# Patient Record
Sex: MALE | Race: WHITE | NOT HISPANIC OR LATINO | Employment: OTHER | URBAN - METROPOLITAN AREA
[De-identification: names, ages, dates, MRNs, and addresses within clinical notes are randomized per-mention and may not be internally consistent; named-entity substitution may affect disease eponyms.]

---

## 2017-01-20 DIAGNOSIS — E66.9 OBESITY: ICD-10-CM

## 2017-01-22 ENCOUNTER — TRANSCRIBE ORDERS (OUTPATIENT)
Dept: LAB | Facility: HOSPITAL | Age: 62
End: 2017-01-22

## 2017-01-22 ENCOUNTER — APPOINTMENT (OUTPATIENT)
Dept: LAB | Facility: HOSPITAL | Age: 62
End: 2017-01-22
Payer: COMMERCIAL

## 2017-01-22 DIAGNOSIS — E66.9 OBESITY: ICD-10-CM

## 2017-01-22 LAB
ALBUMIN SERPL BCP-MCNC: 4.1 G/DL (ref 3.5–5)
ALP SERPL-CCNC: 81 U/L (ref 46–116)
ALT SERPL W P-5'-P-CCNC: 31 U/L (ref 12–78)
ANION GAP SERPL CALCULATED.3IONS-SCNC: 8 MMOL/L (ref 4–13)
AST SERPL W P-5'-P-CCNC: 14 U/L (ref 5–45)
BASOPHILS # BLD AUTO: 0.04 THOUSANDS/ΜL (ref 0–0.1)
BASOPHILS NFR BLD AUTO: 1 % (ref 0–1)
BILIRUB SERPL-MCNC: 0.53 MG/DL (ref 0.2–1)
BUN SERPL-MCNC: 17 MG/DL (ref 5–25)
CALCIUM SERPL-MCNC: 9.1 MG/DL (ref 8.3–10.1)
CHLORIDE SERPL-SCNC: 106 MMOL/L (ref 100–108)
CHOLEST SERPL-MCNC: 187 MG/DL (ref 50–200)
CO2 SERPL-SCNC: 28 MMOL/L (ref 21–32)
CREAT SERPL-MCNC: 1.32 MG/DL (ref 0.6–1.3)
EOSINOPHIL # BLD AUTO: 0.2 THOUSAND/ΜL (ref 0–0.61)
EOSINOPHIL NFR BLD AUTO: 3 % (ref 0–6)
ERYTHROCYTE [DISTWIDTH] IN BLOOD BY AUTOMATED COUNT: 13.6 % (ref 11.6–15.1)
GFR SERPL CREATININE-BSD FRML MDRD: 55.5 ML/MIN/1.73SQ M
GLUCOSE SERPL-MCNC: 146 MG/DL (ref 65–140)
HCT VFR BLD AUTO: 45.5 % (ref 36.5–49.3)
HDLC SERPL-MCNC: 49 MG/DL (ref 40–60)
HGB BLD-MCNC: 15.1 G/DL (ref 12–17)
LDLC SERPL CALC-MCNC: 113 MG/DL (ref 0–100)
LYMPHOCYTES # BLD AUTO: 2.08 THOUSANDS/ΜL (ref 0.6–4.47)
LYMPHOCYTES NFR BLD AUTO: 35 % (ref 14–44)
MCH RBC QN AUTO: 31.7 PG (ref 26.8–34.3)
MCHC RBC AUTO-ENTMCNC: 33.2 G/DL (ref 31.4–37.4)
MCV RBC AUTO: 95 FL (ref 82–98)
MONOCYTES # BLD AUTO: 0.62 THOUSAND/ΜL (ref 0.17–1.22)
MONOCYTES NFR BLD AUTO: 10 % (ref 4–12)
NEUTROPHILS # BLD AUTO: 3.03 THOUSANDS/ΜL (ref 1.85–7.62)
NEUTS SEG NFR BLD AUTO: 51 % (ref 43–75)
NRBC BLD AUTO-RTO: 0 /100 WBCS
PLATELET # BLD AUTO: 256 THOUSANDS/UL (ref 149–390)
PMV BLD AUTO: 11.1 FL (ref 8.9–12.7)
POTASSIUM SERPL-SCNC: 4.4 MMOL/L (ref 3.5–5.3)
PROT SERPL-MCNC: 7.8 G/DL (ref 6.4–8.2)
RBC # BLD AUTO: 4.77 MILLION/UL (ref 3.88–5.62)
SODIUM SERPL-SCNC: 142 MMOL/L (ref 136–145)
TRIGL SERPL-MCNC: 126 MG/DL
WBC # BLD AUTO: 5.98 THOUSAND/UL (ref 4.31–10.16)

## 2017-01-22 PROCEDURE — 85025 COMPLETE CBC W/AUTO DIFF WBC: CPT

## 2017-01-22 PROCEDURE — 80061 LIPID PANEL: CPT

## 2017-01-22 PROCEDURE — 36415 COLL VENOUS BLD VENIPUNCTURE: CPT

## 2017-01-22 PROCEDURE — 80053 COMPREHEN METABOLIC PANEL: CPT

## 2017-01-23 ENCOUNTER — ALLSCRIPTS OFFICE VISIT (OUTPATIENT)
Dept: OTHER | Facility: OTHER | Age: 62
End: 2017-01-23

## 2017-05-23 ENCOUNTER — ALLSCRIPTS OFFICE VISIT (OUTPATIENT)
Dept: OTHER | Facility: OTHER | Age: 62
End: 2017-05-23

## 2017-09-29 ENCOUNTER — GENERIC CONVERSION - ENCOUNTER (OUTPATIENT)
Dept: OTHER | Facility: OTHER | Age: 62
End: 2017-09-29

## 2017-11-28 ENCOUNTER — GENERIC CONVERSION - ENCOUNTER (OUTPATIENT)
Dept: OTHER | Facility: OTHER | Age: 62
End: 2017-11-28

## 2017-12-29 ENCOUNTER — GENERIC CONVERSION - ENCOUNTER (OUTPATIENT)
Dept: OTHER | Facility: OTHER | Age: 62
End: 2017-12-29

## 2017-12-29 ENCOUNTER — ALLSCRIPTS OFFICE VISIT (OUTPATIENT)
Dept: OTHER | Facility: OTHER | Age: 62
End: 2017-12-29

## 2017-12-29 DIAGNOSIS — E66.9 OBESITY: ICD-10-CM

## 2017-12-29 DIAGNOSIS — R73.01 IMPAIRED FASTING GLUCOSE: ICD-10-CM

## 2017-12-29 DIAGNOSIS — E78.2 MIXED HYPERLIPIDEMIA: ICD-10-CM

## 2017-12-29 DIAGNOSIS — F40.01 AGORAPHOBIA WITH PANIC DISORDER: ICD-10-CM

## 2017-12-30 NOTE — PROGRESS NOTES
Assessment   1  Agoraphobia with panic attacks (300 21) (F40 01)   2  Obesity (BMI 30-39 9) (278 00) (E66 9)   3  Impaired fasting glucose (790 21) (R73 01)   4  Hyperlipidemia, mixed (272 2) (E78 2)   5  Current every day smoker (305 1) (F17 200)   6  Encounter for screening colonoscopy (V76 51) (Z12 11)    Plan   Agoraphobia with panic attacks, Hyperlipidemia, mixed    · (1) TSH WITH FT4 REFLEX; Status:Active; Requested for:46Aoi4519;   Agoraphobia with panic attacks, Hyperlipidemia, mixed, Impaired fasting glucose,    Obesity (BMI 30-39  9)    · (1) CBC/PLT/DIFF; Status:Active; Requested for:19Eru0933;    · (1) COMPREHENSIVE METABOLIC PANEL; Status:Active; Requested for:92Vcx8778;    · (1) LIPID PANEL FASTING W DIRECT LDL REFLEX; Status:Active; Requested    for:65Xxq2931;   Encounter for screening colonoscopy    · COLONOSCOPY; Status:Active; Requested for:90Vpr4268;   Hyperlipidemia, mixed, Impaired fasting glucose    · (1) HEMOGLOBIN A1C; Status:Active; Requested for:42Xyr3802;   SocHx: Current every day smoker    · You need to quit smoking ; Status:Complete;   Done: 72DTX3515   · 2 - *MAYRA&MEGHA ( COLORECTAL SURGERY, GASTROENTEROLOGY)    Co-Management  *  Status: Hold For - Scheduling     Requested for: 47HXI0865  Care Summary provided  : Yes  SocHx: Current every day smoker, Obesity (BMI 30-39  9)    · Some eating tips that can help you lose weight ; Status:Complete;   Done: 62OXR2623    Discussion/Summary      1  Agoraphobia with panic attacks- Mood seems to be stable on Abilify and Clonazepam  He will continue to follow up with Andrew Roldan LCSW in our office  Recommended he try to engage in more social activities, perhaps try to volunteer again    Hyperlipidemia- will check lipid panel  Follow a low fat/ low cholesterol diet    IFG- labs as ordered  Follow a low carb diet, watch sweets   Encouraged weight loss    Obesity- regular exercise, weight loss encouraged    Pipe smoker- advised to stop smoking  He declined any assistance with this today   advised and ordered  I also offered to order the FIT test but he does not want to do this test discussed and advised, however, he declined  the flu vaccine today    6 months  Possible side effects of new medications were reviewed with the patient/guardian today  The treatment plan was reviewed with the patient/guardian  The patient/guardian understands and agrees with the treatment plan      Chief Complaint   Patient here for routine follow up for Agoraphobia with panic attacks, Hyperlipidemia, mixed, Impaired fasting glucose, and Obesity (BMI 30-39 9)  History of Present Illness   Pt presents by himself today for a routine follow up of chronic medical conditions   with panic attacks- Currently taking Clonazepam 0 5 mg one tab at HS and Ability 10 mg one tab daily  He does admit that he sometimes takes the Abilify every other day, for no particular reason  Reports his mood and symptoms have been stable on this regimen  Has not established himself with a therapist or Psychiatrist  Does not drive  Lives with his sister and brother in law, doesn't really leave his home  Reports a lot of anxiety just coming to this appointment today as it is a new environment  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  he was just evaluated by Kevin Alicia our office this morning- plan is to continue seeing [facility] via [employer]    last lipid panel 1/2017 with total cholesterol 187/ / Triglycerides 126/ HDL 49  He does not follow any particular diets, no regular exercise  Unfortunately, he did not have blood work that was ordered for this past summer   last checked 1/2017 at 146, still unclear if this was fasting or not    no regular exercise or attempts to lose weight    smoker- smokes a pipe twice daily for 45 years, has no desire to quit  ETOH or illicit drug use   has never had and has no desire to have this procedure   It looks like the FIT test was ordered in 1/2017 but he did not complete this has never had this level checked and does NOT want it checked now          Review of Systems        Constitutional: no fever,-- not feeling poorly,-- no chills-- and-- not feeling tired  Cardiovascular: no chest pain,-- no intermittent leg claudication,-- no palpitations-- and-- no extremity edema  Respiratory: no shortness of breath,-- no cough,-- no wheezing-- and-- no shortness of breath during exertion  Gastrointestinal: no abdominal pain,-- no nausea,-- no constipation,-- no diarrhea-- and-- no blood in stools  Genitourinary: no dysuria-- and-- no nocturia  Musculoskeletal: no arthralgias-- and-- no myalgias  Integumentary: no rashes-- and-- no skin wound  Neurological: no headache,-- no numbness,-- no tingling-- and-- no dizziness  Psychiatric: as noted in HPI,-- not suicidal-- and-- no sleep disturbances  Endocrine: no feelings of weakness  Hematologic/Lymphatic: no swollen glands,-- no tendency for easy bleeding-- and-- no tendency for easy bruising  ROS reviewed  Active Problems   1  Dental disease (525 9) (K08 9)   2  Encounter for screening colonoscopy (V76 51) (Z12 11)   3  Encounter for screening for malignant neoplasm of prostate (V76 44) (Z12 5)   4  Hyperlipidemia, mixed (272 2) (E78 2)   5  Impaired fasting glucose (790 21) (R73 01)   6  Need for influenza vaccination (V04 81) (Z23)   7  Obesity (BMI 30-39 9) (278 00) (E66 9)   8  Psychosis, atypical (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 9) (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)    Past Medical History   1  No pertinent past medical history     The active problems and past medical history were reviewed and updated today  Family History   Mother    1  Family history of Claustrophobia  Family History    2  Family history of Drug addiction   3   Family history of alcoholism (V17 0) (Z81 1)    Social History    · Denied: History of Alcohol use   · Always uses seat belt   · Current every day smoker (305 1) (F17 200)   · Denies alcohol consumption (V49 89) (Z78 9)   · No caffeine use   · No drug use   · Pipe smoker (305 1) (F17 290)   · Second hand smoke exposure (V15 89) (Z77 22)   · Single  The social history was reviewed and updated today  The social history was reviewed and is unchanged  Current Meds    1  ARIPiprazole 10 MG Oral Tablet; TAKE 1 TABLET DAILY; Therapy: 88NZG9358 to (Evaluate:13Mar2018)  Requested for: 44IMG4687; Last     Rx:13Nov2017 Ordered   2  ClonazePAM 0 5 MG Oral Tablet; TAKE 1 TABLET AT BEDTIME; Therapy: 61NOS9558 to (Evaluate:20Sep2017); Last Rx:68Jgd4029 Ordered     The medication list was reviewed and updated today  Allergies   1  No Known Drug Allergies    Vitals   Vital Signs    Recorded: 89OHC6859 10:48AM Recorded: 54Gpa5000 09:49AM   Temperature  97 3 F, Tympanic   Heart Rate  64, L Radial   Respiration  16   Systolic 509, LUE, Sitting 652, LUE   Diastolic 90, LUE, Sitting 104, LUE   Height  5 ft 7 in   Weight  222 lb 6 4 oz   BMI Calculated  34 83   BSA Calculated  2 12   Pain Scale  0     Physical Exam        Constitutional      General appearance: No acute distress, well appearing and well nourished  obese  Eyes      Conjunctiva and lids: No swelling, erythema, or discharge  Pupils and irises: Equal, round and reactive to light  Pulmonary      Respiratory effort: No increased work of breathing or signs of respiratory distress  Auscultation of lungs: Clear to auscultation, equal breath sounds bilaterally, no wheezes, no rales, no rhonci  Cardiovascular      Auscultation of heart: Normal rate and rhythm, normal S1 and S2, without murmurs  Examination of extremities for edema and/or varicosities: Normal        Carotid pulses: Normal        Abdomen      Abdomen: Non-tender, no masses  The abdomen was obese  Bowel sounds were normal  The abdomen was soft and nontender        Liver and spleen: No hepatomegaly or splenomegaly  Lymphatic      Palpation of lymph nodes in neck: No lymphadenopathy  Musculoskeletal      Gait and station: Normal        Skin      Skin and subcutaneous tissue: Normal without rashes or lesions  Neurologic Grossly intact  Psychiatric      Orientation to person, place and time: Normal        Mood and affect: Abnormal   Mood and Affect: anxious  Attending Note   Collaborating Note: I did not interview and examine the patient-- and-- I agree with the Advanced Practitioner note  Future Appointments      Date/Time Provider Specialty Site   06/29/2018 09:40 AM Yana Shirley MD Family Medicine [facility]     Signatures    Electronically signed by : Jimenez Ncie [facility]; Dec 29 2017 10:53AM EST                       (Author)     Electronically signed by :  Philip Beckman MD; Dec 29 2017 12:09PM EST                       (Author)
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
## 2018-01-12 NOTE — MISCELLANEOUS
Signatures   Electronically signed by : BROOKE Schwartz; Nov 28 2017  9:50AM EST                       (Author)

## 2018-01-12 NOTE — MISCELLANEOUS
Provider Comments  Provider Comments:   No showed today, called patient no answer      Signatures   Electronically signed by : BROOKE Dennis; Sep 29 2017 10:42AM EST                       (Author)

## 2018-01-13 VITALS
SYSTOLIC BLOOD PRESSURE: 120 MMHG | HEART RATE: 106 BPM | TEMPERATURE: 97.9 F | RESPIRATION RATE: 18 BRPM | HEIGHT: 67 IN | BODY MASS INDEX: 34.73 KG/M2 | WEIGHT: 221.25 LBS | DIASTOLIC BLOOD PRESSURE: 90 MMHG

## 2018-01-13 VITALS
BODY MASS INDEX: 34.53 KG/M2 | DIASTOLIC BLOOD PRESSURE: 80 MMHG | WEIGHT: 220 LBS | RESPIRATION RATE: 20 BRPM | HEIGHT: 67 IN | OXYGEN SATURATION: 97 % | HEART RATE: 99 BPM | SYSTOLIC BLOOD PRESSURE: 130 MMHG

## 2018-01-13 NOTE — MISCELLANEOUS
Provider Comments  Provider Comments:   Dear Gissel Donnelly,    You missed your 2nd appointment on 11/14/2016 at 10:00AM  We understand that many situations arise that occasionally prevents patients from keeping scheduled appointments  It is the policy of [facility] that patients notify us 24 hours in advance if unable to keep a scheduled appointment  Missed appointments jeopardize strong patient-physician relationships and may compromise our ability to provide you with appropriate quality medical care  The appointment you missed could have easily been made available to another patient if you had contacted us to cancel  We like to accommodate all of our patients, but when patients miss an appointment it prevents us from being able to help everyone  In the future, we request at least 24 hours notice of cancellation so we can make your appointment available to someone else in need  At this time, you have missed two or more appointments  Should you miss another appointment or cancel with failure to give 24 hours notice, we will no longer be able to maintain a patient-physician relationship and may dismiss you from the practice        Sincerely,        The Physicians and Staff of [facility]         Signatures   Electronically signed by : COLE Sherman ; Nov 14 2016 11:01AM EST                       (Author)

## 2018-01-18 NOTE — MISCELLANEOUS
Provider Comments  Provider Comments:   Dear Matthew Hinson,    You missed an appointment on 2/22/2016 at 9:40am  We understand that many situations arise that occasionally prevents patients from keeping scheduled appointments  It is the policy of [facility] that patients notify us 24 hours in advance if unable to keep a scheduled appointment  Missed appointments jeopardize strong physician-patient relationships  The appointment you missed could have easily been made available to another patient if you had contacted us to cancel  We like to accommodate all of our patients, but when patients miss an appointment it prevents us from being able to help everyone  In the future, we request at least 24 hours notice of cancellation so we can make your appointment available to someone else in need         Sincerely,    The Physicians and Staff of [facility]        Signatures   Electronically signed by : COLE Huerta ; Feb 22 2016  5:14PM EST                       (Author)

## 2018-01-23 VITALS
BODY MASS INDEX: 34.91 KG/M2 | WEIGHT: 222.4 LBS | TEMPERATURE: 97.3 F | RESPIRATION RATE: 16 BRPM | HEIGHT: 67 IN | DIASTOLIC BLOOD PRESSURE: 90 MMHG | HEART RATE: 64 BPM | SYSTOLIC BLOOD PRESSURE: 132 MMHG

## 2018-01-23 NOTE — PSYCH
History of Present Illness  Psychotherapy Provided [facility]: Individual Psychotherapy 30 minutes minutes provided today  Goals addressed in session:   Timbo Escobar denies any acute issues  Mood stable  Anxiety and agoraphobia manageable  Was long time patient at [address] [facility]  Has not been in consistent counseling or treatment since that time  Had been volunteering at [employer] and this was helpful in decreasing tension with his sister with whom he resides  Has limited activities with others  Does spend time with his niece  May function below average intellectual functioning  Had been a drinker but stopped 7 years ago  He is pleasant, verbal and cooperative  HPI - Psych: Timbo Escobar is stable  Sleep and appetite appropriate  No psychosis  Anxiety appears well managed on current meds  Has some stress at home relying on his sister for transportation  This appears to be his primary stressor  Spends time with niece  Has some social supports  Had done very well volunteering and needs to develop similar activity  Agrees with this and plans to do so after worst of winter is over  Has long [address] treatment history as an outpatient  Reports onset of agoraphobia about 10 years ago  No precipitating stressors  Possibly related to his drinking  Has been sober for 7 years   Note   Note:   Provided supportive therapy and focused on establishing a rapport  Reinforced need to develop more activities and supports separate from sister to decrease stress at home  Will work with him on this  Provided my contact information  Provided handout on evisits  Would prefer to eliminate need for sister to transport  Plans on visiting niece today to help him set up account  Once this is done, he will contact me to schedule  Assessment    1   Agoraphobia with panic attacks (300 21) (F40 01)    Signatures   Electronically signed by : Carmel Diaz LCSW; Dec 29 2017  9:57AM EST (Author)

## 2018-04-01 DIAGNOSIS — F40.01 AGORAPHOBIA WITH PANIC ATTACKS: Primary | ICD-10-CM

## 2018-04-01 RX ORDER — ARIPIPRAZOLE 10 MG/1
TABLET ORAL
Qty: 30 TABLET | Refills: 3 | Status: SHIPPED | OUTPATIENT
Start: 2018-04-01 | End: 2018-07-06 | Stop reason: SDUPTHER

## 2018-07-02 RX ORDER — CLONAZEPAM 0.5 MG/1
1 TABLET ORAL
COMMUNITY
Start: 2015-06-05 | End: 2019-06-20 | Stop reason: ALTCHOICE

## 2018-07-06 ENCOUNTER — OFFICE VISIT (OUTPATIENT)
Dept: FAMILY MEDICINE CLINIC | Facility: CLINIC | Age: 63
End: 2018-07-06
Payer: COMMERCIAL

## 2018-07-06 VITALS
RESPIRATION RATE: 16 BRPM | HEART RATE: 116 BPM | OXYGEN SATURATION: 95 % | HEIGHT: 67 IN | SYSTOLIC BLOOD PRESSURE: 132 MMHG | BODY MASS INDEX: 35.47 KG/M2 | DIASTOLIC BLOOD PRESSURE: 88 MMHG | TEMPERATURE: 98.8 F | WEIGHT: 226 LBS

## 2018-07-06 DIAGNOSIS — E66.9 OBESITY (BMI 30-39.9): Primary | ICD-10-CM

## 2018-07-06 DIAGNOSIS — F40.01 AGORAPHOBIA WITH PANIC ATTACKS: ICD-10-CM

## 2018-07-06 DIAGNOSIS — E78.2 HYPERLIPIDEMIA, MIXED: ICD-10-CM

## 2018-07-06 DIAGNOSIS — R73.01 IMPAIRED FASTING GLUCOSE: ICD-10-CM

## 2018-07-06 DIAGNOSIS — F17.290 PIPE SMOKER: ICD-10-CM

## 2018-07-06 PROCEDURE — 3725F SCREEN DEPRESSION PERFORMED: CPT | Performed by: FAMILY MEDICINE

## 2018-07-06 PROCEDURE — 3008F BODY MASS INDEX DOCD: CPT | Performed by: FAMILY MEDICINE

## 2018-07-06 PROCEDURE — 99214 OFFICE O/P EST MOD 30 MIN: CPT | Performed by: FAMILY MEDICINE

## 2018-07-06 RX ORDER — ARIPIPRAZOLE 10 MG/1
10 TABLET ORAL DAILY
Qty: 30 TABLET | Refills: 5 | Status: SHIPPED | OUTPATIENT
Start: 2018-07-06 | End: 2018-08-13 | Stop reason: SDUPTHER

## 2018-07-06 NOTE — PROGRESS NOTES
Chief Complaint   Patient presents with    Follow-up     6 month follow up     Assessment/Plan:    Obesity---lose weight  Agoraphobia with panic attacks- Mood seems to be stable on Abilify  He will continue to follow up with Deng Brunson LCSW in our office  IFG---low carb diet  Check labs  Hyperlipidemia- will check lipid panel  Follow a low fat/ low cholesterol diet     Pipe smoker- advised to stop smoking  He declined any assistance with this today    Refused Colonoscopy today  Refused PSA today  RTO 4 months  Diagnoses and all orders for this visit:    Obesity (BMI 30-39 9)  -     CBC and differential; Future  -     Comprehensive metabolic panel; Future  -     TSH, 3rd generation with Free T4 reflex; Future    Agoraphobia with panic attacks  -     ARIPiprazole (ABILIFY) 10 mg tablet; Take 1 tablet (10 mg total) by mouth daily    Impaired fasting glucose  -     Hemoglobin A1C; Future    Hyperlipidemia, mixed  -     Lipid Panel with Direct LDL reflex; Future    Pipe smoker          Subjective:      Patient ID: Naeem Hernandez is a 63 y o  male  HPI    Pt is here by himself  Obesity---BMI 35 4 today  Walk daily  Eat healthy per pt  Agoraphobia with panic attacks--- Currently taking Ability 10 mg one tab daily  He states he does not need clonazepam for a while  Reports his mood and symptoms have been stable on this regimen  Pt saw psychiatrist before but not now  Saw therapist Rusty Joyner before and would like to continue seeing Rusty Joyner  IFG- last checked 1/2017 at 146  No recent labs  Hyperlipidemia- last lipid panel 1/2017 with total cholesterol 187/ / Triglycerides 126/ HDL 49  No recent labs  Pt states he does not like needles  Current smoker- smokes a pipe twice daily for 45 years, has no desire to quit   Denies ETOH or illicit drug use    Does not drive  Lives with his sister and brother in law         The following portions of the patient's history were reviewed and updated as appropriate: allergies, current medications, past family history, past medical history, past social history, past surgical history and problem list     Review of Systems   Constitutional: Negative for appetite change, chills and fever  HENT: Negative for congestion, ear pain, sinus pain and sore throat  Eyes: Negative for discharge and itching  Respiratory: Negative for apnea, cough, chest tightness, shortness of breath and wheezing  Cardiovascular: Negative for chest pain, palpitations and leg swelling  Gastrointestinal: Negative for abdominal pain, anal bleeding, constipation, diarrhea, nausea and vomiting  Endocrine: Negative for cold intolerance, heat intolerance and polyuria  Genitourinary: Negative for difficulty urinating and dysuria  Musculoskeletal: Negative for arthralgias, back pain and myalgias  Skin: Negative for rash  Neurological: Negative for dizziness and headaches  Psychiatric/Behavioral: Negative for agitation  Objective:      /88 (BP Location: Left arm, Patient Position: Sitting, Cuff Size: Adult)   Pulse (!) 116   Temp 98 8 °F (37 1 °C) (Tympanic)   Resp 16   Ht 5' 7" (1 702 m)   Wt 103 kg (226 lb)   SpO2 95%   BMI 35 40 kg/m²          Physical Exam   Constitutional: He appears well-developed  HENT:   Head: Normocephalic and atraumatic  Eyes: Conjunctivae are normal  Pupils are equal, round, and reactive to light  Neck: Normal range of motion  Neck supple  Cardiovascular: Normal rate, regular rhythm, normal heart sounds and intact distal pulses  Pulmonary/Chest: Effort normal and breath sounds normal    Abdominal: Soft  Bowel sounds are normal    Musculoskeletal: Normal range of motion  Neurological: He is alert

## 2018-08-12 DIAGNOSIS — F40.01 AGORAPHOBIA WITH PANIC ATTACKS: ICD-10-CM

## 2018-08-13 RX ORDER — ARIPIPRAZOLE 10 MG/1
TABLET ORAL
Qty: 30 TABLET | Refills: 5 | Status: SHIPPED | OUTPATIENT
Start: 2018-08-13 | End: 2018-09-21

## 2018-08-16 DIAGNOSIS — F40.01 AGORAPHOBIA WITH PANIC ATTACKS: ICD-10-CM

## 2018-08-16 RX ORDER — ARIPIPRAZOLE 10 MG/1
TABLET ORAL
Qty: 30 TABLET | Refills: 3 | Status: SHIPPED | OUTPATIENT
Start: 2018-08-16 | End: 2019-03-14 | Stop reason: SDUPTHER

## 2018-09-21 ENCOUNTER — HOSPITAL ENCOUNTER (EMERGENCY)
Facility: HOSPITAL | Age: 63
Discharge: HOME | End: 2018-09-21
Attending: EMERGENCY MEDICINE | Admitting: EMERGENCY MEDICINE
Payer: COMMERCIAL

## 2018-09-21 VITALS
WEIGHT: 210 LBS | BODY MASS INDEX: 31.83 KG/M2 | OXYGEN SATURATION: 96 % | RESPIRATION RATE: 18 BRPM | SYSTOLIC BLOOD PRESSURE: 134 MMHG | HEART RATE: 92 BPM | HEIGHT: 68 IN | TEMPERATURE: 97.4 F | DIASTOLIC BLOOD PRESSURE: 74 MMHG

## 2018-09-21 DIAGNOSIS — T78.40XA ALLERGIC REACTION: Primary | ICD-10-CM

## 2018-09-21 PROCEDURE — 96375 TX/PRO/DX INJ NEW DRUG ADDON: CPT

## 2018-09-21 PROCEDURE — 99283 EMERGENCY DEPT VISIT LOW MDM: CPT

## 2018-09-21 PROCEDURE — 96372 THER/PROPH/DIAG INJ SC/IM: CPT

## 2018-09-21 PROCEDURE — 96374 THER/PROPH/DIAG INJ IV PUSH: CPT

## 2018-09-21 RX ORDER — DIPHENHYDRAMINE HYDROCHLORIDE 50 MG/ML
50 INJECTION INTRAMUSCULAR; INTRAVENOUS ONCE
Status: COMPLETED | OUTPATIENT
Start: 2018-09-21 | End: 2018-09-21

## 2018-09-21 RX ORDER — PREDNISONE 20 MG/1
40 TABLET ORAL DAILY
Qty: 8 TABLET | Refills: 0 | Status: SHIPPED | OUTPATIENT
Start: 2018-09-21 | End: 2018-09-25

## 2018-09-21 RX ORDER — DIPHENHYDRAMINE HCL 25 MG
50 TABLET ORAL EVERY 6 HOURS PRN
Qty: 30 TABLET | Refills: 0 | Status: SHIPPED | OUTPATIENT
Start: 2018-09-21 | End: 2019-03-12 | Stop reason: ALTCHOICE

## 2018-09-21 RX ORDER — METHYLPREDNISOLONE SODIUM SUCCINATE 125 MG/2ML
INJECTION, POWDER, LYOPHILIZED, FOR SOLUTION INTRAMUSCULAR; INTRAVENOUS
Status: COMPLETED
Start: 2018-09-21 | End: 2018-09-21

## 2018-09-21 RX ORDER — EPINEPHRINE 0.3 MG/.3ML
0.3 INJECTION SUBCUTANEOUS ONCE
Qty: 0.3 ML | Refills: 0 | Status: SHIPPED | OUTPATIENT
Start: 2018-09-21 | End: 2022-01-31 | Stop reason: HOSPADM

## 2018-09-21 RX ORDER — EPINEPHRINE 1 MG/ML
0.3 INJECTION, SOLUTION, CONCENTRATE INTRAVENOUS ONCE
Status: COMPLETED | OUTPATIENT
Start: 2018-09-21 | End: 2018-09-21

## 2018-09-21 RX ORDER — METHYLPREDNISOLONE SODIUM SUCCINATE 125 MG/2ML
125 INJECTION, POWDER, LYOPHILIZED, FOR SOLUTION INTRAMUSCULAR; INTRAVENOUS DAILY
Status: DISCONTINUED | OUTPATIENT
Start: 2018-09-21 | End: 2018-09-21 | Stop reason: HOSPADM

## 2018-09-21 RX ADMIN — FAMOTIDINE 20 MG: 10 INJECTION, SOLUTION INTRAVENOUS at 03:27

## 2018-09-21 RX ADMIN — METHYLPREDNISOLONE SODIUM SUCCINATE 125 MG: 125 INJECTION, POWDER, LYOPHILIZED, FOR SOLUTION INTRAMUSCULAR; INTRAVENOUS at 03:24

## 2018-09-21 RX ADMIN — DIPHENHYDRAMINE HYDROCHLORIDE 50 MG: 50 INJECTION, SOLUTION INTRAMUSCULAR; INTRAVENOUS at 03:21

## 2018-09-21 RX ADMIN — EPINEPHRINE 0.3 MG: 1 INJECTION, SOLUTION, CONCENTRATE INTRAVENOUS at 03:15

## 2018-09-21 RX ADMIN — METHYLPREDNISOLONE SODIUM SUCCINATE 125 MG: 125 INJECTION, POWDER, FOR SOLUTION INTRAMUSCULAR; INTRAVENOUS at 03:24

## 2018-09-21 NOTE — DISCHARGE INSTRUCTIONS
Your allergic reaction was likely due to year exposure to peanut butter  Avoid eating any food containing peanuts or occult in peanuttype wheels  Food Allergy   WHAT YOU NEED TO KNOW:   A food allergy is an immune system reaction to a food  A food allergen is an ingredient or chemical in a food that causes your immune system to react  Allergic reactions happen when your immune system fights too strongly against an allergen and causes you to get sick  Allergic reactions can happen within minutes to several hours after you eat, touch, or smell the food  You can also have a second reaction up to 8 hours later  DISCHARGE INSTRUCTIONS:   Call 911 for signs or symptoms of anaphylaxis,  such as trouble breathing, swelling in your mouth or throat, or wheezing  You may also have itching, a rash, hives, or feel like you are going to faint  Seek care immediately if:   · Your mouth, tongue, or throat swells  · You have itching or hives that spread all over your body  Contact your healthcare provider if:   · You have new or worsening rashes, hives, or itching  · You have an upset stomach or are vomiting  · You have stomach cramps or diarrhea  · You have questions or concerns about your condition or care  Medicines:   · Epinephrine  is used to treat severe allergic reactions such as anaphylaxis  · Antihistamines  decrease mild symptoms such as itching or a rash  · Steroids: This medicine may be given to decrease inflammation  · Short-acting bronchodilators: You may need short-acting bronchodilators to help open your airways quickly  These medicines may be called rescue inhalers or relievers  They relieve sudden, severe symptoms and start to work right away  · Take your medicine as directed  Contact your healthcare provider if you think your medicine is not helping or if you have side effects  Tell him or her if you are allergic to any medicine   Keep a list of the medicines, vitamins, and herbs you take  Include the amounts, and when and why you take them  Bring the list or the pill bottles to follow-up visits  Carry your medicine list with you in case of an emergency  Follow up with your healthcare provider as directed: You may need to see specialists for ongoing care  Your healthcare provider may want to test you regularly to see if the food allergy changes  Write down your questions so you remember to ask them during follow-up visits  Steps to take for signs or symptoms of anaphylaxis:   · Immediately  give 1 shot of epinephrine only into the outer thigh muscle  · Leave the shot in place  as directed  Your healthcare provider may recommend you leave it in place for up to 10 seconds before you remove it  This helps make sure all of the epinephrine is delivered  · Call 911 and go to the emergency department,  even if the shot improved symptoms  Do not drive yourself  Bring the used epinephrine shot with you  Safety precautions to take if you are at risk for anaphylaxis:   · Keep 2 shots of epinephrine with you at all times  You may need a second shot, because epinephrine only works for about 20 minutes and symptoms may return  Your healthcare provider can show you and family members how to give the shot  Check the expiration date every month and replace it before it expires  · Create an action plan  Your healthcare provider can help you create a written plan that explains the allergy and an emergency plan to treat a reaction  The plan explains when to give a second epinephrine shot if symptoms return or do not improve after the first  Give copies of the action plan and emergency instructions to family members, work and school staff, and  providers  Show them how to give a shot of epinephrine  Update the plan as the allergy changes  · Be careful when you exercise  If you have had exercise-induced anaphylaxis, do not exercise right after you eat   Stop exercising right away if you start to develop any signs or symptoms of anaphylaxis  You may first feel tired, warm, or have itchy skin  Hives, swelling, and severe breathing problems may develop if you continue to exercise  · Carry medical alert identification  Wear jewelry or carry a card that says you have a food allergy  Ask your healthcare provider where to get these items  · Do not eat the food that causes your allergy  Even a small taste can cause an allergic reaction  Your healthcare provider or a dietitian can help you plan a balanced diet  Babies may need to drink a formula that does not contain milk or soy  A dietitian can teach you how to read labels for ingredients that cause your allergies  · Ask about ingredients in foods prepared outside your home  When you eat out, ask what is in the food you want to order  Ask how food is prepared  Fried foods may contain small amounts of food allergens, such as nuts and shellfish  · Use good hygiene  Do not share utensils or food  Wash your hands before and after meals  Flu vaccine and egg allergy:  Do not get the nasal spray form of the flu vaccine if you have an egg allergy  The nasal spray may contain egg proteins that can cause anaphylaxis  Ask your healthcare provider if the injection form of the vaccine is safe for you  © 2017 [facility] [facility] Information is for End User's use only and may not be sold, redistributed or otherwise used for commercial purposes  All illustrations and images included in CareNotes® are the copyrighted property of [employer]  or Pedro Jorge  The above information is an  only  It is not intended as medical advice for individual conditions or treatments  Talk to your doctor, nurse or pharmacist before following any medical regimen to see if it is safe and effective for you

## 2018-09-21 NOTE — ED ATTENDING ATTESTATION
Marian Erickson MD, saw and evaluated the patient  I have discussed the patient with the resident/non-physician practitioner and agree with the resident's/non-physician practitioner's findings, Plan of Care, and MDM as documented in the resident's/non-physician practitioner's note, except where noted  All available labs and Radiology studies were reviewed  At this point I agree with the current assessment done in the Emergency Department  I have conducted an independent evaluation of this patient a history and physical is as follows:      Critical Care Time  CritCare Time    Procedures     65 yo male with known peanut allergy, had peanut butter and developed lip swelling and facial swelling after few hours  No trouble breathing, no sob, no trouble swallowing, no rash, no itchiness, no vomiting  Vss, afebrile, lungs cta, rrr, lip swelling, no rash, abdomen soft nontender  Epi, benadryl, steroids  Observation

## 2018-09-21 NOTE — ED PROVIDER NOTES
History  Chief Complaint   Patient presents with    Allergic Reaction     Patient reported to have eaten peanut butter arond 2030 last night  Patient woken from sleep with facial and lip swelling  79-year-old male with a known past medical history of peanut and other food allergies presents to the emergency department for an acute allergic reaction to peanut butter  Patient states that previously in his life he is able tolerate peanut butter without a reaction, even though he was diagnosed with severe peanut allergy  Patient states that today he decided he he would eat peanut butter and shortly after started to notice his lips tingling and slowly enlarging  Patient was brought by family to the emergency department for evaluation and treatment  Enroute the patient took 50 mg oral Benadryl  Patient's initial oxygen on room air was 100%  Patient denies fever, chills, nausea, vomiting, abdominal pain, chest pain, shortness of breath, dyspnea, throat itching/swelling/closing up sensation, tongue swelling  Prior to Admission Medications   Prescriptions Last Dose Informant Patient Reported? Taking? ARIPiprazole (ABILIFY) 10 mg tablet   No Yes   Sig: TAKE 1 TABLET DAILY  clonazePAM (KlonoPIN) 0 5 mg tablet  Self Yes Yes   Sig: Take 1 tablet by mouth daily at bedtime      Facility-Administered Medications: None       History reviewed  No pertinent past medical history  History reviewed  No pertinent surgical history  Family History   Problem Relation Age of Onset    Other Mother         Claustrophobia    Drug abuse Family     Alcohol abuse Family      I have reviewed and agree with the history as documented      Social History   Substance Use Topics    Smoking status: Current Every Day Smoker     Types: Pipe    Smokeless tobacco: Never Used      Comment: Secondhand smoke exposure    Alcohol use No        Review of Systems   Constitutional: Negative for chills, diaphoresis, fatigue and fever    HENT: Negative for congestion, ear discharge, facial swelling, hearing loss, rhinorrhea, sinus pain, sinus pressure, sneezing, sore throat, tinnitus and trouble swallowing  Eyes: Negative for pain, discharge and redness  Respiratory: Negative for cough, choking, chest tightness, shortness of breath, wheezing and stridor  Cardiovascular: Negative for chest pain, palpitations and leg swelling  Gastrointestinal: Negative for abdominal distention, abdominal pain, blood in stool, constipation, diarrhea, nausea and vomiting  Endocrine: Negative for cold intolerance, polydipsia and polyuria  Genitourinary: Negative for difficulty urinating, dysuria, enuresis, flank pain, frequency and hematuria  Musculoskeletal: Negative for arthralgias, back pain, gait problem and neck stiffness  Skin: Negative for rash and wound  Allergic/Immunologic: Positive for food allergies  Neurological: Negative for dizziness, seizures, syncope, weakness, numbness and headaches  Hematological: Negative for adenopathy  Psychiatric/Behavioral: Negative for agitation, confusion, hallucinations, sleep disturbance and suicidal ideas  All other systems reviewed and are negative  Physical Exam  ED Triage Vitals   Temperature Pulse Respirations Blood Pressure SpO2   09/21/18 0320 09/21/18 0320 09/21/18 0320 09/21/18 0320 09/21/18 0320   (!) 97 4 °F (36 3 °C) (!) 117 (!) 28 149/90 98 %      Temp Source Heart Rate Source Patient Position - Orthostatic VS BP Location FiO2 (%)   09/21/18 0320 09/21/18 0320 09/21/18 0320 09/21/18 0320 --   Tympanic Monitor Sitting Right arm       Pain Score       09/21/18 0330       No Pain           Orthostatic Vital Signs  Vitals:    09/21/18 0330 09/21/18 0425 09/21/18 0520 09/21/18 0543   BP: 120/82 122/69 115/74 134/74   Pulse: (!) 111 99 90 92   Patient Position - Orthostatic VS: Sitting Lying Lying        Physical Exam   Constitutional: He is oriented to person, place, and time  He appears well-developed and well-nourished  No distress  HENT:   Head: Normocephalic and atraumatic  Mouth/Throat:       Eyes: Conjunctivae and EOM are normal  Pupils are equal, round, and reactive to light  Neck: Normal range of motion  Cardiovascular: Normal rate and regular rhythm  Exam reveals no gallop and no friction rub  No murmur heard  Pulmonary/Chest: Breath sounds normal  No respiratory distress  He has no wheezes  He has no rales  Abdominal: Soft  Normal appearance and bowel sounds are normal  There is no tenderness  There is no rigidity, no rebound, no guarding, no CVA tenderness, no tenderness at McBurney's point and negative Hoyos's sign  Neurological: He is alert and oriented to person, place, and time  No cranial nerve deficit or sensory deficit  GCS eye subscore is 4  GCS verbal subscore is 5  GCS motor subscore is 6  Reflex Scores:       Bicep reflexes are 2+ on the right side and 2+ on the left side  Patellar reflexes are 2+ on the right side and 2+ on the left side  Patient has equal 5/5 strength b/l UE and Le  No focal neuro deficits noted  Skin: Skin is warm and dry  Capillary refill takes less than 2 seconds  He is not diaphoretic  Psychiatric: He has a normal mood and affect  His behavior is normal  Judgment and thought content normal    Nursing note and vitals reviewed        ED Medications  Medications   famotidine (PEPCID) injection 20 mg (20 mg Intravenous Given 9/21/18 0327)   methylPREDNISolone sodium succinate (Solu-MEDROL) injection 125 mg (125 mg Intravenous Given 9/21/18 0324)   diphenhydrAMINE (BENADRYL) injection 50 mg (50 mg Intravenous Given 9/21/18 0321)   EPINEPHrine PF (ADRENALIN) 1 mg/mL injection 0 3 mg (0 3 mg Intramuscular Given 9/21/18 0315)       Diagnostic Studies  Results Reviewed     None                 No orders to display         Procedures  Procedures      Phone Consults  ED Phone Contact    ED Course MDM  Number of Diagnoses or Management Options  Allergic reaction: new and requires workup  Diagnosis management comments: Upon arrival emergency department the patient's oxygen saturation on room air is 100%  Patient had IV access gain is given 125 Solu-Medrol, 50 mg Benadryl, 20 mg Pepcid, 0 3 mg IM epinephrine  Patient shortly after started see improvements in his sensations in his lips  Patient also states that her last epinephrine was given his throat was no longer scratchy  Approximately 30 minutes after the epinephrine and medications were given the patient's lip significantly improved the patient feels as if his symptoms are resolving  Will continue to watch for minimum 2-3 hours for rebound of symptoms  1   Food allergy, peanut  -patient prescribed EpiPen  -patient is to take Prednisone 40mg x 4 days  -patient is take Benadryl 50 mg q 8    The patient presented with a condition in which there was a high probability of imminent or life-threatening deterioration, and critical care services (excluding separately billable procedures) totalled 30-74 minutes  Disposition  Final diagnoses: Allergic reaction     Time reflects when diagnosis was documented in both MDM as applicable and the Disposition within this note     Time User Action Codes Description Comment    9/21/2018  5:24 AM Marty Walton Add [[id number] [id number]] Allergic reaction       ED Disposition     ED Disposition Condition Comment    Discharge  Dairl Pinna discharge to home/self care      Condition at discharge: Good        Follow-up Information     Follow up With Specialties Details Why Contact Info Additional Information    Margot Bryan MD Family Medicine Call in 2 days  [address] [facility] [phone]       [address] [place] [address] Emergency Department Emergency Medicine  If symptoms worsen [address]  [phone]  ED, [address] [place] [address], [place], [address] [facility], DO Allergy Call in 2 days  [address] [facility]  [address] [facility]             Discharge Medication List as of 9/21/2018  5:28 AM      START taking these medications    Details   diphenhydrAMINE (BENADRYL) 25 mg tablet Take 2 tablets (50 mg total) by mouth every 6 (six) hours as needed for itching, Starting Fri 9/21/2018, Print      EPINEPHrine (EPIPEN) 0 3 mg/0 3 mL SOAJ Inject 0 3 mL (0 3 mg total) into a muscle once for 1 dose, Starting Fri 9/21/2018, Print      predniSONE 20 mg tablet Take 2 tablets (40 mg total) by mouth daily for 4 days, Starting Fri 9/21/2018, Until Tue 9/25/2018, Print         CONTINUE these medications which have NOT CHANGED    Details   ARIPiprazole (ABILIFY) 10 mg tablet TAKE 1 TABLET DAILY , Normal      clonazePAM (KlonoPIN) 0 5 mg tablet Take 1 tablet by mouth daily at bedtime, Starting Fri 6/5/2015, Historical Med           No discharge procedures on file  ED Provider  Attending physically available and evaluated Chris Garcia  I managed the patient along with the ED Attending      Electronically Signed by         Caryle Bright, DO  09/21/18 7756

## 2018-09-21 NOTE — ED NOTES
Dr Kennedi Sims at the bedside for patient evaluation at this time       Nayan Umana, RN  09/21/18 2874

## 2018-11-05 ENCOUNTER — APPOINTMENT (OUTPATIENT)
Dept: LAB | Facility: HOSPITAL | Age: 63
End: 2018-11-05
Payer: COMMERCIAL

## 2018-11-05 DIAGNOSIS — E66.9 OBESITY (BMI 30-39.9): ICD-10-CM

## 2018-11-05 DIAGNOSIS — F40.01 AGORAPHOBIA WITH PANIC DISORDER: ICD-10-CM

## 2018-11-05 DIAGNOSIS — E78.2 HYPERLIPIDEMIA, MIXED: ICD-10-CM

## 2018-11-05 DIAGNOSIS — E66.9 OBESITY: ICD-10-CM

## 2018-11-05 DIAGNOSIS — E78.2 MIXED HYPERLIPIDEMIA: ICD-10-CM

## 2018-11-05 DIAGNOSIS — R73.01 IMPAIRED FASTING GLUCOSE: ICD-10-CM

## 2018-11-05 LAB
ALBUMIN SERPL BCP-MCNC: 3.7 G/DL (ref 3.5–5)
ALP SERPL-CCNC: 78 U/L (ref 46–116)
ALT SERPL W P-5'-P-CCNC: 34 U/L (ref 12–78)
ANION GAP SERPL CALCULATED.3IONS-SCNC: 9 MMOL/L (ref 4–13)
AST SERPL W P-5'-P-CCNC: 14 U/L (ref 5–45)
BASOPHILS # BLD AUTO: 0.06 THOUSANDS/ΜL (ref 0–0.1)
BASOPHILS NFR BLD AUTO: 1 % (ref 0–1)
BILIRUB SERPL-MCNC: 0.3 MG/DL (ref 0.2–1)
BUN SERPL-MCNC: 14 MG/DL (ref 5–25)
CALCIUM SERPL-MCNC: 8.2 MG/DL (ref 8.3–10.1)
CHLORIDE SERPL-SCNC: 107 MMOL/L (ref 100–108)
CHOLEST SERPL-MCNC: 174 MG/DL (ref 50–200)
CO2 SERPL-SCNC: 22 MMOL/L (ref 21–32)
CREAT SERPL-MCNC: 1.17 MG/DL (ref 0.6–1.3)
EOSINOPHIL # BLD AUTO: 0.18 THOUSAND/ΜL (ref 0–0.61)
EOSINOPHIL NFR BLD AUTO: 2 % (ref 0–6)
ERYTHROCYTE [DISTWIDTH] IN BLOOD BY AUTOMATED COUNT: 13.9 % (ref 11.6–15.1)
EST. AVERAGE GLUCOSE BLD GHB EST-MCNC: 126 MG/DL
GFR SERPL CREATININE-BSD FRML MDRD: 67 ML/MIN/1.73SQ M
GLUCOSE P FAST SERPL-MCNC: 146 MG/DL (ref 65–99)
HBA1C MFR BLD: 6 % (ref 4.2–6.3)
HCT VFR BLD AUTO: 43.9 % (ref 36.5–49.3)
HDLC SERPL-MCNC: 34 MG/DL (ref 40–60)
HGB BLD-MCNC: 14.4 G/DL (ref 12–17)
IMM GRANULOCYTES # BLD AUTO: 0.01 THOUSAND/UL (ref 0–0.2)
IMM GRANULOCYTES NFR BLD AUTO: 0 % (ref 0–2)
LDLC SERPL CALC-MCNC: 116 MG/DL (ref 0–100)
LYMPHOCYTES # BLD AUTO: 2.09 THOUSANDS/ΜL (ref 0.6–4.47)
LYMPHOCYTES NFR BLD AUTO: 28 % (ref 14–44)
MCH RBC QN AUTO: 31.8 PG (ref 26.8–34.3)
MCHC RBC AUTO-ENTMCNC: 32.8 G/DL (ref 31.4–37.4)
MCV RBC AUTO: 97 FL (ref 82–98)
MONOCYTES # BLD AUTO: 0.62 THOUSAND/ΜL (ref 0.17–1.22)
MONOCYTES NFR BLD AUTO: 8 % (ref 4–12)
NEUTROPHILS # BLD AUTO: 4.64 THOUSANDS/ΜL (ref 1.85–7.62)
NEUTS SEG NFR BLD AUTO: 61 % (ref 43–75)
NRBC BLD AUTO-RTO: 0 /100 WBCS
PLATELET # BLD AUTO: 238 THOUSANDS/UL (ref 149–390)
PMV BLD AUTO: 11.5 FL (ref 8.9–12.7)
POTASSIUM SERPL-SCNC: 3.6 MMOL/L (ref 3.5–5.3)
PROT SERPL-MCNC: 7 G/DL (ref 6.4–8.2)
RBC # BLD AUTO: 4.53 MILLION/UL (ref 3.88–5.62)
SODIUM SERPL-SCNC: 138 MMOL/L (ref 136–145)
TRIGL SERPL-MCNC: 122 MG/DL
TSH SERPL DL<=0.05 MIU/L-ACNC: 1.17 UIU/ML (ref 0.36–3.74)
WBC # BLD AUTO: 7.6 THOUSAND/UL (ref 4.31–10.16)

## 2018-11-05 PROCEDURE — 85025 COMPLETE CBC W/AUTO DIFF WBC: CPT

## 2018-11-05 PROCEDURE — 80053 COMPREHEN METABOLIC PANEL: CPT

## 2018-11-05 PROCEDURE — 80061 LIPID PANEL: CPT

## 2018-11-05 PROCEDURE — 36415 COLL VENOUS BLD VENIPUNCTURE: CPT

## 2018-11-05 PROCEDURE — 83036 HEMOGLOBIN GLYCOSYLATED A1C: CPT

## 2018-11-05 PROCEDURE — 84443 ASSAY THYROID STIM HORMONE: CPT

## 2018-11-09 ENCOUNTER — OFFICE VISIT (OUTPATIENT)
Dept: FAMILY MEDICINE CLINIC | Facility: CLINIC | Age: 63
End: 2018-11-09
Payer: COMMERCIAL

## 2018-11-09 VITALS
HEART RATE: 68 BPM | TEMPERATURE: 97.9 F | WEIGHT: 226.4 LBS | HEIGHT: 68 IN | RESPIRATION RATE: 16 BRPM | BODY MASS INDEX: 34.31 KG/M2 | SYSTOLIC BLOOD PRESSURE: 118 MMHG | DIASTOLIC BLOOD PRESSURE: 86 MMHG

## 2018-11-09 DIAGNOSIS — F40.01 AGORAPHOBIA WITH PANIC ATTACKS: ICD-10-CM

## 2018-11-09 DIAGNOSIS — Z23 NEED FOR INFLUENZA VACCINATION: ICD-10-CM

## 2018-11-09 DIAGNOSIS — E66.9 OBESITY (BMI 30-39.9): ICD-10-CM

## 2018-11-09 DIAGNOSIS — E78.2 HYPERLIPIDEMIA, MIXED: ICD-10-CM

## 2018-11-09 DIAGNOSIS — R73.01 IMPAIRED FASTING GLUCOSE: Primary | ICD-10-CM

## 2018-11-09 PROCEDURE — 90471 IMMUNIZATION ADMIN: CPT

## 2018-11-09 PROCEDURE — 99214 OFFICE O/P EST MOD 30 MIN: CPT | Performed by: FAMILY MEDICINE

## 2018-11-09 PROCEDURE — 3008F BODY MASS INDEX DOCD: CPT | Performed by: FAMILY MEDICINE

## 2018-11-09 PROCEDURE — 90682 RIV4 VACC RECOMBINANT DNA IM: CPT

## 2018-11-09 RX ORDER — D-METHORPHAN/PE/ACETAMINOPHEN 5-325MG/15
LIQUID (ML) ORAL
Refills: 0 | COMMUNITY
Start: 2018-09-21 | End: 2019-03-12

## 2018-11-09 NOTE — PROGRESS NOTES
Chief Complaint   Patient presents with    Follow-up     4 month follow up  Health Maintenance   Topic Date Due    CRC Screening: Colonoscopy  1955    Pneumococcal PPSV23 Medium Risk Adult (1 of 1 - PPSV23) 08/21/1976    DTaP,Tdap,and Td Vaccines (1 - Tdap) 08/21/1978    INFLUENZA VACCINE  07/01/2018    Depression Screening PHQ  07/06/2019     Assessment/Plan:  Reviewed lab in 11/2018  TSH normal  CBC ok  CMP ok  HgA1C 6 0 elevated  Lipid 174/122/34/116 ok    IFG---low carb diet  Agoraphobia with panic attacks- Mood seems to be stable on Abilify  He will continue to follow up with Hardik Suarez LCSW in our office     Hyperlipidemia---Follow a low fat/ low cholesterol diet     Obesity---lose weight  Advised pt to exercise regularly       Give flu shot today  Refused pneumovax  Refused Colonoscopy today  Refused PSA today  RTO 6 months  Diagnoses and all orders for this visit:    Impaired fasting glucose  -     Hemoglobin A1C; Future    Agoraphobia with panic attacks  -     Comprehensive metabolic panel; Future    Hyperlipidemia, mixed  -     Lipid Panel with Direct LDL reflex; Future    Obesity (BMI 30-39 9)  -     TSH, 3rd generation with Free T4 reflex; Future    Need for influenza vaccination  -     influenza vaccine, [id number], quadrivalent, recombinant, PF, 0 5 mL, for patients 18 yr+ (FLUBLOK)    Other orders  -     Cancel: Ambulatory referral to Gastroenterology; Future  -     Cancel: Ambulatory referral to Gastroenterology; Future          Subjective:      Patient ID: More Loving is a 66 y o  male  HPI    Pt is here by himself  IFG---11/2018 hgA1C 6 0 Pt likes candy       Agoraphobia with panic attacks--- Currently taking Ability 10 mg one tab daily  He states he does not need clonazepam for a while  Reports his mood and symptoms have been stable on this regimen  Pt saw psychiatrist before but not now  Saw therapist Farhat Young before and would like to continue seeing Farhat Young     Hyperlipidemia---Pt states he eat healthy, a lot of vegetables  Obesity---BMI 34 42 today        Current smoker- smokes a pipe twice daily for 45 years, has no desire to quit   Denies ETOH or illicit drug use     Does not drive  Lives with his sister and brother in law  The following portions of the patient's history were reviewed and updated as appropriate: allergies, current medications, past family history, past medical history, past social history, past surgical history and problem list     Review of Systems   Constitutional: Negative for appetite change, chills and fever  HENT: Negative for congestion, ear pain, sinus pain and sore throat  Eyes: Negative for discharge and itching  Respiratory: Negative for apnea, cough, chest tightness, shortness of breath and wheezing  Cardiovascular: Negative for chest pain, palpitations and leg swelling  Gastrointestinal: Negative for abdominal pain, anal bleeding, constipation, diarrhea, nausea and vomiting  Endocrine: Negative for cold intolerance, heat intolerance and polyuria  Genitourinary: Negative for difficulty urinating and dysuria  Musculoskeletal: Negative for arthralgias, back pain and myalgias  Skin: Negative for rash  Neurological: Negative for dizziness and headaches  Psychiatric/Behavioral: Negative for agitation  Objective:      /86 (BP Location: Left arm, Patient Position: Sitting, Cuff Size: Standard)   Pulse 68   Temp 97 9 °F (36 6 °C) (Tympanic)   Resp 16   Ht 5' 8" (1 727 m)   Wt 103 kg (226 lb 6 4 oz)   BMI 34 42 kg/m²          Physical Exam   Constitutional: He appears well-developed  HENT:   Head: Normocephalic and atraumatic  Eyes: Pupils are equal, round, and reactive to light  Conjunctivae are normal    Neck: Normal range of motion  Neck supple  Cardiovascular: Normal rate, regular rhythm, normal heart sounds and intact distal pulses      Pulmonary/Chest: Effort normal and breath sounds normal    Abdominal: Soft  Bowel sounds are normal    Musculoskeletal: Normal range of motion  Neurological: He is alert

## 2018-12-12 ENCOUNTER — HOSPITAL ENCOUNTER (EMERGENCY)
Facility: HOSPITAL | Age: 63
Discharge: HOME | End: 2018-12-12
Attending: EMERGENCY MEDICINE
Payer: COMMERCIAL

## 2018-12-12 VITALS
OXYGEN SATURATION: 94 % | TEMPERATURE: 97.9 F | SYSTOLIC BLOOD PRESSURE: 134 MMHG | RESPIRATION RATE: 18 BRPM | HEART RATE: 86 BPM | DIASTOLIC BLOOD PRESSURE: 64 MMHG

## 2018-12-12 DIAGNOSIS — T78.40XA ALLERGIC REACTION: ICD-10-CM

## 2018-12-12 DIAGNOSIS — T78.3XXA ANGIOEDEMA: Primary | ICD-10-CM

## 2018-12-12 PROCEDURE — 96374 THER/PROPH/DIAG INJ IV PUSH: CPT

## 2018-12-12 PROCEDURE — 96375 TX/PRO/DX INJ NEW DRUG ADDON: CPT

## 2018-12-12 PROCEDURE — 99283 EMERGENCY DEPT VISIT LOW MDM: CPT

## 2018-12-12 RX ORDER — DIPHENHYDRAMINE HYDROCHLORIDE 50 MG/ML
50 INJECTION INTRAMUSCULAR; INTRAVENOUS ONCE
Status: COMPLETED | OUTPATIENT
Start: 2018-12-12 | End: 2018-12-12

## 2018-12-12 RX ORDER — METHYLPREDNISOLONE SODIUM SUCCINATE 125 MG/2ML
125 INJECTION, POWDER, LYOPHILIZED, FOR SOLUTION INTRAMUSCULAR; INTRAVENOUS ONCE
Status: COMPLETED | OUTPATIENT
Start: 2018-12-12 | End: 2018-12-12

## 2018-12-12 RX ORDER — PREDNISONE 20 MG/1
40 TABLET ORAL DAILY
Qty: 8 TABLET | Refills: 0 | Status: SHIPPED | OUTPATIENT
Start: 2018-12-12 | End: 2018-12-16

## 2018-12-12 RX ADMIN — DIPHENHYDRAMINE HYDROCHLORIDE 50 MG: 50 INJECTION, SOLUTION INTRAMUSCULAR; INTRAVENOUS at 05:15

## 2018-12-12 RX ADMIN — FAMOTIDINE 20 MG: 10 INJECTION, SOLUTION INTRAVENOUS at 05:17

## 2018-12-12 RX ADMIN — METHYLPREDNISOLONE SODIUM SUCCINATE 125 MG: 125 INJECTION, POWDER, FOR SOLUTION INTRAMUSCULAR; INTRAVENOUS at 05:16

## 2018-12-12 NOTE — ED PROVIDER NOTES
History  Chief Complaint   Patient presents with    Allergic Reaction     Pt woke up with swelling to left side of face  Pt denies problems breathing  65 y/o male presents to the ED for L upper lip swelling  Patient states that he woke up from sleep about 20 minutes ago and noticed the swelling  He states that he has a hx of angioedema with prior allergic reactions to foods  He states that he is allergic to peanut butter and mushrooms- had a new TV dinner last night and is unsure it contained either of these  He did not take anything for the symptoms  Denies any tongue swelling, sensation of his throat closing, drooling, throat swelling, sob, wheeze, abd pain, n/v, or rash/ itching  He reports that he was seen here a few months ago for similar, after eating peanut butter- states that his entire upper and lower lips were swollen at that time and he was given epinephrine with resolution of symptoms  He denies any prior admissions or intubations for this  Denies any other complaints  Denies any new medications or ACE/ARB medication use  History provided by:  Patient      Prior to Admission Medications   Prescriptions Last Dose Informant Patient Reported? Taking? ARIPiprazole (ABILIFY) 10 mg tablet  Self No Yes   Sig: TAKE 1 TABLET DAILY  [facility] ALL[facility]GY 25 MG capsule  Self Yes Yes   Sig: TAKE 2 CAPSULES BY MOUTH EVERY 6 HRS AS NEEDED FOR ITCHING   EPINEPHrine (EPIPEN) 0 3 mg/0 3 mL SOAJ  Self No Yes   Sig: Inject 0 3 mL (0 3 mg total) into a muscle once for 1 dose   clonazePAM (KlonoPIN) 0 5 mg tablet  Self Yes Yes   Sig: Take 1 tablet by mouth daily at bedtime   diphenhydrAMINE (BENADRYL) 25 mg tablet  Self No Yes   Sig: Take 2 tablets (50 mg total) by mouth every 6 (six) hours as needed for itching      Facility-Administered Medications: None       History reviewed  No pertinent past medical history  History reviewed  No pertinent surgical history      Family History   Problem Relation Age of Onset  Other Mother         Claustrophobia    Drug abuse Family     Alcohol abuse Family      I have reviewed and agree with the history as documented  Social History   Substance Use Topics    Smoking status: Current Every Day Smoker     Types: Pipe    Smokeless tobacco: Never Used      Comment: Secondhand smoke exposure    Alcohol use No        Review of Systems   Constitutional: Negative for chills and fever  HENT: Positive for facial swelling  Negative for congestion, ear pain and sore throat  Eyes: Negative for pain and visual disturbance  Respiratory: Negative for cough, shortness of breath and wheezing  Cardiovascular: Negative for chest pain and leg swelling  Gastrointestinal: Negative for abdominal pain, diarrhea, nausea and vomiting  Genitourinary: Negative for dysuria, frequency, hematuria and urgency  Musculoskeletal: Negative for neck pain and neck stiffness  Skin: Negative for rash and wound  Neurological: Negative for weakness, numbness and headaches  Psychiatric/Behavioral: Negative for agitation and confusion  All other systems reviewed and are negative  Physical Exam  ED Triage Vitals   Temperature Pulse Respirations Blood Pressure SpO2   12/12/18 0510 12/12/18 0507 12/12/18 0507 12/12/18 0507 12/12/18 0507   97 9 °F (36 6 °C) (!) 111 20 151/78 95 %      Temp src Heart Rate Source Patient Position - Orthostatic VS BP Location FiO2 (%)   -- 12/12/18 0600 12/12/18 0600 12/12/18 0600 --    Monitor Lying Right arm       Pain Score       12/12/18 0507       No Pain           Orthostatic Vital Signs  Vitals:    12/12/18 0507 12/12/18 0600 12/12/18 0730   BP: 151/78 137/76 134/64   Pulse: (!) 111 90 86   Patient Position - Orthostatic VS:  Lying Lying       Physical Exam   Constitutional: He is oriented to person, place, and time  He appears well-developed and well-nourished  HENT:   Head: Normocephalic and atraumatic     Mouth/Throat: Oropharynx is clear and moist  Left sided upper lip swelling  No tongue swelling  Oropharynx is patent and open without any edema    Eyes: Pupils are equal, round, and reactive to light  EOM are normal    Neck: Normal range of motion  Neck supple  Cardiovascular: Normal rate and regular rhythm  Pulmonary/Chest: Effort normal and breath sounds normal  No respiratory distress  He has no wheezes  He has no rales  He exhibits no tenderness  Abdominal: Soft  Bowel sounds are normal  He exhibits no distension  There is no tenderness  Musculoskeletal: Normal range of motion  Neurological: He is alert and oriented to person, place, and time  No focal deficits   Skin: Skin is warm and dry  No rash noted  Nursing note and vitals reviewed  ED Medications  Medications   methylPREDNISolone sodium succinate (Solu-MEDROL) injection 125 mg (125 mg Intravenous Given 12/12/18 0516)   famotidine (PEPCID) injection 20 mg (20 mg Intravenous Given 12/12/18 0517)   diphenhydrAMINE (BENADRYL) injection 50 mg (50 mg Intravenous Given 12/12/18 0515)       Diagnostic Studies  Results Reviewed     None                 No orders to display         Procedures  Procedures      Phone Consults  ED Phone Contact    ED Course  ED Course as of Dec 13 0138   Wed Dec 12, 2018   0627 Patient re-evaluated- states that he feels slightly improved  Swelling has gone down slightly  MDM  Number of Diagnoses or Management Options  Allergic reaction: new and requires workup  Angioedema: new and requires workup  Diagnosis management comments: Patient with unilateral angioedema- will give steroids, benadryl, and pepcid- will reassess  Case signed out- pending continued observation          Amount and/or Complexity of Data Reviewed  Clinical lab tests: ordered and reviewed  Tests in the radiology section of CPT®: ordered and reviewed  Tests in the medicine section of CPT®: ordered and reviewed  Discussion of test results with the performing providers: yes  Decide to obtain previous medical records or to obtain history from someone other than the patient: yes  Obtain history from someone other than the patient: yes  Review and summarize past medical records: yes  Discuss the patient with other providers: yes  Independent visualization of images, tracings, or specimens: yes    Patient Progress  Patient progress: improved    CritCare Time    Disposition  Final diagnoses:   Angioedema   Allergic reaction     Time reflects when diagnosis was documented in both MDM as applicable and the Disposition within this note     Time User Action Codes Description Comment    12/12/2018  5:38 AM Genoveva Boothh A Add [place]  3XXA] Angioedema     12/12/2018  6:54 AM Genoveva Boothh A Add [T78 [id number]] Allergic reaction       ED Disposition     ED Disposition Condition Comment    Discharge  Deette Pro discharge to home/self care  Condition at discharge: Stable        Follow-up Information     Follow up With Specialties Details Why Contact Info Additional Information    Ida Kenney MD Family Medicine Call in 1 day for follow up within 2-3 days  [address]   [address] Emergency Department Emergency Medicine Go to immediately for any new or worsening symptoms    [address] [place] [address], [address] [place] [address], [id number]          Discharge Medication List as of 12/12/2018  7:11 AM      START taking these medications    Details   predniSONE 20 mg tablet Take 2 tablets (40 mg total) by mouth daily for 4 days, Starting Wed 12/12/2018, Until Sun 12/16/2018, Print         CONTINUE these medications which have NOT CHANGED    Details   ARIPiprazole (ABILIFY) 10 mg tablet TAKE 1 TABLET DAILY , Normal      clonazePAM (KlonoPIN) 0 5 mg tablet Take 1 tablet by mouth daily at bedtime, Starting Fri 6/5/2015, Historical Med      [facility] ALLERGY 25 MG capsule TAKE 2 CAPSULES BY MOUTH EVERY 6 HRS AS NEEDED FOR ITCHING, Historical Med      diphenhydrAMINE (BENADRYL) 25 mg tablet Take 2 tablets (50 mg total) by mouth every 6 (six) hours as needed for itching, Starting Fri 9/21/2018, Print      EPINEPHrine (EPIPEN) 0 3 mg/0 3 mL SOAJ Inject 0 3 mL (0 3 mg total) into a muscle once for 1 dose, Starting Fri 9/21/2018, Print           No discharge procedures on file  ED Provider  Attending physically available and evaluated Matthew Hinson I managed the patient along with the ED Attending      Electronically Signed by         Martin Case DO  12/13/18 3321

## 2018-12-12 NOTE — ED ATTENDING ATTESTATION
Saundra Foreman MD, saw and evaluated the patient  I have discussed the patient with the resident/non-physician practitioner and agree with the resident's/non-physician practitioner's findings, Plan of Care, and MDM as documented in the resident's/non-physician practitioner's note, except where noted  All available labs and Radiology studies were reviewed  At this point I agree with the current assessment done in the Emergency Department  I have conducted an independent evaluation of this patient including a focused history and a physical exam     68-year-old male, history of allergies to mushrooms and peanuts, presents to the emergency department for evaluation of swelling to his left upper lip that was present on waking  Patient denies any known ingestants, however states that he a new TV dinner the preceding night  Patient denies any sensation of swelling in his tongue or shortness of breath  Patient did not administer any medications prior to arrival   Patient has an epinephrine pen at home  On examination the patient is noted to have some mild swelling of the left upper lip  Uvula is midline and nonswollen  Tongue is nonswollen  Patient has a Mallampati 1 airway  Neck is supple without stridor  Lungs are clear to auscultation bilaterally  Heart is regular rate and rhythm  Abdomen is soft and nontender  Skin without any rashes  Allergic reaction versus idiopathic angioedema of the left side of the upper lip  Patient will be medicated with Benadryl, steroids, and H2 blockers and will be observed in the emergency department  Given the focal nature of the swelling, and lack of systemic symptoms, patient does not require epinephrine at this point for anaphylaxis

## 2018-12-12 NOTE — DISCHARGE INSTRUCTIONS
Angioedema   WHAT YOU NEED TO KNOW:   Angioedema is sudden swelling caused by fluid that collects in deep layers of the skin  Swelling occurs most often on the face, lips, tongue, or throat, but it can happen anywhere on the body  Your risk for angioedema increases if you have food or insect allergies or a family history of angioedema  Emotional stress, autoimmune disorders, and medicines, such as ACE inhibitors, also increase your risk  Your symptoms may be mild, or you may develop anaphylaxis  Anaphylaxis is a sudden, life-threatening reaction that needs immediate treatment  DISCHARGE INSTRUCTIONS:   Call 911 for signs or symptoms of anaphylaxis,  such as trouble breathing, swelling in your mouth or throat, or wheezing  You may also have itching, a rash, hives, or feel like you are going to faint  Return to the emergency department if:   · You have sudden behavior changes or irritability  · You are dizzy and your heart is beating faster than usual   Contact your healthcare provider if:   · Your swelling does not improve, even after you take your medicines  · You have questions or concerns about your condition or care  Medicines:   · Antihistamines  decrease mild symptoms such as itching or a rash  · Epinephrine  is used to treat severe allergic reactions such as anaphylaxis  · Steroids: This medicine may be given to decrease redness, pain, and swelling  · Take your medicine as directed  Contact your healthcare provider if you think your medicine is not helping or if you have side effects  Tell him of her if you are allergic to any medicine  Keep a list of the medicines, vitamins, and herbs you take  Include the amounts, and when and why you take them  Bring the list or the pill bottles to follow-up visits  Carry your medicine list with you in case of an emergency    Steps to take for signs or symptoms of anaphylaxis:   · Immediately  give 1 shot of epinephrine only into the outer thigh muscle  · Leave the shot in place  as directed  Your healthcare provider may recommend you leave it in place for up to 10 seconds before you remove it  This helps make sure all of the epinephrine is delivered  · Call 911 and go to the emergency department,  even if the shot improved symptoms  Do not drive yourself  Bring the used epinephrine shot with you  Safety precautions to take if you are at risk for anaphylaxis:   · Keep 2 shots of epinephrine with you at all times  You may need a second shot, because epinephrine only works for about 20 minutes and symptoms may return  Your healthcare provider can show you and family members how to give the shot  Check the expiration date every month and replace it before it expires  · Create an action plan  Your healthcare provider can help you create a written plan that explains the allergy and an emergency plan to treat a reaction  The plan explains when to give a second epinephrine shot if symptoms return or do not improve after the first  Give copies of the action plan and emergency instructions to family members, work and school staff, and  providers  Show them how to give a shot of epinephrine  · Be careful when you exercise  If you have had exercise-induced anaphylaxis, do not exercise right after you eat  Stop exercising right away if you start to develop any signs or symptoms of anaphylaxis  You may first feel tired, warm, or have itchy skin  Hives, swelling, and severe breathing problems may develop if you continue to exercise  · Carry medical alert identification  Wear medical alert jewelry or carry a card that explains the allergy  Ask your healthcare provider where to get these items  · Keep a symptom diary  Include information about how often your symptoms occur, how long they last, and if they are mild or severe  Also keep information on what you ate, what happened, or which medicines you took before the swelling started  · Avoid triggers  Triggers include foods, medicines, and other things that you know cause symptoms  You may need to see a specialist, such as an allergist or dietitian, to learn what to avoid  Follow up with your healthcare provider as directed:  Write down your questions so you remember to ask them during your visits  © 2017 [employer] Information is for End User's use only and may not be sold, redistributed or otherwise used for commercial purposes  All illustrations and images included in CareNotes® are the copyrighted property of A D A M [employer]  or [employer]  The above information is an  only  It is not intended as medical advice for individual conditions or treatments  Talk to your doctor, nurse or pharmacist before following any medical regimen to see if it is safe and effective for you  General Allergic Reaction   WHAT YOU NEED TO KNOW:   An allergic reaction is your body's response to an allergen  Allergens include medicines, food, insect stings, animal dander, mold, latex, chemicals, and dust mites  Pollen from trees, grass, and weeds can also cause an allergic reaction  DISCHARGE INSTRUCTIONS:   Return to the emergency department if:   · You have a skin rash, hives, swelling, or itching that gets worse  · You have trouble breathing, shortness of breath, wheezing, or coughing  · Your throat tightens, or your lips or tongue swell  · You have trouble swallowing or speaking  · You have dizziness, lightheadedness, fainting, or confusion  · You have nausea, vomiting, diarrhea, or abdominal cramps  · You have chest pain or tightness  Contact your healthcare provider if:   · You have questions or concerns about your condition or care  Medicines:   · Medicines  may be given to relieve certain allergy symptoms such as itching, sneezing, and swelling  You may take them as a pill or use drops in your nose or eyes   Topical treatments may be given to put directly on your skin to help decrease itching or swelling  · Take your medicine as directed  Contact your healthcare provider if you think your medicine is not helping or if you have side effects  Tell him of her if you are allergic to any medicine  Keep a list of the medicines, vitamins, and herbs you take  Include the amounts, and when and why you take them  Bring the list or the pill bottles to follow-up visits  Carry your medicine list with you in case of an emergency  Follow up with your healthcare provider as directed:  Write down your questions so you remember to ask them during your visits  Self-care:   · Avoid the allergen  that you think may have caused your allergic reaction  · Use cold compresses  on your skin or eyes if they were affected by the allergic reaction  Cold compresses may help to soothe your skin or eyes  · Rinse your nasal passages  with a saline solution  Daily rinsing may help clear your nose of allergens  · Do not smoke  Your allergy symptoms may decrease if you are not around smoke  Nicotine and other chemicals in cigarettes and cigars can also cause lung damage  Ask your healthcare provider for information if you currently smoke and need help to quit  E-cigarettes or smokeless tobacco still contain nicotine  Talk to your healthcare provider before you use these products  © 2017 [facility] Information is for End User's use only and may not be sold, redistributed or otherwise used for commercial purposes  All illustrations and images included in CareNotes® are the copyrighted property of A [employer]  or Pedro Jorge  The above information is an  only  It is not intended as medical advice for individual conditions or treatments  Talk to your doctor, nurse or pharmacist before following any medical regimen to see if it is safe and effective for you

## 2019-03-07 ENCOUNTER — APPOINTMENT (OUTPATIENT)
Dept: LAB | Facility: HOSPITAL | Age: 64
End: 2019-03-07
Payer: COMMERCIAL

## 2019-03-07 DIAGNOSIS — R73.01 IMPAIRED FASTING GLUCOSE: ICD-10-CM

## 2019-03-07 DIAGNOSIS — E66.9 OBESITY (BMI 30-39.9): ICD-10-CM

## 2019-03-07 DIAGNOSIS — E78.2 HYPERLIPIDEMIA, MIXED: ICD-10-CM

## 2019-03-07 DIAGNOSIS — F40.01 AGORAPHOBIA WITH PANIC ATTACKS: ICD-10-CM

## 2019-03-07 LAB
ALBUMIN SERPL BCP-MCNC: 3.8 G/DL (ref 3.5–5)
ALP SERPL-CCNC: 80 U/L (ref 46–116)
ALT SERPL W P-5'-P-CCNC: 31 U/L (ref 12–78)
ANION GAP SERPL CALCULATED.3IONS-SCNC: 3 MMOL/L (ref 4–13)
AST SERPL W P-5'-P-CCNC: 15 U/L (ref 5–45)
BILIRUB SERPL-MCNC: 0.36 MG/DL (ref 0.2–1)
BUN SERPL-MCNC: 18 MG/DL (ref 5–25)
CALCIUM SERPL-MCNC: 9.1 MG/DL (ref 8.3–10.1)
CHLORIDE SERPL-SCNC: 106 MMOL/L (ref 100–108)
CHOLEST SERPL-MCNC: 186 MG/DL (ref 50–200)
CO2 SERPL-SCNC: 30 MMOL/L (ref 21–32)
CREAT SERPL-MCNC: 1.2 MG/DL (ref 0.6–1.3)
EST. AVERAGE GLUCOSE BLD GHB EST-MCNC: 120 MG/DL
GFR SERPL CREATININE-BSD FRML MDRD: 65 ML/MIN/1.73SQ M
GLUCOSE P FAST SERPL-MCNC: 100 MG/DL (ref 65–99)
HBA1C MFR BLD: 5.8 % (ref 4.2–6.3)
HDLC SERPL-MCNC: 36 MG/DL (ref 40–60)
LDLC SERPL CALC-MCNC: 123 MG/DL (ref 0–100)
POTASSIUM SERPL-SCNC: 4.7 MMOL/L (ref 3.5–5.3)
PROT SERPL-MCNC: 7.2 G/DL (ref 6.4–8.2)
SODIUM SERPL-SCNC: 139 MMOL/L (ref 136–145)
TRIGL SERPL-MCNC: 137 MG/DL
TSH SERPL DL<=0.05 MIU/L-ACNC: 1.73 UIU/ML (ref 0.36–3.74)

## 2019-03-07 PROCEDURE — 80061 LIPID PANEL: CPT

## 2019-03-07 PROCEDURE — 80053 COMPREHEN METABOLIC PANEL: CPT

## 2019-03-07 PROCEDURE — 84443 ASSAY THYROID STIM HORMONE: CPT

## 2019-03-07 PROCEDURE — 36415 COLL VENOUS BLD VENIPUNCTURE: CPT

## 2019-03-07 PROCEDURE — 83036 HEMOGLOBIN GLYCOSYLATED A1C: CPT

## 2019-03-12 ENCOUNTER — OFFICE VISIT (OUTPATIENT)
Dept: FAMILY MEDICINE CLINIC | Facility: CLINIC | Age: 64
End: 2019-03-12
Payer: COMMERCIAL

## 2019-03-12 VITALS
WEIGHT: 225.6 LBS | RESPIRATION RATE: 16 BRPM | HEIGHT: 68 IN | BODY MASS INDEX: 34.19 KG/M2 | HEART RATE: 92 BPM | DIASTOLIC BLOOD PRESSURE: 68 MMHG | TEMPERATURE: 97.7 F | SYSTOLIC BLOOD PRESSURE: 122 MMHG

## 2019-03-12 DIAGNOSIS — R73.01 IMPAIRED FASTING GLUCOSE: ICD-10-CM

## 2019-03-12 DIAGNOSIS — E66.9 OBESITY (BMI 30-39.9): ICD-10-CM

## 2019-03-12 DIAGNOSIS — F40.01 AGORAPHOBIA WITH PANIC ATTACKS: Primary | ICD-10-CM

## 2019-03-12 DIAGNOSIS — E78.2 HYPERLIPIDEMIA, MIXED: ICD-10-CM

## 2019-03-12 PROCEDURE — 3008F BODY MASS INDEX DOCD: CPT | Performed by: NURSE PRACTITIONER

## 2019-03-12 PROCEDURE — 99214 OFFICE O/P EST MOD 30 MIN: CPT | Performed by: NURSE PRACTITIONER

## 2019-03-12 NOTE — PROGRESS NOTES
Chief Complaint   Patient presents with    Follow-up     no concerns     Assessment/Plan:    Impaired fasting glucose  A1C at 5 8  Diet modifications reviewed  Encouraged regular exercise     Obesity (BMI 30-39  9)  Advised regular exercise 30 minutes 5 x per week, work on weight loss     Hyperlipidemia, mixed  Lipid panel is controlled  Diet modifications reviewed, exercise regularly     Agoraphobia with panic attacks  Stable  Continue Abilify and Clonazepam  He is aware that we have a LCSW in our office if needed     Refuses Pneumovax, Tdap   Refuses CRC screening (multiple options discussed today)  RTO 6 months (labs due for this visit)     Diagnoses and all orders for this visit:    Agoraphobia with panic attacks  -     CBC and differential; Future  -     Comprehensive metabolic panel; Future  -     TSH, 3rd generation with Free T4 reflex; Future    Hyperlipidemia, mixed  -     CBC and differential; Future  -     Comprehensive metabolic panel; Future  -     Hemoglobin A1C; Future  -     Lipid panel; Future  -     TSH, 3rd generation with Free T4 reflex; Future    Obesity (BMI 30-39 9)  -     CBC and differential; Future  -     Comprehensive metabolic panel; Future  -     Hemoglobin A1C; Future  -     Lipid panel; Future  -     TSH, 3rd generation with Free T4 reflex; Future    Impaired fasting glucose  -     CBC and differential; Future  -     Comprehensive metabolic panel; Future  -     Hemoglobin A1C; Future  -     Lipid panel; Future  -     TSH, 3rd generation with Free T4 reflex; Future          Subjective:      Patient ID: Nydia Vásquez is a 66 y o  male      HPI    Pt presents by himself today for a routine follow up   His brother in law drove him to this visit today    IFG- recent A1C at 5 8    Agoraphobia with panic attacks- taking Abilify daily which seems to be controlling his symptoms well  Has Clonazepam for use at bedtime prn  Feels his anxiety is well controlled  When taking his meds, he is able to leave his home more- still doesn't enjoy this but is able to physically do it  Lives with his sister and brother in law    Hyperlipidemia- recent lipid panel with / / HDL 36/     Obesity- no current or regular exercise  BMI at 34 30      The following portions of the patient's history were reviewed and updated as appropriate: allergies, current medications, past medical history, past social history and problem list     Review of Systems   Constitutional: Negative for chills, fatigue and fever  Respiratory: Negative for cough, shortness of breath and wheezing  Cardiovascular: Negative for chest pain, palpitations and leg swelling  Gastrointestinal: Negative for abdominal pain, blood in stool, constipation, diarrhea and nausea  Genitourinary: Negative for dysuria  Musculoskeletal: Negative for arthralgias and myalgias  Skin: Negative for rash and wound  Neurological: Negative for dizziness, weakness, numbness and headaches  Psychiatric/Behavioral: Negative for sleep disturbance and suicidal ideas  The patient is nervous/anxious  As noted in HPI         Objective:      /68   Pulse 92   Temp 97 7 °F (36 5 °C) (Oral)   Resp 16   Ht 5' 8" (1 727 m)   Wt 102 kg (225 lb 9 6 oz)   BMI 34 30 kg/m²          Physical Exam   Constitutional: He is oriented to person, place, and time  He appears well-developed and well-nourished  No distress  HENT:   Head: Normocephalic and atraumatic  Eyes: Pupils are equal, round, and reactive to light  Conjunctivae are normal    Neck: Normal range of motion  Neck supple  No thyromegaly present  Cardiovascular: Normal rate, regular rhythm and normal heart sounds  No murmur heard  No LE edema  No carotid bruits    Pulmonary/Chest: Effort normal and breath sounds normal  No respiratory distress  He has no wheezes  Abdominal: Soft  Bowel sounds are normal  He exhibits no distension  There is no tenderness     Musculoskeletal: Normal range of motion  Lymphadenopathy:     He has no cervical adenopathy  Neurological: He is alert and oriented to person, place, and time  Skin: Skin is warm and dry  He is not diaphoretic  Psychiatric: His mood appears anxious  BMI Counseling: Body mass index is 34 3 kg/m²  Discussed the patient's BMI with him  The BMI is above average  BMI counseling and education was provided to the patient  Nutrition recommendations include reducing portion sizes, decreasing overall calorie intake, 3-5 servings of fruits/vegetables daily, reducing fast food intake, consuming healthier snacks, decreasing soda and/or juice intake, moderation in carbohydrate intake, increasing intake of lean protein, reducing intake of saturated fat and trans fat and reducing intake of cholesterol  Exercise recommendations include moderate aerobic physical activity for 150 minutes/week

## 2019-03-14 DIAGNOSIS — F40.01 AGORAPHOBIA WITH PANIC ATTACKS: ICD-10-CM

## 2019-03-14 RX ORDER — ARIPIPRAZOLE 10 MG/1
10 TABLET ORAL DAILY
Qty: 30 TABLET | Refills: 3 | Status: SHIPPED | OUTPATIENT
Start: 2019-03-14 | End: 2019-06-20 | Stop reason: ALTCHOICE

## 2019-03-14 NOTE — TELEPHONE ENCOUNTER
Patient wants a refill on Aripiprazole 10 mg called in to [address] Ma[facility] Escobar can be reached at [phone]

## 2019-06-20 ENCOUNTER — OFFICE VISIT (OUTPATIENT)
Dept: FAMILY MEDICINE CLINIC | Facility: CLINIC | Age: 64
End: 2019-06-20
Payer: COMMERCIAL

## 2019-06-20 VITALS
HEIGHT: 68 IN | DIASTOLIC BLOOD PRESSURE: 80 MMHG | SYSTOLIC BLOOD PRESSURE: 110 MMHG | WEIGHT: 218 LBS | RESPIRATION RATE: 16 BRPM | TEMPERATURE: 97.7 F | BODY MASS INDEX: 33.04 KG/M2 | HEART RATE: 80 BPM

## 2019-06-20 DIAGNOSIS — F40.01 AGORAPHOBIA WITH PANIC ATTACKS: Primary | ICD-10-CM

## 2019-06-20 PROCEDURE — 99214 OFFICE O/P EST MOD 30 MIN: CPT | Performed by: NURSE PRACTITIONER

## 2019-06-20 PROCEDURE — 3008F BODY MASS INDEX DOCD: CPT | Performed by: NURSE PRACTITIONER

## 2019-06-20 RX ORDER — ALPRAZOLAM 0.5 MG/1
TABLET ORAL
Qty: 30 TABLET | Refills: 0 | Status: SHIPPED | OUTPATIENT
Start: 2019-06-20 | End: 2020-06-25 | Stop reason: SDUPTHER

## 2019-09-06 ENCOUNTER — OFFICE VISIT (OUTPATIENT)
Dept: FAMILY MEDICINE CLINIC | Facility: CLINIC | Age: 64
End: 2019-09-06
Payer: COMMERCIAL

## 2019-09-06 VITALS
HEART RATE: 96 BPM | DIASTOLIC BLOOD PRESSURE: 82 MMHG | WEIGHT: 211.6 LBS | RESPIRATION RATE: 16 BRPM | TEMPERATURE: 97.5 F | HEIGHT: 68 IN | BODY MASS INDEX: 32.07 KG/M2 | SYSTOLIC BLOOD PRESSURE: 138 MMHG

## 2019-09-06 DIAGNOSIS — R73.01 IMPAIRED FASTING GLUCOSE: Primary | ICD-10-CM

## 2019-09-06 DIAGNOSIS — Z23 NEED FOR PNEUMOCOCCAL VACCINATION: ICD-10-CM

## 2019-09-06 DIAGNOSIS — E78.2 HYPERLIPIDEMIA, MIXED: ICD-10-CM

## 2019-09-06 DIAGNOSIS — E66.9 OBESITY (BMI 30-39.9): ICD-10-CM

## 2019-09-06 DIAGNOSIS — F40.01 AGORAPHOBIA WITH PANIC ATTACKS: ICD-10-CM

## 2019-09-06 DIAGNOSIS — F17.290 PIPE SMOKER: ICD-10-CM

## 2019-09-06 PROCEDURE — 3008F BODY MASS INDEX DOCD: CPT | Performed by: FAMILY MEDICINE

## 2019-09-06 PROCEDURE — 90471 IMMUNIZATION ADMIN: CPT

## 2019-09-06 PROCEDURE — 99214 OFFICE O/P EST MOD 30 MIN: CPT | Performed by: FAMILY MEDICINE

## 2019-09-06 PROCEDURE — 90732 PPSV23 VACC 2 YRS+ SUBQ/IM: CPT

## 2019-09-06 NOTE — PROGRESS NOTES
Chief Complaint   Patient presents with    Follow-up     6 month follow up     Health Maintenance   Topic Date Due    Hepatitis C Screening  1955    CRC Screening: Colonoscopy  1955    Pneumococcal Vaccine: Pediatrics (0 to 5 Years) and At-Risk Patients (6 to 59 Years) (1 of 1 - PPSV23) 08/21/1963    DTaP,Tdap,and Td Vaccines (1 - Tdap) 08/21/1978    INFLUENZA VACCINE  07/01/2019    Depression Screening PHQ  07/06/2019    BMI: Followup Plan  06/20/2020    BMI: Adult  06/20/2020    Pneumococcal Vaccine: 65+ Years (1 of 2 - PCV13) 08/21/2022    HEPATITIS B VACCINES  Aged Out     BMI Counseling: Body mass index is 32 17 kg/m²  Discussed the patient's BMI with him  The BMI is above average  BMI counseling and education was provided to the patient  Nutrition recommendations include reducing portion sizes, decreasing overall calorie intake and 3-5 servings of fruits/vegetables daily  Exercise recommendations include moderate aerobic physical activity for 150 minutes/week  Assessment/Plan:     Flu shot yearly  Give pneumovax today  Refused Colonoscopy or FIT  Refused PSA today  RTO 6 months  Diagnoses and all orders for this visit:    Impaired fasting glucose  Comments:  Low carb diet  Orders:  -     Hemoglobin A1C; Future  -     CBC and differential; Future  -     Comprehensive metabolic panel; Future    Hyperlipidemia, mixed  Comments:  Low fat diet  Orders:  -     Lipid Panel with Direct LDL reflex; Future  -     CBC and differential; Future  -     Comprehensive metabolic panel; Future    Agoraphobia with panic attacks  Comments:  Continue xanax as needed  SE educated pt  Pt is aware  PDMP checked and it was appropriate  Pipe smoker  Comments:  Strongly advised pt to quit  Pt refused  Obesity (BMI 30-39  9)    Need for pneumococcal vaccination  -     PNEUMOCOCCAL POLYSACCHARIDE VACCINE 23-VALENT =>3YO SQ IM          Subjective:      Patient ID: Ivonne Romero is a 60 y o  male  HPI    Pt is here with his brother-in-law  IFG---pt tried to follow low carb diet  3/2019 hgA1C 5 8 stable  Hyperlipidemia---Pt states he eat healthy, a lot of vegetables       Agoraphobia with panic attacks---He feels anxious if he gets out of house, stay in car or go to unfamiliar places  Family hx of agoraphobia  He was on abilify before but feels it did not do anything for him, so he stopped it since 6/2019  He uses xanax 0 5mg as needed, does not use it everyday  30 tabs last for months  Last refill was 6/2019  Does not need refill today  Pt saw psychiatrist/therapist before but not now      Obesity---BMI 32 17 today  Tried to exercise and lose weight       He smokes a pipe twice daily for 45 years, refuse to quit today  Denies ETOH or illicit drug use     Does not drive  Lives with his sister and brother in law  The following portions of the patient's history were reviewed and updated as appropriate: allergies, current medications, past family history, past medical history, past social history, past surgical history and problem list     Review of Systems   Constitutional: Negative for appetite change, chills and fever  HENT: Negative for congestion, ear pain, sinus pain and sore throat  Eyes: Negative for discharge and itching  Respiratory: Negative for apnea, cough, chest tightness, shortness of breath and wheezing  Cardiovascular: Negative for chest pain, palpitations and leg swelling  Gastrointestinal: Negative for abdominal pain, anal bleeding, constipation, diarrhea, nausea and vomiting  Endocrine: Negative for cold intolerance, heat intolerance and polyuria  Genitourinary: Negative for difficulty urinating and dysuria  Musculoskeletal: Negative for arthralgias, back pain and myalgias  Skin: Negative for rash  Neurological: Negative for dizziness and headaches  Psychiatric/Behavioral: Negative for agitation           Objective:      /82 (BP Location: Left arm, Patient Position: Sitting, Cuff Size: Adult)   Pulse 96   Temp 97 5 °F (36 4 °C) (Tympanic)   Resp 16   Ht 5' 8" (1 727 m)   Wt 96 kg (211 lb 9 6 oz)   BMI 32 17 kg/m²          Physical Exam   Constitutional: He appears well-developed  HENT:   Head: Normocephalic and atraumatic  Eyes: Pupils are equal, round, and reactive to light  Conjunctivae are normal    Neck: Normal range of motion  Neck supple  Cardiovascular: Normal rate, regular rhythm, normal heart sounds and intact distal pulses  Pulmonary/Chest: Effort normal and breath sounds normal    Abdominal: Soft  Bowel sounds are normal    Musculoskeletal: Normal range of motion  Neurological: He is alert

## 2019-11-08 ENCOUNTER — CLINICAL SUPPORT (OUTPATIENT)
Dept: FAMILY MEDICINE CLINIC | Facility: CLINIC | Age: 64
End: 2019-11-08
Payer: COMMERCIAL

## 2019-11-08 DIAGNOSIS — Z23 ENCOUNTER FOR IMMUNIZATION: ICD-10-CM

## 2019-11-08 PROCEDURE — 90682 RIV4 VACC RECOMBINANT DNA IM: CPT

## 2019-11-08 PROCEDURE — 90471 IMMUNIZATION ADMIN: CPT

## 2020-03-04 ENCOUNTER — APPOINTMENT (OUTPATIENT)
Dept: LAB | Facility: HOSPITAL | Age: 65
End: 2020-03-04
Payer: COMMERCIAL

## 2020-03-04 DIAGNOSIS — E78.2 HYPERLIPIDEMIA, MIXED: ICD-10-CM

## 2020-03-04 DIAGNOSIS — F40.01 AGORAPHOBIA WITH PANIC ATTACKS: ICD-10-CM

## 2020-03-04 DIAGNOSIS — R73.01 IMPAIRED FASTING GLUCOSE: ICD-10-CM

## 2020-03-04 DIAGNOSIS — E66.9 OBESITY (BMI 30-39.9): ICD-10-CM

## 2020-03-04 LAB
ALBUMIN SERPL BCP-MCNC: 3.6 G/DL (ref 3.5–5)
ALP SERPL-CCNC: 70 U/L (ref 46–116)
ALT SERPL W P-5'-P-CCNC: 24 U/L (ref 12–78)
ANION GAP SERPL CALCULATED.3IONS-SCNC: 7 MMOL/L (ref 4–13)
AST SERPL W P-5'-P-CCNC: 8 U/L (ref 5–45)
BASOPHILS # BLD AUTO: 0.06 THOUSANDS/ΜL (ref 0–0.1)
BASOPHILS NFR BLD AUTO: 1 % (ref 0–1)
BILIRUB SERPL-MCNC: 0.41 MG/DL (ref 0.2–1)
BUN SERPL-MCNC: 15 MG/DL (ref 5–25)
CALCIUM SERPL-MCNC: 8.5 MG/DL (ref 8.3–10.1)
CHLORIDE SERPL-SCNC: 110 MMOL/L (ref 100–108)
CHOLEST SERPL-MCNC: 174 MG/DL (ref 50–200)
CO2 SERPL-SCNC: 23 MMOL/L (ref 21–32)
CREAT SERPL-MCNC: 1.05 MG/DL (ref 0.6–1.3)
EOSINOPHIL # BLD AUTO: 0.23 THOUSAND/ΜL (ref 0–0.61)
EOSINOPHIL NFR BLD AUTO: 4 % (ref 0–6)
ERYTHROCYTE [DISTWIDTH] IN BLOOD BY AUTOMATED COUNT: 13 % (ref 11.6–15.1)
EST. AVERAGE GLUCOSE BLD GHB EST-MCNC: 108 MG/DL
GFR SERPL CREATININE-BSD FRML MDRD: 76 ML/MIN/1.73SQ M
GLUCOSE P FAST SERPL-MCNC: 94 MG/DL (ref 65–99)
HBA1C MFR BLD: 5.4 %
HCT VFR BLD AUTO: 43.6 % (ref 36.5–49.3)
HDLC SERPL-MCNC: 39 MG/DL
HGB BLD-MCNC: 14.2 G/DL (ref 12–17)
IMM GRANULOCYTES # BLD AUTO: 0.02 THOUSAND/UL (ref 0–0.2)
IMM GRANULOCYTES NFR BLD AUTO: 0 % (ref 0–2)
LDLC SERPL CALC-MCNC: 109 MG/DL (ref 0–100)
LYMPHOCYTES # BLD AUTO: 1.57 THOUSANDS/ΜL (ref 0.6–4.47)
LYMPHOCYTES NFR BLD AUTO: 30 % (ref 14–44)
MCH RBC QN AUTO: 31.2 PG (ref 26.8–34.3)
MCHC RBC AUTO-ENTMCNC: 32.6 G/DL (ref 31.4–37.4)
MCV RBC AUTO: 96 FL (ref 82–98)
MONOCYTES # BLD AUTO: 0.45 THOUSAND/ΜL (ref 0.17–1.22)
MONOCYTES NFR BLD AUTO: 9 % (ref 4–12)
NEUTROPHILS # BLD AUTO: 2.88 THOUSANDS/ΜL (ref 1.85–7.62)
NEUTS SEG NFR BLD AUTO: 56 % (ref 43–75)
NRBC BLD AUTO-RTO: 0 /100 WBCS
PLATELET # BLD AUTO: 219 THOUSANDS/UL (ref 149–390)
PMV BLD AUTO: 10.7 FL (ref 8.9–12.7)
POTASSIUM SERPL-SCNC: 3.7 MMOL/L (ref 3.5–5.3)
PROT SERPL-MCNC: 7 G/DL (ref 6.4–8.2)
RBC # BLD AUTO: 4.55 MILLION/UL (ref 3.88–5.62)
SODIUM SERPL-SCNC: 140 MMOL/L (ref 136–145)
TRIGL SERPL-MCNC: 128 MG/DL
TSH SERPL DL<=0.05 MIU/L-ACNC: 2 UIU/ML (ref 0.36–3.74)
WBC # BLD AUTO: 5.21 THOUSAND/UL (ref 4.31–10.16)

## 2020-03-04 PROCEDURE — 80053 COMPREHEN METABOLIC PANEL: CPT

## 2020-03-04 PROCEDURE — 85025 COMPLETE CBC W/AUTO DIFF WBC: CPT

## 2020-03-04 PROCEDURE — 83036 HEMOGLOBIN GLYCOSYLATED A1C: CPT

## 2020-03-04 PROCEDURE — 84443 ASSAY THYROID STIM HORMONE: CPT

## 2020-03-04 PROCEDURE — 80061 LIPID PANEL: CPT

## 2020-03-04 PROCEDURE — 36415 COLL VENOUS BLD VENIPUNCTURE: CPT

## 2020-03-06 ENCOUNTER — OFFICE VISIT (OUTPATIENT)
Dept: FAMILY MEDICINE CLINIC | Facility: CLINIC | Age: 65
End: 2020-03-06
Payer: COMMERCIAL

## 2020-03-06 VITALS
BODY MASS INDEX: 31.07 KG/M2 | DIASTOLIC BLOOD PRESSURE: 88 MMHG | OXYGEN SATURATION: 98 % | WEIGHT: 205 LBS | HEIGHT: 68 IN | HEART RATE: 100 BPM | TEMPERATURE: 97.4 F | RESPIRATION RATE: 16 BRPM | SYSTOLIC BLOOD PRESSURE: 122 MMHG

## 2020-03-06 DIAGNOSIS — F40.01 AGORAPHOBIA WITH PANIC ATTACKS: ICD-10-CM

## 2020-03-06 DIAGNOSIS — R73.01 IMPAIRED FASTING GLUCOSE: Primary | ICD-10-CM

## 2020-03-06 DIAGNOSIS — E78.2 HYPERLIPIDEMIA, MIXED: ICD-10-CM

## 2020-03-06 DIAGNOSIS — F17.290 PIPE SMOKER: ICD-10-CM

## 2020-03-06 DIAGNOSIS — E66.9 OBESITY (BMI 30-39.9): ICD-10-CM

## 2020-03-06 PROCEDURE — 99214 OFFICE O/P EST MOD 30 MIN: CPT | Performed by: FAMILY MEDICINE

## 2020-03-06 PROCEDURE — 4004F PT TOBACCO SCREEN RCVD TLK: CPT | Performed by: FAMILY MEDICINE

## 2020-03-06 PROCEDURE — 3008F BODY MASS INDEX DOCD: CPT | Performed by: FAMILY MEDICINE

## 2020-03-06 NOTE — ASSESSMENT & PLAN NOTE
Continue alprazolem 0 5mg 1-2 tabs prior to leaving his house  SE educated pt  PDMP checked and it was appropriate  Pt does not need refills today  Refused to see therapist or psychiatrist now

## 2020-03-06 NOTE — PROGRESS NOTES
Chief Complaint   Patient presents with    Follow-up     6 months and review labs  Health Maintenance   Topic Date Due    Hepatitis C Screening  1955    CRC Screening: Colonoscopy  1955    DTaP,Tdap,and Td Vaccines (1 - Tdap) 08/21/1968    HIV Screening  08/21/1972    Annual Physical  08/21/1975    Depression Screening PHQ  09/06/2020    BMI: Followup Plan  09/06/2020    BMI: Adult  09/06/2020    Pneumococcal Vaccine: 65+ Years (1 of 2 - PCV13) 08/21/2022    Pneumococcal Vaccine: Pediatrics (0 to 5 Years) and At-Risk Patients (6 to 59 Years)  Completed    Influenza Vaccine  Completed    HIB Vaccine  Aged Out    Hepatitis B Vaccine  Aged Out    IPV Vaccine  Aged Out    Hepatitis A Vaccine  Aged Out    Meningococcal ACWY Vaccine  Aged Out    HPV Vaccine  Aged Out     Assessment/Plan:    Impaired fasting glucose  3/2020 hgA1C 5 4 normal      Agoraphobia with panic attacks  Continue alprazolem 0 5mg 1-2 tabs prior to leaving his house  SE educated pt  PDMP checked and it was appropriate  Pt does not need refills today  Refused to see therapist or psychiatrist now  Hyperlipidemia, mixed  3/2020 lipid 174/128/39/109 stale  Low fat diet  Pipe smoker  Refused to quit now  Reviewed lab in 3/2020  Lipid ok  CBC ok  CMP ok  hgA1C 5 4 normal  TSH normal    Flu shot yearly  Got pneumovax in 9/2019  Refused Colonoscopy or FIT  Refused PSA today  RTO 6 months      BMI Counseling: Body mass index is 31 17 kg/m²  The BMI is above normal  Nutrition recommendations include decreasing portion sizes, encouraging healthy choices of fruits and vegetables and decreasing fast food intake  Exercise recommendations include moderate physical activity 150 minutes/week  No pharmacotherapy was ordered  Tobacco Cessation Counseling: Tobacco cessation counseling was provided   The patient is sincerely urged to quit consumption of tobacco  He is not ready to quit tobacco  Medication options and side effects of medication discussed  Patient refused medication  Diagnoses and all orders for this visit:    Impaired fasting glucose  -     Comprehensive metabolic panel; Future  -     Hemoglobin A1C With EAG; Future    Pipe smoker    Obesity (BMI 30-39  9)    Agoraphobia with panic attacks    Hyperlipidemia, mixed    Other orders  -     Cancel: Ambulatory referral to Gastroenterology; Future          Subjective:      Patient ID: Brandi Coronado is a 60 y o  male  HPI       Pt is here with his brother-in-law  Brother-in-law waits outside in waiting area  IFG---pt tried to follow low carb diet  Still loves candy  3/2020 hg1C 5 4 good       Hyperlipidemia---Pt states he eat healthy, a lot of vegetables  Not on statins       Agoraphobia with panic attacks---He feels anxious if he gets out of house or go to unfamiliar places  Family hx of agoraphobia  He uses xanax 0 5mg 1-2 tabs as needed, does not use it everyday  He states if he only gets out for several blocks from his house, he does not use it  30 tabs last for months  Last refill was 6/2019  Does not need refill today  Pt saw psychiatrist/therapist before but not now      Obesity---BMI 31 17 today   Tried to exercise regularly  Lost 6 lbs in last 6 months       He smokes a pipe twice daily for 45 years, refuse to quit today  Denies ETOH or illicit drug use     Does not drive  Lives with his sister and brother in law        The following portions of the patient's history were reviewed and updated as appropriate: allergies, current medications, past family history, past medical history, past social history, past surgical history and problem list     Review of Systems   Constitutional: Negative for appetite change, chills and fever  HENT: Negative for congestion, ear pain, sinus pain and sore throat  Eyes: Negative for discharge and itching  Respiratory: Negative for apnea, cough, chest tightness, shortness of breath and wheezing  Cardiovascular: Negative for chest pain, palpitations and leg swelling  Gastrointestinal: Negative for abdominal pain, anal bleeding, constipation, diarrhea, nausea and vomiting  Endocrine: Negative for cold intolerance, heat intolerance and polyuria  Genitourinary: Negative for difficulty urinating and dysuria  Musculoskeletal: Negative for arthralgias, back pain and myalgias  Skin: Negative for rash  Neurological: Negative for dizziness and headaches  Psychiatric/Behavioral: Negative for agitation, self-injury and suicidal ideas  The patient is nervous/anxious  Objective:      /88 (BP Location: Left arm, Patient Position: Sitting, Cuff Size: Adult)   Pulse 100   Temp (!) 97 4 °F (36 3 °C) (Tympanic)   Resp 16   Ht 5' 8" (1 727 m)   Wt 93 kg (205 lb)   SpO2 98%   BMI 31 17 kg/m²          Physical Exam   Constitutional: He appears well-developed  HENT:   Head: Normocephalic and atraumatic  Eyes: Pupils are equal, round, and reactive to light  Conjunctivae are normal    Neck: Normal range of motion  Neck supple  Cardiovascular: Normal rate, regular rhythm, normal heart sounds and intact distal pulses  Exam reveals no gallop and no friction rub  No murmur heard  Pulmonary/Chest: Effort normal and breath sounds normal  No stridor  No respiratory distress  He has no wheezes  He has no rales  Abdominal: Soft  Bowel sounds are normal  He exhibits no distension and no mass  There is no tenderness  There is no rebound and no guarding  Musculoskeletal: Normal range of motion  He exhibits no edema, tenderness or deformity  Neurological: He is alert

## 2020-06-25 DIAGNOSIS — F40.01 AGORAPHOBIA WITH PANIC ATTACKS: ICD-10-CM

## 2020-06-25 RX ORDER — ALPRAZOLAM 0.5 MG/1
TABLET ORAL
Qty: 30 TABLET | Refills: 0 | Status: SHIPPED | OUTPATIENT
Start: 2020-06-25 | End: 2021-03-18 | Stop reason: SDUPTHER

## 2020-09-14 ENCOUNTER — APPOINTMENT (OUTPATIENT)
Dept: LAB | Facility: HOSPITAL | Age: 65
End: 2020-09-14
Payer: COMMERCIAL

## 2020-09-14 DIAGNOSIS — R73.01 IMPAIRED FASTING GLUCOSE: ICD-10-CM

## 2020-09-14 LAB
ALBUMIN SERPL BCP-MCNC: 3.8 G/DL (ref 3.5–5)
ALP SERPL-CCNC: 68 U/L (ref 46–116)
ALT SERPL W P-5'-P-CCNC: 27 U/L (ref 12–78)
ANION GAP SERPL CALCULATED.3IONS-SCNC: 5 MMOL/L (ref 4–13)
AST SERPL W P-5'-P-CCNC: 9 U/L (ref 5–45)
BILIRUB SERPL-MCNC: 0.35 MG/DL (ref 0.2–1)
BUN SERPL-MCNC: 22 MG/DL (ref 5–25)
CALCIUM SERPL-MCNC: 8.8 MG/DL (ref 8.3–10.1)
CHLORIDE SERPL-SCNC: 113 MMOL/L (ref 100–108)
CO2 SERPL-SCNC: 24 MMOL/L (ref 21–32)
CREAT SERPL-MCNC: 1.07 MG/DL (ref 0.6–1.3)
EST. AVERAGE GLUCOSE BLD GHB EST-MCNC: 114 MG/DL
GFR SERPL CREATININE-BSD FRML MDRD: 73 ML/MIN/1.73SQ M
GLUCOSE P FAST SERPL-MCNC: 95 MG/DL (ref 65–99)
HBA1C MFR BLD: 5.6 %
POTASSIUM SERPL-SCNC: 4 MMOL/L (ref 3.5–5.3)
PROT SERPL-MCNC: 7.4 G/DL (ref 6.4–8.2)
SODIUM SERPL-SCNC: 142 MMOL/L (ref 136–145)

## 2020-09-14 PROCEDURE — 80053 COMPREHEN METABOLIC PANEL: CPT

## 2020-09-14 PROCEDURE — 83036 HEMOGLOBIN GLYCOSYLATED A1C: CPT

## 2020-09-14 PROCEDURE — 36415 COLL VENOUS BLD VENIPUNCTURE: CPT

## 2020-09-18 ENCOUNTER — OFFICE VISIT (OUTPATIENT)
Dept: FAMILY MEDICINE CLINIC | Facility: CLINIC | Age: 65
End: 2020-09-18
Payer: COMMERCIAL

## 2020-09-18 VITALS
SYSTOLIC BLOOD PRESSURE: 138 MMHG | DIASTOLIC BLOOD PRESSURE: 84 MMHG | HEIGHT: 66 IN | OXYGEN SATURATION: 98 % | BODY MASS INDEX: 32.37 KG/M2 | RESPIRATION RATE: 16 BRPM | HEART RATE: 96 BPM | WEIGHT: 201.4 LBS | TEMPERATURE: 97.7 F

## 2020-09-18 DIAGNOSIS — F40.01 AGORAPHOBIA WITH PANIC ATTACKS: ICD-10-CM

## 2020-09-18 DIAGNOSIS — R73.01 IMPAIRED FASTING GLUCOSE: Primary | ICD-10-CM

## 2020-09-18 DIAGNOSIS — E66.9 OBESITY (BMI 30-39.9): ICD-10-CM

## 2020-09-18 DIAGNOSIS — E78.2 HYPERLIPIDEMIA, MIXED: ICD-10-CM

## 2020-09-18 DIAGNOSIS — Z11.4 SCREENING FOR HIV (HUMAN IMMUNODEFICIENCY VIRUS): ICD-10-CM

## 2020-09-18 DIAGNOSIS — Z00.00 ANNUAL PHYSICAL EXAM: ICD-10-CM

## 2020-09-18 DIAGNOSIS — Z11.59 NEED FOR HEPATITIS C SCREENING TEST: ICD-10-CM

## 2020-09-18 DIAGNOSIS — Z23 NEED FOR INFLUENZA VACCINATION: ICD-10-CM

## 2020-09-18 DIAGNOSIS — F17.290 PIPE SMOKER: ICD-10-CM

## 2020-09-18 PROCEDURE — 90682 RIV4 VACC RECOMBINANT DNA IM: CPT

## 2020-09-18 PROCEDURE — 3725F SCREEN DEPRESSION PERFORMED: CPT | Performed by: FAMILY MEDICINE

## 2020-09-18 PROCEDURE — 99214 OFFICE O/P EST MOD 30 MIN: CPT | Performed by: FAMILY MEDICINE

## 2020-09-18 PROCEDURE — 4004F PT TOBACCO SCREEN RCVD TLK: CPT | Performed by: FAMILY MEDICINE

## 2020-09-18 PROCEDURE — 90471 IMMUNIZATION ADMIN: CPT

## 2020-09-18 PROCEDURE — 99396 PREV VISIT EST AGE 40-64: CPT | Performed by: FAMILY MEDICINE

## 2020-09-18 NOTE — PROGRESS NOTES
[address] [facility]    NAME: Matthew Hinson  AGE: 63 y o  SEX: male  : 1955     DATE: 2020     Assessment and Plan:     Problem List Items Addressed This Visit        Endocrine    Impaired fasting glucose - Primary     Reviewed lab in 2020 hgA1C 5 6 normal  Low carb diet  Relevant Orders    CBC    Comprehensive metabolic panel    Hemoglobin A1C    Lipid panel    TSH, 3rd generation with Free T4 reflex       Other    Agoraphobia with panic attacks     Use xanax 0 5mg 1-2 tabs as needed  SE educated pt  PDMP checked and it was appropriate  Pt does not need refills today  Hyperlipidemia, mixed     Low fat diet  Relevant Orders    CBC    Comprehensive metabolic panel    Hemoglobin A1C    Lipid panel    TSH, 3rd generation with Free T4 reflex    Obesity (BMI 30-39  9)    Relevant Orders    CBC    Comprehensive metabolic panel    Hemoglobin A1C    Lipid panel    TSH, 3rd generation with Free T4 reflex    Pipe smoker     Strongly advised pt to quit  Other Visit Diagnoses     Need for influenza vaccination        Relevant Orders    influenza vaccine, quadrivalent, recombinant, PF, 0 5 mL, for patients 18 yr+ ([facility]BLOK) (Completed)    Need for hepatitis C screening test        Relevant Orders    Hepatitis C Antibody ([facility])    Screening for HIV (human immunodeficiency virus)        Relevant Orders    HIV 1/2 Antigen/Antibody (4th Generation) w Reflex [facility]    Annual physical exam              Immunizations and preventive care screenings were discussed with patient today  Appropriate education was printed on patient's after visit summary  Counseling:  Alcohol/drug use: discussed moderation in alcohol intake, the recommendations for healthy alcohol use, and avoidance of illicit drug use  Dental Health: discussed importance of regular tooth brushing, flossing, and dental visits    Injury prevention: discussed safety/seat belts, safety helmets, smoke detectors, carbon dioxide detectors, and smoking near bedding or upholstery  Sexual health: discussed sexually transmitted diseases, partner selection, use of condoms, avoidance of unintended pregnancy, and contraceptive alternatives  · Exercise: the importance of regular exercise/physical activity was discussed  Recommend exercise 3-5 times per week for at least 30 minutes  No follow-ups on file  Chief Complaint:     Chief Complaint   Patient presents with    Physical Exam     Preventative and review labs  Health Maintenance   Topic Date Due    Hepatitis C Screening  1955    HIV Screening  08/21/1972    Annual Physical  08/21/1975    DTaP,Tdap,and Td Vaccines (1 - Tdap) 08/21/1978    Depression Screening PHQ  09/06/2020    BMI: Followup Plan  03/06/2021    Colorectal Cancer Screening  03/06/2021 (Originally 8/21/2007)    BMI: Adult  09/18/2021    Pneumococcal Vaccine: Pediatrics (0 to 5 Years) and At-Risk Patients (6 to 59 Years)  Completed    Influenza Vaccine  Completed    HIB Vaccine  Aged Out    Hepatitis B Vaccine  Aged Out    IPV Vaccine  Aged Out    Hepatitis A Vaccine  Aged Out    Meningococcal ACWY Vaccine  Aged Out    HPV Vaccine  Aged Out        History of Present Illness:     Adult Annual Physical   Patient here for a comprehensive physical exam  The patient reports no problems  Diet and Physical Activity  · Diet/Nutrition: well balanced diet  · Exercise: no formal exercise  Depression Screening  PHQ-9 Depression Screening    PHQ-9:    Frequency of the following problems over the past two weeks:       Little interest or pleasure in doing things:  0 - not at all  Feeling down, depressed, or hopeless:  0 - not at all  PHQ-2 Score:  0       General Health  · Sleep: sleeps well  · Hearing: normal - bilateral   · Vision: no vision problems  · Dental: regular dental visits          Health  · Symptoms include: none     Review of Systems:     Review of Systems   Past Medical History:     History reviewed  No pertinent past medical history  Past Surgical History:     History reviewed  No pertinent surgical history     Family History:     Family History   Problem Relation Age of Onset    Other Mother         Claustrophobia    Drug abuse Family     Alcohol abuse Family       Social History:        Social History     Socioeconomic History    Marital status: Single     Spouse name: None    Number of children: None    Years of education: None    Highest education level: None   Occupational History    None   Social Needs    Financial resource strain: None    Food insecurity     Worry: None     Inability: None    Transportation needs     Medical: None     Non-medical: None   Tobacco Use    Smoking status: Current Every Day Smoker     Types: Pipe    Smokeless tobacco: Never Used    Tobacco comment: Secondhand smoke exposure   Substance and Sexual Activity    Alcohol use: No    Drug use: No    Sexual activity: None   Lifestyle    Physical activity     Days per week: None     Minutes per session: None    Stress: None   Relationships    Social connections     Talks on phone: None     Gets together: None     Attends Mandaen service: None     Active member of club or organization: None     Attends meetings of clubs or organizations: None     Relationship status: None    Intimate partner violence     Fear of current or ex partner: None     Emotionally abused: None     Physically abused: None     Forced sexual activity: None   Other Topics Concern    None   Social History Narrative    Always uses seatbelt    No caffeine use      Current Medications:     Current Outpatient Medications   Medication Sig Dispense Refill    ALPRAZolam (XANAX) 0 5 mg tablet Take 1-2 tablets 30-60 minutes prior to leaving home prn 30 tablet 0    EPINEPHrine (EPIPEN) 0 3 mg/0 3 mL SOAJ Inject 0 3 mL (0 3 mg total) into a muscle once for 1 dose 0 3 mL 0     No current facility-administered medications for this visit  Allergies:      Allergies   Allergen Reactions    Mushroom Extract Complex Facial Swelling    Peanut Oil     Strawberry C [Ascorbate]       Physical Exam:     /84 (BP Location: Left arm, Patient Position: Sitting, Cuff Size: Adult)   Pulse 96   Temp 97 7 °F (36 5 °C) (Oral)   Resp 16   Ht 5' 5 5" (1 664 m)   Wt 91 4 kg (201 lb 6 4 oz)   SpO2 98%   BMI 33 00 kg/m²     Physical Exam     Eder Rodriguez MD  [address] [place] [address] Drive

## 2020-09-18 NOTE — ASSESSMENT & PLAN NOTE
Use xanax 0 5mg 1-2 tabs as needed  SE educated pt  PDMP checked and it was appropriate  Pt does not need refills today

## 2020-09-18 NOTE — PROGRESS NOTES
Assessment/Plan:    Impaired fasting glucose  Reviewed lab in 9/2020 hgA1C 5 6 normal  Low carb diet  Hyperlipidemia, mixed  Low fat diet  Agoraphobia with panic attacks  Use xanax 0 5mg 1-2 tabs as needed  SE educated pt  PDMP checked and it was appropriate  Pt does not need refills today  Pipe smoker  Strongly advised pt to quit  Reviewed lab in 9/2020  hgA1C 5 6 normal  CMP normal    Flu shot yearly  Got pneumovax in 9/2019     Refused Colonoscopy or FIT    Refused PSA today  RTO 6 months         Diagnoses and all orders for this visit:    Impaired fasting glucose  -     CBC; Future  -     Comprehensive metabolic panel; Future  -     Hemoglobin A1C; Future  -     Lipid panel; Future  -     TSH, 3rd generation with Free T4 reflex; Future    Agoraphobia with panic attacks    Hyperlipidemia, mixed  -     CBC; Future  -     Comprehensive metabolic panel; Future  -     Hemoglobin A1C; Future  -     Lipid panel; Future  -     TSH, 3rd generation with Free T4 reflex; Future    Obesity (BMI 30-39 9)  -     CBC; Future  -     Comprehensive metabolic panel; Future  -     Hemoglobin A1C; Future  -     Lipid panel; Future  -     TSH, 3rd generation with Free T4 reflex; Future    Need for influenza vaccination  -     influenza vaccine, quadrivalent, recombinant, PF, 0 5 mL, for patients 18 yr+ ([facility]OK)    Need for hepatitis C screening test  -     Hepatitis C Antibody ([facility]); Future    Screening for HIV (human immunodeficiency virus)  -     HIV 1/2 Antigen/Antibody (4th Generation) w Reflex [facility]; Future    Annual physical exam    Pipe smoker    Other orders  -     Cancel: TDAP VACCINE GREATER THAN OR EQUAL TO 8YO IM          Subjective:      Patient ID: Carissa Parada is a 63 y o  male  HPI    Pt is here with his brother-in-law  Brother-in-law waits outside in waiting area  IFG---pt tried to follow low carb diet       Hyperlipidemia---Pt states he eat healthy, a lot of vegetables   Not on statins       Agoraphobia with panic attacks---He feels anxious if he gets out of house or go to unfamiliar places  Family hx of agoraphobia  He uses xanax 0 5mg 1-2 tabs as needed, does not use it everyday  He states if he only gets out for several blocks from his house, he does not use it  30 tabs last for months  Last refill was 6/2020  Pt states he still has 21 tabs left  Does not need refill today    Pt saw psychiatrist/therapist before but not now      Obesity---BMI 33 today   Tried to exercise regularly      He smokes a pipe 3-4 daily for 45 years, refuse to quit today    Denies ETOH or illicit drug use       Does not drive  Lives with his sister and brother in law           The following portions of the patient's history were reviewed and updated as appropriate: allergies, current medications, past family history, past medical history, past social history, past surgical history and problem list     Review of Systems   Constitutional: Negative for appetite change, chills and fever  HENT: Negative for congestion, ear pain, sinus pain and sore throat  Eyes: Negative for discharge and itching  Respiratory: Negative for apnea, cough, chest tightness, shortness of breath and wheezing  Cardiovascular: Negative for chest pain, palpitations and leg swelling  Gastrointestinal: Negative for abdominal pain, anal bleeding, constipation, diarrhea, nausea and vomiting  Endocrine: Negative for cold intolerance, heat intolerance and polyuria  Genitourinary: Negative for difficulty urinating and dysuria  Musculoskeletal: Negative for arthralgias, back pain and myalgias  Skin: Negative for rash  Neurological: Negative for dizziness and headaches  Psychiatric/Behavioral: Negative for agitation           Objective:      /84 (BP Location: Left arm, Patient Position: Sitting, Cuff Size: Adult)   Pulse 96   Temp 97 7 °F (36 5 °C) (Oral)   Resp 16   Ht 5' 5 5" (1 664 m)   Wt 91 4 kg (201 lb 6 4 oz)   SpO2 98%   BMI 33 00 kg/m²          Physical Exam  Constitutional:       Appearance: He is well-developed  HENT:      Head: Normocephalic and atraumatic  Eyes:      General:         Right eye: No discharge  Left eye: No discharge  Conjunctiva/sclera: Conjunctivae normal    Neck:      Musculoskeletal: Normal range of motion and neck supple  Cardiovascular:      Rate and Rhythm: Normal rate and regular rhythm  Heart sounds: Normal heart sounds  Pulmonary:      Effort: Pulmonary effort is normal       Breath sounds: Normal breath sounds  Abdominal:      General: Bowel sounds are normal       Palpations: Abdomen is soft  Musculoskeletal: Normal range of motion  Neurological:      Mental Status: He is alert

## 2021-03-15 ENCOUNTER — APPOINTMENT (OUTPATIENT)
Dept: LAB | Facility: HOSPITAL | Age: 66
End: 2021-03-15
Payer: COMMERCIAL

## 2021-03-15 DIAGNOSIS — Z11.4 SCREENING FOR HIV (HUMAN IMMUNODEFICIENCY VIRUS): ICD-10-CM

## 2021-03-15 DIAGNOSIS — E78.2 HYPERLIPIDEMIA, MIXED: ICD-10-CM

## 2021-03-15 DIAGNOSIS — R73.01 IMPAIRED FASTING GLUCOSE: ICD-10-CM

## 2021-03-15 DIAGNOSIS — E66.9 OBESITY (BMI 30-39.9): ICD-10-CM

## 2021-03-15 DIAGNOSIS — Z11.59 NEED FOR HEPATITIS C SCREENING TEST: ICD-10-CM

## 2021-03-15 LAB
ALBUMIN SERPL BCP-MCNC: 3.8 G/DL (ref 3.5–5)
ALP SERPL-CCNC: 81 U/L (ref 46–116)
ALT SERPL W P-5'-P-CCNC: 41 U/L (ref 12–78)
ANION GAP SERPL CALCULATED.3IONS-SCNC: 3 MMOL/L (ref 4–13)
AST SERPL W P-5'-P-CCNC: 15 U/L (ref 5–45)
BILIRUB SERPL-MCNC: 0.54 MG/DL (ref 0.2–1)
BUN SERPL-MCNC: 20 MG/DL (ref 5–25)
CALCIUM SERPL-MCNC: 8.7 MG/DL (ref 8.3–10.1)
CHLORIDE SERPL-SCNC: 111 MMOL/L (ref 100–108)
CHOLEST SERPL-MCNC: 202 MG/DL (ref 50–200)
CO2 SERPL-SCNC: 26 MMOL/L (ref 21–32)
CREAT SERPL-MCNC: 1.09 MG/DL (ref 0.6–1.3)
ERYTHROCYTE [DISTWIDTH] IN BLOOD BY AUTOMATED COUNT: 13.4 % (ref 11.6–15.1)
EST. AVERAGE GLUCOSE BLD GHB EST-MCNC: 105 MG/DL
GFR SERPL CREATININE-BSD FRML MDRD: 72 ML/MIN/1.73SQ M
GLUCOSE P FAST SERPL-MCNC: 89 MG/DL (ref 65–99)
HBA1C MFR BLD: 5.3 %
HCT VFR BLD AUTO: 44.4 % (ref 36.5–49.3)
HCV AB SER QL: NORMAL
HDLC SERPL-MCNC: 42 MG/DL
HGB BLD-MCNC: 14.6 G/DL (ref 12–17)
LDLC SERPL CALC-MCNC: 133 MG/DL (ref 0–100)
MCH RBC QN AUTO: 31.3 PG (ref 26.8–34.3)
MCHC RBC AUTO-ENTMCNC: 32.9 G/DL (ref 31.4–37.4)
MCV RBC AUTO: 95 FL (ref 82–98)
NONHDLC SERPL-MCNC: 160 MG/DL
PLATELET # BLD AUTO: 216 THOUSANDS/UL (ref 149–390)
PMV BLD AUTO: 11 FL (ref 8.9–12.7)
POTASSIUM SERPL-SCNC: 3.7 MMOL/L (ref 3.5–5.3)
PROT SERPL-MCNC: 7.4 G/DL (ref 6.4–8.2)
RBC # BLD AUTO: 4.67 MILLION/UL (ref 3.88–5.62)
SODIUM SERPL-SCNC: 140 MMOL/L (ref 136–145)
TRIGL SERPL-MCNC: 136 MG/DL
TSH SERPL DL<=0.05 MIU/L-ACNC: 1.52 UIU/ML (ref 0.36–3.74)
WBC # BLD AUTO: 4.82 THOUSAND/UL (ref 4.31–10.16)

## 2021-03-15 PROCEDURE — 80053 COMPREHEN METABOLIC PANEL: CPT

## 2021-03-15 PROCEDURE — 86803 HEPATITIS C AB TEST: CPT

## 2021-03-15 PROCEDURE — 83036 HEMOGLOBIN GLYCOSYLATED A1C: CPT

## 2021-03-15 PROCEDURE — 36415 COLL VENOUS BLD VENIPUNCTURE: CPT

## 2021-03-15 PROCEDURE — 85027 COMPLETE CBC AUTOMATED: CPT

## 2021-03-15 PROCEDURE — 84443 ASSAY THYROID STIM HORMONE: CPT

## 2021-03-15 PROCEDURE — 87389 HIV-1 AG W/HIV-1&-2 AB AG IA: CPT

## 2021-03-15 PROCEDURE — 80061 LIPID PANEL: CPT

## 2021-03-16 LAB — HIV 1+2 AB+HIV1 P24 AG SERPL QL IA: NORMAL

## 2021-03-18 ENCOUNTER — OFFICE VISIT (OUTPATIENT)
Dept: FAMILY MEDICINE CLINIC | Facility: CLINIC | Age: 66
End: 2021-03-18
Payer: COMMERCIAL

## 2021-03-18 VITALS
RESPIRATION RATE: 16 BRPM | TEMPERATURE: 97.4 F | OXYGEN SATURATION: 94 % | HEIGHT: 66 IN | WEIGHT: 206 LBS | SYSTOLIC BLOOD PRESSURE: 122 MMHG | DIASTOLIC BLOOD PRESSURE: 68 MMHG | BODY MASS INDEX: 33.11 KG/M2 | HEART RATE: 92 BPM

## 2021-03-18 DIAGNOSIS — F17.290 PIPE SMOKER: ICD-10-CM

## 2021-03-18 DIAGNOSIS — E66.9 OBESITY (BMI 30-39.9): ICD-10-CM

## 2021-03-18 DIAGNOSIS — E78.2 HYPERLIPIDEMIA, MIXED: Primary | ICD-10-CM

## 2021-03-18 DIAGNOSIS — F40.01 AGORAPHOBIA WITH PANIC ATTACKS: ICD-10-CM

## 2021-03-18 DIAGNOSIS — R73.01 IMPAIRED FASTING GLUCOSE: ICD-10-CM

## 2021-03-18 PROCEDURE — 4004F PT TOBACCO SCREEN RCVD TLK: CPT | Performed by: FAMILY MEDICINE

## 2021-03-18 PROCEDURE — 3725F SCREEN DEPRESSION PERFORMED: CPT | Performed by: FAMILY MEDICINE

## 2021-03-18 PROCEDURE — 99214 OFFICE O/P EST MOD 30 MIN: CPT | Performed by: FAMILY MEDICINE

## 2021-03-18 RX ORDER — ALPRAZOLAM 0.5 MG/1
TABLET ORAL
Qty: 30 TABLET | Refills: 0 | Status: SHIPPED | OUTPATIENT
Start: 2021-03-18 | End: 2022-02-28 | Stop reason: SDUPTHER

## 2021-03-18 NOTE — ASSESSMENT & PLAN NOTE
3/2021 lipid 202/136/42/133 slightly elevated  Low fat diet  If still elevated next time, will consider medication

## 2021-03-18 NOTE — PATIENT INSTRUCTIONS
Heart Healthy Diet   WHAT YOU NEED TO KNOW:   A heart healthy diet is an eating plan low in unhealthy fats and sodium (salt)  The plan is high in healthy fats and fiber  A heart healthy diet helps improve your cholesterol levels and lowers your risk for heart disease and stroke  A dietitian will teach you how to read and understand food labels  DISCHARGE INSTRUCTIONS:   Heart healthy diet guidelines to follow:   · Choose foods that contain healthy fats  ? Unsaturated fats  include monounsaturated and polyunsaturated fats  Unsaturated fat is found in foods such as soybean, canola, olive, corn, and safflower oils  It is also found in soft tub margarine that is made with liquid vegetable oil  ? Omega-3 fat  is found in certain fish, such as salmon, tuna, and trout, and in walnuts and flaxseed  Eat fish high in omega-3 fats at least 2 times a week  · Get 20 to 30 grams of fiber each day  Fruits, vegetables, whole-grain foods, and legumes (cooked beans) are good sources of fiber  · Limit or do not have unhealthy fats  ? Cholesterol  is found in animal foods, such as eggs and lobster, and in dairy products made from whole milk  Limit cholesterol to less than 200 mg each day  ? Saturated fat  is found in meats, such as mendoza and hamburger  It is also found in chicken or turkey skin, whole milk, and butter  Limit saturated fat to less than 7% of your total daily calories  ? Trans fat  is found in packaged foods, such as potato chips and cookies  It is also in hard margarine, some fried foods, and shortening  Do not eat foods that contain trans fats  · Limit sodium as directed  You may be told to limit sodium to 2,000 to 2,300 mg each day  Choose low-sodium or no-salt-added foods  Add little or no salt to food you prepare  Use herbs and spices in place of salt         Include the following in your heart healthy plan:  Ask your dietitian or healthcare provider how many servings to have from each of the following food groups:  · Grains:      ? Whole-wheat breads, cereals, and pastas, and brown rice    ? Low-fat, low-sodium crackers and chips    · Vegetables:      ? Broccoli, green beans, green peas, and spinach    ? Collards, kale, and lima beans    ? Carrots, sweet potatoes, tomatoes, and peppers    ? Canned vegetables with no salt added    · Fruits:      ? Bananas, peaches, pears, and pineapple    ? Grapes, raisins, and dates    ? Oranges, tangerines, grapefruit, orange juice, and grapefruit juice    ? Apricots, mangoes, melons, and papaya    ? Raspberries and strawberries    ? Canned fruit with no added sugar    · Low-fat dairy:      ? Nonfat (skim) milk, 1% milk, and low-fat almond, cashew, or soy milks fortified with calcium    ? Low-fat cheese, regular or frozen yogurt, and cottage cheese    · Meats and proteins:      ? Lean cuts of beef and pork (loin, leg, round), skinless chicken and turkey    ? Legumes, soy products, egg whites, or nuts    Limit or do not include the following in your heart healthy plan:   · Unhealthy fats and oils:      ? Whole or 2% milk, cream cheese, sour cream, or cheese    ? High-fat cuts of beef (T-bone steaks, ribs), chicken or turkey with skin, and organ meats such as liver    ? Butter, stick margarine, shortening, and cooking oils such as coconut or palm oil    · Foods and liquids high in sodium:      ? Packaged foods, such as frozen dinners, cookies, macaroni and cheese, and cereals with more than 300 mg of sodium per serving    ? Vegetables with added sodium, such as instant potatoes, vegetables with added sauces, or regular canned vegetables    ? Cured or smoked meats, such as hot dogs, mendoza, and sausage    ? High-sodium ketchup, barbecue sauce, salad dressing, pickles, olives, soy sauce, or miso    · Foods and liquids high in sugar:      ? Candy, cake, cookies, pies, or doughnuts    ? Soft drinks (soda), sports drinks, or sweetened tea    ?  Canned or dry mixes for cakes, soups, sauces, or gravies    Other healthy heart guidelines:   · Do not smoke  Nicotine and other chemicals in cigarettes and cigars can cause lung and heart damage  Ask your healthcare provider for information if you currently smoke and need help to quit  E-cigarettes or smokeless tobacco still contain nicotine  Talk to your healthcare provider before you use these products  · Limit or do not drink alcohol as directed  Alcohol can damage your heart and raise your blood pressure  Your healthcare provider may give you specific daily and weekly limits  The general recommended limit is 1 drink a day for women 21 or older and for men 72 or older  Do not have more than 3 drinks in a day or 7 in a week  The recommended limit is 2 drinks a day for men 24to 59years of age  Do not have more than 4 drinks in a day or 14 in a week  A drink of alcohol is 12 ounces of beer, 5 ounces of wine, or 1½ ounces of liquor  · Exercise regularly  Exercise can help you maintain a healthy weight and improve your blood pressure and cholesterol levels  Regular exercise can also decrease your risk for heart problems  Ask your healthcare provider about the best exercise plan for you  Do not start an exercise program without asking your healthcare provider  Follow up with your doctor or cardiologist as directed:  Write down your questions so you remember to ask them during your visits  © Copyright [address] Information is for End User's use only and may not be sold, redistributed or otherwise used for commercial purposes  All illustrations and images included in CareNotes® are the copyrighted property of [employer]  or [address] [place] [address]  The above information is an  only  It is not intended as medical advice for individual conditions or treatments  Talk to your doctor, nurse or pharmacist before following any medical regimen to see if it is safe and effective for you

## 2021-03-18 NOTE — PROGRESS NOTES
Chief Complaint   Patient presents with    Follow-up     6 month follow up      Health Maintenance   Topic Date Due    DTaP,Tdap,and Td Vaccines (1 - Tdap) Never done    Colorectal Cancer Screening  Never done    BMI: Followup Plan  03/06/2021    Annual Physical  09/18/2021    Depression Screening PHQ  03/18/2022    BMI: Adult  03/18/2022    HIV Screening  Completed    Hepatitis C Screening  Completed    Pneumococcal Vaccine: Pediatrics (0 to 5 Years) and At-Risk Patients (6 to 59 Years)  Completed    Influenza Vaccine  Completed    HIB Vaccine  Aged Out    Hepatitis B Vaccine  Aged Out    IPV Vaccine  Aged Out    Hepatitis A Vaccine  Aged Out    Meningococcal ACWY Vaccine  Aged Out    HPV Vaccine  Aged Out     BMI Counseling: Body mass index is 33 76 kg/m²  The BMI is above normal  Nutrition recommendations include decreasing portion sizes, encouraging healthy choices of fruits and vegetables, decreasing fast food intake, consuming healthier snacks and limiting drinks that contain sugar  Exercise recommendations include moderate physical activity 150 minutes/week  No pharmacotherapy was ordered  Assessment/Plan:    Hyperlipidemia, mixed  3/2021 lipid 202/136/42/133 slightly elevated  Low fat diet  If still elevated next time, will consider medication  Impaired fasting glucose  3/2021 hgA1C 5 3 normal  Low carb diet  Agoraphobia with panic attacks  Use xanax as needed  SE educated pt  Pipe smoker  Advised pt to quit  Reviewed lab in 3/2021  TSH normal  Hep C negative  HIV negative  CBC normal  CMP ok  HgA1C 5 3 normal  Lipid 202/136/42/133 slightly elevated    Flu shot yearly  Got pneumovax in 9/2019     Interested in Wilson Sergio vaccine  Refused Colonoscopy or FIT or cologuard  Refused PSA  RTO 6 months         Diagnoses and all orders for this visit:    Hyperlipidemia, mixed  -     Comprehensive metabolic panel;  Future  -     Hemoglobin A1C; Future  -     Lipid panel; Future    Impaired fasting glucose  -     Comprehensive metabolic panel; Future  -     Hemoglobin A1C; Future  -     Lipid panel; Future    Agoraphobia with panic attacks  -     Comprehensive metabolic panel; Future  -     Hemoglobin A1C; Future  -     Lipid panel; Future  -     ALPRAZolam (XANAX) 0 5 mg tablet; Take 1-2 tablets 30-60 minutes prior to leaving home prn    Obesity (BMI 30-39  9)    Pipe smoker    Other orders  -     Cancel: Cologuard; Future          Subjective:      Patient ID: Aubree Zaragoza is a 63 y o  male  HPI    Pt is here with his brother-in-law  Brother-in-law waits outside in waiting area      Hyperlipidemia---Pt states he did not follow good diet recently  Not on statins      IFG---Not follow low carb diet recently       Agoraphobia with panic attacks---He feels anxious if he gets out of house or go to unfamiliar places  Family hx of agoraphobia  He uses xanax 0 5mg 1-2 tabs as needed, does not use it everyday  30 tabs last for 5-6 months  Pt saw psychiatrist/therapist before but not any more  Denies depression       Obesity---BMI 33 76 today   Tried to exercise regularly      He smokes a pipe 3-4 daily for 45 years, refuse to quit today    Denies ETOH or illicit drug use       Does not drive  Lives with his sister and brother in law        The following portions of the patient's history were reviewed and updated as appropriate: allergies, current medications, past family history, past medical history, past social history, past surgical history and problem list     Review of Systems   Constitutional: Negative for appetite change, chills and fever  HENT: Negative for congestion, ear pain, sinus pain and sore throat  Eyes: Negative for discharge and itching  Respiratory: Negative for apnea, cough, chest tightness, shortness of breath and wheezing  Cardiovascular: Negative for chest pain, palpitations and leg swelling     Gastrointestinal: Negative for abdominal pain, anal bleeding, constipation, diarrhea, nausea and vomiting  Endocrine: Negative for cold intolerance, heat intolerance and polyuria  Genitourinary: Negative for difficulty urinating and dysuria  Musculoskeletal: Negative for arthralgias, back pain and myalgias  Skin: Negative for rash  Neurological: Negative for dizziness and headaches  Psychiatric/Behavioral: Negative for agitation, self-injury, sleep disturbance and suicidal ideas  The patient is nervous/anxious  Objective:      /68   Pulse 92   Temp (!) 97 4 °F (36 3 °C) (Tympanic)   Resp 16   Ht 5' 5 5" (1 664 m)   Wt 93 4 kg (206 lb)   SpO2 94%   BMI 33 76 kg/m²          Physical Exam  Constitutional:       Appearance: He is well-developed  HENT:      Head: Normocephalic and atraumatic  Eyes:      General:         Right eye: No discharge  Left eye: No discharge  Conjunctiva/sclera: Conjunctivae normal    Neck:      Musculoskeletal: Normal range of motion and neck supple  No muscular tenderness  Cardiovascular:      Rate and Rhythm: Normal rate and regular rhythm  Heart sounds: Normal heart sounds  No murmur  No friction rub  No gallop  Pulmonary:      Effort: Pulmonary effort is normal  No respiratory distress  Breath sounds: Normal breath sounds  No wheezing or rales  Abdominal:      General: Bowel sounds are normal  There is no distension  Palpations: Abdomen is soft  Tenderness: There is no abdominal tenderness  There is no guarding  Musculoskeletal: Normal range of motion  General: No swelling, tenderness or deformity  Lymphadenopathy:      Cervical: No cervical adenopathy  Neurological:      Mental Status: He is alert

## 2021-09-13 ENCOUNTER — APPOINTMENT (OUTPATIENT)
Dept: LAB | Facility: HOSPITAL | Age: 66
End: 2021-09-13
Payer: COMMERCIAL

## 2021-09-13 DIAGNOSIS — R73.01 IMPAIRED FASTING GLUCOSE: ICD-10-CM

## 2021-09-13 DIAGNOSIS — F40.01 AGORAPHOBIA WITH PANIC ATTACKS: ICD-10-CM

## 2021-09-13 DIAGNOSIS — E78.2 HYPERLIPIDEMIA, MIXED: ICD-10-CM

## 2021-09-13 LAB
ALBUMIN SERPL BCP-MCNC: 3.7 G/DL (ref 3.5–5)
ALP SERPL-CCNC: 70 U/L (ref 46–116)
ALT SERPL W P-5'-P-CCNC: 25 U/L (ref 12–78)
ANION GAP SERPL CALCULATED.3IONS-SCNC: 3 MMOL/L (ref 4–13)
AST SERPL W P-5'-P-CCNC: 10 U/L (ref 5–45)
BILIRUB SERPL-MCNC: 0.62 MG/DL (ref 0.2–1)
BUN SERPL-MCNC: 19 MG/DL (ref 5–25)
CALCIUM SERPL-MCNC: 8.9 MG/DL (ref 8.3–10.1)
CHLORIDE SERPL-SCNC: 109 MMOL/L (ref 100–108)
CHOLEST SERPL-MCNC: 199 MG/DL (ref 50–200)
CO2 SERPL-SCNC: 27 MMOL/L (ref 21–32)
CREAT SERPL-MCNC: 1.16 MG/DL (ref 0.6–1.3)
EST. AVERAGE GLUCOSE BLD GHB EST-MCNC: 105 MG/DL
GFR SERPL CREATININE-BSD FRML MDRD: 66 ML/MIN/1.73SQ M
GLUCOSE P FAST SERPL-MCNC: 89 MG/DL (ref 65–99)
HBA1C MFR BLD: 5.3 %
HDLC SERPL-MCNC: 38 MG/DL
LDLC SERPL CALC-MCNC: 134 MG/DL (ref 0–100)
NONHDLC SERPL-MCNC: 161 MG/DL
POTASSIUM SERPL-SCNC: 3.9 MMOL/L (ref 3.5–5.3)
PROT SERPL-MCNC: 7.3 G/DL (ref 6.4–8.2)
SODIUM SERPL-SCNC: 139 MMOL/L (ref 136–145)
TRIGL SERPL-MCNC: 137 MG/DL

## 2021-09-13 PROCEDURE — 80061 LIPID PANEL: CPT

## 2021-09-13 PROCEDURE — 83036 HEMOGLOBIN GLYCOSYLATED A1C: CPT

## 2021-09-13 PROCEDURE — 36415 COLL VENOUS BLD VENIPUNCTURE: CPT

## 2021-09-13 PROCEDURE — 80053 COMPREHEN METABOLIC PANEL: CPT

## 2021-10-07 ENCOUNTER — OFFICE VISIT (OUTPATIENT)
Dept: FAMILY MEDICINE CLINIC | Facility: CLINIC | Age: 66
End: 2021-10-07
Payer: COMMERCIAL

## 2021-10-07 VITALS
HEIGHT: 66 IN | DIASTOLIC BLOOD PRESSURE: 82 MMHG | RESPIRATION RATE: 20 BRPM | TEMPERATURE: 98.4 F | OXYGEN SATURATION: 97 % | HEART RATE: 112 BPM | WEIGHT: 205.2 LBS | BODY MASS INDEX: 32.98 KG/M2 | SYSTOLIC BLOOD PRESSURE: 132 MMHG

## 2021-10-07 DIAGNOSIS — Z23 ENCOUNTER FOR IMMUNIZATION: ICD-10-CM

## 2021-10-07 DIAGNOSIS — F17.290 PIPE SMOKER: ICD-10-CM

## 2021-10-07 DIAGNOSIS — F40.01 AGORAPHOBIA WITH PANIC ATTACKS: ICD-10-CM

## 2021-10-07 DIAGNOSIS — F29 PSYCHOSIS, ATYPICAL (HCC): ICD-10-CM

## 2021-10-07 DIAGNOSIS — E78.2 HYPERLIPIDEMIA, MIXED: ICD-10-CM

## 2021-10-07 DIAGNOSIS — Z00.00 ANNUAL PHYSICAL EXAM: ICD-10-CM

## 2021-10-07 DIAGNOSIS — E66.9 OBESITY (BMI 30-39.9): ICD-10-CM

## 2021-10-07 DIAGNOSIS — R73.01 IMPAIRED FASTING GLUCOSE: Primary | ICD-10-CM

## 2021-10-07 PROCEDURE — 90471 IMMUNIZATION ADMIN: CPT

## 2021-10-07 PROCEDURE — 90682 RIV4 VACC RECOMBINANT DNA IM: CPT

## 2021-10-07 PROCEDURE — 99396 PREV VISIT EST AGE 40-64: CPT | Performed by: FAMILY MEDICINE

## 2021-10-07 PROCEDURE — 99214 OFFICE O/P EST MOD 30 MIN: CPT | Performed by: FAMILY MEDICINE

## 2021-12-30 ENCOUNTER — APPOINTMENT (INPATIENT)
Dept: RADIOLOGY | Facility: HOSPITAL | Age: 66
DRG: 167 | End: 2021-12-30
Payer: COMMERCIAL

## 2021-12-30 ENCOUNTER — HOSPITAL ENCOUNTER (INPATIENT)
Facility: HOSPITAL | Age: 66
LOS: 32 days | Discharge: NURSING, REHAB OR LONG-TERM CARE | DRG: 167 | End: 2022-01-31
Attending: EMERGENCY MEDICINE | Admitting: ANESTHESIOLOGY
Payer: COMMERCIAL

## 2021-12-30 ENCOUNTER — APPOINTMENT (EMERGENCY)
Dept: RADIOLOGY | Facility: HOSPITAL | Age: 66
DRG: 167 | End: 2021-12-30
Payer: COMMERCIAL

## 2021-12-30 ENCOUNTER — APPOINTMENT (INPATIENT)
Dept: NON INVASIVE DIAGNOSTICS | Facility: HOSPITAL | Age: 66
DRG: 167 | End: 2021-12-30
Payer: COMMERCIAL

## 2021-12-30 DIAGNOSIS — I50.21 ACUTE SYSTOLIC CONGESTIVE HEART FAILURE (HCC): ICD-10-CM

## 2021-12-30 DIAGNOSIS — R57.0 CARDIOGENIC SHOCK (HCC): Primary | ICD-10-CM

## 2021-12-30 DIAGNOSIS — R53.1 GENERALIZED WEAKNESS: ICD-10-CM

## 2021-12-30 DIAGNOSIS — R94.31 ST ELEVATION: ICD-10-CM

## 2021-12-30 DIAGNOSIS — Q21.0 VSD (VENTRICULAR SEPTAL DEFECT): ICD-10-CM

## 2021-12-30 DIAGNOSIS — T82.9XXA CENTRAL LINE COMPLICATION, INITIAL ENCOUNTER: ICD-10-CM

## 2021-12-30 DIAGNOSIS — K72.90 LIVER FAILURE (HCC): ICD-10-CM

## 2021-12-30 DIAGNOSIS — Z87.74 S/P VSD REPAIR: ICD-10-CM

## 2021-12-30 DIAGNOSIS — R33.9 URINARY RETENTION: ICD-10-CM

## 2021-12-30 DIAGNOSIS — N17.9 ACUTE KIDNEY INJURY (HCC): ICD-10-CM

## 2021-12-30 DIAGNOSIS — R74.01 TRANSAMINITIS: ICD-10-CM

## 2021-12-30 DIAGNOSIS — I50.9 ACUTE CHF (CONGESTIVE HEART FAILURE) (HCC): ICD-10-CM

## 2021-12-30 DIAGNOSIS — J96.01 ACUTE RESPIRATORY FAILURE WITH HYPOXIA (HCC): ICD-10-CM

## 2021-12-30 LAB
4HR DELTA HS TROPONIN: -1363 NG/L
ABO GROUP BLD: NORMAL
ABO GROUP BLD: NORMAL
ALBUMIN SERPL BCP-MCNC: 2.9 G/DL (ref 3.5–5)
ALBUMIN SERPL BCP-MCNC: 3.1 G/DL (ref 3.5–5)
ALP SERPL-CCNC: 145 U/L (ref 46–116)
ALP SERPL-CCNC: 165 U/L (ref 46–116)
ALT SERPL W P-5'-P-CCNC: 5455 U/L (ref 12–78)
ALT SERPL W P-5'-P-CCNC: 5669 U/L (ref 12–78)
ANION GAP SERPL CALCULATED.3IONS-SCNC: 17 MMOL/L (ref 4–13)
ANION GAP SERPL CALCULATED.3IONS-SCNC: 21 MMOL/L (ref 4–13)
AORTIC ROOT: 3.3 CM
APAP SERPL-MCNC: 21 UG/ML (ref 10–20)
APICAL FOUR CHAMBER EJECTION FRACTION: 58 %
APTT PPP: 28 SECONDS (ref 23–37)
APTT PPP: 28 SECONDS (ref 23–37)
APTT PPP: 34 SECONDS (ref 23–37)
ARTIFACT: PRESENT
ASCENDING AORTA: 3.3 CM
AST SERPL W P-5'-P-CCNC: 3054 U/L (ref 5–45)
AST SERPL W P-5'-P-CCNC: 3128 U/L (ref 5–45)
AV LVOT MEAN GRADIENT: 1 MMHG
AV LVOT PEAK GRADIENT: 2 MMHG
BACTERIA UR QL AUTO: ABNORMAL /HPF
BASE EX.OXY STD BLDV CALC-SCNC: 40.4 % (ref 60–80)
BASE EX.OXY STD BLDV CALC-SCNC: 50.5 % (ref 60–80)
BASE EX.OXY STD BLDV CALC-SCNC: 50.6 % (ref 60–80)
BASE EXCESS BLDA CALC-SCNC: -8.5 MMOL/L
BASE EXCESS BLDV CALC-SCNC: -11.3 MMOL/L
BASE EXCESS BLDV CALC-SCNC: -18.2 MMOL/L
BASE EXCESS BLDV CALC-SCNC: -7.2 MMOL/L
BASOPHILS # BLD MANUAL: 0 THOUSAND/UL (ref 0–0.1)
BASOPHILS NFR MAR MANUAL: 0 % (ref 0–1)
BILIRUB DIRECT SERPL-MCNC: 0.55 MG/DL (ref 0–0.2)
BILIRUB SERPL-MCNC: 1.09 MG/DL (ref 0.2–1)
BILIRUB SERPL-MCNC: 1.27 MG/DL (ref 0.2–1)
BILIRUB UR QL STRIP: ABNORMAL
BLD GP AB SCN SERPL QL: NEGATIVE
BODY TEMPERATURE: 97.7 DEGREES FEHRENHEIT
BUN SERPL-MCNC: 141 MG/DL (ref 5–25)
BUN SERPL-MCNC: 147 MG/DL (ref 5–25)
CALCIUM ALBUM COR SERPL-MCNC: 9.1 MG/DL (ref 8.3–10.1)
CALCIUM SERPL-MCNC: 8.2 MG/DL (ref 8.3–10.1)
CALCIUM SERPL-MCNC: 8.4 MG/DL (ref 8.3–10.1)
CARDIAC TROPONIN I PNL SERPL HS: 4519 NG/L
CARDIAC TROPONIN I PNL SERPL HS: 5882 NG/L
CHLORIDE SERPL-SCNC: 101 MMOL/L (ref 100–108)
CHLORIDE SERPL-SCNC: 97 MMOL/L (ref 100–108)
CHOLEST SERPL-MCNC: 150 MG/DL
CLARITY UR: ABNORMAL
CO2 SERPL-SCNC: 12 MMOL/L (ref 21–32)
CO2 SERPL-SCNC: 16 MMOL/L (ref 21–32)
COLOR UR: ABNORMAL
CREAT SERPL-MCNC: 3.53 MG/DL (ref 0.6–1.3)
CREAT SERPL-MCNC: 4.01 MG/DL (ref 0.6–1.3)
DOP CALC RVOT PEAK VEL: 2.48 M/S
E WAVE DECELERATION TIME: 115 MS
EOSINOPHIL # BLD MANUAL: 0 THOUSAND/UL (ref 0–0.4)
EOSINOPHIL NFR BLD MANUAL: 0 % (ref 0–6)
ERYTHROCYTE [DISTWIDTH] IN BLOOD BY AUTOMATED COUNT: 13.9 % (ref 11.6–15.1)
EST. AVERAGE GLUCOSE BLD GHB EST-MCNC: 117 MG/DL
FLUAV RNA RESP QL NAA+PROBE: NEGATIVE
FLUBV RNA RESP QL NAA+PROBE: NEGATIVE
FRACTIONAL SHORTENING: 20 % (ref 28–44)
GFR SERPL CREATININE-BSD FRML MDRD: 14 ML/MIN/1.73SQ M
GFR SERPL CREATININE-BSD FRML MDRD: 17 ML/MIN/1.73SQ M
GLUCOSE SERPL-MCNC: 136 MG/DL (ref 65–140)
GLUCOSE SERPL-MCNC: 163 MG/DL (ref 65–140)
GLUCOSE UR STRIP-MCNC: NEGATIVE MG/DL
HBA1C MFR BLD: 5.7 %
HCO3 BLDA-SCNC: 14.6 MMOL/L (ref 22–28)
HCO3 BLDV-SCNC: 10.4 MMOL/L (ref 24–30)
HCO3 BLDV-SCNC: 14 MMOL/L (ref 24–30)
HCO3 BLDV-SCNC: 17.7 MMOL/L (ref 24–30)
HCT VFR BLD AUTO: 40.1 % (ref 36.5–49.3)
HDLC SERPL-MCNC: 13 MG/DL
HGB BLD-MCNC: 12.8 G/DL (ref 12–17)
HGB UR QL STRIP.AUTO: ABNORMAL
HYALINE CASTS #/AREA URNS LPF: ABNORMAL /LPF
INR PPP: 2.04 (ref 0.84–1.19)
INTERVENTRICULAR SEPTUM IN DIASTOLE (PARASTERNAL SHORT AXIS VIEW): 1.1 CM
KETONES UR STRIP-MCNC: NEGATIVE MG/DL
LACTATE SERPL-SCNC: 11.1 MMOL/L (ref 0.5–2)
LACTATE SERPL-SCNC: 2.5 MMOL/L (ref 0.5–2)
LACTATE SERPL-SCNC: 5.1 MMOL/L (ref 0.5–2)
LDLC SERPL CALC-MCNC: 107 MG/DL (ref 0–100)
LEFT ATRIUM AREA SYSTOLE SINGLE PLANE A4C: 21.5 CM2
LEFT INTERNAL DIMENSION IN SYSTOLE: 3.6 CM (ref 2.1–4)
LEFT VENTRICULAR INTERNAL DIMENSION IN DIASTOLE: 4.5 CM (ref 5.42–8.07)
LEFT VENTRICULAR POSTERIOR WALL IN END DIASTOLE: 1.2 CM
LEFT VENTRICULAR STROKE VOLUME: 40 ML
LEUKOCYTE ESTERASE UR QL STRIP: ABNORMAL
LYMPHOCYTES # BLD AUTO: 0.69 THOUSAND/UL (ref 0.6–4.47)
LYMPHOCYTES # BLD AUTO: 4 % (ref 14–44)
MAGNESIUM SERPL-MCNC: 3.5 MG/DL (ref 1.6–2.6)
MCH RBC QN AUTO: 31.4 PG (ref 26.8–34.3)
MCHC RBC AUTO-ENTMCNC: 31.9 G/DL (ref 31.4–37.4)
MCV RBC AUTO: 98 FL (ref 82–98)
MONOCYTES # BLD AUTO: 0.69 THOUSAND/UL (ref 0–1.22)
MONOCYTES NFR BLD: 4 % (ref 4–12)
MV E'TISSUE VEL-SEP: 13 CM/S
MV PEAK A VEL: 0.88 M/S
MV PEAK E VEL: 110 CM/S
MV STENOSIS PRESSURE HALF TIME: 0 MS
NASAL CANNULA: 2
NASAL CANNULA: 4
NEUTROPHILS # BLD MANUAL: 15.67 THOUSAND/UL (ref 1.85–7.62)
NEUTS SEG NFR BLD AUTO: 91 % (ref 43–75)
NITRITE UR QL STRIP: NEGATIVE
NON-SQ EPI CELLS URNS QL MICRO: ABNORMAL /HPF
NONHDLC SERPL-MCNC: 137 MG/DL
NT-PROBNP SERPL-MCNC: ABNORMAL PG/ML
O2 CT BLDA-SCNC: 15.9 ML/DL (ref 16–23)
O2 CT BLDV-SCNC: 7.8 ML/DL
O2 CT BLDV-SCNC: 8.6 ML/DL
O2 CT BLDV-SCNC: 8.7 ML/DL
OXYHGB MFR BLDA: 96.4 % (ref 94–97)
PCO2 BLDA: 24.3 MM HG (ref 36–44)
PCO2 BLDV: 29.7 MM HG (ref 42–50)
PCO2 BLDV: 33.6 MM HG (ref 42–50)
PCO2 BLDV: 34.4 MM HG (ref 42–50)
PCO2 TEMP ADJ BLDA: 23.8 MM HG (ref 36–44)
PH BLD: 7.41 [PH] (ref 7.35–7.45)
PH BLDA: 7.4 [PH] (ref 7.35–7.45)
PH BLDV: 7.1 [PH] (ref 7.3–7.4)
PH BLDV: 7.29 [PH] (ref 7.3–7.4)
PH BLDV: 7.34 [PH] (ref 7.3–7.4)
PH UR STRIP.AUTO: 5 [PH]
PHOSPHATE SERPL-MCNC: 9 MG/DL (ref 2.3–4.1)
PLATELET # BLD AUTO: 217 THOUSANDS/UL (ref 149–390)
PLATELET BLD QL SMEAR: ADEQUATE
PMV BLD AUTO: 11.2 FL (ref 8.9–12.7)
PO2 BLD: 100 MM HG (ref 75–129)
PO2 BLDA: 103 MM HG (ref 75–129)
PO2 BLDV: 33.5 MM HG (ref 35–45)
PO2 BLDV: 35.4 MM HG (ref 35–45)
PO2 BLDV: 36.3 MM HG (ref 35–45)
POIKILOCYTOSIS BLD QL SMEAR: PRESENT
POTASSIUM SERPL-SCNC: 4.6 MMOL/L (ref 3.5–5.3)
POTASSIUM SERPL-SCNC: 4.7 MMOL/L (ref 3.5–5.3)
PROCALCITONIN SERPL-MCNC: 1.65 NG/ML
PROT SERPL-MCNC: 6.4 G/DL (ref 6.4–8.2)
PROT SERPL-MCNC: 7.6 G/DL (ref 6.4–8.2)
PROT UR STRIP-MCNC: ABNORMAL MG/DL
PROTHROMBIN TIME: 22.1 SECONDS (ref 11.6–14.5)
RBC # BLD AUTO: 4.08 MILLION/UL (ref 3.88–5.62)
RBC #/AREA URNS AUTO: ABNORMAL /HPF
RBC MORPH BLD: PRESENT
RH BLD: POSITIVE
RH BLD: POSITIVE
RIGHT ATRIUM AREA SYSTOLE A4C: 19.5 CM2
RIGHT VENTRICLE ID DIMENSION: 3.8 CM
RSV RNA RESP QL NAA+PROBE: NEGATIVE
SALICYLATES SERPL-MCNC: 3 MG/DL (ref 3–20)
SARS-COV-2 RNA RESP QL NAA+PROBE: NEGATIVE
SL CV LV EF: 45
SL CV PED ECHO LEFT VENTRICLE DIASTOLIC VOLUME (MOD BIPLANE) 2D: 94 ML
SL CV PED ECHO LEFT VENTRICLE SYSTOLIC VOLUME (MOD BIPLANE) 2D: 54 ML
SL CV RVOT MAX GRADIENT: 25 MMHG
SL CV RVOT MEAN GRADIENT: 13 MMHG
SL CV RVOT VMEAN: 1.64 M/S
SODIUM SERPL-SCNC: 130 MMOL/L (ref 136–145)
SODIUM SERPL-SCNC: 134 MMOL/L (ref 136–145)
SP GR UR STRIP.AUTO: 1.02 (ref 1–1.03)
SPECIMEN EXPIRATION DATE: NORMAL
SPECIMEN SOURCE: ABNORMAL
TRICUSPID VALVE PEAK REGURGITATION VELOCITY: 4.02 M/S
TRICUSPID VALVE S': 0.7 CM/S
TRIGL SERPL-MCNC: 152 MG/DL
TV PEAK GRADIENT: 65 MMHG
UROBILINOGEN UR QL STRIP.AUTO: 2 E.U./DL
VARIANT LYMPHS # BLD AUTO: 1 %
WBC # BLD AUTO: 17.22 THOUSAND/UL (ref 4.31–10.16)
WBC #/AREA URNS AUTO: ABNORMAL /HPF
Z-SCORE OF LEFT VENTRICULAR DIMENSION IN END SYSTOLE: -3.85

## 2021-12-30 PROCEDURE — 93306 TTE W/DOPPLER COMPLETE: CPT | Performed by: INTERNAL MEDICINE

## 2021-12-30 PROCEDURE — 86850 RBC ANTIBODY SCREEN: CPT | Performed by: PHYSICIAN ASSISTANT

## 2021-12-30 PROCEDURE — 83605 ASSAY OF LACTIC ACID: CPT | Performed by: EMERGENCY MEDICINE

## 2021-12-30 PROCEDURE — 81001 URINALYSIS AUTO W/SCOPE: CPT | Performed by: EMERGENCY MEDICINE

## 2021-12-30 PROCEDURE — 83735 ASSAY OF MAGNESIUM: CPT | Performed by: PHYSICIAN ASSISTANT

## 2021-12-30 PROCEDURE — 87040 BLOOD CULTURE FOR BACTERIA: CPT | Performed by: EMERGENCY MEDICINE

## 2021-12-30 PROCEDURE — 80179 DRUG ASSAY SALICYLATE: CPT | Performed by: ANESTHESIOLOGY

## 2021-12-30 PROCEDURE — 71045 X-RAY EXAM CHEST 1 VIEW: CPT

## 2021-12-30 PROCEDURE — 99285 EMERGENCY DEPT VISIT HI MDM: CPT

## 2021-12-30 PROCEDURE — 83605 ASSAY OF LACTIC ACID: CPT | Performed by: ANESTHESIOLOGY

## 2021-12-30 PROCEDURE — 96375 TX/PRO/DX INJ NEW DRUG ADDON: CPT

## 2021-12-30 PROCEDURE — 96365 THER/PROPH/DIAG IV INF INIT: CPT

## 2021-12-30 PROCEDURE — 84145 PROCALCITONIN (PCT): CPT | Performed by: EMERGENCY MEDICINE

## 2021-12-30 PROCEDURE — 96366 THER/PROPH/DIAG IV INF ADDON: CPT

## 2021-12-30 PROCEDURE — 80053 COMPREHEN METABOLIC PANEL: CPT | Performed by: EMERGENCY MEDICINE

## 2021-12-30 PROCEDURE — 82805 BLOOD GASES W/O2 SATURATION: CPT | Performed by: PHYSICIAN ASSISTANT

## 2021-12-30 PROCEDURE — 03HY32Z INSERTION OF MONITORING DEVICE INTO UPPER ARTERY, PERCUTANEOUS APPROACH: ICD-10-PCS | Performed by: THORACIC SURGERY (CARDIOTHORACIC VASCULAR SURGERY)

## 2021-12-30 PROCEDURE — 85730 THROMBOPLASTIN TIME PARTIAL: CPT | Performed by: EMERGENCY MEDICINE

## 2021-12-30 PROCEDURE — 84484 ASSAY OF TROPONIN QUANT: CPT | Performed by: EMERGENCY MEDICINE

## 2021-12-30 PROCEDURE — 36620 INSERTION CATHETER ARTERY: CPT

## 2021-12-30 PROCEDURE — 80143 DRUG ASSAY ACETAMINOPHEN: CPT | Performed by: EMERGENCY MEDICINE

## 2021-12-30 PROCEDURE — 85027 COMPLETE CBC AUTOMATED: CPT | Performed by: EMERGENCY MEDICINE

## 2021-12-30 PROCEDURE — 85730 THROMBOPLASTIN TIME PARTIAL: CPT | Performed by: ANESTHESIOLOGY

## 2021-12-30 PROCEDURE — 80061 LIPID PANEL: CPT | Performed by: PHYSICIAN ASSISTANT

## 2021-12-30 PROCEDURE — 93005 ELECTROCARDIOGRAM TRACING: CPT

## 2021-12-30 PROCEDURE — 99292 CRITICAL CARE ADDL 30 MIN: CPT | Performed by: EMERGENCY MEDICINE

## 2021-12-30 PROCEDURE — 02HV33Z INSERTION OF INFUSION DEVICE INTO SUPERIOR VENA CAVA, PERCUTANEOUS APPROACH: ICD-10-PCS | Performed by: THORACIC SURGERY (CARDIOTHORACIC VASCULAR SURGERY)

## 2021-12-30 PROCEDURE — 82805 BLOOD GASES W/O2 SATURATION: CPT | Performed by: ANESTHESIOLOGY

## 2021-12-30 PROCEDURE — 80076 HEPATIC FUNCTION PANEL: CPT | Performed by: PHYSICIAN ASSISTANT

## 2021-12-30 PROCEDURE — 99449 NTRPROF PH1/NTRNET/EHR 31/>: CPT | Performed by: EMERGENCY MEDICINE

## 2021-12-30 PROCEDURE — 36556 INSERT NON-TUNNEL CV CATH: CPT | Performed by: EMERGENCY MEDICINE

## 2021-12-30 PROCEDURE — 85610 PROTHROMBIN TIME: CPT | Performed by: EMERGENCY MEDICINE

## 2021-12-30 PROCEDURE — 83036 HEMOGLOBIN GLYCOSYLATED A1C: CPT | Performed by: ANESTHESIOLOGY

## 2021-12-30 PROCEDURE — 93306 TTE W/DOPPLER COMPLETE: CPT

## 2021-12-30 PROCEDURE — 4A133B1 MONITORING OF ARTERIAL PRESSURE, PERIPHERAL, PERCUTANEOUS APPROACH: ICD-10-PCS | Performed by: THORACIC SURGERY (CARDIOTHORACIC VASCULAR SURGERY)

## 2021-12-30 PROCEDURE — 99291 CRITICAL CARE FIRST HOUR: CPT | Performed by: ANESTHESIOLOGY

## 2021-12-30 PROCEDURE — 82805 BLOOD GASES W/O2 SATURATION: CPT | Performed by: EMERGENCY MEDICINE

## 2021-12-30 PROCEDURE — NC001 PR NO CHARGE: Performed by: THORACIC SURGERY (CARDIOTHORACIC VASCULAR SURGERY)

## 2021-12-30 PROCEDURE — 86923 COMPATIBILITY TEST ELECTRIC: CPT

## 2021-12-30 PROCEDURE — 99254 IP/OBS CNSLTJ NEW/EST MOD 60: CPT | Performed by: INTERNAL MEDICINE

## 2021-12-30 PROCEDURE — 86920 COMPATIBILITY TEST SPIN: CPT

## 2021-12-30 PROCEDURE — NC001 PR NO CHARGE: Performed by: INTERNAL MEDICINE

## 2021-12-30 PROCEDURE — 80048 BASIC METABOLIC PNL TOTAL CA: CPT | Performed by: PHYSICIAN ASSISTANT

## 2021-12-30 PROCEDURE — 96374 THER/PROPH/DIAG INJ IV PUSH: CPT

## 2021-12-30 PROCEDURE — 86901 BLOOD TYPING SEROLOGIC RH(D): CPT | Performed by: PHYSICIAN ASSISTANT

## 2021-12-30 PROCEDURE — 99255 IP/OBS CONSLTJ NEW/EST HI 80: CPT | Performed by: THORACIC SURGERY (CARDIOTHORACIC VASCULAR SURGERY)

## 2021-12-30 PROCEDURE — 0241U HB NFCT DS VIR RESP RNA 4 TRGT: CPT | Performed by: EMERGENCY MEDICINE

## 2021-12-30 PROCEDURE — 83880 ASSAY OF NATRIURETIC PEPTIDE: CPT | Performed by: INTERNAL MEDICINE

## 2021-12-30 PROCEDURE — 99291 CRITICAL CARE FIRST HOUR: CPT | Performed by: EMERGENCY MEDICINE

## 2021-12-30 PROCEDURE — 86900 BLOOD TYPING SEROLOGIC ABO: CPT | Performed by: PHYSICIAN ASSISTANT

## 2021-12-30 PROCEDURE — 85007 BL SMEAR W/DIFF WBC COUNT: CPT | Performed by: EMERGENCY MEDICINE

## 2021-12-30 PROCEDURE — 4A133J1 MONITORING OF ARTERIAL PULSE, PERIPHERAL, PERCUTANEOUS APPROACH: ICD-10-PCS | Performed by: THORACIC SURGERY (CARDIOTHORACIC VASCULAR SURGERY)

## 2021-12-30 PROCEDURE — 84100 ASSAY OF PHOSPHORUS: CPT | Performed by: PHYSICIAN ASSISTANT

## 2021-12-30 PROCEDURE — 36415 COLL VENOUS BLD VENIPUNCTURE: CPT | Performed by: EMERGENCY MEDICINE

## 2021-12-30 RX ORDER — METOLAZONE 10 MG/1
10 TABLET ORAL ONCE
Status: COMPLETED | OUTPATIENT
Start: 2021-12-30 | End: 2021-12-30

## 2021-12-30 RX ORDER — HEPARIN SODIUM 10000 [USP'U]/100ML
3-20 INJECTION, SOLUTION INTRAVENOUS
Status: DISCONTINUED | OUTPATIENT
Start: 2021-12-30 | End: 2021-12-31

## 2021-12-30 RX ORDER — ONDANSETRON 2 MG/ML
4 INJECTION INTRAMUSCULAR; INTRAVENOUS ONCE
Status: COMPLETED | OUTPATIENT
Start: 2021-12-30 | End: 2021-12-30

## 2021-12-30 RX ORDER — ONDANSETRON 2 MG/ML
INJECTION INTRAMUSCULAR; INTRAVENOUS
Status: COMPLETED
Start: 2021-12-30 | End: 2021-12-30

## 2021-12-30 RX ORDER — MILRINONE LACTATE 0.2 MG/ML
0.13 INJECTION, SOLUTION INTRAVENOUS CONTINUOUS
Status: DISCONTINUED | OUTPATIENT
Start: 2021-12-30 | End: 2022-01-04 | Stop reason: HOSPADM

## 2021-12-30 RX ORDER — ASPIRIN 81 MG/1
81 TABLET ORAL DAILY
Status: DISCONTINUED | OUTPATIENT
Start: 2021-12-31 | End: 2022-01-03

## 2021-12-30 RX ORDER — ASPIRIN 81 MG/1
324 TABLET, CHEWABLE ORAL ONCE
Status: COMPLETED | OUTPATIENT
Start: 2021-12-30 | End: 2021-12-30

## 2021-12-30 RX ORDER — NOREPINEPHRINE BITARTRATE 1 MG/ML
INJECTION, SOLUTION INTRAVENOUS
Status: COMPLETED
Start: 2021-12-30 | End: 2021-12-30

## 2021-12-30 RX ORDER — BUMETANIDE 0.25 MG/ML
1 INJECTION INTRAMUSCULAR; INTRAVENOUS CONTINUOUS
Status: DISCONTINUED | OUTPATIENT
Start: 2021-12-30 | End: 2021-12-31

## 2021-12-30 RX ORDER — HEPARIN SODIUM 1000 [USP'U]/ML
4000 INJECTION, SOLUTION INTRAVENOUS; SUBCUTANEOUS
Status: DISCONTINUED | OUTPATIENT
Start: 2021-12-30 | End: 2021-12-31

## 2021-12-30 RX ORDER — LIDOCAINE HYDROCHLORIDE 10 MG/ML
INJECTION, SOLUTION EPIDURAL; INFILTRATION; INTRACAUDAL; PERINEURAL
Status: COMPLETED
Start: 2021-12-30 | End: 2021-12-30

## 2021-12-30 RX ORDER — HEPARIN SODIUM 1000 [USP'U]/ML
2000 INJECTION, SOLUTION INTRAVENOUS; SUBCUTANEOUS
Status: DISCONTINUED | OUTPATIENT
Start: 2021-12-30 | End: 2021-12-31

## 2021-12-30 RX ORDER — BUMETANIDE 0.25 MG/ML
2 INJECTION, SOLUTION INTRAMUSCULAR; INTRAVENOUS 2 TIMES DAILY
Status: DISCONTINUED | OUTPATIENT
Start: 2021-12-30 | End: 2021-12-31

## 2021-12-30 RX ORDER — SODIUM CHLORIDE 9 MG/ML
3 INJECTION INTRAVENOUS
Status: DISCONTINUED | OUTPATIENT
Start: 2021-12-30 | End: 2022-01-04 | Stop reason: HOSPADM

## 2021-12-30 RX ORDER — FUROSEMIDE 10 MG/ML
40 INJECTION INTRAMUSCULAR; INTRAVENOUS ONCE
Status: COMPLETED | OUTPATIENT
Start: 2021-12-30 | End: 2021-12-30

## 2021-12-30 RX ORDER — HEPARIN SODIUM 1000 [USP'U]/ML
4000 INJECTION, SOLUTION INTRAVENOUS; SUBCUTANEOUS ONCE
Status: COMPLETED | OUTPATIENT
Start: 2021-12-30 | End: 2021-12-30

## 2021-12-30 RX ORDER — ALPRAZOLAM 0.25 MG/1
0.5 TABLET ORAL 2 TIMES DAILY PRN
Status: DISCONTINUED | OUTPATIENT
Start: 2021-12-30 | End: 2021-12-31

## 2021-12-30 RX ADMIN — HEPARIN SODIUM 4000 UNITS: 1000 INJECTION INTRAVENOUS; SUBCUTANEOUS at 10:26

## 2021-12-30 RX ADMIN — ACETYLCYSTEINE 5000 MG: 200 INJECTION, SOLUTION INTRAVENOUS at 21:20

## 2021-12-30 RX ADMIN — MILRINONE LACTATE IN DEXTROSE 0.38 MCG/KG/MIN: 200 INJECTION, SOLUTION INTRAVENOUS at 14:18

## 2021-12-30 RX ADMIN — BUMETANIDE 2 MG: 0.25 INJECTION INTRAMUSCULAR; INTRAVENOUS at 15:31

## 2021-12-30 RX ADMIN — ASPIRIN 81 MG CHEWABLE TABLET 324 MG: 81 TABLET CHEWABLE at 10:01

## 2021-12-30 RX ADMIN — NOREPINEPHRINE BITARTRATE 2 MCG/MIN: 1 INJECTION, SOLUTION, CONCENTRATE INTRAVENOUS at 22:01

## 2021-12-30 RX ADMIN — BUMETANIDE 2 MG: 0.25 INJECTION INTRAMUSCULAR; INTRAVENOUS at 18:23

## 2021-12-30 RX ADMIN — METOLAZONE 10 MG: 10 TABLET ORAL at 16:40

## 2021-12-30 RX ADMIN — ONDANSETRON 4 MG: 2 INJECTION INTRAMUSCULAR; INTRAVENOUS at 15:00

## 2021-12-30 RX ADMIN — VANCOMYCIN HYDROCHLORIDE 1750 MG: 10 INJECTION, POWDER, LYOPHILIZED, FOR SOLUTION INTRAVENOUS at 15:30

## 2021-12-30 RX ADMIN — HEPARIN SODIUM 4000 UNITS: 1000 INJECTION INTRAVENOUS; SUBCUTANEOUS at 20:45

## 2021-12-30 RX ADMIN — FOMEPIZOLE 1400 MG: 1 INJECTION, SOLUTION INTRAVENOUS at 22:04

## 2021-12-30 RX ADMIN — CEFEPIME HYDROCHLORIDE 2000 MG: 2 INJECTION, POWDER, FOR SOLUTION INTRAVENOUS at 12:04

## 2021-12-30 RX ADMIN — FUROSEMIDE 40 MG: 10 INJECTION, SOLUTION INTRAMUSCULAR; INTRAVENOUS at 12:11

## 2021-12-30 RX ADMIN — NOREPINEPHRINE BITARTRATE 4 MG: 1 INJECTION, SOLUTION, CONCENTRATE INTRAVENOUS at 22:03

## 2021-12-30 RX ADMIN — LIDOCAINE HYDROCHLORIDE 300 MG: 10 INJECTION, SOLUTION EPIDURAL; INFILTRATION; INTRACAUDAL; PERINEURAL at 20:18

## 2021-12-30 RX ADMIN — MILRINONE LACTATE IN DEXTROSE 0.25 MCG/KG/MIN: 200 INJECTION, SOLUTION INTRAVENOUS at 23:33

## 2021-12-30 RX ADMIN — ACETYLCYSTEINE 15000 MG: 200 INJECTION, SOLUTION INTRAVENOUS at 19:57

## 2021-12-30 RX ADMIN — HEPARIN SODIUM 11.1 UNITS/KG/HR: 10000 INJECTION, SOLUTION INTRAVENOUS at 10:26

## 2021-12-30 RX ADMIN — EPINEPHRINE 3 MCG/MIN: 1 INJECTION, SOLUTION, CONCENTRATE INTRAVENOUS at 12:18

## 2021-12-30 NOTE — CONSULTS
Consultation - Cardiology   Anju De La Cruz 61 y o  male MRN: [id number]  Unit/Bed#: ED 22 Encounter: [id number]    Assessment/Plan     Assessment:    Acute Decompensate Systolic CHF complicated by cardiogenic shock, JUDE, shock liver  Plan:    Recommend to start milrinone infusion for cardiogenic shock and wean off epinephrine as able  Start bumex 2 mg IV BID for diuresis  Can trend SVO2 to monitor response to milrinone and titrate if necessary  Check  Stat echo ( spoke with echo lab)  He has loud MR/TR murmur on exam      Ultimately once he is more stable/ renal function recovered, we can then do left heart cath to evaluate for ischemic heart disease  No ACEI secondary to JUDE and shock  Troponin elevation is most likely type 2 MI secondary to cardiogenic shock  History of Present Illness   Physician Requesting Consult: Home Nuñez MD  Reason for Consult / Principal Problem: Cardiomyopathy  HPI: Anju De La Cruz is a 61y o  year old male who presents with worsening shortness of breath and weakness  On presentation the patient was severely hypoxic requiring a 100% nonrebreather  Cath lab was initially contacted re: possibility of acute STEMI, however clinical presentation is not consistent with this  Patient was found to be hypotensive and started on Epinephrine drip  Bedside ultrasound by ER revealed dilated LV and severe LV systolic dysfunction  The patient denies any prior cardiac history  He was a long time smoker  He has dyslipidemia and takes xanax prn for anxiety  He currently complains of mild shortness of breath, no chest pain  He has cold extremities and is currently wearing a bear hugger  Consult to Cardiology  Consult performed by: Rafael Carlin MD  Consult ordered by: Latoya Hoang MD          Review of Systems   Constitutional: Positive for fatigue  Negative for chills and fever  HENT: Negative for ear pain and sore throat      Eyes: Negative for pain and visual disturbance  Respiratory: Positive for shortness of breath  Negative for cough  Cardiovascular: Positive for leg swelling  Negative for palpitations  Gastrointestinal: Negative for abdominal pain and vomiting  Endocrine: Negative  Genitourinary: Negative for dysuria and hematuria  Musculoskeletal: Negative for arthralgias and back pain  Skin: Negative for color change and rash  Allergic/Immunologic: Negative  Neurological: Negative for seizures and syncope  Hematological: Negative  Psychiatric/Behavioral: Negative  All other systems reviewed and are negative  Historical Information   History reviewed  No pertinent past medical history  History reviewed  No pertinent surgical history    Social History     Substance and Sexual Activity   Alcohol Use No     Social History     Substance and Sexual Activity   Drug Use No     E-Cigarette/Vaping     E-Cigarette/Vaping Substances     Social History     Tobacco Use   Smoking Status Current Every Day Smoker    Types: Pipe   Smokeless Tobacco Never Used   Tobacco Comment    Secondhand smoke exposure     Family History:   Family History   Problem Relation Age of Onset    Other Mother         Claustrophobia    Drug abuse Family     Alcohol abuse Family        Meds/Allergies   current meds:   Current Facility-Administered Medications   Medication Dose Route Frequency    bumetanide (BUMEX) injection 2 mg  2 mg Intravenous BID    EPINEPHrine 3000 mcg (STANDARD CONCENTRATION) IV in sodium chloride 0 9% 250 mL  1-10 mcg/min Intravenous Titrated    heparin (porcine) 25,000 units in 0 45% NaCl 250 mL infusion (premix)  3-20 Units/kg/hr (Order-Specific) Intravenous Titrated    heparin (porcine) injection 2,000 Units  2,000 Units Intravenous Q1H PRN    heparin (porcine) injection 4,000 Units  4,000 Units Intravenous Q1H PRN    milrinone (PRIMACOR) 20 mg in 100 mL infusion (premix)  0 38 mcg/kg/min Intravenous Continuous    sodium chloride (PF) 0 9 % injection 3 mL  3 mL Intravenous Q1H PRN    vancomycin (VANCOCIN) 1,750 mg in sodium chloride 0 9 % 500 mL IVPB  20 mg/kg Intravenous Once     Allergies   Allergen Reactions    Mushroom Extract Complex - Food Allergy Facial Swelling    Peanut Oil - Food Allergy     Strawberry C [Ascorbate - Food Allergy]        Objective   Vitals: Blood pressure (!) 88/53, pulse 102, temperature (!) 95 9 °F (35 5 °C), temperature source Rectal, resp  rate (!) 26, weight 93 kg (205 lb), SpO2 92 %  Orthostatic Blood Pressures      Most Recent Value   Blood Pressure 88/53 filed at 12/30/2021 1215   Patient Position - Orthostatic VS Lying filed at 12/30/2021 1215            Intake/Output Summary (Last 24 hours) at 12/30/2021 1238  Last data filed at 12/30/2021 1231  Gross per 24 hour   Intake --   Output 40 ml   Net -40 ml       Invasive Devices  Report    Peripheral Intravenous Line            Peripheral IV 12/30/21 Left Antecubital <1 day    Peripheral IV 12/30/21 Right Forearm <1 day          Drain            Urethral Catheter Temperature probe <1 day                Physical Exam  Vitals and nursing note reviewed  Constitutional:       Appearance: He is well-developed  HENT:      Head: Normocephalic and atraumatic  Mouth/Throat:      Mouth: Mucous membranes are moist    Eyes:      Conjunctiva/sclera: Conjunctivae normal    Neck:      Vascular: JVD present  Cardiovascular:      Rate and Rhythm: Normal rate and regular rhythm  Heart sounds: Murmur heard  Pulmonary:      Effort: Pulmonary effort is normal  No respiratory distress  Breath sounds: Normal breath sounds  Abdominal:      Palpations: Abdomen is soft  Tenderness: There is no abdominal tenderness  Musculoskeletal:         General: Swelling present  Cervical back: Neck supple  Right lower leg: Edema present  Left lower leg: Edema present  Skin:     General: Skin is warm and dry     Neurological: General: No focal deficit present  Mental Status: He is alert and oriented to person, place, and time  Psychiatric:         Mood and Affect: Mood normal          Behavior: Behavior normal          Lab Results:   I have personally reviewed pertinent lab results  CBC with diff:   Results from last 7 days   Lab Units 12/30/21  1007   WBC Thousand/uL 17 22*   RBC Million/uL 4 08   HEMOGLOBIN g/dL 12 8   HEMATOCRIT % 40 1   MCV fL 98   MCH pg 31 4   MCHC g/dL 31 9   RDW % 13 9   MPV fL 11 2   PLATELETS Thousands/uL 217     CMP:   Results from last 7 days   Lab Units 12/30/21  1007   SODIUM mmol/L 130*   POTASSIUM mmol/L 4 6   CHLORIDE mmol/L 97*   CO2 mmol/L 12*   BUN mg/dL 141*   CREATININE mg/dL 4 01*   CALCIUM mg/dL 8 4   AST U/L 3,128*   ALT U/L 5,669*   ALK PHOS U/L 165*   EGFR ml/min/1 73sq m 14     Troponin: No results found for: TROPONINI  BNP:   Results from last 7 days   Lab Units 12/30/21  1007   POTASSIUM mmol/L 4 6   CHLORIDE mmol/L 97*   CO2 mmol/L 12*   BUN mg/dL 141*   CREATININE mg/dL 4 01*   CALCIUM mg/dL 8 4   EGFR ml/min/1 73sq m 14     Imaging: I have personally reviewed pertinent films in PACS  EKG: NSR LVH with repolarization abnormality  Code Status: No Order  Advance Directive and Living Will:      Power of :    POLST:      Counseling / Coordination of Care  Total floor / unit time spent today 45 minutes  Greater than 50% of total time was spent with the patient and / or family counseling and / or coordination of care  A description of the counseling / coordination of care

## 2021-12-30 NOTE — ASSESSMENT & PLAN NOTE
· Presumed MI over past 1-2 weeks causing VSD  · Family opted for medical management  · Palliative care consult

## 2021-12-30 NOTE — H&P
[address] [facility]  H&P- Gissel Donnelly 1955, 61 y o  male MRN: [id number]  Unit/Bed#: [facility] 418-01 Encounter: [id number]  Primary Care Provider: León Browne MD   Date and time admitted to hospital: 12/30/2021  9:40 AM    * Cardiogenic shock ([facility] [address] )  Assessment & Plan  · Presented to the ER after several days of lethargy, fatigue, nausea, and constipation  · Found to be in multi-system organ failure - renal failure, elevated LFTs, encephalopathy  · Cardiology and cardiac surgery consults appreciated  · Evaluated for [facility] ECMO, decision was made not to cannulate per next of kin  · Milrinone 0 38, trend SvO2  L IJ CVC placed in ER  · Diuresis for goal negative 1-2 liters  Bolused with bumex  Trial zaroxolyn now    VSD (ventricular septal defect)  Assessment & Plan  · Presumed MI over past 1-2 weeks causing VSD  · Family opted for medical management  · Palliative care consult    JUDE (acute kidney injury) ([facility] [address] )  Assessment & Plan  · Secondary to cardiogenic shock  · Trend strict I/Os  · Attempt negative fluid balance    Transaminitis  Assessment & Plan  · Secondary to cardiogenic shock  · Trend daily    Agoraphobia with panic attacks  Assessment & Plan  · Xanax prn (home med)    -------------------------------------------------------------------------------------------------------------  Chief Complaint: Fatigue    History of Present Illness     Gissel Donnelly is a 61 y o  male who presents with PMHx of agoraphobia who presented to the ER today with several days of fatigue, nausea, and constipation  He was found to be in multi-system organ failure and cardiogenic shock, likely secondary to recent MI  Echo performed today showed anterior septal VSD consistent with recent LAD STEMI  Patient was started on epinephrine and subsequently milrinone in the ER and was given 2mg bumex per cardiology  Ultimately, cardiac surgery was consulted for [facility] ECMO evaluation as a bridge to VSD repair   Given patient's functional status and inability to make medical decisions on his behalf, patient's sister opted to not undergo ECMO or surgery for VSD  History obtained from chart review  -------------------------------------------------------------------------------------------------------------  Dispo: Admit to Critical Care     Code Status: Level 1 - Full Code  --------------------------------------------------------------------------------------------------------------  Review of Systems    A 12-point, complete review of systems was reviewed and negative except as stated above     Physical Exam  Vitals and nursing note reviewed  Constitutional:       General: He is not in acute distress  Appearance: Normal appearance  He is not ill-appearing, toxic-appearing or diaphoretic  Interventions: Nasal cannula in place  HENT:      Head: Normocephalic and atraumatic  Eyes:      Pupils: Pupils are equal, round, and reactive to light  Cardiovascular:      Rate and Rhythm: Regular rhythm  Tachycardia present  Pulses:           Radial pulses are 2+ on the right side and 2+ on the left side  Dorsalis pedis pulses are 2+ on the right side and 2+ on the left side  Heart sounds: Normal heart sounds  Heart sounds not distant  No murmur heard  No friction rub  No gallop  Pulmonary:      Effort: No tachypnea or accessory muscle usage  Breath sounds: Decreased breath sounds present  No wheezing, rhonchi or rales  Abdominal:      General: There is no distension  Palpations: Abdomen is soft  Tenderness: There is no abdominal tenderness  Musculoskeletal:      Right lower leg: Edema present  Left lower leg: Edema present  Skin:     General: Skin is warm and dry  Neurological:      General: No focal deficit present  Mental Status: He is alert and oriented to person, place, and time  GCS: GCS eye subscore is 4  GCS verbal subscore is 5  GCS motor subscore is 6  Psychiatric:         Behavior: Behavior is cooperative        --------------------------------------------------------------------------------------------------------------  Vitals:   Vitals:    12/30/21 1320 12/30/21 1425 12/30/21 1445 12/30/21 1600   BP: 129/93 101/70 105/74 95/52   BP Location:  Left arm Left arm Left arm   Pulse: (!) 109 (!) 112 (!) 112 (!) 114   Resp:  20 22 (!) 31   Temp:  98 6 °F (37 °C) 98 6 °F (37 °C) 97 5 °F (36 4 °C)   TempSrc:    Oral   SpO2:  99% 99% 98%   Weight: 93 kg (205 lb)      Height: 5' 5 5" (1 664 m)        Temp  Min: 95 9 °F (35 5 °C)  Max: 98 6 °F (37 °C)  IBW (Ideal Body Weight): 62 65 kg  Height: 5' 5 5" (166 4 cm)  Body mass index is 33 59 kg/m²  Laboratory and Diagnostics:  Results from last 7 days   Lab Units 12/30/21  1007   WBC Thousand/uL 17 22*   HEMOGLOBIN g/dL 12 8   HEMATOCRIT % 40 1   PLATELETS Thousands/uL 217   MONO PCT % 4     Results from last 7 days   Lab Units 12/30/21  1007   SODIUM mmol/L 130*   POTASSIUM mmol/L 4 6   CHLORIDE mmol/L 97*   CO2 mmol/L 12*   ANION GAP mmol/L 21*   BUN mg/dL 141*   CREATININE mg/dL 4 01*   CALCIUM mg/dL 8 4   GLUCOSE RANDOM mg/dL 163*   ALT U/L 5,669*   AST U/L 3,128*   ALK PHOS U/L 165*   ALBUMIN g/dL 3 1*   TOTAL BILIRUBIN mg/dL 1 27*          Results from last 7 days   Lab Units 12/30/21  1613 12/30/21  1007   INR   --  2 04*   PTT seconds 28 28          Results from last 7 days   Lab Units 12/30/21  1007   LACTIC ACID mmol/L 11 1*     ABG:    VBG:  Results from last 7 days   Lab Units 12/30/21  1007   PH JANNET  7 100*   PCO2 JANNET mm Hg 34 4*   PO2 JANNET mm Hg 36 3   HCO3 JANNET mmol/L 10 4*   BASE EXC JANNET mmol/L -18 2     Results from last 7 days   Lab Units 12/30/21  1007   PROCALCITONIN ng/ml 1 65*       Micro:  Results from last 7 days   Lab Units 12/30/21  1129 12/30/21  1010   BLOOD CULTURE  Received in Microbiology Lab  Culture in Progress  Received in Microbiology Lab  Culture in Progress         Imaging: CXR: Low lung volumes with vascular crowding  Question superimposed mild pulmonary edema  I have personally reviewed pertinent reports  Historical Information   History reviewed  No pertinent past medical history  History reviewed  No pertinent surgical history    Social History   Social History     Substance and Sexual Activity   Alcohol Use No     Social History     Substance and Sexual Activity   Drug Use No     Social History     Tobacco Use   Smoking Status Current Every Day Smoker    Types: Pipe   Smokeless Tobacco Never Used   Tobacco Comment    Secondhand smoke exposure     Family History:   Family History   Problem Relation Age of Onset    Other Mother         Claustrophobia    Drug abuse Family     Alcohol abuse Family      I have reviewed this patient's family history and commented on sigificant items within the HPI      Medications:  Current Facility-Administered Medications   Medication Dose Route Frequency    ALPRAZolam (XANAX) tablet 0 5 mg  0 5 mg Oral BID PRN    [START ON 12/31/2021] aspirin (ECOTRIN LOW STRENGTH) EC tablet 81 mg  81 mg Oral Daily    bumetanide (BUMEX) injection 2 mg  2 mg Intravenous BID    EPINEPHrine 3000 mcg (STANDARD CONCENTRATION) IV in sodium chloride 0 9% 250 mL  1-10 mcg/min Intravenous Titrated    heparin (porcine) 25,000 units in 0 45% NaCl 250 mL infusion (premix)  3-20 Units/kg/hr (Order-Specific) Intravenous Titrated    heparin (porcine) injection 2,000 Units  2,000 Units Intravenous Q1H PRN    heparin (porcine) injection 4,000 Units  4,000 Units Intravenous Q1H PRN    metolazone (ZAROXOLYN) tablet 10 mg  10 mg Oral Once    milrinone (PRIMACOR) 20 mg in 100 mL infusion (premix)  0 38 mcg/kg/min Intravenous Continuous    sodium chloride (PF) 0 9 % injection 3 mL  3 mL Intravenous Q1H PRN    vancomycin (VANCOCIN) 1,750 mg in sodium chloride 0 9 % 500 mL IVPB  20 mg/kg Intravenous Once     Home medications:  Prior to Admission Medications   Prescriptions Last Dose Informant Patient Reported? Taking? ALPRAZolam (XANAX) 0 5 mg tablet   No No   Sig: Take 1-2 tablets 30-60 minutes prior to leaving home prn   EPINEPHrine (EPIPEN) 0 3 mg/0 3 mL SOAJ  Self No No   Sig: Inject 0 3 mL (0 3 mg total) into a muscle once for 1 dose      Facility-Administered Medications: None     Allergies:   Allergies   Allergen Reactions    Mushroom Extract Complex - Food Allergy Facial Swelling    Peanut Oil - Food Allergy     Strawberry C [Ascorbate - Food Allergy]        ------------------------------------------------------------------------------------------------------------  Advance Directive and Living Will:      Power of :    POLST:    ------------------------------------------------------------------------------------------------------------  Anticipated Length of Stay is > 2 midnights    Care Time Delivered:   No Critical Care time spent       Nan Tomas PA-C

## 2021-12-30 NOTE — ED NOTES
Unable to obtain labs at this time  L AC peripheral IV removed from catheter damage  Will obtain labs when central line is inserted       Alexandra Carver, OMA  12/30/21 4127

## 2021-12-30 NOTE — ED NOTES
Upon starting procedure for central line, pt beginning to feel nausea, Dr Melissa Ambriz at bedside, will order zofran for nausea     Kellen Aguilar RN  12/30/21 29 L  [address] [facility], [address] [place] [address]  12/30/21 7992

## 2021-12-30 NOTE — PROGRESS NOTES
Called by ED regarding possible STEMI  Patient seen and examined  Patient with reports of generalized malaise and upper respiratory complaints  No notes of CP or palpitations currently or prior to arrival and further notes that he has not had any chest pain in over two months  ED Physician at bedside with 4 chamber TTE available to view with evidence of Stress CM  Physical exam reveals a 61year old man, ill appearing, though well perfused with 2+ BLE/BUE pulses, RRR, on supplemental oxygen, and in no acute distress  At this time, will proceed with medical management (enzymes, tte, risk strat with a1c/flp, dapt, heparin gtt, and monitoring on tele)  Patient with risk factors for CAD though an acute STEMI does not appear to be the etiology of his acute condition at this time  Anticipate future LHC on this admission pending further evaluation  Case discussed in detail with ED

## 2021-12-30 NOTE — CONSULTS
Consultation - Cardiothoracic Surgery   Sharon Goodson 61 y o  male MRN: [id number]  Unit/Bed#: [facility] 418-01 Encounter: [id number]    Physician Requesting Consult: Froilan Gibson MD    Reason for Consult / Principal Problem: Cardiogenic shock    Inpatient consult to Cardiothoracic Surgery  Consult performed by: Bebe Natarajan PA-C  Consult ordered by: Janalyn Oppenheim, PA-C        History of Present Illness: Sharon Goodson is a 61y o  year old male without a longstanding cardiovascular past medical history  Recently, he developed substernal chest pain and pressure approximately 5 days ago  He attributed this to acid reflux, and therefore did not immediately seek medical attention  Upon further questioning, he does note intermittent angina over the preceding months, which he associates with his pipe smoking  Ultimately his weakness and shortness of breath symptoms increased to the point that he presented for evaluation and treatment in the emergency department at [address]  Upon presentation, he also complains of significant fatigability and cold distal extremities  Upon initial evaluation he was found to be severely hypoxic requiring significant supplemental oxygen therapy  High sensitivity troponin was sent and found to be significantly elevated  At this point,  - he was evaluated by Cardiology, and epinephrine infusion was initiated for significant hypotension  Bedside transthoracic echocardiogram was completed and significant finding of ventricular septal defect was identified  At this point, cardiothoracic surgery consultation was obtained to evaluate for consideration of VA ECMO for cardiogenic shock  During interview, he denies complaints of ongoing current angina  He does again note persistent weakness, fatigability, and extremity coldness  He denies any lightheadedness, dizziness, or syncope  He also denies any recent fevers, sweats, or chills      Past Medical History:  Current tobacco abuse, history of alcohol abuse, anxiety    Past Surgical History:   Patient denies prior surgical interventions  Family History:  Family History   Problem Relation Age of Onset    Other Mother         Claustrophobia    Drug abuse Family     Alcohol abuse Family          Social History:  Social History     Substance and Sexual Activity   Alcohol Use No     Social History     Substance and Sexual Activity   Drug Use No     Social History     Tobacco Use   Smoking Status Current Every Day Smoker    Types: Pipe   Smokeless Tobacco Never Used   Tobacco Comment    Secondhand smoke exposure     Marital Status: Single      Home Medications:   Prior to Admission medications    Medication Sig Start Date End Date Taking?  Authorizing Provider   ALPRAZolam Dalbert Carton) 0 5 mg tablet Take 1-2 tablets 30-60 minutes prior to leaving home prn 3/18/21   Penelope Walters MD   EPINEPHrine (EPIPEN) 0 3 mg/0 3 mL SOAJ Inject 0 3 mL (0 3 mg total) into a muscle once for 1 dose 9/21/18 3/18/21  Rob Walton DO       Inpatient Medications:  Scheduled Meds:   Current Facility-Administered Medications   Medication Dose Route Frequency Provider Last Rate    bumetanide  2 mg Intravenous BID Ruth Denise MD      epinephrine  1-10 mcg/min Intravenous Titrated Jayjay Rodriguez MD Stopped (12/30/21 1600)    heparin (porcine)  3-20 Units/kg/hr (Order-Specific) Intravenous Titrated Jayjay Rodriguez MD 11 1 Units/kg/hr (12/30/21 1026)    heparin (porcine)  2,000 Units Intravenous Q1H PRN Jayjay Rodriguez MD      heparin (porcine)  4,000 Units Intravenous Q1H PRN Jayjay Rodriguez MD      [facility]) infusion  0 38 mcg/kg/min Intravenous Continuous Ruth Denise MD 0 38 mcg/kg/min (12/30/21 1418)    sodium chloride (PF)  3 mL Intravenous Q1H PRN Jayjay Rodriguez MD      vancomycin  20 mg/kg Intravenous Once Jayjay Rodriguez MD 1,750 mg (12/30/21 1530)     Continuous Infusions: epinephrine, 1-10 mcg/min, Last Rate: Stopped (12/30/21 1600)  heparin (porcine), 3-20 Units/kg/hr (Order-Specific), Last Rate: 11 1 Units/kg/hr (12/30/21 1026)  milrinone (PRIMACOR) infusion, 0 38 mcg/kg/min, Last Rate: 0 38 mcg/kg/min (12/30/21 1418)      PRN Meds:  heparin (porcine), 2,000 Units, Q1H PRN  heparin (porcine), 4,000 Units, Q1H PRN  sodium chloride (PF), 3 mL, Q1H PRN        Allergies: Allergies   Allergen Reactions    Mushroom Extract Complex - Food Allergy Facial Swelling    Peanut Oil - Food Allergy     Strawberry C [Ascorbate - Food Allergy]        Review of Systems:  Review of Systems   Unable to perform ROS: Mental status change       Vital Signs:     Vitals:    12/30/21 1320 12/30/21 1425 12/30/21 1445 12/30/21 1600   BP: 129/93 101/70 105/74 95/52   BP Location:  Left arm Left arm Left arm   Pulse: (!) 109 (!) 112 (!) 112 (!) 114   Resp:  20 22 (!) 31   Temp:  98 6 °F (37 °C) 98 6 °F (37 °C) 97 5 °F (36 4 °C)   TempSrc:    Oral   SpO2:  99% 99% 98%   Weight: 93 kg (205 lb)      Height: 5' 5 5" (1 664 m)        Invasive Devices  Report    Central Venous Catheter Line            CVC Central Lines 12/30/21 Triple Left Internal jugular <1 day          Peripheral Intravenous Line            Peripheral IV 12/30/21 Right Forearm <1 day          Drain            Urethral Catheter Temperature probe <1 day                Physical Exam:  Physical Exam  Constitutional:       Appearance: Normal appearance  He is well-developed  He is ill-appearing  HENT:      Head: Normocephalic and atraumatic  Eyes:      Conjunctiva/sclera: Conjunctivae normal    Neck:      Thyroid: No thyromegaly  Vascular: No carotid bruit or JVD  Trachea: No tracheal deviation  Cardiovascular:      Rate and Rhythm: Regular rhythm  Tachycardia present  Pulses:           Carotid pulses are 2+ on the right side and 2+ on the left side  Dorsalis pedis pulses are 2+ on the right side and 2+ on the left side          Posterior tibial pulses are 2+ on the right side and 2+ on the left side  Heart sounds: S1 normal and S2 normal  Murmur heard  Friction rub present  Pulmonary:      Effort: No accessory muscle usage or respiratory distress  Breath sounds: Rhonchi and rales present  No wheezing  Chest:      Chest wall: No tenderness  Abdominal:      General: Bowel sounds are normal       Palpations: Abdomen is soft  Tenderness: There is no abdominal tenderness  Musculoskeletal:         General: Normal range of motion  Cervical back: Full passive range of motion without pain and normal range of motion  Skin:     General: Skin is warm and dry  Neurological:      Mental Status: Mental status is at baseline  He is disoriented  Cranial Nerves: No cranial nerve deficit  Sensory: No sensory deficit  Psychiatric:         Speech: Speech is delayed  Behavior: Behavior is slowed  Cognition and Memory: Cognition is impaired  Lab Results:     Results from last 7 days   Lab Units 12/30/21  1007   WBC Thousand/uL 17 22*   HEMOGLOBIN g/dL 12 8   HEMATOCRIT % 40 1   PLATELETS Thousands/uL 217     Results from last 7 days   Lab Units 12/30/21  1007   POTASSIUM mmol/L 4 6   CHLORIDE mmol/L 97*   CO2 mmol/L 12*   BUN mg/dL 141*   CREATININE mg/dL 4 01*   CALCIUM mg/dL 8 4     Results from last 7 days   Lab Units 12/30/21  1007   INR  2 04*   PTT seconds 28     Lab Results   Component Value Date    HGBA1C 5 3 09/13/2021     No results found for: CKTOTAL, CKMB, CKMBINDEX, TROPONINI    Imaging Studies:     Echocardiogram:  EF 45%  IVS: There is diastolic flattening of the interventricular septum consistent with right ventricle volume overload  There is a small, post-myocardial infarction, central muscular ventricular septal defect with a large left to right shunt       I have personally reviewed pertinent reports        Assessment:  Principal Problem:    Cardiogenic shock [facility])    VSD with cardiogenic shock    Plan:  Risks and benefits of VA ECMO cannulation as a bridge to possible VSD repair were discussed in detail today with the patient's family over the phone  They understand the risks of the procedure as well as the alternatives to surgery  SIGNATURE: Claude Apley, PA-C  DATE: December 30, 2021  TIME: 4:15 PM    * This note was completed in part utilizing [employer] fluency direct voice recognition software  Grammatical errors, random word insertion, spelling mistakes, and incomplete sentences may be an occasional consequence of the system secondary to software limitations, ambient noise and hardware issues  At the time of dictation, efforts were made to edit, clarify and /or correct errors  Please read the chart carefully and recognize, using context, where substitutions have occurred  If you have any questions or concerns about the context, text or information contained within the body of this dictation, please contact myself, the provider, for further clarification

## 2021-12-30 NOTE — ED PROVIDER NOTES
Final Diagnosis:  1  Cardiogenic shock ([facility] [address] )    2  ST elevation    3  Acute respiratory failure with hypoxia (HCC)    4  Generalized weakness    5  Acute kidney injury ([facility] [address] )    6  Liver failure ([facility] [address] )    7  Transaminitis    8  Acute CHF (congestive heart failure) ([facility] [address] )    9  VSD (ventricular septal defect)      ED Course as of 01/01/22 1514   Thu Dec 30, 2021   1153 ICU to come evaluate     Chief Complaint   Patient presents with    Weakness - Generalized     per EMS - pt has been feeling weakness and has been febrile for the past 5-7 days   Procedure Note: EKG  Date/Time: 12/30/21 1430   Interpreted by: Maci Price  Indications / Diagnosis: SOB  ECG reviewed by me, the ED Provider: yes   The EKG demonstrates:  Rhythm: normal sinus  Intervals: normal intervals  Axis: normal axis  QRS/Blocks: RBBB  ST Changes: ST elevation anteroseptal; No acute ST Changes, no STD/POORNIMA  Procedure  Central Line    Date/Time: 12/30/2021 3:53 PM  Performed by: Rubens Jimenez MD  Authorized by: Rubens Jimenez MD     Patient location:  ED  Other Assisting Provider: Yes (comment)    Consent:     Consent obtained:  Verbal and written    Consent given by:  Patient    Risks discussed:  Incorrect placement, bleeding, infection, pneumothorax and arterial puncture  Universal protocol:     Patient identity confirmed:  Verbally with patient  Pre-procedure details:     Hand hygiene: Hand hygiene performed prior to insertion      Sterile barrier technique: All elements of maximal sterile technique followed      Skin preparation:  ChloraPrep    Skin preparation agent: Skin preparation agent completely dried prior to procedure    Indications:     Central line indications: medications requiring central line, hemodynamic monitoring and no peripheral vascular access      Site selection rationale:  Possible swan in R IJ  Anesthesia (see MAR for exact dosages):      Anesthesia method:  Local infiltration    Local anesthetic:  Lidocaine 1% w/o epi  Procedure details:     Location:  Left internal jugular    Vessel type: vein      Laterality:  Left    Approach: percutaneous technique used      Patient position:  Reverse Trendelenburg    Catheter type:  Triple lumen 20cm    Catheter size:  7 Fr    Landmarks identified: yes      Ultrasound guidance: yes      Ultrasound image availability:  Still images obtained    Sterile ultrasound techniques: Sterile gel and sterile probe covers were used      Number of attempts:  3  Post-procedure details:     Post-procedure:  Line sutured and dressing applied    Assessment:  Blood return through all ports    Post-procedure complications: none      Patient tolerance of procedure: Tolerated well, no immediate complications  Comments:       Initially despite good blood return, the wire would not thread  The L IJ was stuck twice more before the wire would thread          ASSESSMENT + PLAN:   - Nursing note reviewed  1  Shortness of breath  -has ST elevations in his anteroseptal leads, discussed the case with Interventional Cardiology who will come evaluate the patient at bedside  -started on heparin and aspirin for ACS  -bedside echocardiogram performed by Dr Osman Hill shows decreased ejection fraction, will get a formal echocardiogram  -believe the patient is in cardiogenic shock as he does have cool extremities, low blood pressure, troponin elevation, as well as LFT elevation consistent with cardiogenic shock, unclear origin of ST elevation, but at this time not a candidate for cath lab  -cardiology discussed the case with Cardiothoracic surgery discuss ECMO with patient and family, initially declined  -I placed a central line in as the patient required vasopressors and will need ongoing diuresis and likely more vasopressor support      Final Dispo   Patient was reassessed  Patient was updated with information regarding the tests/imaging done today  I answered all of the patient's and/or family's questions   Patient and/or family vocalizes understanding  After discussion and given the test results, the patient will be admitted to the hospital  Patient and/or family are agreeable to the plan   They will be admitted to ICU who will further manage their care in the hospital       Medications   sodium chloride (PF) 0 9 % injection 3 mL ( Intravenous MAR Unhold 12/31/21 1335)   milrinone (PRIMACOR) 20 mg in 100 mL infusion (premix) (0 25 mcg/kg/min × 93 kg Intravenous New Bag 1/1/22 1337)   aspirin (ECOTRIN LOW STRENGTH) EC tablet 81 mg (81 mg Oral Given 1/1/22 0845)   norepinephrine (LEVOPHED) 4 mg (STANDARD CONCENTRATION) IV in sodium chloride 0 9% 250 mL (6 mcg/min Intravenous Rate/Dose Change 1/1/22 1200)   vasopressin (PITRESSIN) 20 Units in sodium chloride 0 9 % 100 mL infusion (0 04 Units/min Intravenous New Bag 1/1/22 1238)   ceFAZolin (ANCEF) IVPB (premix in dextrose) 2,000 mg 50 mL ( Intravenous MAR Unhold 12/31/21 1335)   heparin (porcine) 5,000 Units in sodium chloride 0 9 % 1,000 mL Irrigation ( Irrigation MAR Unhold 12/31/21 1335)   propofol (DIPRIVAN) 1000 mg in 100 mL infusion (premix) (30 mcg/kg/min × 92 3 kg Intravenous New Bag 1/1/22 1336)   insulin regular (HumuLIN R,NovoLIN R) 1 Units/mL in sodium chloride 0 9 % 100 mL infusion (1 5 Units/hr Intravenous Rate/Dose Verify 1/1/22 1200)   fentanyl citrate (PF) 100 MCG/2ML 50 mcg (has no administration in time range)   ondansetron (ZOFRAN) injection 4 mg (has no administration in time range)   heparin (porcine) 25,000 units in 0 45% NaCl 250 mL infusion (premix) (13 Units/kg/hr × 90 kg (Order-Specific) Intravenous New Bag 1/1/22 1338)   chlorhexidine (PERIDEX) 0 12 % oral rinse 15 mL (15 mL Mouth/Throat Given 1/1/22 0845)   omeprazole (PRILOSEC) suspension 2 mg/mL (20 mg Oral Given 1/1/22 1355)   polyethylene glycol (MIRALAX) packet 17 g (17 g Oral Given 1/1/22 0845)   senna oral syrup 8 8 mg (8 8 mg Oral Given 1/1/22 0845)   metoprolol (LOPRESSOR) injection 2 5 mg (2 5 mg Intravenous Given 1/1/22 0229)   phytonadione (AQUA-MEPHYTON) 10 mg/mL 5 mg in sodium chloride 0 9 % 50 mL IVPB (has no administration in time range)   esmolol (BREVIBLOC) 2500 mg/250 mL IV infusion (premix) (25 mcg/kg/min × 90 5 kg Intravenous New Bag 1/1/22 1341)   potassium chloride 20 mEq IVPB (premix) (has no administration in time range)   aspirin chewable tablet 324 mg (324 mg Oral Given 12/30/21 1001)   heparin (porcine) injection 4,000 Units (4,000 Units Intravenous Given 12/30/21 1026)   cefepime (MAXIPIME) 2 g/50 mL dextrose IVPB (0 mg Intravenous Stopped 12/30/21 1240)   vancomycin (VANCOCIN) 1,750 mg in sodium chloride 0 9 % 500 mL IVPB (0 mg Intravenous Stopped 12/30/21 1938)   furosemide (LASIX) injection 40 mg (40 mg Intravenous Given 12/30/21 1211)   ondansetron (ZOFRAN) injection 4 mg (4 mg Intravenous Given 12/30/21 1500)   metolazone (ZAROXOLYN) tablet 10 mg (10 mg Oral Given 12/30/21 1640)   acetylcysteine (ACETADOTE) 15,000 mg in dextrose 5 % 200 mL IVPB (0 mg Intravenous Stopped 12/30/21 2122)   acetylcysteine (ACETADOTE) 5,000 mg in dextrose 5 % 500 mL IVPB (0 mg Intravenous Stopped 12/31/21 0137)   acetylcysteine (ACETADOTE) 10,000 mg in dextrose 5 % 1,000 mL IVPB (0 mg Intravenous Stopped 12/31/21 1300)   fomepizole (ANTIZOL) 1,400 mg in sodium chloride 0 9 % 100 mL IVPB (0 mg Intravenous Stopped 12/31/21 0137)   lidocaine (PF) (XYLOCAINE-MPF) 1 % injection **ADS Override Pull** (300 mg  Given 12/30/21 2018)   norepinephrine (LEVOPHED) 1 mg/mL injection **ADS Override Pull** (4 mg  Given 12/30/21 2203)   potassium chloride 20 mEq IVPB (premix) (0 mEq Intravenous Stopped 12/31/21 0600)   calcium gluconate 2 g in sodium chloride 0 9% 100 mL (premix) (0 g Intravenous Stopped 12/31/21 0700)     Followed by   calcium gluconate 2 g in sodium chloride 0 9% 100 mL (premix) (0 g Intravenous Stopped 12/31/21 0932)   calcium gluconate 2 g in sodium chloride 0 9% 100 mL (premix) (0 g Intravenous Stopped 12/31/21 1530)   potassium chloride 40 mEq IVPB (premix) (0 mEq Intravenous Stopped 12/31/21 1837)   albumin human (FLEXBUMIN) 5 % injection 12 5 g (0 g Intravenous Stopped 12/31/21 1636)   albumin human (FLEXBUMIN) 5 % injection 12 5 g (0 g Intravenous Stopped 12/31/21 2050)   potassium chloride 40 mEq IVPB (premix) (40 mEq Intravenous New Bag 1/1/22 0008)   albumin human (FLEXBUMIN) 5 % injection 25 g (0 g Intravenous Stopped 1/1/22 0229)   calcium gluconate 2 g in sodium chloride 0 9% 100 mL (premix) (0 g Intravenous Stopped 1/1/22 0800)     Time reflects when diagnosis was documented in both MDM as applicable and the Disposition within this note     Time User Action Codes Description Comment    12/30/2021  9:56 AM Norberto Lynch Add [R94 31] ST elevation     12/30/2021 12:04 PM Arnulfo Sherwood [R57 0] Cardiogenic shock ([facility] [address] [facility]     12/30/2021 12:04 PM Norberto Lynch Modify [R94 31] ST elevation     12/30/2021 12:04 PM Arnulfo Sherwood Modify [R57 0] Cardiogenic shock ([facility] [address] )     12/30/2021 12:04 PM Arnulfo Sherwood [J96 01] Acute respiratory failure with hypoxia ([facility] [address] )     12/30/2021 12:04 PM Arnulfo Sherwood [R53 1] Generalized weakness     12/30/2021 12:04 PM Arnulfo Sherwood [N17 9] Acute kidney injury ([facility] [address] )     12/30/2021 12:04 PM Arnulfo Sherwood [K72 90] Liver failure ([facility] [address] )     12/30/2021  2:77 PM Metneo Wood [D17 85] Transaminitis     12/31/2021  8:03 AM Arnulfo Sherwood [I50 9] Acute CHF (congestive heart failure) ([facility] [address] )     12/31/2021  8:03 AM Arnulfo Sherwood [Q21 0] VSD (ventricular septal defect)       ED Disposition     ED Disposition Condition Date/Time Comment    Admit Stable Thu Dec 30, 2021 12:04 PM Case was discussed with ICU and the patient's admission status was agreed to be Admission Status: inpatient status to the service of Dr Curry Hastings           Follow-up Information    None       Current Discharge Medication List      CONTINUE these medications which have NOT CHANGED    Details ALPRAZolam (XANAX) 0 5 mg tablet Take 1-2 tablets 30-60 minutes prior to leaving home prn  Qty: 30 tablet, Refills: 0    Associated Diagnoses: Agoraphobia with panic attacks      EPINEPHrine (EPIPEN) 0 3 mg/0 3 mL SOAJ Inject 0 3 mL (0 3 mg total) into a muscle once for 1 dose  Qty: 0 3 mL, Refills: 0    Associated Diagnoses: Allergic reaction           No discharge procedures on file  Prior to Admission Medications   Prescriptions Last Dose Informant Patient Reported? Taking? ALPRAZolam (XANAX) 0 5 mg tablet   No No   Sig: Take 1-2 tablets 30-60 minutes prior to leaving home prn   EPINEPHrine (EPIPEN) 0 3 mg/0 3 mL SOAJ  Self No No   Sig: Inject 0 3 mL (0 3 mg total) into a muscle once for 1 dose      Facility-Administered Medications: None       History of Present Illness: This is a 61 y o  male presenting today for evaluation of weakness and increasing shortness of breath  Patient says that for 5 or so days now, he has had shortness of breath  He denies any chest pain  I was urgently called to the bedside because EMS saw ST elevation in his anteroseptal leads  EKG here confirms the same  Patient says that he has some mild chest discomfort, but no real chest pain  No nausea or vomiting  Pain does not radiate anywhere  No associated diaphoresis  Patient is out of breath  He says that his only med drug uses that he smokes a pipe  - No language barrier    - History obtained from EMS, patient and chart and brother   - Limited by critical nature of patient  - Previous charting was reviewed  Some data reviewed included below for ease of access whether or not it is relevant to this patient encounter  Past Medical, Past Surgical History:    has no past medical history on file  has no past surgical history on file  Allergies:      Allergies   Allergen Reactions    Mushroom Extract Complex - Food Allergy Facial Swelling    Peanut Oil - Food Allergy     Strawberry C [Ascorbate - Food Allergy] Social and Family History:     Social History     Substance and Sexual Activity   Alcohol Use No     Social History     Tobacco Use   Smoking Status Current Every Day Smoker    Types: Pipe   Smokeless Tobacco Never Used   Tobacco Comment    Secondhand smoke exposure     Social History     Substance and Sexual Activity   Drug Use No       Review of Systems:   Review of Systems   Unable to perform ROS: Acuity of condition        Physical Examination     Vitals:    01/01/22 0900 01/01/22 1000 01/01/22 1100 01/01/22 1200   BP: (!) 78/55 (!) 87/51     BP Location:       Pulse: (!) 124 (!) 125 (!) 120 (!) 116   Resp: (!) 23 20 18 17   Temp: 98 6 °F (37 °C) 98 4 °F (36 9 °C) 98 6 °F (37 °C) 98 6 °F (37 °C)   TempSrc:    Core   SpO2: 98% 98% 98% 98%   Weight:       Height:         Vitals reviewed by me  Physical Exam  Vitals and nursing note reviewed  Constitutional:       Appearance: He is well-developed  He is ill-appearing  HENT:      Head: Normocephalic and atraumatic  Mouth/Throat:      Comments: JVD  Eyes:      General: No scleral icterus  Cardiovascular:      Rate and Rhythm: Regular rhythm  Tachycardia present  Heart sounds: Murmur heard  Pulmonary:      Effort: Respiratory distress present  Breath sounds: Rales present  Abdominal:      General: There is no distension  Palpations: Abdomen is soft  Tenderness: There is no abdominal tenderness  Musculoskeletal:         General: Normal range of motion  Cervical back: Normal range of motion and neck supple  Skin:     General: Skin is warm and dry  Neurological:      General: No focal deficit present  Mental Status: He is oriented to person, place, and time  Cranial Nerves: No cranial nerve deficit  Comments: Grossly neuro intact;  Able to move all extremities   Psychiatric:         Mood and Affect: Mood normal             Risk Stratification Tools               ED Course as of 01/01/22 1514   Thu Dec 30, 2021   1153 ICU to come evaluate     XR chest portable   Final Result      Lines and tubes as above  No acute findings  Workstation performed: [id number]         XR chest portable ICU   Final Result      Bibasilar atelectasis  Lines and tubes unchanged  Workstation performed: [id number]         XR chest portable ICU   Final Result      ECMO catheter tip projects over the right mainstem bronchus  Mild prominence of the pulmonary vasculature and right basilar subsegmental atelectasis  No pneumothorax  Workstation performed: [id number]         XR chest 1 view portable   Final Result      1  Central vascular congestion with diffuse bilateral hazy ground glass opacity likely representing edema, though with underlying infectious process not excluded in the context of leukocytosis  2   Probable new trace left pleural effusion  Workstation performed: [id number]         XR chest 1 view portable   ED Interpretation   Fluids overload vs COVID  Final Result      Low lung volumes with vascular crowding  Question superimposed mild pulmonary edema                    Workstation performed: [id number]         IR other    (Results Pending)   XR chest portable    (Results Pending)     Orders Placed This Encounter   Procedures    Central Line    Blood culture #1    Blood culture #2    COVID/FLU/RSV    XR chest 1 view portable    XR chest 1 view portable    IR other    XR chest portable ICU    XR chest portable    XR chest portable ICU    XR chest portable    CBC and differential    Comprehensive metabolic panel    Lactic acid    Procalcitonin with AM Reflex    Protime-INR    APTT    UA w Reflex to Microscopic w Reflex to Culture    Blood gas, venous    HS Troponin 0hr (reflex protocol)    APTT six (6) hours after Heparin bolus/drip initiation or dosing change    HS Troponin I 4hr    Lactic acid 2 Hours    Acetaminophen level-"If concentration is detectable, please discuss with medical  on call "    NT-BNP PRO    Urine Microscopic    Procalcitonin Reflex    Blood gas, venous    Basic metabolic panel    Magnesium    Phosphorus    Hepatic function panel    Lipid panel    Hemoglobin A1C w/ EAG Estimation    CBC and differential    Calcium, ionized    Magnesium    Phosphorus    APTT    Blood gas, arterial    Salicylate level    APTT    Blood gas, venous    Basic metabolic panel    Lactic acid, plasma    Lactic acid 2 Hours    APTT    Hemoglobin A1c w/EAG Estimation (Orders if not completed within the last 90 days)    Lactic acid, plasma    Hepatic function panel    Procalcitonin    Magnesium    Phosphorus    Protime-INR    Lactic acid 2 Hours    Blood gas, arterial    Blood gas, venous    CBC and Platelet    Comprehensive metabolic panel    Lactic acid, plasma    Protime-INR    Fibrinogen    APTT: Obtain PTT two (2) hours after Heparin drip initiation or dosing change   When 3 consecutive PTTs are therapeutic (minimum of 6 hours    Blood gas, arterial    APTT    Blood gas, arterial    APTT    Lactic acid 2 Hours    APTT    APTT    Acetaminophen level-"If concentration is detectable, please discuss with medical  on call "    APTT    Calcium, ionized    APTT    APTT    APTT    Blood gas, venous    CBC and differential    APTT    Procalcitonin Reflex    CBC and differential    Calcium, ionized    Magnesium    Phosphorus    Protime-INR    Hepatic function panel    APTT    APTT    Hepatic function panel    Protime-INR    CBC and Platelet    APTT    Diet Enteral/Parenteral; Tube Feeding No Oral Diet; Nepro; Continuous; 40    Diet NPO    Insert peripheral IV    Heparin: ACS (Low) or Straight  Infusion Protocol Notify Physician If:    Heparin Infusion and Platelet Monitoring Per Protocol    Insert urinary catheter    Okay to use CVC    Cardio-Pulmonary Monitoring (Critical Care & Step Down Only)    Vital Signs    Up with assistance    Turn patient    Daily weights    I/O    CAM (ICU) Assessment    Nursing dysphagia assessment    Insert peripheral IV    Maintain IV access    Apply SCD only    Treatment of hypoglycemia( Blood Glucose less than 70 mg/dL):    Insulin Infusion Notify Physician    Hypoglycemia Protocol    Nursing communication No history of diabetes    Fingerstick Glucose for Critical Care    Heparin Infusion Instruction    Heparin Infusion and Platelet Monitoring Per Protocol    ET tube suction    Early Mobilization    Elevate HOB    Orogastric tube insertion    Oral care    Suction deep laryngeal    Maintain suction equipment    Change oral suction equipment    Assess sedation level - goal not achieved    Assess sedation level - goal achieved    Notify provider    Daily Awakening Trial    Document reasons for exclusion    Care Document reasons for failure    Discontinue arterial line    Level 1-Full Code: all life saving measures are indicated    Consult to Cardiology    Inpatient consult to Cardiothoracic Surgery    Consult to Case Management    Inpatient consult to Palliative Care    Inpatient consult to Toxicology    Weaning and Extubation Protocol    Respiratory Protocol    Mechanical ventilation    Endotracheal Tube Position Adjustment    EKG RESULTS    ECG 12 lead    ECG 12 lead    ECG 12 lead    ECG 12 lead    ECG 12 lead    ECG 12 lead    Echo complete w/ contrast if indicated    Type and screen    ABORh Recheck - Contact Blood Bank Prior to Collection    Prepare Leukoreduced RBC: 4 Units    Type and screen    Insert arterial line    Insert arterial line    Insert arterial line    Inpatient Admission    Restraints non-violent    Fall precautions       Labs:     Labs Reviewed   CBC AND DIFFERENTIAL - Abnormal       Result Value Ref Range Status    WBC 17 22 (*) 4 31 - 10 16 Thousand/uL Final    RBC 4 08  3 88 - 5 62 Million/uL Final    Hemoglobin 12 8  12 0 - 17 0 g/dL Final    Hematocrit 40 1  36 5 - 49 3 % Final    MCV 98  82 - 98 fL Final    MCH 31 4  26 8 - 34 3 pg Final    MCHC 31 9  31 4 - 37 4 g/dL Final    RDW 13 9  11 6 - 15 1 % Final    MPV 11 2  8 9 - 12 7 fL Final    Platelets 250  447 - 390 Thousands/uL Final    Narrative: This is an appended report  These results have been appended to a previously verified report  COMPREHENSIVE METABOLIC PANEL - Abnormal    Sodium 130 (*) 136 - 145 mmol/L Final    Potassium 4 6  3 5 - 5 3 mmol/L Final    Chloride 97 (*) 100 - 108 mmol/L Final    CO2 12 (*) 21 - 32 mmol/L Final    ANION GAP 21 (*) 4 - 13 mmol/L Final     (*) 5 - 25 mg/dL Final    Creatinine 4 01 (*) 0 60 - 1 30 mg/dL Final    Comment: Standardized to IDMS reference method    Glucose 163 (*) 65 - 140 mg/dL Final    Comment: If the patient is fasting, the ADA then defines impaired fasting glucose as > 100 mg/dL and diabetes as > or equal to 123 mg/dL  Specimen collection should occur prior to Sulfasalazine administration due to the potential for falsely depressed results  Specimen collection should occur prior to Sulfapyridine administration due to the potential for falsely elevated results  Calcium 8 4  8 3 - 10 1 mg/dL Final    Corrected Calcium 9 1  8 3 - 10 1 mg/dL Final    AST 3,128 (*) 5 - 45 U/L Final    Comment: Specimen collection should occur prior to Sulfasalazine administration due to the potential for falsely depressed results  ALT 5,669 (*) 12 - 78 U/L Final    Comment: Specimen collection should occur prior to Sulfasalazine and/or Sulfapyridine administration due to the potential for falsely depressed results       Alkaline Phosphatase 165 (*) 46 - 116 U/L Final    Total Protein 7 6  6 4 - 8 2 g/dL Final    Albumin 3 1 (*) 3 5 - 5 0 g/dL Final    Total Bilirubin 1 27 (*) 0 20 - 1 00 mg/dL Final    Comment: Use of this assay is not recommended for patients undergoing treatment with eltrombopag due to the potential for falsely elevated results  eGFR 14  ml/min/1 73sq m Final    Narrative:     [place] guidelines for Chronic Kidney Disease (CKD):     Stage 1 with normal or high GFR (GFR > 90 mL/min/1 73 square meters)    Stage 2 Mild CKD (GFR = 60-89 mL/min/1 73 square meters)    Stage 3A Moderate CKD (GFR = 45-59 mL/min/1 73 square meters)    Stage 3B Moderate CKD (GFR = 30-44 mL/min/1 73 square meters)    Stage 4 Severe CKD (GFR = 15-29 mL/min/1 73 square meters)    Stage 5 End Stage CKD (GFR <15 mL/min/1 73 square meters)  Note: GFR calculation is accurate only with a steady state creatinine   LACTIC ACID, PLASMA - Abnormal    LACTIC ACID 11 1 (*) 0 5 - 2 0 mmol/L Final    Narrative:     Result may be elevated if tourniquet was used during collection  PROCALCITONIN TEST - Abnormal    Procalcitonin 1 65 (*) <=0 25 ng/ml Final    Comment: Suspected Lower Respiratory Tract Infection (LRTI):  - LESS than or EQUAL to 0 25 ng/mL:   low likelihood for bacterial LRTI; antibiotics DISCOURAGED   - GREATER than 0 25 ng/mL:   increased likelihood for bacterial LRTI; antibiotics ENCOURAGED  Suspected Sepsis:  - Strongly consider initiating antibiotics in ALL UNSTABLE patients  - LESS than or EQUAL to 0 5 ng/mL:   low likelihood for bacterial sepsis; antibiotics DISCOURAGED   - GREATER than 0 5 ng/mL:   increased likelihood for bacterial sepsis; antibiotics ENCOURAGED   - GREATER than 2 ng/mL:   high risk for severe sepsis / septic shock; antibiotics strongly ENCOURAGED  Decisions on antibiotic use should not be based solely on Procalcitonin (PCT) levels  If PCT is low but uncertainty exists with stopping antibiotics, repeat PCT in 6-24 hours to confirm the low level   If antibiotics are administered (regardless if initial PCT was high or low), repeat PCT every 1-2 days to consider early antibiotic cessation (when GREATER than 80% decrease from the peak OR when PCT drops below designated cutoffs, whichever comes first), so long as the infection is NOT one that typically requires prolonged treatment durations (e g , bone/joint infections, endocarditis, Staph  aureus bacteremia)      Situations of FALSE-POSITIVE Procalcitonin values:  1) Newborns < 67 hours old  2) Massive stress from severe trauma / burns, major surgery, acute pancreatitis, cardiogenic / hemorrhagic shock, sickle cell crisis, or other organ perfusion abnormalities  3) Malaria and some Candidal infections  4) Treatment with agents that stimulate cytokines (e g , OKT3, anti-lymphocyte globulins, alemtuzumab, IL-2, granulocyte transfusion [NOT GCSFs])  5) Chronic renal disease causes elevated baseline levels (consider GREATER than 0 75 ng/mL as an abnormal cut-off); initiating HD/CRRT may cause transient decreases  6) Paraneoplastic syndromes from medullary thyroid or SCLC, some forms of vasculitis, and acute eeqzq-vw-ectp disease    Situations of FALSE-NEGATIVE Procalcitonin values:  1) Too early in clinical course for PCT to have reached its peak (may repeat in 6-24 hours to confirm low level)  2) Localized infection WITHOUT systemic (SIRS / sepsis) response (e g , an abscess, osteomyelitis, cystitis)  3) Mycobacteria (e g , Tuberculosis, MAC)  4) Cystic fibrosis exacerbations     PROTIME-INR - Abnormal    Protime 22 1 (*) 11 6 - 14 5 seconds Final    INR 2 04 (*) 0 84 - 1 19 Final   UA W REFLEX TO MICROSCOPIC WITH REFLEX TO CULTURE - Abnormal    Color, UA Dk Yellow   Final    Clarity, UA Cloudy   Final    Specific [place], UA 1 019  1 003 - 1 030 Final    pH, UA 5 0  4 5, 5 0, 5 5, 6 0, 6 5, 7 0, 7 5, 8 0 Final    Leukocytes, UA Small (*) Negative Final    Nitrite, UA Negative  Negative Final    Protein, UA 30 (1+) (*) Negative mg/dl Final    Glucose, UA Negative  Negative mg/dl Final    Ketones, UA Negative  Negative mg/dl Final    Urobilinogen, UA 2 0 (*) 0 2, 1 0 E U /dl E U /dl Final    Bilirubin, UA Interference- unable to analyze (*) Negative Final    Comment: The dipstick result may be falsely positive due to interfering substances  We recommend reliance upon serum bilirubin, liver & kidney function tests to guide patient care if clinically indicated  Blood, UA Moderate (*) Negative Final   BLOOD GAS, VENOUS - Abnormal    pH, [place] 7 100 (*) 7 300 - 7 400 Final    pCO2, [place] 34 4 (*) 42 0 - 50 0 mm Hg Final    pO2, [place] 36 3  35 0 - 45 0 mm Hg Final    HCO3, [place] 10 4 (*) 24 - 30 mmol/L Final    Base Excess, [place] -18 2  mmol/L Final    O2 Content, [place] 7 8  ml/dL Final    O2 HGB, VENOUS 40 4 (*) 60 0 - 80 0 % Final   HS TROPONIN I 0HR - Abnormal    hs TnI 0hr 5,882 (*) "Refer to ACS Flowchart"- see link ng/L Final    Comment:                                              Initial (time 0) result  If >=50 ng/L, Myocardial injury suggested ;  Type of myocardial injury and treatment strategy  to be determined  If 5-49 ng/L, a delta result at 2 hours and or 4 hours will be needed to further evaluate  If <4 ng/L, and chest pain has been >3 hours since onset, patient may qualify for discharge based on the HEART score in the ED  If <5 ng/L and <3hours since onset of chest pain, a delta result at 2 hours will be needed to further evaluate  Second Troponin (time 2 hours)  If calculated delta >= 20 ng/L,  Myocardial injury suggested ; Type of myocardial injury and treatment strategy to be determined  If 5-49 ng/L and the calculated delta is 5-19 ng/L, consult medical service for evaluation  Continue evaluation for ischemia on ecg and other possible etiology and repeat hs troponin at 4 hours  If delta is <5 ng/L at 2 hours, consider discharge based on risk stratification via the HEART score (if in ED), or LUCÍA risk score in IP/Observation     URINE MICROSCOPIC - Abnormal    RBC, UA 10-20 (*) None Seen, 2-4 /hpf Final    WBC, UA 2-4  None Seen, 2-4, 5-60 /hpf Final    Epithelial Cells Moderate (*) None Seen, Occasional /hpf Final    Bacteria, UA Occasional  None Seen, Occasional /hpf Final    Hyaline Casts, UA 5-10 (*) None Seen /lpf Final   MANUAL DIFFERENTIAL(PHLEBS DO NOT ORDER) - Abnormal    Segmented % 91 (*) 43 - 75 % Final    Lymphocytes % 4 (*) 14 - 44 % Final    Monocytes % 4  4 - 12 % Final    Eosinophils, % 0  0 - 6 % Final    Basophils % 0  0 - 1 % Final    Atypical Lymphocytes % 1 (*) <=0 % Final    Absolute Neutrophils 15 67 (*) 1 85 - 7 62 Thousand/uL Final    Lymphocytes Absolute 0 69  0 60 - 4 47 Thousand/uL Final    Monocytes Absolute 0 69  0 00 - 1 22 Thousand/uL Final    Eosinophils Absolute 0 00  0 00 - 0 40 Thousand/uL Final    Basophils Absolute 0 00  0 00 - 0 10 Thousand/uL Final    Total Counted     Final    RBC Morphology Present   Final    Poikilocytes Present   Final    Platelet Estimate Adequate  Adequate Final    Artifact Present   Final   COVID19, INFLUENZA A/B, RSV PCR, [facility] - Normal    SARS-CoV-2 Negative  Negative Final    Comment:      INFLUENZA A PCR Negative  Negative Final    Comment:      INFLUENZA B PCR Negative  Negative Final    Comment:      RSV PCR Negative  Negative Final    Comment:      Narrative:         APTT - Normal    PTT 28  23 - 37 seconds Final    Comment: Therapeutic Heparin Range =  60-90 seconds       Imaging:   XR chest 1 view portable    Result Date: 12/30/2021  Narrative: CHEST INDICATION:   MI   Weakness, febrile  Patient has suspected COVID-19  COMPARISON:  None EXAM PERFORMED/VIEWS:  XR CHEST PORTABLE FINDINGS: Cardiomediastinal silhouette appears unremarkable  Low lung volumes with vascular crowding  Question superimposed mild pulmonary edema  No effusion or pneumothorax  Osseous structures appear within normal limits for patient age  Impression: Low lung volumes with vascular crowding  Question superimposed mild pulmonary edema   Workstation performed: [id number]     [id number] [facility] [facility] bedside procedure    Result Date: 12/30/2021  Narrative: 1 2 [phone] [id number]  2 446 246 [id number] 147 1       Final Diagnosis:  1  Cardiogenic shock ([facility] [address] )    2  ST elevation    3  Acute respiratory failure with hypoxia (HCC)    4  Generalized weakness    5  Acute kidney injury ([facility] [address] )    6  Liver failure ([facility] [address] )    7  Transaminitis    8  Acute [facility] (congestive [facility] failure[facility]    9  VSD (ventricular septal defect)        Code Status: Level 1 - Full Code    Portions of the record may have been created with voice recognition software  Occasional wrong word or "sound a like" substitutions may have occurred due to the inherent limitations of voice recognition software  Read the chart carefully and recognize, using context, where substitutions have occurred      Electronically signed by:  Talha Swenson, PGY 3, MD Yannick Ochoa MD  01/01/22 [id number]

## 2021-12-30 NOTE — ASSESSMENT & PLAN NOTE
· Presented to the ER after several days of lethargy, fatigue, nausea, and constipation  · Found to be in multi-system organ failure - renal failure, elevated LFTs, encephalopathy  · Cardiology and cardiac surgery consults appreciated  · Evaluated for [facility] ECMO, decision was made not to cannulate per next of kin  · Milrinone 0 38, trend SvO2  L IJ CVC placed in ER  · Diuresis for goal negative 1-2 liters  Bolused with bumex   Trial zaroxolyn now

## 2021-12-30 NOTE — ED ATTENDING ATTESTATION
Final Diagnosis:  1  Cardiogenic shock ([address] )    2  ST elevation    3  Acute respiratory failure with hypoxia (HCC)    4  Generalized weakness    5  Acute kidney injury ([facility] [address] )    6  Liver failure [facility])      ED Course as of 12/30/21 1228   Thu Dec 30, 2021   1012 Procedure Note for Ultrasound Guided Peripheral Line:  Skin prepared using Chloraprep  In the LEFT basilic vein, using ultrasound guidance (CPT code 03827), an 18G peripheral IV long angiocatheter was placed successfully by physician secondary to difficulty placing IV by nursing staff  Successful  One attempt  Good blood return  Good palpable flush  Adhered to skin using Tegederm  U/S image in chart  1012 POCUS Cardiac = Takusubo's   1119 LACTIC ACID(!!): 11 1       I, Evita Turcios MD, saw and evaluated the patient  All available labs and X-rays were ordered by me or the resident and have been reviewed by myself  I discussed the patient with the resident / non-physician and agree with the resident's / non-physician practitioner's findings and plan as documented in the resident's / non-physician practicitioner's note, except where noted  At this point, I agree with the current assessment done in the ED  I was present during key portions of all procedures performed unless otherwise stated  Chief Complaint   Patient presents with    Weakness - Generalized     per EMS - pt has been feeling weakness and has been febrile for the past 5-7 days     This is a 61 y o  male presenting for evaluation of increasing weakness and fatigue and shortness of breath  This been going on for maybe 5 days or so  It is getting worse so called EMS today  When he was initially found his O2 saturation was 50 percent on room air, improving to 85 percent on 6 liters nasal cannula  He is then placed on a non-rebreather  Also very cold to the touch which EMS initially thought was the reason for the difficulty getting O2 plasty    His initial temperature was 92 2 Fahrenheit tympanic for EMS  Patient states he just does not feel well  Compliant with all his medications  Denied any chest pain  A pre-hospital EKG was done that demonstrates anteroseptal ST elevations without reciprocal changes  No history of heart attack or ventricular aneurysm in the past   He was seen immediately upon arrival     POCUS done demonstrating LV aneurysm vs Takusubo's  Cardiology (interventional cards) called about patient ; doesn't have CP but has severe dyspnea + abnormal eKG  Echo discussed ? manage as NSTEMI essentially  PMH:   has no past medical history on file  PSH:   has no past surgical history on file  Social:  Social History     Substance and Sexual Activity   Alcohol Use No     Social History     Tobacco Use   Smoking Status Current Every Day Smoker    Types: Pipe   Smokeless Tobacco Never Used   Tobacco Comment    Secondhand smoke exposure     Social History     Substance and Sexual Activity   Drug Use No     PE:  Vitals:    12/30/21 0959 12/30/21 1045 12/30/21 1145 12/30/21 1215   BP:  127/58 93/69 (!) 88/53   BP Location:   Right arm Right arm   Pulse:  (!) 106 (!) 106 102   Resp:  20 (!) 32 (!) 26   Temp:       TempSrc:       SpO2:  97% 100% 92%   Weight: 93 kg (205 lb)      General: VS reviewed  Severe dyspnea  awake, alert  Well-nourished, well-developed  Appears stated age  Head: Normocephalic, atraumatic, nontender  Eyes: PERRL, EOM-I  No diplopia  No hyphema  No subconjunctival hemorrhages  Symmetrical lids  ENTAtraumatic external nose and ears  MMM  No stridor  Normal phonation  No drooling  Base of mouth is soft  No mastoid tenderness  Neck: Symmetric, trachea midline  No JVD  CV: Tachycardic   Peripheral pulses +2 throughout  No JVD  No target for an EJ  No chest wall tenderness  Lungs:   labored   No retractions  No crepitus  +tachypnea  No paradoxical motion    Abd: +BS, soft, NT/ND    MSK:   FROM   No lower extremity edema  Back:   No CVAT  Skin: Dry, intact  Neuro: AAOx3, GCS 15, CN II-XII grossly intact  Motor grossly intact  Psychiatric/Behavioral: Appropriate mood and affect   Exam: deferred  A:  - Tachypnea  - Hypoxia  - Hypotension (relative)  - Weakness  P:  - POCUS ? LV aneurysm vs Takusubo's   - Cardiac workup    - 13 point ROS was performed and all are normal unless stated in the history above  - Nursing note reviewed  Vitals reviewed  - Orders placed by myself and/or advanced practitioner / resident     - Previous chart was reviewed  - No language barrier    - History obtained from patient  - There are no limitations to the history obtained  - Critical care time: 90 minutes  - Critical care time was exclusive of seperately bilable procedures and treating other patients as well as teaching time     - Critical care was necessary to treat or prevent imminent or life-threatening deterioration of the following condition: Cardiac shock?  - Critical care time was spent personally by me on the following activities as well as the above as per the ED course and rest of chart: blood draw for specimens, obtaining history from patient / surrogate, developement of a treatment plan, discussions with consultants, evaluation of patient's response to the treatment, examination of the patient, ordering/performing treatements and interventions, re-evaluation of the patient's condition, review of old charts, ordering/reviewing laboratory studies, ordering/reviewing of radiographic studies    Code Status: No Order  Advance Directive and Living Will:      Power of :    POLST:      Medications   sodium chloride (PF) 0 9 % injection 3 mL (has no administration in time range)   heparin (porcine) 25,000 units in 0 45% NaCl 250 mL infusion (premix) (11 1 Units/kg/hr × 90 kg (Order-Specific) Intravenous New Bag 12/30/21 1026)   heparin (porcine) injection 4,000 Units (has no administration in time range) heparin (porcine) injection 2,000 Units (has no administration in time range)   cefepime (MAXIPIME) 2 g/50 mL dextrose IVPB (2,000 mg Intravenous New Bag 12/30/21 1204)   vancomycin (VANCOCIN) 1,750 mg in sodium chloride 0 9 % 500 mL IVPB (has no administration in time range)   EPINEPHrine 3000 mcg (STANDARD CONCENTRATION) IV in sodium chloride 0 9% 250 mL (3 mcg/min Intravenous New Bag 12/30/21 1218)   aspirin chewable tablet 324 mg (324 mg Oral Given 12/30/21 1001)   heparin (porcine) injection 4,000 Units (4,000 Units Intravenous Given 12/30/21 1026)   furosemide (LASIX) injection 40 mg (40 mg Intravenous Given 12/30/21 1211)     XR chest 1 view portable   ED Interpretation   Fluids overload vs COVID  Final Result      Low lung volumes with vascular crowding  Question superimposed mild pulmonary edema                    Workstation performed: [id number]           Orders Placed This Encounter   Procedures    Blood culture #1    Blood culture #2    COVID/FLU/RSV    XR chest 1 view portable    CBC and differential    Comprehensive metabolic panel    Lactic acid    Procalcitonin with AM Reflex    Protime-INR    APTT    UA w Reflex to Microscopic w Reflex to Culture    Blood gas, venous    HS Troponin 0hr (reflex protocol)    APTT six (6) hours after Heparin bolus/drip initiation or dosing change    HS Troponin I 2hr    HS Troponin I 4hr    Lactic acid 2 Hours    Acetaminophen level-"If concentration is detectable, please discuss with medical  on call "    Nonrebreather mask oxygen    Continuous cardiac monitoring    Continuous pulse oximetry    Nasal cannula oxygen    Insert peripheral IV    Heparin: ACS (low) or Straight Infusion Protocol Administration Instructions    Heparin: ACS (Low) or Straight  Infusion Protocol Notify Physician If:    Heparin Infusion and Platelet Monitoring Per Protocol    Insert urinary catheter    Consult to Cardiology    ECG 12 lead    Echo complete w/ contrast if indicated    Inpatient Admission     Labs Reviewed   CBC AND DIFFERENTIAL - Abnormal       Result Value Ref Range Status    WBC 17 22 (*) 4 31 - 10 16 Thousand/uL Final    RBC 4 08  3 88 - 5 62 Million/uL Final    Hemoglobin 12 8  12 0 - 17 0 g/dL Final    Hematocrit 40 1  36 5 - 49 3 % Final    MCV 98  82 - 98 fL Final    MCH 31 4  26 8 - 34 3 pg Final    MCHC 31 9  31 4 - 37 4 g/dL Final    RDW 13 9  11 6 - 15 1 % Final    MPV 11 2  8 9 - 12 7 fL Final    Platelets 633  799 - 390 Thousands/uL Final    Narrative: This is an appended report  These results have been appended to a previously verified report  COMPREHENSIVE METABOLIC PANEL - Abnormal    Sodium 130 (*) 136 - 145 mmol/L Final    Potassium 4 6  3 5 - 5 3 mmol/L Final    Chloride 97 (*) 100 - 108 mmol/L Final    CO2 12 (*) 21 - 32 mmol/L Final    ANION GAP 21 (*) 4 - 13 mmol/L Final     (*) 5 - 25 mg/dL Final    Creatinine 4 01 (*) 0 60 - 1 30 mg/dL Final    Comment: Standardized to IDMS reference method    Glucose 163 (*) 65 - 140 mg/dL Final    Comment: If the patient is fasting, the ADA then defines impaired fasting glucose as > 100 mg/dL and diabetes as > or equal to 123 mg/dL  Specimen collection should occur prior to Sulfasalazine administration due to the potential for falsely depressed results  Specimen collection should occur prior to Sulfapyridine administration due to the potential for falsely elevated results  Calcium 8 4  8 3 - 10 1 mg/dL Final    Corrected Calcium 9 1  8 3 - 10 1 mg/dL Final    AST 3,128 (*) 5 - 45 U/L Final    Comment: Specimen collection should occur prior to Sulfasalazine administration due to the potential for falsely depressed results  ALT 5,669 (*) 12 - 78 U/L Final    Comment: Specimen collection should occur prior to Sulfasalazine and/or Sulfapyridine administration due to the potential for falsely depressed results       Alkaline Phosphatase 165 (*) 46 - 116 U/L Final Total Protein 7 6  6 4 - 8 2 g/dL Final    Albumin 3 1 (*) 3 5 - 5 0 g/dL Final    Total Bilirubin 1 27 (*) 0 20 - 1 00 mg/dL Final    Comment: Use of this assay is not recommended for patients undergoing treatment with eltrombopag due to the potential for falsely elevated results  eGFR 14  ml/min/1 73sq m Final    Narrative:     [place] guidelines for Chronic Kidney Disease (CKD):     Stage 1 with normal or high GFR (GFR > 90 mL/min/1 73 square meters)    Stage 2 Mild CKD (GFR = 60-89 mL/min/1 73 square meters)    Stage 3A Moderate CKD (GFR = 45-59 mL/min/1 73 square meters)    Stage 3B Moderate CKD (GFR = 30-44 mL/min/1 73 square meters)    Stage 4 Severe CKD (GFR = 15-29 mL/min/1 73 square meters)    Stage 5 End Stage CKD (GFR <15 mL/min/1 73 square meters)  Note: GFR calculation is accurate only with a steady state creatinine   LACTIC ACID, PLASMA - Abnormal    LACTIC ACID 11 1 (*) 0 5 - 2 0 mmol/L Final    Narrative:     Result may be elevated if tourniquet was used during collection  PROTIME-INR - Abnormal    Protime 22 1 (*) 11 6 - 14 5 seconds Final    INR 2 04 (*) 0 84 - 1 19 Final   BLOOD GAS, VENOUS - Abnormal    pH, [place] 7 100 (*) 7 300 - 7 400 Final    pCO2, [place] 34 4 (*) 42 0 - 50 0 mm Hg Final    pO2, [place] 36 3  35 0 - 45 0 mm Hg Final    HCO3, [place] 10 4 (*) 24 - 30 mmol/L Final    Base Excess, [place] -18 2  mmol/L Final    O2 Content, [place] 7 8  ml/dL Final    O2 HGB, VENOUS 40 4 (*) 60 0 - 80 0 % Final   HS TROPONIN I 0HR - Abnormal    hs TnI 0hr 5,882 (*) "Refer to ACS Flowchart"- see link ng/L Final    Comment:                                              Initial (time 0) result  If >=50 ng/L, Myocardial injury suggested ;  Type of myocardial injury and treatment strategy  to be determined  If 5-49 ng/L, a delta result at 2 hours and or 4 hours will be needed to further evaluate    If <4 ng/L, and chest pain has been >3 hours since onset, patient may qualify for discharge based on the HEART score in the ED  If <5 ng/L and <3hours since onset of chest pain, a delta result at 2 hours will be needed to further evaluate  Second Troponin (time 2 hours)  If calculated delta >= 20 ng/L,  Myocardial injury suggested ; Type of myocardial injury and treatment strategy to be determined  If 5-49 ng/L and the calculated delta is 5-19 ng/L, consult medical service for evaluation  Continue evaluation for ischemia on ecg and other possible etiology and repeat hs troponin at 4 hours  If delta is <5 ng/L at 2 hours, consider discharge based on risk stratification via the HEART score (if in ED), or LUCÍA risk score in IP/Observation     MANUAL DIFFERENTIAL(PHLEBS DO NOT ORDER) - Abnormal    Segmented % 91 (*) 43 - 75 % Final    Lymphocytes % 4 (*) 14 - 44 % Final    Monocytes % 4  4 - 12 % Final    Eosinophils, % 0  0 - 6 % Final    Basophils % 0  0 - 1 % Final    Atypical Lymphocytes % 1 (*) <=0 % Final    Absolute Neutrophils 15 67 (*) 1 85 - 7 62 Thousand/uL Final    Lymphocytes Absolute 0 69  0 60 - 4 47 Thousand/uL Final    Monocytes Absolute 0 69  0 00 - 1 22 Thousand/uL Final    Eosinophils Absolute 0 00  0 00 - 0 40 Thousand/uL Final    Basophils Absolute 0 00  0 00 - 0 10 Thousand/uL Final    Total Counted     Final    RBC Morphology Present   Final    Poikilocytes Present   Final    Platelet Estimate Adequate  Adequate Final    Artifact Present   Final   COVID19, INFLUENZA A/B, RSV PCR, [facility] - Normal    SARS-CoV-2 Negative  Negative Final    Comment:      INFLUENZA A PCR Negative  Negative Final    Comment:      INFLUENZA B PCR Negative  Negative Final    Comment:      RSV PCR Negative  Negative Final    Comment:      Narrative:         APTT - Normal    PTT 28  23 - 37 seconds Final    Comment: Therapeutic Heparin Range =  60-90 seconds   BLOOD CULTURE   BLOOD CULTURE   PROCALCITONIN TEST   UA W REFLEX TO MICROSCOPIC WITH REFLEX TO CULTURE   HS TROPONIN I 2HR   LACTIC ACID 2 HOUR   ACETAMINOPHEN LEVEL   APTT   HS TROPONIN I 4HR     Time reflects when diagnosis was documented in both MDM as applicable and the Disposition within this note     Time User Action Codes Description Comment    12/30/2021  9:56 AM Niki Bassett Add [R94 31] ST elevation     12/30/2021 12:04 PM Arnulfo Sherwood [R57 0] Cardiogenic shock ([facility] [address] )     12/30/2021 12:04 PM Niki Bassett Modify [R94 31] ST elevation     12/30/2021 12:04 PM Arnulfo Sherwood Modify [R57 0] Cardiogenic shock ([facility] [address] )     12/30/2021 12:04 PM Arnulfo Sherwood [J96 01] Acute respiratory failure with hypoxia ([facility] [address] )     12/30/2021 12:04 PM Arnulfo Sherwood [R53 1] Generalized weakness     12/30/2021 12:04 PM Arnulfo Sherwood [N17 9] Acute kidney injury ([facility] [address] )     12/30/2021 12:04 PM Niki Wood [K72 90] Liver failure [facility])       ED Disposition     ED Disposition Condition Date/Time Comment    Admit Stable Thu Dec 30, 2021 12:04 PM Case was discussed with ICU and the patient's admission status was agreed to be Admission Status: inpatient status to the service of Dr Reanna Bustamante   Follow-up Information    None       Patient's Medications   Discharge Prescriptions    No medications on file     No discharge procedures on file  Prior to Admission Medications   Prescriptions Last Dose Informant Patient Reported? Taking? ALPRAZolam (XANAX) 0 5 mg tablet   No No   Sig: Take 1-2 tablets 30-60 minutes prior to leaving home prn   EPINEPHrine (EPIPEN) 0 3 mg/0 3 mL SOAJ  Self No No   Sig: Inject 0 3 mL (0 3 mg total) into a muscle once for 1 dose      Facility-Administered Medications: None       Portions of the record may have been created with voice recognition software  Occasional wrong word or "sound a like" substitutions may have occurred due to the inherent limitations of voice recognition software  Read the chart carefully and recognize, using context, where substitutions have occurred      Electronically signed by:  Bethany Nelson

## 2021-12-30 NOTE — PROGRESS NOTES
Full consult note to follow  I was asked by Dr Sondra Blevins to evaluate this patient with acquired post-MI ventricular septal defect who presented in cardiogenic shock with multi-system organ failure/dysfunction (cardiac, pulmonary, renal, hepatic)  History and physical exam, which will be documented in the full consult note, are consistent with MI of approximately 2-3 weeks duration and then worsening over the last 4-5 days  Patient reports chest pain that was worse over last 4-5 days  Sister and brother-in-law, with whom the patient lives, report that he has not eaten or drank anything for 4-5 days and has been basically been bed bound over that time period  Mamta had to "carry him down the stairs" for the ambulance to bring him to the hospital       Echo today shows apparent anterior septal VSD consistent with recent LAD STEMI  Emergent repair is warranted for acquired post-MI VSD  In the setting of MSOF/cardiogenic shock and severe acidosis, mortality risk of emergent repair is extreme (approaching 100%)  A reasonable alternative is initiation of VA ECMO in an attempt to limit end organ injury and ideally see some recovery of the kidneys and liver  If stabilization were achieved, then he could undergo VSD repair in 24-72 hours with less mortality risk, though still very high (~50%)  I had separate discussions with the patient (in person) and the patient's sister and Mamta (by telephone)  In my opinion the patient has extremely limited insight into his health in general and into this critical life-threatening illness as well  His very limited insight makes it impossible for him to make medical decisions on his own behalf  His sister and Mamta have good insight and understand how critically ill he is and that his life is at risk  I explained the option of VA ECMO as a bridge to definitive surgical repair  They understood but they do not want him to undergo ECMO or surgery    They stated that it would be "prolonging the inevitable"  I made it very clear that if we choose not to intervene with ECMO now, it will be extremely unlikely that he would improve enough with medical therapy to tolerate any surgical intervention later  They understood and were very clear that he not undergo potentially lifesaving surgical intervention  Recommend palliative care consultation at earliest opportunity      Margaux Moss MD  12/30/21  4:22 PM

## 2021-12-31 ENCOUNTER — APPOINTMENT (INPATIENT)
Dept: RADIOLOGY | Facility: HOSPITAL | Age: 66
DRG: 167 | End: 2021-12-31
Payer: COMMERCIAL

## 2021-12-31 ENCOUNTER — ANESTHESIA (INPATIENT)
Dept: PERIOP | Facility: HOSPITAL | Age: 66
DRG: 167 | End: 2021-12-31
Payer: COMMERCIAL

## 2021-12-31 ENCOUNTER — ANESTHESIA EVENT (INPATIENT)
Dept: PERIOP | Facility: HOSPITAL | Age: 66
DRG: 167 | End: 2021-12-31
Payer: COMMERCIAL

## 2021-12-31 ENCOUNTER — APPOINTMENT (INPATIENT)
Dept: NON INVASIVE DIAGNOSTICS | Facility: HOSPITAL | Age: 66
DRG: 167 | End: 2021-12-31
Attending: THORACIC SURGERY (CARDIOTHORACIC VASCULAR SURGERY)
Payer: COMMERCIAL

## 2021-12-31 PROBLEM — E87.2 METABOLIC ACIDOSIS: Status: ACTIVE | Noted: 2021-12-31

## 2021-12-31 PROBLEM — E87.20 METABOLIC ACIDOSIS: Status: ACTIVE | Noted: 2021-12-31

## 2021-12-31 PROBLEM — R73.9 HYPERGLYCEMIA: Status: ACTIVE | Noted: 2021-12-31

## 2021-12-31 PROBLEM — T39.1X1A TYLENOL INGESTION: Status: ACTIVE | Noted: 2021-12-31

## 2021-12-31 PROBLEM — I50.21 ACUTE SYSTOLIC CONGESTIVE HEART FAILURE (HCC): Status: ACTIVE | Noted: 2021-12-31

## 2021-12-31 PROBLEM — Z92.81 PERSONAL HISTORY OF ECMO: Status: ACTIVE | Noted: 2021-12-31

## 2021-12-31 PROBLEM — D64.9 ANEMIA: Status: ACTIVE | Noted: 2021-12-31

## 2021-12-31 LAB
ALBUMIN SERPL BCP-MCNC: 2.2 G/DL (ref 3.5–5)
ALBUMIN SERPL BCP-MCNC: 2.4 G/DL (ref 3.5–5)
ALBUMIN SERPL BCP-MCNC: 2.5 G/DL (ref 3.5–5)
ALP SERPL-CCNC: 128 U/L (ref 46–116)
ALP SERPL-CCNC: 148 U/L (ref 46–116)
ALP SERPL-CCNC: 150 U/L (ref 46–116)
ALT SERPL W P-5'-P-CCNC: 3696 U/L (ref 12–78)
ALT SERPL W P-5'-P-CCNC: 4905 U/L (ref 12–78)
ALT SERPL W P-5'-P-CCNC: 5427 U/L (ref 12–78)
ANION GAP SERPL CALCULATED.3IONS-SCNC: 14 MMOL/L (ref 4–13)
ANION GAP SERPL CALCULATED.3IONS-SCNC: 14 MMOL/L (ref 4–13)
ANION GAP SERPL CALCULATED.3IONS-SCNC: 16 MMOL/L (ref 4–13)
ANION GAP SERPL CALCULATED.3IONS-SCNC: 18 MMOL/L (ref 4–13)
ANION GAP SERPL CALCULATED.3IONS-SCNC: 21 MMOL/L (ref 4–13)
APAP SERPL-MCNC: 2 UG/ML (ref 10–20)
APTT PPP: 138 SECONDS (ref 23–37)
APTT PPP: 41 SECONDS (ref 23–37)
APTT PPP: 44 SECONDS (ref 23–37)
APTT PPP: 49 SECONDS (ref 23–37)
APTT PPP: 53 SECONDS (ref 23–37)
APTT PPP: 63 SECONDS (ref 23–37)
APTT PPP: 69 SECONDS (ref 23–37)
APTT PPP: 84 SECONDS (ref 23–37)
APTT PPP: 96 SECONDS (ref 23–37)
ARTERIAL PATENCY WRIST A: NO
AST SERPL W P-5'-P-CCNC: 2054 U/L (ref 5–45)
AST SERPL W P-5'-P-CCNC: 2371 U/L (ref 5–45)
AST SERPL W P-5'-P-CCNC: 996 U/L (ref 5–45)
BASE EX.OXY STD BLDV CALC-SCNC: 45.3 % (ref 60–80)
BASE EX.OXY STD BLDV CALC-SCNC: 50.8 % (ref 60–80)
BASE EX.OXY STD BLDV CALC-SCNC: 52.4 % (ref 60–80)
BASE EX.OXY STD BLDV CALC-SCNC: 98.8 % (ref 60–80)
BASE EXCESS BLDA CALC-SCNC: -2 MMOL/L (ref -2–3)
BASE EXCESS BLDA CALC-SCNC: -4 MMOL/L (ref -2–3)
BASE EXCESS BLDA CALC-SCNC: -4.7 MMOL/L
BASE EXCESS BLDA CALC-SCNC: -4.7 MMOL/L
BASE EXCESS BLDA CALC-SCNC: -4.8 MMOL/L
BASE EXCESS BLDA CALC-SCNC: -4.8 MMOL/L
BASE EXCESS BLDA CALC-SCNC: -6 MMOL/L (ref -2–3)
BASE EXCESS BLDA CALC-SCNC: -6 MMOL/L (ref -2–3)
BASE EXCESS BLDV CALC-SCNC: -5.2 MMOL/L
BASE EXCESS BLDV CALC-SCNC: -6.3 MMOL/L
BASE EXCESS BLDV CALC-SCNC: -6.5 MMOL/L
BASE EXCESS BLDV CALC-SCNC: -7.6 MMOL/L
BILIRUB DIRECT SERPL-MCNC: 0.4 MG/DL (ref 0–0.2)
BILIRUB DIRECT SERPL-MCNC: 0.43 MG/DL (ref 0–0.2)
BILIRUB SERPL-MCNC: 0.93 MG/DL (ref 0.2–1)
BILIRUB SERPL-MCNC: 0.99 MG/DL (ref 0.2–1)
BILIRUB SERPL-MCNC: 1 MG/DL (ref 0.2–1)
BODY TEMPERATURE: 97.3 DEGREES FEHRENHEIT
BODY TEMPERATURE: 97.9 DEGREES FEHRENHEIT
BODY TEMPERATURE: 98.6 DEGREES FEHRENHEIT
BUN SERPL-MCNC: 135 MG/DL (ref 5–25)
BUN SERPL-MCNC: 140 MG/DL (ref 5–25)
BUN SERPL-MCNC: 145 MG/DL (ref 5–25)
BUN SERPL-MCNC: 148 MG/DL (ref 5–25)
BUN SERPL-MCNC: 150 MG/DL (ref 5–25)
CA-I BLD-SCNC: 0.95 MMOL/L (ref 1.12–1.32)
CA-I BLD-SCNC: 1.09 MMOL/L (ref 1.12–1.32)
CA-I BLD-SCNC: 1.12 MMOL/L (ref 1.12–1.32)
CA-I BLD-SCNC: 1.12 MMOL/L (ref 1.12–1.32)
CA-I BLD-SCNC: 1.18 MMOL/L (ref 1.12–1.32)
CA-I BLD-SCNC: 1.2 MMOL/L (ref 1.12–1.32)
CALCIUM ALBUM COR SERPL-MCNC: 9.8 MG/DL (ref 8.3–10.1)
CALCIUM SERPL-MCNC: 7.4 MG/DL (ref 8.3–10.1)
CALCIUM SERPL-MCNC: 7.5 MG/DL (ref 8.3–10.1)
CALCIUM SERPL-MCNC: 8.4 MG/DL (ref 8.3–10.1)
CALCIUM SERPL-MCNC: 8.5 MG/DL (ref 8.3–10.1)
CALCIUM SERPL-MCNC: 8.6 MG/DL (ref 8.3–10.1)
CHLORIDE SERPL-SCNC: 104 MMOL/L (ref 100–108)
CHLORIDE SERPL-SCNC: 104 MMOL/L (ref 100–108)
CHLORIDE SERPL-SCNC: 106 MMOL/L (ref 100–108)
CHLORIDE SERPL-SCNC: 98 MMOL/L (ref 100–108)
CHLORIDE SERPL-SCNC: 99 MMOL/L (ref 100–108)
CO2 SERPL-SCNC: 16 MMOL/L (ref 21–32)
CO2 SERPL-SCNC: 17 MMOL/L (ref 21–32)
CO2 SERPL-SCNC: 18 MMOL/L (ref 21–32)
CO2 SERPL-SCNC: 20 MMOL/L (ref 21–32)
CO2 SERPL-SCNC: 24 MMOL/L (ref 21–32)
CREAT SERPL-MCNC: 2.82 MG/DL (ref 0.6–1.3)
CREAT SERPL-MCNC: 2.87 MG/DL (ref 0.6–1.3)
CREAT SERPL-MCNC: 3.02 MG/DL (ref 0.6–1.3)
CREAT SERPL-MCNC: 3.27 MG/DL (ref 0.6–1.3)
CREAT SERPL-MCNC: 3.29 MG/DL (ref 0.6–1.3)
ERYTHROCYTE [DISTWIDTH] IN BLOOD BY AUTOMATED COUNT: 13.4 % (ref 11.6–15.1)
ERYTHROCYTE [DISTWIDTH] IN BLOOD BY AUTOMATED COUNT: 13.4 % (ref 11.6–15.1)
EST. AVERAGE GLUCOSE BLD GHB EST-MCNC: 123 MG/DL
FIBRINOGEN PPP-MCNC: 367 MG/DL (ref 227–495)
FIO2 GAS DIL.REBREATH: 100 L
GFR SERPL CREATININE-BSD FRML MDRD: 18 ML/MIN/1.73SQ M
GFR SERPL CREATININE-BSD FRML MDRD: 18 ML/MIN/1.73SQ M
GFR SERPL CREATININE-BSD FRML MDRD: 20 ML/MIN/1.73SQ M
GFR SERPL CREATININE-BSD FRML MDRD: 22 ML/MIN/1.73SQ M
GFR SERPL CREATININE-BSD FRML MDRD: 22 ML/MIN/1.73SQ M
GLUCOSE SERPL-MCNC: 133 MG/DL (ref 65–140)
GLUCOSE SERPL-MCNC: 143 MG/DL (ref 65–140)
GLUCOSE SERPL-MCNC: 159 MG/DL (ref 65–140)
GLUCOSE SERPL-MCNC: 161 MG/DL (ref 65–140)
GLUCOSE SERPL-MCNC: 164 MG/DL (ref 65–140)
GLUCOSE SERPL-MCNC: 165 MG/DL (ref 65–140)
GLUCOSE SERPL-MCNC: 166 MG/DL (ref 65–140)
GLUCOSE SERPL-MCNC: 168 MG/DL (ref 65–140)
GLUCOSE SERPL-MCNC: 170 MG/DL (ref 65–140)
GLUCOSE SERPL-MCNC: 177 MG/DL (ref 65–140)
GLUCOSE SERPL-MCNC: 180 MG/DL (ref 65–140)
GLUCOSE SERPL-MCNC: 196 MG/DL (ref 65–140)
GLUCOSE SERPL-MCNC: 197 MG/DL (ref 65–140)
GLUCOSE SERPL-MCNC: 210 MG/DL (ref 65–140)
GLUCOSE SERPL-MCNC: 240 MG/DL (ref 65–140)
GLUCOSE SERPL-MCNC: 240 MG/DL (ref 65–140)
HBA1C MFR BLD: 5.9 %
HCO3 BLDA-SCNC: 16.3 MMOL/L (ref 24–30)
HCO3 BLDA-SCNC: 18.2 MMOL/L (ref 22–28)
HCO3 BLDA-SCNC: 19.1 MMOL/L (ref 22–28)
HCO3 BLDA-SCNC: 19.9 MMOL/L (ref 24–30)
HCO3 BLDA-SCNC: 20 MMOL/L (ref 22–28)
HCO3 BLDA-SCNC: 20.1 MMOL/L (ref 22–28)
HCO3 BLDA-SCNC: 21.1 MMOL/L (ref 22–28)
HCO3 BLDA-SCNC: 22.6 MMOL/L (ref 22–28)
HCO3 BLDV-SCNC: 15 MMOL/L (ref 24–30)
HCO3 BLDV-SCNC: 17.8 MMOL/L (ref 24–30)
HCO3 BLDV-SCNC: 18.4 MMOL/L (ref 24–30)
HCO3 BLDV-SCNC: 18.8 MMOL/L (ref 24–30)
HCT VFR BLD AUTO: 26.1 % (ref 36.5–49.3)
HCT VFR BLD AUTO: 31.2 % (ref 36.5–49.3)
HCT VFR BLD CALC: 23 % (ref 36.5–49.3)
HCT VFR BLD CALC: 23 % (ref 36.5–49.3)
HCT VFR BLD CALC: 28 % (ref 36.5–49.3)
HCT VFR BLD CALC: 32 % (ref 36.5–49.3)
HGB BLD-MCNC: 10.7 G/DL (ref 12–17)
HGB BLD-MCNC: 9.1 G/DL (ref 12–17)
HGB BLDA-MCNC: 10.9 G/DL (ref 12–17)
HGB BLDA-MCNC: 7.8 G/DL (ref 12–17)
HGB BLDA-MCNC: 7.8 G/DL (ref 12–17)
HGB BLDA-MCNC: 9.5 G/DL (ref 12–17)
HOROWITZ INDEX BLDA+IHG-RTO: 100 MM[HG]
HOROWITZ INDEX BLDA+IHG-RTO: 50 MM[HG]
INR PPP: 2.42 (ref 0.84–1.19)
KCT BLD-ACNC: 169 SEC (ref 89–137)
KCT BLD-ACNC: 238 SEC (ref 89–137)
KCT BLD-ACNC: 341 SEC (ref 89–137)
LACTATE SERPL-SCNC: 1.5 MMOL/L (ref 0.5–2)
LACTATE SERPL-SCNC: 2 MMOL/L (ref 0.5–2)
LACTATE SERPL-SCNC: 2.1 MMOL/L (ref 0.5–2)
LACTATE SERPL-SCNC: 2.3 MMOL/L (ref 0.5–2)
LACTATE SERPL-SCNC: 2.8 MMOL/L (ref 0.5–2)
MAGNESIUM SERPL-MCNC: 3.4 MG/DL (ref 1.6–2.6)
MCH RBC QN AUTO: 31.7 PG (ref 26.8–34.3)
MCH RBC QN AUTO: 32 PG (ref 26.8–34.3)
MCHC RBC AUTO-ENTMCNC: 34.3 G/DL (ref 31.4–37.4)
MCHC RBC AUTO-ENTMCNC: 34.9 G/DL (ref 31.4–37.4)
MCV RBC AUTO: 92 FL (ref 82–98)
MCV RBC AUTO: 92 FL (ref 82–98)
NASAL CANNULA: 2
NON VENT ROOM AIR: 21 %
NRBC BLD AUTO-RTO: 3 /100 WBCS
O2 CT BLDA-SCNC: 15.5 ML/DL (ref 16–23)
O2 CT BLDA-SCNC: 15.6 ML/DL (ref 16–23)
O2 CT BLDA-SCNC: 16 ML/DL (ref 16–23)
O2 CT BLDA-SCNC: 16 ML/DL (ref 16–23)
O2 CT BLDV-SCNC: 14.6 ML/DL
O2 CT BLDV-SCNC: 7.3 ML/DL
O2 CT BLDV-SCNC: 8.4 ML/DL
O2 CT BLDV-SCNC: 8.8 ML/DL
OXYHGB MFR BLDA: 95.6 % (ref 94–97)
OXYHGB MFR BLDA: 98.8 % (ref 94–97)
OXYHGB MFR BLDA: 98.9 % (ref 94–97)
OXYHGB MFR BLDA: 98.9 % (ref 94–97)
PCO2 BLD: 17 MMOL/L (ref 21–32)
PCO2 BLD: 21 MMOL/L (ref 21–32)
PCO2 BLD: 22 MMOL/L (ref 21–32)
PCO2 BLD: 23 MM HG (ref 42–50)
PCO2 BLD: 24 MMOL/L (ref 21–32)
PCO2 BLD: 36.8 MM HG (ref 36–44)
PCO2 BLD: 37.4 MM HG (ref 36–44)
PCO2 BLD: 39.1 MM HG (ref 42–50)
PCO2 BLDA: 27.8 MM HG (ref 36–44)
PCO2 BLDA: 30.9 MM HG (ref 36–44)
PCO2 BLDA: 35.3 MM HG (ref 36–44)
PCO2 BLDA: 36.9 MM HG (ref 36–44)
PCO2 BLDV: 16.6 MM HG (ref 42–50)
PCO2 BLDV: 33.6 MM HG (ref 42–50)
PCO2 BLDV: 36 MM HG (ref 42–50)
PCO2 BLDV: 36.8 MM HG (ref 42–50)
PEEP RESPIRATORY: 6 CM[H2O]
PH BLD: 7.32 [PH] (ref 7.3–7.4)
PH BLD: 7.36 [PH] (ref 7.35–7.45)
PH BLD: 7.4 [PH] (ref 7.35–7.45)
PH BLD: 7.46 [PH] (ref 7.3–7.4)
PH BLDA: 7.36 [PH] (ref 7.35–7.45)
PH BLDA: 7.37 [PH] (ref 7.35–7.45)
PH BLDA: 7.41 [PH] (ref 7.35–7.45)
PH BLDA: 7.43 [PH] (ref 7.35–7.45)
PH BLDV: 7.31 [PH] (ref 7.3–7.4)
PH BLDV: 7.33 [PH] (ref 7.3–7.4)
PH BLDV: 7.36 [PH] (ref 7.3–7.4)
PH BLDV: 7.57 [PH] (ref 7.3–7.4)
PHOSPHATE SERPL-MCNC: 6 MG/DL (ref 2.3–4.1)
PLATELET # BLD AUTO: 141 THOUSANDS/UL (ref 149–390)
PLATELET # BLD AUTO: 152 THOUSANDS/UL (ref 149–390)
PMV BLD AUTO: 11.3 FL (ref 8.9–12.7)
PMV BLD AUTO: 11.5 FL (ref 8.9–12.7)
PO2 BLD: 62 MM HG (ref 35–45)
PO2 BLD: 95 MM HG (ref 35–45)
PO2 BLD: >400 MM HG (ref 75–129)
PO2 BLD: >400 MM HG (ref 75–129)
PO2 BLDA: 347 MM HG (ref 75–129)
PO2 BLDA: 400 MM HG (ref 75–129)
PO2 BLDA: 401.7 MM HG (ref 75–129)
PO2 BLDA: 93.3 MM HG (ref 75–129)
PO2 BLDV: 310.9 MM HG (ref 35–45)
PO2 BLDV: 32 MM HG (ref 35–45)
PO2 BLDV: 33.8 MM HG (ref 35–45)
PO2 BLDV: 35.2 MM HG (ref 35–45)
POTASSIUM BLD-SCNC: 3.5 MMOL/L (ref 3.5–5.3)
POTASSIUM BLD-SCNC: 3.7 MMOL/L (ref 3.5–5.3)
POTASSIUM SERPL-SCNC: 3.5 MMOL/L (ref 3.5–5.3)
POTASSIUM SERPL-SCNC: 3.8 MMOL/L (ref 3.5–5.3)
POTASSIUM SERPL-SCNC: 3.9 MMOL/L (ref 3.5–5.3)
POTASSIUM SERPL-SCNC: 3.9 MMOL/L (ref 3.5–5.3)
POTASSIUM SERPL-SCNC: 4.2 MMOL/L (ref 3.5–5.3)
PROCALCITONIN SERPL-MCNC: 2.16 NG/ML
PROT SERPL-MCNC: 5 G/DL (ref 6.4–8.2)
PROT SERPL-MCNC: 6.3 G/DL (ref 6.4–8.2)
PROT SERPL-MCNC: 6.4 G/DL (ref 6.4–8.2)
PROTHROMBIN TIME: 25.1 SECONDS (ref 11.6–14.5)
RBC # BLD AUTO: 2.84 MILLION/UL (ref 3.88–5.62)
RBC # BLD AUTO: 3.38 MILLION/UL (ref 3.88–5.62)
SAO2 % BLD FROM PO2: 89 % (ref 60–85)
SAO2 % BLD FROM PO2: 98 % (ref 60–85)
SODIUM BLD-SCNC: 135 MMOL/L (ref 136–145)
SODIUM BLD-SCNC: 136 MMOL/L (ref 136–145)
SODIUM BLD-SCNC: 137 MMOL/L (ref 136–145)
SODIUM BLD-SCNC: 138 MMOL/L (ref 136–145)
SODIUM SERPL-SCNC: 134 MMOL/L (ref 136–145)
SODIUM SERPL-SCNC: 135 MMOL/L (ref 136–145)
SODIUM SERPL-SCNC: 138 MMOL/L (ref 136–145)
SODIUM SERPL-SCNC: 140 MMOL/L (ref 136–145)
SODIUM SERPL-SCNC: 142 MMOL/L (ref 136–145)
SPECIMEN SOURCE: ABNORMAL
VENT AC: 14
VENT- AC: AC
VT SETTING VENT: 400 ML
VT SETTING VENT: 450 ML
WBC # BLD AUTO: 14.61 THOUSAND/UL (ref 4.31–10.16)
WBC # BLD AUTO: 14.68 THOUSAND/UL (ref 4.31–10.16)

## 2021-12-31 PROCEDURE — 80076 HEPATIC FUNCTION PANEL: CPT | Performed by: ANESTHESIOLOGY

## 2021-12-31 PROCEDURE — C1894 INTRO/SHEATH, NON-LASER: HCPCS | Performed by: THORACIC SURGERY (CARDIOTHORACIC VASCULAR SURGERY)

## 2021-12-31 PROCEDURE — 85347 COAGULATION TIME ACTIVATED: CPT

## 2021-12-31 PROCEDURE — 99292 CRITICAL CARE ADDL 30 MIN: CPT | Performed by: ANESTHESIOLOGY

## 2021-12-31 PROCEDURE — 83605 ASSAY OF LACTIC ACID: CPT | Performed by: NURSE PRACTITIONER

## 2021-12-31 PROCEDURE — 99233 SBSQ HOSP IP/OBS HIGH 50: CPT | Performed by: ANESTHESIOLOGY

## 2021-12-31 PROCEDURE — 84145 PROCALCITONIN (PCT): CPT | Performed by: EMERGENCY MEDICINE

## 2021-12-31 PROCEDURE — 93005 ELECTROCARDIOGRAM TRACING: CPT

## 2021-12-31 PROCEDURE — 33948 ECMO/ECLS DAILY MGMT-VENOUS: CPT | Performed by: ANESTHESIOLOGY

## 2021-12-31 PROCEDURE — 82948 REAGENT STRIP/BLOOD GLUCOSE: CPT

## 2021-12-31 PROCEDURE — 99291 CRITICAL CARE FIRST HOUR: CPT | Performed by: NURSE PRACTITIONER

## 2021-12-31 PROCEDURE — 82805 BLOOD GASES W/O2 SATURATION: CPT | Performed by: NURSE PRACTITIONER

## 2021-12-31 PROCEDURE — 85730 THROMBOPLASTIN TIME PARTIAL: CPT | Performed by: NURSE PRACTITIONER

## 2021-12-31 PROCEDURE — 80053 COMPREHEN METABOLIC PANEL: CPT | Performed by: NURSE PRACTITIONER

## 2021-12-31 PROCEDURE — 82330 ASSAY OF CALCIUM: CPT | Performed by: NURSE PRACTITIONER

## 2021-12-31 PROCEDURE — 30233N1 TRANSFUSION OF NONAUTOLOGOUS RED BLOOD CELLS INTO PERIPHERAL VEIN, PERCUTANEOUS APPROACH: ICD-10-PCS | Performed by: THORACIC SURGERY (CARDIOTHORACIC VASCULAR SURGERY)

## 2021-12-31 PROCEDURE — C1769 GUIDE WIRE: HCPCS | Performed by: THORACIC SURGERY (CARDIOTHORACIC VASCULAR SURGERY)

## 2021-12-31 PROCEDURE — 83605 ASSAY OF LACTIC ACID: CPT | Performed by: ANESTHESIOLOGY

## 2021-12-31 PROCEDURE — 82805 BLOOD GASES W/O2 SATURATION: CPT | Performed by: ANESTHESIOLOGY

## 2021-12-31 PROCEDURE — 82805 BLOOD GASES W/O2 SATURATION: CPT | Performed by: PHYSICIAN ASSISTANT

## 2021-12-31 PROCEDURE — 85384 FIBRINOGEN ACTIVITY: CPT | Performed by: NURSE PRACTITIONER

## 2021-12-31 PROCEDURE — C1760 CLOSURE DEV, VASC: HCPCS | Performed by: THORACIC SURGERY (CARDIOTHORACIC VASCULAR SURGERY)

## 2021-12-31 PROCEDURE — 84132 ASSAY OF SERUM POTASSIUM: CPT

## 2021-12-31 PROCEDURE — 93355 ECHO TRANSESOPHAGEAL (TEE): CPT

## 2021-12-31 PROCEDURE — 94760 N-INVAS EAR/PLS OXIMETRY 1: CPT

## 2021-12-31 PROCEDURE — 5A1522G EXTRACORPOREAL OXYGENATION, MEMBRANE, PERIPHERAL VENO-ARTERIAL: ICD-10-PCS | Performed by: THORACIC SURGERY (CARDIOTHORACIC VASCULAR SURGERY)

## 2021-12-31 PROCEDURE — 30233M1 TRANSFUSION OF NONAUTOLOGOUS PLASMA CRYOPRECIPITATE INTO PERIPHERAL VEIN, PERCUTANEOUS APPROACH: ICD-10-PCS | Performed by: THORACIC SURGERY (CARDIOTHORACIC VASCULAR SURGERY)

## 2021-12-31 PROCEDURE — 33947 ECMO/ECLS INITIATION ARTERY: CPT | Performed by: THORACIC SURGERY (CARDIOTHORACIC VASCULAR SURGERY)

## 2021-12-31 PROCEDURE — 82330 ASSAY OF CALCIUM: CPT

## 2021-12-31 PROCEDURE — 80048 BASIC METABOLIC PNL TOTAL CA: CPT | Performed by: PHYSICIAN ASSISTANT

## 2021-12-31 PROCEDURE — 85730 THROMBOPLASTIN TIME PARTIAL: CPT | Performed by: PHYSICIAN ASSISTANT

## 2021-12-31 PROCEDURE — 71045 X-RAY EXAM CHEST 1 VIEW: CPT

## 2021-12-31 PROCEDURE — NC001 PR NO CHARGE: Performed by: NURSE PRACTITIONER

## 2021-12-31 PROCEDURE — 99252 IP/OBS CONSLTJ NEW/EST SF 35: CPT | Performed by: INTERNAL MEDICINE

## 2021-12-31 PROCEDURE — 99231 SBSQ HOSP IP/OBS SF/LOW 25: CPT | Performed by: THORACIC SURGERY (CARDIOTHORACIC VASCULAR SURGERY)

## 2021-12-31 PROCEDURE — 80143 DRUG ASSAY ACETAMINOPHEN: CPT | Performed by: ANESTHESIOLOGY

## 2021-12-31 PROCEDURE — 83735 ASSAY OF MAGNESIUM: CPT | Performed by: PHYSICIAN ASSISTANT

## 2021-12-31 PROCEDURE — 02UM08Z SUPPLEMENT VENTRICULAR SEPTUM WITH ZOOPLASTIC TISSUE, OPEN APPROACH: ICD-10-PCS | Performed by: THORACIC SURGERY (CARDIOTHORACIC VASCULAR SURGERY)

## 2021-12-31 PROCEDURE — 83036 HEMOGLOBIN GLYCOSYLATED A1C: CPT | Performed by: PHYSICIAN ASSISTANT

## 2021-12-31 PROCEDURE — 33952 ECMO/ECLS INSJ PRPH CANNULA: CPT | Performed by: PHYSICIAN ASSISTANT

## 2021-12-31 PROCEDURE — 33947 ECMO/ECLS INITIATION ARTERY: CPT | Performed by: PHYSICIAN ASSISTANT

## 2021-12-31 PROCEDURE — 76000 FLUOROSCOPY <1 HR PHYS/QHP: CPT

## 2021-12-31 PROCEDURE — 85027 COMPLETE CBC AUTOMATED: CPT | Performed by: PHYSICIAN ASSISTANT

## 2021-12-31 PROCEDURE — 5A1223Z PERFORMANCE OF CARDIAC PACING, CONTINUOUS: ICD-10-PCS | Performed by: THORACIC SURGERY (CARDIOTHORACIC VASCULAR SURGERY)

## 2021-12-31 PROCEDURE — 85610 PROTHROMBIN TIME: CPT | Performed by: NURSE PRACTITIONER

## 2021-12-31 PROCEDURE — 80048 BASIC METABOLIC PNL TOTAL CA: CPT | Performed by: NURSE PRACTITIONER

## 2021-12-31 PROCEDURE — 0T9B70Z DRAINAGE OF BLADDER WITH DRAINAGE DEVICE, VIA NATURAL OR ARTIFICIAL OPENING: ICD-10-PCS | Performed by: THORACIC SURGERY (CARDIOTHORACIC VASCULAR SURGERY)

## 2021-12-31 PROCEDURE — 82947 ASSAY GLUCOSE BLOOD QUANT: CPT

## 2021-12-31 PROCEDURE — 83605 ASSAY OF LACTIC ACID: CPT | Performed by: PHYSICIAN ASSISTANT

## 2021-12-31 PROCEDURE — 33952 ECMO/ECLS INSJ PRPH CANNULA: CPT | Performed by: THORACIC SURGERY (CARDIOTHORACIC VASCULAR SURGERY)

## 2021-12-31 PROCEDURE — 84100 ASSAY OF PHOSPHORUS: CPT | Performed by: PHYSICIAN ASSISTANT

## 2021-12-31 PROCEDURE — 85027 COMPLETE CBC AUTOMATED: CPT | Performed by: NURSE PRACTITIONER

## 2021-12-31 PROCEDURE — 85730 THROMBOPLASTIN TIME PARTIAL: CPT | Performed by: ANESTHESIOLOGY

## 2021-12-31 PROCEDURE — 30233K1 TRANSFUSION OF NONAUTOLOGOUS FROZEN PLASMA INTO PERIPHERAL VEIN, PERCUTANEOUS APPROACH: ICD-10-PCS | Performed by: THORACIC SURGERY (CARDIOTHORACIC VASCULAR SURGERY)

## 2021-12-31 PROCEDURE — 84295 ASSAY OF SERUM SODIUM: CPT

## 2021-12-31 PROCEDURE — 85014 HEMATOCRIT: CPT

## 2021-12-31 PROCEDURE — 94002 VENT MGMT INPAT INIT DAY: CPT

## 2021-12-31 PROCEDURE — 82330 ASSAY OF CALCIUM: CPT | Performed by: PHYSICIAN ASSISTANT

## 2021-12-31 PROCEDURE — 5A1221Z PERFORMANCE OF CARDIAC OUTPUT, CONTINUOUS: ICD-10-PCS | Performed by: THORACIC SURGERY (CARDIOTHORACIC VASCULAR SURGERY)

## 2021-12-31 PROCEDURE — 82803 BLOOD GASES ANY COMBINATION: CPT

## 2021-12-31 PROCEDURE — 30233R1 TRANSFUSION OF NONAUTOLOGOUS PLATELETS INTO PERIPHERAL VEIN, PERCUTANEOUS APPROACH: ICD-10-PCS | Performed by: THORACIC SURGERY (CARDIOTHORACIC VASCULAR SURGERY)

## 2021-12-31 RX ORDER — ALBUMIN, HUMAN INJ 5% 5 %
12.5 SOLUTION INTRAVENOUS ONCE
Status: COMPLETED | OUTPATIENT
Start: 2021-12-31 | End: 2021-12-31

## 2021-12-31 RX ORDER — CALCIUM CHLORIDE 100 MG/ML
INJECTION INTRAVENOUS; INTRAVENTRICULAR AS NEEDED
Status: DISCONTINUED | OUTPATIENT
Start: 2021-12-31 | End: 2021-12-31

## 2021-12-31 RX ORDER — CALCIUM GLUCONATE 20 MG/ML
2 INJECTION, SOLUTION INTRAVENOUS ONCE
Status: COMPLETED | OUTPATIENT
Start: 2021-12-31 | End: 2021-12-31

## 2021-12-31 RX ORDER — EPHEDRINE SULFATE 50 MG/ML
INJECTION INTRAVENOUS AS NEEDED
Status: DISCONTINUED | OUTPATIENT
Start: 2021-12-31 | End: 2021-12-31

## 2021-12-31 RX ORDER — FENTANYL CITRATE 50 UG/ML
INJECTION, SOLUTION INTRAMUSCULAR; INTRAVENOUS AS NEEDED
Status: DISCONTINUED | OUTPATIENT
Start: 2021-12-31 | End: 2021-12-31

## 2021-12-31 RX ORDER — ALBUMIN, HUMAN INJ 5% 5 %
SOLUTION INTRAVENOUS
Status: COMPLETED
Start: 2021-12-31 | End: 2021-12-31

## 2021-12-31 RX ORDER — POTASSIUM CHLORIDE 29.8 MG/ML
40 INJECTION INTRAVENOUS ONCE
Status: COMPLETED | OUTPATIENT
Start: 2021-12-31 | End: 2021-12-31

## 2021-12-31 RX ORDER — POLYETHYLENE GLYCOL 3350 17 G/17G
17 POWDER, FOR SOLUTION ORAL DAILY
Status: DISCONTINUED | OUTPATIENT
Start: 2022-01-01 | End: 2022-01-04 | Stop reason: HOSPADM

## 2021-12-31 RX ORDER — HEPARIN SODIUM 10000 [USP'U]/100ML
3-15 INJECTION, SOLUTION INTRAVENOUS
Status: DISCONTINUED | OUTPATIENT
Start: 2021-12-31 | End: 2022-01-04 | Stop reason: HOSPADM

## 2021-12-31 RX ORDER — POTASSIUM CHLORIDE 14.9 MG/ML
20 INJECTION INTRAVENOUS ONCE
Status: COMPLETED | OUTPATIENT
Start: 2021-12-31 | End: 2021-12-31

## 2021-12-31 RX ORDER — PROPOFOL 10 MG/ML
5-50 INJECTION, EMULSION INTRAVENOUS
Status: DISCONTINUED | OUTPATIENT
Start: 2021-12-31 | End: 2022-01-02

## 2021-12-31 RX ORDER — CEFAZOLIN SODIUM 2 G/50ML
2000 SOLUTION INTRAVENOUS ONCE
Status: DISCONTINUED | OUTPATIENT
Start: 2021-12-31 | End: 2021-12-31 | Stop reason: HOSPADM

## 2021-12-31 RX ORDER — ROCURONIUM BROMIDE 10 MG/ML
INJECTION, SOLUTION INTRAVENOUS AS NEEDED
Status: DISCONTINUED | OUTPATIENT
Start: 2021-12-31 | End: 2021-12-31

## 2021-12-31 RX ORDER — CEFAZOLIN SODIUM 1 G/3ML
INJECTION, POWDER, FOR SOLUTION INTRAMUSCULAR; INTRAVENOUS AS NEEDED
Status: DISCONTINUED | OUTPATIENT
Start: 2021-12-31 | End: 2021-12-31

## 2021-12-31 RX ORDER — HEPARIN SODIUM 1000 [USP'U]/ML
10000 INJECTION, SOLUTION INTRAVENOUS; SUBCUTANEOUS ONCE
Status: DISCONTINUED | OUTPATIENT
Start: 2021-12-31 | End: 2021-12-31 | Stop reason: HOSPADM

## 2021-12-31 RX ORDER — POTASSIUM CHLORIDE 14.9 MG/ML
20 INJECTION INTRAVENOUS ONCE
Status: DISCONTINUED | OUTPATIENT
Start: 2021-12-31 | End: 2021-12-31

## 2021-12-31 RX ORDER — SODIUM CHLORIDE, SODIUM LACTATE, POTASSIUM CHLORIDE, CALCIUM CHLORIDE 600; 310; 30; 20 MG/100ML; MG/100ML; MG/100ML; MG/100ML
INJECTION, SOLUTION INTRAVENOUS CONTINUOUS PRN
Status: DISCONTINUED | OUTPATIENT
Start: 2021-12-31 | End: 2021-12-31

## 2021-12-31 RX ORDER — CEFAZOLIN SODIUM 2 G/50ML
2000 SOLUTION INTRAVENOUS EVERY 8 HOURS
Status: ACTIVE | OUTPATIENT
Start: 2021-12-31 | End: 2021-12-31

## 2021-12-31 RX ORDER — HEPARIN SODIUM 1000 [USP'U]/ML
INJECTION, SOLUTION INTRAVENOUS; SUBCUTANEOUS AS NEEDED
Status: DISCONTINUED | OUTPATIENT
Start: 2021-12-31 | End: 2021-12-31

## 2021-12-31 RX ORDER — MILRINONE LACTATE 0.2 MG/ML
INJECTION, SOLUTION INTRAVENOUS CONTINUOUS PRN
Status: DISCONTINUED | OUTPATIENT
Start: 2021-12-31 | End: 2021-12-31

## 2021-12-31 RX ORDER — SENNOSIDES 8.8 MG/5ML
8.8 LIQUID ORAL 2 TIMES DAILY
Status: DISCONTINUED | OUTPATIENT
Start: 2022-01-01 | End: 2022-01-04 | Stop reason: HOSPADM

## 2021-12-31 RX ORDER — SUCCINYLCHOLINE/SOD CL,ISO/PF 100 MG/5ML
SYRINGE (ML) INTRAVENOUS AS NEEDED
Status: DISCONTINUED | OUTPATIENT
Start: 2021-12-31 | End: 2021-12-31

## 2021-12-31 RX ORDER — HEPARIN SODIUM 1000 [USP'U]/ML
400 INJECTION, SOLUTION INTRAVENOUS; SUBCUTANEOUS ONCE
Status: DISCONTINUED | OUTPATIENT
Start: 2021-12-31 | End: 2021-12-31 | Stop reason: HOSPADM

## 2021-12-31 RX ORDER — PROPOFOL 10 MG/ML
INJECTION, EMULSION INTRAVENOUS AS NEEDED
Status: DISCONTINUED | OUTPATIENT
Start: 2021-12-31 | End: 2021-12-31

## 2021-12-31 RX ORDER — CHLORHEXIDINE GLUCONATE 0.12 MG/ML
15 RINSE ORAL EVERY 12 HOURS SCHEDULED
Status: DISCONTINUED | OUTPATIENT
Start: 2021-12-31 | End: 2022-01-04 | Stop reason: HOSPADM

## 2021-12-31 RX ORDER — MIDAZOLAM HYDROCHLORIDE 2 MG/2ML
INJECTION, SOLUTION INTRAMUSCULAR; INTRAVENOUS AS NEEDED
Status: DISCONTINUED | OUTPATIENT
Start: 2021-12-31 | End: 2021-12-31

## 2021-12-31 RX ORDER — FENTANYL CITRATE 50 UG/ML
50 INJECTION, SOLUTION INTRAMUSCULAR; INTRAVENOUS EVERY 2 HOUR PRN
Status: DISCONTINUED | OUTPATIENT
Start: 2021-12-31 | End: 2022-01-02

## 2021-12-31 RX ORDER — MAGNESIUM HYDROXIDE 1200 MG/15ML
LIQUID ORAL AS NEEDED
Status: DISCONTINUED | OUTPATIENT
Start: 2021-12-31 | End: 2021-12-31 | Stop reason: HOSPADM

## 2021-12-31 RX ORDER — ONDANSETRON 2 MG/ML
4 INJECTION INTRAMUSCULAR; INTRAVENOUS EVERY 6 HOURS PRN
Status: DISCONTINUED | OUTPATIENT
Start: 2021-12-31 | End: 2022-01-04 | Stop reason: HOSPADM

## 2021-12-31 RX ORDER — SODIUM CHLORIDE 9 MG/ML
INJECTION, SOLUTION INTRAVENOUS CONTINUOUS PRN
Status: DISCONTINUED | OUTPATIENT
Start: 2021-12-31 | End: 2021-12-31

## 2021-12-31 RX ORDER — METOPROLOL TARTRATE 5 MG/5ML
2.5 INJECTION INTRAVENOUS EVERY 4 HOURS PRN
Status: DISCONTINUED | OUTPATIENT
Start: 2021-12-31 | End: 2022-01-04 | Stop reason: HOSPADM

## 2021-12-31 RX ADMIN — HEPARIN SODIUM 8 UNITS/KG/HR: 10000 INJECTION, SOLUTION INTRAVENOUS at 15:32

## 2021-12-31 RX ADMIN — Medication 1 MG/HR: at 00:25

## 2021-12-31 RX ADMIN — METOPROLOL TARTRATE 2.5 MG: 5 INJECTION, SOLUTION INTRAVENOUS at 18:05

## 2021-12-31 RX ADMIN — MILRINONE LACTATE IN DEXTROSE 0.5 MCG/KG/MIN: 200 INJECTION, SOLUTION INTRAVENOUS at 21:30

## 2021-12-31 RX ADMIN — VASOPRESSIN 0.04 UNITS/MIN: 20 INJECTION INTRAVENOUS at 10:02

## 2021-12-31 RX ADMIN — HEPARIN SODIUM 17.1 UNITS/KG/HR: 10000 INJECTION, SOLUTION INTRAVENOUS at 05:11

## 2021-12-31 RX ADMIN — PROPOFOL 50 MG: 10 INJECTION, EMULSION INTRAVENOUS at 11:52

## 2021-12-31 RX ADMIN — EPINEPHRINE 2 MCG/MIN: 1 INJECTION, SOLUTION, CONCENTRATE INTRAVENOUS at 16:57

## 2021-12-31 RX ADMIN — CALCIUM GLUCONATE 2 G: 20 INJECTION, SOLUTION INTRAVENOUS at 14:28

## 2021-12-31 RX ADMIN — VASOPRESSIN 2 ML: 20 INJECTION INTRAVENOUS at 10:39

## 2021-12-31 RX ADMIN — Medication 1 MG/HR: at 05:08

## 2021-12-31 RX ADMIN — Medication 2 MCG/MIN: at 10:02

## 2021-12-31 RX ADMIN — EPHEDRINE SULFATE 5 MG: 50 INJECTION, SOLUTION INTRAVENOUS at 10:07

## 2021-12-31 RX ADMIN — CHLORHEXIDINE GLUCONATE 15 ML: 1.2 SOLUTION ORAL at 21:09

## 2021-12-31 RX ADMIN — HEPARIN SODIUM 5000 UNITS: 1000 INJECTION INTRAVENOUS; SUBCUTANEOUS at 10:59

## 2021-12-31 RX ADMIN — MILRINONE LACTATE IN DEXTROSE 0.5 MCG/KG/MIN: 200 INJECTION, SOLUTION INTRAVENOUS at 10:02

## 2021-12-31 RX ADMIN — ALBUMIN, HUMAN INJ 5% 12.5 G: 5 SOLUTION at 20:38

## 2021-12-31 RX ADMIN — POTASSIUM CHLORIDE 40 MEQ: 29.8 INJECTION, SOLUTION INTRAVENOUS at 15:23

## 2021-12-31 RX ADMIN — CALCIUM GLUCONATE 2 G: 20 INJECTION, SOLUTION INTRAVENOUS at 06:10

## 2021-12-31 RX ADMIN — HEPARIN SODIUM 3000 UNITS: 1000 INJECTION INTRAVENOUS; SUBCUTANEOUS at 11:15

## 2021-12-31 RX ADMIN — Medication 20 MG: at 17:19

## 2021-12-31 RX ADMIN — CEFAZOLIN 2000 MG: 1 INJECTION, POWDER, FOR SOLUTION INTRAMUSCULAR; INTRAVENOUS at 10:30

## 2021-12-31 RX ADMIN — VASOPRESSIN 0.04 UNITS/MIN: 20 INJECTION INTRAVENOUS at 00:21

## 2021-12-31 RX ADMIN — NOREPINEPHRINE BITARTRATE 2 MCG/MIN: 1 INJECTION, SOLUTION, CONCENTRATE INTRAVENOUS at 10:02

## 2021-12-31 RX ADMIN — ACETYLCYSTEINE 10000 MG: 200 INJECTION, SOLUTION INTRAVENOUS at 01:35

## 2021-12-31 RX ADMIN — VASOPRESSIN 0.04 UNITS/MIN: 20 INJECTION INTRAVENOUS at 18:00

## 2021-12-31 RX ADMIN — MILRINONE LACTATE IN DEXTROSE 0.38 MCG/KG/MIN: 200 INJECTION, SOLUTION INTRAVENOUS at 09:15

## 2021-12-31 RX ADMIN — PROPOFOL 50 MG: 10 INJECTION, EMULSION INTRAVENOUS at 12:12

## 2021-12-31 RX ADMIN — CALCIUM CHLORIDE 0.5 G: 100 INJECTION INTRAVENOUS; INTRAVENTRICULAR at 11:12

## 2021-12-31 RX ADMIN — POTASSIUM CHLORIDE 20 MEQ: 14.9 INJECTION, SOLUTION INTRAVENOUS at 03:27

## 2021-12-31 RX ADMIN — PROPOFOL 50 MCG/KG/MIN: 10 INJECTION, EMULSION INTRAVENOUS at 13:02

## 2021-12-31 RX ADMIN — CALCIUM GLUCONATE 2 G: 20 INJECTION, SOLUTION INTRAVENOUS at 08:32

## 2021-12-31 RX ADMIN — VASOPRESSIN 2 ML: 20 INJECTION INTRAVENOUS at 10:11

## 2021-12-31 RX ADMIN — MIDAZOLAM 4 MG: 1 INJECTION INTRAMUSCULAR; INTRAVENOUS at 11:52

## 2021-12-31 RX ADMIN — NICARDIPINE HYDROCHLORIDE 2 MG/HR: 2.5 INJECTION, SOLUTION INTRAVENOUS at 12:08

## 2021-12-31 RX ADMIN — MIDAZOLAM 6 MG: 1 INJECTION INTRAMUSCULAR; INTRAVENOUS at 10:08

## 2021-12-31 RX ADMIN — SODIUM CHLORIDE: 0.9 INJECTION, SOLUTION INTRAVENOUS at 10:36

## 2021-12-31 RX ADMIN — HEPARIN SODIUM 2000 UNITS: 1000 INJECTION INTRAVENOUS; SUBCUTANEOUS at 04:25

## 2021-12-31 RX ADMIN — ALBUMIN (HUMAN) 12.5 G: 12.5 INJECTION, SOLUTION INTRAVENOUS at 20:38

## 2021-12-31 RX ADMIN — CHLORHEXIDINE GLUCONATE 15 ML: 1.2 SOLUTION ORAL at 14:32

## 2021-12-31 RX ADMIN — SODIUM CHLORIDE 1 UNITS/HR: 9 INJECTION, SOLUTION INTRAVENOUS at 18:13

## 2021-12-31 RX ADMIN — FENTANYL CITRATE 500 MCG: 50 INJECTION INTRAMUSCULAR; INTRAVENOUS at 10:08

## 2021-12-31 RX ADMIN — PROPOFOL 50 MCG/KG/MIN: 10 INJECTION, EMULSION INTRAVENOUS at 14:53

## 2021-12-31 RX ADMIN — Medication 100 MG: at 10:08

## 2021-12-31 RX ADMIN — SODIUM CHLORIDE, SODIUM LACTATE, POTASSIUM CHLORIDE, AND CALCIUM CHLORIDE: .6; .31; .03; .02 INJECTION, SOLUTION INTRAVENOUS at 10:36

## 2021-12-31 RX ADMIN — PROPOFOL 15 MCG/KG/MIN: 10 INJECTION, EMULSION INTRAVENOUS at 21:09

## 2021-12-31 RX ADMIN — MILRINONE LACTATE IN DEXTROSE 0.5 MCG/KG/MIN: 200 INJECTION, SOLUTION INTRAVENOUS at 15:20

## 2021-12-31 RX ADMIN — PROPOFOL 50 MG: 10 INJECTION, EMULSION INTRAVENOUS at 10:08

## 2021-12-31 RX ADMIN — ROCURONIUM BROMIDE 100 MG: 50 INJECTION, SOLUTION INTRAVENOUS at 10:10

## 2021-12-31 RX ADMIN — CALCIUM CHLORIDE 0.5 G: 100 INJECTION INTRAVENOUS; INTRAVENTRICULAR at 10:07

## 2021-12-31 RX ADMIN — ALBUMIN (HUMAN) 12.5 G: 12.5 INJECTION, SOLUTION INTRAVENOUS at 16:14

## 2021-12-31 NOTE — ASSESSMENT & PLAN NOTE
· Presented to the ER after several days of lethargy, fatigue, nausea, and constipation  · Found to be in multi-system organ failure - renal failure, elevated LFTs, encephalopathy  · Cardiology and cardiac surgery consults appreciated  · Evaluated for VA ECMO and VSD repair, initial decision was made not to cannulate per next of kin, however family called again yesterday evening after reading notes in myChart had better understanding  They felt if he had a 50/50 chance of recovery they would like to try    Case discussed with CTSx and they will reevaluate him again this AM   · Milrinone 0 38 initially, weaned to 0 25 overnight for hypotension, trend SvO2  · Continue bumex infusion 1mg/hr  · Weaned off of levophed overnight, vaso 0 04 on hold this AM

## 2021-12-31 NOTE — QUICK NOTE
Called the family back  They seemed to have misunderstood the conversation with CTS earlier and would be interested in ECMO/surgery if it was still an option  Explained that we are currently treating him medically and optimizing him from a renal standpoint  Pt will be re-assessed tomorrow morning as to whether or not he is still an ECMO candidate  Addressed concerns and reassured them that we would call if any acute changes in his clinical condition       Samantha Lynch MD

## 2021-12-31 NOTE — ASSESSMENT & PLAN NOTE
Wt Readings from Last 3 Encounters:   12/30/21 93 kg (205 lb)   10/07/21 93 1 kg (205 lb 3 2 oz)   03/18/21 93 4 kg (206 lb)       · Cardiology consulted appreciate recommendations  · Heparin infusion  · Complicated by cardiogenic shock and VSD  · Will eventually need cardiac cath to evaluate for ischemic heart disease

## 2021-12-31 NOTE — PROCEDURES
Arterial Line Insertion    Date/Time: 12/30/2021 9:06 PM  Performed by: RT Federico  Authorized by: Diana Guallpa MD     Patient location:  ICU  Consent:     Consent obtained:  Verbal    Consent given by:  Patient  Indications:     Indications: hemodynamic monitoring and multiple ABGs    Pre-procedure details:     Skin preparation:  Chlorhexidine  Anesthesia (see MAR for exact dosages): Anesthesia method:  None  Procedure details:     Location / Tip of Catheter:  Radial    Laterality:  Left    Tico's test performed: yes      Tico's test abnormal: no      Needle gauge:  20 G    Placement technique:  Ultrasound guided    Ultrasound image availability:  Not saved    Sterile ultrasound techniques: Sterile gel and sterile probe covers were used      Number of attempts:  2    Transducer: waveform confirmed    Post-procedure details:     Post-procedure:  Sterile dressing applied and sutured    CMS:  Normal    Patient tolerance of procedure:   Tolerated well, no immediate complications  Comments:      A-Line placed at this time per Order

## 2021-12-31 NOTE — ANESTHESIA POSTPROCEDURE EVALUATION
Post-Op Assessment Note    CV Status:  Stable  Pain Score: 0      Airway: intubated       Comments: see intra-op quick note for details of ICU hand off  Reason for prolonged intubation > 24 hours:  Ett per ICU management, this is added to close chartReason for prolonged intubation > 48 hours:  Ett per ICU management, this is added to close chart      No complications documented      BP      Temp     Pulse    Resp     SpO2

## 2021-12-31 NOTE — PROGRESS NOTES
[address] [facility]  Progress Note Adam Dire 1955, 61 y o  male MRN: [id number]  Unit/Bed#: [facility] 418-01 Encounter: [id number]  Primary Care Provider: Sindy Alonso MD   Date and time admitted to hospital: 12/30/2021  9:40 AM    * Cardiogenic shock ([facility] )  Assessment & Plan  · Presented to the ER after several days of lethargy, fatigue, nausea, and constipation  · Found to be in multi-system organ failure - renal failure, elevated LFTs, encephalopathy  · Cardiology and cardiac surgery consults appreciated  · Evaluated for VA ECMO and VSD repair, initial decision was made not to cannulate per next of kin, however [facility]  They felt if he had a 50/50 chance of recovery they would like to try  Case discussed with CTSx and they will reevaluate him again this AM   · Milrinone 0 38 initially, weaned to 0 25 overnight for hypotension, trend SvO2  · Continue bumex infusion 1mg/hr  · Weaned off of levophed overnight, vaso 0 04 on hold this AM    VSD (ventricular septal defect)  Assessment & Plan  · Presumed MI over past 1-2 weeks causing VSD  · Family opted for medical management initially    CTSx to reevaluate this AM  · Palliative care consult    JUDE (acute kidney injury) ([facility] )  Assessment & Plan  · Secondary to cardiogenic shock  · Trend strict I/Os  · Bumex 1mg/hr with Q6 BMPs  · Improving urine output however worsening uremia, may require dialysis     Transaminitis  Assessment & Plan  · Secondary to cardiogenic shock  · Avoid hypotension and hepatotoxic medications  · Improving   · Trend daily    Agoraphobia with panic attacks  Assessment & Plan  · Xanax prn (home med)    Anemia  Assessment & Plan  · Likely dilutional in setting of poor UOP and volume overload  · No obvious source of bleeding  · Trend daily    Hyperglycemia  Assessment & Plan  · Hx of impaired fasting glucose, check A1C  · SSI    Acute systolic congestive heart failure (HCC)  Assessment & Plan  Wt Readings from Last 3 Encounters:   12/30/21 93 kg (205 lb)   10/07/21 93 1 kg (205 lb 3 2 oz)   03/18/21 93 4 kg (206 lb)       · Cardiology consulted appreciate recommendations  · Heparin infusion  · Complicated by cardiogenic shock and VSD  · Will eventually need cardiac cath to evaluate for ischemic heart disease      Metabolic acidosis  Assessment & Plan  · Multifactorial in setting of acute renal failure and lactic acidosis from shock state  · LA downtrending and improvement in UOP  · Respiratory compensation  · Continue to monitor     Tylenol ingestion  Assessment & Plan  · Tylenol level in ED 21  · Toxicology consulted- feel this is unintentional as patient was taking Q6 hours to medicate for what we now believe was an MI  Patient received NAC 21hr protocol  · Trend Q8 CMP, daily INR  · Appreciate recommendations      ----------------------------------------------------------------------------------------  HPI/24hr events:     61 YOM PMhx agoraphobia, impaired fasting glucose, presented with CP x 5 days on 12/30  He was found to be cool, hypoxic with an elevated troponin  He was evaluated by cardiology and initiated on an epi infusion for cardiogenic shock  TTE demonstrated VSD and CTSx was consulted for possible VA ECMO and repair  Long discussion with patient and family who initially opted for medical management  After further reading notes in myChart and family discussions they would like patient to be reevaluated for ECMO and possible repair  Overnight primacor weaned to 0 25 for hypotension and levo/vaso initiated    Levo and vaso weaned to off by this AM   Started on bumex 1mg/hr for low UOP    Patient appropriate for transfer out of the ICU today?: No  Disposition: Continue Critical Care   Code Status: Level 1 - Full Code  ---------------------------------------------------------------------------------------  SUBJECTIVE  "I'm just uncomfortable" Patient reports he feels exhausted, but does not feel like he can sleep, took some short naps overnight  Is unable to specify what exactly is bothering him  Review of Systems  Review of systems was reviewed and negative unless stated above in HPI/24-hour events   ---------------------------------------------------------------------------------------  OBJECTIVE    Vitals   Vitals:    21 0400 21 0500 21 0540 21 0600   BP: 96/63 (!) 86/56  101/56   BP Location: Right arm   Right arm   Pulse: (!) 120 (!) 117  (!) 120   Resp: (!) 34 (!) 35  (!) 32   Temp: 97 9 °F (36 6 °C) 97 7 °F (36 5 °C)  97 5 °F (36 4 °C)   TempSrc: Probe   Bladder   SpO2: 95% 95%  95%   Weight:   92 3 kg (203 lb 7 8 oz)    Height:         Temp (24hrs), Av 7 °F (36 5 °C), Min:95 9 °F (35 5 °C), Max:98 6 °F (37 °C)  Current: Temperature: 97 5 °F (36 4 °C)  Arterial Line BP: 65/48  Arterial Line MAP (mmHg): 55 mmHg    Respiratory:  SpO2: SpO2: 95 %  Nasal Cannula O2 Flow Rate (L/min): 2 L/min    Invasive/non-invasive ventilation settings   Respiratory  Report   Lab Data (Last 4 hours)    None         O2/Vent Data (Last 4 hours)    None                Physical Exam  Constitutional:       Appearance: He is ill-appearing  He is not diaphoretic  HENT:      Head: Normocephalic and atraumatic  Mouth/Throat:      Mouth: Mucous membranes are moist    Eyes:      Pupils: Pupils are equal, round, and reactive to light  Cardiovascular:      Rate and Rhythm: Regular rhythm  Tachycardia present  Heart sounds: Murmur heard  No friction rub  No gallop  Pulmonary:      Comments: Tachypnea  Diminished b/l breath sounds that are difficult to heart with loud murmur  Abdominal:      General: There is no distension  Palpations: Abdomen is soft  Tenderness:  There is no abdominal tenderness  Musculoskeletal:      Right lower leg: Edema present  Left lower leg: Edema present  Skin:     General: Skin is warm and dry  Neurological:      General: No focal deficit present  Mental Status: He is alert and oriented to person, place, and time  Psychiatric:         Mood and Affect: Affect is blunt        Comments: Oriented, but unable to describe situation that lead to admission or discuss why he is in the hospital             Laboratory and Diagnostics:  Results from last 7 days   Lab Units 12/31/21  0400 12/30/21  1007   WBC Thousand/uL 14 68* 17 22*   HEMOGLOBIN g/dL 10 7* 12 8   HEMATOCRIT % 31 2* 40 1   PLATELETS Thousands/uL 152 217   MONO PCT %  --  4     Results from last 7 days   Lab Units 12/31/21  0420 12/31/21  0411 12/31/21  0043 12/31/21  0036 12/30/21  1632 12/30/21  1007   SODIUM mmol/L  --  134*  --  135* 134* 130*   POTASSIUM mmol/L  --  3 9  --  3 9 4 7 4 6   CHLORIDE mmol/L  --  99*  --  98* 101 97*   CO2 mmol/L  --  17*  --  16* 16* 12*   ANION GAP mmol/L  --  18*  --  21* 17* 21*   BUN mg/dL  --  150*  --  148* 147* 141*   CREATININE mg/dL  --  3 29*  --  3 27* 3 53* 4 01*   CALCIUM mg/dL  --  7 5*  --  7 4* 8 2* 8 4   GLUCOSE RANDOM mg/dL  --  240*  --  240* 136 163*   ALT U/L 4,905*  --  5,427*  --  5,455* 5,669*   AST U/L 2,054*  --  2,371*  --  3,054* 3,128*   ALK PHOS U/L 148*  --  150*  --  145* 165*   ALBUMIN g/dL 2 4*  --  2 5*  --  2 9* 3 1*   TOTAL BILIRUBIN mg/dL 1 00  --  0 99  --  1 09* 1 27*     Results from last 7 days   Lab Units 12/31/21  0400 12/30/21  1632   MAGNESIUM mg/dL 3 4* 3 5*   PHOSPHORUS mg/dL 6 0* 9 0*      Results from last 7 days   Lab Units 12/31/21  0306 12/30/21 2006 12/30/21  1613 12/30/21  1007   INR   --   --   --  2 04*   PTT seconds 53* 34 28 28          Results from last 7 days   Lab Units 12/31/21  0036 12/30/21 2259 12/30/21  1613 12/30/21  1007   LACTIC ACID mmol/L 2 8* 2 5* 5 1* 11 1*     ABG:  Results from last 7 days   Lab Units 12/30/21 2108   PH ART  7 398   PCO2 ART mm Hg 24 3*   PO2 ART mm Hg 103 0   HCO3 ART mmol/L 14 6*   BASE EXC ART mmol/L -8 5   ABG SOURCE  Line, Arterial     VBG:  Results from last 7 days   Lab Units 12/31/21  0411 12/30/21 2138 12/30/21 2108   PH JANNET  7 326   < >  --    PCO2 JANNET mm Hg 36 8*   < >  --    PO2 JANNET mm Hg 32 0*   < >  --    HCO3 JANNET mmol/L 18 8*   < >  --    BASE EXC JANNET mmol/L -6 5   < >  --    ABG SOURCE   --   --  Line, Arterial    < > = values in this interval not displayed  Results from last 7 days   Lab Units 12/31/21  0422 12/30/21  1007   PROCALCITONIN ng/ml 2 16* 1 65*       Micro  Results from last 7 days   Lab Units 12/30/21  1129 12/30/21  1010   BLOOD CULTURE  Received in Microbiology Lab  Culture in Progress  Received in Microbiology Lab  Culture in Progress  EKG: no new  Imaging: no new    Intake and Output  I/O       12/29 0701  12/30 0700 12/30 0701  12/31 0700    P  O   360    I V  (mL/kg)  459 6 (4 9)    IV Piggyback  1350    Total Intake(mL/kg)  2169 6 (23 3)    Urine (mL/kg/hr)  2385    Total Output  2385    Net  -215 4                Height and Weights   Height: 5' 5 5" (166 4 cm)  IBW (Ideal Body Weight): 62 65 kg  Body mass index is 33 35 kg/m²  Weight (last 2 days)     Date/Time Weight    12/31/21 0540 92 3 (203 48)    12/30/21 1320 93 (205)    12/30/21 0959 93 (205)            Nutrition       Diet Orders   (From admission, onward)             Start     Ordered    12/30/21 1616  Diet Cardiovascular; Cardiac; Fluid Restriction 1800 ML  Diet effective now        References:    Nutrtion Support Algorithm Enteral vs  Parenteral   Question Answer Comment   Diet Type Cardiovascular    Cardiac Cardiac    Other Restriction(s): Fluid Restriction 1800 ML    RD to adjust diet per protocol?  No        12/30/21 1616                  Active Medications  Scheduled Meds:  Current Facility-Administered Medications   Medication Dose Route Frequency Provider Last Rate    acetylcysteine  100 mg/kg Intravenous Once Reynaldo Mckeon MD 87,099 mg (12/31/21 0135)    ALPRAZolam  0 5 mg Oral BID PRN Valentino Fordyce, PA-C      aspirin  81 mg Oral Daily Valentinorosey Rome PA-C      bumetanide  1 mg/hr Intravenous Continuous Ileene Cancel, PA-C 1 mg/hr (12/31/21 4611)    calcium gluconate  2 g Intravenous Once Ileene Cancel, PA-C 2 g (12/31/21 0610)    Followed by   Margarita Chaudhari calcium gluconate  2 g Intravenous Once Ileene Cancel, PA-C      heparin (porcine)  3-20 Units/kg/hr (Order-Specific) Intravenous Titrated Yuli Padilla MD 17 1 Units/kg/hr (12/31/21 0511)    heparin (porcine)  2,000 Units Intravenous Q1H PRN Yuli Padilla MD      heparin (porcine)  4,000 Units Intravenous Q1H PRN Yuli Padilla MD      insulin lispro  1-5 Units Subcutaneous TID AC Lisa Islas PA-C      milrinone [facility]) infusion  0 38 mcg/kg/min Intravenous Continuous Ileene Cancel, PA-C 0 38 mcg/kg/min (12/31/21 0453)    norepinephrine  1-30 mcg/min Intravenous Titrated Ileene Cancel, PA-C Stopped (12/31/21 0122)    sodium chloride (PF)  3 mL Intravenous Q1H PRN Yuli Padilla MD      vasopressin (PITRESSIN) in 0 9 % sodium chloride 100 mL  0 04 Units/min Intravenous Continuous Ileene Cancel, PA-C Stopped (12/31/21 0401)     Continuous Infusions:  bumetanide, 1 mg/hr, Last Rate: 1 mg/hr (12/31/21 0508)  heparin (porcine), 3-20 Units/kg/hr (Order-Specific), Last Rate: 17 1 Units/kg/hr (12/31/21 0511)  milrinone (PRIMACOR) infusion, 0 38 mcg/kg/min, Last Rate: 0 38 mcg/kg/min (12/31/21 0453)  norepinephrine, 1-30 mcg/min, Last Rate: Stopped (12/31/21 0122)  vasopressin (PITRESSIN) in 0 9 % sodium chloride 100 mL, 0 04 Units/min, Last Rate: Stopped (12/31/21 0401)      PRN Meds:   ALPRAZolam, 0 5 mg, BID PRN  heparin (porcine), 2,000 Units, Q1H PRN  heparin (porcine), 4,000 Units, Q1H PRN  sodium chloride (PF), 3 mL, Q1H PRN        Invasive Devices Review  Invasive Devices  Report Central Venous Catheter Line            CVC Central Lines 12/30/21 Triple Left Internal jugular <1 day          Peripheral Intravenous Line            Peripheral IV 12/30/21 Right Forearm 1 day          Arterial Line            Arterial Line 12/30/21 Radial <1 day          Drain            Urethral Catheter Temperature probe <1 day                Rationale for remaining devices: medications and strict UOP  ---------------------------------------------------------------------------------------  Advance Directive and Living Will:      Power of :    POLST:    ---------------------------------------------------------------------------------------  Care Time Delivered:   No Critical Care time spent       Rich Robles PA-C      Portions of the record may have been created with voice recognition software  Occasional wrong word or "sound a like" substitutions may have occurred due to the inherent limitations of voice recognition software    Read the chart carefully and recognize, using context, where substitutions have occurred

## 2021-12-31 NOTE — ASSESSMENT & PLAN NOTE
· Secondary to cardiogenic shock  · Trend strict I/Os  · Bumex 1mg/hr with Q6 BMPs  · Improving urine output however worsening uremia, may require dialysis

## 2021-12-31 NOTE — ANESTHESIA PROCEDURE NOTES
Procedure Performed: CORIE Anesthesia  Start Time:  12/31/2021 10:40 AM        Preanesthesia Checklist    Patient identified, IV assessed, risks and benefits discussed, monitors and equipment assessed, procedure being performed at surgeon's request and anesthesia consent obtained  Procedure    Diagnostic Indications for CORIE:  defect repair evaluation, hemodynamic monitoring and suspected pericardial effusion  Type of CORIE: complete CORIE with interpretation  Images Saved: ultrasound permanent image saved  Physician Requesting Echo: Aleksandar Guo MD  Location performed: OR  Intubated  Bite block not placed  Heart visualized  Insertion of CORIE Probe:  Atraumatic  Probe Type:  Multiplane  Modalities:  3D, color flow mapping and continuous wave Doppler  Echocardiographic and Doppler Measurements    PREPROCEDURE    LEFT VENTRICLE:  Systolic Function: moderately impaired  Ejection Fraction: 40%  Regional Wall Motion Abnormalities: anteroseptal (mid to apical) aneurysmal, thinned out, VSD, left to right cont flow  RIGHT VENTRICLE:  Systolic Function: moderately impaired  Cavity size moderately dilated  AORTIC VALVE:  Leaflets: normal  Stenosis: none  Regurgitation: none  MITRAL VALVE:  Leaflets: normal  Regurgitation: none  Stenosis: none  ASCENDING AORTA:  Dissection not present  AORTIC ARCH:  dissection not present  Grade 2: severe intimal thickening without protruding atheroma  DESCENDING AORTA:  Dissection: unclear; localized dissection with minimal extension in mid descending, doesnot appear to be plaque  VENTRICULAR SEPTUM:  Intra-ventricular septum morphology: ventricular septal defect  POSTPROCEDURE    LEFT VENTRICLE: Unchanged   RIGHT VENTRICLE: Unchanged   AORTIC VALVE:   Leaflets: not opening  MITRAL VALVE: Unchanged   ATRIA:     OTHER ATRIAL FINDINGS: Venous cannula tip in SVC  AORTA:   Dissection present  OTHER AORTA FINDINGS: AV not opening (on VA ECMO with LV ejecting across VSD into RV); echogenic smoke in aortic root  Gerry Triana REMOVAL:  Probe Removal: atraumatic

## 2021-12-31 NOTE — ASSESSMENT & PLAN NOTE
· Multifactorial in setting of acute renal failure and lactic acidosis from shock state  · LA downtrending and improvement in UOP  · Respiratory compensation  · Continue to monitor

## 2021-12-31 NOTE — ANESTHESIA PROCEDURE NOTES
Arterial Line Insertion  Performed by: Sun Edward MD  Authorized by: Sun Edward MD   Consent: Verbal consent obtained  Written consent obtained  Risks and benefits: risks, benefits and alternatives were discussed  Consent given by: patient  Patient understanding: patient states understanding of the procedure being performed  Patient consent: the patient's understanding of the procedure matches consent given  Required items: required blood products, implants, devices, and special equipment available  Patient identity confirmed: arm band  Preparation: Patient was prepped and draped in the usual sterile fashion  Indications: multiple ABGs and hemodynamic monitoring  Indications comments: right side chan on VA ECMO  Orientation:  Right  Location: brachial arteryMedication group details: ga  Procedure Details:  Needle gauge: 4 Fr    Seldinger technique: Seldinger technique used  Number of attempts: 1    Post-procedure:  Post-procedure: chlorhexidine patch applied and dressing applied  Waveform: good waveform and waveform confirmed  Patient tolerance: patient tolerated the procedure well with no immediate complications  Comments: Attempt right radial with ultrasound, very difficult given lack of pulsatile flow  Right brachial as above, with ultrasound, sterile technique

## 2021-12-31 NOTE — ASSESSMENT & PLAN NOTE
· Presumed MI over past 1-2 weeks causing VSD  · Family opted for medical management initially    CTSx to reevaluate this AM  · Palliative care consult

## 2021-12-31 NOTE — ANESTHESIA PREPROCEDURE EVALUATION
Procedure:  SUPPORT EXTRACORPOREAL MEMBRANE OXYGENATION (ECMO) (N/A Chest)  REPLACEMENT VALVE AORTIC (AVR) (N/A Chest)    Relevant Problems   CARDIO   (+) Acute systolic congestive heart failure (HCC)   (+) Hyperlipidemia, mixed      /RENAL   (+) JUDE (acute kidney injury) (HCC)      HEMATOLOGY   (+) Anemia      NEURO/PSYCH   (+) Agoraphobia with panic attacks      PULMONARY   (+) Pipe smoker             Anesthesia Plan  ASA Score- 5 Emergent    Anesthesia Type- general with ASA Monitors  Additional Monitors: pulmonary artery catheter, central venous line and arterial line  Airway Plan: ETT  Comment: emergent  Plan Factors-    Chart reviewed  Existing labs reviewed  Patient summary reviewed  Induction- intravenous      Postoperative Plan-     Informed Consent-

## 2021-12-31 NOTE — ASSESSMENT & PLAN NOTE
· Likely dilutional in setting of poor UOP and volume overload  · No obvious source of bleeding  · Trend daily

## 2021-12-31 NOTE — CONSULTS
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   2  Anion gap metabolic acidosis, initially with dual respiratory acidosis that is now more compensated   3  Acute renal insufficiency   4  Cardiogenic shock       RECOMMENDATIONS:  Please continue supportive care and routine medical management, along with the following specific recommendations regarding acetaminophen toxicity  The patient's presentation, including the acute liver injury, appears multifactorial  Patient has been taking large quantities of acetaminophen and OTC medications for several days to self-treat myalgias and generalized illness  APAP concentration was 21 ug/mL today at 10AM without any APAP taken today, indicating much higher concentration prior to that, given the serum half life of 2 5 hours  Therefore, please start N-acetylcysteine per 21 hour protocol, but order the third bag as continuous, while assessing Q8 hour CMP, daily INR (NAC DC criteria: ALT downtrending x2 with INR < 2)  Please repeat a VBG now to assure pH is rising above 7 3  I would generally recommend that a pH less than 7 3 should be transferred to a transplant center based on [facility]; however, pH is improving and his AST/ALT ratio provide further information  Since his ALT is markedly higher than AST, the degree of his liver injury may be compounded by hepatic congestion secondary to fluid overload   This pattern is also seen with improving liver injury after APAP overdose, but since APAP still detectable, this is much less likely the case  Additionally, please administer fomepizole 15 mg/kg once as recent evidence supports its use in inhibiting the production of toxic APAP metabolites  Although the patient has multiple components to his presentation, the elevated INR, creatinine and acidosis also are common in significant acetaminophen toxicity  However, given the patient's acidosis and difficulty in properly utilizing OTC medications, please also assess salicylate concentration and reach out if elevated  Cardiogenic shock and fluid overload are unlikely directly related to the acetaminophen toxicity, which was likely as secondary event that occurred while patient was trying to self-treat  Summary of recommendations:  Repeat VBG and CMP, ASA ccn at this time  NAC per protocol with third bag ordered as continuous  Fomepizole 15 mg/kg x 1    CMP Q8 hrs and daily INR to monitor for NAC DC criteria  Additional treatment per primary team              For further questions, please contact the medical  on call via [employer] or throughl the [employer] or [employer]  Please see additional teaching note below:    Medical Toxicology Teaching Note  [facility]  Acetaminophen Toxicity  Last revised October 2017     Acetaminophen (Tylenol) is a nonopiod analgesic and antipyretic medication found in many over-the-counter and prescription products such as Tylenol PM, Norco, Percocet, Nyquil, Vicks Formula 44-D  The recommended maximum daily dose of acetaminophen for adults is 3g/day, and 75-90mg/kg/day for children  Alcoholics may safely take Tylenol in therapeutic doses, but they may be at increased risk for hepatotoxicity in overdose  Mechanism of Toxicity: Acetaminophen is primarily metabolized by the liver  In therapeutic doses, about 90% of acetaminophen is conjugated to nontoxic metabolites (glucoronides and sulfates)   A small portion (<5%) is conjugated by cytochrome P450 enzyme, subunit CYP2E1, to a toxic metabolite, N-acetyl-p-benzoquinoneimine (NAPQI)  This metabolite is further conjugated by glutathione, to nontoxic metabolites eliminated by the kidneys  Liver Injury:  In toxic doses, the usual metabolic pathways are overwhelmed; acetaminophen is shunted to the cytochrome P450 pathway, creating NAPQI  Glutathione stores are depleted and NAPQI is produced  Cellular injury and hepatic necrosis may occur as NAPQI accumulates  Renal Injury:  Cytochrome P450 activity in the kidneys is thought to cause direct renal damage  Renal insufficiency may also develop during fulminant hepatic failure due to hepatorenal syndrome  Renal toxicity is usually associated with liver injury  Pharmacokinetics:  Acetaminophen is rapidly absorbed  Peak levels occur within  minutes with normal doses  Delayed absorption may occur with sustained release products or with co-ingestions that slow the GI tract (opiods, anticholinergics)  The elimination half-life is 1-3 hours after therapeutic doses and may extend to 12 hours after overdose  Toxic Dose:  Toxicity in adults may occur with acute ingestions of 7g, and 200mg/kg in children  Hepatic injury following chronic ingestions may occur at any dose above the daily recommended dose  Clinical Presentation:    Acute Ingestion: Within 8 hrs of an acute ingestion, there are usually few symptoms  Between 8-30 hours after a toxic, acute ingestion, a transaminitis will develop  Nausea, vomiting, and right upper quadrant pain may occur  Within 12-36 hours, worsening AST/ALT develops with elevated bilirubin and INR  The most severe cases will develop fulminant liver failure with hepatic encephalopathy and acidosis, usually within 3-7 days post overdose  The patient should be evaluated for a liver transplantation     Repeated Supra-therapeutic Ingestion: Due to a sub-acute course, patients may present anywhere along a spectrum - normal LFTS to asymptomatic elevation of enzymes to hepatic failure  Diagnosis   Acute Ingestion (Time of Ingestion Known): After an acute ingestion at a known time, obtain a 4-hour post-ingestion serum acetaminophen level and plot the level on the Rumalexandria-Eddies nomogram (see below)  This nomogram is used to predict the likelihood of hepatic toxicity based on the level of acetaminophen between 4 and 24 hours post-ingestion  The nomogram CANNOT be used if the time of ingestion is unknown  The dotted line (Rumack-Eddie line), marking a 4-hour level at 200 mcg/ml, is the original line developed from the study above which hepatic toxicity will probably occur  The solid line (Treatment Line), marking a 4-hour level at 150ug/ml  is the treatment line accepted as the standard of care in the [place] and is 25% lower as a safety margin  If the patients serum APAP level falls above the treatment line, start treatment with N-acetylcysteine (NAC)  (see Treatment below)           Acute Ingestion (Time of Ingestion Unknown) or Repeated Supra-therapeutic Ingestion An acetaminophen level CANNOT be plotted on the Rumack-Eddies nomogram  Draw an APAP level and AST/ALT at time of presentation  Anyone with an APAP level> 10mcg/ml OR elevated AST/ALT should start NAC  (see Treatment below)     TREATMENT   Emergency and Supportive Care: Treat nausea and vomiting to protect airway and support safe administration of charcoal and NAC, when indicated (see below)  Provide standard supportive care for liver and renal failure  Contact liver transplant team if fulminant hepatic failure occurs  Decontamination:  Administer activated charcoal within 2 hours of ingestion (consider later if extended release preparations)  Use antiemetics for nausea  Activated charcoal does bind to NAC, but the effect is not thought to be clinically significant  Gastric emptying is not recommended  Specific Drugs and Antidotes  Acute Ingestion Treat with NAC if the APAP level falls above the Treatment Line on the nomogram  The maximal benefit occurs if given within 8 hours of acute ingestion  Therefore, it is recommended to empirically start NAC before a level is obtained if there is a reasonable concern of a toxic ingestion presenting close to 8 hours or beyond  In late presenters (>8hrs), start NAC and treat for a full course or longer if LFTS remain abnormal  Treatment maybe stopped when AST/ALT peak and then downtrend, with an INR <2 and patient is clinically well  If abnormal labs persist, continue NAC and call Toxicology  There are two routes of administration for NAC, oral and IV  The treatment protocols are described below  Acute Ingestion (Time of Ingestion Unknown) or Repeated Supra-therapeutic Ingestion   The nomogram CANNOT be used to estimate the risk of hepatotoxicity  At presentation, check a serum APAP level and AST/ALT  If the APAP level is above 10 mcg/ml or the AST/ALT are elevated, start NAC treatment for 12 hours  If abnormalities persist, continue NAC treatment and call toxicology  If the APAP level is undetectable and AST and ALT are downtrending at the end of 12 hours, treatment may be stopped  Intravenous (Acetadote)   Loading dose- 150mg/kg infused over 15-60 minutes   Maintenance Infusion #1- 50mg/kg (12 5mg/kg/hr) over 4 hours   Maintenance Infusion #2 -100mg/kg (6 25 mg/kg/hr) until treatment endpoint   Treatment Endpoint: 20 hours or more   NAC should be continued for the full course  NAC can be stopped when APAP is undetectable, AST/ALT have peaked and are downtrending, and patient appears clinically well  Consultation with a medical  [facility] is recommended before changes in the duration of therapy are made       Acetaminophen Toxicity Dos and Donts   Acute Ingestions   DO give charcoal for decontamination within 2 hours of ingestion if the patient can adequately protect their airway  DO start NAC empirically, i e  without an APAP level, if the ingestion is likely a large overdose presenting at 8 hours or more after ingestion  DO contact the Liver Transplant Team early if liver failure is developing  DO NOT get a level before 4hrs post-ingestion if the time of ingestion is certain in an acute overdose  DO NOT stop NAC therapy until full course is finished or truncated therapy is recommended by the [facility]  Repeated Supra-Therapeutic Ingestions (RSI)   DO ask patients with pain complaints (toothaches, back pain, cancer) about the amount of acetaminophen they use  DO NOT use the Osiris-Zaragoza nomogram to determine if the APAP level is toxic  DO NOT stop NAC therapy until full course is finished or truncated therapy is recommended by the [facility]  NAC Protocols   DO stop IV NAC if an anaphylactoid reaction occurs (rare)  Treat the reaction appropriately and call the [facility] for recommendations on continued NAC therapy  DO give charcoal with oral NAC when charcoal is indicated  References   Ramesh GALICIA Acetaminophen  In [facility] EM, [address] [place] et al [facility]  Medical Toxicology 3rd edition  [facility]: [address] [place] [address], 2004: pp [phone]  Buddy BROOKS Acetaminophen  In [facility]       Hx and PE limited by the dynamics of a phone consultation  I have not personally interviewed or evaluated the patient, but only advised based on the information provided to me  Primary provider is responsible for all clinical decisions  Pertinent history, physical exam and clinical findings and course discussed: Vivia Fothergill is a 61y o  year old male who presents with cardiogenic shock and multisystem organ dysfunction  He reports taking significant amounts of APAP during the last few days while feeling ill  Review of systems and physical exam not performed by me  Historical Information   History reviewed   No pertinent past medical history  History reviewed  No pertinent surgical history  Social History   Social History     Substance and Sexual Activity   Alcohol Use No     Social History     Substance and Sexual Activity   Drug Use No     Social History     Tobacco Use   Smoking Status Current Every Day Smoker    Types: Pipe   Smokeless Tobacco Never Used   Tobacco Comment    Secondhand smoke exposure     Family History   Problem Relation Age of Onset    Other Mother         Claustrophobia    Drug abuse Family     Alcohol abuse Family         Prior to Admission medications    Medication Sig Start Date End Date Taking?  Authorizing Provider   ALPRAZolam Osie Cools) 0 5 mg tablet Take 1-2 tablets 30-60 minutes prior to leaving home prn 3/18/21   Dony Gil MD   EPINEPHrine (EPIPEN) 0 3 mg/0 3 mL SOAJ Inject 0 3 mL (0 3 mg total) into a muscle once for 1 dose 9/21/18 3/18/21  Rob Walton DO       Current Facility-Administered Medications   Medication Dose Route Frequency    acetylcysteine (ACETADOTE) 10,000 mg in dextrose 5 % 1,000 mL IVPB  100 mg/kg Intravenous Once    acetylcysteine (ACETADOTE) 15,000 mg in dextrose 5 % 200 mL IVPB  150 mg/kg Intravenous Once    acetylcysteine (ACETADOTE) 5,000 mg in dextrose 5 % 500 mL IVPB  50 mg/kg Intravenous Once    ALPRAZolam (XANAX) tablet 0 5 mg  0 5 mg Oral BID PRN    [START ON 12/31/2021] aspirin (ECOTRIN LOW STRENGTH) EC tablet 81 mg  81 mg Oral Daily    bumetanide (BUMEX) injection 2 mg  2 mg Intravenous BID    EPINEPHrine 3000 mcg (STANDARD CONCENTRATION) IV in sodium chloride 0 9% 250 mL  1-10 mcg/min Intravenous Titrated    fomepizole (ANTIZOL) 1,400 mg in sodium chloride 0 9 % 100 mL IVPB  15 mg/kg Intravenous Once    heparin (porcine) 25,000 units in 0 45% NaCl 250 mL infusion (premix)  3-20 Units/kg/hr (Order-Specific) Intravenous Titrated    heparin (porcine) injection 2,000 Units  2,000 Units Intravenous Q1H PRN    heparin (porcine) injection 4,000 Units  4,000 Units Intravenous Q1H PRN    milrinone (PRIMACOR) 20 mg in 100 mL infusion (premix)  0 38 mcg/kg/min Intravenous Continuous    sodium chloride (PF) 0 9 % injection 3 mL  3 mL Intravenous Q1H PRN       Allergies   Allergen Reactions    Mushroom Extract Complex - Food Allergy Facial Swelling    Peanut Oil - Food Allergy     Strawberry C [Ascorbate - Food Allergy]        Objective       Intake/Output Summary (Last 24 hours) at 12/30/2021 1909  Last data filed at 12/30/2021 1800  Gross per 24 hour   Intake 849 22 ml   Output 365 ml   Net 484 22 ml       Invasive Devices:   CVC Central Lines 12/30/21 Triple Left Internal jugular (Active)   Reasons to continue CVC line  Medications requiring central line 12/30/21 1600   Goal for Removal D/C when meds requiring line finished 12/30/21 1600   Line Necessity Reviewed Yes, reviewed with provider 12/30/21 1600   Site Assessment WDL 12/30/21 1600   Proximal Lumen Status Blood return noted; Flushed;Normal saline locked 12/30/21 1600   Medial Lumen Status Infusing 12/30/21 1600   Distal Lumen Status Infusing 12/30/21 1600   Dressing Type Chlorhexidine dressing 12/30/21 1600   Dressing Status Clean;Dry; Intact 12/30/21 1600   Dressing Intervention Dressing reinforced 12/30/21 1600   Dressing Change Due 01/06/21 12/30/21 1600       Peripheral IV 12/30/21 Right Forearm (Active)   Site Assessment WDL 12/30/21 1600   Dressing Type Transparent 12/30/21 1600   Line Status Infusing 12/30/21 1600   Dressing Status Clean;Dry; Intact 12/30/21 1600   Dressing Change Due 01/03/21 12/30/21 1600       Urethral Catheter Temperature probe (Active)   Amt returned on insertion(mL) 20 mL 12/30/21 1231   Reasons to continue Urinary Catheter  Accurate I&O assessment in critically ill patients (48 hr  max) 12/30/21 1600   Goal for Removal Remove after 48 hrs of I/O monitoring 12/30/21 1600   Site Assessment Clean;Skin intact; Patent 12/30/21 1600   Collection Container Standard drainage bag 12/30/21 1600 Securement Method Securing device (Describe) 12/30/21 1600   Output (mL) 200 mL 12/30/21 1800       Vitals   Vitals:    12/30/21 1445 12/30/21 1600 12/30/21 1700 12/30/21 1800   BP: 105/74 95/52 (!) 89/57 94/54   TempSrc:  Oral     Pulse: (!) 112 (!) 114 (!) 113 (!) 114   Resp: 22 (!) 31 (!) 31 (!) 30   Patient Position - Orthostatic VS: Lying Lying     Temp: 98 6 °F (37 °C) 97 5 °F (36 4 °C)           EKG, Pathology, and/or Other Studies: I have personally reviewed pertinent reports  Lab Results: I have personally reviewed pertinent reports  Labs:    Results from last 7 days   Lab Units 12/30/21  1007   WBC Thousand/uL 17 22*   HEMOGLOBIN g/dL 12 8   HEMATOCRIT % 40 1   PLATELETS Thousands/uL 217   LYMPHO PCT % 4*   MONO PCT % 4      Results from last 7 days   Lab Units 12/30/21  1632 12/30/21  1007 12/30/21  1007   SODIUM mmol/L 134*   < > 130*   POTASSIUM mmol/L 4 7   < > 4 6   CHLORIDE mmol/L 101   < > 97*   CO2 mmol/L 16*   < > 12*   BUN mg/dL 147*   < > 141*   CREATININE mg/dL 3 53*   < > 4 01*   CALCIUM mg/dL 8 2*   < > 8 4   ALK PHOS U/L  --   --  165*   ALT U/L  --   --  5,669*   AST U/L  --   --  3,128*   MAGNESIUM mg/dL 3 5*  --   --    PHOSPHORUS mg/dL 9 0*  --   --     < > = values in this interval not displayed  Results from last 7 days   Lab Units 12/30/21  1613 12/30/21  1007   INR   --  2 04*   PTT seconds 28 28     Results from last 7 days   Lab Units 12/30/21  1613 12/30/21  1007   LACTIC ACID mmol/L 5 1* 11 1*     No results found for: TROPONINI  Results from last 7 days   Lab Units 12/30/21  1632   PH JANNET  7 290*   PCO2 JANNET mm Hg 29 7*   PO2 JANNET mm Hg 35 4   HCO3 JANNET mmol/L 14 0*   O2 CONTENT JANNET ml/dL 8 7   O2 HGB, VENOUS % 50 5*     Results from last 7 days   Lab Units 12/30/21  1007   ACETAMINOPHEN LVL ug/mL 21*     Invalid input(s): EXTPREGUR      Imaging Studies: I have personally reviewed pertinent reports        Counseling / Coordination of Care  Total time spent today 36 minutes  This was a phone consultation

## 2021-12-31 NOTE — SEPSIS NOTE
Although patient meets SIRS criteria, believe this is due to cardiogenic shock, new HF not infection  Sepsis Note   Chase Lot 61 y o  male MRN: [id number]  Unit/Bed#: [facility] 418-01 Encounter: [id number]       qSOFA     Row Name 12/31/21 0600 12/31/21 0500 12/31/21 0400 12/31/21 0300 12/31/21 0200    Altered mental status GCS < 15 -- -- 0 -- --    Respiratory Rate > / =22 1 1 1 1 1    Systolic BP < / =023 0 1 1 1 0    Q Sofa Score 1 2 2 2 1    Row Name 12/31/21 0100 12/31/21 0000 12/30/21 2300 12/30/21 2200 12/30/21 2100    Altered mental status GCS < 15 -- 0 -- -- --    Respiratory Rate > / =22 1 1 1 1 1    Systolic BP < / =195 1 1 1 1 1    Q Sofa Score 2 2 2 2 2    Row Name 12/30/21 2000 12/30/21 1900 12/30/21 1800 12/30/21 1700 12/30/21 1600    Altered mental status GCS < 15 0 -- -- -- 0    Respiratory Rate > / =22 1 1 1 1 1    Systolic BP < / =380 1 1 1 1 1    Q Sofa Score 2 2 2 2 2    Row Name 12/30/21 1445 12/30/21 1425 12/30/21 1320 12/30/21 1215 12/30/21 1145    Altered mental status GCS < 15 -- -- -- -- --    Respiratory Rate > / =22 1 0 -- 1 1    Systolic BP < / =175 0 0 0 1 1    Q Sofa Score 1 0 1 2 2    Row Name 12/30/21 1045 12/30/21 0945 12/30/21 0942          Altered mental status GCS < 15 -- 0 --      Respiratory Rate > / =22 0 -- 0      Systolic BP < / =521 0 -- 0      Q Sofa Score 0 0 0                 Initial Sepsis Screening     Row Name 12/30/21 1100                Is the patient's history suggestive of a new or worsening infection? --  Believe the patient has new HF, cardiogenic shock  -JH        Suspected source of infection --        Are two or more of the following signs & symptoms of infection both present and new to the patient? --        Indicate SIRS criteria --        If the answer is yes to both questions, suspicion of sepsis is present --        If severe sepsis is present AND tissue hypoperfusion perists in the hour after fluid resuscitation or lactate > 4, the patient meets criteria for SEPTIC SHOCK --        Are any of the following organ dysfunction criteria present within 6 hours of suspected infection and SIRS criteria that are NOT considered to be chronic conditions?  --        Organ dysfunction --        Date of presentation of severe sepsis --        Time of presentation of severe sepsis --        Tissue hypoperfusion persists in the hour after crystalloid fluid administration, evidenced, by either: --        Was hypotension present within one hour of the conclusion of crystalloid fluid administration? --        Date of presentation of septic shock --        Time of presentation of septic shock --              User Key  (r) = Recorded By, (t) = Taken By, (c) = Cosigned By    [id number] Name Provider Type    Hilda Luna MD Physician

## 2021-12-31 NOTE — ASSESSMENT & PLAN NOTE
· Secondary to cardiogenic shock  · Avoid hypotension and hepatotoxic medications  · Improving   · Trend daily

## 2021-12-31 NOTE — PROGRESS NOTES
[address] [facility] Liliane 61 y o  male MRN: [id number]  Unit/Bed#: [facility] 418-01 Encounter: [id number]    Consults    Physician Requesting Consult: Tara Echavarria MD    Reason for Consult / Principal Problem: Cardiogenic shock [facility])    HPI: Vladimir Edwards is a 61 y o  male with PMH of agoraphobia/panic attacks and tobacco abuse who began having chest pain, SOB, and activity intolerance around the time of thanksgiving  He noted that his chest pain became much worse on Tuesday and was associated with worsening fatigue, SOB, activity intolerance, nausea, and constipation prompting him to present to the ED for evaluation 12/30  He was encephalopathic on admission and found to be in cardiogenic shock with multi-organ system failure with significant JUDE and transaminitis  EKG suggestive of recent LAD STEMI and stat Echo demonstrated large VSD with left to right shunt, EF 45%  Stat cardiology and cardiac surgery consults were obtained  Patient encephalopathic at the time and could not make medical decisions, therefore this deferred to his sister  CT surgery contacted sister and d/t high mortality of immediate VSD repair, recommended bridge with VA ECMO to support end organ systems prior to repair  After deliberation patient's sister decided against ECMO cannulation last evening  After further deliberation overnight, patient's sister changed her mind and asked CT surgery to re-evaluate  In the interim patient was started on Milrinone at 0 38mcg and required Levo/Vaso to maintain MAP > 65  Milrinone decreased to 0 25mcg overnight d/t hypotension, however ScVO2 decreased to 45% this morning and was increased back up to 0 50mcg  Patient also aggressively diuresed with 4mg IV Bumex and Bumex infusion at 2mg/hr  This morning on exam patient alert and oriented and able to make decisions   After discussions with both critical care team and CT surgery, he elected to procedure with VA ECMO cannulation as a bridge to VSD repair  Patient arrives back from OR with R venous/arterial fem cannulation sites and retrograde perfusion cannula in place  Intra-op CORIE LVEF 40%  Anteroseptal (mid to apical) aneurysmal, thinned out, VSD, left to right cont flow, AV not opening secondary to flow through VSD  RV systolic function moderately reduced, cavity moderate dilated, descending aorta with localized dissection  Infusions: Milrinone 0 5mcg, Levo 2mcg, Vaso 0 04 units, Propofol    No blood products received intra-operatively  History obtained from chart review due to patient being intubated and sedated  ---------------------------------------------------------------------------------------------------------------------------------------------------------------------  Impressions:  1  Cardiogenic shock s/p VA ECMO cannulation  2  Recent LAD STEMI  3  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   4  Shock liver  5  JUDE  6  Toxic metabolic encephalopathy   7  Respiratory alkalosis    Plan:    Neuro: Propofol for continuous sedation  PRN fentanyl for pain  Trend neuro exam   Delirium precautions  CV: MAP goal >65  Aortic valve not opening secondary to flow across VSD  CI>2 2  Post-op medications: Primacor, 0 50 mcg/kg/min, Levophed, 2 mcg/min, Vasopressin, 0 04 Units/min  Wean Vasopressors/inotropes as able  Volume resuscitation as needed  Monitor rhythm on telemetry  Intra-op CORIE LVEF 40%  Anteroseptal (mid to apical) aneurysmal, thinned out, VSD, left to right cont flow, AV not opening secondary to flow through VSD  RV systolic function moderately reduced, cavity moderate dilated, descending aorta with localized dissection  Lung: Check STAT post-op ABG and CXR  Mechanical Ventilation 14/450/6/50%  Chlorhexidine/PPI/HOB> 30 degrees  GI: GI prophylaxis with PPI  Bowel regimen  Zofran PRN for nausea  FEN: NPO, start tube feedings tonight if vasopressor requirement remains stable   Replenish K >4 0, mag >2 0 and calcium >7 0      : Check STAT post-op BMP  Chang in place  Monitor UOP with goal >0 5cc/kg/hour  Diuretics versus volume resuscitate as needed depending on hemodynamics and volume status  ID: Prophylactic post-op abx  Maintain normothermia  Trend temps  Heme: Check STAT post-op H/H and platelets  Monitor cannulation sites and invasive lines  Send coags  Start ECMO protocol heparin gtt once PTT equal to or less than 72  Endo: Insulin gtt for blood sugar control  Results from last 6 Months   Lab Units 12/31/21  0400 12/30/21  1007 09/13/21  0657   HEMOGLOBIN A1C % 5 9* 5 7* 5 3     Disposition: ICU Care   ---------------------------------------------------------------------------------------------------------------------------------------------------------------------  Historical Information   History reviewed  No pertinent past medical history  History reviewed  No pertinent surgical history  Social History   Social History     Substance and Sexual Activity   Alcohol Use No     Social History     Substance and Sexual Activity   Drug Use No     Social History     Tobacco Use   Smoking Status Current Every Day Smoker    Types: Pipe   Smokeless Tobacco Never Used   Tobacco Comment    Secondhand smoke exposure     Family History   Problem Relation Age of Onset    Other Mother         Claustrophobia    Drug abuse Family     Alcohol abuse Family      I have reviewed this patient's family history and commented on sigificant items within the HPI    ROS: ROS unable to be obtained due to patient being intubated and sedated  Allergies: Allergies   Allergen Reactions    Mushroom Extract Complex - Food Allergy Facial Swelling    Peanut Oil - Food Allergy     Strawberry C [Ascorbate - Food Allergy]        Home Medications:   Prior to Admission medications    Medication Sig Start Date End Date Taking?  Authorizing Provider   ALPRAZolam Merlinda Sang) 0 5 mg tablet Take 1-2 tablets 30-60 minutes prior to leaving home prn 3/18/21   Nito Oglesby MD   EPINEPHrine (EPIPEN) 0 3 mg/0 3 mL SOAJ Inject 0 3 mL (0 3 mg total) into a muscle once for 1 dose 9/21/18 3/18/21  Parth Walton DO     Inpatient Medications:  Scheduled Meds:  Current Facility-Administered Medications   Medication Dose Route Frequency Provider Last Rate    acetylcysteine  100 mg/kg Intravenous Once Yuliana Moore MD 14,285 mg (12/31/21 0135)    aspirin  81 mg Oral Daily Wendi Spironello V, CRNP      bumetanide  1 mg/hr Intravenous Continuous Wendi Spironello V, CRNP Stopped (12/31/21 1345)    calcium gluconate  2 g Intravenous Once Wendi Spironello V, CRNP 2 g (12/31/21 1428)    cefazolin  2,000 mg Intravenous Q8H Wendi Spironello V, CRNP      chlorhexidine  15 mL Mouth/Throat Q12H [address] Wendi Spironello V, CRNP      fentanyl citrate (PF)  50 mcg Intravenous Q2H PRN Wendi Spironello V, CRNP      heparin (porcine)  3-15 Units/kg/hr (Order-Specific) Intravenous Titrated Wendi Spironello V, CRNP      heparin 5000 units in sodium chloride 0 9% 1000 mL irrigation   Irrigation Once [employer] V, CRNP      insulin regular (HumuLIN R,NovoLIN R) infusion  0 3-21 Units/hr Intravenous Titrated Wendi Spironello V, CRNP      milrinone [facility]) infusion  0 5 mcg/kg/min Intravenous Continuous Wendi Spironello V, CRNP 0 5 mcg/kg/min (12/31/21 1302)    norepinephrine  1-30 mcg/min Intravenous Titrated Wendi Spironello V, CRNP 2 mcg/min (12/31/21 1315)    omeprazole (PRILOSEC) suspension 2 mg/mL  20 mg Oral Daily Wendi Spironello V, CRNP      ondansetron  4 mg Intravenous Q6H PRN Wendi Spironello V, CRNP      [START ON 1/1/2022] polyethylene glycol  17 g Oral Daily Wendi Spironello V, CRNP      potassium chloride  40 mEq Intravenous Once [employer] V, CRNP      propofol  5-50 mcg/kg/min Intravenous Titrated Wendi Marks V, CRNP 50 mcg/kg/min (12/31/21 6613)    [START ON 1/1/2022] senna  8 8 mg Oral BID Wendi Spironello V, CRNP      sodium chloride (PF)  3 mL Intravenous Q1H PRN Wendi Spironello V, CRNP      vasopressin (PITRESSIN) in 0 9 % sodium chloride 100 mL  0 04 Units/min Intravenous Continuous Wendi Spironello V, CRNP 0 04 Units/min (21 1300)     Continuous Infusions:bumetanide, 1 mg/hr, Last Rate: Stopped (21 1345)  heparin (porcine), 3-15 Units/kg/hr (Order-Specific)  insulin regular (HumuLIN R,NovoLIN R) infusion, 0 3-21 Units/hr  milrinone (PRIMACOR) infusion, 0 5 mcg/kg/min, Last Rate: 0 5 mcg/kg/min (21 1302)  norepinephrine, 1-30 mcg/min, Last Rate: 2 mcg/min (21 1315)  propofol, 5-50 mcg/kg/min, Last Rate: 50 mcg/kg/min (21 1453)  vasopressin (PITRESSIN) in 0 9 % sodium chloride 100 mL, 0 04 Units/min, Last Rate: 0 04 Units/min (21 1300)      PRN Meds:  fentanyl citrate (PF), 50 mcg, Q2H PRN  ondansetron, 4 mg, Q6H PRN  sodium chloride (PF), 3 mL, Q1H PRN      ---------------------------------------------------------------------------------------------------------------------------------------------------------------------  Vitals:   Vitals:    21 1308 21 1320 21 1400 21 1500   BP:       BP Location:       Pulse:  (!) 106 (!) 114 (!) 116   Resp:  14 16 17   Temp:   98 9 °F (37 2 °C) 98 9 °F (37 2 °C)   TempSrc:   Probe Probe   SpO2: 98% 100% 100% 100%   Weight:       Height:         Arterial Line:  Arterial Line BP: 72/69  Arterial Line MAP (mmHg): 71 mmHg    Temperature: Temp (24hrs), Av 8 °F (36 6 °C), Min:97 3 °F (36 3 °C), Max:98 9 °F (37 2 °C)  Current: Temperature: 98 9 °F (37 2 °C)    Weights: IBW (Ideal Body Weight): 62 65 kg  Body mass index is 33 35 kg/m²      Hemodynamic Monitoring:  PAP: PAP: 13/9, CVP: CVP (mean): 6 mmHg, CO:  , CI:  , SVR:       ECMO:   Flow: 4 4  Speed: 6800  Sweep: 3  Fio2: 100%                  Ventilator Settings:  Respiratory  Report   Lab Data (Last 4 hours)       4836 pH, Arterial       7 371             pCO2, Arterial       35 3             pO2, Arterial       347 0             HCO3, Arterial       20 0             Base Excess, Arterial       -4 7                  O2/Vent Data       12/31 1308   Most Recent         Vent Mode AC/VC  AC/VC      Resp Rate (BPM) (BPM) 14  14      Vt (mL) (mL) 450  450      FIO2 (%) (%) 100  100      PEEP (cmH2O) (cmH2O) 6  6      Patient safety screen outcome: Failed  Failed      MV 6 9  6 9                Labs:   Results from last 7 days   Lab Units 12/31/21  1326 12/31/21  1149 12/31/21  1010 12/31/21  0400 12/30/21  1007 12/30/21  1007   WBC Thousand/uL 14 61*  --   --  14 68*  --  17 22*   HEMOGLOBIN g/dL 9 1*  --   --  10 7*   < > 12 8   I STAT HEMOGLOBIN g/dl 9 5* 7 8*  7 8*   < >  --   --   --    HEMATOCRIT % 26 1*  --   --  31 2*   < > 40 1   HEMATOCRIT, ISTAT % 28* 23*  23*   < >  --   --   --    PLATELETS Thousands/uL 141*  --   --  152  --  217   MONO PCT %  --   --   --   --   --  4    < > = values in this interval not displayed       Results from last 7 days   Lab Units 12/31/21  1326 12/31/21  1149 12/31/21  1010 12/31/21  1010 12/31/21  0420 12/31/21  0411 12/31/21  0043 12/31/21  0036 12/31/21  0036 12/30/21  1632   SODIUM mmol/L 138  --   --   --   --  134*  --   --  135*  --    POTASSIUM mmol/L 3 5  --   --   --   --  3 9  --   --  3 9  --    CHLORIDE mmol/L 104  --   --   --   --  99*  --   --  98*  --    CO2 mmol/L 18*  --   --   --   --  17*  --    < > 16*  --    CO2, I-STAT mmol/L 24 21  22   < > 17*  --   --   --   --   --   --    BUN mg/dL 140*  --   --   --   --  150*  --   --  148*  --    CREATININE mg/dL 2 87*  --   --   --   --  3 29*  --   --  3 27*  --    CALCIUM mg/dL 8 4  --   --   --   --  7 5*  --   --  7 4*  --    ALK PHOS U/L 128*  --   --   --  148*  --  150*  --   --    < >   ALT U/L 3,696*  --   --   --  4,905*  --  5,427*  --   --    < >   AST U/L 996*  --   --   --  2,054*  --  2,371*  --   --    < >   GLUCOSE, ISTAT mg/dl 161* 168*  170*  --  180*  --   --   --   --   --   --     < > = values in this interval not displayed  Post-op AB 39/36 8/>400/22 6/-2  Post-op CXR: Melinda Gaines appears to extend toward left main pulmonary artery, otherwise lines and tubes stable post-op  RML infiltrate vs atelectasis  I have personally reviewed pertinent films in PACS  Post-op EKG: NSR-rate 99, ST elevations V2-V6, PVCs  This was personally reviewed by myself  Physical Exam  Constitutional:       Appearance: Normal appearance  He is well-developed  Interventions: He is sedated, intubated and restrained  HENT:      Head: Normocephalic and atraumatic  Eyes:      Comments: Pupils +4 b/l non-reactive  Left pupil irregular shape   Neck:      Trachea: Trachea normal    Cardiovascular:      Rate and Rhythm: Regular rhythm  Tachycardia present  Pulses:           Radial pulses are 1+ on the right side and 1+ on the left side  Posterior tibial pulses are detected w/ Doppler on the right side and detected w/ Doppler on the left side  Heart sounds: S1 normal and S2 normal    Pulmonary:      Effort: Pulmonary effort is normal  He is intubated  Breath sounds: Normal breath sounds  Abdominal:      General: Bowel sounds are decreased  Palpations: Abdomen is soft  Musculoskeletal:      Cervical back: Neck supple  Comments: Unable to assess d/t sedation   Skin:     General: Skin is warm and dry  Capillary Refill: Capillary refill takes less than 2 seconds  Coloration: Skin is pale  Neurological:      GCS: GCS eye subscore is 1  GCS verbal subscore is 1  GCS motor subscore is 1  Invasive lines and devices:   Invasive Devices  Report    Central Venous Catheter Line            CVC Central Lines 21 Triple Left Internal jugular <1 day    Introducer 21 <1 day    LDA ECMO 21 Femoral artery  <1 day          Peripheral Intravenous Line            Peripheral IV 12/30/21 Right Forearm 1 day          Arterial Line            Arterial Line 12/30/21 Radial <1 day    Arterial Line 12/31/21 Right Brachial <1 day          Line            Arterial Sheath 6 Fr  Right  <1 day          Drain            Urethral Catheter Temperature probe 1 day          Airway            ETT  Hi-Lo; Cuffed;Oral 8 5 mm <1 day              ---------------------------------------------------------------------------------------------------------------------------------------------------------------------  Care Time Delivered:   Upon my evaluation, this patient had a high probability of imminent or life-threatening deterioration due to cardiogenic shock in setting of VSD s/p VA ECMO cannulation, which required my direct attention, intervention, and personal management  I have personally provided 40 minutes (1245 to 1400) of critical care time, exclusive of procedures, teaching, family meetings, and any prior time recorded by providers other than myself       SIGNATURE: BROOKE Castanon  DATE: December 31, 2021  TIME: 3:08 PM

## 2021-12-31 NOTE — PROGRESS NOTES
Progress Note - Cardiothoracic Surgery   Tommie Hays 61 y o  male MRN: [id number]  Unit/Bed#: [facility] 418-01 Encounter: [id number]      Hospital Day #2 with acquired Post_MI VSD    Pt seen/examined  Interval history and data reviewed with critical care team     Creatinine and ALT/AST have trended better  Acidosis improving  Family has decided that they do want him to undergo attempts at lifesaving surgical intervention        Medications:   Scheduled Meds:  Current Facility-Administered Medications   Medication Dose Route Frequency Provider Last Rate    acetylcysteine  100 mg/kg Intravenous Once Noris Jauregui MD 11,617 mg (12/31/21 0135)    ALPRAZolam  0 5 mg Oral BID PRN Leatha Singh PA-C      amiodarone (CORDARONE) 150 mg/3 mL perfusion syringe  150 mg Perfusion Once Chaim Woodward MD      aspirin  81 mg Oral Daily Leatha Singh PA-C      bumetanide  1 mg/hr Intravenous Continuous Elías Molina PA-C 1 mg/hr (12/31/21 9769)    cefazolin  2,000 mg Intravenous Q8H Concepcion Obrien PA-C      cefazolin  2,000 mg Intravenous Once Chaim Woodward MD      EPINEPHrine 12 mcg/mL in sodium chloride 0 9% 10 mL syringe   mcg Intravenous Once Chaim Woodward MD      EPINEPHrine 3000 mcg (STANDARD CONCENTRATION) in sodium chloride 0 9% 250 mL  1-10 mcg/min Intravenous Once Chaim Woodward MD      heparin (porcine)  3-20 Units/kg/hr (Order-Specific) Intravenous Titrated Flora Castaneda MD 17 1 Units/kg/hr (12/31/21 0511)    heparin 5000 units in sodium chloride 0 9% 1000 mL irrigation   Irrigation Once Chaim Woodward MD      heparin (porcine)  2,000 Units Intravenous Q1H PRN Flora Castaneda MD      heparin (porcine)  4,000 Units Intravenous Q1H PRN Flora Castaneda MD      heparin (porcine)  10,000 Units Perfusion Once Chaim Woodward MD      heparin (porcine)  400 Units/kg Perfusion Once Chaim Woodward MD      insulin lispro  1-6 Units Subcutaneous TID AC Wendi Marks V, CRNP      insulin lispro  1-6 Units Subcutaneous HS Wendi Marks V, CRNP      insulin regular 100 units in sodium chloride 0 9% 100 mL  100 Units Intravenous Once Renae Granados MD      lidocaine (cardiac) 100 mg/5 mL perfusion syringe  100 mg Perfusion Once Renae Granados MD      magnesium sulfate 2 g/4 mL perfusion syringe  2 g Perfusion Once Renae Granados MD      milrinone [facility]) infusion  0 38 mcg/kg/min Intravenous Continuous Rich Iana PA-C 0 38 mcg/kg/min (12/31/21 0915)    niCARdipine (CARDENE) 25 mg (STANDARD CONCENTRATION) in sodium chloride 0 9% 250 mL  1-15 mg/hr Intravenous Once Renae Granados MD      niCARdipine 100 mcg/mL in sodium chloride 0 9% 10 mL syringe  100-1,000 mcg Intravenous Once Renae Granados MD      norepinephrine  1-30 mcg/min Intravenous Titrated Rich PIPE Robles-C 2 mcg/min (12/31/21 4098)    phenylephine   mcg/min Intravenous Once Renae Granados MD      phenylephrine  10,000 mcg Perfusion Once Renae Granados MD      phenylephine  200-250 mcg Intravenous Once Renae Granados MD      potassium chloride  20 mEq Intravenous Once [employer] V, BROOKE      sodium chloride (PF)  3 mL Intravenous Q1H PRN Natalio Dorsey MD      vasopressin (PITRESSIN) in 0 9 % sodium chloride 100 mL  0 04 Units/min Intravenous Continuous Rich Travis PA-C Stopped (12/31/21 0401)     Continuous Infusions:bumetanide, 1 mg/hr, Last Rate: 1 mg/hr (12/31/21 0508)  heparin (porcine), 3-20 Units/kg/hr (Order-Specific), Last Rate: 17 1 Units/kg/hr (12/31/21 0511)  milrinone (PRIMACOR) infusion, 0 38 mcg/kg/min, Last Rate: 0 38 mcg/kg/min (12/31/21 0915)  norepinephrine, 1-30 mcg/min, Last Rate: 2 mcg/min (12/31/21 0828)  vasopressin (PITRESSIN) in 0 9 % sodium chloride 100 mL, 0 04 Units/min, Last Rate: Stopped (12/31/21 0401)      PRN Meds:  ALPRAZolam   heparin (porcine)    heparin (porcine)    Insert peripheral IV **AND** sodium chloride (PF)    Vitals: Blood pressure 98/53, pulse (!) 126, temperature (!) 97 3 °F (36 3 °C), resp  rate (!) 26, height 5' 5 5" (1 664 m), weight 92 3 kg (203 lb 7 8 oz), SpO2 97 %  ,Body mass index is 33 35 kg/m²  I/O last 24 hours: In: 2525 8 [P O :560; I V :515 8; IV Piggyback:1450]  Out: 4282 [Urine:3830]  Invasive Devices  Report    Central Venous Catheter Line            CVC Central Lines 12/30/21 Triple Left Internal jugular <1 day          Peripheral Intravenous Line            Peripheral IV 12/30/21 Right Forearm 1 day          Arterial Line            Arterial Line 12/30/21 Radial <1 day          Drain            Urethral Catheter Temperature probe <1 day                  Lab, Imaging and other studies:   Results from last 7 days   Lab Units 12/31/21  0400 12/30/21  1007   WBC Thousand/uL 14 68* 17 22*   HEMOGLOBIN g/dL 10 7* 12 8   HEMATOCRIT % 31 2* 40 1   PLATELETS Thousands/uL 152 217     Results from last 7 days   Lab Units 12/31/21  0411 12/31/21  0036 12/30/21  1632   POTASSIUM mmol/L 3 9 3 9 4 7   CHLORIDE mmol/L 99* 98* 101   CO2 mmol/L 17* 16* 16*   BUN mg/dL 150* 148* 147*   CREATININE mg/dL 3 29* 3 27* 3 53*   CALCIUM mg/dL 7 5* 7 4* 8 2*     Results from last 7 days   Lab Units 12/31/21  0306 12/30/21 2006 12/30/21  1613 12/30/21  1007 12/30/21  1007   INR   --   --   --   --  2 04*   PTT seconds 53* 34 28   < > 28    < > = values in this interval not displayed  Recent Labs     12/31/21  0833   PHART 7 435   RUT0QQM 18 2*   PO2ART 93 3   [id number] 27 8*   BEART -4 8           Plan:    Situation discussed with patient and his sister  He has improved somewhat and they now want aggressive lifesaving attempts at surgical intervention  Since he has improved it does appear reasonable  I still do not think he could tolerate definitive surgical repair right now    Therefore will proceed with VA ECMO in order to try to optimize him to definitive repair  Consent obtained from patient who appears more competent now  Additionally, the patient's sister also gave her consent for the procedure  After he is incapacitated, his sister will be the decision maker        Nicole Batista MD  DATE: December 31, 2021  TIME: 9:38 AM

## 2021-12-31 NOTE — PLAN OF CARE
Problem: MOBILITY - ADULT  Goal: Maintain or return to baseline ADL function  Description: INTERVENTIONS:  -  Assess patient's ability to carry out ADLs; assess patient's baseline for ADL function and identify physical deficits which impact ability to perform ADLs (bathing, care of mouth/teeth, toileting, grooming, dressing, etc )  - Assess/evaluate cause of self-care deficits   - Assess range of motion  - Assess patient's mobility; develop plan if impaired  - Assess patient's need for assistive devices and provide as appropriate  - Encourage maximum independence but intervene and supervise when necessary  - Involve family in performance of ADLs  - Assess for home care needs following discharge   - Consider OT consult to assist with ADL evaluation and planning for discharge  - Provide patient education as appropriate  12/31/2021 1028 by Walt Locke RN  Outcome: Progressing  12/31/2021 1027 by Walt Locke RN  Outcome: Progressing  12/31/2021 1027 by Walt Locke RN  Outcome: Progressing  Goal: Maintains/Returns to pre admission functional level  Description: INTERVENTIONS:  - Perform BMAT or MOVE assessment daily    - Set and communicate daily mobility goal to care team and patient/family/caregiver  - Collaborate with rehabilitation services on mobility goals if consulted  - Perform Range of Motion  times a day  - Reposition patient every  hours    - Dangle patient  times a day  - Stand patient  times a day  - Ambulate patient  times a day  - Out of bed to chair  times a day   - Out of bed for meals  times a day  - Out of bed for toileting  - Record patient progress and toleration of activity level   12/31/2021 1028 by Walt Locke RN  Outcome: Progressing  12/31/2021 1027 by Walt Locke RN  Outcome: Progressing  12/31/2021 1027 by Walt Locke RN  Outcome: Progressing     Problem: Potential for Falls  Goal: Patient will remain free of falls  Description: INTERVENTIONS:  - Educate patient/family on patient safety including physical limitations  - Instruct patient to call for assistance with activity   - Consult OT/PT to assist with strengthening/mobility   - Keep Call bell within reach  - Keep bed low and locked with side rails adjusted as appropriate  - Keep care items and personal belongings within reach  - Initiate and maintain comfort rounds  - Make Fall Risk Sign visible to staff  - Offer Toileting every  Hours, in advance of need  - Initiate/Maintain alarm  - Obtain necessary fall risk management equipment:   - Apply yellow socks and bracelet for high fall risk patients  - Consider moving patient to room near nurses station  12/31/2021 1028 by Callie Garcia RN  Outcome: Progressing  12/31/2021 1027 by Callie Garcia RN  Outcome: Progressing  12/31/2021 1027 by Callie Garcia RN  Outcome: Progressing     Problem: Prexisting or High Potential for Compromised Skin Integrity  Goal: Skin integrity is maintained or improved  Description: INTERVENTIONS:  - Identify patients at risk for skin breakdown  - Assess and monitor skin integrity  - Assess and monitor nutrition and hydration status  - Monitor labs   - Assess for incontinence   - Turn and reposition patient  - Assist with mobility/ambulation  - Relieve pressure over bony prominences  - Avoid friction and shearing  - Provide appropriate hygiene as needed including keeping skin clean and dry  - Evaluate need for skin moisturizer/barrier cream  - Collaborate with interdisciplinary team   - Patient/family teaching  - Consider wound care consult   12/31/2021 1028 by Callie Garcia RN  Outcome: Progressing  12/31/2021 1027 by Callie Garcia RN  Outcome: Progressing  12/31/2021 1027 by Callie Garcia RN  Outcome: Progressing     Problem: PAIN - ADULT  Goal: Verbalizes/displays adequate comfort level or baseline comfort level  Description: Interventions:  - Encourage patient to monitor pain and request assistance  - Assess pain using appropriate pain scale  - Administer analgesics based on type and severity of pain and evaluate response  - Implement non-pharmacological measures as appropriate and evaluate response  - Consider cultural and social influences on pain and pain management  - Notify physician/advanced practitioner if interventions unsuccessful or patient reports new pain  12/31/2021 1028 by Geraldo Ramos RN  Outcome: Progressing  12/31/2021 1027 by Geraldo Ramos RN  Outcome: Progressing     Problem: INFECTION - ADULT  Goal: Absence or prevention of progression during hospitalization  Description: INTERVENTIONS:  - Assess and monitor for signs and symptoms of infection  - Monitor lab/diagnostic results  - Monitor all insertion sites, i e  indwelling lines, tubes, and drains  - Monitor endotracheal if appropriate and nasal secretions for changes in amount and color  - Thompson appropriate cooling/warming therapies per order  - Administer medications as ordered  - Instruct and encourage patient and family to use good hand hygiene technique  - Identify and instruct in appropriate isolation precautions for identified infection/condition  12/31/2021 1028 by Geraldo Ramos RN  Outcome: Progressing  12/31/2021 1027 by Geraldo Ramos RN  Outcome: Progressing  Goal: Absence of fever/infection during neutropenic period  Description: INTERVENTIONS:  - Monitor WBC    12/31/2021 1028 by Geraldo Ramos RN  Outcome: Progressing  12/31/2021 1027 by Geraldo Ramos RN  Outcome: Progressing     Problem: SAFETY ADULT  Goal: Maintain or return to baseline ADL function  Description: INTERVENTIONS:  -  Assess patient's ability to carry out ADLs; assess patient's baseline for ADL function and identify physical deficits which impact ability to perform ADLs (bathing, care of mouth/teeth, toileting, grooming, dressing, etc )  - Assess/evaluate cause of self-care deficits   - Assess range of motion  - Assess patient's mobility; develop plan if impaired  - Assess patient's need for assistive devices and provide as appropriate  - Encourage maximum independence but intervene and supervise when necessary  - Involve family in performance of ADLs  - Assess for home care needs following discharge   - Consider OT consult to assist with ADL evaluation and planning for discharge  - Provide patient education as appropriate  12/31/2021 1028 by Annamaria Primrose, RN  Outcome: Progressing  12/31/2021 1027 by Annamaria Primrose, RN  Outcome: Progressing  12/31/2021 1027 by Annamaria Primrose, RN  Outcome: Progressing  Goal: Maintains/Returns to pre admission functional level  Description: INTERVENTIONS:  - Perform BMAT or MOVE assessment daily    - Set and communicate daily mobility goal to care team and patient/family/caregiver  - Collaborate with rehabilitation services on mobility goals if consulted  - Perform Range of Motion  times a day  - Reposition patient every  hours    - Dangle patient  times a day  - Stand patient  times a day  - Ambulate patient  times a day  - Out of bed to chair  times a day   - Out of bed for meals  times a day  - Out of bed for toileting  - Record patient progress and toleration of activity level   12/31/2021 1028 by Annamaria Primrose, RN  Outcome: Progressing  12/31/2021 1027 by Annamaria Primrose, RN  Outcome: Progressing  12/31/2021 1027 by Annamaria Primrose, RN  Outcome: Progressing  Goal: Patient will remain free of falls  Description: INTERVENTIONS:  - Educate patient/family on patient safety including physical limitations  - Instruct patient to call for assistance with activity   - Consult OT/PT to assist with strengthening/mobility   - Keep Call bell within reach  - Keep bed low and locked with side rails adjusted as appropriate  - Keep care items and personal belongings within reach  - Initiate and maintain comfort rounds  - Make Fall Risk Sign visible to staff  - Offer Toileting every  Hours, in advance of need  - Initiate/Maintain alarm  - Obtain necessary fall risk management equipment:   - Apply yellow socks and bracelet for high fall risk patients  - Consider moving patient to room near nurses station  12/31/2021 1028 by Jaret Lieberman RN  Outcome: Progressing  12/31/2021 1027 by Jaret Lieberman RN  Outcome: Progressing  12/31/2021 1027 by Jaret Lieberman RN  Outcome: Progressing     Problem: DISCHARGE PLANNING  Goal: Discharge to home or other facility with appropriate resources  Description: INTERVENTIONS:  - Identify barriers to discharge w/patient and caregiver  - Arrange for needed discharge resources and transportation as appropriate  - Identify discharge learning needs (meds, wound care, etc )  - Arrange for interpretive services to assist at discharge as needed  - Refer to Case Management Department for coordinating discharge planning if the patient needs post-hospital services based on physician/advanced practitioner order or complex needs related to functional status, cognitive ability, or social support system  12/31/2021 1028 by Jaret Lieberman RN  Outcome: Progressing  12/31/2021 1027 by Jaret Lieberman RN  Outcome: Progressing     Problem: Knowledge Deficit  Goal: Patient/family/caregiver demonstrates understanding of disease process, treatment plan, medications, and discharge instructions  Description: Complete learning assessment and assess knowledge base    Interventions:  - Provide teaching at level of understanding  - Provide teaching via preferred learning methods  12/31/2021 1028 by Jaret Lieberman RN  Outcome: Progressing  12/31/2021 1027 by Jaret Lieberman RN  Outcome: Progressing

## 2021-12-31 NOTE — ANESTHESIA PROCEDURE NOTES
Introducer/Nodaway-Althea  Performed by: Fredy Daniel MD  Authorized by: Fredy Daniel MD     Date/Time: 12/31/2021 10:27 AM  Consent: Verbal consent obtained  Written consent obtained  Risks and benefits: risks, benefits and alternatives were discussed  Consent given by: patient  Patient understanding: patient states understanding of the procedure being performed  Patient consent: the patient's understanding of the procedure matches consent given  Required items: required blood products, implants, devices, and special equipment available  Indications: central pressure monitoring and vascular access  Location details: right internal jugular  Catheter size: 9 Fr  Patient position: Trendelenburg    Anesthesia:  Local anesthetic: ga    Assessment: blood return through all ports and free fluid flow  Preparation: skin prepped with ChloraPrep  Skin prep agent dried: skin prep agent completely dried prior to procedure  Sterile barriers: all five maximum sterile barriers used - cap, mask, sterile gown, sterile gloves, and large sterile sheet  Ultrasound guidance: yes  ultrasound permanent image saved  Site selection rationale: vss, va ecmo  Number of attempts: 1  Successful placement: yes  Post-procedure: line sutured and dressing applied  Patient tolerance: patient tolerated the procedure well with no immediate complications

## 2021-12-31 NOTE — OP NOTE
OPERATIVE REPORT  PATIENT NAME: Carissa Parada    :  1955  MRN: [id number]  Pt Location: [facility] OR ROOM 02    SURGERY DATE: 2021    SURGEON: Megha Bhandari MD     ASSISTANT: Melquiades Zendejas PA-C    CO-SURGEON: Dr Limon     PREOPERATIVE DIAGNOSIS Acquired ventricular septal defect (post-infarction), Cardiogenic shock, acute kidney injury, acute liver injury     POSTOPERATIVE DIAGNOSIS Acquired ventricular septal defect (post-infarction), Cardiogenic shock, acute kidney injury, acute liver injury      NYHA CLASS: 4    CCS CLASS: 4    PROCEDURE Initiation of venoarterial Extracorporeal Life Support     ANESTHESIA Dr Haylee Charles, general endotracheal anesthesia with transesophageal echocardiogram guidance  CARDIOPULMONARY BYPASS TIME 0  PACKS/TUBES/DRAINS None  ESTIMATED BLOOD LOSS: 100 mL    OPERATIVE TECHNIQUE The patient was taken to the operating room and placed supine on the operating table  Following the satisfactory induction of general anesthesia and placement of monitoring lines, the patient was prepped and draped in the usual sterile fashion  A time-out procedure was performed  The right common femoral artery was accessed percutaneously using Seldinger technique and fluoroscopy  Two (2) Perclose sutures were deployed in the standard fashion  The right common femoral vein was accessed in a similar fashion and was cannulated with a 6 Western Smiley sheath  The femoral artery was cannulated with a 7 Croatian sheath  Angiography of the common femoral artery demonstrated a medium size common femoral artery with excellent runoff into the SFA and profunda  I elected not to perform SFA cannulation at this point anticipating that the right leg runoff around an 18 FR arterial cannula would be satisfactory  The patient was systemically heparinized  The right common femoral artery sheath was then removed over an Amplatz wire and the tract was serially dilated    An 18 FR cannula was placed, deaired, and connected to the arterial outflow of the ECMO circuit  The cannula was secured  Attention was turned to venous cannulation  The sheath was removed from the femoral vein over an Amplatz wire  The tract was serially dilated, and the cannula was advanced under fluoroscopic guidance  At the IVC-RA junction, resistance was met, and despite multiple attempts to advance the wire into the RA, I was unable to advance the wire, the dilator, or the cannula into the right atrium  The wire and dilator were removed from the sheath and a 16 FR dryseal sheath was placed through the cannula  An angled catheter was then advanced through the sheath over a Bentson wire  With the angled catheter, I was able to advance the wire into the SVC  The angled catheter was advanced over the wire into the SVC  The Bentson wire was then exchanged for an Amplatz wire  While maintaining the Amplatz wire position in the SVC, the Dryseal sheath was removed from the cannula and the dilator was placed back in the venous cannula  The cannula was then advanced over the Amplatz wire and was positioned with its tip just above the SVC-RA junction  The cannula was deaired and connect to the venous drainage side of the ECMO circuit  The cannula was secured  The patient was initiated on ECMO with excellent flow  Angiography was then performed through the arterial cannula to ensure adequate RLE perfusion  Unfortunately, there was no visible contrast flow below the arterial cannula  Therefore, using Seldinger technique, percutaneous access to the distal right CFA was performed  Angiography confirmed position of a 4 FR catheter entering the common femoral bifurcation with runoff in both the SFA and profunda  There was also acceptable backbleeding through the catheter, indicating some collateral flow  I was not able to direct the wire down the SFA unfortunately    Rather than potentially lose access, I elected to proceed with placement of a perfusion cannula in the profunda femoris  The wire was advanced down the profunda, the tract was serially dilated, and a wire-wrapped 6 FR sheath was advanced over the wire  Angiography through the sheath confirmed positioning in the profunda with good distal runoff  The sheath was secured, was deaired, and was connected to the arterial outflow of the ECMO circuit through a 3-way stopcock on the arterial cannula  The patient tolerated the procedure well and was transported back to the ICU in critical condition  Final CORIE demonstrated robust lateral and inferior LV function, severe hypokinesis of the anterior wall and anteroseptum  There was severe ventricular septal thinning and a R->L shunt was seen through this area  There is no cardiac residency program at this facility and for complex procedures such as this, it is vital to have a physician assistant experienced in cardiac surgery  The assistance of Bren Case was necessary for experienced assistance with manipulation of wires, sheaths, and cannulae during initiation of ECMO        SIGNATURESaintclair Headland, MD  DATE: December 31, 2021  TIME: 12:25 PM

## 2022-01-01 ENCOUNTER — APPOINTMENT (INPATIENT)
Dept: RADIOLOGY | Facility: HOSPITAL | Age: 67
DRG: 167 | End: 2022-01-01
Payer: COMMERCIAL

## 2022-01-01 PROBLEM — E87.20 METABOLIC ACIDOSIS: Status: RESOLVED | Noted: 2021-12-31 | Resolved: 2022-01-01

## 2022-01-01 PROBLEM — D68.9 COAGULOPATHY (HCC): Status: ACTIVE | Noted: 2022-01-01

## 2022-01-01 PROBLEM — E87.2 METABOLIC ACIDOSIS: Status: RESOLVED | Noted: 2021-12-31 | Resolved: 2022-01-01

## 2022-01-01 LAB
ALBUMIN SERPL BCP-MCNC: 3.1 G/DL (ref 3.5–5)
ALBUMIN SERPL BCP-MCNC: 3.3 G/DL (ref 3.5–5)
ALP SERPL-CCNC: 134 U/L (ref 46–116)
ALP SERPL-CCNC: 140 U/L (ref 46–116)
ALT SERPL W P-5'-P-CCNC: 2325 U/L (ref 12–78)
ALT SERPL W P-5'-P-CCNC: 2895 U/L (ref 12–78)
ANION GAP SERPL CALCULATED.3IONS-SCNC: 11 MMOL/L (ref 4–13)
ANION GAP SERPL CALCULATED.3IONS-SCNC: 11 MMOL/L (ref 4–13)
ANION GAP SERPL CALCULATED.3IONS-SCNC: 8 MMOL/L (ref 4–13)
ANISOCYTOSIS BLD QL SMEAR: PRESENT
APTT PPP: 47 SECONDS (ref 23–37)
APTT PPP: 49 SECONDS (ref 23–37)
APTT PPP: 49 SECONDS (ref 23–37)
APTT PPP: 50 SECONDS (ref 23–37)
APTT PPP: 51 SECONDS (ref 23–37)
APTT PPP: 55 SECONDS (ref 23–37)
APTT PPP: 55 SECONDS (ref 23–37)
APTT PPP: 56 SECONDS (ref 23–37)
ARTERIAL PATENCY WRIST A: NO
ARTERIAL PATENCY WRIST A: NO
ARTIFACT: PRESENT
AST SERPL W P-5'-P-CCNC: 349 U/L (ref 5–45)
AST SERPL W P-5'-P-CCNC: 476 U/L (ref 5–45)
ATRIAL RATE: 111 BPM
ATRIAL RATE: 115 BPM
ATRIAL RATE: 116 BPM
BASE EX.OXY STD BLDV CALC-SCNC: 97.9 % (ref 60–80)
BASE EXCESS BLDA CALC-SCNC: -2.3 MMOL/L
BASE EXCESS BLDA CALC-SCNC: -3.3 MMOL/L
BASE EXCESS BLDA CALC-SCNC: 0.8 MMOL/L
BASE EXCESS BLDA CALC-SCNC: 2 MMOL/L
BASE EXCESS BLDV CALC-SCNC: -4.2 MMOL/L
BASOPHILS # BLD MANUAL: 0 THOUSAND/UL (ref 0–0.1)
BASOPHILS NFR MAR MANUAL: 0 % (ref 0–1)
BILIRUB DIRECT SERPL-MCNC: 1.48 MG/DL (ref 0–0.2)
BILIRUB DIRECT SERPL-MCNC: 1.99 MG/DL (ref 0–0.2)
BILIRUB SERPL-MCNC: 2.64 MG/DL (ref 0.2–1)
BILIRUB SERPL-MCNC: 3.27 MG/DL (ref 0.2–1)
BODY TEMPERATURE: 98.5 DEGREES FEHRENHEIT
BODY TEMPERATURE: 98.5 DEGREES FEHRENHEIT
BODY TEMPERATURE: 98.6 DEGREES FEHRENHEIT
BUN SERPL-MCNC: 133 MG/DL (ref 5–25)
BUN SERPL-MCNC: 133 MG/DL (ref 5–25)
BUN SERPL-MCNC: 135 MG/DL (ref 5–25)
CA-I BLD-SCNC: 1.08 MMOL/L (ref 1.12–1.32)
CALCIUM SERPL-MCNC: 8.3 MG/DL (ref 8.3–10.1)
CALCIUM SERPL-MCNC: 8.4 MG/DL (ref 8.3–10.1)
CALCIUM SERPL-MCNC: 8.6 MG/DL (ref 8.3–10.1)
CHLORIDE SERPL-SCNC: 108 MMOL/L (ref 100–108)
CHLORIDE SERPL-SCNC: 108 MMOL/L (ref 100–108)
CHLORIDE SERPL-SCNC: 111 MMOL/L (ref 100–108)
CO2 SERPL-SCNC: 24 MMOL/L (ref 21–32)
CO2 SERPL-SCNC: 26 MMOL/L (ref 21–32)
CO2 SERPL-SCNC: 27 MMOL/L (ref 21–32)
CREAT SERPL-MCNC: 2.94 MG/DL (ref 0.6–1.3)
CREAT SERPL-MCNC: 3 MG/DL (ref 0.6–1.3)
CREAT SERPL-MCNC: 3.01 MG/DL (ref 0.6–1.3)
EOSINOPHIL # BLD MANUAL: 0 THOUSAND/UL (ref 0–0.4)
EOSINOPHIL NFR BLD MANUAL: 0 % (ref 0–6)
ERYTHROCYTE [DISTWIDTH] IN BLOOD BY AUTOMATED COUNT: 13.8 % (ref 11.6–15.1)
ERYTHROCYTE [DISTWIDTH] IN BLOOD BY AUTOMATED COUNT: 14.1 % (ref 11.6–15.1)
GFR SERPL CREATININE-BSD FRML MDRD: 20 ML/MIN/1.73SQ M
GFR SERPL CREATININE-BSD FRML MDRD: 20 ML/MIN/1.73SQ M
GFR SERPL CREATININE-BSD FRML MDRD: 21 ML/MIN/1.73SQ M
GLUCOSE SERPL-MCNC: 101 MG/DL (ref 65–140)
GLUCOSE SERPL-MCNC: 103 MG/DL (ref 65–140)
GLUCOSE SERPL-MCNC: 128 MG/DL (ref 65–140)
GLUCOSE SERPL-MCNC: 129 MG/DL (ref 65–140)
GLUCOSE SERPL-MCNC: 131 MG/DL (ref 65–140)
GLUCOSE SERPL-MCNC: 140 MG/DL (ref 65–140)
GLUCOSE SERPL-MCNC: 141 MG/DL (ref 65–140)
GLUCOSE SERPL-MCNC: 153 MG/DL (ref 65–140)
GLUCOSE SERPL-MCNC: 156 MG/DL (ref 65–140)
GLUCOSE SERPL-MCNC: 159 MG/DL (ref 65–140)
GLUCOSE SERPL-MCNC: 172 MG/DL (ref 65–140)
HCO3 BLDA-SCNC: 21.7 MMOL/L (ref 22–28)
HCO3 BLDA-SCNC: 22.9 MMOL/L (ref 22–28)
HCO3 BLDA-SCNC: 26.7 MMOL/L (ref 22–28)
HCO3 BLDA-SCNC: 27 MMOL/L (ref 22–28)
HCO3 BLDV-SCNC: 16.5 MMOL/L (ref 24–30)
HCT VFR BLD AUTO: 25.2 % (ref 36.5–49.3)
HCT VFR BLD AUTO: 26.2 % (ref 36.5–49.3)
HGB BLD-MCNC: 8.4 G/DL (ref 12–17)
HGB BLD-MCNC: 8.8 G/DL (ref 12–17)
HOROWITZ INDEX BLDA+IHG-RTO: 50 MM[HG]
INR PPP: 1.93 (ref 0.84–1.19)
INR PPP: 2.06 (ref 0.84–1.19)
LYMPHOCYTES # BLD AUTO: 1.69 THOUSAND/UL (ref 0.6–4.47)
LYMPHOCYTES # BLD AUTO: 6 % (ref 14–44)
MACROCYTES BLD QL AUTO: PRESENT
MAGNESIUM SERPL-MCNC: 3.2 MG/DL (ref 1.6–2.6)
MCH RBC QN AUTO: 31.6 PG (ref 26.8–34.3)
MCH RBC QN AUTO: 32 PG (ref 26.8–34.3)
MCHC RBC AUTO-ENTMCNC: 33.3 G/DL (ref 31.4–37.4)
MCHC RBC AUTO-ENTMCNC: 33.6 G/DL (ref 31.4–37.4)
MCV RBC AUTO: 95 FL (ref 82–98)
MCV RBC AUTO: 95 FL (ref 82–98)
MONOCYTES # BLD AUTO: 2.26 THOUSAND/UL (ref 0–1.22)
MONOCYTES NFR BLD: 8 % (ref 4–12)
NEUTROPHILS # BLD MANUAL: 24.28 THOUSAND/UL (ref 1.85–7.62)
NEUTS BAND NFR BLD MANUAL: 4 % (ref 0–8)
NEUTS SEG NFR BLD AUTO: 82 % (ref 43–75)
NRBC BLD AUTO-RTO: 7 /100 WBC (ref 0–2)
O2 CT BLDA-SCNC: 12.6 ML/DL (ref 16–23)
O2 CT BLDA-SCNC: 13 ML/DL (ref 16–23)
O2 CT BLDA-SCNC: 13.1 ML/DL (ref 16–23)
O2 CT BLDA-SCNC: 14.3 ML/DL (ref 16–23)
O2 CT BLDV-SCNC: 13.2 ML/DL
OXYHGB MFR BLDA: 98.3 % (ref 94–97)
OXYHGB MFR BLDA: 98.6 % (ref 94–97)
OXYHGB MFR BLDA: 98.8 % (ref 94–97)
OXYHGB MFR BLDA: 98.8 % (ref 94–97)
P AXIS: 44 DEGREES
P AXIS: 56 DEGREES
P AXIS: 65 DEGREES
PCO2 BLD: 18.4 MM HG (ref 42–50)
PCO2 BLDA: 38.9 MM HG (ref 36–44)
PCO2 BLDA: 40.9 MM HG (ref 36–44)
PCO2 BLDA: 42.2 MM HG (ref 36–44)
PCO2 BLDA: 52.1 MM HG (ref 36–44)
PCO2 BLDV: 18.5 MM HG (ref 42–50)
PCO2 TEMP ADJ BLDA: 38.7 MM HG (ref 36–44)
PEEP RESPIRATORY: 6 CM[H2O]
PH BLD: 7.37 [PH] (ref 7.35–7.45)
PH BLD: 7.57 [PH] (ref 7.3–7.4)
PH BLDA: 7.33 [PH] (ref 7.35–7.45)
PH BLDA: 7.37 [PH] (ref 7.35–7.45)
PH BLDA: 7.37 [PH] (ref 7.35–7.45)
PH BLDA: 7.42 [PH] (ref 7.35–7.45)
PH BLDV: 7.57 [PH] (ref 7.3–7.4)
PHOSPHATE SERPL-MCNC: 5.3 MG/DL (ref 2.3–4.1)
PLATELET # BLD AUTO: 138 THOUSANDS/UL (ref 149–390)
PLATELET # BLD AUTO: 164 THOUSANDS/UL (ref 149–390)
PLATELET BLD QL SMEAR: ADEQUATE
PMV BLD AUTO: 11 FL (ref 8.9–12.7)
PMV BLD AUTO: 11.2 FL (ref 8.9–12.7)
PO2 BLD: 369.5 MM HG (ref 75–129)
PO2 BLDA: 271.4 MM HG (ref 75–129)
PO2 BLDA: 348.2 MM HG (ref 75–129)
PO2 BLDA: 370 MM HG (ref 75–129)
PO2 BLDA: 384.7 MM HG (ref 75–129)
PO2 BLDV: 143.2 MM HG (ref 35–45)
PO2 VENOUS TEMP CORRECTED: 142.6 MM HG (ref 35–45)
POLYCHROMASIA BLD QL SMEAR: PRESENT
POTASSIUM SERPL-SCNC: 3.7 MMOL/L (ref 3.5–5.3)
POTASSIUM SERPL-SCNC: 4.1 MMOL/L (ref 3.5–5.3)
POTASSIUM SERPL-SCNC: 4.3 MMOL/L (ref 3.5–5.3)
PR INTERVAL: 158 MS
PR INTERVAL: 172 MS
PR INTERVAL: 176 MS
PROCALCITONIN SERPL-MCNC: 2.9 NG/ML
PROT SERPL-MCNC: 5.7 G/DL (ref 6.4–8.2)
PROT SERPL-MCNC: 6 G/DL (ref 6.4–8.2)
PROTHROMBIN TIME: 21.2 SECONDS (ref 11.6–14.5)
PROTHROMBIN TIME: 22.2 SECONDS (ref 11.6–14.5)
QRS AXIS: 53 DEGREES
QRS AXIS: 54 DEGREES
QRS AXIS: 55 DEGREES
QRSD INTERVAL: 132 MS
QRSD INTERVAL: 134 MS
QRSD INTERVAL: 140 MS
QT INTERVAL: 340 MS
QT INTERVAL: 376 MS
QT INTERVAL: 378 MS
QTC INTERVAL: 462 MS
QTC INTERVAL: 520 MS
QTC INTERVAL: 525 MS
RBC # BLD AUTO: 2.66 MILLION/UL (ref 3.88–5.62)
RBC # BLD AUTO: 2.75 MILLION/UL (ref 3.88–5.62)
RBC MORPH BLD: PRESENT
SODIUM SERPL-SCNC: 143 MMOL/L (ref 136–145)
SODIUM SERPL-SCNC: 145 MMOL/L (ref 136–145)
SODIUM SERPL-SCNC: 146 MMOL/L (ref 136–145)
SPECIMEN SOURCE: ABNORMAL
T WAVE AXIS: -9 DEGREES
T WAVE AXIS: 12 DEGREES
T WAVE AXIS: 17 DEGREES
VENT AC: 14
VENT AC: 15
VENT- AC: AC
VENTRICULAR RATE: 111 BPM
VENTRICULAR RATE: 115 BPM
VENTRICULAR RATE: 116 BPM
VT SETTING VENT: 400 ML
WBC # BLD AUTO: 28.23 THOUSAND/UL (ref 4.31–10.16)
WBC # BLD AUTO: 31.86 THOUSAND/UL (ref 4.31–10.16)

## 2022-01-01 PROCEDURE — 85027 COMPLETE CBC AUTOMATED: CPT | Performed by: NURSE PRACTITIONER

## 2022-01-01 PROCEDURE — 93005 ELECTROCARDIOGRAM TRACING: CPT

## 2022-01-01 PROCEDURE — 85730 THROMBOPLASTIN TIME PARTIAL: CPT | Performed by: PHYSICIAN ASSISTANT

## 2022-01-01 PROCEDURE — 33949 ECMO/ECLS DAILY MGMT ARTERY: CPT

## 2022-01-01 PROCEDURE — 93010 ELECTROCARDIOGRAM REPORT: CPT | Performed by: INTERNAL MEDICINE

## 2022-01-01 PROCEDURE — 84100 ASSAY OF PHOSPHORUS: CPT | Performed by: NURSE PRACTITIONER

## 2022-01-01 PROCEDURE — 82330 ASSAY OF CALCIUM: CPT | Performed by: PHYSICIAN ASSISTANT

## 2022-01-01 PROCEDURE — 94760 N-INVAS EAR/PLS OXIMETRY 1: CPT

## 2022-01-01 PROCEDURE — 82805 BLOOD GASES W/O2 SATURATION: CPT | Performed by: NURSE PRACTITIONER

## 2022-01-01 PROCEDURE — 83735 ASSAY OF MAGNESIUM: CPT | Performed by: NURSE PRACTITIONER

## 2022-01-01 PROCEDURE — 99291 CRITICAL CARE FIRST HOUR: CPT | Performed by: ANESTHESIOLOGY

## 2022-01-01 PROCEDURE — 85610 PROTHROMBIN TIME: CPT | Performed by: NURSE PRACTITIONER

## 2022-01-01 PROCEDURE — 71045 X-RAY EXAM CHEST 1 VIEW: CPT

## 2022-01-01 PROCEDURE — 85730 THROMBOPLASTIN TIME PARTIAL: CPT | Performed by: ANESTHESIOLOGY

## 2022-01-01 PROCEDURE — 4A133J1 MONITORING OF ARTERIAL PULSE, PERIPHERAL, PERCUTANEOUS APPROACH: ICD-10-PCS | Performed by: THORACIC SURGERY (CARDIOTHORACIC VASCULAR SURGERY)

## 2022-01-01 PROCEDURE — 80048 BASIC METABOLIC PNL TOTAL CA: CPT | Performed by: NURSE PRACTITIONER

## 2022-01-01 PROCEDURE — 03HY32Z INSERTION OF MONITORING DEVICE INTO UPPER ARTERY, PERCUTANEOUS APPROACH: ICD-10-PCS | Performed by: THORACIC SURGERY (CARDIOTHORACIC VASCULAR SURGERY)

## 2022-01-01 PROCEDURE — 33949 ECMO/ECLS DAILY MGMT ARTERY: CPT | Performed by: THORACIC SURGERY (CARDIOTHORACIC VASCULAR SURGERY)

## 2022-01-01 PROCEDURE — 33949 ECMO/ECLS DAILY MGMT ARTERY: CPT | Performed by: ANESTHESIOLOGY

## 2022-01-01 PROCEDURE — 99232 SBSQ HOSP IP/OBS MODERATE 35: CPT | Performed by: INTERNAL MEDICINE

## 2022-01-01 PROCEDURE — 85027 COMPLETE CBC AUTOMATED: CPT | Performed by: PHYSICIAN ASSISTANT

## 2022-01-01 PROCEDURE — 4A133B1 MONITORING OF ARTERIAL PRESSURE, PERIPHERAL, PERCUTANEOUS APPROACH: ICD-10-PCS | Performed by: THORACIC SURGERY (CARDIOTHORACIC VASCULAR SURGERY)

## 2022-01-01 PROCEDURE — NC001 PR NO CHARGE: Performed by: ANESTHESIOLOGY

## 2022-01-01 PROCEDURE — 82948 REAGENT STRIP/BLOOD GLUCOSE: CPT

## 2022-01-01 PROCEDURE — 36620 INSERTION CATHETER ARTERY: CPT | Performed by: ANESTHESIOLOGY

## 2022-01-01 PROCEDURE — 80076 HEPATIC FUNCTION PANEL: CPT | Performed by: NURSE PRACTITIONER

## 2022-01-01 PROCEDURE — 94003 VENT MGMT INPAT SUBQ DAY: CPT

## 2022-01-01 PROCEDURE — 85007 BL SMEAR W/DIFF WBC COUNT: CPT | Performed by: PHYSICIAN ASSISTANT

## 2022-01-01 PROCEDURE — 84145 PROCALCITONIN (PCT): CPT | Performed by: NURSE PRACTITIONER

## 2022-01-01 PROCEDURE — 82805 BLOOD GASES W/O2 SATURATION: CPT | Performed by: PHYSICIAN ASSISTANT

## 2022-01-01 RX ORDER — ALBUMIN, HUMAN INJ 5% 5 %
25 SOLUTION INTRAVENOUS ONCE
Status: COMPLETED | OUTPATIENT
Start: 2022-01-01 | End: 2022-01-01

## 2022-01-01 RX ORDER — ESMOLOL HYDROCHLORIDE 10 MG/ML
25-200 INJECTION, SOLUTION INTRAVENOUS
Status: DISCONTINUED | OUTPATIENT
Start: 2022-01-01 | End: 2022-01-02

## 2022-01-01 RX ORDER — POTASSIUM CHLORIDE 29.8 MG/ML
40 INJECTION INTRAVENOUS ONCE
Status: COMPLETED | OUTPATIENT
Start: 2022-01-01 | End: 2022-01-02

## 2022-01-01 RX ORDER — POTASSIUM CHLORIDE 14.9 MG/ML
20 INJECTION INTRAVENOUS ONCE
Status: COMPLETED | OUTPATIENT
Start: 2022-01-01 | End: 2022-01-01

## 2022-01-01 RX ORDER — CALCIUM GLUCONATE 20 MG/ML
2 INJECTION, SOLUTION INTRAVENOUS ONCE
Status: COMPLETED | OUTPATIENT
Start: 2022-01-01 | End: 2022-01-01

## 2022-01-01 RX ADMIN — ALBUMIN (HUMAN) 25 G: 12.5 INJECTION, SOLUTION INTRAVENOUS at 01:20

## 2022-01-01 RX ADMIN — SENNOSIDES 8.8 MG: 8.8 SYRUP ORAL at 18:20

## 2022-01-01 RX ADMIN — PROPOFOL 30 MCG/KG/MIN: 10 INJECTION, EMULSION INTRAVENOUS at 13:36

## 2022-01-01 RX ADMIN — METOPROLOL TARTRATE 2.5 MG: 5 INJECTION, SOLUTION INTRAVENOUS at 00:22

## 2022-01-01 RX ADMIN — POTASSIUM CHLORIDE 40 MEQ: 29.8 INJECTION, SOLUTION INTRAVENOUS at 00:08

## 2022-01-01 RX ADMIN — PROPOFOL 25 MCG/KG/MIN: 10 INJECTION, EMULSION INTRAVENOUS at 03:20

## 2022-01-01 RX ADMIN — CALCIUM GLUCONATE 2 G: 20 INJECTION, SOLUTION INTRAVENOUS at 06:41

## 2022-01-01 RX ADMIN — SENNOSIDES 8.8 MG: 8.8 SYRUP ORAL at 08:45

## 2022-01-01 RX ADMIN — VASOPRESSIN 0.04 UNITS/MIN: 20 INJECTION INTRAVENOUS at 12:38

## 2022-01-01 RX ADMIN — METOPROLOL TARTRATE 2.5 MG: 5 INJECTION, SOLUTION INTRAVENOUS at 02:29

## 2022-01-01 RX ADMIN — MILRINONE LACTATE IN DEXTROSE 0.25 MCG/KG/MIN: 200 INJECTION, SOLUTION INTRAVENOUS at 13:37

## 2022-01-01 RX ADMIN — ESMOLOL HYDROCHLORIDE 25 MCG/KG/MIN: 10 INJECTION INTRAVENOUS at 13:41

## 2022-01-01 RX ADMIN — VASOPRESSIN 0.04 UNITS/MIN: 20 INJECTION INTRAVENOUS at 01:58

## 2022-01-01 RX ADMIN — VASOPRESSIN 0.04 UNITS/MIN: 20 INJECTION INTRAVENOUS at 22:04

## 2022-01-01 RX ADMIN — CHLORHEXIDINE GLUCONATE 15 ML: 1.2 SOLUTION ORAL at 08:45

## 2022-01-01 RX ADMIN — POTASSIUM CHLORIDE 20 MEQ: 14.9 INJECTION, SOLUTION INTRAVENOUS at 15:32

## 2022-01-01 RX ADMIN — NOREPINEPHRINE BITARTRATE 5 MCG/MIN: 1 INJECTION, SOLUTION, CONCENTRATE INTRAVENOUS at 10:47

## 2022-01-01 RX ADMIN — PROPOFOL 25 MCG/KG/MIN: 10 INJECTION, EMULSION INTRAVENOUS at 19:59

## 2022-01-01 RX ADMIN — Medication 20 MG: at 13:55

## 2022-01-01 RX ADMIN — MILRINONE LACTATE IN DEXTROSE 0.5 MCG/KG/MIN: 200 INJECTION, SOLUTION INTRAVENOUS at 05:11

## 2022-01-01 RX ADMIN — CHLORHEXIDINE GLUCONATE 15 ML: 1.2 SOLUTION ORAL at 21:09

## 2022-01-01 RX ADMIN — PROPOFOL 25 MCG/KG/MIN: 10 INJECTION, EMULSION INTRAVENOUS at 08:22

## 2022-01-01 RX ADMIN — HEPARIN SODIUM 13 UNITS/KG/HR: 10000 INJECTION, SOLUTION INTRAVENOUS at 13:38

## 2022-01-01 RX ADMIN — PHYTONADIONE 5 MG: 10 INJECTION, EMULSION INTRAMUSCULAR; INTRAVENOUS; SUBCUTANEOUS at 15:26

## 2022-01-01 RX ADMIN — ASPIRIN 81 MG: 81 TABLET, COATED ORAL at 08:45

## 2022-01-01 RX ADMIN — POLYETHYLENE GLYCOL 3350 17 G: 17 POWDER, FOR SOLUTION ORAL at 08:45

## 2022-01-01 NOTE — PROGRESS NOTES
Progress Note - Critical Care   Thirza Spatz 61 y o  male MRN: [id number]  Unit/Bed#: [facility] 418-01 Encounter: [id number]      Attending Physician: Eri Garcia MD    24 Hour Events/HPI: POD # 1 s/p VA ECMO cannulation for eventual bridge to VSD repair  High urine output and drops in flows, given total of 750mL 5% Albumin  Current infusions: Epi 2mcg, Levo 2mcg, Vaso 0 04 units, Milrinone 0 5mcg, ECMO Heparin, Propofol    ROS: Review of Systems   Unable to perform ROS: Intubated     ------------------------------------------------------------------------------------------------------------------------------------------------------------------  Impressions:  1  Cardiogenic shock s/p VA ECMO cannulation  2  Recent LAD STEMI  3  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   4  Shock liver  5  JUDE  6  Toxic metabolic encephalopathy  7  Tylenol OD s/p NAC     Plan:    Neuro:   · Sedation plan: propofol  · RASS goal: -1 Drowsy  · Pain controlled with: PRN fentanyl   · Regulate sleep/wake cycle  · Delirium precautions  · CAM-ICU daily  · Trend neuro exam    CV:   · Cardiac infusions: Epinephrine, 2 mcg/min, Primacor, 0 5 mcg/kg/min, Levophed, 2 mcg/min, Vasopressin, 0 04 Units/min  · Wean vasopressors/inotropes as able  · MAP goal > 65 and CI >2 2  · continue Husser Althea catheter  · continue Arterial line  · Rhythm: Sinus Tachycardia  · Follow rhythm on telemetry  · Hold beta blocker secondary to vasopressor requirement  · Continue ASA    Lung:   · Acute respiratory failure; Requiring Intubation with ventilator support  Secondary to atelectasis and lethargy/altered mental status  Continue incentive spirometry/coughing/deep breathing exercises    Wean supplemental oxygen as tolerated for saturation > 90%  · SBT plan: daily  · Chlorhexidine ordered: yes, PPI, HOB > 30 degrees   · Vent 14/400/6/50%    GI:   · Continue PPI for stress ulcer prophylaxis  · Continue bowel regimen  · Trend abdominal exam and bowel function    FEN:   · Diuretic plan: PRN diuresis  · PRN electrolyte repletion  · Nutrition/diet plan: [facility] at [facility], advance to goal today  · Replenish electrolytes with goals: K >4 0, Mag >2 0, and Phos >3 0    :   · Indwelling Chang present: yes   · continue Chang  · Trend UOP and BUN/creat  · Strict I and O    ID:   · Trend temps and WBC count  · Maintain normothermia    Results from last 7 days   Lab Units 01/01/22  0433 12/31/21  0422 12/30/21  1007   PROCALCITONIN ng/ml 2 90* 2 16* 1 65*     Heme:   · Trend hgb and plts  · ECMO protocol heparin gtt     Endo:   · Glycemic control plan: no hx of DM, continue insulin gtt    Results from last 6 Months   Lab Units 12/31/21  0400 12/30/21  1007 09/13/21  0657   HEMOGLOBIN A1C % 5 9* 5 7* 5 3     MSK/Skin:  · Frequent turning and pressure off-loading  · Local wound care as needed    Disposition:  Continue ICU care  ---------------------------------------------------------------------------------------------------------------------------------------------------------------------  Allergies:    Allergies   Allergen Reactions    Mushroom Extract Complex - Food Allergy Facial Swelling    Peanut Oil - Food Allergy     Strawberry C [Ascorbate - Food Allergy]      Medications:   Scheduled Meds:  Current Facility-Administered Medications   Medication Dose Route Frequency Provider Last Rate    aspirin  81 mg Oral Daily Wendi Spironello V, CRNP      calcium gluconate  2 g Intravenous Once [employer] V, CRNP 2 g (01/01/22 0641)    chlorhexidine  15 mL Mouth/Throat Q12H [address] Wendi Spironello V, CRNP      epinephrine  2 mcg/min Intravenous Continuous Surinder Tesoriero, DO 2 mcg/min (12/31/21 6991)    fentanyl citrate (PF)  50 mcg Intravenous Q2H PRN Wendi Spironello V, CRNP      heparin (porcine)  3-15 Units/kg/hr (Order-Specific) Intravenous Titrated Wendi Spironello V, CRNP 13 Units/kg/hr (01/01/22 9553)    heparin 5000 units in sodium chloride 0 9% 1000 mL irrigation   Irrigation Once [facility] V, CRNP      insulin regular (HumuLIN R,NovoLIN R) infusion  0 3-21 Units/hr Intravenous Titrated Wendi Spironello V, CRNP 1 5 Units/hr (01/01/22 0606)    metoprolol  2 5 mg Intravenous Q4H PRN Luis Sergei, DO      milrinone [facility]) infusion  0 5 mcg/kg/min Intravenous Continuous Wendi Spironello V, CRNP 0 5 mcg/kg/min (01/01/22 0511)    norepinephrine  1-30 mcg/min Intravenous Titrated Wendi Spironello V, CRNP 3 mcg/min (01/01/22 3826)    omeprazole (PRILOSEC) suspension 2 mg/mL  20 mg Oral Daily Wendi Spironello V, CRNP      ondansetron  4 mg Intravenous Q6H PRN Wendi Spironello V, CRNP      polyethylene glycol  17 g Oral Daily Wendi Spironello V, CRNP      propofol  5-50 mcg/kg/min Intravenous Titrated Wendi Spironello V, CRNP 25 mcg/kg/min (01/01/22 0419)    senna  8 8 mg Oral BID Wendi Spironello V, CRNP      sodium chloride (PF)  3 mL Intravenous Q1H PRN Wendi Spironello V, CRNP      vasopressin (PITRESSIN) in 0 9 % sodium chloride 100 mL  0 04 Units/min Intravenous Continuous Wendi Spironello V, CRNP 0 04 Units/min (01/01/22 0158)     VTE Pharmacologic Prophylaxis: Heparin Drip  VTE Mechanical Prophylaxis: sequential compression device    Continuous Infusions:epinephrine, 2 mcg/min, Last Rate: 2 mcg/min (12/31/21 1657)  heparin (porcine), 3-15 Units/kg/hr (Order-Specific), Last Rate: 13 Units/kg/hr (01/01/22 0459)  insulin regular (HumuLIN R,NovoLIN R) infusion, 0 3-21 Units/hr, Last Rate: 1 5 Units/hr (01/01/22 0606)  milrinone (PRIMACOR) infusion, 0 5 mcg/kg/min, Last Rate: 0 5 mcg/kg/min (01/01/22 0511)  norepinephrine, 1-30 mcg/min, Last Rate: 3 mcg/min (01/01/22 1680)  propofol, 5-50 mcg/kg/min, Last Rate: 25 mcg/kg/min (01/01/22 3867)  vasopressin (PITRESSIN) in 0 9 % sodium chloride 100 mL, 0 04 Units/min, Last Rate: 0 04 Units/min (01/01/22 7419)      PRN Meds:  fentanyl citrate (PF), 50 mcg, Q2H PRN  metoprolol, 2 5 mg, Q4H PRN  ondansetron, 4 mg, Q6H PRN  sodium chloride (PF), 3 mL, Q1H PRN      Home Medications:   Prior to Admission medications    Medication Sig Start Date End Date Taking? Authorizing Provider   Gladys Branham) 0 5 mg tablet Take 1-2 tablets 30-60 minutes prior to leaving home prn 3/18/21   Siddhartha Blackwood MD   EPINEPHrine (EPIPEN) 0 3 mg/0 3 mL SOAJ Inject 0 3 mL (0 3 mg total) into a muscle once for 1 dose 9/21/18 3/18/21  Rob Walton, DO     ---------------------------------------------------------------------------------------------------------------------------------------------------------------------  Vitals:   Vitals:    22 0403 22 0500 22 0538 22 0600   BP:       BP Location:       Pulse:  (!) 114  (!) 114   Resp:  21  19   Temp:  98 6 °F (37 °C)  98 6 °F (37 °C)   TempSrc:       SpO2: 98% 98%  98%   Weight:   90 5 kg (199 lb 8 3 oz)    Height:         Arterial Line:  Arterial Line BP: 71/67  Arterial Line MAP (mmHg): 69 mmHg    Tele Rhythm: Sinus Tachycardia This was personally reviewed by myself      Respiratory:  SpO2: SpO2: 98 %, SpO2 Activity: SpO2 Activity: At Rest, SpO2 Device: O2 Device: Ventilator  Nasal Cannula O2 Flow Rate (L/min): 2 L/min    Ventilator:  Respiratory  Report   Lab Data (Last 4 hours)       0433            pH, Arterial       7 365             pCO2, Arterial       38 9             pO2, Arterial       370 0             HCO3, Arterial       21 7             Base Excess, Arterial       -3 3                  O2/Vent Data        0403   Most Recent         Vent Mode AC/VC  AC/VC      Resp Rate (BPM) (BPM) 14  14      Vt (mL) (mL) 400  400      FIO2 (%) (%) 50  50      PEEP (cmH2O) (cmH2O) 6  6      MV 8 9  8 9                Temperature: Temp (24hrs), Av 4 °F (36 9 °C), Min:96 2 °F (35 7 °C), Max:98 9 °F (37 2 °C)  Current: Temperature: 98 6 °F (37 °C)    Weights:   Weight (last 2 days)     Date/Time Weight 01/01/22 0538 90 5 (199 52)    12/31/21 0540 92 3 (203 48)    12/30/21 1320 93 (205)    12/30/21 0959 93 (205)        Body mass index is 32 7 kg/m²  Hemodynamic Monitoring:  PAP: PAP: 23/13, CVP: CVP (mean): 9 mmHg, CO:  , CI:  , SVR: SVR (dyne*sec)/cm5: 364 (dyne*sec)/cm5  PAP: (10-98)/(0-73) 23/13     ECMO:  Treatment Type: AV ECMO  Pump Flow (L/min): 4 4 LPM  Pump Speed (Rotations/min): 6800 RPM   Flow (L/min): 3 LPM   FiO2 (%): 100 %    Intake and Outputs:    Intake/Output Summary (Last 24 hours) at 1/1/2022 0729  Last data filed at 1/1/2022 0600  Gross per 24 hour   Intake 5033 19 ml   Output 6625 ml   Net -1591 81 ml     I/O last 24 hours: In: 5033 2 [I V :2664 3; IV Piggyback:2148 9; Feedings:220]  Out: 6625 [Urine:6625]    BM: PTA    Labs:  Results from last 7 days   Lab Units 01/01/22  0443 12/31/21  1326 12/31/21  1010 12/31/21  0400 12/30/21  1007 12/30/21  1007   WBC Thousand/uL 28 23* 14 61*  --  14 68*   < > 17 22*   HEMOGLOBIN g/dL 8 8* 9 1*  --  10 7*   < > 12 8   I STAT HEMOGLOBIN g/dl  --  9 5*   < >  --   --   --    HEMATOCRIT % 26 2* 26 1*  --  31 2*   < > 40 1   HEMATOCRIT, ISTAT %  --  28*   < >  --   --   --    PLATELETS Thousands/uL 164 141*  --  152   < > 217   MONO PCT %  --   --   --   --   --  4    < > = values in this interval not displayed       Results from last 7 days   Lab Units 01/01/22  0433 12/31/21  2321 12/31/21  1729 12/31/21  1326 12/31/21  1326 12/31/21  1149 12/31/21  1010 12/31/21  1010 12/31/21  0420 12/31/21  0411   SODIUM mmol/L 143 142 140   < > 138  --   --   --   --    < >   POTASSIUM mmol/L 4 1 3 8 4 2   < > 3 5  --   --   --   --    < >   CHLORIDE mmol/L 108 104 106   < > 104  --   --   --   --    < >   CO2 mmol/L 24 24 20*   < > 18*  --   --   --   --    < >   CO2, I-STAT mmol/L  --   --   --   --  24 21  22   < > 17*  --    < >   BUN mg/dL 133* 145* 135*   < > 140*  --   --   --   --    < >   CREATININE mg/dL 3 01* 3 02* 2 82*   < > 2 87*  -- --   --   --    < >   CALCIUM mg/dL 8 3 8 6 8 5   < > 8 4  --   --   --   --    < >   ALK PHOS U/L 134*  --   --   --  128*  --   --   --  148*  --    ALT U/L 2,895*  --   --   --  3,696*  --   --   --  4,905*  --    AST U/L 476*  --   --   --  996*  --   --   --  2,054*  --    GLUCOSE, ISTAT mg/dl  --   --   --   --  161* 168*  170*  --  180*  --   --     < > = values in this interval not displayed  Baseline Creat: 1 0-1 2    Results from last 7 days   Lab Units 01/01/22  0433 12/31/21  0400 12/30/21  1632   MAGNESIUM mg/dL 3 2* 3 4* 3 5*     Results from last 7 days   Lab Units 01/01/22  0433 12/31/21  0400 12/30/21  1632   PHOSPHORUS mg/dL 5 3* 6 0* 9 0*      Results from last 7 days   Lab Units 01/01/22  0646 01/01/22  0433 01/01/22  0351 01/01/22  0132 12/31/21  1354 12/31/21  1326 12/30/21  1613 12/30/21  1007   INR   --  2 06*  --   --   --  2 42*  --  2 04*   PTT seconds 50*  --  49* 47*   < > 138*   < > 28    < > = values in this interval not displayed  Results from last 7 days   Lab Units 12/31/21  1650 12/31/21  1326 12/31/21  0836   LACTIC ACID mmol/L 1 5 2 1* 2 0     Results from last 7 days   Lab Units 01/01/22  0433 12/31/21  2321 12/31/21  2321 12/31/21  1730 12/31/21  1730   PH ART  7 365   < > 7 355   < > 7 409   PCO2 ART mm Hg 38 9   < > 36 9   < > 30 9*   PO2 ART mm Hg 370 0*   < > 401 7*   < > 400 0*   HCO3 ART mmol/L 21 7*   < > 20 1*   < > 19 1*   BASE EXC ART mmol/L -3 3   < > -4 8   < > -4 7   ABG SOURCE  Line, Arterial  --  Line, Arterial  --  Line, Arterial    < > = values in this interval not displayed           SVO2: 98%    Micro:   Blood Culture:   Lab Results   Component Value Date    BLOODCX No Growth at 24 hrs  12/30/2021    BLOODCX No Growth at 24 hrs  12/30/2021     Urine Culture: No results found for: URINECX  Sputum Culture: No components found for: SPUTUMCX  Wound Culure: No results found for: WOUNDCULT    Diagnositic Studies:  01/01/22 CXR: lines and tubes stable, no pneumo  Slight interval worsening of RML/RLL infiltrate vs atelectasis  This was personally reviewed by myself in PACS   01/01/22 EKG: ST-rate 115, persistent ST elevations in V2-V6  This was personally reviewed by myself  Nutrition:        Diet Orders   (From admission, onward)             Start     Ordered    01/01/22 0727  Diet Enteral/Parenteral; Tube Feeding No Oral Diet; Nepro; Continuous; 40  Diet effective now        References:    Nutrtion Support Algorithm Enteral vs  Parenteral   Question Answer Comment   Diet Type Enteral/Parenteral    Enteral/Parenteral Tube Feeding No Oral Diet    Tube Feeding Formula: Nepro    Bolus/Cyclic/Continuous Continuous    Tube Feeding Goal Rate (mL/hr): 40    RD to adjust diet per protocol? Yes        01/01/22 0726              Physical Exam  Constitutional:       Appearance: He is well-developed  He is ill-appearing  Interventions: He is sedated, intubated and restrained  HENT:      Head: Normocephalic and atraumatic  Eyes:      General: Lids are normal       Extraocular Movements: Extraocular movements intact  Conjunctiva/sclera: Conjunctivae normal       Comments: Pupils +4 non-reactive bilaterally, left pupil irregularly shaped   Neck:      Trachea: Trachea normal    Cardiovascular:      Rate and Rhythm: Regular rhythm  Tachycardia present  Pulses:           Dorsalis pedis pulses are detected w/ Doppler on the right side  Posterior tibial pulses are detected w/ Doppler on the left side  Heart sounds: S1 normal and S2 normal  Murmur heard  Comments: Radial doppler signals intact  R fem VA ECMO cannulation site with retrograde perfusion catheter with bloody drainage and clots, site soft, no hematoma  Pulmonary:      Effort: Pulmonary effort is normal  He is intubated  Comments: Course rhonchi bilaterally  Abdominal:      General: Bowel sounds are normal       Palpations: Abdomen is soft     Musculoskeletal:      Cervical back: Neck supple  Comments: Unable to assess d/t sedation   Skin:     General: Skin is cool and dry  Capillary Refill: Capillary refill takes less than 2 seconds  Coloration: Skin is pale  Comments: B/l feet cool and dusky, right hand cool and dusky    Neurological:      GCS: GCS eye subscore is 1  GCS verbal subscore is 1  GCS motor subscore is 1  Invasive lines and devices: Invasive Devices  Report    Central Venous Catheter Line            CVC Central Lines 12/30/21 Triple Left Internal jugular 1 day    Introducer 12/31/21 <1 day    LDA ECMO 12/31/21 Femoral artery  <1 day          Arterial Line            Arterial Line 12/31/21 Right Brachial <1 day          Line            Arterial Sheath 6 Fr  Right  <1 day          Drain            Urethral Catheter Temperature probe 1 day    NG/OG/Enteral Tube Orogastric Center mouth <1 day          Airway            ETT  Hi-Lo; Cuffed;Oral 8 5 mm <1 day              ---------------------------------------------------------------------------------------------------------------------------------------------------------------------  Code Status: Level 1 - Full Code    Care Time Delivered:   No Critical Care time spent     Collaborative bedside rounds performed with cardiac surgery attending, critical care attending and bedside RN      SIGNATURE: BROOKE Henderson  DATE: January 1, 2022  TIME: 7:29 AM

## 2022-01-01 NOTE — PROCEDURES
Arterial Line Insertion    Date/Time: 1/1/2022 12:45 PM  Performed by: Mariela Patel DO  Authorized by: Mariela Patel DO     Patient location:  Bedside  Consent:     Consent obtained:  Emergent situation  Universal protocol:     Required blood products, implants, devices, and special equipment available: yes      Site/side marked: yes      Immediately prior to procedure a time out was called: yes      Patient identity confirmed: Anonymous protocol, patient vented/unresponsive and arm band  Indications:     Indications: hemodynamic monitoring and multiple ABGs    Pre-procedure details:     Skin preparation:  Chlorhexidine    Preparation: Patient was prepped and draped in sterile fashion    Procedure details:     Location / Tip of Catheter:  Other (comment) (Left axillary artery)    Needle gauge: 21 gauge  Placement technique:  Percutaneous    Number of attempts:  1    Successful placement: yes      Transducer: waveform confirmed    Post-procedure details:     Post-procedure:  Biopatch applied  Comments:      Placed a 4 [employer] Smiley micropuncture catheter in the left axillary artery using usual technique without issue

## 2022-01-01 NOTE — RESPIRATORY THERAPY NOTE
RT Ventilator Management Note  Mirna Agudelo 61 y o  male MRN: [id number]  Unit/Bed#: [facility] 418-01 Encounter: [id number]      Daily Screen       12/31/2021  1308             Patient safety screen outcome[de-identified] Failed    Not Ready for Weaning due to[de-identified] Hemodynamically unstable;FiO2 >60%            Physical Exam:   Assessment Type: Assess only  General Appearance: Sedated  Respiratory Pattern: Assisted  Chest Assessment: Chest expansion symmetrical  Bilateral Breath Sounds: Clear,Diminished  O2 Device: vent      Resp Comments: Pt on AC mode  Will continue to monitor

## 2022-01-01 NOTE — PROGRESS NOTES
Cardiology Progress Note - Anjali Stinson 61 y o  male MRN: [id number]    Unit/Bed#: [facility] 418-01 Encounter: [id number]        Subjective:    No significant events overnight  Intubated or sedated    Review of Systems   Unable to perform ROS: intubated       Objective:   Vitals: Blood pressure (!) 87/51, pulse (!) 116, temperature 98 6 °F (37 °C), temperature source Core, resp  rate 17, height 5' 5 5" (1 664 m), weight 90 5 kg (199 lb 8 3 oz), SpO2 98 %  , Body mass index is 32 7 kg/m² ,   Orthostatic Blood Pressures      Most Recent Value   Blood Pressure 87/51 filed at 01/01/2022 1000   Patient Position - Orthostatic VS Lying filed at 01/01/2022 0411         Systolic (46PKD), OND:36 , Min:75 , NXX:14     Diastolic (61JZY), FSA:16, Min:51, Max:55      Intake/Output Summary (Last 24 hours) at 1/1/2022 1515  Last data filed at 1/1/2022 1200  Gross per 24 hour   Intake 3462 67 ml   Output 4275 ml   Net -812 33 ml     Weight (last 2 days)     Date/Time Weight    01/01/22 0538 90 5 (199 52)    12/31/21 0540 92 3 (203 48)    12/30/21 1320 93 (205)    12/30/21 0959 93 (205)            Telemetry Review: No significant arrhythmias seen on telemetry review  Sinus tach  Physical Exam  Vitals reviewed  Constitutional:       Interventions: He is sedated and intubated  HENT:      Head: Normocephalic  Nose: Nose normal       Mouth/Throat:      Mouth: Mucous membranes are moist       Pharynx: Oropharynx is clear  Eyes:      General: No scleral icterus  Conjunctiva/sclera: Conjunctivae normal    Cardiovascular:      Rate and Rhythm: Normal rate and regular rhythm  Heart sounds: Murmur heard  No friction rub  No gallop  Pulmonary:      Effort: Pulmonary effort is normal  No respiratory distress  He is intubated  Breath sounds: Normal breath sounds  No wheezing or rales  Abdominal:      General: Abdomen is flat  Bowel sounds are normal  There is no distension  Palpations: Abdomen is soft  Tenderness: There is no abdominal tenderness  There is no guarding  Musculoskeletal:      Cervical back: Normal range of motion and neck supple  Right lower leg: No edema  Left lower leg: No edema  Skin:     General: Skin is warm and dry  Neurological:      General: No focal deficit present  Laboratory Results:        CBC with diff:   Results from last 7 days   Lab Units 01/01/22  1325 01/01/22  0443 12/31/21  1326 12/31/21  1149 12/31/21  1010 12/31/21  0400 12/30/21  1007 12/30/21  1007   WBC Thousand/uL 31 86* 28 23* 14 61*  --   --  14 68*  --  17 22*   HEMOGLOBIN g/dL 8 4* 8 8* 9 1*  --   --  10 7*   < > 12 8   I STAT HEMOGLOBIN g/dl  --   --  9 5* 7 8*  7 8* 10 9*  --   --   --    HEMATOCRIT % 25 2* 26 2* 26 1*  --   --  31 2*   < > 40 1   HEMATOCRIT, ISTAT %  --   --  28* 23*  23* 32*  --   --   --    MCV fL 95 95 92  --   --  92  --  98   PLATELETS Thousands/uL 138* 164 141*  --   --  152  --  217   MCH pg 31 6 32 0 32 0  --   --  31 7  --  31 4   MCHC g/dL 33 3 33 6 34 9  --   --  34 3  --  31 9   RDW % 14 1 13 8 13 4  --   --  13 4  --  13 9   MPV fL 11 2 11 0 11 5  --   --  11 3  --  11 2   NRBC AUTO /100 WBCs  --   --   --   --   --  3  --   --    NRBC /100 WBC  --  7*  --   --   --   --   --   --     < > = values in this interval not displayed           CMP:  Results from last 7 days   Lab Units 01/01/22  1325 01/01/22  0433 12/31/21  2321 12/31/21  1729 12/31/21  1326 12/31/21  1149 12/31/21  1010 12/31/21  1010 12/31/21  0420 12/31/21  0411 12/31/21  0043 12/31/21  0036 12/31/21  0036 12/30/21  1632 12/30/21  1632 12/30/21  1007 12/30/21  1007   POTASSIUM mmol/L 3 7 4 1 3 8 4 2 3 5  --   --   --   --  3 9  --   --  3 9   < > 4 7   < > 4 6   CHLORIDE mmol/L 108 108 104 106 104  --   --   --   --  99*  --   --  98*   < > 101   < > 97*   CO2 mmol/L 26 24 24 20* 18*  --   --   --   --  17*  --    < > 16*   < > 16*   < > 12*   CO2, I-STAT mmol/L  --   --   --   --  24 21  22   < > 17*  --   --   --   --   --   --   --   --   --    BUN mg/dL 135* 133* 145* 135* 140*  --   --   --   --  150*  --   --  148*   < > 147*   < > 141*   CREATININE mg/dL 2 94* 3 01* 3 02* 2 82* 2 87*  --   --   --   --  3 29*  --   --  3 27*   < > 3 53*   < > 4 01*   GLUCOSE, ISTAT mg/dl  --   --   --   --  161* 168*  170*  --  180*  --   --   --   --   --   --   --   --   --    CALCIUM mg/dL 8 6 8 3 8 6 8 5 8 4  --   --   --   --  7 5*  --   --  7 4*   < > 8 2*   < > 8 4   AST U/L 349* 476*  --   --  996*  --   --   --  2,054*  --  2,371*  --   --   --  3,054*  --  3,128*   ALT U/L 2,325* 2,895*  --   --  3,696*  --   --   --  4,905*  --  5,427*  --   --   --  5,455*  --  5,669*   ALK PHOS U/L 140* 134*  --   --  128*  --   --   --  148*  --  150*  --   --   --  145*  --  165*   EGFR ml/min/1 73sq m 21 20 20 22 22  --   --   --   --  18  --   --  18   < > 17   < > 14    < > = values in this interval not displayed  BMP:  Results from last 7 days   Lab Units 01/01/22  1325 01/01/22  0433 12/31/21  2321 12/31/21  1729 12/31/21  1326 12/31/21  1149 12/31/21  1149 12/31/21  1010 12/31/21  0411 12/31/21  0036 12/31/21  0036   POTASSIUM mmol/L 3 7 4 1 3 8 4 2 3 5  --   --   --  3 9  --  3 9   CHLORIDE mmol/L 108 108 104 106 104  --   --   --  99*  --  98*   CO2 mmol/L 26 24 24 20* 18*  --   --   --  17*   < > 16*   CO2, I-STAT mmol/L  --   --   --   --  24  --  21  22   < >  --   --   --    BUN mg/dL 135* 133* 145* 135* 140*  --   --   --  150*  --  148*   CREATININE mg/dL 2 94* 3 01* 3 02* 2 82* 2 87*  --   --   --  3 29*  --  3 27*   GLUCOSE, ISTAT mg/dl  --   --   --   --  161*   < > 168*  170*   < >  --   --   --    CALCIUM mg/dL 8 6 8 3 8 6 8 5 8 4  --   --   --  7 5*  --  7 4*    < > = values in this interval not displayed  BNP: No results for input(s): BNP in the last 72 hours      Magnesium:   Results from last 7 days   Lab Units 01/01/22  0433 12/31/21  0400 12/30/21  1632   MAGNESIUM mg/dL 3 2* 3  4* 3 5*       Coags:   Results from last 7 days   Lab Units 01/01/22  1325 01/01/22  1034 01/01/22  0841 01/01/22  0646 01/01/22  0433 01/01/22  0351 01/01/22  0132 12/31/21  2321 12/31/21  2055 12/31/21  1354 12/31/21  1326 12/30/21  1613 12/30/21  1007   PTT seconds  --  55* 51* 50*  --  49* 47* 41* 44*   < > 138*   < > 28   INR  1 93*  --   --   --  2 06*  --   --   --   --   --  2 42*  --  2 04*    < > = values in this interval not displayed  TSH: No results found for: TSH    Hemoglobin A1C   Results from last 7 days   Lab Units 12/31/21  0400 12/30/21  1007   HEMOGLOBIN A1C % 5 9* 5 7*       Lipid Profile:   Results from last 7 days   Lab Units 12/30/21  1632   TRIGLYCERIDES mg/dL 152*   HDL mg/dL 13*       Cardiac testing:   No results found for this or any previous visit  No results found for this or any previous visit  No results found for this or any previous visit  No results found for this or any previous visit        Meds/Allergies   current meds:   Current Facility-Administered Medications   Medication Dose Route Frequency    aspirin (ECOTRIN LOW STRENGTH) EC tablet 81 mg  81 mg Oral Daily    chlorhexidine (PERIDEX) 0 12 % oral rinse 15 mL  15 mL Mouth/Throat Q12H [facility]    esmolol (BREVIBLOC) 2500 mg/250 mL IV infusion (premix)   mcg/kg/min Intravenous Titrated    fentanyl citrate (PF) 100 MCG/2ML 50 mcg  50 mcg Intravenous Q2H PRN    heparin (porcine) 25,000 units in 0 45% NaCl 250 mL infusion (premix)  3-15 Units/kg/hr (Order-Specific) Intravenous Titrated    heparin (porcine) 5,000 Units in sodium chloride 0 9 % 1,000 mL Irrigation   Irrigation Once    insulin regular (HumuLIN R,NovoLIN R) 1 Units/mL in sodium chloride 0 9 % 100 mL infusion  0 3-21 Units/hr Intravenous Titrated    metoprolol (LOPRESSOR) injection 2 5 mg  2 5 mg Intravenous Q4H PRN    milrinone (PRIMACOR) 20 mg in 100 mL infusion (premix)  0 25 mcg/kg/min Intravenous Continuous    norepinephrine (LEVOPHED) 4 mg (STANDARD CONCENTRATION) IV in sodium chloride 0 9% 250 mL  1-30 mcg/min Intravenous Titrated    omeprazole (PRILOSEC) suspension 2 mg/mL  20 mg Oral Daily    ondansetron (ZOFRAN) injection 4 mg  4 mg Intravenous Q6H PRN    phytonadione (AQUA-MEPHYTON) 10 mg/mL 5 mg in sodium chloride 0 9 % 50 mL IVPB  5 mg Intravenous Once    polyethylene glycol (MIRALAX) packet 17 g  17 g Oral Daily    potassium chloride 20 mEq IVPB (premix)  20 mEq Intravenous Once    propofol (DIPRIVAN) 1000 mg in 100 mL infusion (premix)  5-50 mcg/kg/min Intravenous Titrated    senna oral syrup 8 8 mg  8 8 mg Oral BID    sodium chloride (PF) 0 9 % injection 3 mL  3 mL Intravenous Q1H PRN    vasopressin (PITRESSIN) 20 Units in sodium chloride 0 9 % 100 mL infusion  0 04 Units/min Intravenous Continuous     Medications Prior to Admission   Medication    ALPRAZolam (XANAX) 0 5 mg tablet    EPINEPHrine (EPIPEN) 0 3 mg/0 3 mL SOAJ       esmolol,  mcg/kg/min, Last Rate: 25 mcg/kg/min (01/01/22 1341)  heparin (porcine), 3-15 Units/kg/hr (Order-Specific), Last Rate: 13 Units/kg/hr (01/01/22 1338)  insulin regular (HumuLIN R,NovoLIN R) infusion, 0 3-21 Units/hr, Last Rate: 1 5 Units/hr (01/01/22 1200)  milrinone (PRIMACOR) infusion, 0 25 mcg/kg/min, Last Rate: 0 25 mcg/kg/min (01/01/22 1337)  norepinephrine, 1-30 mcg/min, Last Rate: 6 mcg/min (01/01/22 1200)  propofol, 5-50 mcg/kg/min, Last Rate: 30 mcg/kg/min (01/01/22 1336)  vasopressin (PITRESSIN) in 0 9 % sodium chloride 100 mL, 0 04 Units/min, Last Rate: 0 04 Units/min (01/01/22 1238)      Assessment:  Principal Problem:    Cardiogenic shock (HCC)  Active Problems:    Agoraphobia with panic attacks    VSD (ventricular septal defect)    JUDE (acute kidney injury) (HCC)    Transaminitis    Tylenol ingestion    Metabolic acidosis    Acute systolic congestive heart failure (HCC)    Hyperglycemia    Anemia    Personal history of ECMO      Plan:    Anteroseptal infarct complicated by post MI VSD  On [facility] ECMO    Wean pressors as needed/tolerated  Continue milrinone for inotrope support  Eventual OR  Counseling / Coordination of Care  Total floor / unit time spent today 25 minutes  Greater than 50% of total time was spent with the patient and / or family counseling and / or coordination of care  A description of the counseling / coordination of care

## 2022-01-01 NOTE — UTILIZATION REVIEW
Initial Clinical Review    Admission: Date/Time/Statement:   Admission Orders (From admission, onward)     Ordered        12/30/21 1205  Inpatient Admission  Once                      Orders Placed This Encounter   Procedures    Inpatient Admission     Standing Status:   Standing     Number of Occurrences:   1     Order Specific Question:   Level of Care     Answer:   Critical Care [15]     Order Specific Question:   Estimated length of stay     Answer:   More than 2 Midnights     Order Specific Question:   Certification     Answer:   I certify that inpatient services are medically necessary for this patient for a duration of greater than two midnights  See H&P and MD Progress Notes for additional information about the patient's course of treatment  ED Arrival Information     Expected Arrival Acuity    - 12/30/2021 09:39 Emergent         Means of arrival Escorted by Service Admission type    Ambulance [facility] Critical Care/ICU Emergency         Arrival complaint    Possible Stemi        Chief Complaint   Patient presents with    Weakness - Generalized     per EMS - pt has been feeling weakness and has been febrile for the past 5-7 days       Initial Presentation: 61 y o  male with PMH of agoraphobia with panic attacks presented to the ED from home with c/o several days of fatigue, nausea, and constipation  He was found to be in multi-system organ failure and cardiogenic shock, likely secondary to recent MI  Prehospital EKG was done that demonstrates ateroseptal ST elevations without reciprocal changes  Echo performed today showed anterior septal VSD consistent with recent LAD STEMI  ED labs revealed JUDE and transaminitis  Patient was started on epinephrine and subsequently milrinone in the ER and was given 2mg bumex per cardiology  Ultimately, cardiac surgery was consulted for [facility] ECMO evaluation as a bridge to VSD repair    Patient also reports self medicating with unknown dose of Tylenol, last ingestion last evening  Given patient's functional status and inability to make medical decisions on his behalf, patient's sister opted to not undergo ECMO or surgery for VSD  Plan: Inpatient admission for evaluation and treatment of cardiogenic shock, acetaminophen toxicity:  Milrinone, vasopressin, Bumex for diuresis, strict I/O, trend liver enzymes, start NAC, consult Toxicology, consult Palliative Care  12/30 Cardiology Consult:   Acute Decompensate Systolic CHF complicated by cardiogenic shock, JUDE, shock liver  Recommend to start milrinone infusion for cardiogenic shock and wean off epinephrine as able  Start bumex 2 mg IV BID for diuresis  Can trend SVO2 to monitor response to milrinone and titrate if necessary  Check  Stat echo  He has loud MR/TR murmur on exam  Ultimately once he is more stable/ renal function recovered, we can then do left heart cath to evaluate for ischemic heart disease  No ACEI secondary to JUDE and shock  Troponin elevation is most likely type 2 MI secondary to cardiogenic shock  Cardiothoracic Surgery consult:  Echo today shows apparent anterior septal VSD consistent with recent LAD STEMI  Emergent repair is warranted for acquired post-MI VSD  In the setting of MSOF/cardiogenic shock and severe acidosis, mortality risk of emergent repair is extreme (approaching 100%)  A reasonable alternative is initiation of VA ECMO in an attempt to limit end organ injury and ideally see some recovery of the kidneys and liver  If stabilization were achieved, then he could undergo VSD repair in 24-72 hours with less mortality risk, though still very high (~50%)  I had separate discussions with the patient (in person) and the patient's sister and Mamta (by telephone)  In my opinion the patient has extremely limited insight into his health in general and into this critical life-threatening illness as well    His very limited insight makes it impossible for him to make medical decisions on his own behalf  His sister and Mamta have good insight and understand how critically ill he is and that his life is at risk  I explained the option of VA ECMO as a bridge to definitive surgical repair  They understood but they do not want him to undergo ECMO or surgery  They stated that it would be "prolonging the inevitable"  I made it very clear that if we choose not to intervene with ECMO now, it will be extremely unlikely that he would improve enough with medical therapy to tolerate any surgical intervention later  They understood and were very clear that he not undergo potentially lifesaving surgical intervention  Medical Toxicology consult: Acetaminophen toxicity, 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  Anion gap metabolic acidosis, initially with dual respiratory acidosis that is now more compensated  Acute renal insufficiency  Repeat VBG and CMP, ASA ccn at this time, NAC per protocol with third bag ordered as continuous  Fomepizole 15 mg/kg x 1  CMP Q8 hrs and daily INR to monitor for NAC DC criteria  12/31 Critical Care:  Overnight primacor weaned to 0 25 for hypotension and levo/vaso initiated  Levo and vaso weaned to off by this AM   Started on bumex 1mg/hr for low UOP  Patient reports he feels exhausted, but does not feel like he can sleep, took some short naps overnight  Is unable to specify what exactly is bothering him  Oriented, but unable to describe situation that lead to admission or discuss why he is in the hospital  Family would now like patient to be evaluated for VSD repair and ECMO  12/31 Cardiothoracic Surgery: Creatinine and ALT/AST have trended better  Acidosis improving  Family has decided that they do want him to undergo attempts at lifesaving surgical intervention  do not think he could tolerate definitive surgical repair right now  Therefore will proceed with VA ECMO in order to try to optimize him to definitive repair   Consent obtained from patient who appears more competent now   Additionally, the patient's sister also gave her consent for the procedure  After he is incapacitated, his sister will be the decision maker  12/31 Operative Note:       PREOPERATIVE DIAGNOSIS Acquired ventricular septal defect (post-infarction), Cardiogenic shock, acute kidney injury, acute liver injury      POSTOPERATIVE DIAGNOSIS Acquired ventricular septal defect (post-infarction), Cardiogenic shock, acute kidney injury, acute liver injury       NYHA CLASS: 4     CCS CLASS: 4     PROCEDURE Initiation of venoarterial Extracorporeal Life Support      ANESTHESIA Dr Taniya Reyes, general endotracheal anesthesia with transesophageal echocardiogram guidance       CARDIOPULMONARY BYPASS TIME 0      PACKS/TUBES/DRAINS None       ESTIMATED BLOOD LOSS: 100 mL    The patient was initiated on ECMO with excellent flow  12/31 Critical Care:  Patient arrives back from OR with R venous/arterial fem cannulation sites and retrograde perfusion cannula in place  Intra-op CORIE LVEF 40%  Anteroseptal (mid to apical) aneurysmal, thinned out, VSD, left to right cont flow, AV not opening secondary to flow through VSD  RV systolic function moderately reduced, cavity moderate dilated, descending aorta with localized dissection  Infusions: Milrinone 0 5mcg, Levo 2mcg, Vaso 0 04 units, Propofol   No blood products received intra-operatively  Check STAT post-op ABG and CXR  Mechanical Ventilation 14/450/6/50%  Chlorhexidine/PPI/HOB> 30 degrees  NPO, start tube feedings tonight if vasopressor requirement remains stable  Replenish K >4 0, mag >2 0 and calcium >7 0  Check STAT post-op BMP  Chang in place  Monitor UOP with goal >0 5cc/kg/hour  Diuretics versus volume resuscitate as needed depending on hemodynamics and volume status  Check STAT post-op H/H and platelets  Monitor cannulation sites and invasive lines  Send coags  Start ECMO protocol heparin gtt once PTT equal to or less than 72   Insulin gtt for blood sugar control         ED Triage Vitals   Temperature Pulse Respirations Blood Pressure SpO2   12/30/21 0950 12/30/21 0942 12/30/21 0942 12/30/21 0942 12/30/21 0945   (!) 95 9 °F (35 5 °C) (!) 107 18 129/93 99 %      Temp Source Heart Rate Source Patient Position - Orthostatic VS BP Location FiO2 (%)   12/30/21 0950 12/30/21 0942 12/30/21 0942 12/30/21 0942 12/31/21 1315   Rectal Monitor Sitting Left arm 100      Pain Score       12/30/21 1129       No Pain          Wt Readings from Last 1 Encounters:   12/31/21 92 3 kg (203 lb 7 8 oz)     Additional Vital Signs:       Date/Time Temp Pulse Resp BP MAP (mmHg) Arterial Line BP MAP SpO2 FiO2 (%) Calculated FIO2 (%) - Nasal Cannula O2 Flow Rate (L/min) Nasal Cannula O2 Flow Rate (L/min) O2 Device Patient Position - Orthostatic VS CVP (mean)   12/31/21 2300 98 6 °F (37 °C) 118 Abnormal  19 -- -- 66/62 64 mmHg 99 % -- -- -- -- -- -- 6 mmHg   12/31/21 2200 98 6 °F (37 °C) 118 Abnormal  19 -- -- 70/64 68 mmHg 100 % -- -- -- -- -- -- 9 mmHg   12/31/21 2100 98 4 °F (36 9 °C) 116 Abnormal  20 -- -- 65/59 63 mmHg 100 % -- -- -- -- -- -- 6 mmHg   12/31/21 2000 98 8 °F (37 1 °C) 118 Abnormal  22 -- -- 70/65 68 mmHg 98 % -- -- -- -- Ventilator -- 5 mmHg   12/31/21 1900 98 8 °F (37 1 °C) 118 Abnormal  21 -- -- 72/69 71 mmHg 98 % -- -- -- -- -- -- 4 mmHg   12/31/21 1800 98 6 °F (37 °C) 126 Abnormal  22 -- -- 68/64 66 mmHg 99 % 50 -- -- -- -- -- 6 mmHg   12/31/21 1700 98 2 °F (36 8 °C) 120 Abnormal  19 -- -- 66/63 64 mmHg 98 % 50 -- -- -- -- -- 4 mmHg   12/31/21 1600 98 6 °F (37 °C) 120 Abnormal  21 -- -- 73/70 71 mmHg 95 % -- -- -- -- Ventilator -- 3 mmHg   12/31/21 1510 -- 118 Abnormal  17 -- -- 69/66 68 mmHg 100 % -- -- -- -- -- -- 4 mmHg   12/31/21 1500 98 9 °F (37 2 °C) 116 Abnormal  17 -- -- 72/69 71 mmHg 100 % -- -- -- -- -- -- 6 mmHg   12/31/21 1430 -- 116 Abnormal  17 -- -- 69/66 67 mmHg 100 % -- -- -- -- -- -- 6 mmHg   12/31/21 1400 98 9 °F (37 2 °C) 114 Abnormal  16 -- -- 64/61 62 mmHg 100 % -- -- -- -- -- -- 7 mmHg   12/31/21 1345 -- 112 Abnormal  14 -- -- 70/68 69 mmHg 88 % Abnormal  -- -- -- -- -- -- 9 mmHg   12/31/21 1330 -- 110 Abnormal  14 -- -- 66/66 66 mmHg -- -- -- -- -- -- -- 9 mmHg   12/31/21 1320 -- 106 Abnormal  14 -- -- 60/59 60 mmHg 100 % 100 -- -- -- Ventilator Lying 8 mmHg   12/31/21 1315 -- 101 13 136/56 81 66/65 65 mmHg 98 % 100 -- -- -- Ventilator -- 10 mmHg   12/31/21 1310 96 2 °F (35 7 °C) Abnormal  97 17 -- -- 70/63 66 mmHg -- -- -- -- -- -- -- 10 mmHg   12/31/21 0900 97 3 °F (36 3 °C) Abnormal  126 Abnormal  -- -- -- 93/65 75 mmHg 97 % -- -- -- -- -- -- --   12/31/21 0800 97 3 °F (36 3 °C) Abnormal  122 Abnormal  26 Abnormal  98/53 70 77/52 60 mmHg 94 % -- -- -- -- None (Room air) Lying --   12/31/21 0700 97 3 °F (36 3 °C) Abnormal  122 Abnormal  23 Abnormal  90/53 66 73/49 59 mmHg 95 % -- -- -- -- -- -- --   12/31/21 0600 97 5 °F (36 4 °C) 120 Abnormal  32 Abnormal  101/56 71 65/48 55 mmHg 95 % -- -- -- -- -- Lying --   12/31/21 0500 97 7 °F (36 5 °C) 117 Abnormal  35 Abnormal  86/56 Abnormal  65 67/50 57 mmHg 95 % -- -- -- -- -- -- --   12/31/21 0400 97 9 °F (36 6 °C) 120 Abnormal  34 Abnormal  96/63 74 64/50 56 mmHg 95 % -- -- -- -- -- Lying --   12/31/21 0300 97 9 °F (36 6 °C) 120 Abnormal  29 Abnormal  88/61 Abnormal  70 54/43 47 mmHg 93 % -- -- -- -- -- -- --   12/31/21 0200 97 9 °F (36 6 °C) 120 Abnormal  29 Abnormal  104/58 73 74/54 62 mmHg 94 % -- -- -- -- -- -- --   12/31/21 0100 -- 117 Abnormal  34 Abnormal  95/60 72 76/54 61 mmHg 94 % -- -- -- -- -- -- --   12/31/21 0000 97 3 °F (36 3 °C) Abnormal  119 Abnormal  31 Abnormal  90/59 69 67/47 54 mmHg 94 % -- 28 -- 2 L/min Nasal cannula Lying --   12/30/21 2300 -- 120 Abnormal  35 Abnormal  96/59 73 76/49 57 mmHg 94 % -- -- -- -- -- -- --   12/30/21 2200 -- 116 Abnormal  33 Abnormal  82/55 Abnormal  64 76/47 56 mmHg 95 % -- -- -- -- -- -- --   12/30/21 2100 -- 118 Abnormal  29 Abnormal  92/55 71 96/65 74 mmHg 94 % -- -- -- -- -- -- --   12/30/21 2000 97 7 °F (36 5 °C) 120 Abnormal  25 Abnormal  89/50 Abnormal  64 -- -- 96 % -- 28 -- 2 L/min Nasal cannula Lying --   12/30/21 1900 -- 112 Abnormal  29 Abnormal  96/52 69 -- -- 94 % -- -- -- -- -- -- --   12/30/21 1800 -- 114 Abnormal  30 Abnormal  94/54 67 -- -- 94 % -- -- -- -- -- -- --   12/30/21 1700 -- 113 Abnormal  31 Abnormal  89/57 Abnormal  68 -- -- 96 % -- -- -- -- -- -- --   12/30/21 1600 97 5 °F (36 4 °C) 114 Abnormal  31 Abnormal  95/52 66 -- -- 98 % -- 36 -- 4 L/min Nasal cannula Lying --   12/30/21 1445 98 6 °F (37 °C) 112 Abnormal  22 105/74 86 -- -- 99 % -- 44 -- 6 L/min Nasal cannula Lying --   12/30/21 1425 98 6 °F (37 °C) 112 Abnormal  20 101/70 -- -- -- 99 % -- 44 -- 6 L/min Nasal cannula Sitting --   12/30/21 1320 -- 109 Abnormal  -- 129/93 -- -- -- -- -- -- -- -- -- -- --   12/30/21 1215 -- 102 26 Abnormal  88/53 Abnormal  66 -- -- 92 % -- -- 6 L/min -- Nasal cannula Lying --   12/30/21 1145 -- 106 Abnormal  32 Abnormal  93/69 77 -- -- 100 % -- 44 -- 6 L/min Nasal cannula Lying --   12/30/21 1045 -- 106 Abnormal  20 127/58 82 -- -- 97 % -- 44 -- 6 L/min Nasal cannula -- --   12/30/21 0953 -- -- -- -- -- -- -- 96 % -- 44 -- 6 L/min Nasal cannula -- --   12/30/21 0950 95 9 °F (35 5 °C) Abnormal  -- -- -- -- -- -- -- -- -- -- -- -- --            Date and Time Eye Opening Best Verbal Response Best Motor Response [place] Coma Scale Score   12/31/21 2000 1 1 6 8   12/31/21 1600 1 1 1 3   12/31/21 1315 1 1 1 3   12/31/21 0800 4 5 6 15   12/31/21 0400 4 5 6 15   12/31/21 0000 4 5 6 15   12/30/21 2000 4 5 6 15   12/30/21 1600 4 5 6 15   12/30/21 0945 4 5 6 15       Pertinent Labs/Diagnostic Test Results:     12/31 CXR: ECMO catheter tip projects over the right mainstem bronchus  Mild prominence of the pulmonary vasculature and right basilar subsegmental atelectasis  No pneumothorax  12/30 repeat CXR: 1    Central vascular congestion with diffuse bilateral hazy ground glass opacity likely representing edema, though with underlying infectious process not excluded in the context of leukocytosis  2   Probable new trace left pleural effusion  12/30 CXR: Low lung volumes with vascular crowding  Question superimposed mild pulmonary edema  12/30 ECHO:        Left Ventricle: Left ventricular cavity size is dilated  The left ventricular ejection fraction is 45%  Systolic function is mildly reduced  Wall thickness is mildly increased  There is eccentric hypertrophy  Unable to assess diastolic function due to E/A fusion due to tachycardia    IVS: There is diastolic flattening of the interventricular septum consistent with right ventricle volume overload  There is a small, post-myocardial infarction, central muscular ventricular septal defect with a large left to right shunt indicated by color flow Doppler  Qp:Qs could not be accurately calculated due to VSD jet direction    Right Ventricle: Right ventricular cavity size is dilated  Systolic function is moderately reduced    Left Atrium: The atrium is mildly dilated    Right Atrium: The atrium is mildly dilated    Tricuspid Valve: There is moderate regurgitation    Pulmonary Artery: The pulmonary artery systolic pressure is severely increased    The following segments are akinetic: mid anterior, mid anteroseptal, mid inferoseptal, apical anterior, apical septal, apical inferior, apical lateral and apex      All other segments are normal               Results from last 7 days   Lab Units 12/30/21  0956   SARS-COV-2  Negative     Results from last 7 days   Lab Units 12/31/21  1326 12/31/21  1149 12/31/21  1010 12/31/21  0400 12/30/21  1007 12/30/21  1007   WBC Thousand/uL 14 61*  --   --  14 68*  --  17 22*   HEMOGLOBIN g/dL 9 1*  --   --  10 7*   < > 12 8   I STAT HEMOGLOBIN g/dl 9 5* 7 8*  7 8* 10 9*  --   --   --    HEMATOCRIT % 26 1*  --   --  31 2*   < > 40 1   HEMATOCRIT, ISTAT % 28* 23*  23* 32*  --   -- --    PLATELETS Thousands/uL 141*  --   --  152  --  217    < > = values in this interval not displayed  Results from last 7 days   Lab Units 12/31/21  1729 12/31/21  1326 12/31/21  1149 12/31/21  1010 12/31/21  0411 12/31/21  0400 12/31/21  0036 12/31/21  0036 12/30/21  1632 12/30/21  1632   SODIUM mmol/L 140 138  --   --  134*  --   --  135*  --  134*   POTASSIUM mmol/L 4 2 3 5  --   --  3 9  --   --  3 9  --  4 7   CHLORIDE mmol/L 106 104  --   --  99*  --   --  98*  --  101   CO2 mmol/L 20* 18*  --   --  17*  --    < > 16*   < > 16*   CO2, I-STAT mmol/L  --  24 21  22 17*  --   --   --   --   --   --    ANION GAP mmol/L 14* 16*  --   --  18*  --   --  21*  --  17*   BUN mg/dL 135* 140*  --   --  150*  --   --  148*  --  147*   CREATININE mg/dL 2 82* 2 87*  --   --  3 29*  --   --  3 27*  --  3 53*   EGFR ml/min/1 73sq m 22 22  --   --  18  --   --  18  --  17   CALCIUM mg/dL 8 5 8 4  --   --  7 5*  --   --  7 4*  --  8 2*   CALCIUM, IONIZED mmol/L 1 12 1 09*  --   --   --  0 95*  --   --   --   --    CALCIUM, IONIZED, ISTAT mmol/L  --   --  1 20  1 18 1 12  --   --   --   --   --   --    MAGNESIUM mg/dL  --   --   --   --   --  3 4*  --   --   --  3 5*   PHOSPHORUS mg/dL  --   --   --   --   --  6 0*  --   --   --  9 0*    < > = values in this interval not displayed       Results from last 7 days   Lab Units 12/31/21  1326 12/31/21  0420 12/31/21  0043 12/30/21  1632 12/30/21  1007   AST U/L 996* 2,054* 2,371* 3,054* 3,128*   ALT U/L 3,696* 4,905* 5,427* 5,455* 5,669*   ALK PHOS U/L 128* 148* 150* 145* 165*   TOTAL PROTEIN g/dL 5 0* 6 3* 6 4 6 4 7 6   ALBUMIN g/dL 2 2* 2 4* 2 5* 2 9* 3 1*   TOTAL BILIRUBIN mg/dL 0 93 1 00 0 99 1 09* 1 27*   BILIRUBIN DIRECT mg/dL  --  0 40* 0 43* 0 55*  --      Results from last 7 days   Lab Units 12/31/21  2158 12/31/21  2036 12/31/21  1805 12/31/21  1548 12/31/21  1318 12/31/21  0546   POC GLUCOSE mg/dl 197* 196* 164* 133 143* 210*     Results from last 7 days   Lab Units 12/31/21  1729 12/31/21  1326 12/31/21  0411 12/31/21  0036 12/30/21  1632 12/30/21  1007   GLUCOSE RANDOM mg/dL 159* 165* 240* 240* 136 163*         Results from last 7 days   Lab Units 12/31/21  0400 12/30/21  1007   HEMOGLOBIN A1C % 5 9* 5 7*   EAG mg/dl 123 117       Results from last 7 days   Lab Units 12/31/21  1730 12/31/21  1425 12/31/21  0833 12/30/21  2108 12/30/21  2108   PH ART  7 409 7 371 7 435   < > 7 398   PCO2 ART mm Hg 30 9* 35 3* 27 8*   < > 24 3*   PO2 ART mm Hg 400 0* 347 0* 93 3   < > 103 0   HCO3 ART mmol/L 19 1* 20 0* 18 2*   < > 14 6*   BASE EXC ART mmol/L -4 7 -4 7 -4 8   < > -8 5   O2 CONTENT ART mL/dL 16 0 15 5* 16 0   < > 15 9*   O2 HGB, ARTERIAL % 98 9* 98 8* 95 6   < > 96 4   ABG SOURCE  Line, Arterial  --  Line, Arterial  --  Line, Arterial    < > = values in this interval not displayed       Results from last 7 days   Lab Units 12/31/21  1327 12/31/21  0836 12/31/21  0411   PH JANNET  7 574* 7 356 7 326   PCO2 JANNET mm Hg 16 6* 33 6* 36 8*   PO2 JANNET mm Hg 310 9* 33 8* 32 0*   HCO3 JANNET mmol/L 15 0* 18 4* 18 8*   BASE EXC JANNET mmol/L -5 2 -6 3 -6 5   O2 CONTENT JANNET ml/dL 14 6 8 4 7 3   O2 HGB, VENOUS % 98 8* 50 8* 45 3*     Results from last 7 days   Lab Units 12/31/21  1326 12/31/21  1149 12/31/21  1010   PH, JANNET I-STAT   --  7 316 7 457*   PCO2, JANNET ISTAT mm HG  --  39 1* 23 0*   PO2, JANNET ISTAT mm HG  --  62 0* 95 0*   HCO3, JANNET ISTAT mmol/L  --  19 9* 16 3*   I STAT BASE EXC mmol/L -2 -6*  -4* -6*   I STAT O2 SAT   --  89* 98*   ISTAT PH ART  7 397 7 359  --    I STAT ART PCO2 mm HG 36 8 37 4  --    I STAT ART PO2 mm HG >400 0* >400 0*  --    I STAT ART HCO3 mmol/L 22 6 21 1*  --          Results from last 7 days   Lab Units 12/30/21  1613 12/30/21  1007   HS TNI 0HR ng/L  --  5,882*   HS TNI 4HR ng/L 4,519*  --    HSTNI D4 ng/L -1,363  --          Results from last 7 days   Lab Units 12/31/21  2055 12/31/21  1729 12/31/21  1525 12/31/21  1354 12/31/21  1326 12/30/21  1613 12/30/21  1007   PROTIME seconds  --   --   --   --  25 1*  --  22 1*   INR   --   --   --   --  2 42*  --  2 04*   PTT seconds 44* 49* 63*   < > 138*   < > 28    < > = values in this interval not displayed  Results from last 7 days   Lab Units 12/31/21  0422 12/30/21  1007   PROCALCITONIN ng/ml 2 16* 1 65*     Results from last 7 days   Lab Units 12/31/21  1650 12/31/21  1326 12/31/21  0836 12/31/21  0544 12/31/21  0036 12/30/21  2259 12/30/21  1613   LACTIC ACID mmol/L 1 5 2 1* 2 0 2 3* 2 8* 2 5* 5 1*             Results from last 7 days   Lab Units 12/30/21  1007   NT-PRO BNP pg/mL 43,122*                             Results from last 7 days   Lab Units 12/30/21  1223   CLARITY UA  Cloudy   COLOR UA  Dk Yellow   SPEC GRAV UA  1 019   PH UA  5 0   GLUCOSE UA mg/dl Negative   KETONES UA mg/dl Negative   BLOOD UA  Moderate*   PROTEIN UA mg/dl 30 (1+)*   NITRITE UA  Negative   BILIRUBIN UA  Interference- unable to analyze*   UROBILINOGEN UA E U /dl 2 0*   LEUKOCYTES UA  Small*   WBC UA /hpf 2-4   RBC UA /hpf 10-20*   BACTERIA UA /hpf Occasional   EPITHELIAL CELLS WET PREP /hpf Moderate*     Results from last 7 days   Lab Units 12/30/21  0956   INFLUENZA A PCR  Negative   INFLUENZA B PCR  Negative   RSV PCR  Negative             Results from last 7 days   Lab Units 12/31/21  1729 12/30/21  1613 12/30/21  1007   ACETAMINOPHEN LVL ug/mL 2*  --  21*   SALICYLATE LVL mg/dL  --  3  --                  Results from last 7 days   Lab Units 12/30/21  1129 12/30/21  1010   BLOOD CULTURE  No Growth at 24 hrs  No Growth at 24 hrs                 ED Treatment:   Medication Administration from 12/30/2021 0939 to 12/30/2021 1552       Date/Time Order Dose Route Action     12/30/2021 1001 aspirin chewable tablet 324 mg 324 mg Oral Given     12/30/2021 1006 heparin (ACS LOW)   Intravenous Not Given     12/30/2021 1026 heparin (porcine) injection 4,000 Units 4,000 Units Intravenous Given     12/30/2021 1026 heparin (porcine) 25,000 units in 0 45% NaCl 250 mL infusion (premix) 11 1 Units/kg/hr Intravenous New Bag     12/30/2021 1240 cefepime (MAXIPIME) 2 g/50 mL dextrose IVPB 0 mg Intravenous Stopped     12/30/2021 1204 cefepime (MAXIPIME) 2 g/50 mL dextrose IVPB 2,000 mg Intravenous New Bag     12/30/2021 1530 vancomycin (VANCOCIN) 1,750 mg in sodium chloride 0 9 % 500 mL IVPB 1,750 mg Intravenous New Bag     12/30/2021 1211 furosemide (LASIX) injection 40 mg 40 mg Intravenous Given     12/30/2021 1218 EPINEPHrine 3000 mcg (STANDARD CONCENTRATION) IV in sodium chloride 0 9% 250 mL 3 mcg/min Intravenous New Bag     12/30/2021 1418 milrinone (PRIMACOR) 20 mg in 100 mL infusion (premix) 0 38 mcg/kg/min Intravenous New Bag     12/30/2021 1531 bumetanide (BUMEX) injection 2 mg 2 mg Intravenous Given     12/30/2021 1500 ondansetron (ZOFRAN) injection 4 mg 4 mg Intravenous Given        History reviewed  No pertinent past medical history  Present on Admission:   Cardiogenic shock ([facility] [address] )   JUDE (acute kidney injury) ([facility] [address] )   Transaminitis   Agoraphobia with panic attacks   Tylenol ingestion   Metabolic acidosis   Acute systolic congestive heart failure (HCC)   Hyperglycemia      Admitting Diagnosis: Cardiogenic shock (HCC) [R57 0]  Liver failure (HCC) [K72 90]  Weakness [R53 1]  ST elevation [R94 31]  Acute kidney injury ([facility] [address] ) [N17 9]  Acute respiratory failure with hypoxia (HCC) [J96 01]  Generalized weakness [R53 1]  Age/Sex: 61 y o  male       Admission Orders:    Cardiopulmonary monitoring, SCD, daily weight, I/O      Scheduled Medications:      aspirin, 81 mg, Oral, Daily  chlorhexidine, 15 mL, Mouth/Throat, Q12H DENITA  heparin 5000 units in sodium chloride 0 9% 1000 mL irrigation, , Irrigation, Once  omeprazole (PRILOSEC) suspension 2 mg/mL, 20 mg, Oral, Daily  [START ON 1/1/2022] polyethylene glycol, 17 g, Oral, Daily  [START ON 1/1/2022] senna, 8 8 mg, Oral, BID      Continuous IV Infusions:      epinephrine, 2 mcg/min, Intravenous, Continuous  heparin (porcine), 3-15 Units/kg/hr (Order-Specific), Intravenous, Titrated  insulin regular (HumuLIN R,NovoLIN R) infusion, 0 3-21 Units/hr, Intravenous, Titrated  milrinone (PRIMACOR) infusion, 0 5 mcg/kg/min, Intravenous, Continuous  norepinephrine, 1-30 mcg/min, Intravenous, Titrated  propofol, 5-50 mcg/kg/min, Intravenous, Titrated  vasopressin (PITRESSIN) in 0 9 % sodium chloride 100 mL, 0 04 Units/min, Intravenous, Continuous      PRN Meds:  fentanyl citrate (PF), 50 mcg, Intravenous, Q2H PRN  metoprolol, 2 5 mg, Intravenous, Q4H PRN  ondansetron, 4 mg, Intravenous, Q6H PRN  sodium chloride (PF), 3 mL, Intravenous, Q1H PRN        IP CONSULT TO CARDIOLOGY  IP CONSULT TO CARDIOTHORACIC SURGERY  IP CONSULT TO CASE MANAGEMENT  IP CONSULT TO PALLIATIVE CARE  IP CONSULT TO TOXICOLOGY    Network Utilization Review Department  ATTENTION: Please call with any questions or concerns to [phone] and carefully listen to the prompts so that you are directed to the right person  All voicemails are confidential   Praful Arredondo all requests for admission clinical reviews, approved or denied determinations and any other requests to dedicated fax number below belonging to the campus where the patient is receiving treatment   List of dedicated fax numbers for the Facilities:  [address] DENIALS (Administrative/Medical Necessity) [phone]   [address] (Maternity/NICU/Pediatrics) [phone]   [address]  [address] [address] [address] [place] [address] [address] [place] [address] [phone]   Kaylen Penaloza [address] [place] [address] [address] [place] [address] [phone]

## 2022-01-01 NOTE — PROGRESS NOTES
Progress Note - Cardiothoracic Surgery   Nitish Segura 61 y o  male MRN: [id number]  Unit/Bed#: [facility] 418-01 Encounter: [id number]      POD 1 VA-ECMO for Post-infarction VSD    Pt seen/examined  Interval history and data reviewed with critical care team     Creatinine and ALT/AST continue to improve, but slowly  Acidosis resolved  He's making plenty of urine but creatinine is still 3 0  INR is 2 0 indicative of coagulopathy resulting from acute liver injury  He has woken up and moved all 4 extremities but isn't completely following commands  RUE is mottled below the arterial line  B/L LE's are adequately perfused        Medications:   Scheduled Meds:  Current Facility-Administered Medications   Medication Dose Route Frequency Provider Last Rate    aspirin  81 mg Oral Daily Wendi Spironello V, CRNP      chlorhexidine  15 mL Mouth/Throat Q12H [address] Wendi Spironello V, CRNP      fentanyl citrate (PF)  50 mcg Intravenous Q2H PRN Wendi Spironello V, CRNP      heparin (porcine)  3-15 Units/kg/hr (Order-Specific) Intravenous Titrated Wendi Spironello V, CRNP 13 Units/kg/hr (01/01/22 0800)    heparin 5000 units in sodium chloride 0 9% 1000 mL irrigation   Irrigation Once [employer] V, CRNP      insulin regular (HumuLIN R,NovoLIN R) infusion  0 3-21 Units/hr Intravenous Titrated Wendi Spironello V, CRNP 1 5 Units/hr (01/01/22 0800)    metoprolol  2 5 mg Intravenous Q4H PRN DO Lisandro Thompsonrinone [facility]) infusion  0 38 mcg/kg/min Intravenous Continuous Wendi Spironello V, CRNP 0 38 mcg/kg/min (01/01/22 1028)    norepinephrine  1-30 mcg/min Intravenous Titrated Wendi Spironello V, CRNP 3 mcg/min (01/01/22 0800)    omeprazole (PRILOSEC) suspension 2 mg/mL  20 mg Oral Daily Wendi Spironello V, CRNP      ondansetron  4 mg Intravenous Q6H PRN Wendi Spironello V, CRNP      polyethylene glycol  17 g Oral Daily Wendi Spironello V, CRNP      propofol  5-50 mcg/kg/min Intravenous Titrated Wendi Spironello V, CRNP 25 mcg/kg/min (01/01/22 2933)    senna  8 8 mg Oral BID Wendi Spironello V, CRNP      sodium chloride (PF)  3 mL Intravenous Q1H PRN Wendi Spironello V, CRNP      vasopressin (PITRESSIN) in 0 9 % sodium chloride 100 mL  0 04 Units/min Intravenous Continuous Wendi Spironello V, CRNP 0 04 Units/min (01/01/22 0800)     Continuous Infusions:heparin (porcine), 3-15 Units/kg/hr (Order-Specific), Last Rate: 13 Units/kg/hr (01/01/22 0800)  insulin regular (HumuLIN R,NovoLIN R) infusion, 0 3-21 Units/hr, Last Rate: 1 5 Units/hr (01/01/22 0800)  milrinone (PRIMACOR) infusion, 0 38 mcg/kg/min, Last Rate: 0 38 mcg/kg/min (01/01/22 1028)  norepinephrine, 1-30 mcg/min, Last Rate: 3 mcg/min (01/01/22 0800)  propofol, 5-50 mcg/kg/min, Last Rate: 25 mcg/kg/min (01/01/22 0822)  vasopressin (PITRESSIN) in 0 9 % sodium chloride 100 mL, 0 04 Units/min, Last Rate: 0 04 Units/min (01/01/22 0800)      PRN Meds: fentanyl citrate (PF)    metoprolol    ondansetron    Insert peripheral IV **AND** sodium chloride (PF)    Vitals: Blood pressure (!) 78/55, pulse (!) 124, temperature 98 6 °F (37 °C), resp  rate (!) 23, height 5' 5 5" (1 664 m), weight 90 5 kg (199 lb 8 3 oz), SpO2 98 %  ,Body mass index is 32 7 kg/m²  I/O last 24 hours: In: 5281 4 [I V :2812 5; IV Piggyback:2248 9; Feedings:220]  Out: 6850 [Urine:6850]  Invasive Devices  Report    Central Venous Catheter Line            CVC Central Lines 12/30/21 Triple Left Internal jugular 1 day    Introducer 12/31/21 1 day    LDA ECMO 12/31/21 Femoral artery  <1 day          Arterial Line            Arterial Line 12/31/21 Right Brachial <1 day          Line            Arterial Sheath 6 Fr  Right  <1 day          Drain            Urethral Catheter Temperature probe 1 day    NG/OG/Enteral Tube Orogastric Center mouth <1 day          Airway            ETT  Hi-Lo; Cuffed;Oral 8 5 mm 1 day                  Lab, Imaging and other studies:   Results from last 7 days   Lab Units 01/01/22  0443 12/31/21  1326 12/31/21  1010 12/31/21  0400   WBC Thousand/uL 28 23* 14 61*  --  14 68*   HEMOGLOBIN g/dL 8 8* 9 1*  --  10 7*   I STAT HEMOGLOBIN g/dl  --  9 5*   < >  --    HEMATOCRIT % 26 2* 26 1*  --  31 2*   HEMATOCRIT, ISTAT %  --  28*   < >  --    PLATELETS Thousands/uL 164 141*  --  152    < > = values in this interval not displayed  Results from last 7 days   Lab Units 01/01/22  0433 12/31/21  2321 12/31/21  1729 12/31/21  1326 12/31/21  1326   POTASSIUM mmol/L 4 1 3 8 4 2   < > 3 5   CHLORIDE mmol/L 108 104 106   < > 104   CO2 mmol/L 24 24 20*   < > 18*   CO2, I-STAT mmol/L  --   --   --   --  24   BUN mg/dL 133* 145* 135*   < > 140*   CREATININE mg/dL 3 01* 3 02* 2 82*   < > 2 87*   GLUCOSE, ISTAT mg/dl  --   --   --   --  161*   CALCIUM mg/dL 8 3 8 6 8 5   < > 8 4    < > = values in this interval not displayed  Results from last 7 days   Lab Units 01/01/22  0841 01/01/22  0646 01/01/22  0433 01/01/22  0351 12/31/21  1354 12/31/21  1326 12/30/21  1613 12/30/21  1007   INR   --   --  2 06*  --   --  2 42*  --  2 04*   PTT seconds 51* 50*  --  49*   < > 138*   < > 28    < > = values in this interval not displayed  Recent Labs     01/01/22  0433   PHART 7 365   XAV1SRY 21 7*   PO2ART 370 0*   QMK8IIE 38 9   BEART -3 3           Plan:    Wean Epi to off  Wean Levo as able  Continue Vasopressin gtt  Wean Milrinone, perhaps as low as 0 25  Esmolol gtt with target HR <100  Monitor neurologic exam     Remove right brachial arterial line and place new arterial line for ABG's and BP monitoring  At this time, he is still not optimized for definitive surgical repair        SIGNATUREFransico Duel, MD  DATE: January 1, 2022  TIME: 10:34 AM

## 2022-01-01 NOTE — UTILIZATION REVIEW
Inpatient Admission Authorization Request   NOTIFICATION OF INPATIENT ADMISSION/INPATIENT AUTHORIZATION REQUEST   SERVICING FACILITY:   [facility]  Address: [address] [place] [address], [address] [place] [address]  Tax ID: [id number]  NPI: [id number]  Place of Service: Inpatient [address] [facility] Code: 24     ATTENDING PROVIDER:  Attending Name and NPI#: Mundo Veliz Md [[id number]]  Address: [address] [place] [address], [address] [place] [address]  Phone: [phone]     UTILIZATION REVIEW CONTACT:  Alejandra Sal Utilization   Network Utilization Review Department  Phone: [phone]  Fax: [phone]  Email: [email or web address]     PHYSICIAN ADVISORY SERVICES:  FOR [id number] REVIEW - MEDICAL NECESSITY DENIAL  Phone: [phone]  Fax: [phone]  Email: [email or web address]     TYPE OF REQUEST:  Inpatient Status     ADMISSION INFORMATION:  ADMISSION DATE/TIME: 12/30/21 12:05 PM  PATIENT DIAGNOSIS CODE/DESCRIPTION:  Cardiogenic shock ([address] ) [R57 0]  Liver failure (HCC) [K72 90]  Weakness [R53 1]  ST elevation [R94 31]  Acute kidney injury ([address] ) [N17 9]  Acute respiratory failure with hypoxia (HCC) [J96 01]  Generalized weakness [R53 1]  DISCHARGE DATE/TIME: No discharge date for patient encounter  DISCHARGE DISPOSITION (IF DISCHARGED): Home/Self Care     IMPORTANT INFORMATION:  Please contact the Alejandra Sal directly with any questions or concerns regarding this request  Department voicemails are confidential     Send requests for admission clinical reviews, concurrent reviews, approvals, and administrative denials due to lack of clinical to fax [phone]

## 2022-01-01 NOTE — PLAN OF CARE
Problem: MOBILITY - ADULT  Goal: Maintain or return to baseline ADL function  Description: INTERVENTIONS:  -  Assess patient's ability to carry out ADLs; assess patient's baseline for ADL function and identify physical deficits which impact ability to perform ADLs (bathing, care of mouth/teeth, toileting, grooming, dressing, etc )  - Assess/evaluate cause of self-care deficits   - Assess range of motion  - Assess patient's mobility; develop plan if impaired  - Assess patient's need for assistive devices and provide as appropriate  - Encourage maximum independence but intervene and supervise when necessary  - Involve family in performance of ADLs  - Assess for home care needs following discharge   - Consider OT consult to assist with ADL evaluation and planning for discharge  - Provide patient education as appropriate  Outcome: Progressing     Problem: Potential for Falls  Goal: Patient will remain free of falls  Description: INTERVENTIONS:  - Educate patient/family on patient safety including physical limitations  - Instruct patient to call for assistance with activity   - Consult OT/PT to assist with strengthening/mobility   - Keep Call bell within reach  - Keep bed low and locked with side rails adjusted as appropriate  - Keep care items and personal belongings within reach  - Initiate and maintain comfort rounds  - Make Fall Risk Sign visible to staff  - Offer Toileting every 2 Hours, in advance of need  - Apply yellow socks and bracelet for high fall risk patients  - Consider moving patient to room near nurses station  Outcome: Progressing     Problem: Prexisting or High Potential for Compromised Skin Integrity  Goal: Skin integrity is maintained or improved  Description: INTERVENTIONS:  - Identify patients at risk for skin breakdown  - Assess and monitor skin integrity  - Assess and monitor nutrition and hydration status  - Monitor labs   - Assess for incontinence   - Turn and reposition patient  - Assist with mobility/ambulation  - Relieve pressure over bony prominences  - Avoid friction and shearing  - Provide appropriate hygiene as needed including keeping skin clean and dry  - Evaluate need for skin moisturizer/barrier cream  - Collaborate with interdisciplinary team   - Patient/family teaching  - Consider wound care consult   Outcome: Progressing     Problem: PAIN - ADULT  Goal: Verbalizes/displays adequate comfort level or baseline comfort level  Description: Interventions:  - Encourage patient to monitor pain and request assistance  - Assess pain using appropriate pain scale  - Administer analgesics based on type and severity of pain and evaluate response  - Implement non-pharmacological measures as appropriate and evaluate response  - Consider cultural and social influences on pain and pain management  - Notify physician/advanced practitioner if interventions unsuccessful or patient reports new pain  Outcome: Progressing     Problem: INFECTION - ADULT  Goal: Absence or prevention of progression during hospitalization  Description: INTERVENTIONS:  - Assess and monitor for signs and symptoms of infection  - Monitor lab/diagnostic results  - Monitor all insertion sites, i e  indwelling lines, tubes, and drains  - Monitor endotracheal if appropriate and nasal secretions for changes in amount and color  - [place] appropriate cooling/warming therapies per order  - Administer medications as ordered  - Instruct and encourage patient and family to use good hand hygiene technique  - Identify and instruct in appropriate isolation precautions for identified infection/condition  Outcome: Progressing  Goal: Absence of fever/infection during neutropenic period  Description: INTERVENTIONS:  - Monitor WBC    Outcome: Progressing     Problem: SAFETY ADULT  Goal: Maintain or return to baseline ADL function  Description: INTERVENTIONS:  -  Assess patient's ability to carry out ADLs; assess patient's baseline for ADL function and identify physical deficits which impact ability to perform ADLs (bathing, care of mouth/teeth, toileting, grooming, dressing, etc )  - Assess/evaluate cause of self-care deficits   - Assess range of motion  - Assess patient's mobility; develop plan if impaired  - Assess patient's need for assistive devices and provide as appropriate  - Encourage maximum independence but intervene and supervise when necessary  - Involve family in performance of ADLs  - Assess for home care needs following discharge   - Consider OT consult to assist with ADL evaluation and planning for discharge  - Provide patient education as appropriate  Outcome: Progressing     Problem: DISCHARGE PLANNING  Goal: Discharge to home or other facility with appropriate resources  Description: INTERVENTIONS:  - Identify barriers to discharge w/patient and caregiver  - Arrange for needed discharge resources and transportation as appropriate  - Identify discharge learning needs (meds, wound care, etc )  - Arrange for interpretive services to assist at discharge as needed  - Refer to Case Management Department for coordinating discharge planning if the patient needs post-hospital services based on physician/advanced practitioner order or complex needs related to functional status, cognitive ability, or social support system  Outcome: Progressing     Problem: Knowledge Deficit  Goal: Patient/family/caregiver demonstrates understanding of disease process, treatment plan, medications, and discharge instructions  Description: Complete learning assessment and assess knowledge base    Interventions:  - Provide teaching at level of understanding  - Provide teaching via preferred learning methods  Outcome: Progressing

## 2022-01-02 ENCOUNTER — APPOINTMENT (INPATIENT)
Dept: RADIOLOGY | Facility: HOSPITAL | Age: 67
DRG: 167 | End: 2022-01-02
Payer: COMMERCIAL

## 2022-01-02 PROBLEM — D72.829 LEUKOCYTOSIS: Status: ACTIVE | Noted: 2022-01-02

## 2022-01-02 PROBLEM — I74.9 ARTERIAL THROMBOSIS (HCC): Status: ACTIVE | Noted: 2022-01-02

## 2022-01-02 LAB
ABO GROUP BLD BPU: NORMAL
ABO GROUP BLD: NORMAL
ALBUMIN SERPL BCP-MCNC: 3.1 G/DL (ref 3.5–5)
ALP SERPL-CCNC: 153 U/L (ref 46–116)
ALT SERPL W P-5'-P-CCNC: 1720 U/L (ref 12–78)
ANION GAP SERPL CALCULATED.3IONS-SCNC: 2 MMOL/L (ref 4–13)
ANION GAP SERPL CALCULATED.3IONS-SCNC: 4 MMOL/L (ref 4–13)
ANION GAP SERPL CALCULATED.3IONS-SCNC: 8 MMOL/L (ref 4–13)
ANION GAP SERPL CALCULATED.3IONS-SCNC: 8 MMOL/L (ref 4–13)
APTT PPP: 54 SECONDS (ref 23–37)
APTT PPP: 54 SECONDS (ref 23–37)
APTT PPP: 56 SECONDS (ref 23–37)
APTT PPP: 56 SECONDS (ref 23–37)
APTT PPP: 57 SECONDS (ref 23–37)
APTT PPP: 58 SECONDS (ref 23–37)
APTT PPP: 63 SECONDS (ref 23–37)
AST SERPL W P-5'-P-CCNC: 268 U/L (ref 5–45)
BASE EXCESS BLDA CALC-SCNC: 11 MMOL/L (ref -2–3)
BASE EXCESS BLDA CALC-SCNC: 4.8 MMOL/L
BASE EXCESS BLDA CALC-SCNC: 5.9 MMOL/L
BASE EXCESS BLDA CALC-SCNC: 7 MMOL/L (ref -2–3)
BASE EXCESS BLDA CALC-SCNC: 8.3 MMOL/L
BILIRUB DIRECT SERPL-MCNC: 1.25 MG/DL (ref 0–0.2)
BILIRUB SERPL-MCNC: 2.27 MG/DL (ref 0.2–1)
BLD GP AB SCN SERPL QL: NEGATIVE
BODY TEMPERATURE: 98.6 DEGREES FEHRENHEIT
BPU ID: NORMAL
BUN SERPL-MCNC: 103 MG/DL (ref 5–25)
BUN SERPL-MCNC: 111 MG/DL (ref 5–25)
BUN SERPL-MCNC: 124 MG/DL (ref 5–25)
BUN SERPL-MCNC: 132 MG/DL (ref 5–25)
CA-I BLD-SCNC: 1.1 MMOL/L (ref 1.12–1.32)
CA-I BLD-SCNC: 1.14 MMOL/L (ref 1.12–1.32)
CA-I BLD-SCNC: 1.15 MMOL/L (ref 1.12–1.32)
CALCIUM SERPL-MCNC: 8.3 MG/DL (ref 8.3–10.1)
CALCIUM SERPL-MCNC: 8.3 MG/DL (ref 8.3–10.1)
CALCIUM SERPL-MCNC: 8.4 MG/DL (ref 8.3–10.1)
CALCIUM SERPL-MCNC: 9.3 MG/DL (ref 8.3–10.1)
CHLORIDE SERPL-SCNC: 111 MMOL/L (ref 100–108)
CHLORIDE SERPL-SCNC: 113 MMOL/L (ref 100–108)
CHLORIDE SERPL-SCNC: 116 MMOL/L (ref 100–108)
CHLORIDE SERPL-SCNC: 117 MMOL/L (ref 100–108)
CO2 SERPL-SCNC: 28 MMOL/L (ref 21–32)
CO2 SERPL-SCNC: 29 MMOL/L (ref 21–32)
CO2 SERPL-SCNC: 31 MMOL/L (ref 21–32)
CO2 SERPL-SCNC: 31 MMOL/L (ref 21–32)
CREAT SERPL-MCNC: 1.87 MG/DL (ref 0.6–1.3)
CREAT SERPL-MCNC: 2.03 MG/DL (ref 0.6–1.3)
CREAT SERPL-MCNC: 2.32 MG/DL (ref 0.6–1.3)
CREAT SERPL-MCNC: 2.64 MG/DL (ref 0.6–1.3)
CROSSMATCH: NORMAL
ERYTHROCYTE [DISTWIDTH] IN BLOOD BY AUTOMATED COUNT: 14.2 % (ref 11.6–15.1)
GFR SERPL CREATININE-BSD FRML MDRD: 24 ML/MIN/1.73SQ M
GFR SERPL CREATININE-BSD FRML MDRD: 28 ML/MIN/1.73SQ M
GFR SERPL CREATININE-BSD FRML MDRD: 33 ML/MIN/1.73SQ M
GFR SERPL CREATININE-BSD FRML MDRD: 37 ML/MIN/1.73SQ M
GLUCOSE SERPL-MCNC: 100 MG/DL (ref 65–140)
GLUCOSE SERPL-MCNC: 107 MG/DL (ref 65–140)
GLUCOSE SERPL-MCNC: 112 MG/DL (ref 65–140)
GLUCOSE SERPL-MCNC: 114 MG/DL (ref 65–140)
GLUCOSE SERPL-MCNC: 118 MG/DL (ref 65–140)
GLUCOSE SERPL-MCNC: 121 MG/DL (ref 65–140)
GLUCOSE SERPL-MCNC: 122 MG/DL (ref 65–140)
GLUCOSE SERPL-MCNC: 125 MG/DL (ref 65–140)
GLUCOSE SERPL-MCNC: 127 MG/DL (ref 65–140)
GLUCOSE SERPL-MCNC: 134 MG/DL (ref 65–140)
GLUCOSE SERPL-MCNC: 134 MG/DL (ref 65–140)
GLUCOSE SERPL-MCNC: 135 MG/DL (ref 65–140)
GLUCOSE SERPL-MCNC: 137 MG/DL (ref 65–140)
GLUCOSE SERPL-MCNC: 137 MG/DL (ref 65–140)
GLUCOSE SERPL-MCNC: 138 MG/DL (ref 65–140)
GLUCOSE SERPL-MCNC: 140 MG/DL (ref 65–140)
GLUCOSE SERPL-MCNC: 145 MG/DL (ref 65–140)
HCO3 BLDA-SCNC: 29.2 MMOL/L (ref 22–28)
HCO3 BLDA-SCNC: 30 MMOL/L (ref 22–28)
HCO3 BLDA-SCNC: 30.7 MMOL/L (ref 24–30)
HCO3 BLDA-SCNC: 32.4 MMOL/L (ref 22–28)
HCO3 BLDA-SCNC: 34.8 MMOL/L (ref 22–28)
HCT VFR BLD AUTO: 23.7 % (ref 36.5–49.3)
HCT VFR BLD CALC: 23 % (ref 36.5–49.3)
HCT VFR BLD CALC: 23 % (ref 36.5–49.3)
HGB BLD-MCNC: 7.7 G/DL (ref 12–17)
HGB BLD-MCNC: 8.1 G/DL (ref 12–17)
HGB BLDA-MCNC: 7.8 G/DL (ref 12–17)
HGB BLDA-MCNC: 7.8 G/DL (ref 12–17)
HOROWITZ INDEX BLDA+IHG-RTO: 100 MM[HG]
HOROWITZ INDEX BLDA+IHG-RTO: 40 MM[HG]
HOROWITZ INDEX BLDA+IHG-RTO: 50 MM[HG]
INR PPP: 1.48 (ref 0.84–1.19)
LDH SERPL-CCNC: 1055 U/L (ref 81–234)
MAGNESIUM SERPL-MCNC: 3.5 MG/DL (ref 1.6–2.6)
MCH RBC QN AUTO: 31.3 PG (ref 26.8–34.3)
MCHC RBC AUTO-ENTMCNC: 32.5 G/DL (ref 31.4–37.4)
MCV RBC AUTO: 96 FL (ref 82–98)
O2 CT BLDA-SCNC: 12 ML/DL (ref 16–23)
O2 CT BLDA-SCNC: 12.7 ML/DL (ref 16–23)
O2 CT BLDA-SCNC: 12.9 ML/DL (ref 16–23)
OXYHGB MFR BLDA: 98.3 % (ref 94–97)
OXYHGB MFR BLDA: 98.4 % (ref 94–97)
OXYHGB MFR BLDA: 98.4 % (ref 94–97)
PCO2 BLD: 32 MMOL/L (ref 21–32)
PCO2 BLD: 36 MMOL/L (ref 21–32)
PCO2 BLD: 40.3 MM HG (ref 42–50)
PCO2 BLD: 41.3 MM HG (ref 36–44)
PCO2 BLDA: 41.4 MM HG (ref 36–44)
PCO2 BLDA: 43.1 MM HG (ref 36–44)
PCO2 BLDA: 43.2 MM HG (ref 36–44)
PEEP RESPIRATORY: 6 CM[H2O]
PH BLD: 7.49 [PH] (ref 7.3–7.4)
PH BLD: 7.53 [PH] (ref 7.35–7.45)
PH BLDA: 7.45 [PH] (ref 7.35–7.45)
PH BLDA: 7.48 [PH] (ref 7.35–7.45)
PH BLDA: 7.49 [PH] (ref 7.35–7.45)
PHOSPHATE SERPL-MCNC: 4.6 MG/DL (ref 2.3–4.1)
PLATELET # BLD AUTO: 102 THOUSANDS/UL (ref 149–390)
PMV BLD AUTO: 11.4 FL (ref 8.9–12.7)
PO2 BLD: 40 MM HG (ref 35–45)
PO2 BLD: >400 MM HG (ref 75–129)
PO2 BLDA: 338.8 MM HG (ref 75–129)
PO2 BLDA: 354.4 MM HG (ref 75–129)
PO2 BLDA: 382 MM HG (ref 75–129)
POTASSIUM BLD-SCNC: 4 MMOL/L (ref 3.5–5.3)
POTASSIUM BLD-SCNC: 4 MMOL/L (ref 3.5–5.3)
POTASSIUM SERPL-SCNC: 3.8 MMOL/L (ref 3.5–5.3)
POTASSIUM SERPL-SCNC: 4 MMOL/L (ref 3.5–5.3)
PROCALCITONIN SERPL-MCNC: 1.26 NG/ML
PROT SERPL-MCNC: 5.5 G/DL (ref 6.4–8.2)
PROTHROMBIN TIME: 17.3 SECONDS (ref 11.6–14.5)
RBC # BLD AUTO: 2.46 MILLION/UL (ref 3.88–5.62)
RH BLD: POSITIVE
SAO2 % BLD FROM PO2: 79 % (ref 60–85)
SODIUM BLD-SCNC: 150 MMOL/L (ref 136–145)
SODIUM BLD-SCNC: 151 MMOL/L (ref 136–145)
SODIUM SERPL-SCNC: 147 MMOL/L (ref 136–145)
SODIUM SERPL-SCNC: 150 MMOL/L (ref 136–145)
SODIUM SERPL-SCNC: 150 MMOL/L (ref 136–145)
SODIUM SERPL-SCNC: 151 MMOL/L (ref 136–145)
SPECIMEN EXPIRATION DATE: NORMAL
SPECIMEN SOURCE: ABNORMAL
UNIT DISPENSE STATUS: NORMAL
UNIT PRODUCT CODE: NORMAL
UNIT PRODUCT VOLUME: 350 ML
UNIT RH: NORMAL
VENT AC: 14
VENT- AC: AC
VT SETTING VENT: 400 ML
WBC # BLD AUTO: 28.7 THOUSAND/UL (ref 4.31–10.16)

## 2022-01-02 PROCEDURE — 82803 BLOOD GASES ANY COMBINATION: CPT

## 2022-01-02 PROCEDURE — 85610 PROTHROMBIN TIME: CPT | Performed by: NURSE PRACTITIONER

## 2022-01-02 PROCEDURE — 82948 REAGENT STRIP/BLOOD GLUCOSE: CPT

## 2022-01-02 PROCEDURE — 84132 ASSAY OF SERUM POTASSIUM: CPT

## 2022-01-02 PROCEDURE — 85027 COMPLETE CBC AUTOMATED: CPT | Performed by: NURSE PRACTITIONER

## 2022-01-02 PROCEDURE — 84145 PROCALCITONIN (PCT): CPT | Performed by: NURSE PRACTITIONER

## 2022-01-02 PROCEDURE — 82805 BLOOD GASES W/O2 SATURATION: CPT | Performed by: NURSE PRACTITIONER

## 2022-01-02 PROCEDURE — 85730 THROMBOPLASTIN TIME PARTIAL: CPT | Performed by: PHYSICIAN ASSISTANT

## 2022-01-02 PROCEDURE — 86923 COMPATIBILITY TEST ELECTRIC: CPT

## 2022-01-02 PROCEDURE — 93005 ELECTROCARDIOGRAM TRACING: CPT

## 2022-01-02 PROCEDURE — 94003 VENT MGMT INPAT SUBQ DAY: CPT

## 2022-01-02 PROCEDURE — 86901 BLOOD TYPING SEROLOGIC RH(D): CPT | Performed by: PHYSICIAN ASSISTANT

## 2022-01-02 PROCEDURE — P9016 RBC LEUKOCYTES REDUCED: HCPCS

## 2022-01-02 PROCEDURE — 83735 ASSAY OF MAGNESIUM: CPT | Performed by: NURSE PRACTITIONER

## 2022-01-02 PROCEDURE — 84100 ASSAY OF PHOSPHORUS: CPT | Performed by: NURSE PRACTITIONER

## 2022-01-02 PROCEDURE — 85730 THROMBOPLASTIN TIME PARTIAL: CPT | Performed by: ANESTHESIOLOGY

## 2022-01-02 PROCEDURE — 83615 LACTATE (LD) (LDH) ENZYME: CPT | Performed by: NURSE PRACTITIONER

## 2022-01-02 PROCEDURE — 80048 BASIC METABOLIC PNL TOTAL CA: CPT | Performed by: NURSE PRACTITIONER

## 2022-01-02 PROCEDURE — 99232 SBSQ HOSP IP/OBS MODERATE 35: CPT | Performed by: INTERNAL MEDICINE

## 2022-01-02 PROCEDURE — 86850 RBC ANTIBODY SCREEN: CPT | Performed by: PHYSICIAN ASSISTANT

## 2022-01-02 PROCEDURE — 82330 ASSAY OF CALCIUM: CPT | Performed by: NURSE PRACTITIONER

## 2022-01-02 PROCEDURE — 33949 ECMO/ECLS DAILY MGMT ARTERY: CPT | Performed by: THORACIC SURGERY (CARDIOTHORACIC VASCULAR SURGERY)

## 2022-01-02 PROCEDURE — 82947 ASSAY GLUCOSE BLOOD QUANT: CPT

## 2022-01-02 PROCEDURE — 71045 X-RAY EXAM CHEST 1 VIEW: CPT

## 2022-01-02 PROCEDURE — 82330 ASSAY OF CALCIUM: CPT

## 2022-01-02 PROCEDURE — 84295 ASSAY OF SERUM SODIUM: CPT

## 2022-01-02 PROCEDURE — 85014 HEMATOCRIT: CPT

## 2022-01-02 PROCEDURE — 80076 HEPATIC FUNCTION PANEL: CPT | Performed by: NURSE PRACTITIONER

## 2022-01-02 PROCEDURE — 99291 CRITICAL CARE FIRST HOUR: CPT | Performed by: ANESTHESIOLOGY

## 2022-01-02 PROCEDURE — 33949 ECMO/ECLS DAILY MGMT ARTERY: CPT

## 2022-01-02 PROCEDURE — 33949 ECMO/ECLS DAILY MGMT ARTERY: CPT | Performed by: ANESTHESIOLOGY

## 2022-01-02 PROCEDURE — 85018 HEMOGLOBIN: CPT | Performed by: NURSE PRACTITIONER

## 2022-01-02 PROCEDURE — 85730 THROMBOPLASTIN TIME PARTIAL: CPT | Performed by: THORACIC SURGERY (CARDIOTHORACIC VASCULAR SURGERY)

## 2022-01-02 PROCEDURE — 94760 N-INVAS EAR/PLS OXIMETRY 1: CPT

## 2022-01-02 PROCEDURE — 86900 BLOOD TYPING SEROLOGIC ABO: CPT | Performed by: PHYSICIAN ASSISTANT

## 2022-01-02 RX ORDER — POTASSIUM CHLORIDE 14.9 MG/ML
20 INJECTION INTRAVENOUS ONCE
Status: COMPLETED | OUTPATIENT
Start: 2022-01-02 | End: 2022-01-02

## 2022-01-02 RX ORDER — FENTANYL CITRATE 50 UG/ML
50 INJECTION, SOLUTION INTRAMUSCULAR; INTRAVENOUS
Status: DISCONTINUED | OUTPATIENT
Start: 2022-01-02 | End: 2022-01-04 | Stop reason: HOSPADM

## 2022-01-02 RX ORDER — CALCIUM GLUCONATE 20 MG/ML
1 INJECTION, SOLUTION INTRAVENOUS ONCE
Status: COMPLETED | OUTPATIENT
Start: 2022-01-02 | End: 2022-01-02

## 2022-01-02 RX ADMIN — FENTANYL CITRATE 50 MCG: 50 INJECTION INTRAMUSCULAR; INTRAVENOUS at 16:23

## 2022-01-02 RX ADMIN — FENTANYL CITRATE 50 MCG: 50 INJECTION INTRAMUSCULAR; INTRAVENOUS at 23:30

## 2022-01-02 RX ADMIN — FENTANYL CITRATE 50 MCG: 50 INJECTION INTRAMUSCULAR; INTRAVENOUS at 22:16

## 2022-01-02 RX ADMIN — ASPIRIN 81 MG: 81 TABLET, COATED ORAL at 08:45

## 2022-01-02 RX ADMIN — CHLORHEXIDINE GLUCONATE 15 ML: 1.2 SOLUTION ORAL at 09:14

## 2022-01-02 RX ADMIN — MILRINONE LACTATE IN DEXTROSE 0.25 MCG/KG/MIN: 200 INJECTION, SOLUTION INTRAVENOUS at 04:39

## 2022-01-02 RX ADMIN — SODIUM CHLORIDE 0.3 MCG/KG/HR: 9 INJECTION, SOLUTION INTRAVENOUS at 09:32

## 2022-01-02 RX ADMIN — PROPOFOL 30 MCG/KG/MIN: 10 INJECTION, EMULSION INTRAVENOUS at 07:46

## 2022-01-02 RX ADMIN — PHYTONADIONE 5 MG: 10 INJECTION, EMULSION INTRAMUSCULAR; INTRAVENOUS; SUBCUTANEOUS at 09:37

## 2022-01-02 RX ADMIN — POLYETHYLENE GLYCOL 3350 17 G: 17 POWDER, FOR SOLUTION ORAL at 08:45

## 2022-01-02 RX ADMIN — SENNOSIDES 8.8 MG: 8.8 SYRUP ORAL at 08:45

## 2022-01-02 RX ADMIN — POTASSIUM CHLORIDE 20 MEQ: 14.9 INJECTION, SOLUTION INTRAVENOUS at 07:22

## 2022-01-02 RX ADMIN — FENTANYL CITRATE 50 MCG: 50 INJECTION INTRAMUSCULAR; INTRAVENOUS at 19:33

## 2022-01-02 RX ADMIN — FENTANYL CITRATE 50 MCG: 50 INJECTION INTRAMUSCULAR; INTRAVENOUS at 13:07

## 2022-01-02 RX ADMIN — FENTANYL CITRATE 50 MCG: 50 INJECTION INTRAMUSCULAR; INTRAVENOUS at 10:52

## 2022-01-02 RX ADMIN — CHLORHEXIDINE GLUCONATE 15 ML: 1.2 SOLUTION ORAL at 20:57

## 2022-01-02 RX ADMIN — ESMOLOL HYDROCHLORIDE 25 MCG/KG/MIN: 10 INJECTION INTRAVENOUS at 06:39

## 2022-01-02 RX ADMIN — SENNOSIDES 8.8 MG: 8.8 SYRUP ORAL at 17:03

## 2022-01-02 RX ADMIN — SODIUM CHLORIDE 0.7 MCG/KG/HR: 9 INJECTION, SOLUTION INTRAVENOUS at 15:43

## 2022-01-02 RX ADMIN — NOREPINEPHRINE BITARTRATE 8 MCG/MIN: 1 INJECTION, SOLUTION, CONCENTRATE INTRAVENOUS at 00:03

## 2022-01-02 RX ADMIN — SODIUM CHLORIDE 0.4 MCG/KG/HR: 9 INJECTION, SOLUTION INTRAVENOUS at 22:00

## 2022-01-02 RX ADMIN — POTASSIUM CHLORIDE 20 MEQ: 14.9 INJECTION, SOLUTION INTRAVENOUS at 03:31

## 2022-01-02 RX ADMIN — VASOPRESSIN 0.04 UNITS/MIN: 20 INJECTION INTRAVENOUS at 08:03

## 2022-01-02 RX ADMIN — HEPARIN SODIUM 13 UNITS/KG/HR: 10000 INJECTION, SOLUTION INTRAVENOUS at 13:47

## 2022-01-02 RX ADMIN — CALCIUM GLUCONATE 1 G: 20 INJECTION, SOLUTION INTRAVENOUS at 06:39

## 2022-01-02 RX ADMIN — POTASSIUM CHLORIDE 20 MEQ: 14.9 INJECTION, SOLUTION INTRAVENOUS at 15:16

## 2022-01-02 RX ADMIN — PROPOFOL 30 MCG/KG/MIN: 10 INJECTION, EMULSION INTRAVENOUS at 01:21

## 2022-01-02 RX ADMIN — FENTANYL CITRATE 50 MCG: 50 INJECTION INTRAMUSCULAR; INTRAVENOUS at 09:45

## 2022-01-02 RX ADMIN — Medication 20 MG: at 08:46

## 2022-01-02 RX ADMIN — NOREPINEPHRINE BITARTRATE 3 MCG/MIN: 1 INJECTION, SOLUTION, CONCENTRATE INTRAVENOUS at 13:46

## 2022-01-02 RX ADMIN — SODIUM CHLORIDE 1.5 UNITS/HR: 9 INJECTION, SOLUTION INTRAVENOUS at 07:31

## 2022-01-02 NOTE — NUTRITION
01/02/22 1330   Recommendations/Interventions   Summary Patient is intubated and sedated on ECMO  Patient was found to be in multi-system organ failure per chart review (renal failure, elevated LFTs, encephalopathy)  Renal parameters and LFTs are slowly improving  Current trickle feed is not meeting estimated needs  Recommend Jevity 1 5 @ 20 ml/hr increasing by 10 ml/hr until goal rate of 55 ml/hr  This will provide 1320 ml total volume, 1980 kcals, 84 g pro, 1003 ml free water  Suggest flush of 150 ml q 4 hrs for a total flush volume of 900 ml and a total free water volume of 1903 ml  Monitor EN tolerance, electrolytes, renal parameters, LFTs, and weight

## 2022-01-02 NOTE — PROGRESS NOTES
Progress Note - Critical Care   Emeli Manzano 61 y o  male MRN: [id number]  Unit/Bed#: [facility] 418-01 Encounter: [id number]      Attending Physician: Leonor Paniagua MD    24 Hour Events/HPI: POD # 2 s/p VA ECMO cannulation for eventual bridge to VSD repair  Yesterday Epi weaned off, Milrinone decreased from 0 5mcg to 0 25mcg, and Esmolol started at 25mcg for goal HR <110  Right upper extremity partial arterial occlusion secondary to thrombus at the art line site, right brachial arterial line d/c-distally became quickly warm and well perfused with palpable pulse  Left axillary chan inserted  Current infusions: Levo 7mcg, Vaso 0 04 units, Milrinone 0 25mcg, Esmolol 25mcg, ECMO Heparin (PTT 54), Propofol 30mcg        ROS: Review of Systems   Unable to perform ROS: Intubated     ------------------------------------------------------------------------------------------------------------------------------------------------------------------  Impressions:  1  Cardiogenic shock s/p VA ECMO cannulation  2  Recent LAD STEMI  3  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   4  Shock liver  5  JUDE  6  Coagulopathy  7  Toxic metabolic encephalopathy  8  Right upper extremity partial arterial occlusion secondary to thrombus at the art line site  9  Tylenol OD s/p NAC   10  Leukocytosis  11  Acute blood loss anemia  12  Thrombocytopenia   13  Agoraphobia with panic attacks      Plan:     Neuro:   · Sedation plan: propofol  § RASS goal: -1 Drowsy  · Pain controlled with: PRN fentanyl   · Regulate sleep/wake cycle  · Delirium precautions  § CAM-ICU daily  · Trend neuro exam     CV:   · Cardiac infusions: Primacor, 0 25 mcg/kg/min, Levophed, 7 mcg/min, Vasopressin, 0 04 Units/min, Esmolol 25mcg  ? Wean vasopressors/inotropes as able  ? MAP goal > 65 and CI >2 2  ?  Continue Esmolol for HR goal <110  § continue [place] catheter  § continue Arterial line  · Rhythm: Sinus Tachycardia  § Follow rhythm on telemetry  · Continue ASA  · Right upper extremity partial arterial occlusion secondary to thrombus at the art line site  · Continue q1h neurovascular checks  · Heparin gtt   · Avoid arterial sites on right UE      Lung:   · Acute respiratory failure; Requiring Intubation with ventilator support  Secondary to atelectasis and lethargy/altered mental status  Continue incentive spirometry/coughing/deep breathing exercises    Wean supplemental oxygen as tolerated for saturation > 90%  § SBT plan: daily  § Chlorhexidine ordered: yes, PPI, HOB > 30 degrees   · Vent 14/400/6/40%     GI:   · Continue PPI for stress ulcer prophylaxis  · Continue bowel regimen  · Trend abdominal exam and bowel function     FEN:   · Diuretic plan: PRN diuresis  § PRN electrolyte repletion  · Nutrition/diet plan: Nepro, advance to goal as tolerated   · Replenish electrolytes with goals: K >4 0, Mag >2 0, and Phos >3 0     :   · Indwelling Chang present: yes   § continue Chang  · Trend UOP and BUN/creat  · Strict I and O     ID:   · Leukocytosis   · Possibly reactive in setting of cardiogenic shock, no clear source of infection  · Trend temps and WBC count  · Maintain normothermia            Results from last 7 days   Lab Units 01/01/22  0433 12/31/21  0422 12/30/21  1007   PROCALCITONIN ng/ml 2 90* 2 16* 1 65*      Heme:   · Trend hgb and plts  · ECMO protocol heparin gtt   · Transfuse 1 unit PRBC today for Hgb 7 7 in setting of low cardiopulmonary reserve, goal Hgb > 8 0  · Thrombocytopenia, continue to trend  · Coagulopathy in setting of liver dysfunction, INR down to 1 48 after 5mg IV Vitamin K yesterday     Endo:   · Glycemic control plan: no hx of DM, Hgb A1c 5 7-5 9 signifies pre-diabetes, continue insulin gtt for stress induced hyperglycemia            Results from last 6 Months   Lab Units 12/31/21  0400 12/30/21  1007 09/13/21  0657   HEMOGLOBIN A1C % 5 9* 5 7* 5 3      MSK/Skin:  · Frequent turning and pressure off-loading  · Local wound care as needed     Disposition:  Continue ICU care    ---------------------------------------------------------------------------------------------------------------------------------------------------------------------  Allergies:    Allergies   Allergen Reactions    Mushroom Extract Complex - Food Allergy Facial Swelling    Peanut Oil - Food Allergy     Strawberry C [Ascorbate - Food Allergy]      Medications:   Scheduled Meds:  Current Facility-Administered Medications   Medication Dose Route Frequency Provider Last Rate    aspirin  81 mg Oral Daily Wendi Spironello V, CRNP      chlorhexidine  15 mL Mouth/Throat Q12H [address] Wendi Spironello V, CRNP      esmolol   mcg/kg/min Intravenous Titrated Wendi Spironello V, CRNP 25 mcg/kg/min (01/02/22 1612)    fentanyl citrate (PF)  50 mcg Intravenous Q2H PRN Wendi Spironello V, CRNP      heparin (porcine)  3-15 Units/kg/hr (Order-Specific) Intravenous Titrated Wendi Spironello V, CRNP 13 Units/kg/hr (01/02/22 5699)    insulin regular (HumuLIN R,NovoLIN R) infusion  0 3-21 Units/hr Intravenous Titrated Wendi Spironello V, CRNP 1 5 Units/hr (01/02/22 5604)    metoprolol  2 5 mg Intravenous Q4H PRN DO Lisandro Phelps [facility]) infusion  0 25 mcg/kg/min Intravenous Continuous Wendi Spironello V, CRNP 0 25 mcg/kg/min (01/02/22 5492)    norepinephrine  1-30 mcg/min Intravenous Titrated Wendi Spironello V, CRNP 7 mcg/min (01/02/22 0053)    omeprazole (PRILOSEC) suspension 2 mg/mL  20 mg Oral Daily Wendi Spironello V, CRNP      ondansetron  4 mg Intravenous Q6H PRN Wendi Spironello V, CRNP      polyethylene glycol  17 g Oral Daily Wendi Spironello V, CRNP      potassium chloride  20 mEq Intravenous Once Stanly Dubin V, CRNP      propofol  5-50 mcg/kg/min Intravenous Titrated Wendi Spironello V, CRNP 30 mcg/kg/min (01/02/22 0121)    senna  8 8 mg Oral BID Wendi Spironello V, CRNP      sodium chloride (PF) 3 mL Intravenous Q1H PRN Wendi Spironello V, CRNP      vasopressin (PITRESSIN) in 0 9 % sodium chloride 100 mL  0 04 Units/min Intravenous Continuous Wendi Spironello V, CRNP 0 04 Units/min (01/01/22 2204)     VTE Pharmacologic Prophylaxis: Heparin Drip  VTE Mechanical Prophylaxis: sequential compression device    Continuous Infusions:esmolol,  mcg/kg/min, Last Rate: 25 mcg/kg/min (01/02/22 0639)  heparin (porcine), 3-15 Units/kg/hr (Order-Specific), Last Rate: 13 Units/kg/hr (01/02/22 0139)  insulin regular (HumuLIN R,NovoLIN R) infusion, 0 3-21 Units/hr, Last Rate: 1 5 Units/hr (01/02/22 0414)  milrinone (PRIMACOR) infusion, 0 25 mcg/kg/min, Last Rate: 0 25 mcg/kg/min (01/02/22 0439)  norepinephrine, 1-30 mcg/min, Last Rate: 7 mcg/min (01/02/22 0053)  propofol, 5-50 mcg/kg/min, Last Rate: 30 mcg/kg/min (01/02/22 0121)  vasopressin (PITRESSIN) in 0 9 % sodium chloride 100 mL, 0 04 Units/min, Last Rate: 0 04 Units/min (01/01/22 2204)      PRN Meds:  fentanyl citrate (PF), 50 mcg, Q2H PRN  metoprolol, 2 5 mg, Q4H PRN  ondansetron, 4 mg, Q6H PRN  sodium chloride (PF), 3 mL, Q1H PRN      Home Medications:   Prior to Admission medications    Medication Sig Start Date End Date Taking?  Authorizing Provider   ALPRAZolam Osie Cools) 0 5 mg tablet Take 1-2 tablets 30-60 minutes prior to leaving home prn 3/18/21   Dony Gil MD   EPINEPHrine (EPIPEN) 0 3 mg/0 3 mL SOAJ Inject 0 3 mL (0 3 mg total) into a muscle once for 1 dose 9/21/18 3/18/21  Rob Walton, DO     ---------------------------------------------------------------------------------------------------------------------------------------------------------------------  Vitals:   Vitals:    01/02/22 0500 01/02/22 0552 01/02/22 0600 01/02/22 0700   BP:       BP Location:       Pulse: 87  85 87   Resp: 15  15 14   Temp: 98 6 °F (37 °C)  98 6 °F (37 °C) 98 6 °F (37 °C)   TempSrc:       SpO2: 99%  99% 95%   Weight:  89 6 kg (197 lb 8 5 oz)     Height: Arterial Line:  Arterial Line BP: 69/67  Arterial Line MAP (mmHg): 68 mmHg    Tele Rhythm: NSR This was personally reviewed by myself  Respiratory:  SpO2: SpO2: 95 %, SpO2 Activity: SpO2 Activity: At Rest, SpO2 Device: O2 Device: Ventilator  Nasal Cannula O2 Flow Rate (L/min): 2 L/min    Ventilator:  Respiratory  Report   Lab Data (Last 4 hours)       0531            pH, Arterial       7 449             pCO2, Arterial       43 1             pO2, Arterial       382 0             HCO3, Arterial       29 2             Base Excess, Arterial       4 8                  O2/Vent Data        0500   Most Recent         Vent Mode AC/VC  AC/VC      Resp Rate (BPM) (BPM) 14  14      Vt (mL) (mL) 400  400      FIO2 (%) (%) 50  50      PEEP (cmH2O) (cmH2O) 6  6      Patient safety screen outcome: Failed  Failed      MV 7 2  7 2                Temperature: Temp (24hrs), Av 6 °F (37 °C), Min:98 4 °F (36 9 °C), Max:98 8 °F (37 1 °C)  Current: Temperature: 98 6 °F (37 °C)    Weights:   Weight (last 2 days)     Date/Time Weight    22 0552 89 6 (197 53)    22 0538 90 5 (199 52)    21 0540 92 3 (203 48)        Body mass index is 32 37 kg/m²  Hemodynamic Monitoring:  PAP: PAP: 20/3, CVP: CVP (mean): 1 mmHg, CO:  , CI:  , SVR: SVR (dyne*sec)/cm5: 554 (dyne*sec)/cm5  PAP: (18-33)/(0-14) 20/3     ECMO:  Treatment Type: AV ECMO  Pump Flow (L/min): 4 4 LPM  Pump Speed (Rotations/min): 6800 RPM   Flow (L/min): 4 LPM   FiO2 (%): 100 %      Intake and Outputs:    Intake/Output Summary (Last 24 hours) at 2022 0718  Last data filed at 2022 3294  Gross per 24 hour   Intake 2712 21 ml   Output 3750 ml   Net -1037 79 ml     I/O last 24 hours: In: 2712 2 [I V :; NG/GT:150; IV Piggyback:224 2;  Feedings:360]  Out: 2268 [Urine:3650; Emesis/NG output:100]    UOP: 150-225mL/hour   BM: PTA     Labs:  Results from last 7 days   Lab Units 22  0530 22  1325 22  0448 12/31/21  0400 12/30/21  1007   WBC Thousand/uL 28 70* 31 86* 28 23*   < > 17 22*   HEMOGLOBIN g/dL 7 7* 8 4* 8 8*   < > 12 8   I STAT HEMOGLOBIN   --   --   --    < >  --    HEMATOCRIT % 23 7* 25 2* 26 2*   < > 40 1   HEMATOCRIT, ISTAT   --   --   --    < >  --    PLATELETS Thousands/uL 102* 138* 164   < > 217   MONO PCT %  --   --  8  --  4    < > = values in this interval not displayed  Results from last 7 days   Lab Units 01/02/22  0529 01/01/22  2340 01/01/22  1816 01/01/22  1325 01/01/22  1325 01/01/22  0433 01/01/22  0433 12/31/21  1729 12/31/21  1326 12/31/21  1149 12/31/21  1010 12/31/21  1010 12/31/21  0411   SODIUM mmol/L 150* 147* 146*   < > 145   < > 143   < > 138  --   --   --    < >   POTASSIUM mmol/L 3 8 3 8 4 3   < > 3 7   < > 4 1   < > 3 5  --   --   --    < >   CHLORIDE mmol/L 113* 111* 111*   < > 108   < > 108   < > 104  --   --   --    < >   CO2 mmol/L 29 28 27   < > 26   < > 24   < > 18*  --   --   --    < >   CO2, I-STAT mmol/L  --   --   --   --   --   --   --   --  24 21  22   < > 17*   < >   BUN mg/dL 124* 132* 133*   < > 135*   < > 133*   < > 140*  --   --   --    < >   CREATININE mg/dL 2 32* 2 64* 3 00*   < > 2 94*   < > 3 01*   < > 2 87*  --   --   --    < >   CALCIUM mg/dL 9 3 8 4 8 4   < > 8 6   < > 8 3   < > 8 4  --   --   --    < >   ALK PHOS U/L  --   --   --   --  140*  --  134*  --  128*  --   --   --    < >   ALT U/L  --   --   --   --  2,325*  --  2,895*  --  3,696*  --   --   --    < >   AST U/L  --   --   --   --  349*  --  476*  --  996*  --   --   --    < >   GLUCOSE, ISTAT mg/dl  --   --   --   --   --   --   --   --  161* 168*  170*  --  180*  --     < > = values in this interval not displayed       Baseline Creat: 1 0-1 2    Results from last 7 days   Lab Units 01/01/22  0433 12/31/21  0400 12/30/21  1632   MAGNESIUM mg/dL 3 2* 3 4* 3 5*     Results from last 7 days   Lab Units 01/01/22  0433 12/31/21  0400 12/30/21  1632   PHOSPHORUS mg/dL 5 3* 6 0* 9 0* Results from last 7 days   Lab Units 01/02/22  0529 01/02/22  0340 01/01/22  2340 01/01/22  1607 01/01/22  1325 01/01/22  0646 01/01/22  0433   INR  1 48*  --   --   --  1 93*  --  2 06*   PTT seconds 54* 57* 63*   < >  --    < >  --     < > = values in this interval not displayed  Results from last 7 days   Lab Units 12/31/21  1650 12/31/21  1326 12/31/21  0836   LACTIC ACID mmol/L 1 5 2 1* 2 0     Results from last 7 days   Lab Units 01/02/22  0531 01/01/22  2340 01/01/22  2340 01/01/22  1816 01/01/22  1816   PH ART  7 449   < > 7 419   < > 7 333*   PCO2 ART mm Hg 43 1   < > 42 2   < > 52 1*   PO2 ART mm Hg 382 0*   < > 348 2*   < > 384 7*   HCO3 ART mmol/L 29 2*   < > 26 7   < > 27 0   BASE EXC ART mmol/L 4 8   < > 2 0   < > 0 8   ABG SOURCE  Line, Arterial  --  Line, Arterial  --  Line, Arterial    < > = values in this interval not displayed  Micro:   Blood Culture:   Lab Results   Component Value Date    BLOODCX No Growth at 48 hrs  12/30/2021    BLOODCX No Growth at 48 hrs  12/30/2021     Urine Culture: No results found for: URINECX  Sputum Culture: No components found for: SPUTUMCX  Wound Culure: No results found for: WOUNDCULT    Diagnositic Studies:  01/02/22 CXR: lines and tubes stable, lungs clear  This was personally reviewed by myself in PACS  Nutrition:        Diet Orders   (From admission, onward)             Start     Ordered    01/02/22 0000  Diet NPO  Diet effective now        Comments: Stop TF on hold for tentative OR procedure   References:    Nutrtion Support Algorithm Enteral vs  Parenteral   Question Answer Comment   Diet Type NPO    RD to adjust diet per protocol? Yes        01/01/22 1013              Physical Exam  Constitutional:       Appearance: He is well-developed  He is ill-appearing  Interventions: He is sedated, intubated and restrained  HENT:      Head: Normocephalic and atraumatic     Eyes:      General: Lids are normal       Extraocular Movements: Extraocular movements intact  Conjunctiva/sclera: Conjunctivae normal    Neck:      Trachea: Trachea normal    Cardiovascular:      Rate and Rhythm: Normal rate and regular rhythm  Pulses:           Radial pulses are 1+ on the right side and 1+ on the left side  Dorsalis pedis pulses are detected w/ Doppler on the right side and detected w/ Doppler on the left side  Heart sounds: Normal heart sounds, S1 normal and S2 normal       Comments: +2 UE edema  Right fem VA ECMO cannulation site clean, dry, intact, no hematoma  Pulmonary:      Effort: Pulmonary effort is normal  He is intubated  Breath sounds: Normal breath sounds  Abdominal:      General: Bowel sounds are decreased  Palpations: Abdomen is soft  Musculoskeletal:      Cervical back: Neck supple  Comments: Unable to assess d/t sedation   Skin:     General: Skin is warm and moist       Capillary Refill: Capillary refill takes less than 2 seconds  Coloration: Skin is pale  Comments: Left metatarsal dusky appearance   Neurological:      GCS: GCS eye subscore is 1  GCS verbal subscore is 1  GCS motor subscore is 1  Invasive lines and devices: Invasive Devices  Report    Central Venous Catheter Line            CVC Central Lines 12/30/21 Triple Left Internal jugular 2 days    Introducer 12/31/21 1 day    LDA ECMO 12/31/21 Femoral artery  1 day          Arterial Line            Arterial Line 01/01/22 Other (Comment) <1 day          Line            Arterial Sheath 6 Fr  Right  1 day          Drain            Urethral Catheter Temperature probe 2 days    NG/OG/Enteral Tube Orogastric Center mouth 1 day          Airway            ETT  Hi-Lo; Cuffed;Oral 8 5 mm 1 day              ---------------------------------------------------------------------------------------------------------------------------------------------------------------------  Code Status: Level 1 - Full Code    Care Time Delivered:   No Critical Care time spent     Collaborative bedside rounds performed with cardiac surgery attending, critical care attending and bedside RN      SIGNATURE: BROOKE Henderson  DATE: January 2, 2022  TIME: 7:18 AM

## 2022-01-02 NOTE — RESPIRATORY THERAPY NOTE
RT Ventilator Management Note  [place] 61 y o  male MRN: [id number]  Unit/Bed#: [facility] 865-19 Encounter: [id number]       01/01/22 2026   Respiratory Assessment   Assessment Type Assess only   General Appearance Sedated   Respiratory Pattern Assisted   Chest Assessment Chest expansion symmetrical   Bilateral Breath Sounds Clear   R Breath Sounds Clear   Cough None   Suction ET Tube   Resp Comments 1st routine vent check this shift  Pt slept throughout this check sounding no vent alarms  No mode or parameter changes since last check  Vent alarms on and tested  Will continue to monitor per protocol  O2 Device  vent   Vent Information   Vent ID [id number]   Vent type     Vent Mode AC/VC   $ Pulse Oximetry Spot Check Charge Completed   AC/VC Settings   Resp Rate (BPM) 14 BPM   Vt (mL) 400 mL   FIO2 (%) 50 %   PEEP (cmH2O) 6 cmH2O   Flow Pattern (LPM) 60 L/min   Trigger Sensitivity Flow (lpm) 3 %   Humidification Heater   Heater Temperature (Set) 98 6 °F (37 °C)   AC/VC Actuals   Resp Rate (BPM) 18 BPM   VT (mL) 417   MV 8 1   MAP (cmH2O) 6 1 cmH2O   Peak Pressure (cmH2O) 15 cmH2O   I/E Ratio (Obs) 1:3   Heater Temperature (Obs) 98 4 °F (36 9 °C)   Static Compliance (mL/cmH20) 58 mL/cmH2O   Plateau Pressure (cm H2O) 11 cm H2O   AC/VC Alarms   High Peak Pressure (cmH2O) 40   High Resp Rate (BPM) 40 BPM   High MV (L/min) 20 L/min   Low MV (L/min) 5 L/min   Vt High (mL) 1400 mL   Vt Low (mL) 300 mL   AC/VC Apnea Settings   Resp Rate (BPM) 14 BPM   VT (mL) 450 mL   FIO2 (%) 100 %   Apnea Time (s) 20 S   Apnea Flow (L/min) 60 L/min   Maintenance   Alarm (pink) cable attached Yes   Resuscitation bag with peep valve at bedside Yes   Water bag changed No   Circuit changed No   Daily Screen   Patient safety screen outcome: Failed   Not Ready for Weaning due to: Underline problem not resolved   ETT  Hi-Lo; Cuffed;Oral 8 5 mm   Placement Date/Time: 12/31/21 1009   Mask Ventilation: Mask ventilation not attempted (0)  Preoxygenated: Yes  Technique: Direct laryngoscopy  Type: Hi-Lo; Cuffed;Oral  Tube Size: 8 5 mm  Laryngoscope: Mac  Blade Size: 3  Location: Oral  Grade View: Fu    Secured at (cm) 26   Measured from Lips   Secured Location Left   Secured by Commercial tube clark   Site Condition Dry   Cuff Pressure (cm H2O) 20 cm H2O   HI-LO Suction  Intermittent suction   HI-LO Secretions Scant   HI-LO Intervention Patent       Daily Screen       1/1/2022  0755 1/1/2022 2026          Patient safety screen outcome[de-identified] Failed Failed      Not Ready for Weaning due to[de-identified] Underline problem not resolved Underline problem not resolved              Physical Exam:   Assessment Type: Assess only  General Appearance: Sedated  Respiratory Pattern: Assisted  Chest Assessment: Chest expansion symmetrical  Bilateral Breath Sounds: Clear  R Breath Sounds: Clear  L Breath Sounds: Clear  Cough: None  Suction: ET Tube  O2 Device:  vent      Resp Comments: 1st routine vent check this shift  Pt slept throughout this check sounding no vent alarms  No mode or parameter changes since last check  Vent alarms on and tested  Will continue to monitor per protocol

## 2022-01-02 NOTE — PROGRESS NOTES
Cardiology Progress Note - Chase Lot 61 y o  male MRN: [id number]    Unit/Bed#: [facility] 418-01 Encounter: [id number]        Subjective:    No significant events overnight  Intubated and sedated     Review of Systems   Unable to perform ROS: intubated       Objective:   Vitals: Blood pressure (!) 71/69, pulse 76, temperature 98 4 °F (36 9 °C), temperature source Bladder, resp  rate (!) 27, height 5' 5 5" (1 664 m), weight 89 6 kg (197 lb 8 5 oz), SpO2 99 %  , Body mass index is 32 37 kg/m² ,   Orthostatic Blood Pressures      Most Recent Value   Blood Pressure 71/69 filed at 01/02/2022 1007   Patient Position - Orthostatic VS Lying filed at 01/01/2022 8162         Systolic (73NMY), QNF:16 , Min:71 , IAH:91     Diastolic (56LQQ), SWQ:84, Min:69, Max:69      Intake/Output Summary (Last 24 hours) at 1/2/2022 1228  Last data filed at 1/2/2022 1200  Gross per 24 hour   Intake 2980 43 ml   Output 3985 ml   Net -1004 57 ml     Weight (last 2 days)     Date/Time Weight    01/02/22 0552 89 6 (197 53)    01/01/22 0538 90 5 (199 52)    12/31/21 0540 92 3 (203 48)            Telemetry Review: No significant arrhythmias seen on telemetry review  Sinus tach  Physical Exam  Vitals reviewed  Constitutional:       Interventions: He is sedated and intubated  HENT:      Head: Normocephalic  Nose: Nose normal       Mouth/Throat:      Mouth: Mucous membranes are moist       Pharynx: Oropharynx is clear  Eyes:      General: No scleral icterus  Conjunctiva/sclera: Conjunctivae normal    Cardiovascular:      Rate and Rhythm: Normal rate and regular rhythm  Heart sounds: Murmur heard  No friction rub  No gallop  Pulmonary:      Effort: Pulmonary effort is normal  No respiratory distress  He is intubated  Breath sounds: Normal breath sounds  No wheezing or rales  Abdominal:      General: Abdomen is flat  Bowel sounds are normal  There is no distension  Palpations: Abdomen is soft        Tenderness: There is no abdominal tenderness  There is no guarding  Musculoskeletal:      Cervical back: Normal range of motion and neck supple  Right lower leg: No edema  Left lower leg: No edema  Skin:     General: Skin is warm and dry  Neurological:      General: No focal deficit present  Laboratory Results:        CBC with diff:   Results from last 7 days   Lab Units 01/02/22  1132 01/02/22  0925 01/02/22  0530 01/01/22  1325 01/01/22  0443 12/31/21  1326 12/31/21  1149 12/31/21  1149 12/31/21  1010 12/31/21  0400 12/30/21  1007 12/30/21  1007   WBC Thousand/uL  --   --  28 70* 31 86* 28 23* 14 61*  --   --   --  14 68*  --  17 22*   HEMOGLOBIN g/dL 8 1*  --  7 7* 8 4* 8 8* 9 1*  --   --   --  10 7*   < > 12 8   I STAT HEMOGLOBIN g/dl  --  7 8*  7 8*  --   --   --  9 5*   < > 7 8*  7 8*   < >  --   --   --    HEMATOCRIT %  --   --  23 7* 25 2* 26 2* 26 1*  --   --   --  31 2*   < > 40 1   HEMATOCRIT, ISTAT %  --  23*  23*  --   --   --  28*  --  23*  23*   < >  --   --   --    MCV fL  --   --  96 95 95 92  --   --   --  92  --  98   PLATELETS Thousands/uL  --   --  102* 138* 164 141*  --   --   --  152  --  217   MCH pg  --   --  31 3 31 6 32 0 32 0  --   --   --  31 7  --  31 4   MCHC g/dL  --   --  32 5 33 3 33 6 34 9  --   --   --  34 3  --  31 9   RDW %  --   --  14 2 14 1 13 8 13 4  --   --   --  13 4  --  13 9   MPV fL  --   --  11 4 11 2 11 0 11 5  --   --   --  11 3  --  11 2   NRBC AUTO /100 WBCs  --   --   --   --   --   --   --   --   --  3  --   --    NRBC /100 WBC  --   --   --   --  7*  --   --   --   --   --   --   --     < > = values in this interval not displayed           CMP:  Results from last 7 days   Lab Units 01/02/22  1132 01/02/22  0650 01/02/22  0529 01/01/22  2340 01/01/22  1816 01/01/22  1325 01/01/22  8125 12/31/21  2321 12/31/21  2321 12/31/21  1729 12/31/21  1326 12/31/21  1149 12/31/21  1010 12/31/21  1010 12/31/21  0420 12/31/21  0411 12/31/21  1837 12/31/21  0036 12/30/21  1632   POTASSIUM mmol/L 3 8  --  3 8 3 8 4 3 3 7 4 1  --  3 8   < > 3 5  --   --   --   --    < >  --    < > 4 7   CHLORIDE mmol/L 116*  --  113* 111* 111* 108 108  --  104   < > 104  --   --   --   --    < >  --    < > 101   CO2 mmol/L 31  --  29 28 27 26 24   < > 24   < > 18*  --   --   --   --    < >  --    < > 16*   CO2, I-STAT mmol/L  --  32  36*  --   --   --   --   --   --   --   --  24 21  22   < > 17*  --    < >  --   --   --    BUN mg/dL 111*  --  124* 132* 133* 135* 133*  --  145*   < > 140*  --   --   --   --    < >  --    < > 147*   CREATININE mg/dL 2 03*  --  2 32* 2 64* 3 00* 2 94* 3 01*  --  3 02*   < > 2 87*  --   --   --   --    < >  --    < > 3 53*   GLUCOSE, ISTAT mg/dl  --  134  137  --   --   --   --   --   --   --   --  161* 168*  170*  --  180*  --   --   --   --   --    CALCIUM mg/dL 8 3  --  9 3 8 4 8 4 8 6 8 3  --  8 6   < > 8 4  --   --   --   --    < >  --    < > 8 2*   AST U/L  --   --  268*  --   --  349* 476*  --   --   --  996*  --   --   --  2,054*  --  2,371*  --  3,054*   ALT U/L  --   --  1,720*  --   --  2,325* 2,895*  --   --   --  3,696*  --   --   --  4,905*  --  5,427*  --  5,455*   ALK PHOS U/L  --   --  153*  --   --  140* 134*  --   --   --  128*  --   --   --  148*  --  150*  --  145*   EGFR ml/min/1 73sq m 33  --  28 24 20 21 20  --  20   < > 22  --   --   --   --    < >  --    < > 17    < > = values in this interval not displayed           BMP:  Results from last 7 days   Lab Units 01/02/22  1132 01/02/22  0925 01/02/22  0529 01/01/22  2340 01/01/22  1816 01/01/22  1325 01/01/22  0433 12/31/21  2321 12/31/21  2321 12/31/21  1326   POTASSIUM mmol/L 3 8  --  3 8 3 8 4 3 3 7 4 1  --  3 8  --    CHLORIDE mmol/L 116*  --  113* 111* 111* 108 108  --  104  --    CO2 mmol/L 31  --  29 28 27 26 24   < > 24  --    CO2, I-STAT mmol/L  --  32  36*  --   --   --   --   --   --   --   --    BUN mg/dL 111*  --  124* 132* 133* 135* 133*  --  145*  -- CREATININE mg/dL 2 03*  --  2 32* 2 64* 3 00* 2 94* 3 01*  --  3 02*  --    GLUCOSE, ISTAT mg/dl  --  134  137  --   --   --   --   --   --   --    < >   CALCIUM mg/dL 8 3  --  9 3 8 4 8 4 8 6 8 3  --  8 6  --     < > = values in this interval not displayed  BNP: No results for input(s): BNP in the last 72 hours  Magnesium:   Results from last 7 days   Lab Units 01/02/22  0529 01/01/22  0433 12/31/21  0400 12/30/21  1632   MAGNESIUM mg/dL 3 5* 3 2* 3 4* 3 5*       Coags:   Results from last 7 days   Lab Units 01/02/22  1132 01/02/22  0725 01/02/22  0529 01/02/22  0340 01/01/22  2340 01/01/22  2128 01/01/22  1939 01/01/22  1607 01/01/22  1325 01/01/22  0646 01/01/22  0433 12/31/21  1354 12/31/21  1326 12/30/21  1613 12/30/21  1007   PTT seconds 54* 56* 54* 57* 63* 56* 55*   < >  --    < >  --    < > 138*   < > 28   INR   --   --  1 48*  --   --   --   --   --  1 93*  --  2 06*  --  2 42*  --  2 04*    < > = values in this interval not displayed  TSH: No results found for: TSH    Hemoglobin A1C   Results from last 7 days   Lab Units 12/31/21  0400 12/30/21  1007   HEMOGLOBIN A1C % 5 9* 5 7*       Lipid Profile:   Results from last 7 days   Lab Units 12/30/21  1632   TRIGLYCERIDES mg/dL 152*   HDL mg/dL 13*       Cardiac testing:   No results found for this or any previous visit  No results found for this or any previous visit  No results found for this or any previous visit  No results found for this or any previous visit        Meds/Allergies   current meds:   Current Facility-Administered Medications   Medication Dose Route Frequency    aspirin (ECOTRIN LOW STRENGTH) EC tablet 81 mg  81 mg Oral Daily    chlorhexidine (PERIDEX) 0 12 % oral rinse 15 mL  15 mL Mouth/Throat Q12H [address]    dexmedeTOMIDine (Precedex) 400 mcg in sodium chloride 0 9 % 100 mL infusion  0 1-0 7 mcg/kg/hr Intravenous Titrated    esmolol (BREVIBLOC) 2500 mg/250 mL IV infusion (premix)   mcg/kg/min Intravenous Titrated    fentanyl citrate (PF) 100 MCG/2ML 50 mcg  50 mcg Intravenous Q1H PRN    heparin (porcine) 25,000 units in 0 45% NaCl 250 mL infusion (premix)  3-15 Units/kg/hr (Order-Specific) Intravenous Titrated    insulin regular (HumuLIN R,NovoLIN R) 1 Units/mL in sodium chloride 0 9 % 100 mL infusion  0 3-21 Units/hr Intravenous Titrated    metoprolol (LOPRESSOR) injection 2 5 mg  2 5 mg Intravenous Q4H PRN    milrinone (PRIMACOR) 20 mg in 100 mL infusion (premix)  0 25 mcg/kg/min Intravenous Continuous    norepinephrine (LEVOPHED) 4 mg (STANDARD CONCENTRATION) IV in sodium chloride 0 9% 250 mL  1-30 mcg/min Intravenous Titrated    omeprazole (PRILOSEC) suspension 2 mg/mL  20 mg Oral Daily    ondansetron (ZOFRAN) injection 4 mg  4 mg Intravenous Q6H PRN    polyethylene glycol (MIRALAX) packet 17 g  17 g Oral Daily    senna oral syrup 8 8 mg  8 8 mg Oral BID    sodium chloride (PF) 0 9 % injection 3 mL  3 mL Intravenous Q1H PRN    vasopressin (PITRESSIN) 20 Units in sodium chloride 0 9 % 100 mL infusion  0 04 Units/min Intravenous Continuous     Medications Prior to Admission   Medication    ALPRAZolam (XANAX) 0 5 mg tablet    EPINEPHrine (EPIPEN) 0 3 mg/0 3 mL SOAJ       dexmedetomidine, 0 1-0 7 mcg/kg/hr, Last Rate: 0 7 mcg/kg/hr (01/02/22 1049)  esmolol,  mcg/kg/min, Last Rate: Stopped (01/02/22 1043)  heparin (porcine), 3-15 Units/kg/hr (Order-Specific), Last Rate: 13 Units/kg/hr (01/02/22 0139)  insulin regular (HumuLIN R,NovoLIN R) infusion, 0 3-21 Units/hr, Last Rate: 0 5 Units/hr (01/02/22 1016)  milrinone (PRIMACOR) infusion, 0 25 mcg/kg/min, Last Rate: 0 13 mcg/kg/min (01/02/22 0858)  norepinephrine, 1-30 mcg/min, Last Rate: 5 mcg/min (01/02/22 1210)  vasopressin (PITRESSIN) in 0 9 % sodium chloride 100 mL, 0 04 Units/min, Last Rate: 0 04 Units/min (01/02/22 3987)      Assessment:  Principal Problem:    Cardiogenic shock (HCC)  Active Problems:    Agoraphobia with panic attacks    VSD (ventricular septal defect)    JUDE (acute kidney injury) ([facility] [address] )    Transaminitis    Tylenol ingestion    Acute systolic congestive heart failure (HCC)    Hyperglycemia    Anemia    Personal history of ECMO    Coagulopathy (HCC)    Leukocytosis    Arterial thrombosis (HCC)      Plan:    Anteroseptal infarct complicated by post MI VSD  On [facility] ECMO    Wean pressors as needed/tolerated  Continue milrinone for inotrope support  Eventual OR this week for VSD repair  Counseling / Coordination of Care  Total floor / unit time spent today 25 minutes  Greater than 50% of total time was spent with the patient and / or family counseling and / or coordination of care  A description of the counseling / coordination of care

## 2022-01-02 NOTE — PROGRESS NOTES
Progress Note - Cardiothoracic Surgery   Geraldene Lesches 61 y o  male MRN: [id number]  Unit/Bed#: [facility] 418-01 Encounter: [id number]      POD 2 [facility]-ECMO for Post-infarction VSD    Pt seen/examined  Interval history and data reviewed with critical care team     Creatinine and ALT/AST continue to improve slowly  Bilirubin now decreasing but not normal yet  He's making plenty of urine but creatinine is still 2 3  INR down to 1 48 with 2 doses of Vit K  He has woken up and followed commands in all 4 extremities  All extremities are now well perfused  Minimal bleeding, receiving first unit of PRBC's now  Epi gtt is off    Milrinone gtt is @ 0 125  Vaso gtt @ 0 04  Levophed gtt @ 4  Esmolol gtt @ 25, HR 87      Medications:   Scheduled Meds:  Current Facility-Administered Medications   Medication Dose Route Frequency Provider Last Rate    aspirin  81 mg Oral Daily Wendi Spironello V, CRNP      chlorhexidine  15 mL Mouth/Throat Q12H [address] Wendi Spironello V, CRNP      dexmedetomidine  0 1-0 7 mcg/kg/hr Intravenous Titrated Mark Lischner, DO 0 3 mcg/kg/hr (01/02/22 0932)    esmolol   mcg/kg/min Intravenous Titrated Wendi Spironello V, CRNP 25 mcg/kg/min (01/02/22 9765)    fentanyl citrate (PF)  50 mcg Intravenous Q2H PRN Wendi Spironello V, CRNP      heparin (porcine)  3-15 Units/kg/hr (Order-Specific) Intravenous Titrated Wendi Spironello V, CRNP 13 Units/kg/hr (01/02/22 0139)    insulin regular (HumuLIN R,NovoLIN R) infusion  0 3-21 Units/hr Intravenous Titrated Wendi Spironello V, CRNP Stopped (01/02/22 0753)    metoprolol  2 5 mg Intravenous Q4H PRN Nilda Cárdenas DO      milrinone [facility]) infusion  0 25 mcg/kg/min Intravenous Continuous Wendi Spironello V, CRNP 0 13 mcg/kg/min (01/02/22 0858)    norepinephrine  1-30 mcg/min Intravenous Titrated Wendi Spironello V, CRNP 4 mcg/min (01/02/22 0905)    omeprazole (PRILOSEC) suspension 2 mg/mL  20 mg Oral Daily Wendi Spironello V, CRNP      ondansetron  4 mg Intravenous Q6H PRN Wendi Spironello V, CRNP      phytonadione  5 mg Intravenous Once [employer], DO 5 mg (01/02/22 0030)    polyethylene glycol  17 g Oral Daily Wendi Spironello V, CRNP      senna  8 8 mg Oral BID Wendi Spironello V, CRNP      sodium chloride (PF)  3 mL Intravenous Q1H PRN Wendi Spironello V, CRNP      vasopressin (PITRESSIN) in 0 9 % sodium chloride 100 mL  0 04 Units/min Intravenous Continuous Wendi Spironello V, CRNP 0 04 Units/min (01/02/22 0803)     Continuous Infusions:dexmedetomidine, 0 1-0 7 mcg/kg/hr, Last Rate: 0 3 mcg/kg/hr (01/02/22 0932)  esmolol,  mcg/kg/min, Last Rate: 25 mcg/kg/min (01/02/22 0639)  heparin (porcine), 3-15 Units/kg/hr (Order-Specific), Last Rate: 13 Units/kg/hr (01/02/22 0139)  insulin regular (HumuLIN R,NovoLIN R) infusion, 0 3-21 Units/hr, Last Rate: Stopped (01/02/22 0753)  milrinone (PRIMACOR) infusion, 0 25 mcg/kg/min, Last Rate: 0 13 mcg/kg/min (01/02/22 0858)  norepinephrine, 1-30 mcg/min, Last Rate: 4 mcg/min (01/02/22 0905)  vasopressin (PITRESSIN) in 0 9 % sodium chloride 100 mL, 0 04 Units/min, Last Rate: 0 04 Units/min (01/02/22 0803)      PRN Meds: fentanyl citrate (PF)    metoprolol    ondansetron    Insert peripheral IV **AND** sodium chloride (PF)    Vitals: Blood pressure (!) 72/69, pulse 90, temperature 98 8 °F (37 1 °C), resp  rate 18, height 5' 5 5" (1 664 m), weight 89 6 kg (197 lb 8 5 oz), SpO2 98 %  ,Body mass index is 32 37 kg/m²  I/O last 24 hours: In: 2978 6 [I V :2144 5; NG/GT:150; IV Piggyback:324 2;  Feedings:360]  Out: 2516 [BODXZ:9552; Emesis/NG output:100]  Invasive Devices  Report    Central Venous Catheter Line            CVC Central Lines 12/30/21 Triple Left Internal jugular 2 days    Introducer 12/31/21 1 day    LDA ECMO 12/31/21 Femoral artery  1 day          Arterial Line            Arterial Line 01/01/22 Other (Comment) <1 day          Line Arterial Sheath 6 Fr  Right  1 day          Drain            Urethral Catheter Temperature probe 2 days    NG/OG/Enteral Tube Orogastric Center mouth 1 day          Airway            ETT  Hi-Lo; Cuffed;Oral 8 5 mm 1 day                  Lab, Imaging and other studies:   Results from last 7 days   Lab Units 01/02/22  0925 01/02/22  0530 01/01/22  1325 01/01/22  0443 01/01/22  0443   WBC Thousand/uL  --  28 70* 31 86*  --  28 23*   HEMOGLOBIN g/dL  --  7 7* 8 4*   < > 8 8*   I STAT HEMOGLOBIN g/dl 7 8*  7 8*  --   --   --   --    HEMATOCRIT %  --  23 7* 25 2*   < > 26 2*   HEMATOCRIT, ISTAT % 23*  23*  --   --   --   --    PLATELETS Thousands/uL  --  102* 138*  --  164    < > = values in this interval not displayed  Results from last 7 days   Lab Units 01/02/22  0925 01/02/22  0529 01/01/22  2340 01/01/22  1816 01/01/22  1816 12/31/21  1326   POTASSIUM mmol/L  --  3 8 3 8  --  4 3  --    CHLORIDE mmol/L  --  113* 111*  --  111*  --    CO2 mmol/L  --  29 28   < > 27  --    CO2, I-STAT mmol/L 32  36*  --   --   --   --   --    BUN mg/dL  --  124* 132*  --  133*  --    CREATININE mg/dL  --  2 32* 2 64*  --  3 00*  --    GLUCOSE, ISTAT mg/dl 134  137  --   --   --   --    < >   CALCIUM mg/dL  --  9 3 8 4  --  8 4  --     < > = values in this interval not displayed  Results from last 7 days   Lab Units 01/02/22  0725 01/02/22  0529 01/02/22  0340 01/01/22  1607 01/01/22  1325 01/01/22  0646 01/01/22  0433   INR   --  1 48*  --   --  1 93*  --  2 06*   PTT seconds 56* 54* 57*   < >  --    < >  --     < > = values in this interval not displayed  Recent Labs     01/02/22  0531   PHART 7 449   [id number] 29 2*   PO2ART 382 0*   [id number] 43 1   BEART 4 8           Plan:    Wean Levo as able  Continue Vasopressin gtt  Cont  Esmolol gtt with target HR <100  Continue current mgmt  If he continues to improve, anticipate definitive surgical repair in 2-3 days        Gonsalo Perez MD  DATE: January 2, 2022  TIME: 9:43 AM

## 2022-01-03 ENCOUNTER — ANESTHESIA EVENT (INPATIENT)
Dept: PERIOP | Facility: HOSPITAL | Age: 67
DRG: 167 | End: 2022-01-03
Payer: COMMERCIAL

## 2022-01-03 ENCOUNTER — APPOINTMENT (INPATIENT)
Dept: NON INVASIVE DIAGNOSTICS | Facility: HOSPITAL | Age: 67
DRG: 167 | End: 2022-01-03
Payer: COMMERCIAL

## 2022-01-03 LAB
ABO GROUP BLD BPU: NORMAL
ABO GROUP BLD: NORMAL
ALBUMIN SERPL BCP-MCNC: 2.6 G/DL (ref 3.5–5)
ALP SERPL-CCNC: 136 U/L (ref 46–116)
ALT SERPL W P-5'-P-CCNC: 956 U/L (ref 12–78)
ANION GAP SERPL CALCULATED.3IONS-SCNC: 1 MMOL/L (ref 4–13)
ANION GAP SERPL CALCULATED.3IONS-SCNC: 2 MMOL/L (ref 4–13)
ANION GAP SERPL CALCULATED.3IONS-SCNC: 2 MMOL/L (ref 4–13)
ANION GAP SERPL CALCULATED.3IONS-SCNC: 4 MMOL/L (ref 4–13)
APTT PPP: 51 SECONDS (ref 23–37)
APTT PPP: 55 SECONDS (ref 23–37)
APTT PPP: 55 SECONDS (ref 23–37)
APTT PPP: 57 SECONDS (ref 23–37)
APTT PPP: 57 SECONDS (ref 23–37)
APTT PPP: 61 SECONDS (ref 23–37)
APTT PPP: 63 SECONDS (ref 23–37)
APTT PPP: 73 SECONDS (ref 23–37)
AST SERPL W P-5'-P-CCNC: 147 U/L (ref 5–45)
ATRIAL RATE: 115 BPM
ATRIAL RATE: 64 BPM
ATRIAL RATE: 71 BPM
ATRIAL RATE: 99 BPM
BASE EX.OXY STD BLDV CALC-SCNC: 97.9 % (ref 60–80)
BASE EXCESS BLDA CALC-SCNC: 5.4 MMOL/L
BASE EXCESS BLDA CALC-SCNC: 7.5 MMOL/L
BASE EXCESS BLDA CALC-SCNC: 7.5 MMOL/L
BASE EXCESS BLDA CALC-SCNC: 9.5 MMOL/L
BASE EXCESS BLDV CALC-SCNC: 6.7 MMOL/L
BASOPHILS # BLD AUTO: 0.04 THOUSANDS/ΜL (ref 0–0.1)
BASOPHILS NFR BLD AUTO: 0 % (ref 0–1)
BILIRUB DIRECT SERPL-MCNC: 0.69 MG/DL (ref 0–0.2)
BILIRUB SERPL-MCNC: 1.4 MG/DL (ref 0.2–1)
BLD GP AB SCN SERPL QL: NEGATIVE
BODY TEMPERATURE: 98.6 DEGREES FEHRENHEIT
BODY TEMPERATURE: 98.6 DEGREES FEHRENHEIT
BPU ID: NORMAL
BUN SERPL-MCNC: 73 MG/DL (ref 5–25)
BUN SERPL-MCNC: 77 MG/DL (ref 5–25)
BUN SERPL-MCNC: 81 MG/DL (ref 5–25)
BUN SERPL-MCNC: 92 MG/DL (ref 5–25)
CA-I BLD-SCNC: 1.09 MMOL/L (ref 1.12–1.32)
CALCIUM SERPL-MCNC: 8.1 MG/DL (ref 8.3–10.1)
CALCIUM SERPL-MCNC: 8.1 MG/DL (ref 8.3–10.1)
CALCIUM SERPL-MCNC: 8.2 MG/DL (ref 8.3–10.1)
CALCIUM SERPL-MCNC: 8.4 MG/DL (ref 8.3–10.1)
CHLORIDE SERPL-SCNC: 117 MMOL/L (ref 100–108)
CHLORIDE SERPL-SCNC: 120 MMOL/L (ref 100–108)
CHLORIDE SERPL-SCNC: 121 MMOL/L (ref 100–108)
CHLORIDE SERPL-SCNC: 121 MMOL/L (ref 100–108)
CO2 SERPL-SCNC: 32 MMOL/L (ref 21–32)
CO2 SERPL-SCNC: 32 MMOL/L (ref 21–32)
CO2 SERPL-SCNC: 33 MMOL/L (ref 21–32)
CO2 SERPL-SCNC: 33 MMOL/L (ref 21–32)
CREAT SERPL-MCNC: 1.43 MG/DL (ref 0.6–1.3)
CREAT SERPL-MCNC: 1.45 MG/DL (ref 0.6–1.3)
CREAT SERPL-MCNC: 1.56 MG/DL (ref 0.6–1.3)
CREAT SERPL-MCNC: 1.7 MG/DL (ref 0.6–1.3)
CROSSMATCH: NORMAL
EOSINOPHIL # BLD AUTO: 0.01 THOUSAND/ΜL (ref 0–0.61)
EOSINOPHIL NFR BLD AUTO: 0 % (ref 0–6)
ERYTHROCYTE [DISTWIDTH] IN BLOOD BY AUTOMATED COUNT: 17 % (ref 11.6–15.1)
GFR SERPL CREATININE-BSD FRML MDRD: 41 ML/MIN/1.73SQ M
GFR SERPL CREATININE-BSD FRML MDRD: 46 ML/MIN/1.73SQ M
GFR SERPL CREATININE-BSD FRML MDRD: 50 ML/MIN/1.73SQ M
GFR SERPL CREATININE-BSD FRML MDRD: 51 ML/MIN/1.73SQ M
GLUCOSE SERPL-MCNC: 105 MG/DL (ref 65–140)
GLUCOSE SERPL-MCNC: 105 MG/DL (ref 65–140)
GLUCOSE SERPL-MCNC: 112 MG/DL (ref 65–140)
GLUCOSE SERPL-MCNC: 113 MG/DL (ref 65–140)
GLUCOSE SERPL-MCNC: 115 MG/DL (ref 65–140)
GLUCOSE SERPL-MCNC: 118 MG/DL (ref 65–140)
GLUCOSE SERPL-MCNC: 120 MG/DL (ref 65–140)
GLUCOSE SERPL-MCNC: 120 MG/DL (ref 65–140)
GLUCOSE SERPL-MCNC: 127 MG/DL (ref 65–140)
GLUCOSE SERPL-MCNC: 128 MG/DL (ref 65–140)
GLUCOSE SERPL-MCNC: 136 MG/DL (ref 65–140)
GLUCOSE SERPL-MCNC: 136 MG/DL (ref 65–140)
GLUCOSE SERPL-MCNC: 138 MG/DL (ref 65–140)
GLUCOSE SERPL-MCNC: 140 MG/DL (ref 65–140)
GLUCOSE SERPL-MCNC: 151 MG/DL (ref 65–140)
GLUCOSE SERPL-MCNC: 152 MG/DL (ref 65–140)
GLUCOSE SERPL-MCNC: 153 MG/DL (ref 65–140)
GLUCOSE SERPL-MCNC: 166 MG/DL (ref 65–140)
HCO3 BLDA-SCNC: 30 MMOL/L (ref 22–28)
HCO3 BLDA-SCNC: 31.9 MMOL/L (ref 22–28)
HCO3 BLDA-SCNC: 33 MMOL/L (ref 22–28)
HCO3 BLDA-SCNC: 35.1 MMOL/L (ref 22–28)
HCO3 BLDV-SCNC: 26.5 MMOL/L (ref 24–30)
HCT VFR BLD AUTO: 24.3 % (ref 36.5–49.3)
HCT VFR BLD AUTO: 26.3 % (ref 36.5–49.3)
HGB BLD-MCNC: 7.9 G/DL (ref 12–17)
HGB BLD-MCNC: 8.4 G/DL (ref 12–17)
HOROWITZ INDEX BLDA+IHG-RTO: 40 MM[HG]
IMM GRANULOCYTES # BLD AUTO: >0.5 THOUSAND/UL (ref 0–0.2)
IMM GRANULOCYTES NFR BLD AUTO: 11 % (ref 0–2)
INR PPP: 1.32 (ref 0.84–1.19)
INR PPP: 1.37 (ref 0.84–1.19)
LDH SERPL-CCNC: 853 U/L (ref 81–234)
LYMPHOCYTES # BLD AUTO: 1.43 THOUSANDS/ΜL (ref 0.6–4.47)
LYMPHOCYTES NFR BLD AUTO: 9 % (ref 14–44)
MAGNESIUM SERPL-MCNC: 3.5 MG/DL (ref 1.6–2.6)
MCH RBC QN AUTO: 31.3 PG (ref 26.8–34.3)
MCHC RBC AUTO-ENTMCNC: 32.5 G/DL (ref 31.4–37.4)
MCV RBC AUTO: 96 FL (ref 82–98)
MONOCYTES # BLD AUTO: 1.1 THOUSAND/ΜL (ref 0.17–1.22)
MONOCYTES NFR BLD AUTO: 7 % (ref 4–12)
NEUTROPHILS # BLD AUTO: 11.9 THOUSANDS/ΜL (ref 1.85–7.62)
NEUTS SEG NFR BLD AUTO: 73 % (ref 43–75)
NRBC BLD AUTO-RTO: 1 /100 WBCS
O2 CT BLDA-SCNC: 12.6 ML/DL (ref 16–23)
O2 CT BLDA-SCNC: 12.6 ML/DL (ref 16–23)
O2 CT BLDA-SCNC: 13.5 ML/DL (ref 16–23)
O2 CT BLDA-SCNC: 14.3 ML/DL (ref 16–23)
O2 CT BLDV-SCNC: 11 ML/DL
OXYHGB MFR BLDA: 98.3 % (ref 94–97)
OXYHGB MFR BLDA: 98.5 % (ref 94–97)
OXYHGB MFR BLDA: 98.8 % (ref 94–97)
OXYHGB MFR BLDA: 99 % (ref 94–97)
P AXIS: 53 DEGREES
P AXIS: 54 DEGREES
P AXIS: 63 DEGREES
P AXIS: 65 DEGREES
PCO2 BLDA: 44.5 MM HG (ref 36–44)
PCO2 BLDA: 44.6 MM HG (ref 36–44)
PCO2 BLDA: 52.1 MM HG (ref 36–44)
PCO2 BLDA: 54.6 MM HG (ref 36–44)
PCO2 BLDV: 21.6 MM HG (ref 42–50)
PEEP RESPIRATORY: 6 CM[H2O]
PH BLDA: 7.42 [PH] (ref 7.35–7.45)
PH BLDA: 7.43 [PH] (ref 7.35–7.45)
PH BLDA: 7.45 [PH] (ref 7.35–7.45)
PH BLDA: 7.47 [PH] (ref 7.35–7.45)
PH BLDV: 7.71 [PH] (ref 7.3–7.4)
PHOSPHATE SERPL-MCNC: 3.8 MG/DL (ref 2.3–4.1)
PMV BLD AUTO: 11.2 FL (ref 8.9–12.7)
PO2 BLDA: 372.3 MM HG (ref 75–129)
PO2 BLDA: 439.5 MM HG (ref 75–129)
PO2 BLDA: 446.7 MM HG (ref 75–129)
PO2 BLDA: 477.2 MM HG (ref 75–129)
PO2 BLDV: 133.8 MM HG (ref 35–45)
POTASSIUM SERPL-SCNC: 3.6 MMOL/L (ref 3.5–5.3)
POTASSIUM SERPL-SCNC: 3.9 MMOL/L (ref 3.5–5.3)
POTASSIUM SERPL-SCNC: 4.1 MMOL/L (ref 3.5–5.3)
POTASSIUM SERPL-SCNC: 4.2 MMOL/L (ref 3.5–5.3)
PR INTERVAL: 142 MS
PR INTERVAL: 150 MS
PR INTERVAL: 154 MS
PR INTERVAL: 160 MS
PROCALCITONIN SERPL-MCNC: 0.61 NG/ML
PROT SERPL-MCNC: 5.2 G/DL (ref 6.4–8.2)
PROTHROMBIN TIME: 15.8 SECONDS (ref 11.6–14.5)
PROTHROMBIN TIME: 16.3 SECONDS (ref 11.6–14.5)
QRS AXIS: 63 DEGREES
QRS AXIS: 63 DEGREES
QRS AXIS: 66 DEGREES
QRS AXIS: 67 DEGREES
QRSD INTERVAL: 100 MS
QRSD INTERVAL: 104 MS
QRSD INTERVAL: 118 MS
QRSD INTERVAL: 98 MS
QT INTERVAL: 298 MS
QT INTERVAL: 356 MS
QT INTERVAL: 382 MS
QT INTERVAL: 390 MS
QTC INTERVAL: 386 MS
QTC INTERVAL: 394 MS
QTC INTERVAL: 412 MS
QTC INTERVAL: 500 MS
RBC # BLD AUTO: 2.52 MILLION/UL (ref 3.88–5.62)
RH BLD: POSITIVE
SODIUM SERPL-SCNC: 154 MMOL/L (ref 136–145)
SODIUM SERPL-SCNC: 154 MMOL/L (ref 136–145)
SODIUM SERPL-SCNC: 155 MMOL/L (ref 136–145)
SODIUM SERPL-SCNC: 155 MMOL/L (ref 136–145)
SPECIMEN EXPIRATION DATE: NORMAL
SPECIMEN SOURCE: ABNORMAL
T WAVE AXIS: 1 DEGREES
T WAVE AXIS: 32 DEGREES
T WAVE AXIS: 66 DEGREES
T WAVE AXIS: 68 DEGREES
UNIT DISPENSE STATUS: NORMAL
UNIT PRODUCT CODE: NORMAL
UNIT PRODUCT VOLUME: 350 ML
UNIT RH: NORMAL
VENT AC: 14
VENT- AC: AC
VENTRICULAR RATE: 115 BPM
VENTRICULAR RATE: 64 BPM
VENTRICULAR RATE: 71 BPM
VENTRICULAR RATE: 99 BPM
VT SETTING VENT: 450 ML
WBC # BLD AUTO: 16.28 THOUSAND/UL (ref 4.31–10.16)

## 2022-01-03 PROCEDURE — 82330 ASSAY OF CALCIUM: CPT | Performed by: NURSE PRACTITIONER

## 2022-01-03 PROCEDURE — 99291 CRITICAL CARE FIRST HOUR: CPT | Performed by: NURSE PRACTITIONER

## 2022-01-03 PROCEDURE — 85018 HEMOGLOBIN: CPT | Performed by: NURSE PRACTITIONER

## 2022-01-03 PROCEDURE — 85730 THROMBOPLASTIN TIME PARTIAL: CPT | Performed by: THORACIC SURGERY (CARDIOTHORACIC VASCULAR SURGERY)

## 2022-01-03 PROCEDURE — 83735 ASSAY OF MAGNESIUM: CPT | Performed by: NURSE PRACTITIONER

## 2022-01-03 PROCEDURE — 99292 CRITICAL CARE ADDL 30 MIN: CPT | Performed by: ANESTHESIOLOGY

## 2022-01-03 PROCEDURE — 82805 BLOOD GASES W/O2 SATURATION: CPT | Performed by: ANESTHESIOLOGY

## 2022-01-03 PROCEDURE — 93005 ELECTROCARDIOGRAM TRACING: CPT

## 2022-01-03 PROCEDURE — 86923 COMPATIBILITY TEST ELECTRIC: CPT

## 2022-01-03 PROCEDURE — 99223 1ST HOSP IP/OBS HIGH 75: CPT | Performed by: INTERNAL MEDICINE

## 2022-01-03 PROCEDURE — 85610 PROTHROMBIN TIME: CPT | Performed by: NURSE PRACTITIONER

## 2022-01-03 PROCEDURE — 80076 HEPATIC FUNCTION PANEL: CPT | Performed by: NURSE PRACTITIONER

## 2022-01-03 PROCEDURE — 93971 EXTREMITY STUDY: CPT | Performed by: SURGERY

## 2022-01-03 PROCEDURE — 84100 ASSAY OF PHOSPHORUS: CPT | Performed by: NURSE PRACTITIONER

## 2022-01-03 PROCEDURE — 83615 LACTATE (LD) (LDH) ENZYME: CPT | Performed by: NURSE PRACTITIONER

## 2022-01-03 PROCEDURE — 93010 ELECTROCARDIOGRAM REPORT: CPT | Performed by: INTERNAL MEDICINE

## 2022-01-03 PROCEDURE — 82948 REAGENT STRIP/BLOOD GLUCOSE: CPT

## 2022-01-03 PROCEDURE — 86901 BLOOD TYPING SEROLOGIC RH(D): CPT | Performed by: PHYSICIAN ASSISTANT

## 2022-01-03 PROCEDURE — 85730 THROMBOPLASTIN TIME PARTIAL: CPT | Performed by: PHYSICIAN ASSISTANT

## 2022-01-03 PROCEDURE — 93971 EXTREMITY STUDY: CPT

## 2022-01-03 PROCEDURE — 33949 ECMO/ECLS DAILY MGMT ARTERY: CPT

## 2022-01-03 PROCEDURE — P9016 RBC LEUKOCYTES REDUCED: HCPCS

## 2022-01-03 PROCEDURE — 86900 BLOOD TYPING SEROLOGIC ABO: CPT | Performed by: PHYSICIAN ASSISTANT

## 2022-01-03 PROCEDURE — 82805 BLOOD GASES W/O2 SATURATION: CPT | Performed by: NURSE PRACTITIONER

## 2022-01-03 PROCEDURE — 84145 PROCALCITONIN (PCT): CPT | Performed by: NURSE PRACTITIONER

## 2022-01-03 PROCEDURE — 85014 HEMATOCRIT: CPT | Performed by: NURSE PRACTITIONER

## 2022-01-03 PROCEDURE — 94003 VENT MGMT INPAT SUBQ DAY: CPT

## 2022-01-03 PROCEDURE — 94760 N-INVAS EAR/PLS OXIMETRY 1: CPT

## 2022-01-03 PROCEDURE — 85025 COMPLETE CBC W/AUTO DIFF WBC: CPT | Performed by: NURSE PRACTITIONER

## 2022-01-03 PROCEDURE — 86850 RBC ANTIBODY SCREEN: CPT | Performed by: PHYSICIAN ASSISTANT

## 2022-01-03 PROCEDURE — 80048 BASIC METABOLIC PNL TOTAL CA: CPT | Performed by: NURSE PRACTITIONER

## 2022-01-03 PROCEDURE — 33949 ECMO/ECLS DAILY MGMT ARTERY: CPT | Performed by: THORACIC SURGERY (CARDIOTHORACIC VASCULAR SURGERY)

## 2022-01-03 RX ORDER — FENTANYL CITRATE-0.9 % NACL/PF 10 MCG/ML
100 PLASTIC BAG, INJECTION (ML) INTRAVENOUS CONTINUOUS
Status: DISCONTINUED | OUTPATIENT
Start: 2022-01-03 | End: 2022-01-04 | Stop reason: HOSPADM

## 2022-01-03 RX ORDER — CEFAZOLIN SODIUM 2 G/50ML
2000 SOLUTION INTRAVENOUS ONCE
Status: CANCELLED | OUTPATIENT
Start: 2022-01-04 | End: 2022-01-03

## 2022-01-03 RX ORDER — CHLORHEXIDINE GLUCONATE 0.12 MG/ML
15 RINSE ORAL EVERY 12 HOURS SCHEDULED
Status: DISCONTINUED | OUTPATIENT
Start: 2022-01-03 | End: 2022-01-04 | Stop reason: HOSPADM

## 2022-01-03 RX ORDER — SODIUM CHLORIDE 450 MG/100ML
125 INJECTION, SOLUTION INTRAVENOUS CONTINUOUS
Status: DISCONTINUED | OUTPATIENT
Start: 2022-01-03 | End: 2022-01-04 | Stop reason: HOSPADM

## 2022-01-03 RX ORDER — POTASSIUM CHLORIDE 29.8 MG/ML
40 INJECTION INTRAVENOUS ONCE
Status: COMPLETED | OUTPATIENT
Start: 2022-01-03 | End: 2022-01-03

## 2022-01-03 RX ORDER — CALCIUM GLUCONATE 20 MG/ML
1 INJECTION, SOLUTION INTRAVENOUS ONCE
Status: COMPLETED | OUTPATIENT
Start: 2022-01-03 | End: 2022-01-03

## 2022-01-03 RX ADMIN — FENTANYL CITRATE 100 MCG/HR: 50 INJECTION, SOLUTION INTRAMUSCULAR; INTRAVENOUS at 23:16

## 2022-01-03 RX ADMIN — FENTANYL CITRATE 50 MCG: 50 INJECTION INTRAMUSCULAR; INTRAVENOUS at 01:55

## 2022-01-03 RX ADMIN — FENTANYL CITRATE 50 MCG/HR: 50 INJECTION, SOLUTION INTRAMUSCULAR; INTRAVENOUS at 01:48

## 2022-01-03 RX ADMIN — SODIUM CHLORIDE 75 ML/HR: 0.45 INJECTION, SOLUTION INTRAVENOUS at 08:15

## 2022-01-03 RX ADMIN — Medication 20 MG: at 08:17

## 2022-01-03 RX ADMIN — POLYETHYLENE GLYCOL 3350 17 G: 17 POWDER, FOR SOLUTION ORAL at 08:16

## 2022-01-03 RX ADMIN — FENTANYL CITRATE 50 MCG: 50 INJECTION INTRAMUSCULAR; INTRAVENOUS at 03:53

## 2022-01-03 RX ADMIN — HEPARIN SODIUM 9 UNITS/KG/HR: 10000 INJECTION, SOLUTION INTRAVENOUS at 15:35

## 2022-01-03 RX ADMIN — FENTANYL CITRATE 50 MCG: 50 INJECTION INTRAMUSCULAR; INTRAVENOUS at 06:03

## 2022-01-03 RX ADMIN — CHLORHEXIDINE GLUCONATE 15 ML: 1.2 SOLUTION ORAL at 08:17

## 2022-01-03 RX ADMIN — POTASSIUM CHLORIDE 40 MEQ: 400 INJECTION, SOLUTION INTRAVENOUS at 01:16

## 2022-01-03 RX ADMIN — CHLORHEXIDINE GLUCONATE 15 ML: 1.2 SOLUTION ORAL at 20:45

## 2022-01-03 RX ADMIN — SODIUM CHLORIDE 0.4 MCG/KG/HR: 9 INJECTION, SOLUTION INTRAVENOUS at 16:48

## 2022-01-03 RX ADMIN — SODIUM CHLORIDE 1 UNITS/HR: 9 INJECTION, SOLUTION INTRAVENOUS at 22:12

## 2022-01-03 RX ADMIN — SENNOSIDES 8.8 MG: 8.8 SYRUP ORAL at 17:52

## 2022-01-03 RX ADMIN — MILRINONE LACTATE IN DEXTROSE 0.13 MCG/KG/MIN: 200 INJECTION, SOLUTION INTRAVENOUS at 23:07

## 2022-01-03 RX ADMIN — FENTANYL CITRATE 50 MCG: 50 INJECTION INTRAMUSCULAR; INTRAVENOUS at 14:52

## 2022-01-03 RX ADMIN — FENTANYL CITRATE 50 MCG: 50 INJECTION INTRAMUSCULAR; INTRAVENOUS at 23:42

## 2022-01-03 RX ADMIN — MUPIROCIN 1 APPLICATION: 20 OINTMENT TOPICAL at 15:55

## 2022-01-03 RX ADMIN — FENTANYL CITRATE 50 MCG: 50 INJECTION INTRAMUSCULAR; INTRAVENOUS at 09:35

## 2022-01-03 RX ADMIN — SODIUM CHLORIDE 0.4 MCG/KG/HR: 9 INJECTION, SOLUTION INTRAVENOUS at 03:59

## 2022-01-03 RX ADMIN — MILRINONE LACTATE IN DEXTROSE 0.13 MCG/KG/MIN: 200 INJECTION, SOLUTION INTRAVENOUS at 04:23

## 2022-01-03 RX ADMIN — SODIUM CHLORIDE 75 ML/HR: 0.45 INJECTION, SOLUTION INTRAVENOUS at 19:56

## 2022-01-03 RX ADMIN — FENTANYL CITRATE 100 MCG/HR: 50 INJECTION, SOLUTION INTRAMUSCULAR; INTRAVENOUS at 11:48

## 2022-01-03 RX ADMIN — CALCIUM GLUCONATE 1 G: 20 INJECTION, SOLUTION INTRAVENOUS at 07:41

## 2022-01-03 RX ADMIN — FENTANYL CITRATE 50 MCG: 50 INJECTION INTRAMUSCULAR; INTRAVENOUS at 20:41

## 2022-01-03 RX ADMIN — SENNOSIDES 8.8 MG: 8.8 SYRUP ORAL at 08:16

## 2022-01-03 NOTE — PLAN OF CARE
Problem: MOBILITY - ADULT  Goal: Maintain or return to baseline ADL function  Description: INTERVENTIONS:  -  Assess patient's ability to carry out ADLs; assess patient's baseline for ADL function and identify physical deficits which impact ability to perform ADLs (bathing, care of mouth/teeth, toileting, grooming, dressing, etc )  - Assess/evaluate cause of self-care deficits   - Assess range of motion  - Assess patient's mobility; develop plan if impaired  - Assess patient's need for assistive devices and provide as appropriate  - Encourage maximum independence but intervene and supervise when necessary  - Involve family in performance of ADLs  - Assess for home care needs following discharge   - Consider OT consult to assist with ADL evaluation and planning for discharge  - Provide patient education as appropriate  Outcome: Progressing  Goal: Maintains/Returns to pre admission functional level  Description: INTERVENTIONS:  - Perform BMAT or MOVE assessment daily    - Set and communicate daily mobility goal to care team and patient/family/caregiver  - Collaborate with rehabilitation services on mobility goals if consulted  - Perform Range of Motion  times a day  - Reposition patient every  hours    - Dangle patient  times a day  - Stand patient  times a day  - Ambulate patient  times a day  - Out of bed to chair  times a day   - Out of bed for meals  times a day  - Out of bed for toileting  - Record patient progress and toleration of activity level   Outcome: Progressing     Problem: Potential for Falls  Goal: Patient will remain free of falls  Description: INTERVENTIONS:  -  Assess patient's ability to carry out ADLs; assess patient's baseline for ADL function and identify physical deficits which impact ability to perform ADLs (bathing, care of mouth/teeth, toileting, grooming, dressing, etc )  - Assess/evaluate cause of self-care deficits   - Assess range of motion  - Assess patient's mobility; develop plan if impaired  - Assess patient's need for assistive devices and provide as appropriate  - Encourage maximum independence but intervene and supervise when necessary  - Involve family in performance of ADLs  - Assess for home care needs following discharge   - Consider OT consult to assist with ADL evaluation and planning for discharge  - Provide patient education as appropriate  Outcome: Progressing     Problem: Prexisting or High Potential for Compromised Skin Integrity  Goal: Skin integrity is maintained or improved  Description: INTERVENTIONS:  - Identify patients at risk for skin breakdown  - Assess and monitor skin integrity  - Assess and monitor nutrition and hydration status  - Monitor labs   - Assess for incontinence   - Turn and reposition patient  - Assist with mobility/ambulation  - Relieve pressure over bony prominences  - Avoid friction and shearing  - Provide appropriate hygiene as needed including keeping skin clean and dry  - Evaluate need for skin moisturizer/barrier cream  - Collaborate with interdisciplinary team   - Patient/family teaching  - Consider wound care consult   Outcome: Progressing     Problem: PAIN - ADULT  Goal: Verbalizes/displays adequate comfort level or baseline comfort level  Description: Interventions:  - Encourage patient to monitor pain and request assistance  - Assess pain using appropriate pain scale  - Administer analgesics based on type and severity of pain and evaluate response  - Implement non-pharmacological measures as appropriate and evaluate response  - Consider cultural and social influences on pain and pain management  - Notify physician/advanced practitioner if interventions unsuccessful or patient reports new pain  Outcome: Progressing     Problem: INFECTION - ADULT  Goal: Absence or prevention of progression during hospitalization  Description: INTERVENTIONS:  - Assess and monitor for signs and symptoms of infection  - Monitor lab/diagnostic results  - Monitor all insertion sites, i e  indwelling lines, tubes, and drains  - Monitor endotracheal if appropriate and nasal secretions for changes in amount and color  - Howes appropriate cooling/warming therapies per order  - Administer medications as ordered  - Instruct and encourage patient and family to use good hand hygiene technique  - Identify and instruct in appropriate isolation precautions for identified infection/condition  Outcome: Progressing  Goal: Absence of fever/infection during neutropenic period  Description: INTERVENTIONS:  - Monitor WBC    Outcome: Progressing     Problem: SAFETY ADULT  Goal: Maintain or return to baseline ADL function  Description: INTERVENTIONS:  -  Assess patient's ability to carry out ADLs; assess patient's baseline for ADL function and identify physical deficits which impact ability to perform ADLs (bathing, care of mouth/teeth, toileting, grooming, dressing, etc )  - Assess/evaluate cause of self-care deficits   - Assess range of motion  - Assess patient's mobility; develop plan if impaired  - Assess patient's need for assistive devices and provide as appropriate  - Encourage maximum independence but intervene and supervise when necessary  - Involve family in performance of ADLs  - Assess for home care needs following discharge   - Consider OT consult to assist with ADL evaluation and planning for discharge  - Provide patient education as appropriate  Outcome: Progressing  Goal: Maintains/Returns to pre admission functional level  Description: INTERVENTIONS:  - Perform BMAT or MOVE assessment daily    - Set and communicate daily mobility goal to care team and patient/family/caregiver  - Collaborate with rehabilitation services on mobility goals if consulted  - Perform Range of Motion  times a day  - Reposition patient every  hours    - Dangle patient  times a day  - Stand patient  times a day  - Ambulate patient  times a day  - Out of bed to chair  times a day   - Out of bed for meals  times a day  - Out of bed for toileting  - Record patient progress and toleration of activity level   Outcome: Progressing  Goal: Patient will remain free of falls  Description: INTERVENTIONS:  -  Assess patient's ability to carry out ADLs; assess patient's baseline for ADL function and identify physical deficits which impact ability to perform ADLs (bathing, care of mouth/teeth, toileting, grooming, dressing, etc )  - Assess/evaluate cause of self-care deficits   - Assess range of motion  - Assess patient's mobility; develop plan if impaired  - Assess patient's need for assistive devices and provide as appropriate  - Encourage maximum independence but intervene and supervise when necessary  - Involve family in performance of ADLs  - Assess for home care needs following discharge   - Consider OT consult to assist with ADL evaluation and planning for discharge  - Provide patient education as appropriate  Outcome: Progressing     Problem: DISCHARGE PLANNING  Goal: Discharge to home or other facility with appropriate resources  Description: INTERVENTIONS:  - Identify barriers to discharge w/patient and caregiver  - Arrange for needed discharge resources and transportation as appropriate  - Identify discharge learning needs (meds, wound care, etc )  - Arrange for interpretive services to assist at discharge as needed  - Refer to Case Management Department for coordinating discharge planning if the patient needs post-hospital services based on physician/advanced practitioner order or complex needs related to functional status, cognitive ability, or social support system  Outcome: Progressing     Problem: Knowledge Deficit  Goal: Patient/family/caregiver demonstrates understanding of disease process, treatment plan, medications, and discharge instructions  Description: Complete learning assessment and assess knowledge base    Interventions:  - Provide teaching at level of understanding  - Provide teaching via preferred learning methods  Outcome: Progressing     Problem: CARDIOVASCULAR - ADULT  Goal: Maintains optimal cardiac output and hemodynamic stability  Description: INTERVENTIONS:  - Monitor I/O, vital signs and rhythm  - Monitor for S/S and trends of decreased cardiac output  - Administer and titrate ordered vasoactive medications to optimize hemodynamic stability  - Assess quality of pulses, skin color and temperature  - Assess for signs of decreased coronary artery perfusion  - Instruct patient to report change in severity of symptoms  Outcome: Progressing  Goal: Absence of cardiac dysrhythmias or at baseline rhythm  Description: INTERVENTIONS:  - Continuous cardiac monitoring, vital signs, obtain 12 lead EKG if ordered  - Administer antiarrhythmic and heart rate control medications as ordered  - Monitor electrolytes and administer replacement therapy as ordered  Outcome: Progressing     Problem: RESPIRATORY - ADULT  Goal: Achieves optimal ventilation and oxygenation  Description: INTERVENTIONS:  - Assess for changes in respiratory status  - Assess for changes in mentation and behavior  - Position to facilitate oxygenation and minimize respiratory effort  - Oxygen administered by appropriate delivery if ordered  - Initiate smoking cessation education as indicated  - Encourage broncho-pulmonary hygiene including cough, deep breathe, Incentive Spirometry  - Assess the need for suctioning and aspirate as needed  - Assess and instruct to report SOB or any respiratory difficulty  - Respiratory Therapy support as indicated  Outcome: Progressing     Problem: GASTROINTESTINAL - ADULT  Goal: Oral mucous membranes remain intact  Description: INTERVENTIONS  - Assess oral mucosa and hygiene practices  - Implement preventative oral hygiene regimen  - Implement oral medicated treatments as ordered  - Initiate Nutrition services referral as needed  Outcome: Progressing     Problem: GENITOURINARY - ADULT  Goal: Maintains or returns to baseline urinary function  Description: INTERVENTIONS:  - Assess urinary function  - Encourage oral fluids to ensure adequate hydration if ordered  - Administer IV fluids as ordered to ensure adequate hydration  - Administer ordered medications as needed  - Offer frequent toileting  - Follow urinary retention protocol if ordered  Outcome: Progressing  Goal: Urinary catheter remains patent  Description: INTERVENTIONS:  - Assess patency of urinary catheter  - If patient has a chronic [place], consider changing catheter if non-functioning  - Follow guidelines for intermittent irrigation of non-functioning urinary catheter  Outcome: Progressing     Problem: METABOLIC, FLUID AND ELECTROLYTES - ADULT  Goal: Electrolytes maintained within normal limits  Description: INTERVENTIONS:  - Monitor labs and assess patient for signs and symptoms of electrolyte imbalances  - Administer electrolyte replacement as ordered  - Monitor response to electrolyte replacements, including repeat lab results as appropriate  - Instruct patient on fluid and nutrition as appropriate  Outcome: Progressing  Goal: Fluid balance maintained  Description: INTERVENTIONS:  - Monitor labs   - Monitor I/O and WT  - Instruct patient on fluid and nutrition as appropriate  - Assess for signs & symptoms of volume excess or deficit  Outcome: Progressing  Goal: Glucose maintained within target range  Description: INTERVENTIONS:  - Monitor Blood Glucose as ordered  - Assess for signs and symptoms of hyperglycemia and hypoglycemia  - Administer ordered medications to maintain glucose within target range  - Assess nutritional intake and initiate nutrition service referral as needed  Outcome: Progressing     Problem: SKIN/TISSUE INTEGRITY - ADULT  Goal: Skin Integrity remains intact(Skin Breakdown Prevention)  Description: Assess:  -Perform Narinder assessment every   -Clean and moisturize skin every   -Inspect skin when repositioning, toileting, and assisting with ADLS  -Assess under medical devices such as  every   -Assess extremities for adequate circulation and sensation     Bed Management:  -Have minimal linens on bed & keep smooth, unwrinkled  -Change linens as needed when moist or perspiring  -Avoid sitting or lying in one position for more than  hours while in bed  -Keep HOB at degrees     Toileting:  -Offer bedside commode  -Assess for incontinence every   -Use incontinent care products after each incontinent episode such as     Activity:  -Mobilize patient  times a day  -Encourage activity and walks on unit  -Encourage or provide ROM exercises   -Turn and reposition patient every  Hours  -Use appropriate equipment to lift or move patient in bed  -Instruct/ Assist with weight shifting every  when out of bed in chair  -Consider limitation of chair time  hour intervals    Skin Care:  -Avoid use of baby powder, tape, friction and shearing, hot water or constrictive clothing  -Relieve pressure over bony prominences using   -Do not massage red bony areas    Next Steps:  -Teach patient strategies to minimize risks such as    -Consider consults to  interdisciplinary teams such as   Outcome: Progressing  Goal: Incision(s), wounds(s) or drain site(s) healing without S/S of infection  Description: INTERVENTIONS  - Assess and document dressing, incision, wound bed, drain sites and surrounding tissue  - Provide patient and family education  - Perform skin care/dressing changes every   Outcome: Progressing  Goal: Pressure injury heals and does not worsen  Description: Interventions:  - Implement low air loss mattress or specialty surface (Criteria met)  - Apply silicone foam dressing  - Instruct/assist with weight shifting every  minutes when in chair   - Limit chair time to  hour intervals  - Use special pressure reducing interventions such as  when in chair   - Apply fecal or urinary incontinence containment device   - Perform passive or active ROM every   - Turn and reposition patient & offload bony prominences every  hours   - Utilize friction reducing device or surface for transfers   - Consider consults to  interdisciplinary teams such as   - Use incontinent care products after each incontinent episode such as  - Consider nutrition services referral as needed  Outcome: Progressing     Problem: HEMATOLOGIC - ADULT  Goal: Maintains hematologic stability  Description: INTERVENTIONS  - Assess for signs and symptoms of bleeding or hemorrhage  - Monitor labs  - Administer supportive blood products/factors as ordered and appropriate  Outcome: Progressing     Problem: Nutrition/Hydration-ADULT  Goal: Nutrient/Hydration intake appropriate for improving, restoring or maintaining nutritional needs  Description: Monitor and assess patient's nutrition/hydration status for malnutrition  Collaborate with interdisciplinary team and initiate plan and interventions as ordered  Monitor patient's weight and dietary intake as ordered or per policy  Utilize nutrition screening tool and intervene as necessary  Determine patient's food preferences and provide high-protein, high-caloric foods as appropriate       INTERVENTIONS:  - Monitor oral intake, urinary output, labs, and treatment plans  - Assess nutrition and hydration status and recommend course of action  - Evaluate amount of meals eaten  - Assist patient with eating if necessary   - Allow adequate time for meals  - Recommend/ encourage appropriate diets, oral nutritional supplements, and vitamin/mineral supplements  - Order, calculate, and assess calorie counts as needed  - Recommend, monitor, and adjust tube feedings and TPN/PPN based on assessed needs  - Assess need for intravenous fluids  - Provide specific nutrition/hydration education as appropriate  - Include patient/family/caregiver in decisions related to nutrition  Outcome: Progressing

## 2022-01-03 NOTE — ANESTHESIA PREPROCEDURE EVALUATION
Procedure:  VA ECMO DECANNULATION (N/A Chest)  REPAIR VENTRICULAR SEPTAL DEFECT (VSD) OPEN (N/A Chest)    Relevant Problems   CARDIO   (+) Acute systolic congestive heart failure (HCC)   (+) Hyperlipidemia, mixed      /RENAL   (+) JUDE (acute kidney injury) (HCC)      HEMATOLOGY   (+) Anemia   (+) Coagulopathy (HCC)      NEURO/PSYCH   (+) Agoraphobia with panic attacks      PULMONARY   (+) Pipe smoker             Anesthesia Plan  ASA Score- 4     Anesthesia Type- general with ASA Monitors  Additional Monitors:   Airway Plan:     Comment: Standard ASA mon, CORIE  Invasive lines in place  Plan Factors-    Chart reviewed  Induction- intravenous      Postoperative Plan-     Informed Consent-

## 2022-01-03 NOTE — PROGRESS NOTES
Progress Note - Cardiothoracic Surgery   Arnaldo Kemp 61 y o  male MRN: [id number]  Unit/Bed#: [facility] 418-01 Encounter: [id number]      POD 3 VA-ECMO for Post-infarction VSD    Pt seen/examined  Interval history and data reviewed with critical care team     Creatinine and ALT/AST continue to improve  Bilirubin almost down to normal     Creatinine 1 70  Still with negative fluid balance without diuretics  INR pending today  Epi gtt is off    Milrinone gtt is @ 0 125  Vaso gtt off  Levophed gtt @ 1  Esmolol gtt off    ECMO:  Treatment Type: AV ECMO  Pump Flow (L/min): 4 4 LPM  Pump Speed (Rotations/min): 6800 RPM   Flow (L/min): 3 5 LPM   FiO2 (%): 100 %      Medications:   Scheduled Meds:  Current Facility-Administered Medications   Medication Dose Route Frequency Provider Last Rate    aspirin  81 mg Oral Daily Wendi Spironello V, CRNP      calcium gluconate  1 g Intravenous Once Judith PoolBROOKE      chlorhexidine  15 mL Mouth/Throat Q12H [facility] Wendi Pereznello V, CRNP      dexmedetomidine  0 1-0 7 mcg/kg/hr Intravenous Titrated Mark Lischner, DO 0 4 mcg/kg/hr (01/03/22 9676)    fentaNYL  100 mcg/hr Intravenous Continuous Yaima Parish PA-C 100 mcg/hr (01/03/22 9597)    fentanyl citrate (PF)  50 mcg Intravenous Q1H PRN Wendi Spironello V, CRNP      heparin (porcine)  3-15 Units/kg/hr (Order-Specific) Intravenous Titrated Wendi Spironello V, CRNP 12 Units/kg/hr (01/03/22 6241)    insulin regular (HumuLIN R,NovoLIN R) infusion  0 3-21 Units/hr Intravenous Titrated Wendi Spironello V, CRNP 0 5 Units/hr (01/02/22 1443)    metoprolol  2 5 mg Intravenous Q4H PRN Surinder Garcia DO      milrinone [facility]) infusion  0 13 mcg/kg/min Intravenous Continuous TARYN CarlosC 0 13 mcg/kg/min (01/03/22 0423)    norepinephrine  1-30 mcg/min Intravenous Titrated Wendi Spironello V, CRNP 1 mcg/min (01/03/22 0113)    omeprazole (PRILOSEC) suspension 2 mg/mL  20 mg Oral Daily Wendi Spironello V, CRNP      ondansetron  4 mg Intravenous Q6H PRN Wendi Spironello V, CRNP      polyethylene glycol  17 g Oral Daily Wendi Spironello V, CRNP      senna  8 8 mg Oral BID Wendi Spironello V, CRNP      sodium chloride (PF)  3 mL Intravenous Q1H PRN Wendi Spironello V, CRNP       Continuous Infusions:dexmedetomidine, 0 1-0 7 mcg/kg/hr, Last Rate: 0 4 mcg/kg/hr (01/03/22 0359)  fentaNYL, 100 mcg/hr, Last Rate: 100 mcg/hr (01/03/22 0407)  heparin (porcine), 3-15 Units/kg/hr (Order-Specific), Last Rate: 12 Units/kg/hr (01/03/22 9924)  insulin regular (HumuLIN R,NovoLIN R) infusion, 0 3-21 Units/hr, Last Rate: 0 5 Units/hr (01/02/22 7988)  milrinone (PRIMACOR) infusion, 0 13 mcg/kg/min, Last Rate: 0 13 mcg/kg/min (01/03/22 0423)  norepinephrine, 1-30 mcg/min, Last Rate: 1 mcg/min (01/03/22 0113)      PRN Meds: fentanyl citrate (PF)    metoprolol    ondansetron    Insert peripheral IV **AND** sodium chloride (PF)    Vitals: Blood pressure (!) 71/69, pulse 63, temperature 98 6 °F (37 °C), resp  rate 15, height 5' 5 5" (1 664 m), weight 89 kg (196 lb 3 4 oz), SpO2 99 %  ,Body mass index is 32 15 kg/m²  I/O last 24 hours: In: 2833 8 [I V :1041 8; Blood:350; NG/GT:755; IV Piggyback:350; Feedings:337]  Out: 3350 [Urine:3350]  Invasive Devices  Report    Central Venous Catheter Line            CVC Central Lines 12/30/21 Triple Left Internal jugular 3 days    Introducer 12/31/21 2 days    LDA ECMO 12/31/21 Femoral artery  2 days          Arterial Line            Arterial Line 01/01/22 Other (Comment) 1 day          Line            Arterial Sheath 6 Fr  Right  2 days          Drain            Urethral Catheter Temperature probe 3 days    NG/OG/Enteral Tube Orogastric Center mouth 2 days          Airway            ETT  Hi-Lo; Cuffed;Oral 8 5 mm 2 days                  Lab, Imaging and other studies:   Results from last 7 days   Lab Units 01/02/22  1132 01/02/22  0925 01/02/22  0544 01/01/22  1325 01/01/22  1325 01/01/22  0443 01/01/22  0443   WBC Thousand/uL  --   --  28 70*  --  31 86*  --  28 23*   HEMOGLOBIN g/dL 8 1*  --  7 7*   < > 8 4*   < > 8 8*   I STAT HEMOGLOBIN g/dl  --  7 8*  7 8*  --   --   --   --   --    HEMATOCRIT %  --   --  23 7*  --  25 2*   < > 26 2*   HEMATOCRIT, ISTAT %  --  23*  23*  --   --   --   --   --    PLATELETS Thousands/uL  --   --  102*  --  138*  --  164    < > = values in this interval not displayed  Results from last 7 days   Lab Units 01/03/22  0550 01/02/22 2348 01/02/22  1746 01/02/22  1132 01/02/22  0925   POTASSIUM mmol/L 4 1 3 6 4 0   < >  --    CHLORIDE mmol/L 120* 117* 117*   < >  --    CO2 mmol/L 32 33* 31   < >  --    CO2, I-STAT mmol/L  --   --   --   --  32  36*   BUN mg/dL 81* 92* 103*   < >  --    CREATININE mg/dL 1 56* 1 70* 1 87*   < >  --    GLUCOSE, ISTAT mg/dl  --   --   --   --  134  137   CALCIUM mg/dL 8 2* 8 1* 8 3   < >  --     < > = values in this interval not displayed  Results from last 7 days   Lab Units 01/03/22  0350 01/02/22  2348 01/02/22  1935 01/02/22  0725 01/02/22  0529 01/01/22  1607 01/01/22  1325 01/01/22  0646 01/01/22  0433   INR   --   --   --   --  1 48*  --  1 93*  --  2 06*   PTT seconds 63* 57* 58*   < > 54*   < >  --    < >  --     < > = values in this interval not displayed  Recent Labs     01/03/22  0550   PHART 7 473*   XTY9YYF 31 9*   PO2ART 439 5*   PGC3DNO 44 5*   BEART 7 5           Plan:    Continue to wean inotropic/pressor support as able  Plan for definitive surgical repair tomorrow        Romie Stauffer MD  DATE: January 3, 2022  TIME: 7:40 AM

## 2022-01-03 NOTE — CONSULTS
Consultation - Cardiology  Mahesh Hernández 61 y o  male MRN: [id number]  Unit/Bed#: [place] 418-01 Encounter: [id number]  Consults    History of Present Illness   Physician Requesting Consult: Nicole Ogden MD  Reason for Consult / Principal Problem:  Late onset MI complicated by VSD and cardiogenic shock    Assessment/Plan   Late onset MI complicated by VSD and cardiogenic shock  -hemodynamics: on 12/31/2021 underwent [facility]-ECMO placement  At this time remains on [place] ECMO (6800 rpm, 4 5 liters/minute flow), levophed drip (1), milrinone (0 13) with cardiac output of about 7-8 liters/minute  -planned for VSD repair tomorrow  -volume status: euvolemic, CVP around 3-5 mmHg, continues self diuresing being net negative  Will plan to keep net negative  -  continue with aspirin 81 mg daily, will start on statins once LFTs improving  Still on low-dose Levophed and Milrinone and ECMO  will reassess left ventricular function after ventricular septal defect repair to further decide on GDMT  -will plan for left heart catheterization for coronary anatomy delineation once kidney function is a better    Hypoxic failure status post intubation  -on low oxygen requirements  Continue keeping net negative fluid balance    JUDE on CKD  -baseline creatinine around 1, presented with creatinine around 4, continues improving    Anemia  -hemoglobin at presentation 12 8, with troponin levels around 8, received 1 unit of packed red blood cells and plan for another unit today  HPI:  History is per chart review as patient is intubated and sedated   Mahesh Hernández is a 61 y o  male with no previous cardiac history presented on 12/30/2021 with shortness of breath, found to be hypoxic, part of workup revealed ST elevations in anteroseptal, inferior leads with concern for STEMI  Upon further evaluation it thought to be secondary to stress cardiomyopathy as opposed to acute STEMI with plan for left heart catheterization at later time    Echo at the same day showed dilated left ventricle with mildly reduced systolic function of 94% EF, regional wall motion abnormalities, with diastolic flattening of septum consistent with right ventricular volume overload, ventricular septal defect with large left-to-right shunt, right ventricular dilatation with moderately reduced function, bilateral atrial enlargement, moderate tricuspid regurgitation and severely increased pulmonary arterial systolic pressures  Patient was started on Milrinone drip  On 12/31/2021 underwent VA-ECMo placement  As well as intubation  At this time remains on [facility] ECMO (6800 rpm, 4 5 liters/minute flow), levophed drip (1), milrinone (0 13)  Self diuresed about 3 3 L overnight with being net -470 cc (net -3 L since admission), improvement in creatinine 1 56 today  Labs significant for sodium 154, chloride 120, AST--956, hemoglobin 12 8 at presentation and 8 1 today, status post 1 unit of packed red blood cells), high sensitivity troponin peaked at 5882, NT proBNP 43 122, hemoglobin A1c 5 9      EKG:  Initial      EKG 01/03/2022      Cardiac testing:   ECHO:  December, 2020  Interpretation Summary         Left Ventricle: Left ventricular cavity size is dilated  The left ventricular ejection fraction is 45%  Systolic function is mildly reduced  Wall thickness is mildly increased  There is eccentric hypertrophy  Unable to assess diastolic function due to E/A fusion due to tachycardia    IVS: There is diastolic flattening of the interventricular septum consistent with right ventricle volume overload  There is a small, post-myocardial infarction, central muscular ventricular septal defect with a large left to right shunt indicated by color flow Doppler  Qp:Qs could not be accurately calculated due to VSD jet direction    Right Ventricle: Right ventricular cavity size is dilated  Systolic function is moderately reduced    Left Atrium: The atrium is mildly dilated    Right Atrium:  The atrium is mildly dilated    Tricuspid Valve: There is moderate regurgitation    Pulmonary Artery: The pulmonary artery systolic pressure is severely increased    The following segments are akinetic: mid anterior, mid anteroseptal, mid inferoseptal, apical anterior, apical septal, apical inferior, apical lateral and apex    All other segments are normal      Review of Systems  ROS as noted above, otherwise 12 point review of systems was performed and is negative  Historical Information   History reviewed  No pertinent past medical history  Past Surgical History:   Procedure Laterality Date    EXTRACORPOREAL CIRCULATION N/A 12/31/2021    Procedure: SUPPORT EXTRACORPOREAL MEMBRANE OXYGENATION (ECMO);   Surgeon: Roxann Muse MD;  Location: [facility];  Service: Cardiac Surgery     Social History     Substance and Sexual Activity   Alcohol Use No     Social History     Substance and Sexual Activity   Drug Use No     Social History     Tobacco Use   Smoking Status Current Every Day Smoker    Types: Pipe   Smokeless Tobacco Never Used   Tobacco Comment    Secondhand smoke exposure     Meds/Allergies   Hospital Medications:   Current Facility-Administered Medications   Medication Dose Route Frequency    chlorhexidine (PERIDEX) 0 12 % oral rinse 15 mL  15 mL Mouth/Throat Q12H [address]    dexmedeTOMIDine (Precedex) 400 mcg in sodium chloride 0 9 % 100 mL infusion  0 1-0 7 mcg/kg/hr Intravenous Titrated    fentaNYL 1000 mcg in sodium chloride 0 9% 100mL infusion  100 mcg/hr Intravenous Continuous    fentanyl citrate (PF) 100 MCG/2ML 50 mcg  50 mcg Intravenous Q1H PRN    heparin (porcine) 25,000 units in 0 45% NaCl 250 mL infusion (premix)  3-15 Units/kg/hr (Order-Specific) Intravenous Titrated    insulin regular (HumuLIN R,NovoLIN R) 1 Units/mL in sodium chloride 0 9 % 100 mL infusion  0 3-21 Units/hr Intravenous Titrated    metoprolol (LOPRESSOR) injection 2 5 mg  2 5 mg Intravenous Q4H PRN    milrinone (PRIMACOR) 20 mg in 100 mL infusion (premix)  0 13 mcg/kg/min Intravenous Continuous    norepinephrine (LEVOPHED) 4 mg (STANDARD CONCENTRATION) IV in sodium chloride 0 9% 250 mL  1-30 mcg/min Intravenous Titrated    omeprazole (PRILOSEC) suspension 2 mg/mL  20 mg Oral Daily    ondansetron (ZOFRAN) injection 4 mg  4 mg Intravenous Q6H PRN    polyethylene glycol (MIRALAX) packet 17 g  17 g Oral Daily    senna oral syrup 8 8 mg  8 8 mg Oral BID    sodium chloride (PF) 0 9 % injection 3 mL  3 mL Intravenous Q1H PRN    sodium chloride infusion 0 45 %  75 mL/hr Intravenous Continuous     Home Medications:   Medications Prior to Admission   Medication    ALPRAZolam (XANAX) 0 5 mg tablet    EPINEPHrine (EPIPEN) 0 3 mg/0 3 mL SOAJ     Allergies   Allergen Reactions    Mushroom Extract Complex - Food Allergy Facial Swelling    Peanut Oil - Food Allergy     Strawberry C [Ascorbate - Food Allergy]      Objective   Vitals: Blood pressure (!) 73/73, pulse 65, temperature 98 6 °F (37 °C), resp  rate 14, height 5' 5 5" (1 664 m), weight 89 kg (196 lb 3 4 oz), SpO2 100 %  Orthostatic Blood Pressures      Most Recent Value   Blood Pressure 73/73 filed at 01/03/2022 1026   Patient Position - Orthostatic VS Lying filed at 01/01/2022 0800          Intake/Output Summary (Last 24 hours) at 1/3/2022 1202  Last data filed at 1/3/2022 1000  Gross per 24 hour   Intake 2528 7 ml   Output 2790 ml   Net -261 3 ml     Invasive Devices  Report    Central Venous Catheter Line            CVC Central Lines 12/30/21 Triple Left Internal jugular 3 days    Introducer 12/31/21 3 days    LDA ECMO 12/31/21 Femoral artery  3 days          Arterial Line            Arterial Line 01/01/22 Other (Comment) 1 day          Line            Arterial Sheath 6 Fr   Right  2 days          Drain            Urethral Catheter Temperature probe 3 days    NG/OG/Enteral Tube Orogastric Center mouth 2 days          Airway            ETT Hi-Lo; Cuffed;Oral 8 5 mm 3 days              Physical Exam   GEN:  Intubated, sedated  SKIN: dry without significant lesions or rashes  HEENT: NCAT, PERRL, EOMs intact  NECK:  Positive JVD or carotid bruits appreciated  CARDIOVASCULAR: RRR, normal S1, S2 without murmurs, rubs, or gallops appreciated  LUNGS:  Mechanical ventilation sounds present  ABDOMEN: Soft, nontender, nondistended  EXTREMITIES/VASCULAR: perfused without clubbing, cyanosis, trace edema b/l    Lab Results: I have personally reviewed pertinent lab results  Results from last 7 days   Lab Units 01/03/22  0550 01/02/22  1132 01/02/22  0925 01/02/22  0530 01/01/22  1325 01/01/22  1325 01/01/22  0443 01/01/22  0443 12/31/21  1326   WBC Thousand/uL 16 28*  --   --  28 70*  --  31 86*   < > 28 23*  --    HEMOGLOBIN g/dL 7 9* 8 1*  --  7 7*   < > 8 4*   < > 8 8*  --    I STAT HEMOGLOBIN g/dl  --   --  7 8*  7 8*  --   --   --   --   --    < >   HEMATOCRIT % 24 3*  --   --  23 7*   < > 25 2*   < > 26 2*  --    HEMATOCRIT, ISTAT %  --   --  23*  23*  --   --   --   --   --   --    PLATELETS Thousands/uL  --   --   --  102*  --  138*  --  164  --     < > = values in this interval not displayed  Results from last 7 days   Lab Units 01/03/22  0550 01/02/22  2348 01/02/22  1746 01/02/22  1132 01/02/22  0925   POTASSIUM mmol/L 4 1 3 6 4 0   < >  --    CHLORIDE mmol/L 120* 117* 117*   < >  --    CO2 mmol/L 32 33* 31   < >  --    CO2, I-STAT mmol/L  --   --   --   --  32  36*   BUN mg/dL 81* 92* 103*   < >  --    CREATININE mg/dL 1 56* 1 70* 1 87*   < >  --    GLUCOSE, ISTAT mg/dl  --   --   --   --  134  137   CALCIUM mg/dL 8 2* 8 1* 8 3   < >  --     < > = values in this interval not displayed       Results from last 7 days   Lab Units 01/03/22  0644 01/03/22  0550 01/03/22  0350 01/02/22  2348 01/02/22  0725 01/02/22  0529 01/01/22  1607 01/01/22  1325   INR   --  1 37*  --   --   --  1 48*  --  1 93*   PTT seconds 73*  --  63* 57*   < > 54*   < >  -- < > = values in this interval not displayed  Results from last 7 days   Lab Units 01/03/22  0550 01/02/22  0529 01/01/22  0433   MAGNESIUM mg/dL 3 5* 3 5* 3 2*     Imaging: I have personally reviewed pertinent reports

## 2022-01-03 NOTE — PROGRESS NOTES
Progress Note - Critical Care   Thirza Spatz 61 y o  male MRN: [id number]  Unit/Bed#: [facility] 418-01 Encounter: [id number]      Attending Physician: Eri Garcia MD    24 Hour Events/HPI: POD # 3 s/p VA ECMO cannulation as bridge to VSD repair  Yesterday, received 1u PRBC, Milrinone weaned to 0 13 and sedation transitioned from propofol to precedex  More bradycardic overnight, precedex lowered and fentanyl added with improvement  No other acute overnight events  VA ECMO:  Flow: 4 4  RPMs: 6,800  Sweep: 3 5  Fio2: 100%    Current gtts:  Milrinone 0 13  Levo 1mcg  Heparin  Fentanyl 100mcg  Precedex 0 4      ROS: Review of Systems  ---------------------------------------------------------------------------------------------------------------------------------------------------------------------  Impressions:  1  Cardiogenic shock s/p VA ECMO cannulation  2  Recent LAD STEMI  3  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   4  Shock liver  5  JUDE  6  Coagulopathy  7  Toxic metabolic encephalopathy  8  Right upper extremity partial arterial occlusion secondary to thrombus at the art line site, improved   9  Tylenol OD s/p NAC   10  Leukocytosis  11  Acute blood loss anemia  12  Thrombocytopenia   13   Agoraphobia with panic attacks       Plan:    Neuro:   · Sedation plan: fentanyl and precedex   · RASS goal: -2 Light Sedation  · Pain controlled with: PRN fentanyl   · Regulate sleep/wake cycle  · Delirium precautions  · CAM-ICU daily  · Trend neuro exam    CV:   · Cardiac infusions: Primacor, 0 13 mcg/kg/min, levophed 1mcg  · Wean levo as able  · MAP goal > 65 and CI >2 2  · Keep Solorio Sake catheter  · Keep Arterial line  · Rhythm: NSR  · Follow rhythm on telemetry  · Holding BB while on pressors  · Continue ASA  · VA ECMO, day #3  · Continue heparin ECMO protocol  · Eventual OR this week for VSD repair   · Avoid Right upper Arterial sticks due to previous partial occlusion, now resolved   · Continue RLE distal perfusion checks     Lung:   · Acute respiratory failure; Requiring Intubation with ventilator support  Secondary to lethargy/altered mental status  Continue incentive spirometry/coughing/deep breathing exercises  Wean supplemental oxygen as tolerated for saturation > 90%  · SBT plan: not a candidate while on ECMO   · Chlorhexidine ordered: yes    GI:   · Continue PPI for stress ulcer prophylaxis  · Continue bowel regimen  · Trend abdominal exam and bowel function    FEN:   · Diuretic plan: Lasix PRN  · Urine output 3 1L/24 hours   · Potassium replacement PRN   · Nutrition/diet plan: Increase TF to goal as tolerated   · Replenish electrolytes with goals: K >4 0, Mag >2 0, and Phos >3 0    :   · Indwelling Chang present: yes   · Keep Chang  · Trend UOP and BUN/creat  · Strict I and O    ID:   · Trend temps and WBC count  · Maintain normothermia    Results from last 7 days   Lab Units 01/02/22  0529 01/01/22  0433 12/31/21  0422 12/30/21  1007   PROCALCITONIN ng/ml 1 26* 2 90* 2 16* 1 65*     Heme:   · Trend hgb and plts  · S/p 1u PRBC yesterday   · S/p vitamin K yesterday  · Continue to closely trend H&H  · Hemoglobin pending for today   · Repeat INR pending     Endo:   · Glycemic control plan: Glucose well-controlled  Results from last 6 Months   Lab Units 12/31/21  0400 12/30/21  1007 09/13/21  0657   HEMOGLOBIN A1C % 5 9* 5 7* 5 3     MSK/Skin:  · Mobility goal:   · PT consult: not applicable  · OT consult: not applicable  · Frequent turning and pressure off-loading  · Local wound care as needed    Disposition:  Continue ICU care  ---------------------------------------------------------------------------------------------------------------------------------------------------------------------  Allergies:    Allergies   Allergen Reactions    Mushroom Extract Complex - Food Allergy Facial Swelling    Peanut Oil - Food Allergy     Strawberry C [Ascorbate - Food Allergy]      Medications:   Scheduled Meds:  Current Facility-Administered Medications   Medication Dose Route Frequency Provider Last Rate    aspirin  81 mg Oral Daily Wendi Spironello V, CRNP      chlorhexidine  15 mL Mouth/Throat Q12H [address] Wendi Spironello V, CRNP      dexmedetomidine  0 1-0 7 mcg/kg/hr Intravenous Titrated Mark Lischner, DO 0 4 mcg/kg/hr (01/03/22 0359)    fentaNYL  100 mcg/hr Intravenous Continuous PIPE Carlos-C 100 mcg/hr (01/03/22 0407)    fentanyl citrate (PF)  50 mcg Intravenous Q1H PRN Wendi Spironello V, CRNP      heparin (porcine)  3-15 Units/kg/hr (Order-Specific) Intravenous Titrated Wendi Spironello V, CRNP 12 Units/kg/hr (01/03/22 0454)    insulin regular (HumuLIN R,NovoLIN R) infusion  0 3-21 Units/hr Intravenous Titrated Wendi Spironello V, CRNP 0 5 Units/hr (01/02/22 1748)    metoprolol  2 5 mg Intravenous Q4H PRN Sanjay Hassan DO      milrinone [facility]) infusion  0 13 mcg/kg/min Intravenous Continuous TARYN CarlosC 0 13 mcg/kg/min (01/03/22 0423)    norepinephrine  1-30 mcg/min Intravenous Titrated Wendi Pereznello V, CRNP 1 mcg/min (01/03/22 0113)    omeprazole (PRILOSEC) suspension 2 mg/mL  20 mg Oral Daily Wendi Spironello V, CRNP      ondansetron  4 mg Intravenous Q6H PRN Wendi Spironello V, CRNP      polyethylene glycol  17 g Oral Daily Wendi Spironello V, CRNP      senna  8 8 mg Oral BID Wendi Spironello V, CRNP      sodium chloride (PF)  3 mL Intravenous Q1H PRN Wendi Spironello V, CRNP       VTE Pharmacologic Prophylaxis: Heparin Drip  VTE Mechanical Prophylaxis: sequential compression device    Continuous Infusions:dexmedetomidine, 0 1-0 7 mcg/kg/hr, Last Rate: 0 4 mcg/kg/hr (01/03/22 0359)  fentaNYL, 100 mcg/hr, Last Rate: 100 mcg/hr (01/03/22 0407)  heparin (porcine), 3-15 Units/kg/hr (Order-Specific), Last Rate: 12 Units/kg/hr (01/03/22 4585)  insulin regular (HumuLIN R,NovoLIN R) infusion, 0 3-21 Units/hr, Last Rate: 0 5 Units/hr (01/02/22 4212)  milrinone [facility]) infusion, 0 13 mcg/kg/min, Last Rate: 0 13 mcg/kg/min (22)  norepinephrine, 1-30 mcg/min, Last Rate: 1 mcg/min (22)      PRN Meds:  fentanyl citrate (PF), 50 mcg, Q1H PRN  metoprolol, 2 5 mg, Q4H PRN  ondansetron, 4 mg, Q6H PRN  sodium chloride (PF), 3 mL, Q1H PRN      Home Medications:   Prior to Admission medications    Medication Sig Start Date End Date Taking? Authorizing Provider   ALPRAZolam Dorthey Halim) 0 5 mg tablet Take 1-2 tablets 30-60 minutes prior to leaving home prn 3/18/21   Yrn Arce MD   EPINEPHrine (EPIPEN) 0 3 mg/0 3 mL SOAJ Inject 0 3 mL (0 3 mg total) into a muscle once for 1 dose 9/21/18 3/18/21  Rob Walton, DO     ---------------------------------------------------------------------------------------------------------------------------------------------------------------------  Vitals:   Vitals:    22 0200 22 0300 22 0400 22 0500   BP:       BP Location:       Pulse: 76 64 67 63   Resp: 14  14 13   Temp: 98 6 °F (37 °C) 98 6 °F (37 °C) 98 6 °F (37 °C) 98 6 °F (37 °C)   TempSrc:       SpO2: 99% 99% 99% 99%   Weight:       Height:         Arterial Line:  Arterial Line BP: 68/67  Arterial Line MAP (mmHg): 67 mmHg    Tele Rhythm: NSR This was personally reviewed by myself      Respiratory:  SpO2: SpO2: 99 %, SpO2 Activity: SpO2 Activity: At Rest, SpO2 Device: O2 Device: Ventilator  Nasal Cannula O2 Flow Rate (L/min): 2 L/min    Ventilator:  Respiratory  Report   Lab Data (Last 4 hours)    None         O2/Vent Data (Last 4)       0300  0400  0455  0500     Vent Mode AC/VC AC/VC AC/VC AC/VC    Resp Rate (BPM) (BPM)   14     Vt (mL) (mL)   450     FIO2 (%) (%)   40     PEEP (cmH2O) (cmH2O)   6     Patient safety screen outcome:   Failed     MV   8 3               Temperature: Temp (24hrs), Av 6 °F (37 °C), Min:98 4 °F (36 9 °C), Max:98 8 °F (37 1 °C)  Current: Temperature: 98 6 °F (37 °C)    Weights:   Weight (last 2 days)     Date/Time Weight    01/02/22 0552 89 6 (197 53)    01/01/22 0538 90 5 (199 52)        Body mass index is 32 37 kg/m²  Hemodynamic Monitoring:  PAP: PAP: 12/5, CVP: CVP (mean): 3 mmHg, CO:  , CI:  , SVR: SVR (dyne*sec)/cm5: 539 (dyne*sec)/cm5  PAP: (12-32)/(3-11) 12/5    Intake and Outputs:    Intake/Output Summary (Last 24 hours) at 1/3/2022 0553  Last data filed at 1/3/2022 0500  Gross per 24 hour   Intake 2945 81 ml   Output 3450 ml   Net -504 19 ml     I/O last 24 hours: In: 4125 3 [I V :2099 1; Blood:350; NG/GT:785; IV Piggyback:474 2; Feedings:417]  Out: 5500 [Urine:5500]      Urine output: 3 2L  Net negative 475mL     Labs:  Results from last 7 days   Lab Units 01/02/22  1132 01/02/22  0925 01/02/22  0530 01/01/22  1325 01/01/22  1325 01/01/22  0443 01/01/22  0443 12/31/21  0400 12/30/21  1007   WBC Thousand/uL  --   --  28 70*  --  31 86*  --  28 23*   < > 17 22*   HEMOGLOBIN g/dL 8 1*  --  7 7*   < > 8 4*   < > 8 8*   < > 12 8   I STAT HEMOGLOBIN g/dl  --  7 8*  7 8*  --   --   --   --   --    < >  --    HEMATOCRIT %  --   --  23 7*  --  25 2*   < > 26 2*   < > 40 1   HEMATOCRIT, ISTAT %  --  23*  23*  --   --   --   --   --    < >  --    PLATELETS Thousands/uL  --   --  102*  --  138*  --  164   < > 217   MONO PCT %  --   --   --   --   --   --  8  --  4    < > = values in this interval not displayed       Results from last 7 days   Lab Units 01/02/22  2348 01/02/22  1746 01/02/22  1132 01/02/22  0925 01/02/22  0529 01/02/22  0529 01/01/22  1816 01/01/22  1325 01/01/22  0433 01/01/22  0433 12/31/21  1729 12/31/21  1326 12/31/21  1326 12/31/21  1149 12/31/21  0411   SODIUM mmol/L 154* 150* 151*  --    < > 150*   < > 145   < > 143   < >  --  138  --    < >   POTASSIUM mmol/L 3 6 4 0 3 8  --    < > 3 8   < > 3 7   < > 4 1   < >  --  3 5  --    < >   CHLORIDE mmol/L 117* 117* 116*  --    < > 113*   < > 108   < > 108   < >  --  104  --    < >   CO2 mmol/L 33* 31 31  --    < > 29   < > 26 < > 24   < >  --  18*  --    < >   CO2, I-STAT mmol/L  --   --   --  32  36*  --   --   --   --   --   --   --    < > 24 21  22   < >   BUN mg/dL 92* 103* 111*  --    < > 124*   < > 135*   < > 133*   < >  --  140*  --    < >   CREATININE mg/dL 1 70* 1 87* 2 03*  --    < > 2 32*   < > 2 94*   < > 3 01*   < >  --  2 87*  --    < >   CALCIUM mg/dL 8 1* 8 3 8 3  --    < > 9 3   < > 8 6   < > 8 3   < >  --  8 4  --    < >   ALK PHOS U/L  --   --   --   --   --  153*  --  140*  --  134*   < >  --  128*  --    < >   ALT U/L  --   --   --   --   --  1,720*  --  2,325*  --  2,895*   < >  --  3,696*  --    < >   AST U/L  --   --   --   --   --  268*  --  349*  --  476*   < >  --  996*  --    < >   GLUCOSE, ISTAT mg/dl  --   --   --  134  137  --   --   --   --   --   --   --   --  161* 168*  170*  --     < > = values in this interval not displayed  Baseline Creat: 1 0-1 2    Results from last 7 days   Lab Units 01/02/22  0529 01/01/22  0433 12/31/21  0400   MAGNESIUM mg/dL 3 5* 3 2* 3 4*     Results from last 7 days   Lab Units 01/02/22  0529 01/01/22  0433 12/31/21  0400   PHOSPHORUS mg/dL 4 6* 5 3* 6 0*      Results from last 7 days   Lab Units 01/03/22  0350 01/02/22  2348 01/02/22  1935 01/02/22  0725 01/02/22  0529 01/01/22  1607 01/01/22  1325 01/01/22  0646 01/01/22  0433   INR   --   --   --   --  1 48*  --  1 93*  --  2 06*   PTT seconds 63* 57* 58*   < > 54*   < >  --    < >  --     < > = values in this interval not displayed                  Results from last 7 days   Lab Units 12/31/21  1650 12/31/21  1326 12/31/21  0836   LACTIC ACID mmol/L 1 5 2 1* 2 0     Results from last 7 days   Lab Units 01/02/22  2348 01/02/22  1746 01/02/22  1746 01/02/22  1132 01/02/22  1132   PH ART  7 446   < > 7 493*   < > 7 478*   PCO2 ART mm Hg 44 6*   < > 43 2   < > 41 4   PO2 ART mm Hg 372 3*   < > 354 4*   < > 338 8*   HCO3 ART mmol/L 30 0*   < > 32 4*   < > 30 0*   BASE EXC ART mmol/L 5 4   < > 8 3   < > 5 9   ABG SOURCE Line, Arterial  --  Line, Arterial  --  Line, Arterial    < > = values in this interval not displayed  Micro:   Blood Culture:   Lab Results   Component Value Date    BLOODCX No Growth at 72 hrs  12/30/2021    BLOODCX No Growth at 72 hrs  12/30/2021     Urine Culture: No results found for: URINECX  Sputum Culture: No components found for: SPUTUMCX  Wound Culure: No results found for: WOUNDCULT    Diagnositic Studies:  No new imaging     Nutrition:        Diet Orders   (From admission, onward)             Start     Ordered    01/03/22 0047  Diet Enteral/Parenteral; Tube Feeding No Oral Diet; Jevity 1 5; Continuous; 20; 250; Water; Every 4 hours  Diet effective now        Comments: Stop TF on hold for tentative OR procedure   References:    Nutrtion Support Algorithm Enteral vs  Parenteral   Question Answer Comment   Diet Type Enteral/Parenteral    Enteral/Parenteral Tube Feeding No Oral Diet    Tube Feeding Formula: Jevity 1 5    Bolus/Cyclic/Continuous Continuous    Tube Feeding Goal Rate (mL/hr): 20    Tube Feeding flush (mL): 250    Water Flush type: Water    Water flush frequency: Every 4 hours    RD to adjust diet per protocol? Yes        01/03/22 0049              Physical Exam  Constitutional:       Interventions: He is sedated and intubated  HENT:      Head: Normocephalic and atraumatic  Eyes:      Pupils: Pupils are equal, round, and reactive to light  Neck:      Comments: ANASTASIIA hernandez  Cardiovascular:      Rate and Rhythm: Normal rate  Heart sounds: Murmur heard  Comments: Pulses via doppler  Right groin Venous and arterial cannulation site with distal perfusion catheter  ECMO lines with visible color difference  No chatter present  Insertion site clean, dry and intact  Distal extremity warm and well perfused  Pulmonary:      Effort: Pulmonary effort is normal  No respiratory distress  He is intubated  Breath sounds: Normal breath sounds  No wheezing     Abdominal: General: Bowel sounds are normal  There is no distension  Palpations: Abdomen is soft  Tenderness: There is no abdominal tenderness  Musculoskeletal:      Cervical back: Normal range of motion  Comments: Trace generalized edema   Skin:     General: Skin is warm and dry  Capillary Refill: Capillary refill takes less than 2 seconds  Findings: No lesion  Neurological:      Comments: Follows commands when awake       Invasive lines and devices: Invasive Devices  Report    Central Venous Catheter Line            CVC Central Lines 12/30/21 Triple Left Internal jugular 3 days    Introducer 12/31/21 2 days    LDA ECMO 12/31/21 Femoral artery  2 days          Arterial Line            Arterial Line 01/01/22 Other (Comment) 1 day          Line            Arterial Sheath 6 Fr  Right  2 days          Drain            Urethral Catheter Temperature probe 3 days    NG/OG/Enteral Tube Orogastric Center mouth 2 days          Airway            ETT  Hi-Lo; Cuffed;Oral 8 5 mm 2 days              ---------------------------------------------------------------------------------------------------------------------------------------------------------------------  Code Status: Level 1 - Full Code    Care Time Delivered:   Upon my evaluation, this patient had a high probability of imminent or life-threatening deterioration due to cardiogenic shock , which required my direct attention, intervention, and personal management  I have personally provided 40 minutes (0620 to 0700) of critical care time, exclusive of procedures, teaching, family meetings, and any prior time recorded by providers other than myself  Collaborative bedside rounds performed with cardiac surgery attending, critical care attending and bedside RN      SIGNATURE: BROOKE Harris  DATE: January 3, 2022  TIME: 5:53 AM

## 2022-01-03 NOTE — RESPIRATORY THERAPY NOTE
RT Ventilator Management Note  Rosario Morales 61 y o  male MRN: [id number]  Unit/Bed#: [facility] 418-01 Encounter: [id number]      Daily Screen       1/3/2022  0455 1/3/2022  0837          Patient safety screen outcome[de-identified] Failed Failed (P)       Not Ready for Weaning due to[de-identified] Underline problem not resolved Underline problem not resolved (P)               Physical Exam:   Assessment Type: Assess only  General Appearance: Sedated  Respiratory Pattern: Assisted  Chest Assessment: Chest expansion symmetrical  Bilateral Breath Sounds: Clear  R Breath Sounds: Clear  L Breath Sounds: Clear  Cough: None  O2 Device: Vent      Resp Comments: (P) Pt comfortable on current vent settings, Pt is occasionally stacking breaths  Attempted to switch him over to VC+ with no success of iliminating breath stack  keeping him on same mode, will continue to monitor

## 2022-01-04 ENCOUNTER — APPOINTMENT (INPATIENT)
Dept: RADIOLOGY | Facility: HOSPITAL | Age: 67
DRG: 167 | End: 2022-01-04
Payer: COMMERCIAL

## 2022-01-04 ENCOUNTER — ANESTHESIA (INPATIENT)
Dept: PERIOP | Facility: HOSPITAL | Age: 67
DRG: 167 | End: 2022-01-04
Payer: COMMERCIAL

## 2022-01-04 LAB
ABO GROUP BLD BPU: NORMAL
ALBUMIN SERPL BCP-MCNC: 2.4 G/DL (ref 3.5–5)
ALP SERPL-CCNC: 131 U/L (ref 46–116)
ALT SERPL W P-5'-P-CCNC: 651 U/L (ref 12–78)
ANION GAP SERPL CALCULATED.3IONS-SCNC: 0 MMOL/L (ref 4–13)
ANION GAP SERPL CALCULATED.3IONS-SCNC: 2 MMOL/L (ref 4–13)
ANION GAP SERPL CALCULATED.3IONS-SCNC: 6 MMOL/L (ref 4–13)
ANION GAP SERPL CALCULATED.3IONS-SCNC: 7 MMOL/L (ref 4–13)
APTT PPP: 34 SECONDS (ref 23–37)
APTT PPP: 49 SECONDS (ref 23–37)
APTT PPP: 50 SECONDS (ref 23–37)
AST SERPL W P-5'-P-CCNC: 103 U/L (ref 5–45)
BACTERIA BLD CULT: NORMAL
BACTERIA BLD CULT: NORMAL
BASE EX.OXY STD BLDV CALC-SCNC: 66.3 % (ref 60–80)
BASE EX.OXY STD BLDV CALC-SCNC: 70.3 % (ref 60–80)
BASE EXCESS BLDA CALC-SCNC: -1.9 MMOL/L
BASE EXCESS BLDA CALC-SCNC: -2.3 MMOL/L
BASE EXCESS BLDA CALC-SCNC: 1 MMOL/L (ref -2–3)
BASE EXCESS BLDA CALC-SCNC: 1 MMOL/L (ref -2–3)
BASE EXCESS BLDA CALC-SCNC: 11 MMOL/L (ref -2–3)
BASE EXCESS BLDA CALC-SCNC: 3 MMOL/L (ref -2–3)
BASE EXCESS BLDA CALC-SCNC: 6 MMOL/L (ref -2–3)
BASE EXCESS BLDA CALC-SCNC: 6 MMOL/L (ref -2–3)
BASE EXCESS BLDA CALC-SCNC: 7 MMOL/L (ref -2–3)
BASE EXCESS BLDA CALC-SCNC: 7 MMOL/L (ref -2–3)
BASE EXCESS BLDA CALC-SCNC: 7.8 MMOL/L
BASE EXCESS BLDA CALC-SCNC: 8 MMOL/L (ref -2–3)
BASE EXCESS BLDA CALC-SCNC: 8.6 MMOL/L
BASE EXCESS BLDV CALC-SCNC: -0.8 MMOL/L
BASE EXCESS BLDV CALC-SCNC: -2.4 MMOL/L
BILIRUB DIRECT SERPL-MCNC: 0.73 MG/DL (ref 0–0.2)
BILIRUB SERPL-MCNC: 1.45 MG/DL (ref 0.2–1)
BODY TEMPERATURE: 98.6 DEGREES FEHRENHEIT
BODY TEMPERATURE: 98.8 DEGREES FEHRENHEIT
BPU ID: NORMAL
BUN SERPL-MCNC: 46 MG/DL (ref 5–25)
BUN SERPL-MCNC: 54 MG/DL (ref 5–25)
BUN SERPL-MCNC: 59 MG/DL (ref 5–25)
BUN SERPL-MCNC: 64 MG/DL (ref 5–25)
CA-I BLD-SCNC: 0.9 MMOL/L (ref 1.12–1.32)
CA-I BLD-SCNC: 0.93 MMOL/L (ref 1.12–1.32)
CA-I BLD-SCNC: 0.96 MMOL/L (ref 1.12–1.32)
CA-I BLD-SCNC: 1 MMOL/L (ref 1.12–1.32)
CA-I BLD-SCNC: 1.06 MMOL/L (ref 1.12–1.32)
CA-I BLD-SCNC: 1.1 MMOL/L (ref 1.12–1.32)
CA-I BLD-SCNC: 1.16 MMOL/L (ref 1.12–1.32)
CA-I BLD-SCNC: 1.19 MMOL/L (ref 1.12–1.32)
CA-I BLD-SCNC: 1.33 MMOL/L (ref 1.12–1.32)
CA-I BLD-SCNC: 1.33 MMOL/L (ref 1.12–1.32)
CA-I BLD-SCNC: 1.35 MMOL/L (ref 1.12–1.32)
CA-I BLD-SCNC: 1.58 MMOL/L (ref 1.12–1.32)
CALCIUM SERPL-MCNC: 7.7 MG/DL (ref 8.3–10.1)
CALCIUM SERPL-MCNC: 8.3 MG/DL (ref 8.3–10.1)
CALCIUM SERPL-MCNC: 8.5 MG/DL (ref 8.3–10.1)
CALCIUM SERPL-MCNC: 9.5 MG/DL (ref 8.3–10.1)
CHLORIDE SERPL-SCNC: 118 MMOL/L (ref 100–108)
CHLORIDE SERPL-SCNC: 119 MMOL/L (ref 100–108)
CHLORIDE SERPL-SCNC: 120 MMOL/L (ref 100–108)
CHLORIDE SERPL-SCNC: 121 MMOL/L (ref 100–108)
CO2 SERPL-SCNC: 25 MMOL/L (ref 21–32)
CO2 SERPL-SCNC: 26 MMOL/L (ref 21–32)
CO2 SERPL-SCNC: 33 MMOL/L (ref 21–32)
CO2 SERPL-SCNC: 35 MMOL/L (ref 21–32)
CREAT SERPL-MCNC: 1.24 MG/DL (ref 0.6–1.3)
CREAT SERPL-MCNC: 1.33 MG/DL (ref 0.6–1.3)
CREAT SERPL-MCNC: 1.35 MG/DL (ref 0.6–1.3)
CREAT SERPL-MCNC: 1.72 MG/DL (ref 0.6–1.3)
CROSSMATCH: NORMAL
ERYTHROCYTE [DISTWIDTH] IN BLOOD BY AUTOMATED COUNT: 16.6 % (ref 11.6–15.1)
FIBRINOGEN PPP-MCNC: 284 MG/DL (ref 227–495)
GFR SERPL CREATININE-BSD FRML MDRD: 41 ML/MIN/1.73SQ M
GFR SERPL CREATININE-BSD FRML MDRD: 55 ML/MIN/1.73SQ M
GFR SERPL CREATININE-BSD FRML MDRD: 56 ML/MIN/1.73SQ M
GFR SERPL CREATININE-BSD FRML MDRD: 61 ML/MIN/1.73SQ M
GLUCOSE SERPL-MCNC: 109 MG/DL (ref 65–140)
GLUCOSE SERPL-MCNC: 110 MG/DL (ref 65–140)
GLUCOSE SERPL-MCNC: 129 MG/DL (ref 65–140)
GLUCOSE SERPL-MCNC: 136 MG/DL (ref 65–140)
GLUCOSE SERPL-MCNC: 140 MG/DL (ref 65–140)
GLUCOSE SERPL-MCNC: 148 MG/DL (ref 65–140)
GLUCOSE SERPL-MCNC: 152 MG/DL (ref 65–140)
GLUCOSE SERPL-MCNC: 155 MG/DL (ref 65–140)
GLUCOSE SERPL-MCNC: 159 MG/DL (ref 65–140)
GLUCOSE SERPL-MCNC: 160 MG/DL (ref 65–140)
GLUCOSE SERPL-MCNC: 161 MG/DL (ref 65–140)
GLUCOSE SERPL-MCNC: 162 MG/DL (ref 65–140)
GLUCOSE SERPL-MCNC: 166 MG/DL (ref 65–140)
GLUCOSE SERPL-MCNC: 170 MG/DL (ref 65–140)
GLUCOSE SERPL-MCNC: 171 MG/DL (ref 65–140)
GLUCOSE SERPL-MCNC: 180 MG/DL (ref 65–140)
GLUCOSE SERPL-MCNC: 190 MG/DL (ref 65–140)
GLUCOSE SERPL-MCNC: 200 MG/DL (ref 65–140)
GLUCOSE SERPL-MCNC: 207 MG/DL (ref 65–140)
GLUCOSE SERPL-MCNC: 208 MG/DL (ref 65–140)
GLUCOSE SERPL-MCNC: 211 MG/DL (ref 65–140)
GLUCOSE SERPL-MCNC: 212 MG/DL (ref 65–140)
GLUCOSE SERPL-MCNC: 230 MG/DL (ref 65–140)
GLUCOSE SERPL-MCNC: 86 MG/DL (ref 65–140)
GLUCOSE SERPL-MCNC: 98 MG/DL (ref 65–140)
HCO3 BLDA-SCNC: 23.7 MMOL/L (ref 22–28)
HCO3 BLDA-SCNC: 24 MMOL/L (ref 22–28)
HCO3 BLDA-SCNC: 27.2 MMOL/L (ref 22–28)
HCO3 BLDA-SCNC: 27.5 MMOL/L (ref 22–28)
HCO3 BLDA-SCNC: 28.1 MMOL/L (ref 22–28)
HCO3 BLDA-SCNC: 28.7 MMOL/L (ref 22–28)
HCO3 BLDA-SCNC: 28.8 MMOL/L (ref 22–28)
HCO3 BLDA-SCNC: 29.2 MMOL/L (ref 22–28)
HCO3 BLDA-SCNC: 30.6 MMOL/L (ref 22–28)
HCO3 BLDA-SCNC: 31.2 MMOL/L (ref 22–28)
HCO3 BLDA-SCNC: 31.3 MMOL/L (ref 24–30)
HCO3 BLDA-SCNC: 31.5 MMOL/L (ref 22–28)
HCO3 BLDA-SCNC: 32.2 MMOL/L (ref 22–28)
HCO3 BLDA-SCNC: 33.7 MMOL/L (ref 22–28)
HCO3 BLDA-SCNC: 33.9 MMOL/L (ref 22–28)
HCO3 BLDA-SCNC: 35.5 MMOL/L (ref 22–28)
HCO3 BLDV-SCNC: 24.8 MMOL/L (ref 24–30)
HCO3 BLDV-SCNC: 26.3 MMOL/L (ref 24–30)
HCT VFR BLD AUTO: 24.2 % (ref 36.5–49.3)
HCT VFR BLD AUTO: 37.2 % (ref 36.5–49.3)
HCT VFR BLD AUTO: 37.7 % (ref 36.5–49.3)
HCT VFR BLD CALC: 17 % (ref 36.5–49.3)
HCT VFR BLD CALC: 19 % (ref 36.5–49.3)
HCT VFR BLD CALC: 21 % (ref 36.5–49.3)
HCT VFR BLD CALC: 21 % (ref 36.5–49.3)
HCT VFR BLD CALC: 22 % (ref 36.5–49.3)
HCT VFR BLD CALC: 24 % (ref 36.5–49.3)
HCT VFR BLD CALC: 24 % (ref 36.5–49.3)
HCT VFR BLD CALC: 25 % (ref 36.5–49.3)
HCT VFR BLD CALC: 25 % (ref 36.5–49.3)
HCT VFR BLD CALC: 27 % (ref 36.5–49.3)
HCT VFR BLD CALC: 27 % (ref 36.5–49.3)
HCT VFR BLD CALC: 28 % (ref 36.5–49.3)
HGB BLD-MCNC: 12.6 G/DL (ref 12–17)
HGB BLD-MCNC: 12.6 G/DL (ref 12–17)
HGB BLD-MCNC: 7.6 G/DL (ref 12–17)
HGB BLDA-MCNC: 5.8 G/DL (ref 12–17)
HGB BLDA-MCNC: 6.5 G/DL (ref 12–17)
HGB BLDA-MCNC: 7.1 G/DL (ref 12–17)
HGB BLDA-MCNC: 7.1 G/DL (ref 12–17)
HGB BLDA-MCNC: 7.5 G/DL (ref 12–17)
HGB BLDA-MCNC: 8.2 G/DL (ref 12–17)
HGB BLDA-MCNC: 8.2 G/DL (ref 12–17)
HGB BLDA-MCNC: 8.5 G/DL (ref 12–17)
HGB BLDA-MCNC: 8.5 G/DL (ref 12–17)
HGB BLDA-MCNC: 9.2 G/DL (ref 12–17)
HGB BLDA-MCNC: 9.2 G/DL (ref 12–17)
HGB BLDA-MCNC: 9.5 G/DL (ref 12–17)
HOROWITZ INDEX BLDA+IHG-RTO: 40 MM[HG]
HOROWITZ INDEX BLDA+IHG-RTO: 40 MM[HG]
HOROWITZ INDEX BLDA+IHG-RTO: 70 MM[HG]
HOROWITZ INDEX BLDA+IHG-RTO: 70 MM[HG]
HOROWITZ INDEX BLDA+IHG-RTO: 80 MM[HG]
INR PPP: 1.27 (ref 0.84–1.19)
INR PPP: 1.59 (ref 0.84–1.19)
KCT BLD-ACNC: 150 SEC (ref 89–137)
KCT BLD-ACNC: 471 SEC (ref 89–137)
KCT BLD-ACNC: 485 SEC (ref 89–137)
KCT BLD-ACNC: 519 SEC (ref 89–137)
KCT BLD-ACNC: 652 SEC (ref 89–137)
KCT BLD-ACNC: 652 SEC (ref 89–137)
MAGNESIUM SERPL-MCNC: 3.1 MG/DL (ref 1.6–2.6)
MCH RBC QN AUTO: 30.9 PG (ref 26.8–34.3)
MCHC RBC AUTO-ENTMCNC: 31.4 G/DL (ref 31.4–37.4)
MCV RBC AUTO: 98 FL (ref 82–98)
O2 CT BLDA-SCNC: 12.7 ML/DL (ref 16–23)
O2 CT BLDA-SCNC: 12.7 ML/DL (ref 16–23)
O2 CT BLDA-SCNC: 17.6 ML/DL (ref 16–23)
O2 CT BLDA-SCNC: 17.7 ML/DL (ref 16–23)
O2 CT BLDV-SCNC: 12.5 ML/DL
O2 CT BLDV-SCNC: 12.6 ML/DL
OXYHGB MFR BLDA: 94.9 % (ref 94–97)
OXYHGB MFR BLDA: 97.6 % (ref 94–97)
OXYHGB MFR BLDA: 98 % (ref 94–97)
OXYHGB MFR BLDA: 98.7 % (ref 94–97)
PCO2 BLD: 28 MMOL/L (ref 21–32)
PCO2 BLD: 29 MMOL/L (ref 21–32)
PCO2 BLD: 30 MMOL/L (ref 21–32)
PCO2 BLD: 31 MMOL/L (ref 21–32)
PCO2 BLD: 32 MMOL/L (ref 21–32)
PCO2 BLD: 32 MMOL/L (ref 21–32)
PCO2 BLD: 33 MMOL/L (ref 21–32)
PCO2 BLD: 33 MMOL/L (ref 21–32)
PCO2 BLD: 34 MMOL/L (ref 21–32)
PCO2 BLD: 34.1 MM HG (ref 35–45)
PCO2 BLD: 37 MMOL/L (ref 21–32)
PCO2 BLD: 38.5 MM HG (ref 36–44)
PCO2 BLD: 40.6 MM HG (ref 36–44)
PCO2 BLD: 46.5 MM HG (ref 36–44)
PCO2 BLD: 49.7 MM HG (ref 36–44)
PCO2 BLD: 50 MM HG (ref 36–44)
PCO2 BLD: 50.2 MM HG (ref 42–50)
PCO2 BLD: 51.2 MM HG (ref 36–44)
PCO2 BLD: 52 MM HG (ref 35–45)
PCO2 BLD: 55.4 MM HG (ref 35–45)
PCO2 BLD: 56.5 MM HG (ref 35–45)
PCO2 BLD: 56.6 MM HG (ref 35–45)
PCO2 BLDA: 45.1 MM HG (ref 36–44)
PCO2 BLDA: 45.8 MM HG (ref 36–44)
PCO2 BLDA: 52.1 MM HG (ref 36–44)
PCO2 BLDA: 56.9 MM HG (ref 36–44)
PCO2 BLDV: 53.2 MM HG (ref 42–50)
PCO2 BLDV: 54 MM HG (ref 42–50)
PEEP RESPIRATORY: 6 CM[H2O]
PEEP RESPIRATORY: 6 CM[H2O]
PEEP RESPIRATORY: 8 CM[H2O]
PH BLD: 7.29 [PH] (ref 7.35–7.45)
PH BLD: 7.3 [PH] (ref 7.35–7.45)
PH BLD: 7.33 [PH] (ref 7.35–7.45)
PH BLD: 7.35 [PH] (ref 7.35–7.45)
PH BLD: 7.37 [PH] (ref 7.35–7.45)
PH BLD: 7.4 [PH] (ref 7.35–7.45)
PH BLD: 7.4 [PH] (ref 7.3–7.4)
PH BLD: 7.42 [PH] (ref 7.35–7.45)
PH BLD: 7.46 [PH] (ref 7.35–7.45)
PH BLD: 7.49 [PH] (ref 7.35–7.45)
PH BLD: 7.49 [PH] (ref 7.35–7.45)
PH BLD: 7.56 [PH] (ref 7.35–7.45)
PH BLDA: 7.34 [PH] (ref 7.35–7.45)
PH BLDA: 7.34 [PH] (ref 7.35–7.45)
PH BLDA: 7.39 [PH] (ref 7.35–7.45)
PH BLDA: 7.43 [PH] (ref 7.35–7.45)
PH BLDV: 7.29 [PH] (ref 7.3–7.4)
PH BLDV: 7.31 [PH] (ref 7.3–7.4)
PLATELET # BLD AUTO: 17 THOUSANDS/UL (ref 149–390)
PLATELET # BLD AUTO: 174 THOUSANDS/UL (ref 149–390)
PLATELET # BLD AUTO: 53 THOUSANDS/UL (ref 149–390)
PMV BLD AUTO: 11.7 FL (ref 8.9–12.7)
PMV BLD AUTO: 11.8 FL (ref 8.9–12.7)
PMV BLD AUTO: 9.6 FL (ref 8.9–12.7)
PO2 BLD: 180 MM HG (ref 75–129)
PO2 BLD: 222 MM HG (ref 75–129)
PO2 BLD: 23 MM HG (ref 75–129)
PO2 BLD: 275 MM HG (ref 75–129)
PO2 BLD: 30 MM HG (ref 75–129)
PO2 BLD: 30 MM HG (ref 75–129)
PO2 BLD: 313 MM HG (ref 75–129)
PO2 BLD: 35 MM HG (ref 75–129)
PO2 BLD: 36 MM HG (ref 35–45)
PO2 BLD: 360 MM HG (ref 75–129)
PO2 BLD: 39 MM HG (ref 75–129)
PO2 BLD: >400 MM HG (ref 75–129)
PO2 BLDA: 120.7 MM HG (ref 75–129)
PO2 BLDA: 412.2 MM HG (ref 75–129)
PO2 BLDA: 446.8 MM HG (ref 75–129)
PO2 BLDA: 88.1 MM HG (ref 75–129)
PO2 BLDV: 35.8 MM HG (ref 35–45)
PO2 BLDV: 38.8 MM HG (ref 35–45)
POTASSIUM BLD-SCNC: 4 MMOL/L (ref 3.5–5.3)
POTASSIUM BLD-SCNC: 4.1 MMOL/L (ref 3.5–5.3)
POTASSIUM BLD-SCNC: 4.2 MMOL/L (ref 3.5–5.3)
POTASSIUM BLD-SCNC: 4.2 MMOL/L (ref 3.5–5.3)
POTASSIUM BLD-SCNC: 4.4 MMOL/L (ref 3.5–5.3)
POTASSIUM BLD-SCNC: 4.5 MMOL/L (ref 3.5–5.3)
POTASSIUM BLD-SCNC: 4.6 MMOL/L (ref 3.5–5.3)
POTASSIUM BLD-SCNC: 5.1 MMOL/L (ref 3.5–5.3)
POTASSIUM SERPL-SCNC: 3.7 MMOL/L (ref 3.5–5.3)
POTASSIUM SERPL-SCNC: 4.1 MMOL/L (ref 3.5–5.3)
POTASSIUM SERPL-SCNC: 4.1 MMOL/L (ref 3.5–5.3)
POTASSIUM SERPL-SCNC: 4.4 MMOL/L (ref 3.5–5.3)
POTASSIUM SERPL-SCNC: 4.6 MMOL/L (ref 3.5–5.3)
PROCALCITONIN SERPL-MCNC: 0.31 NG/ML
PROT SERPL-MCNC: 4.8 G/DL (ref 6.4–8.2)
PROTHROMBIN TIME: 15.3 SECONDS (ref 11.6–14.5)
PROTHROMBIN TIME: 18.3 SECONDS (ref 11.6–14.5)
RBC # BLD AUTO: 2.46 MILLION/UL (ref 3.88–5.62)
SAO2 % BLD FROM PO2: 100 % (ref 60–85)
SAO2 % BLD FROM PO2: 37 % (ref 60–85)
SAO2 % BLD FROM PO2: 49 % (ref 60–85)
SAO2 % BLD FROM PO2: 51 % (ref 60–85)
SAO2 % BLD FROM PO2: 67 % (ref 60–85)
SAO2 % BLD FROM PO2: 67 % (ref 60–85)
SAO2 % BLD FROM PO2: 70 % (ref 60–85)
SODIUM BLD-SCNC: 151 MMOL/L (ref 136–145)
SODIUM BLD-SCNC: 152 MMOL/L (ref 136–145)
SODIUM BLD-SCNC: 153 MMOL/L (ref 136–145)
SODIUM SERPL-SCNC: 151 MMOL/L (ref 136–145)
SODIUM SERPL-SCNC: 152 MMOL/L (ref 136–145)
SODIUM SERPL-SCNC: 153 MMOL/L (ref 136–145)
SODIUM SERPL-SCNC: 156 MMOL/L (ref 136–145)
SPECIMEN SOURCE: ABNORMAL
UNIT DISPENSE STATUS: NORMAL
UNIT PRODUCT CODE: NORMAL
UNIT PRODUCT VOLUME: 350 ML
UNIT RH: NORMAL
VENT AC: 14
VENT AC: 14
VENT AC: 18
VENT- AC: AC
VT SETTING VENT: 450 ML
WBC # BLD AUTO: 14.22 THOUSAND/UL (ref 4.31–10.16)

## 2022-01-04 PROCEDURE — C1781 MESH (IMPLANTABLE): HCPCS | Performed by: THORACIC SURGERY (CARDIOTHORACIC VASCULAR SURGERY)

## 2022-01-04 PROCEDURE — P9037 PLATE PHERES LEUKOREDU IRRAD: HCPCS

## 2022-01-04 PROCEDURE — 82803 BLOOD GASES ANY COMBINATION: CPT

## 2022-01-04 PROCEDURE — P9035 PLATELET PHERES LEUKOREDUCED: HCPCS

## 2022-01-04 PROCEDURE — 80048 BASIC METABOLIC PNL TOTAL CA: CPT | Performed by: PHYSICIAN ASSISTANT

## 2022-01-04 PROCEDURE — 85347 COAGULATION TIME ACTIVATED: CPT

## 2022-01-04 PROCEDURE — 85610 PROTHROMBIN TIME: CPT | Performed by: PHYSICIAN ASSISTANT

## 2022-01-04 PROCEDURE — 71045 X-RAY EXAM CHEST 1 VIEW: CPT

## 2022-01-04 PROCEDURE — P9016 RBC LEUKOCYTES REDUCED: HCPCS

## 2022-01-04 PROCEDURE — 93005 ELECTROCARDIOGRAM TRACING: CPT

## 2022-01-04 PROCEDURE — 82805 BLOOD GASES W/O2 SATURATION: CPT | Performed by: THORACIC SURGERY (CARDIOTHORACIC VASCULAR SURGERY)

## 2022-01-04 PROCEDURE — P9017 PLASMA 1 DONOR FRZ W/IN 8 HR: HCPCS

## 2022-01-04 PROCEDURE — 82330 ASSAY OF CALCIUM: CPT

## 2022-01-04 PROCEDURE — 82805 BLOOD GASES W/O2 SATURATION: CPT | Performed by: NURSE PRACTITIONER

## 2022-01-04 PROCEDURE — 82948 REAGENT STRIP/BLOOD GLUCOSE: CPT

## 2022-01-04 PROCEDURE — 83735 ASSAY OF MAGNESIUM: CPT | Performed by: NURSE PRACTITIONER

## 2022-01-04 PROCEDURE — 85049 AUTOMATED PLATELET COUNT: CPT | Performed by: PHYSICIAN ASSISTANT

## 2022-01-04 PROCEDURE — C1769 GUIDE WIRE: HCPCS | Performed by: THORACIC SURGERY (CARDIOTHORACIC VASCULAR SURGERY)

## 2022-01-04 PROCEDURE — 85027 COMPLETE CBC AUTOMATED: CPT | Performed by: NURSE PRACTITIONER

## 2022-01-04 PROCEDURE — 82330 ASSAY OF CALCIUM: CPT | Performed by: THORACIC SURGERY (CARDIOTHORACIC VASCULAR SURGERY)

## 2022-01-04 PROCEDURE — 94003 VENT MGMT INPAT SUBQ DAY: CPT

## 2022-01-04 PROCEDURE — NC001 PR NO CHARGE: Performed by: EMERGENCY MEDICINE

## 2022-01-04 PROCEDURE — 82330 ASSAY OF CALCIUM: CPT | Performed by: PHYSICIAN ASSISTANT

## 2022-01-04 PROCEDURE — 85610 PROTHROMBIN TIME: CPT | Performed by: NURSE PRACTITIONER

## 2022-01-04 PROCEDURE — 80076 HEPATIC FUNCTION PANEL: CPT | Performed by: NURSE PRACTITIONER

## 2022-01-04 PROCEDURE — 80048 BASIC METABOLIC PNL TOTAL CA: CPT | Performed by: NURSE PRACTITIONER

## 2022-01-04 PROCEDURE — P9012 CRYOPRECIPITATE EACH UNIT: HCPCS

## 2022-01-04 PROCEDURE — 33949 ECMO/ECLS DAILY MGMT ARTERY: CPT

## 2022-01-04 PROCEDURE — 85014 HEMATOCRIT: CPT

## 2022-01-04 PROCEDURE — 85730 THROMBOPLASTIN TIME PARTIAL: CPT | Performed by: PHYSICIAN ASSISTANT

## 2022-01-04 PROCEDURE — 85049 AUTOMATED PLATELET COUNT: CPT | Performed by: THORACIC SURGERY (CARDIOTHORACIC VASCULAR SURGERY)

## 2022-01-04 PROCEDURE — 33966 ECMO/ECLS RMVL PRPH CANNULA: CPT | Performed by: THORACIC SURGERY (CARDIOTHORACIC VASCULAR SURGERY)

## 2022-01-04 PROCEDURE — 94760 N-INVAS EAR/PLS OXIMETRY 1: CPT

## 2022-01-04 PROCEDURE — 33545 REPAIR OF HEART DAMAGE: CPT | Performed by: THORACIC SURGERY (CARDIOTHORACIC VASCULAR SURGERY)

## 2022-01-04 PROCEDURE — 99291 CRITICAL CARE FIRST HOUR: CPT | Performed by: NURSE PRACTITIONER

## 2022-01-04 PROCEDURE — 82947 ASSAY GLUCOSE BLOOD QUANT: CPT

## 2022-01-04 PROCEDURE — 84132 ASSAY OF SERUM POTASSIUM: CPT

## 2022-01-04 PROCEDURE — 88307 TISSUE EXAM BY PATHOLOGIST: CPT | Performed by: PATHOLOGY

## 2022-01-04 PROCEDURE — 85384 FIBRINOGEN ACTIVITY: CPT | Performed by: PHYSICIAN ASSISTANT

## 2022-01-04 PROCEDURE — 85018 HEMOGLOBIN: CPT | Performed by: PHYSICIAN ASSISTANT

## 2022-01-04 PROCEDURE — 84132 ASSAY OF SERUM POTASSIUM: CPT | Performed by: PHYSICIAN ASSISTANT

## 2022-01-04 PROCEDURE — NC001 PR NO CHARGE: Performed by: THORACIC SURGERY (CARDIOTHORACIC VASCULAR SURGERY)

## 2022-01-04 PROCEDURE — 84145 PROCALCITONIN (PCT): CPT | Performed by: NURSE PRACTITIONER

## 2022-01-04 PROCEDURE — 84295 ASSAY OF SERUM SODIUM: CPT

## 2022-01-04 PROCEDURE — 82805 BLOOD GASES W/O2 SATURATION: CPT | Performed by: PHYSICIAN ASSISTANT

## 2022-01-04 PROCEDURE — 85014 HEMATOCRIT: CPT | Performed by: PHYSICIAN ASSISTANT

## 2022-01-04 PROCEDURE — 99292 CRITICAL CARE ADDL 30 MIN: CPT | Performed by: ANESTHESIOLOGY

## 2022-01-04 DEVICE — BARD® PTFE FELT, 2.5 CM X 15.2 CM
Type: IMPLANTABLE DEVICE | Site: HEART | Status: FUNCTIONAL
Brand: BARD® PTFE FELT

## 2022-01-04 DEVICE — BARD® PTFE FELT, 10.2 CM X 10.2 CM
Type: IMPLANTABLE DEVICE | Site: HEART | Status: FUNCTIONAL
Brand: BARD® PTFE FELT

## 2022-01-04 DEVICE — XENOSURE BIOLOGIC PATCH, 10CM X 16CM, EIFU
Type: IMPLANTABLE DEVICE | Site: HEART | Status: FUNCTIONAL
Brand: XENOSURE BIOLOGIC PATCH

## 2022-01-04 RX ORDER — HYDROMORPHONE HCL/PF 1 MG/ML
0.5 SYRINGE (ML) INJECTION EVERY 2 HOUR PRN
Status: DISCONTINUED | OUTPATIENT
Start: 2022-01-04 | End: 2022-01-07

## 2022-01-04 RX ORDER — OXYCODONE HYDROCHLORIDE 5 MG/1
5 TABLET ORAL EVERY 4 HOURS PRN
Status: DISCONTINUED | OUTPATIENT
Start: 2022-01-04 | End: 2022-01-05

## 2022-01-04 RX ORDER — PANTOPRAZOLE SODIUM 40 MG/1
40 TABLET, DELAYED RELEASE ORAL
Status: DISCONTINUED | OUTPATIENT
Start: 2022-01-05 | End: 2022-01-31 | Stop reason: HOSPADM

## 2022-01-04 RX ORDER — PROPOFOL 10 MG/ML
INJECTION, EMULSION INTRAVENOUS CONTINUOUS PRN
Status: DISCONTINUED | OUTPATIENT
Start: 2022-01-04 | End: 2022-01-04

## 2022-01-04 RX ORDER — MAGNESIUM SULFATE HEPTAHYDRATE 40 MG/ML
2 INJECTION, SOLUTION INTRAVENOUS ONCE
Status: COMPLETED | OUTPATIENT
Start: 2022-01-04 | End: 2022-01-04

## 2022-01-04 RX ORDER — AMIODARONE HYDROCHLORIDE 200 MG/1
200 TABLET ORAL EVERY 8 HOURS SCHEDULED
Status: DISCONTINUED | OUTPATIENT
Start: 2022-01-04 | End: 2022-01-27

## 2022-01-04 RX ORDER — AMINOCAPROIC ACID 250 MG/ML
INJECTION, SOLUTION INTRAVENOUS AS NEEDED
Status: DISCONTINUED | OUTPATIENT
Start: 2022-01-04 | End: 2022-01-04

## 2022-01-04 RX ORDER — MAGNESIUM HYDROXIDE 1200 MG/15ML
LIQUID ORAL AS NEEDED
Status: DISCONTINUED | OUTPATIENT
Start: 2022-01-04 | End: 2022-01-04 | Stop reason: HOSPADM

## 2022-01-04 RX ORDER — ROCURONIUM BROMIDE 10 MG/ML
INJECTION, SOLUTION INTRAVENOUS AS NEEDED
Status: DISCONTINUED | OUTPATIENT
Start: 2022-01-04 | End: 2022-01-04

## 2022-01-04 RX ORDER — POTASSIUM CHLORIDE 14.9 MG/ML
20 INJECTION INTRAVENOUS ONCE AS NEEDED
Status: COMPLETED | OUTPATIENT
Start: 2022-01-04 | End: 2022-01-06

## 2022-01-04 RX ORDER — CHLORHEXIDINE GLUCONATE 0.12 MG/ML
15 RINSE ORAL EVERY 12 HOURS SCHEDULED
Status: DISCONTINUED | OUTPATIENT
Start: 2022-01-04 | End: 2022-01-07

## 2022-01-04 RX ORDER — SODIUM CHLORIDE 450 MG/100ML
20 INJECTION, SOLUTION INTRAVENOUS CONTINUOUS
Status: DISCONTINUED | OUTPATIENT
Start: 2022-01-04 | End: 2022-01-07

## 2022-01-04 RX ORDER — MILRINONE LACTATE 0.2 MG/ML
INJECTION, SOLUTION INTRAVENOUS CONTINUOUS PRN
Status: DISCONTINUED | OUTPATIENT
Start: 2022-01-04 | End: 2022-01-04

## 2022-01-04 RX ORDER — POTASSIUM CHLORIDE 14.9 MG/ML
20 INJECTION INTRAVENOUS
Status: DISCONTINUED | OUTPATIENT
Start: 2022-01-04 | End: 2022-01-07

## 2022-01-04 RX ORDER — FUROSEMIDE 10 MG/ML
40 INJECTION INTRAMUSCULAR; INTRAVENOUS EVERY 6 HOURS PRN
Status: DISCONTINUED | OUTPATIENT
Start: 2022-01-04 | End: 2022-01-05

## 2022-01-04 RX ORDER — OXYCODONE HYDROCHLORIDE 5 MG/1
2.5 TABLET ORAL EVERY 4 HOURS PRN
Status: DISCONTINUED | OUTPATIENT
Start: 2022-01-04 | End: 2022-01-07 | Stop reason: SDUPTHER

## 2022-01-04 RX ORDER — FONDAPARINUX SODIUM 2.5 MG/.5ML
2.5 INJECTION SUBCUTANEOUS DAILY
Status: DISCONTINUED | OUTPATIENT
Start: 2022-01-05 | End: 2022-01-05

## 2022-01-04 RX ORDER — SODIUM CHLORIDE 9 MG/ML
INJECTION, SOLUTION INTRAVENOUS CONTINUOUS PRN
Status: DISCONTINUED | OUTPATIENT
Start: 2022-01-04 | End: 2022-01-04

## 2022-01-04 RX ORDER — BISACODYL 10 MG
10 SUPPOSITORY, RECTAL RECTAL DAILY PRN
Status: DISCONTINUED | OUTPATIENT
Start: 2022-01-04 | End: 2022-01-07 | Stop reason: SDUPTHER

## 2022-01-04 RX ORDER — VANCOMYCIN HYDROCHLORIDE 1 G/20ML
1 INJECTION, POWDER, LYOPHILIZED, FOR SOLUTION INTRAVENOUS ONCE
Status: DISCONTINUED | OUTPATIENT
Start: 2022-01-04 | End: 2022-01-04 | Stop reason: HOSPADM

## 2022-01-04 RX ORDER — POLYETHYLENE GLYCOL 3350 17 G/17G
17 POWDER, FOR SOLUTION ORAL DAILY
Status: DISCONTINUED | OUTPATIENT
Start: 2022-01-05 | End: 2022-01-11

## 2022-01-04 RX ORDER — HEPARIN SODIUM 1000 [USP'U]/ML
INJECTION, SOLUTION INTRAVENOUS; SUBCUTANEOUS AS NEEDED
Status: DISCONTINUED | OUTPATIENT
Start: 2022-01-04 | End: 2022-01-04

## 2022-01-04 RX ORDER — CALCIUM CHLORIDE 100 MG/ML
INJECTION INTRAVENOUS; INTRAVENTRICULAR AS NEEDED
Status: DISCONTINUED | OUTPATIENT
Start: 2022-01-04 | End: 2022-01-04

## 2022-01-04 RX ORDER — POTASSIUM CHLORIDE 20MEQ/15ML
40 LIQUID (ML) ORAL ONCE
Status: COMPLETED | OUTPATIENT
Start: 2022-01-04 | End: 2022-01-04

## 2022-01-04 RX ORDER — ATORVASTATIN CALCIUM 80 MG/1
80 TABLET, FILM COATED ORAL
Status: DISCONTINUED | OUTPATIENT
Start: 2022-01-04 | End: 2022-01-31 | Stop reason: HOSPADM

## 2022-01-04 RX ORDER — ACETAMINOPHEN 650 MG/1
650 SUPPOSITORY RECTAL EVERY 4 HOURS PRN
Status: DISCONTINUED | OUTPATIENT
Start: 2022-01-04 | End: 2022-01-06

## 2022-01-04 RX ORDER — ACETAMINOPHEN 325 MG/1
975 TABLET ORAL EVERY 8 HOURS
Status: DISCONTINUED | OUTPATIENT
Start: 2022-01-04 | End: 2022-01-05

## 2022-01-04 RX ORDER — MIDAZOLAM HYDROCHLORIDE 2 MG/2ML
INJECTION, SOLUTION INTRAMUSCULAR; INTRAVENOUS AS NEEDED
Status: DISCONTINUED | OUTPATIENT
Start: 2022-01-04 | End: 2022-01-04

## 2022-01-04 RX ORDER — SODIUM CHLORIDE, SODIUM LACTATE, POTASSIUM CHLORIDE, CALCIUM CHLORIDE 600; 310; 30; 20 MG/100ML; MG/100ML; MG/100ML; MG/100ML
INJECTION, SOLUTION INTRAVENOUS CONTINUOUS PRN
Status: DISCONTINUED | OUTPATIENT
Start: 2022-01-04 | End: 2022-01-04

## 2022-01-04 RX ORDER — PROPOFOL 10 MG/ML
5-50 INJECTION, EMULSION INTRAVENOUS
Status: DISCONTINUED | OUTPATIENT
Start: 2022-01-04 | End: 2022-01-05

## 2022-01-04 RX ORDER — FENTANYL CITRATE 50 UG/ML
50 INJECTION, SOLUTION INTRAMUSCULAR; INTRAVENOUS
Status: DISCONTINUED | OUTPATIENT
Start: 2022-01-04 | End: 2022-01-06

## 2022-01-04 RX ORDER — FENTANYL CITRATE 50 UG/ML
50 INJECTION, SOLUTION INTRAMUSCULAR; INTRAVENOUS ONCE
Status: DISCONTINUED | OUTPATIENT
Start: 2022-01-04 | End: 2022-01-05

## 2022-01-04 RX ORDER — MILRINONE LACTATE 0.2 MG/ML
0.13 INJECTION, SOLUTION INTRAVENOUS CONTINUOUS
Status: DISCONTINUED | OUTPATIENT
Start: 2022-01-04 | End: 2022-01-10

## 2022-01-04 RX ORDER — CEFAZOLIN SODIUM 2 G/50ML
2000 SOLUTION INTRAVENOUS EVERY 8 HOURS
Status: COMPLETED | OUTPATIENT
Start: 2022-01-04 | End: 2022-01-05

## 2022-01-04 RX ORDER — CALCIUM GLUCONATE 20 MG/ML
2 INJECTION, SOLUTION INTRAVENOUS ONCE
Status: COMPLETED | OUTPATIENT
Start: 2022-01-04 | End: 2022-01-05

## 2022-01-04 RX ORDER — CEFAZOLIN SODIUM 1 G/3ML
INJECTION, POWDER, FOR SOLUTION INTRAMUSCULAR; INTRAVENOUS AS NEEDED
Status: DISCONTINUED | OUTPATIENT
Start: 2022-01-04 | End: 2022-01-04

## 2022-01-04 RX ORDER — PROPOFOL 10 MG/ML
INJECTION, EMULSION INTRAVENOUS AS NEEDED
Status: DISCONTINUED | OUTPATIENT
Start: 2022-01-04 | End: 2022-01-04

## 2022-01-04 RX ORDER — ASPIRIN 325 MG
325 TABLET ORAL DAILY
Status: DISCONTINUED | OUTPATIENT
Start: 2022-01-04 | End: 2022-01-07 | Stop reason: SDUPTHER

## 2022-01-04 RX ORDER — ONDANSETRON 2 MG/ML
4 INJECTION INTRAMUSCULAR; INTRAVENOUS EVERY 6 HOURS PRN
Status: DISCONTINUED | OUTPATIENT
Start: 2022-01-04 | End: 2022-01-07 | Stop reason: SDUPTHER

## 2022-01-04 RX ORDER — VANCOMYCIN HYDROCHLORIDE 1 G/20ML
INJECTION, POWDER, LYOPHILIZED, FOR SOLUTION INTRAVENOUS AS NEEDED
Status: DISCONTINUED | OUTPATIENT
Start: 2022-01-04 | End: 2022-01-04 | Stop reason: HOSPADM

## 2022-01-04 RX ORDER — CALCIUM CHLORIDE 100 MG/ML
1 INJECTION INTRAVENOUS; INTRAVENTRICULAR ONCE
Status: DISCONTINUED | OUTPATIENT
Start: 2022-01-04 | End: 2022-01-05

## 2022-01-04 RX ORDER — FENTANYL CITRATE 50 UG/ML
INJECTION, SOLUTION INTRAMUSCULAR; INTRAVENOUS AS NEEDED
Status: DISCONTINUED | OUTPATIENT
Start: 2022-01-04 | End: 2022-01-04

## 2022-01-04 RX ADMIN — MILRINONE LACTATE IN DEXTROSE 0.5 MCG/KG/MIN: 200 INJECTION, SOLUTION INTRAVENOUS at 08:11

## 2022-01-04 RX ADMIN — FENTANYL CITRATE 150 MCG: 50 INJECTION INTRAMUSCULAR; INTRAVENOUS at 11:29

## 2022-01-04 RX ADMIN — ACETAMINOPHEN 975 MG: 325 TABLET, FILM COATED ORAL at 21:57

## 2022-01-04 RX ADMIN — VASOPRESSIN 0.06 UNITS/MIN: 20 INJECTION INTRAVENOUS at 13:10

## 2022-01-04 RX ADMIN — Medication 4 MCG/MIN: at 11:21

## 2022-01-04 RX ADMIN — CALCIUM CHLORIDE 0.5 G: 100 INJECTION INTRAVENOUS; INTRAVENTRICULAR at 12:30

## 2022-01-04 RX ADMIN — MILRINONE LACTATE IN DEXTROSE 0.5 MCG/KG/MIN: 200 INJECTION, SOLUTION INTRAVENOUS at 13:10

## 2022-01-04 RX ADMIN — VASOPRESSIN 2 ML: 20 INJECTION INTRAVENOUS at 08:12

## 2022-01-04 RX ADMIN — SODIUM CHLORIDE, SODIUM LACTATE, POTASSIUM CHLORIDE, AND CALCIUM CHLORIDE 500 ML: .6; .31; .03; .02 INJECTION, SOLUTION INTRAVENOUS at 17:17

## 2022-01-04 RX ADMIN — CEFAZOLIN SODIUM 2000 MG: 2 SOLUTION INTRAVENOUS at 20:15

## 2022-01-04 RX ADMIN — VASOPRESSIN 2 ML: 20 INJECTION INTRAVENOUS at 12:02

## 2022-01-04 RX ADMIN — FENTANYL CITRATE 250 MCG: 50 INJECTION INTRAMUSCULAR; INTRAVENOUS at 08:14

## 2022-01-04 RX ADMIN — MIDAZOLAM 4 MG: 1 INJECTION INTRAMUSCULAR; INTRAVENOUS at 08:10

## 2022-01-04 RX ADMIN — PROPOFOL 50 MCG/KG/MIN: 10 INJECTION, EMULSION INTRAVENOUS at 08:33

## 2022-01-04 RX ADMIN — FENTANYL CITRATE 250 MCG: 50 INJECTION INTRAMUSCULAR; INTRAVENOUS at 08:10

## 2022-01-04 RX ADMIN — FENTANYL CITRATE 50 MCG: 50 INJECTION INTRAMUSCULAR; INTRAVENOUS at 07:38

## 2022-01-04 RX ADMIN — VASOPRESSIN 0.04 UNITS/MIN: 20 INJECTION INTRAVENOUS at 16:38

## 2022-01-04 RX ADMIN — CEFAZOLIN 2000 MG: 1 INJECTION, POWDER, FOR SOLUTION INTRAMUSCULAR; INTRAVENOUS at 08:22

## 2022-01-04 RX ADMIN — POTASSIUM CHLORIDE 40 MEQ: 20 SOLUTION ORAL at 02:18

## 2022-01-04 RX ADMIN — PROPOFOL 10 MCG/KG/MIN: 10 INJECTION, EMULSION INTRAVENOUS at 17:59

## 2022-01-04 RX ADMIN — PROPOFOL 50 MG: 10 INJECTION, EMULSION INTRAVENOUS at 08:15

## 2022-01-04 RX ADMIN — MIDAZOLAM 2 MG: 1 INJECTION INTRAMUSCULAR; INTRAVENOUS at 08:14

## 2022-01-04 RX ADMIN — AMINOCAPROIC ACID 5 G: 250 INJECTION, SOLUTION INTRAVENOUS at 08:22

## 2022-01-04 RX ADMIN — CHLORHEXIDINE GLUCONATE 15 ML: 1.2 SOLUTION ORAL at 20:16

## 2022-01-04 RX ADMIN — HEPARIN SODIUM 9 UNITS/KG/HR: 10000 INJECTION, SOLUTION INTRAVENOUS at 02:18

## 2022-01-04 RX ADMIN — FENTANYL CITRATE 50 MCG: 50 INJECTION INTRAMUSCULAR; INTRAVENOUS at 23:14

## 2022-01-04 RX ADMIN — NOREPINEPHRINE BITARTRATE 3 MCG/MIN: 1 INJECTION, SOLUTION, CONCENTRATE INTRAVENOUS at 02:36

## 2022-01-04 RX ADMIN — NOREPINEPHRINE BITARTRATE 4 MCG/MIN: 1 INJECTION, SOLUTION, CONCENTRATE INTRAVENOUS at 08:11

## 2022-01-04 RX ADMIN — SODIUM CHLORIDE 5 UNITS/HR: 9 INJECTION, SOLUTION INTRAVENOUS at 17:58

## 2022-01-04 RX ADMIN — ROCURONIUM BROMIDE 100 MG: 50 INJECTION, SOLUTION INTRAVENOUS at 08:10

## 2022-01-04 RX ADMIN — SODIUM CHLORIDE 20 ML/HR: 0.45 INJECTION, SOLUTION INTRAVENOUS at 14:10

## 2022-01-04 RX ADMIN — SODIUM CHLORIDE: 0.9 INJECTION, SOLUTION INTRAVENOUS at 08:00

## 2022-01-04 RX ADMIN — AMINOCAPROIC ACID 2 G/HR: 250 INJECTION, SOLUTION INTRAVENOUS at 08:30

## 2022-01-04 RX ADMIN — SODIUM CHLORIDE: 9 INJECTION, SOLUTION INTRAVENOUS at 08:00

## 2022-01-04 RX ADMIN — AMIODARONE HYDROCHLORIDE 200 MG: 200 TABLET ORAL at 21:57

## 2022-01-04 RX ADMIN — VASOPRESSIN 0.04 UNITS/MIN: 20 INJECTION INTRAVENOUS at 08:11

## 2022-01-04 RX ADMIN — NOREPINEPHRINE BITARTRATE 12 MCG/MIN: 1 INJECTION, SOLUTION, CONCENTRATE INTRAVENOUS at 21:07

## 2022-01-04 RX ADMIN — SODIUM CHLORIDE, SODIUM LACTATE, POTASSIUM CHLORIDE, AND CALCIUM CHLORIDE: .6; .31; .03; .02 INJECTION, SOLUTION INTRAVENOUS at 08:00

## 2022-01-04 RX ADMIN — FENTANYL CITRATE 50 MCG: 50 INJECTION INTRAMUSCULAR; INTRAVENOUS at 05:39

## 2022-01-04 RX ADMIN — MILRINONE LACTATE IN DEXTROSE 0.5 MCG/KG/MIN: 200 INJECTION, SOLUTION INTRAVENOUS at 20:39

## 2022-01-04 RX ADMIN — CALCIUM CHLORIDE 0.5 G: 100 INJECTION INTRAVENOUS; INTRAVENTRICULAR at 11:43

## 2022-01-04 RX ADMIN — MILRINONE LACTATE IN DEXTROSE 0.5 MCG/KG/MIN: 200 INJECTION, SOLUTION INTRAVENOUS at 13:50

## 2022-01-04 RX ADMIN — CALCIUM CHLORIDE 0.5 G: 100 INJECTION INTRAVENOUS; INTRAVENTRICULAR at 12:39

## 2022-01-04 RX ADMIN — EPINEPHRINE 6 MCG/MIN: 1 INJECTION, SOLUTION, CONCENTRATE INTRAVENOUS at 13:10

## 2022-01-04 RX ADMIN — FENTANYL CITRATE 250 MCG: 50 INJECTION INTRAMUSCULAR; INTRAVENOUS at 08:52

## 2022-01-04 RX ADMIN — CEFAZOLIN 2000 MG: 1 INJECTION, POWDER, FOR SOLUTION INTRAMUSCULAR; INTRAVENOUS at 11:40

## 2022-01-04 RX ADMIN — MAGNESIUM SULFATE HEPTAHYDRATE 2 G: 40 INJECTION, SOLUTION INTRAVENOUS at 13:41

## 2022-01-04 RX ADMIN — EPINEPHRINE 6 MCG/MIN: 1 INJECTION, SOLUTION, CONCENTRATE INTRAVENOUS at 19:44

## 2022-01-04 RX ADMIN — FENTANYL CITRATE 100 MCG: 50 INJECTION INTRAMUSCULAR; INTRAVENOUS at 12:34

## 2022-01-04 RX ADMIN — CALCIUM CHLORIDE 0.5 G: 100 INJECTION INTRAVENOUS; INTRAVENTRICULAR at 11:39

## 2022-01-04 RX ADMIN — OXYCODONE HYDROCHLORIDE 5 MG: 5 TABLET ORAL at 21:57

## 2022-01-04 RX ADMIN — SODIUM CHLORIDE, SODIUM LACTATE, POTASSIUM CHLORIDE, AND CALCIUM CHLORIDE 500 ML: .6; .31; .03; .02 INJECTION, SOLUTION INTRAVENOUS at 18:43

## 2022-01-04 RX ADMIN — ROCURONIUM BROMIDE 50 MG: 50 INJECTION, SOLUTION INTRAVENOUS at 09:30

## 2022-01-04 RX ADMIN — MUPIROCIN 1 APPLICATION: 20 OINTMENT TOPICAL at 20:16

## 2022-01-04 RX ADMIN — PROPOFOL 10 MCG/KG/MIN: 10 INJECTION, EMULSION INTRAVENOUS at 20:28

## 2022-01-04 RX ADMIN — HEPARIN SODIUM 36000 UNITS: 1000 INJECTION INTRAVENOUS; SUBCUTANEOUS at 09:07

## 2022-01-04 RX ADMIN — SODIUM CHLORIDE 0.5 MCG/KG/HR: 9 INJECTION, SOLUTION INTRAVENOUS at 02:27

## 2022-01-04 NOTE — ANESTHESIA POSTPROCEDURE EVALUATION
Post-Op Assessment Note    CV Status:  Stable          Post Op Vitals Reviewed: Yes      Staff: Anesthesiologist   Comments: see intra-op quick note for details of ICU hand off        No complications documented      BP     Temp      Pulse     Resp      SpO2

## 2022-01-04 NOTE — PROGRESS NOTES
Progress Note - Critical Care    Arnaldo Kemp 61 y o  male MRN: [id number]  Unit/Bed#: [facility] 418-01 Encounter: [id number]    Consults    Physician Requesting Consult: Radha Fagan MD    Reason for Consult / Principal Problem: Cardiogenic shock [facility])    HPI: Arnaldo Kemp is a 61 y o  male with past medical history of agoraphobia who presented to the ER on 12/30 with several days of fatigue, nausea, and constipation  He was hypoxic on presentation requiring NRB  EKG concerning for possible acute STEMI  Laboratory data concerning for multiorgan system dysfunction  Cardiology consulted  STAT echo demonstrated anterior septal VSD consistent with recent LAD infarct  Cardiac Surgery was consulted for ECMO as a bridge to VSD repair  On 12/31, he was placed on VA fem-fem ECMO  Initially required multiple pressors and inotropes  After ECMO cannulation, end organ perfusion improved  He was weaned off of Epi and Milrinone remained on at 0 13  While on ECMO, he was sedated but was arousable and follewed commands with all extremities  Today he went to the OR for definitive VSD closure and ECMO decannulation  He received 6u PRBC, 4packs of platelets, 2u FFP and 2u of cryo intraoperatively  CORIE with estimated EF 40%, no residual VSD seen  He arrives to the ICU supported on Epi 6, milrinone 0 5, levophed 6, vasopressin at 0 06  Cardiopulmonary bypass time: 135 minutes  Crossclamp time: 102 minutes        History obtained from chart review due to patient being intubated and sedated  ---------------------------------------------------------------------------------------------------------------------------------------------------------------------  Impressions:  1  Recent LAD STEMI  2  Post MI VSD s/p VSD repair  3  Cardiogenic shock s/pVA ECMO, now decannulated  4  Acute post operative blood loss anemia  5  Thrombocytopenia  6  JUDE, improving  7  Shock liver, improving  8   Toxic metabolic encephalopathy  9  Coagulopathy   10  Right upper extremity partial arterial occlusion secondary to thrombus at the arterial line site, since removed  11  Tylenol OD s/p NAC on admission  12  Agoraphobia with panic attacks     Plan:    Neuro: D/C continuous sedation  PRN oxycodone, fentanyl, dilaudid for pain  Trend neuro exam   Delirium precautions  CV: MAP goal >65  SBP goal <130  CI>2 2  Post-op medications: Epinephrine, 6 mcg/min, Primacor, 0 5 mcg/kg/min, Levophed, 6 mcg/min, Vasopressin, 0 04 Units/min  Wean levo/vaso as able  Volume resuscitation as needed  Monitor rhythm on telemetry  Epicardial pacing wires Ax1, Vx1  Intra-op CORIE LVEF 40%, no residual VSD  Lung: Check STAT post-op ABG and CXR  Wean vent with spontaneous breathing trial with goal to extubate, however low threshold to keep intubated overnight due to hemodynamic instability  GI: GI prophylaxis with PPI  Bowel regimen  Zofran PRN for nausea  FEN: NPO  Replenish K >4 0, mag >2 0 and calcium >7 0  : Check STAT post-op BMP  Chang in place  Monitor UOP with goal >0 5cc/kg/hour  Lasix versus volume resuscitate as needed depending on hemodynamics and volume status  ID: Prophylactic post-op abx  Maintain normothermia  Trend temps  Heme: Check STAT post-op H/H and platelets  Monitor incision site, invasive lines, and chest tube outputs for bleeding  Send coag panel if needed  Endo: Insulin gtt for blood sugar control  Results from last 6 Months   Lab Units 12/31/21  0400 12/30/21  1007 09/13/21  0657   HEMOGLOBIN A1C % 5 9* 5 7* 5 3     Disposition: ICU Care   ---------------------------------------------------------------------------------------------------------------------------------------------------------------------  Historical Information   History reviewed  No pertinent past medical history    Past Surgical History:   Procedure Laterality Date    EXTRACORPOREAL CIRCULATION N/A 12/31/2021    Procedure: SUPPORT EXTRACORPOREAL MEMBRANE OXYGENATION (ECMO); Surgeon: Tahir Ocasio MD;  Location: [facility];  Service: Cardiac Surgery     Social History   Social History     Substance and Sexual Activity   Alcohol Use No     Social History     Substance and Sexual Activity   Drug Use No     Social History     Tobacco Use   Smoking Status Current Every Day Smoker    Types: Pipe   Smokeless Tobacco Never Used   Tobacco Comment    Secondhand smoke exposure     Family History   Problem Relation Age of Onset    Other Mother         Claustrophobia    Drug abuse Family     Alcohol abuse Family      I have reviewed this patient's family history and commented on sigificant items within the HPI    ROS: ROS unable to be obtained due to patient being intubated and sedated  Allergies: Allergies   Allergen Reactions    Mushroom Extract Complex - Food Allergy Facial Swelling    Peanut Oil - Food Allergy     Strawberry C [Ascorbate - Food Allergy]        Home Medications:   Prior to Admission medications    Medication Sig Start Date End Date Taking?  Authorizing Provider   ALPRAZolam Kylah Tubac) 0 5 mg tablet Take 1-2 tablets 30-60 minutes prior to leaving home prn 3/18/21   Teetee Bonilla MD   EPINEPHrine (EPIPEN) 0 3 mg/0 3 mL SOAJ Inject 0 3 mL (0 3 mg total) into a muscle once for 1 dose 9/21/18 3/18/21  Rob Walton DO     Inpatient Medications:  Scheduled Meds:  Current Facility-Administered Medications   Medication Dose Route Frequency Provider Last Rate    acetaminophen  650 mg Rectal Q4H PRN Gevena Schirmer, PA-C      acetaminophen  975 mg Oral [address] [place], NOA      [facility]      aspirin  325 mg Oral Daily Gevena Schirmer, [place]      atorvastatin  80 mg Oral After [employer], NOA      bisacodyl  10 mg Rectal Daily PRN Gevena Schirmer, PA-C      calcium chloride  1 g Intravenous Once Gevena Schirmer, PA-C      cefazolin  2,000 mg Intravenous [facility] [facility] NOA [place]      chlorhexidine  15 mL Swish & Spit Q12H [address] [place], [place]      epinephrine  1-10 mcg/min Intravenous Titrated Shayla Spears PA-C      fentanyl citrate (PF)  50 mcg Intravenous Once Ramirez Deng PA-C      fentanyl citrate (PF)  50 mcg Intravenous Q1H PRN Ramirez Deng PA-C      [START ON 1/5/2022] fondaparinux  2 5 mg Subcutaneous Daily Bren Magallanes      furosemide  40 mg Intravenous Q6H PRN Ramirez Deng PA-C      HYDROmorphone  0 5 mg Intravenous Q2H PRN Ramirez Deng PA-C      insulin regular (HumuLIN R,NovoLIN R) infusion  0 3-21 Units/hr Intravenous Titrated Ramirez Deng PA-C      lactated ringers  500 mL Intravenous Q30 Min PRN Ramirez Deng PA-C      lidocaine (cardiac)  100 mg Intravenous Q30 Min PRN Ramirez Deng PA-C      magnesium sulfate  2 g Intravenous Once Ramirez Deng PA-C      milrinone [facility]) infusion  0 5 mcg/kg/min Intravenous Continuous Shayla Spears PA-C      mupirocin  1 application Nasal M72E [address] Munira Murdock PA-C      niCARdipine  2 5-15 mg/hr Intravenous Titrated Ramirez Sheen [place]      norepinephrine  1-30 mcg/min Intravenous Titrated Shayla Spears PA-C      ondansetron  4 mg Intravenous Q6H PRN Ramirez Deng PA-C      oxyCODONE  2 5 mg Oral Q4H PRN Ramirez Deng PA-C      oxyCODONE  5 mg Oral Q4H PRN Ramirez SheBren dodd      [START ON 1/5/2022] pantoprazole  40 mg Oral Early Morning Ramirez Deng PA-C      [START ON 1/5/2022] polyethylene glycol  17 g Oral Daily Ramirez Deng PA-C      potassium chloride  20 mEq Intravenous Once PRN Ramirez Deng PA-C      potassium chloride  20 mEq Intravenous Q1H PRN Ramirez Deng PA-C      potassium chloride  20 mEq Intravenous Q30 Min PRN Ramirez Deng PA-C      sodium chloride  20 mL/hr Intravenous Continuous Munira Rodriguez PA-C       Continuous Infusions:epinephrine, 1-10 mcg/min  insulin regular (HumuLIN R,NovoLIN R) infusion, 0 3-21 Units/hr  milrinone (PRIMACOR) infusion, 0 5 mcg/kg/min  niCARdipine, 2 5-15 mg/hr  norepinephrine, 1-30 mcg/min  sodium chloride, 20 mL/hr      PRN Meds:  acetaminophen, 650 mg, Q4H PRN  bisacodyl, 10 mg, Daily PRN  fentanyl citrate (PF), 50 mcg, Q1H PRN  furosemide, 40 mg, Q6H PRN  HYDROmorphone, 0 5 mg, Q2H PRN  lactated ringers, 500 mL, Q30 Min PRN  lidocaine (cardiac), 100 mg, Q30 Min PRN  ondansetron, 4 mg, Q6H PRN  oxyCODONE, 2 5 mg, Q4H PRN  oxyCODONE, 5 mg, Q4H PRN  potassium chloride, 20 mEq, Once PRN  potassium chloride, 20 mEq, Q1H PRN  potassium chloride, 20 mEq, Q30 Min PRN      ---------------------------------------------------------------------------------------------------------------------------------------------------------------------  Vitals:   Vitals:    22 0630 22 0645 22 0700 22 0741   BP:       Pulse: 72 71 68 67   Resp: 16  14   Temp: 98 8 °F (37 1 °C) 98 8 °F (37 1 °C) 98 8 °F (37 1 °C)    TempSrc:   Bladder    SpO2: 98% 98% 99% 98%   Weight:       Height:         Arterial Line:  Arterial Line BP: 68/67  Arterial Line MAP (mmHg): 68 mmHg    Temperature: Temp (24hrs), Av 8 °F (37 1 °C), Min:98 6 °F (37 °C), Max:98 8 °F (37 1 °C)  Current: Temperature: 98 8 °F (37 1 °C)    Weights: IBW (Ideal Body Weight): 62 65 kg  Body mass index is 32 08 kg/m²      Hemodynamic Monitoring:  PAP: PAP: 11/6, CVP: CVP (mean): 5 mmHg, CO:  , CI:  , SVR: SVR (dyne*sec)/cm5: 996 (dyne*sec)/cm5    Ventilator Settings:  Respiratory  Report   Lab Data (Last 4 hours)    None         O2/Vent Data (Last 4 hours)    None              Labs:   Results from last 7 days   Lab Units 22  1247 22  1215 22  1153 22  1120 22  1116 22  0830 22  0450 22  1419 22  0550 22  0925 22  0530 22  1325 22  0443 21  0400 21  1007   WBC Thousand/uL  --   --   --   --   --   --  *  -- 16 28*  --  28 70*   < > 28 23*   < > 17 22*   HEMOGLOBIN g/dL  --   --   --   --   --   --  7 6*   < > 7 9*   < > 7 7*   < > 8 8*   < > 12 8   I STAT HEMOGLOBIN g/dl 9 2* 9 2*  9 5* 8 5*   < >  --    < >  --   --   --    < >  --   --   --    < >  --    HEMATOCRIT %  --   --   --   --   --   --  24 2*   < > 24 3*   < > 23 7*   < > 26 2*   < > 40 1   HEMATOCRIT, ISTAT % 27* 27*  28* 25*   < >  --    < >  --   --   --    < >  --   --   --    < >  --    PLATELETS Thousands/uL  --   --   --   --  17*  --  53*  --   --   --  102*   < > 164   < > 217   NEUTROS PCT %  --   --   --   --   --   --   --   --  73  --   --   --   --   --   --    MONOS PCT %  --   --   --   --   --   --   --   --  7  --   --   --   --   --   --    MONO PCT %  --   --   --   --   --   --   --   --   --   --   --   --  8  --  4    < > = values in this interval not displayed       Results from last 7 days   Lab Units 01/04/22  1247 01/04/22  1215 01/04/22  1153 01/04/22  0830 01/04/22  0450 01/03/22  2358 01/03/22  2358 01/03/22  1814 01/03/22  1814 01/03/22  1138 01/03/22  0550 01/02/22  0925 01/02/22  0529   SODIUM mmol/L  --   --   --   --  156*  --  153*  --  155*   < > 154*   < > 150*   POTASSIUM mmol/L  --   --   --   --  4 6  --  3 7  --  3 9   < > 4 1   < > 3 8   CHLORIDE mmol/L  --   --   --   --  121*  --  118*  --  121*   < > 120*   < > 113*   CO2 mmol/L  --   --   --   --  35*   < > 33*   < > 33*   < > 32   < > 29   CO2, I-STAT mmol/L 30 29  30 31   < >  --   --   --   --   --   --   --    < >  --    BUN mg/dL  --   --   --   --  59*  --  64*  --  73*   < > 81*   < > 124*   CREATININE mg/dL  --   --   --   --  1 24  --  1 33*  --  1 43*   < > 1 56*   < > 2 32*   CALCIUM mg/dL  --   --   --   --  7 7*  --  8 3  --  8 1*   < > 8 2*   < > 9 3   ALK PHOS U/L  --   --   --   --  131*  --   --   --   --   --  136*  --  153*   ALT U/L  --   --   --   --  651*  --   --   --   --   --  956*  --  1,720*   AST U/L  --   --   --   --  103*  -- --   --   --   --  147*  --  268*   GLUCOSE, ISTAT mg/dl 207* 200*  208* 170*   < >  --   --   --   --   --   --   --    < >  --     < > = values in this interval not displayed  Post-op ABG: pending  Post-op CXR: Lungs are clear, lines and tubes in adequate position  No pneumothorax  I have personally reviewed pertinent films in PACS  Post-op EKG: Sinus tachycardia  Non-specific intraventricular block  Acute ST elevations in martínez-lateral leads as to be expected  This was personally reviewed by myself  Physical Exam  Invasive lines and devices: Invasive Devices  Report    Central Venous Catheter Line            CVC Central Lines 12/30/21 Triple Left Internal jugular 4 days    Introducer 12/31/21 4 days    LDA ECMO 12/31/21 Femoral artery  4 days          Arterial Line            Arterial Line 01/01/22 Other (Comment) 3 days          Line            Arterial Sheath 6 Fr  Right  4 days    Pacer Wires <1 day    Pacer Wires <1 day          Drain            Urethral Catheter Temperature probe 5 days    NG/OG/Enteral Tube Orogastric Center mouth 4 days    Chest Tube 1 Anterior Mediastinal 32 Fr  <1 day    Chest Tube 2 Posterior Mediastinal 32 Fr  <1 day          Airway            ETT  Hi-Lo; Cuffed;Oral 8 5 mm 4 days              ---------------------------------------------------------------------------------------------------------------------------------------------------------------------  Care Time Delivered:   Upon my evaluation, this patient had a high probability of imminent or life-threatening deterioration due to post op open heart surgery , which required my direct attention, intervention, and personal management  I have personally provided 40 minutes (1400 to 1500) of critical care time, exclusive of procedures, teaching, family meetings, and any prior time recorded by providers other than myself       SIGNATURE: BROOKE Negron  DATE: January 4, 2022  TIME: 1:38 PM

## 2022-01-04 NOTE — RESPIRATORY THERAPY NOTE
RT Ventilator Management Note  Vivia Fothergill 61 y o  male MRN: [id number]  Unit/Bed#: [facility] 167-61 Encounter: [id number]       01/03/22 2038   Respiratory Assessment   Assessment Type Assess only   General Appearance Sleeping   Respiratory Pattern Assisted   Chest Assessment Chest expansion symmetrical   Bilateral Breath Sounds Clear;Diminished   R Breath Sounds Clear   L Breath Sounds Clear   Cough None   Suction ET Tube   Resp Comments 1st routine vent check post coughing and suctioning performed by RN  Pt not currently double stacking his ventilator breaths  Appears comfortable  On same mode and vent settngs as 24 hrs ago  O2 Device Vent   Vent Information   Vent ID [id number]   Vent type     Vent Mode AC/VC   $ Pulse Oximetry Spot Check Charge Completed   AC/VC Settings   Resp Rate (BPM) 14 BPM   Vt (mL) 450 mL   FIO2 (%) 40 %   PEEP (cmH2O) 6 cmH2O   Flow Pattern (LPM) 70 L/min   Trigger Sensitivity Flow (lpm) 3 %   Humidification Heater   Heater Temperature (Set) 99 7 °F (37 6 °C)   AC/VC Actuals   Resp Rate (BPM) 14 BPM   VT (mL) 432   MV 6 06   MAP (cmH2O) 9 cmH2O   Peak Pressure (cmH2O) 20 cmH2O   I/E Ratio (Obs) 1:5 1   Heater Temperature (Obs) 99 7 °F (37 6 °C)   Static Compliance (mL/cmH20) 60 mL/cmH2O   Plateau Pressure (cm H2O) 14 cm H2O   AC/VC Alarms   High Peak Pressure (cmH2O) 40   High Resp Rate (BPM) 40 BPM   High MV (L/min) 20 L/min   Low MV (L/min) 4 8 L/min   Vt High (mL) 1020 mL   Vt Low (mL) 190 mL   AC/VC Apnea Settings   Resp Rate (BPM) 14 BPM   VT (mL) 450 mL   FIO2 (%) 100 %   Apnea Time (s) 20 S   Apnea Flow (L/min) 70 L/min   Maintenance   Alarm (pink) cable attached Yes   Resuscitation bag with peep valve at bedside Yes   Water bag changed No   Circuit changed No   Daily Screen   Patient safety screen outcome: Failed   Not Ready for Weaning due to:  Underline problem not resolved   IHI Ventilator Associated Pneumonia Bundle   Head of Bed Elevated HOB 30   ETT Hi-Lo; Cuffed;Oral 8 5 mm   Placement Date/Time: 12/31/21 1009   Mask Ventilation: Mask ventilation not attempted (0)  Preoxygenated: Yes  Technique: Direct laryngoscopy  Type: Hi-Lo; Cuffed;Oral  Tube Size: 8 5 mm  Laryngoscope: Mac  Blade Size: 3  Location: Oral  Grade View: Fu    Secured at (cm) 26   Measured from Lips   Secured Location Left   Secured by Commercial tube clark   Site Condition Dry   Cuff Pressure (cm H2O) 26 cm H2O   HI-LO Suction  Intermittent suction   HI-LO Secretions Scant   HI-LO Intervention Patent       Daily Screen       1/3/2022  0837 1/3/2022  2038          Patient safety screen outcome[de-identified] Failed Failed      Not Ready for Weaning due to[de-identified] Underline problem not resolved Underline problem not resolved              Physical Exam:   Assessment Type: Assess only  General Appearance: Sleeping  Respiratory Pattern: Assisted  Chest Assessment: Chest expansion symmetrical  Bilateral Breath Sounds: Clear,Diminished  R Breath Sounds: Clear  L Breath Sounds: Clear  Cough: None  Suction: ET Tube  O2 Device: Vent      Resp Comments: 1st routine vent check post coughing and suctioning performed by RN  Pt not currently double stacking his ventilator breaths  Appears comfortable  On same mode and vent settngs as 24 hrs ago

## 2022-01-04 NOTE — QUICK NOTE
Evaluated patient at bedside  Review year he had just come back from the OR  He was still intubated  Spoke with nursing staff, patient was doing well  With the chart, surgery team had updated the family  He is now decannulated from ecmo  Checked in yesterday with primary team, no change in status or in no acute palliative needs  Will continue to follow the patient to reach out to family as needed  Please feel free to reach out to palliative medicine with any acute palliative needs

## 2022-01-04 NOTE — PROGRESS NOTES
Progress Note - Cardiothoracic Surgery   Arley Pro 61 y o  male MRN: [id number]  Unit/Bed#: [facility] 418-01 Encounter: [id number]      POD 4 VA-ECMO for Post-infarction VSD    Pt seen/examined  Interval history and data reviewed with critical care team     Incremental improvement continues  Epi gtt is off    Milrinone gtt is @ 0 125  Vaso gtt off  Levophed gtt @ 4  Esmolol gtt off    ECMO:  Treatment Type: AV ECMO  Pump Flow (L/min): 4 3 LPM  Pump Speed (Rotations/min): 6800 RPM   Flow (L/min): 3 5 LPM   FiO2 (%): 100 %        Medications:   Scheduled Meds:  Current Facility-Administered Medications   Medication Dose Route Frequency Provider Last Rate    chlorhexidine  15 mL Mouth/Throat Q12H [address] Wendi Spironello V, CRNP      chlorhexidine  15 mL Swish & Spit Q12H [address] Bren Chambers      dexmedetomidine  0 1-0 7 mcg/kg/hr Intravenous Titrated Mark Lischner, DO 0 5 mcg/kg/hr (01/04/22 0227)    fentaNYL  100 mcg/hr Intravenous Continuous Yaima Parish PA-C 100 mcg/hr (01/03/22 2316)    fentanyl citrate (PF)  50 mcg Intravenous Q1H PRN Wendi Spironello V, CRNP      heparin (porcine)  3-15 Units/kg/hr (Order-Specific) Intravenous Titrated Wendi Spironello V, CRNP 10 Units/kg/hr (01/04/22 0711)    insulin regular (HumuLIN R,NovoLIN R) infusion  0 3-21 Units/hr Intravenous Titrated Wendi Spironello V, CRNP 0 5 Units/hr (01/04/22 0558)    metoprolol  2 5 mg Intravenous Q4H PRN Alexandra Garcia DO      metoprolol tartrate  12 5 mg Oral Once Codi Hanna PA-C      [facility] infusion  0 13 mcg/kg/min Intravenous Continuous Yaima Parish PA-C 0 13 mcg/kg/min (01/03/22 2307)    norepinephrine  1-30 mcg/min Intravenous Titrated Wendi Spironello V, CRNP 4 mcg/min (01/04/22 0602)    omeprazole (PRILOSEC) suspension 2 mg/mL  20 mg Oral Daily BROOKE Banegas      ondansetron  4 mg Intravenous Q6H PRN BROOKE Banegas      polyethylene glycol  17 g Oral Daily Wendi Spironello V, CRNP      senna  8 8 mg Oral BID Wendi Spironello V, CRNP      sodium chloride (PF)  3 mL Intravenous Q1H PRN Wendi Spironello V, CRNP      sodium chloride  125 mL/hr Intravenous Continuous Phoebe Enter, PA-C 125 mL/hr (01/04/22 0539)     Continuous Infusions:dexmedetomidine, 0 1-0 7 mcg/kg/hr, Last Rate: 0 5 mcg/kg/hr (01/04/22 0227)  fentaNYL, 100 mcg/hr, Last Rate: 100 mcg/hr (01/03/22 2316)  heparin (porcine), 3-15 Units/kg/hr (Order-Specific), Last Rate: 10 Units/kg/hr (01/04/22 0711)  insulin regular (HumuLIN R,NovoLIN R) infusion, 0 3-21 Units/hr, Last Rate: 0 5 Units/hr (01/04/22 0558)  milrinone (PRIMACOR) infusion, 0 13 mcg/kg/min, Last Rate: 0 13 mcg/kg/min (01/03/22 2307)  norepinephrine, 1-30 mcg/min, Last Rate: 4 mcg/min (01/04/22 0602)  sodium chloride, 125 mL/hr, Last Rate: 125 mL/hr (01/04/22 0539)      PRN Meds: fentanyl citrate (PF)    metoprolol    ondansetron    Insert peripheral IV **AND** sodium chloride (PF)    Vitals: Blood pressure (!) 73/73, pulse 68, temperature 98 8 °F (37 1 °C), resp  rate 13, height 5' 5 5" (1 664 m), weight 88 8 kg (195 lb 12 3 oz), SpO2 99 %  ,Body mass index is 32 08 kg/m²  I/O last 24 hours: In: 5366 6 [I V :2721 6; Blood:850; NG/GT:1060; IV Piggyback:50; Feedings:685]  Out: 4752 [Urine:2415]  Invasive Devices  Report    Central Venous Catheter Line            CVC Central Lines 12/30/21 Triple Left Internal jugular 4 days    Introducer 12/31/21 3 days    LDA ECMO 12/31/21 Femoral artery  3 days          Arterial Line            Arterial Line 01/01/22 Other (Comment) 2 days          Line            Arterial Sheath 6 Fr  Right  3 days          Drain            Urethral Catheter Temperature probe 4 days    NG/OG/Enteral Tube Orogastric Center mouth 3 days          Airway            ETT  Hi-Lo; Cuffed;Oral 8 5 mm 3 days                  Lab, Imaging and other studies:   Results from last 7 days   Lab Units 01/04/22  8277 01/03/22  1419 01/03/22  0550 01/02/22  0925 01/02/22  0530 01/01/22  1325 01/01/22  1325 01/01/22  0443 01/01/22  0443   WBC Thousand/uL 14 22*  --  16 28*  --  28 70*   < > 31 86*   < > 28 23*   HEMOGLOBIN g/dL 7 6* 8 4* 7 9*   < > 7 7*   < > 8 4*   < > 8 8*   I STAT HEMOGLOBIN   --   --   --    < >  --   --   --   --   --    HEMATOCRIT % 24 2* 26 3* 24 3*   < > 23 7*   < > 25 2*   < > 26 2*   HEMATOCRIT, ISTAT   --   --   --    < >  --   --   --   --   --    PLATELETS Thousands/uL  --   --   --   --  102*  --  138*  --  164    < > = values in this interval not displayed  Results from last 7 days   Lab Units 01/04/22  0450 01/03/22  2358 01/03/22  1814 01/02/22  1132 01/02/22  0925   POTASSIUM mmol/L 4 6 3 7 3 9   < >  --    CHLORIDE mmol/L 121* 118* 121*   < >  --    CO2 mmol/L 35* 33* 33*   < >  --    CO2, I-STAT mmol/L  --   --   --   --  32  36*   BUN mg/dL 59* 64* 73*   < >  --    CREATININE mg/dL 1 24 1 33* 1 43*   < >  --    GLUCOSE, ISTAT mg/dl  --   --   --   --  134  137   CALCIUM mg/dL 7 7* 8 3 8 1*   < >  --     < > = values in this interval not displayed  Results from last 7 days   Lab Units 01/04/22  0556 01/04/22  0450 01/04/22  0214 01/03/22  2201 01/03/22  1138 01/03/22  0644 01/03/22  0550 01/03/22  0350   INR   --  1 27*  --   --   --  1 32* 1 37*  --    PTT seconds 49*  --  50* 51*   < > 73*  --    < >    < > = values in this interval not displayed  Recent Labs     01/04/22  0503   PHART 7 391   QUU4PHZ 33 7*   PO2ART 446 8*   [id number] 56 9*   BEART 7 8           Plan:    Definitive surgical repair today        SIGNATUREAbdulaziz Martinez MD  DATE: January 4, 2022  TIME: 7:37 AM

## 2022-01-04 NOTE — CONSULTS
PHONE [employer] 1978 Toxicology  Sejal Sandoval 61 y o  male MRN: [id number]  Unit/Bed#: [facility] 418-01 Encounter: [id number]      Reason for Consult / Principal Problem: BPA  Inpatient consult to Toxicology  Consult performed by: Jalyn Bell MD  Consult ordered by: Renae Granados MD        01/04/22    The BPA for toxicology consultation due to elevated acetaminophen concentration was still firing leading to re-consultation today  Patient's acetaminophen cleared several days ago and transaminitis much improved with INR under to  Nothing else to do at this time from toxicology standpoint  I will work with IT to try to resolve this BPA issue

## 2022-01-04 NOTE — RESPIRATORY THERAPY NOTE
RT Ventilator Management Note  Anju De La Cruz 61 y o  male MRN: [id number]  Unit/Bed#: [facility] 418-01 Encounter: [id number]       01/04/22 0447   Respiratory Assessment   Assessment Type Assess only   General Appearance Sleeping   Respiratory Pattern Assisted   Chest Assessment Chest expansion symmetrical   Bilateral Breath Sounds Clear;Diminished   Cough None   Suction ET Tube   Resp Comments 3rd and final routine vent check on this pt with the  vent in A/C VC mode  No vent parameter or mode changes until this vent check with AP and I attempted to modify the frequency of the pt's breath stacking, which just started to (re)appear within the last 40 mins  Sedation levels on this pt have not changed  Pt's V trig level reduced from 3 to 1 5 without any significant change in the appearance of double and triple breaths which at times is causing the vent to see a zero returned tidal volume (which is a false report)  Will recommend that while pt is in the OR this AM, that a Seymour vent if available be provided to replace the  just for the additional mode features it provides      O2 Device    Vent Information   Vent ID [id number]   Vent type     Vent Mode AC/VC   $ Pulse Oximetry Spot Check Charge Completed   AC/VC Settings   Resp Rate (BPM) 14 BPM   Vt (mL) 450 mL   FIO2 (%) 40 %   PEEP (cmH2O) 6 cmH2O   Flow Pattern (LPM) 70 L/min   Trigger Sensitivity Flow (lpm) 1 5 %  (At the suggestion of the AP when breath stacking)   Humidification Heater   Heater Temperature (Set) 98 8 °F (37 1 °C)   AC/VC Actuals   Resp Rate (BPM) 24 BPM  (breath stacking now)   VT (mL) 971  (breath stacking now)   MV 10 8  (breath stacking now)   MAP (cmH2O) 10 cmH2O   Peak Pressure (cmH2O) 16 cmH2O   I/E Ratio (Obs) 1:5 1   Heater Temperature (Obs) 98 6 °F (37 °C)   Static Compliance (mL/cmH20)   (breath stacking now)   Plateau Pressure (cm H2O)   (breath stacking now)   AC/VC Alarms   High Peak Pressure (cmH2O) 40   High Resp Rate (BPM) 40 BPM   High MV (L/min) 20 L/min   Low MV (L/min) 4 8 L/min   Vt High (mL) 1020 mL   Vt Low (mL) 190 mL   AC/VC Apnea Settings   Resp Rate (BPM) 14 BPM   VT (mL) 450 mL   FIO2 (%) 100 %   Apnea Time (s) 20 S   Apnea Flow (L/min) 70 L/min   Maintenance   Alarm (pink) cable attached Yes  (connection apparently broken)   Resuscitation bag with peep valve at bedside Yes   Water bag changed No   Circuit changed No   Daily Screen   Patient safety screen outcome: Failed   Not Ready for Weaning due to: Underline problem not resolved   IHI Ventilator Associated Pneumonia Bundle   Head of Bed Elevated HOB less than 20   ETT  Hi-Lo; Cuffed;Oral 8 5 mm   Placement Date/Time: 12/31/21 1009   Mask Ventilation: Mask ventilation not attempted (0)  Preoxygenated: Yes  Technique: Direct laryngoscopy  Type: Hi-Lo; Cuffed;Oral  Tube Size: 8 5 mm  Laryngoscope: Mac  Blade Size: 3  Location: Oral  Grade View: Fu    Secured at (cm) 26   Measured from Lips   Secured Location Left   Secured by Commercial tube clark   Site Condition Dry   Cuff Pressure (cm H2O) 26 cm H2O   HI-LO Suction  Intermittent suction   HI-LO Secretions Scant   HI-LO Intervention Patent       Daily Screen       1/4/2022  0100 1/4/2022  0447          Patient safety screen outcome[de-identified] Failed Failed      Not Ready for Weaning due to[de-identified] Underline problem not resolved Underline problem not resolved              Physical Exam:   Assessment Type: (P) Assess only  General Appearance: (P) Sleeping  Respiratory Pattern: (P) Assisted  Chest Assessment: (P) Chest expansion symmetrical  Bilateral Breath Sounds: (P) Clear,Diminished  R Breath Sounds: Clear  L Breath Sounds: Clear  Cough: (P) None  Suction: (P) ET Tube  O2 Device: (P)       Resp Comments: (P) 3rd and final routine vent check on this pt with the  vent in A/C VC mode    No vent parameter or mode changes until this vent check with AP and I attempted to modify the frequency of the pt's breath stacking, which just started to (re)appear within the last 40 mins  Sedation levels on this pt have not changed  Pt's V trig level reduced from 3 to 1 5 without any significant change in the appearance of double and triple breaths which at times is causing the vent to see a zero returned tidal volume (which is a false report)  Will recommend that while pt is in the OR this AM, that a Seymour vent if available be provided to replace the  just for the additional mode features it provides

## 2022-01-04 NOTE — OP NOTE
OPERATIVE REPORT  PATIENT NAME: More Loving    :  1955  MRN: [id number]  Pt Location: [facility] OR ROOM 16    SURGERY DATE: 2022    SURGEON: Aretha Seth MD     ASSISTANT: Francisca Hicks DO    ADDITIONAL ASSISTANT: Christina Gamboa PA-C     PREOPERATIVE DIAGNOSIS: 1  Post-infarct acquired ventricular septal defect; 2  Cardiogenic shock; 3  Veno-arterial ECMO support; 4  Recovering multisystem organ failure  POSTOPERATIVE DIAGNOSES: 1  Post-infarct acquired ventricular septal defect; 2  Cardiogenic shock; 3  Veno-arterial ECMO support; 4  Recovering multisystem organ failure  NYHA CLASS: 4    CCS CLASS: 4    PROCEDURES: 1  Repair of post-infarction ventricular septal defect; 2  Venoarterial ECMO decannulation  ANESTHESIA General endotracheal anesthesia with transesophageal echocardiogram guidance, Dr Harrison Harper: 135 minutes  CROSSCLAMP TIME: 102 minutes  Chest tubes: x 2     Pacing wires: A x1 and V x1  TRANSFUSIONS: 6u RBCs, 4u Plts, 2u FFP, 2u Cryo     SPECIMENS: Portion of infarcted anterior wall of left ventricle  ESTIMATED BLOOD LOSS: 200 mL    OPERATIVE TECHNIQUE: The patient was taken to the operating room and placed supine on the operating table  Following the satisfactory induction of general anesthesia and with monitoring lines already in place, the patient was prepped and draped in the usual sterile fashion  A time-out procedure was performed  The patient was on venoarterial ECMO for 4 days prior to the definitive surgical repair and had experienced marked improvement in acute kidney injury, acute liver injury, and metabolic acidosis  The venous ECMO cannula was in the right common femoral vein with its tip positioned in the SVC  The arterial cannula was in the right common femoral artery    There was a RLE perfusion catheter as well, which was a 6 FR reinforced sheath, entering the distal right common femoral artery with its tip positioned in the right profunda femoris  The patient underwent median sternotomy, pericardiotomy, and systemic heparinization  Epiaortic ultrasound was used to evaluate the ascending aorta which was found to be free of significant atheromatous disease  The patient underwent SVC cannulation and an antegrade was placed  The patient was briefly disconnected from [facility] ECMO and switched to cardiopulmonary bypass  The femoral venous cannula was withdrawn from the SVC and was positioned with its tip in the IVC  An LV vent was placed through the right superior pulmonary vein  The ascending aorta was crossclamped  Antegrade [place] cardioplegia was delivered with an excellent arrest      SVC and IVC tapes were snared  The left ventricle was elevated into the operative field  The distal anterior wall was aneurysmal, with some portions extremely thinned out  In fact, the tip of the LV vent was visible through a very thin layer of the ventricular wall, and was therefore withdrawn to prevent complete perforation  Left ventriculotomy was begun at that location, which was the thinnest section of anterior wall  The ventriculotomy was carried superiorly and inferiorly through infarcted tissue  The most thinned and friable edges of the free wall were debrided and sent to pathology as a specimen  The edges of the ventriculotomy were retracted with pledgeted stay sutures  The ventricular septum was completely exposed and was thoroughly evaluated  The entire septum was infarcted and of poor quality  The most distal portion was still intact with intact endocardial surface  The more proximal portion was necrotic with portions that had sloughing endocardial tissue  The anterior-most portion of the proximal septum was frankly ruptured in a fenestrated manner consistent with the extremely friable infarcted, necrotic tissue  The pulmonary artery catheter was easily visible in the RVOT       The ventricular septum was not debrided  An exclusion technique was utilized, with a large bovine pericardial patch sutured at the junction of the inferior ventricular septum and the inferior left ventricular wall  The posterior suture line was completed with running 4-0 Prolene suture  The posterior suture line was carried up to the inferior aspect of the LVOT and then brought out of the anterior LV free wall  In a similar fashion the apical end of the posterior suture line was carried out the distal aspect of the left ventriculotomy and brought out of the apico-anterior free wall  At this point, the entire endocardial portion of the repair was complete  The pericardial patch was trimmed at this point to be incorporated into the ventriculotomy closure, thus completing the exclusion of the ruptured ventricular septum to the right ventricular side of the heart  The patch was trimmed generously to create a tension-free exclusion of the septum  The ventriculotomy closure was then performed through the pericardial patch, with felt strips to buttress the infarcted free anterior wall tissue  This was performed with one layer of 3-0 Prolene horizontal mattress sutures, and a second layer of running over and over 3-0 Prolene suture  In the distal aspect of the ventriculotomy, I was concerned about the quality of the tissue and therefore two additional 3-0 Prolene horizontal mattress sutures were used to buttress the closure in this area  With the repair completed, the left heart was returned to its anatomic location in the pericardial well  The heart was extensively de-aired and the crossclamp was removed  Atrial and ventricular pacing wires were placed  Following a period of reperfusion the patient was weaned from cardiopulmonary bypass  After a period of hemodynamic optimization, the patient was decannulated  The intrathoracic cannulae were removed and secured in the typical fashion    The arterial cannula was removed from the right common femoral artery over a wire, and the two Perclose sutures which had been placed at the time of ECMO cannulation were secured while pressure was held  The femoral venous cannula was removed and oversewn with a large, buttressed #2 Vicryl suture  The 6 FR distal perfusion catheter was maintained for removal with direct pressure in the ICU  Protamine was administered with normalization of the ACT  After the transfusion of multiple blood products, hemostasis was confirmed in all fields  Thoracostomy tubes were placed  The sternum was closed with stainless steel wires  The fascia, subdermis and skin were closed with multiple layers of running absorbable suture  As the attending surgeon, I was present and scrubbed for all critical portions of this procedure  Sponge, needle and instrument counts were reported as correct by the nursing staff  There is no cardiac residency program at this facility and for complex procedures such as this, it is vital to have an assistant experienced in cardiac surgery  The assistance of Curtis Kumar DO was necessary during ventricular septal repair for retraction of the heart with correct tissue handling techniques during exposure of the septum, for proper following of the suture during reconstruction, and left ventriculotomy closure  Final CORIE demonstrated LVEF of 40%, normal RV size and function, trace MR, no AI, and moderate TR  There was no demonstrable shunt across the ventricular septum  Simultaneous blood gases drawn from the SVC and PA demonstrated a physiologically appropriate stepdown in the venous oxygen saturation from 67% to 49%, consistent with a fully repaired ventricular septum        Heriberto High MD  DATE: January 4, 2022  TIME: 12:43 PM

## 2022-01-04 NOTE — ANESTHESIA PROCEDURE NOTES
Procedure Performed: CORIE Anesthesia  Start Time:  1/4/2022 8:25 AM        Preanesthesia Checklist    Patient identified, IV assessed, risks and benefits discussed, monitors and equipment assessed, procedure being performed at surgeon's request, anesthesia consent obtained and consented for ecmo on 12/31 by me  Procedure    Diagnostic Indications for CORIE:  assessment of surgical repair, defect repair evaluation, hemodynamic monitoring and suspected pericardial effusion  Type of CORIE: complete CORIE with interpretation  Images Saved: ultrasound permanent image saved  Physician Requesting Echo: Juvenal Ramos MD  Location performed: OR  Intubated  Bite block not placed  Heart visualized  Insertion of CORIE Probe:  Atraumatic  Probe Type:  Multiplane  Modalities:  3D       Echocardiographic and Doppler Measurements    PREPROCEDURE    LEFT VENTRICLE:  Systolic Function: moderately impaired  Ejection Fraction: 40%  Regional Wall Motion Abnormalities: anteroseptal and septal wall from mid to apex thinned out, akinetic  RIGHT VENTRICLE:  Systolic Function: mildly impaired  Cavity size mildly dilated  OTHER VENTRICULAR FINDINGS:  RV decompressed on VA ECMO    Anteroseptal (close to apex) VSD with left to right flow, directed toward PV), mean gradient 12 mmHg across in systole  AORTIC VALVE:  Leaflets: trileaflet  Leaflet motions not moving on ECMO; when briefly off ECMO, no AS seen; layered, mobile clot seen in [place]  Regurgitation: none  MITRAL VALVE:  Leaflets: normal  Leaflet Motions: normal  Regurgitation: none  Stenosis: none  TRICUSPID VALVE:  Regurgitation: moderate and difficult to determine with ECMO jet, at least moderate  ASCENDING AORTA:  Dissection not present  AORTIC ARCH:  dissection not present  Grade 3: atheroma protruding < 0 5 cm into lumen  DESCENDING AORTA:  Dissection not present   Grade 5: any thickness with mobile component or components  VENTRICULAR SEPTUM:  Intra-ventricular septum morphology: ventricular septal defect  OTHER VENTRICULAR SEPTAL DEFECT FINDINGS:  See note above  POSTPROCEDURE    LEFT VENTRICLE:   Ejection Fraction: 40 %  OTHER VENTRICULAR FINDINGS: Myocardial segments (both LV and RV) present contractile, appear normal; most of septum no pericardial patch  No residual VSD seen (there is some flow acceleration across the RVOT with aliasing of CW that confounds the picture, but the Pa sat << SVC sat, suggesting no residual VSD)  AORTIC VALVE:   Leaflets: of note, clot that was present in the [facility] is no longer present and native  Stenosis: none  Regurgitation: none  MITRAL VALVE:   Leaflets: native  Regurgitation: trace  Stenosis: none  TRICUSPID VALVE:   Stenosis: moderate  AORTA:   Dissection: Dissection not present  OTHER AORTA FINDINGS:   Grade 5 descending (more clear now with pulsatile flow), grade 3 arch

## 2022-01-05 ENCOUNTER — APPOINTMENT (INPATIENT)
Dept: RADIOLOGY | Facility: HOSPITAL | Age: 67
DRG: 167 | End: 2022-01-05
Payer: COMMERCIAL

## 2022-01-05 LAB
ABO GROUP BLD BPU: NORMAL
ALBUMIN SERPL BCP-MCNC: 3.2 G/DL (ref 3.5–5)
ALP SERPL-CCNC: 89 U/L (ref 46–116)
ALT SERPL W P-5'-P-CCNC: 202 U/L (ref 12–78)
ANION GAP SERPL CALCULATED.3IONS-SCNC: 4 MMOL/L (ref 4–13)
ANION GAP SERPL CALCULATED.3IONS-SCNC: 4 MMOL/L (ref 4–13)
ANION GAP SERPL CALCULATED.3IONS-SCNC: 5 MMOL/L (ref 4–13)
AST SERPL W P-5'-P-CCNC: 127 U/L (ref 5–45)
ATRIAL RATE: 104 BPM
ATRIAL RATE: 120 BPM
ATRIAL RATE: 252 BPM
BASE EX.OXY STD BLDV CALC-SCNC: 58.1 % (ref 60–80)
BASE EXCESS BLDA CALC-SCNC: -1.3 MMOL/L
BASE EXCESS BLDV CALC-SCNC: -0.8 MMOL/L
BILIRUB DIRECT SERPL-MCNC: 2.82 MG/DL (ref 0–0.2)
BILIRUB SERPL-MCNC: 3.76 MG/DL (ref 0.2–1)
BPU ID: NORMAL
BUN SERPL-MCNC: 57 MG/DL (ref 5–25)
BUN SERPL-MCNC: 61 MG/DL (ref 5–25)
BUN SERPL-MCNC: 69 MG/DL (ref 5–25)
CA-I BLD-SCNC: 1.13 MMOL/L (ref 1.12–1.32)
CALCIUM SERPL-MCNC: 8.4 MG/DL (ref 8.3–10.1)
CALCIUM SERPL-MCNC: 8.7 MG/DL (ref 8.3–10.1)
CALCIUM SERPL-MCNC: 8.8 MG/DL (ref 8.3–10.1)
CHLORIDE SERPL-SCNC: 117 MMOL/L (ref 100–108)
CHLORIDE SERPL-SCNC: 120 MMOL/L (ref 100–108)
CHLORIDE SERPL-SCNC: 120 MMOL/L (ref 100–108)
CO2 SERPL-SCNC: 27 MMOL/L (ref 21–32)
CO2 SERPL-SCNC: 28 MMOL/L (ref 21–32)
CO2 SERPL-SCNC: 30 MMOL/L (ref 21–32)
CREAT SERPL-MCNC: 1.78 MG/DL (ref 0.6–1.3)
CREAT SERPL-MCNC: 1.93 MG/DL (ref 0.6–1.3)
CREAT SERPL-MCNC: 2.09 MG/DL (ref 0.6–1.3)
CROSSMATCH: NORMAL
ERYTHROCYTE [DISTWIDTH] IN BLOOD BY AUTOMATED COUNT: 18.6 % (ref 11.6–15.1)
GFR SERPL CREATININE-BSD FRML MDRD: 32 ML/MIN/1.73SQ M
GFR SERPL CREATININE-BSD FRML MDRD: 35 ML/MIN/1.73SQ M
GFR SERPL CREATININE-BSD FRML MDRD: 39 ML/MIN/1.73SQ M
GLUCOSE SERPL-MCNC: 106 MG/DL (ref 65–140)
GLUCOSE SERPL-MCNC: 114 MG/DL (ref 65–140)
GLUCOSE SERPL-MCNC: 125 MG/DL (ref 65–140)
GLUCOSE SERPL-MCNC: 132 MG/DL (ref 65–140)
GLUCOSE SERPL-MCNC: 139 MG/DL (ref 65–140)
GLUCOSE SERPL-MCNC: 140 MG/DL (ref 65–140)
GLUCOSE SERPL-MCNC: 142 MG/DL (ref 65–140)
GLUCOSE SERPL-MCNC: 143 MG/DL (ref 65–140)
GLUCOSE SERPL-MCNC: 146 MG/DL (ref 65–140)
GLUCOSE SERPL-MCNC: 147 MG/DL (ref 65–140)
GLUCOSE SERPL-MCNC: 149 MG/DL (ref 65–140)
GLUCOSE SERPL-MCNC: 149 MG/DL (ref 65–140)
GLUCOSE SERPL-MCNC: 151 MG/DL (ref 65–140)
GLUCOSE SERPL-MCNC: 158 MG/DL (ref 65–140)
GLUCOSE SERPL-MCNC: 162 MG/DL (ref 65–140)
GLUCOSE SERPL-MCNC: 165 MG/DL (ref 65–140)
HCO3 BLDA-SCNC: 24.8 MMOL/L (ref 22–28)
HCO3 BLDV-SCNC: 25.9 MMOL/L (ref 24–30)
HCT VFR BLD AUTO: 36.1 % (ref 36.5–49.3)
HGB BLD-MCNC: 11.9 G/DL (ref 12–17)
HOROWITZ INDEX BLDA+IHG-RTO: 60 MM[HG]
HOROWITZ INDEX BLDA+IHG-RTO: 60 MM[HG]
MAGNESIUM SERPL-MCNC: 3.7 MG/DL (ref 1.6–2.6)
MCH RBC QN AUTO: 30.7 PG (ref 26.8–34.3)
MCHC RBC AUTO-ENTMCNC: 33 G/DL (ref 31.4–37.4)
MCV RBC AUTO: 93 FL (ref 82–98)
O2 CT BLDA-SCNC: 16.7 ML/DL (ref 16–23)
O2 CT BLDV-SCNC: 10 ML/DL
OXYHGB MFR BLDA: 94.8 % (ref 94–97)
P AXIS: 50 DEGREES
P AXIS: 58 DEGREES
P AXIS: 82 DEGREES
PCO2 BLDA: 47.4 MM HG (ref 36–44)
PCO2 BLDV: 52.1 MM HG (ref 42–50)
PEEP RESPIRATORY: 8 CM[H2O]
PEEP RESPIRATORY: 8 CM[H2O]
PH BLDA: 7.34 [PH] (ref 7.35–7.45)
PH BLDV: 7.32 [PH] (ref 7.3–7.4)
PLATELET # BLD AUTO: 140 THOUSANDS/UL (ref 149–390)
PMV BLD AUTO: 11.3 FL (ref 8.9–12.7)
PO2 BLDA: 82.5 MM HG (ref 75–129)
PO2 BLDV: 32.4 MM HG (ref 35–45)
POTASSIUM SERPL-SCNC: 3.7 MMOL/L (ref 3.5–5.3)
POTASSIUM SERPL-SCNC: 3.8 MMOL/L (ref 3.5–5.3)
POTASSIUM SERPL-SCNC: 4.3 MMOL/L (ref 3.5–5.3)
PR INTERVAL: 156 MS
PR INTERVAL: 162 MS
PROT SERPL-MCNC: 5.7 G/DL (ref 6.4–8.2)
QRS AXIS: 76 DEGREES
QRS AXIS: 78 DEGREES
QRS AXIS: 81 DEGREES
QRSD INTERVAL: 104 MS
QRSD INTERVAL: 120 MS
QRSD INTERVAL: 122 MS
QT INTERVAL: 332 MS
QT INTERVAL: 334 MS
QT INTERVAL: 362 MS
QTC INTERVAL: 436 MS
QTC INTERVAL: 483 MS
QTC INTERVAL: 511 MS
RBC # BLD AUTO: 3.88 MILLION/UL (ref 3.88–5.62)
SODIUM SERPL-SCNC: 151 MMOL/L (ref 136–145)
SODIUM SERPL-SCNC: 152 MMOL/L (ref 136–145)
SODIUM SERPL-SCNC: 152 MMOL/L (ref 136–145)
SPECIMEN SOURCE: ABNORMAL
T WAVE AXIS: -18 DEGREES
T WAVE AXIS: -23 DEGREES
T WAVE AXIS: 36 DEGREES
UNIT DISPENSE STATUS: NORMAL
UNIT PRODUCT CODE: NORMAL
UNIT PRODUCT VOLUME: 125 ML
UNIT PRODUCT VOLUME: 125 ML
UNIT PRODUCT VOLUME: 250 ML
UNIT PRODUCT VOLUME: 280 ML
UNIT PRODUCT VOLUME: 300 ML
UNIT PRODUCT VOLUME: 350 ML
UNIT RH: NORMAL
VENT AC: 18
VENT AC: 18
VENT- AC: AC
VENT- AC: AC
VENTRICULAR RATE: 104 BPM
VENTRICULAR RATE: 120 BPM
VENTRICULAR RATE: 126 BPM
VT SETTING VENT: 450 ML
VT SETTING VENT: 450 ML
WBC # BLD AUTO: 24.24 THOUSAND/UL (ref 4.31–10.16)

## 2022-01-05 PROCEDURE — 93010 ELECTROCARDIOGRAM REPORT: CPT | Performed by: INTERNAL MEDICINE

## 2022-01-05 PROCEDURE — 71045 X-RAY EXAM CHEST 1 VIEW: CPT

## 2022-01-05 PROCEDURE — 82330 ASSAY OF CALCIUM: CPT | Performed by: THORACIC SURGERY (CARDIOTHORACIC VASCULAR SURGERY)

## 2022-01-05 PROCEDURE — 82948 REAGENT STRIP/BLOOD GLUCOSE: CPT

## 2022-01-05 PROCEDURE — 80048 BASIC METABOLIC PNL TOTAL CA: CPT | Performed by: PHYSICIAN ASSISTANT

## 2022-01-05 PROCEDURE — 85027 COMPLETE CBC AUTOMATED: CPT | Performed by: PHYSICIAN ASSISTANT

## 2022-01-05 PROCEDURE — 82805 BLOOD GASES W/O2 SATURATION: CPT | Performed by: THORACIC SURGERY (CARDIOTHORACIC VASCULAR SURGERY)

## 2022-01-05 PROCEDURE — 80076 HEPATIC FUNCTION PANEL: CPT | Performed by: THORACIC SURGERY (CARDIOTHORACIC VASCULAR SURGERY)

## 2022-01-05 PROCEDURE — 82805 BLOOD GASES W/O2 SATURATION: CPT | Performed by: PHYSICIAN ASSISTANT

## 2022-01-05 PROCEDURE — 93005 ELECTROCARDIOGRAM TRACING: CPT

## 2022-01-05 PROCEDURE — 99292 CRITICAL CARE ADDL 30 MIN: CPT | Performed by: ANESTHESIOLOGY

## 2022-01-05 PROCEDURE — 99024 POSTOP FOLLOW-UP VISIT: CPT | Performed by: THORACIC SURGERY (CARDIOTHORACIC VASCULAR SURGERY)

## 2022-01-05 PROCEDURE — 99291 CRITICAL CARE FIRST HOUR: CPT | Performed by: PHYSICIAN ASSISTANT

## 2022-01-05 PROCEDURE — 94003 VENT MGMT INPAT SUBQ DAY: CPT

## 2022-01-05 PROCEDURE — 83735 ASSAY OF MAGNESIUM: CPT | Performed by: PHYSICIAN ASSISTANT

## 2022-01-05 PROCEDURE — 99232 SBSQ HOSP IP/OBS MODERATE 35: CPT | Performed by: INTERNAL MEDICINE

## 2022-01-05 PROCEDURE — 94760 N-INVAS EAR/PLS OXIMETRY 1: CPT

## 2022-01-05 RX ORDER — FENTANYL CITRATE-0.9 % NACL/PF 10 MCG/ML
50 PLASTIC BAG, INJECTION (ML) INTRAVENOUS CONTINUOUS
Status: DISCONTINUED | OUTPATIENT
Start: 2022-01-05 | End: 2022-01-07

## 2022-01-05 RX ORDER — FUROSEMIDE 10 MG/ML
40 INJECTION INTRAMUSCULAR; INTRAVENOUS ONCE
Status: COMPLETED | OUTPATIENT
Start: 2022-01-05 | End: 2022-01-05

## 2022-01-05 RX ORDER — ACETAMINOPHEN 325 MG/1
650 TABLET ORAL EVERY 8 HOURS
Status: DISCONTINUED | OUTPATIENT
Start: 2022-01-05 | End: 2022-01-06

## 2022-01-05 RX ORDER — HEPARIN SODIUM 5000 [USP'U]/ML
5000 INJECTION, SOLUTION INTRAVENOUS; SUBCUTANEOUS EVERY 8 HOURS SCHEDULED
Status: DISCONTINUED | OUTPATIENT
Start: 2022-01-05 | End: 2022-01-05

## 2022-01-05 RX ORDER — ALBUMIN, HUMAN INJ 5% 5 %
SOLUTION INTRAVENOUS
Status: COMPLETED
Start: 2022-01-05 | End: 2022-01-05

## 2022-01-05 RX ORDER — ALBUMIN, HUMAN INJ 5% 5 %
12.5 SOLUTION INTRAVENOUS ONCE
Status: COMPLETED | OUTPATIENT
Start: 2022-01-05 | End: 2022-01-05

## 2022-01-05 RX ORDER — OXYCODONE HYDROCHLORIDE 5 MG/1
5 TABLET ORAL EVERY 4 HOURS PRN
Status: DISCONTINUED | OUTPATIENT
Start: 2022-01-05 | End: 2022-01-07 | Stop reason: SDUPTHER

## 2022-01-05 RX ORDER — FONDAPARINUX SODIUM 2.5 MG/.5ML
2.5 INJECTION SUBCUTANEOUS EVERY 24 HOURS
Status: DISCONTINUED | OUTPATIENT
Start: 2022-01-05 | End: 2022-01-06

## 2022-01-05 RX ORDER — FENTANYL CITRATE 50 UG/ML
50 INJECTION, SOLUTION INTRAMUSCULAR; INTRAVENOUS ONCE
Status: DISCONTINUED | OUTPATIENT
Start: 2022-01-05 | End: 2022-01-05

## 2022-01-05 RX ADMIN — ASPIRIN 325 MG ORAL TABLET 325 MG: 325 PILL ORAL at 08:59

## 2022-01-05 RX ADMIN — CALCIUM GLUCONATE 2 G: 20 INJECTION, SOLUTION INTRAVENOUS at 00:23

## 2022-01-05 RX ADMIN — POTASSIUM CHLORIDE 20 MEQ: 14.9 INJECTION, SOLUTION INTRAVENOUS at 14:20

## 2022-01-05 RX ADMIN — FONDAPARINUX SODIUM 2.5 MG: 2.5 INJECTION, SOLUTION SUBCUTANEOUS at 07:29

## 2022-01-05 RX ADMIN — MILRINONE LACTATE IN DEXTROSE 0.5 MCG/KG/MIN: 200 INJECTION, SOLUTION INTRAVENOUS at 19:50

## 2022-01-05 RX ADMIN — CHLORHEXIDINE GLUCONATE 15 ML: 1.2 SOLUTION ORAL at 08:59

## 2022-01-05 RX ADMIN — FENTANYL CITRATE 50 MCG/HR: 50 INJECTION, SOLUTION INTRAMUSCULAR; INTRAVENOUS at 06:24

## 2022-01-05 RX ADMIN — AMIODARONE HYDROCHLORIDE 200 MG: 200 TABLET ORAL at 22:16

## 2022-01-05 RX ADMIN — HYDROMORPHONE HYDROCHLORIDE 0.5 MG: 1 INJECTION, SOLUTION INTRAMUSCULAR; INTRAVENOUS; SUBCUTANEOUS at 03:45

## 2022-01-05 RX ADMIN — VASOPRESSIN 0.04 UNITS/MIN: 20 INJECTION INTRAVENOUS at 00:24

## 2022-01-05 RX ADMIN — CEFAZOLIN SODIUM 2000 MG: 2 SOLUTION INTRAVENOUS at 03:59

## 2022-01-05 RX ADMIN — FENTANYL CITRATE 50 MCG: 50 INJECTION INTRAMUSCULAR; INTRAVENOUS at 23:49

## 2022-01-05 RX ADMIN — MUPIROCIN 1 APPLICATION: 20 OINTMENT TOPICAL at 08:59

## 2022-01-05 RX ADMIN — FENTANYL CITRATE 50 MCG: 50 INJECTION INTRAMUSCULAR; INTRAVENOUS at 03:59

## 2022-01-05 RX ADMIN — FUROSEMIDE 40 MG: 10 INJECTION, SOLUTION INTRAVENOUS at 05:39

## 2022-01-05 RX ADMIN — CEFAZOLIN SODIUM 2000 MG: 2 SOLUTION INTRAVENOUS at 11:00

## 2022-01-05 RX ADMIN — FENTANYL CITRATE 50 MCG: 50 INJECTION INTRAMUSCULAR; INTRAVENOUS at 21:26

## 2022-01-05 RX ADMIN — FENTANYL CITRATE 50 MCG: 50 INJECTION INTRAMUSCULAR; INTRAVENOUS at 00:21

## 2022-01-05 RX ADMIN — MILRINONE LACTATE IN DEXTROSE 0.5 MCG/KG/MIN: 200 INJECTION, SOLUTION INTRAVENOUS at 12:14

## 2022-01-05 RX ADMIN — VASOPRESSIN 0.04 UNITS/MIN: 20 INJECTION INTRAVENOUS at 08:58

## 2022-01-05 RX ADMIN — ALBUMIN (HUMAN) 12.5 G: 12.5 INJECTION, SOLUTION INTRAVENOUS at 02:59

## 2022-01-05 RX ADMIN — EPINEPHRINE 6 MCG/MIN: 1 INJECTION, SOLUTION, CONCENTRATE INTRAVENOUS at 02:36

## 2022-01-05 RX ADMIN — AMIODARONE HYDROCHLORIDE 200 MG: 200 TABLET ORAL at 14:02

## 2022-01-05 RX ADMIN — MILRINONE LACTATE IN DEXTROSE 0.5 MCG/KG/MIN: 200 INJECTION, SOLUTION INTRAVENOUS at 04:33

## 2022-01-05 RX ADMIN — HYDROMORPHONE HYDROCHLORIDE 0.5 MG: 1 INJECTION, SOLUTION INTRAMUSCULAR; INTRAVENOUS; SUBCUTANEOUS at 22:16

## 2022-01-05 RX ADMIN — FENTANYL CITRATE 50 MCG: 50 INJECTION INTRAMUSCULAR; INTRAVENOUS at 04:24

## 2022-01-05 RX ADMIN — ATORVASTATIN CALCIUM 80 MG: 80 TABLET, FILM COATED ORAL at 17:01

## 2022-01-05 RX ADMIN — VASOPRESSIN 0.04 UNITS/MIN: 20 INJECTION INTRAVENOUS at 17:32

## 2022-01-05 RX ADMIN — ACETAMINOPHEN 650 MG: 325 TABLET, FILM COATED ORAL at 22:16

## 2022-01-05 RX ADMIN — OXYCODONE HYDROCHLORIDE 5 MG: 5 TABLET ORAL at 04:27

## 2022-01-05 RX ADMIN — ACETAMINOPHEN 650 MG: 325 TABLET, FILM COATED ORAL at 14:02

## 2022-01-05 RX ADMIN — FENTANYL CITRATE 50 MCG: 50 INJECTION INTRAMUSCULAR; INTRAVENOUS at 02:58

## 2022-01-05 RX ADMIN — FUROSEMIDE 40 MG: 10 INJECTION, SOLUTION INTRAVENOUS at 14:20

## 2022-01-05 RX ADMIN — AMIODARONE HYDROCHLORIDE 200 MG: 200 TABLET ORAL at 05:39

## 2022-01-05 RX ADMIN — HYDROMORPHONE HYDROCHLORIDE 0.5 MG: 1 INJECTION, SOLUTION INTRAMUSCULAR; INTRAVENOUS; SUBCUTANEOUS at 01:22

## 2022-01-05 RX ADMIN — HYDROMORPHONE HYDROCHLORIDE 0.5 MG: 1 INJECTION, SOLUTION INTRAMUSCULAR; INTRAVENOUS; SUBCUTANEOUS at 16:01

## 2022-01-05 RX ADMIN — HYDROMORPHONE HYDROCHLORIDE 0.5 MG: 1 INJECTION, SOLUTION INTRAMUSCULAR; INTRAVENOUS; SUBCUTANEOUS at 06:00

## 2022-01-05 RX ADMIN — FENTANYL CITRATE 50 MCG/HR: 50 INJECTION, SOLUTION INTRAMUSCULAR; INTRAVENOUS at 22:42

## 2022-01-05 RX ADMIN — FENTANYL CITRATE 50 MCG: 50 INJECTION INTRAMUSCULAR; INTRAVENOUS at 01:58

## 2022-01-05 RX ADMIN — CHLORHEXIDINE GLUCONATE 15 ML: 1.2 SOLUTION ORAL at 20:12

## 2022-01-05 RX ADMIN — NOREPINEPHRINE BITARTRATE 9 MCG/MIN: 1 INJECTION, SOLUTION, CONCENTRATE INTRAVENOUS at 02:35

## 2022-01-05 RX ADMIN — POLYETHYLENE GLYCOL 3350 17 G: 17 POWDER, FOR SOLUTION ORAL at 08:59

## 2022-01-05 RX ADMIN — ALBUMIN (HUMAN) 12.5 G: 12.5 INJECTION, SOLUTION INTRAVENOUS at 01:22

## 2022-01-05 RX ADMIN — ACETAMINOPHEN 975 MG: 325 TABLET, FILM COATED ORAL at 05:39

## 2022-01-05 RX ADMIN — ALBUMIN, HUMAN INJ 5% 12.5 G: 5 SOLUTION at 01:22

## 2022-01-05 RX ADMIN — OXYCODONE HYDROCHLORIDE 5 MG: 5 TABLET ORAL at 22:16

## 2022-01-05 RX ADMIN — MUPIROCIN 1 APPLICATION: 20 OINTMENT TOPICAL at 20:12

## 2022-01-05 NOTE — PROGRESS NOTES
Progress Note - Critical Care   Vladimir Edwards 61 y o  male MRN: [id number]  Unit/Bed#: [facility] 673-56 Encounter: [id number]      Attending Physician: Deven Jacobs MD    24 Hour Events/HPI: POD # 1 s/p repair of post-infarction VSD and VA ecmo decannulation  Maintained on milrinone, epi, levo, and vaso  Remains hypoxic  ROS: Review of Systems   Unable to perform ROS: Intubated     ---------------------------------------------------------------------------------------------------------------------------------------------------------------------  Impressions:  1  Recent LAD STEMI  2  Post MI VSD s/p VSD repair  3  Cardiogenic shock s/p VA ECMO, now decannulated  4  Acute post operative blood loss anemia  5  Thrombocytopenia  6  JUDE, improving  7  Shock liver, improving  8  Toxic metabolic encephalopathy  9  Coagulopathy   10  Right upper extremity partial arterial occlusion secondary to thrombus at the arterial line site, since removed  11  Tylenol OD s/p NAC on admission  12  Agoraphobia with panic attacks   13  Hyernatremia    Plan:    Neuro:   · Sedation plan: fentanyl infusion  · RASS goal: 0 Alert and Calm  · Pain controlled with: tylenol ATC and oxycodone and dilaudid PRN  Fentanyl PRN for sedation  · Regulate sleep/wake cycle  · Delirium precautions  · CAM-ICU daily  · Trend neuro exam    CV:   · Cardiac infusions: Epinephrine, 6 mcg/min, Primacor, 0 5 mcg/kg/min, Levophed, 5 mcg/min, Vasopressin, 0 04 Units/min  · Wean epi and levophed as able  · MAP goal > 65 and CI >2 2  · Keep Jasmyne Marker catheter  · Keep Arterial line  · Rhythm: Sinus Tachycardia  · Follow rhythm on telemetry  · No BB with epi  · Maintain epicardial pacing wires for pacing PRN    Lung:   · Acute respiratory failure; Requiring Intubation with ventilator support  Secondary to hemodynamic instability  Continue incentive spirometry/coughing/deep breathing exercises    Wean supplemental oxygen as tolerated for saturation > 90%  · SBT plan: attempt today  · Wean vent as tolerated  · Chlorhexidine ordered: yes  · Chest tube output remains persistently high; Continue chest tubes to suction today    GI:   · Continue PPI for stress ulcer prophylaxis  · Continue bowel regimen  · Trend abdominal exam and bowel function    FEN:   · Diuretic plan: Lasix PRN  · Nutrition/diet plan: TF if not being extubated  · Replenish electrolytes with goals: K >4 0, Mag >2 0, and Phos >3 0    :   · Indwelling Chang present: yes   · Keep Chang  · Trend UOP and BUN/creat  · Strict I and O    ID:   · Trend temps and WBC count  · Maintain normothermia    Results from last 7 days   Lab Units 01/04/22  0450 01/03/22  0550 01/02/22  0529 01/01/22  0433 12/31/21  0422   PROCALCITONIN ng/ml 0 31* 0 61* 1 26* 2 90* 2 16*     Heme:   · Trend hgb and plts    Endo:   · Glycemic control plan: continue insulin today with epi    Results from last 6 Months   Lab Units 12/31/21  0400 12/30/21  1007 09/13/21  0657   HEMOGLOBIN A1C % 5 9* 5 7* 5 3     MSK/Skin:  · Mobility goal: Bedrest  · PT consult: not applicable  · OT consult: not applicable  · Frequent turning and pressure off-loading  · Local wound care as needed    Disposition:  Continue ICU care  ---------------------------------------------------------------------------------------------------------------------------------------------------------------------  Allergies:    Allergies   Allergen Reactions    Mushroom Extract Complex - Food Allergy Facial Swelling    Peanut Oil - Food Allergy     Strawberry C [Ascorbate - Food Allergy]      Medications:   Scheduled Meds:  Current Facility-Administered Medications   Medication Dose Route Frequency Provider Last Rate    acetaminophen  650 mg Rectal Q4H PRN Dhiraj Franklin PA-C      [facility]  [facility]      amiodarone  200 mg Oral [facility] NOA Murdock      aspirin  325 mg Oral Daily Dhiraj Franklin [place]      atorvastatin  80 mg Oral After [employer]      bisacodyl  10 mg Rectal Daily PRN Yris Fam PA-C      calcium chloride  1 g Intravenous Once Yris Fam PA-C      cefazolin  2,000 mg Intravenous [facility] NOA 2,000 mg (01/05/22 0359)    chlorhexidine  15 mL [place], [place]      epinephrine  1-10 mcg/min Intravenous Titrated Marvene GillsvilleTARYN wallisC 6 mcg/min (01/05/22 0236)    fentaNYL  50 mcg/hr Intravenous Continuous Marvene [place], PA-C 50 mcg/hr (01/05/22 1549)    fentanyl citrate (PF)  50 mcg Intravenous Once Yris Fam PA-C      fentanyl citrate (PF)  50 mcg Intravenous Q1H PRN Yris Fam PA-C      fentanyl citrate (PF)  50 mcg Intravenous Once Lo Adame PA-C      fondaparinux  2 5 mg Subcutaneous Daily Yris Fam PA-C      furosemide  40 mg Intravenous Q6H PRN Yris Fam PA-C      HYDROmorphone  0 5 mg Intravenous Q2H PRN Yris Fam PA-C      insulin regular (HumuLIN R,NovoLIN R) infusion  0 3-21 Units/hr Intravenous Titrated Yris Fam PA-C 0 5 Units/hr (01/05/22 4415)    lactated ringers  500 mL Intravenous Q30 Min PRN Yris Fam PA-C Stopped (01/04/22 1916)    lidocaine (cardiac)  100 mg Intravenous Q30 Min PRN Munira Murdock PA-C      milrinone [facility]) infusion  0 5 mcg/kg/min Intravenous Continuous Waneta CoonNOA 0 5 mcg/kg/min (01/05/22 0433)    mupirocin  1 application Nasal [address] [place], [place]      norepinephrine  1-30 mcg/min Intravenous Titrated Amishe GillsvilleTARYN wallisC 5 mcg/min (01/05/22 0601)    ondansetron  4 mg Intravenous Q6H PRN Yris Fam PA-C      oxyCODONE  2 5 mg Oral Q4H PRN Yris Fam PA-C      oxyCODONE  5 mg Oral Q4H PRN Yris Fam PA-C      pantoprazole  40 mg Oral Early Morning Munria Murdock PA-C      polyethylene glycol  17 g Oral Daily Bren Horn      potassium chloride  20 mEq Intravenous Once PRN Yris Fam PA-C  potassium chloride  20 mEq Intravenous Q1H PRN Ponce Monia, PA-C      potassium chloride  20 mEq Intravenous Q30 Min PRN Ponce Monia, PA-C      sodium chloride  20 mL/hr Intravenous Continuous Ponce Essence, PA-C 20 mL/hr (01/04/22 1410)    vasopressin  0 04 Units/min Intravenous Continuous IGNACIO BradyNP 0 04 Units/min (01/05/22 0431)     VTE Pharmacologic Prophylaxis: Fondaparinux (Arixtra)  VTE Mechanical Prophylaxis: sequential compression device    Continuous Infusions:epinephrine, 1-10 mcg/min, Last Rate: 6 mcg/min (01/05/22 0236)  fentaNYL, 50 mcg/hr, Last Rate: 50 mcg/hr (01/05/22 0624)  insulin regular (HumuLIN R,NovoLIN R) infusion, 0 3-21 Units/hr, Last Rate: 0 5 Units/hr (01/05/22 9025)  milrinone (PRIMACOR) infusion, 0 5 mcg/kg/min, Last Rate: 0 5 mcg/kg/min (01/05/22 0433)  norepinephrine, 1-30 mcg/min, Last Rate: 5 mcg/min (01/05/22 0601)  sodium chloride, 20 mL/hr, Last Rate: 20 mL/hr (01/04/22 1410)  vasopressin, 0 04 Units/min, Last Rate: 0 04 Units/min (01/05/22 0431)      PRN Meds:  acetaminophen, 650 mg, Q4H PRN  bisacodyl, 10 mg, Daily PRN  fentanyl citrate (PF), 50 mcg, Q1H PRN  furosemide, 40 mg, Q6H PRN  HYDROmorphone, 0 5 mg, Q2H PRN  lactated ringers, 500 mL, Q30 Min PRN  lidocaine (cardiac), 100 mg, Q30 Min PRN  ondansetron, 4 mg, Q6H PRN  oxyCODONE, 2 5 mg, Q4H PRN  oxyCODONE, 5 mg, Q4H PRN  potassium chloride, 20 mEq, Once PRN  potassium chloride, 20 mEq, Q1H PRN  potassium chloride, 20 mEq, Q30 Min PRN      Home Medications:   Prior to Admission medications    Medication Sig Start Date End Date Taking?  Authorizing Provider   ALPRAZolam Terence Morocho) 0 5 mg tablet Take 1-2 tablets 30-60 minutes prior to leaving home prn 3/18/21   Fran Momin MD   EPINEPHrine (EPIPEN) 0 3 mg/0 3 mL SOAJ Inject 0 3 mL (0 3 mg total) into a muscle once for 1 dose 9/21/18 3/18/21  Rob Walton, DO ---------------------------------------------------------------------------------------------------------------------------------------------------------------------  Vitals:   Vitals:    22 0400 22 0500 22 0546 22 0600   BP: 121/62 118/69     Pulse: (!) 112 (!) 109  104   Resp: 14 14  18   Temp: 100 °F (37 8 °C) 99 5 °F (37 5 °C)  99 9 °F (37 7 °C)   TempSrc: Bladder   Bladder   SpO2: 95% 96%  97%   Weight:   100 kg (220 lb 14 4 oz)    Height:         Arterial Line:  Arterial Line BP: 123/61  Arterial Line MAP (mmHg): 77 mmHg    Tele Rhythm: Sinus Tachycardia This was personally reviewed by myself  Respiratory:  SpO2: SpO2: 97 %, SpO2 Activity: SpO2 Activity: At Rest, SpO2 Device: O2 Device: Ventilator  Nasal Cannula O2 Flow Rate (L/min): 2 L/min    Ventilator:  Respiratory  Report   Lab Data (Last 4 hours)      413            pH, Arterial       7 337             pCO2, Arterial       47 4             pO2, Arterial       82 5             HCO3, Arterial       24 8             Base Excess, Arterial       -1 3                  O2/Vent Data       340   Most Recent         Vent Mode AC/VC  AC/VC      Resp Rate (BPM) (BPM) 18  18      Vt (mL) (mL) 450  450      FIO2 (%) (%) 60  60      PEEP (cmH2O) (cmH2O) 8  8      MV 8 8  8 8                Temperature: Temp (24hrs), Av °F (37 2 °C), Min:97 7 °F (36 5 °C), Max:100 4 °F (38 °C)  Current: Temperature: 99 9 °F (37 7 °C)    Weights:   Weight (last 2 days)     Date/Time Weight    22 0546 100 (220 9)    22 0600 88 8 (195 77)    22 0600 89 (196 21)        Body mass index is 36 2 kg/m²      Hemodynamic Monitoring:  PAP: PAP: 29/21, CVP: CVP (mean): 11 mmHg, CO: 4 6  , CI: 2 3-2 6  , SVR: SVR (dyne*sec)/cm5: 1004 (dyne*sec)/cm5    Intake and Outputs:    Intake/Output Summary (Last 24 hours) at 2022 0650  Last data filed at 2022 0600  Gross per 24 hour   Intake 46011 14 ml   Output 3005 ml   Net 7907 14 ml     I/O last 24 hours: In: 20814 5 [I V :5551 5; Blood:4150; NG/GT:60; IV Piggyback:1700; Feedings:275]  Out: 4190 [Urine:2750; Blood:1250; Chest Tube:190]    Cellsaver 1250  1L LR and 500 albumin  3 6L blood  4L IV drips    UOP: 30-50/hour   BM: 0 in the last 24 hours    Chest tube Output:  Mediastinal tubes: 40 mL/8 hours  190 mL/24 hours   Pleural tubes: -- mL/8 hours  -- mL/24 hours     Labs:  Results from last 7 days   Lab Units 01/05/22  0412 01/04/22  2059 01/04/22  1335 01/04/22  1120 01/04/22  1116 01/04/22  0830 01/04/22  0450 01/03/22  1419 01/03/22  0550 01/01/22  1325 01/01/22  0443 12/31/21  0400 12/30/21  1007   WBC Thousand/uL 24 24*  --   --   --   --   --  14 22*  --  16 28*   < > 28 23*   < > 17 22*   HEMOGLOBIN g/dL 11 9* 12 6 12 6  --   --    < > 7 6*   < > 7 9*   < > 8 8*   < > 12 8   I STAT HEMOGLOBIN   --   --   --    < >  --    < >  --   --   --    < >  --    < >  --    HEMATOCRIT % 36 1* 37 2 37 7  --   --    < > 24 2*   < > 24 3*   < > 26 2*   < > 40 1   HEMATOCRIT, ISTAT   --   --   --    < >  --    < >  --   --   --    < >  --    < >  --    PLATELETS Thousands/uL 140*  --  174  --  17*   < > 53*  --   --    < > 164   < > 217   NEUTROS PCT %  --   --   --   --   --   --   --   --  73  --   --   --   --    MONOS PCT %  --   --   --   --   --   --   --   --  7  --   --   --   --    MONO PCT %  --   --   --   --   --   --   --   --   --   --  8  --  4    < > = values in this interval not displayed       Results from last 7 days   Lab Units 01/05/22  0411 01/04/22  2059 01/04/22  1716 01/04/22  1335 01/04/22  1335 01/04/22  1247 01/04/22  1215 01/04/22  1215 01/04/22  1153 01/04/22  1153 01/04/22  0830 01/04/22  0450 01/04/22  0450 01/03/22  1138 01/03/22  0550   SODIUM mmol/L 152* 152*  --   --  151*  --   --   --   --   --   --    < > 156*   < > 154*   POTASSIUM mmol/L 4 3 4 1 4 4   < > 4 1  --   --   --   --   --   --    < > 4 6   < > 4 1   CHLORIDE mmol/L 120* 119*  --   -- 120*  --   --   --   --   --   --    < > 121*   < > 120*   CO2 mmol/L 27 26  --   --  25  --   --   --   --   --   --    < > 35*   < > 32   CO2, I-STAT mmol/L  --   --   --   --   --  30   < > 29  30   < > 31   < >  --   --   --   --    BUN mg/dL 57* 54*  --   --  46*  --   --   --   --   --   --    < > 59*   < > 81*   CREATININE mg/dL 1 78* 1 72*  --   --  1 35*  --   --   --   --   --   --    < > 1 24   < > 1 56*   CALCIUM mg/dL 8 8 8 5  --   --  9 5  --   --   --   --   --   --    < > 7 7*   < > 8 2*   ALK PHOS U/L 89  --   --   --   --   --   --   --   --   --   --   --  131*  --  136*   ALT U/L 202*  --   --   --   --   --   --   --   --   --   --   --  651*  --  956*   AST U/L 127*  --   --   --   --   --   --   --   --   --   --   --  103*  --  147*   GLUCOSE, ISTAT mg/dl  --   --   --   --   --  207*  --  200*  208*  --  170*   < >  --   --   --   --     < > = values in this interval not displayed  Baseline Creat: 1  1? Results from last 7 days   Lab Units 01/05/22  0411 01/04/22  0450 01/03/22  0550   MAGNESIUM mg/dL 3 7* 3 1* 3 5*     Results from last 7 days   Lab Units 01/03/22  0550 01/02/22  0529 01/01/22  0433   PHOSPHORUS mg/dL 3 8 4 6* 5 3*      Results from last 7 days   Lab Units 01/04/22  1335 01/04/22  0556 01/04/22  0450 01/04/22  0214 01/03/22  1138 01/03/22  0644   INR  1 59*  --  1 27*  --   --  1 32*   PTT seconds 34 49*  --  50*   < > 73*    < > = values in this interval not displayed       Results from last 7 days   Lab Units 12/31/21  1650 12/31/21  1326 12/31/21  0836   LACTIC ACID mmol/L 1 5 2 1* 2 0     Results from last 7 days   Lab Units 01/05/22  0413 01/04/22  2100 01/04/22  2100 01/04/22  1619 01/04/22  1619   PH ART  7 337*   < > 7 338*   < > 7 338*   PCO2 ART mm Hg 47 4*   < > 45 8*   < > 45 1*   PO2 ART mm Hg 82 5   < > 120 7   < > 88 1   HCO3 ART mmol/L 24 8   < > 24 0   < > 23 7   BASE EXC ART mmol/L -1 3   < > -1 9   < > -2 3   ABG SOURCE  Line, Arterial  --  Line, Arterial  --  Line, Arterial    < > = values in this interval not displayed  SVO2: 58%    Micro:   Blood Culture:   Lab Results   Component Value Date    BLOODCX No Growth After 5 Days  12/30/2021    BLOODCX No Growth After 5 Days  12/30/2021     Urine Culture: No results found for: URINECX  Sputum Culture: No components found for: SPUTUMCX  Wound Culure: No results found for: WOUNDCULT    Diagnositic Studies:  01/05/22 CXR: ETT shallow- will advance  [place] in good position  MARIO shallow  This was personally reviewed by myself in PACS  01/05/22 EKG: Sinus tachy  Incomplete RBBB  This was personally reviewed by myself  Nutrition:        Diet Orders   (From admission, onward)             Start     Ordered    01/05/22 0525  Diet Enteral/Parenteral; Tube Feeding No Oral Diet; Jevity 1 2 Ori; Continuous; 0; 150; Water; Every 4 hours  Diet effective 0500        References:    Nutrtion Support Algorithm Enteral vs  Parenteral   Question Answer Comment   Diet Type Enteral/Parenteral    Enteral/Parenteral Tube Feeding No Oral Diet    Tube Feeding Formula: Jevity 1 2 Ori    Bolus/Cyclic/Continuous Continuous    Tube Feeding Goal Rate (mL/hr): 0    Tube Feeding flush (mL): 150    Water Flush type: Water    Water flush frequency: Every 4 hours    RD to adjust diet per protocol? Yes        01/05/22 0525              Physical Exam  Vitals reviewed  Constitutional:       Interventions: He is sedated and intubated  Neck:      Comments: ROQUE hernandez and MARIO TLC  Cardiovascular:      Rate and Rhythm: Normal rate and regular rhythm  Heart sounds: No murmur heard  No friction rub  Pulmonary:      Effort: Pulmonary effort is normal  He is intubated  Breath sounds: Normal breath sounds  No wheezing, rhonchi or rales  Genitourinary:     Comments: +Chang  Skin:     General: Skin is warm and dry     Neurological:      Comments: Sedated but follows commands by report   Psychiatric:      Comments: Cannot assess Invasive lines and devices: Invasive Devices  Report    Central Venous Catheter Line            CVC Central Lines 12/30/21 Triple Left Internal jugular 5 days    Introducer 12/31/21 4 days    LDA ECMO 12/31/21 Femoral artery  4 days          Arterial Line            Arterial Line 01/01/22 Other (Comment) 3 days          Line            Pacer Wires <1 day    Pacer Wires <1 day          Drain            Urethral Catheter Temperature probe 5 days    Chest Tube 1 Anterior Mediastinal 32 Fr  <1 day    Chest Tube 2 Posterior Mediastinal 32 Fr  <1 day    NG/OG/Enteral Tube Orogastric Center mouth <1 day          Airway            ETT  Hi-Lo; Cuffed;Oral 8 5 mm 4 days              ---------------------------------------------------------------------------------------------------------------------------------------------------------------------  Code Status: Level 1 - Full Code    Care Time Delivered:   Upon my evaluation, this patient had a high probability of imminent or life-threatening deterioration due to hemodynamic instability, respiratory failure, ABLA, which required my direct attention, intervention, and personal management  I have personally provided 39 minutes (0600 to [phone]) of critical care time, exclusive of procedures, teaching, family meetings, and any prior time recorded by providers other than myself  Collaborative bedside rounds performed with cardiac surgery attending, critical care attending and bedside RN      SIGNATURE: Krystin Ventura PA-C  DATE: January 5, 2022  TIME: 6:38 AM

## 2022-01-05 NOTE — PROGRESS NOTES
Progress Note - Cardiothoracic Surgery   [facility] [address] 61 y o  male MRN: [id number]  Unit/Bed#: [facility] 418-01 Encounter: [id number]      POD 1 Repair of Post-infarction VSD, VA ECMO decannulation  Pt seen/examined    Interval history and data reviewed with critical care team     Epi gtt 6  Milrinone gtt is @ 0 5  Vaso gtt 0 04  Levophed gtt @ 5    Remains intubated with 60% FiO2 and PEEP 8      Medications:   Scheduled Meds:  Current Facility-Administered Medications   Medication Dose Route Frequency Provider Last Rate    acetaminophen  650 mg Rectal Q4H PRN Karen Palencia PA-C      acetaminophen  650 mg Oral Q8H Yaima Parish PA-C      amiodarone  200 mg Oral [facility] Munira Murdock PA-C      aspirin  325 mg Oral Daily Karen Palencia, [place]      atorvastatin  80 mg Oral After [employer]      bisacodyl  10 mg Rectal Daily PRN Karen Palencia PA-C      cefazolin  2,000 mg Intravenous [address] [place] 2,000 mg (01/05/22 7269)    chlorhexidine  15 mL [place], [place]      epinephrine  1-10 mcg/min Intravenous Titrated Keely Mendoza PA-C 6 mcg/min (01/05/22 0236)    fentaNYL  50 mcg/hr Intravenous Continuous Keely Mendoza PA-C 50 mcg/hr (01/05/22 1565)    fentanyl citrate (PF)  50 mcg Intravenous Q1H PRN Karen Palencia PA-C      fondaparinux  2 5 mg Subcutaneous Q24H Yaima Parish PA-C      HYDROmorphone  0 5 mg Intravenous Q2H PRN Karen Palencia PA-C      insulin regular (HumuLIN R,NovoLIN R) infusion  0 3-21 Units/hr Intravenous Titrated Karen Palencia PA-C 1 Units/hr (01/05/22 0737)    lidocaine (cardiac)  100 mg Intravenous Q30 Min PRN Munira Murdock PA-C      milrinone [facility]) infusion  0 5 mcg/kg/min Intravenous Continuous Cornelius Jennings PA-C 0 5 mcg/kg/min (01/05/22 8497)    mupirocin  1 application Nasal [address] Munira Murdock PA-C      norepinephrine  1-30 mcg/min Intravenous Titrated Keely Mendoza PA-C 5 mcg/min (01/05/22 0601)    ondansetron  4 mg Intravenous Q6H PRN Bolivar Cords, PA-C      oxyCODONE  2 5 mg Oral Q4H PRN Bolivar Cords, PA-C      oxyCODONE  5 mg Oral Q4H PRN Bolivar Cords, PA-C      pantoprazole  40 mg Oral Early Morning Bolivar Cords, PA-C      polyethylene glycol  17 g Oral Daily Bolivar Asim, [place]      potassium chloride  20 mEq Intravenous Once PRN Bolivar Cords, PA-C      potassium chloride  20 mEq Intravenous Q1H PRN Bolivar Cords, PA-C      potassium chloride  20 mEq Intravenous Q30 Min PRN Bolivar Cords, PA-C      sodium chloride  20 mL/hr Intravenous Continuous Bolivar Cords, PA-C 20 mL/hr (01/04/22 1410)    vasopressin  0 04 Units/min Intravenous Continuous Dain Mercly, IGNACIONP 0 04 Units/min (01/05/22 0431)     Continuous Infusions:epinephrine, 1-10 mcg/min, Last Rate: 6 mcg/min (01/05/22 0236)  fentaNYL, 50 mcg/hr, Last Rate: 50 mcg/hr (01/05/22 0624)  insulin regular (HumuLIN R,NovoLIN R) infusion, 0 3-21 Units/hr, Last Rate: 1 Units/hr (01/05/22 0737)  milrinone (PRIMACOR) infusion, 0 5 mcg/kg/min, Last Rate: 0 5 mcg/kg/min (01/05/22 0433)  norepinephrine, 1-30 mcg/min, Last Rate: 5 mcg/min (01/05/22 0601)  sodium chloride, 20 mL/hr, Last Rate: 20 mL/hr (01/04/22 1410)  vasopressin, 0 04 Units/min, Last Rate: 0 04 Units/min (01/05/22 0431)      PRN Meds:   acetaminophen    bisacodyl    fentanyl citrate (PF)    HYDROmorphone    lidocaine (cardiac)    ondansetron    oxyCODONE    oxyCODONE    potassium chloride    potassium chloride    potassium chloride    Vitals: Blood pressure 118/69, pulse 104, temperature 99 9 °F (37 7 °C), temperature source Bladder, resp  rate 18, height 5' 5 5" (1 664 m), weight 100 kg (220 lb 14 4 oz), SpO2 97 %  ,Body mass index is 36 2 kg/m²  I/O last 24 hours: In: 91846 9 [I V :4078 9; Blood:3650; NG/GT:60; IV Piggyback:1700]  Out: 2960 [Urine:1520; Blood:1250;  Chest Tube:190]  Invasive Devices  Report    Central Venous Catheter Line            CVC Central Lines 12/30/21 Triple Left Internal jugular 5 days    Introducer 12/31/21 4 days    LDA ECMO 12/31/21 Femoral artery  4 days          Arterial Line            Arterial Line 01/01/22 Other (Comment) 3 days          Line            Pacer Wires <1 day    Pacer Wires <1 day          Drain            Urethral Catheter Temperature probe 5 days    Chest Tube 1 Anterior Mediastinal 32 Fr  <1 day    Chest Tube 2 Posterior Mediastinal 32 Fr  <1 day    NG/OG/Enteral Tube Orogastric Center mouth <1 day          Airway            ETT  Hi-Lo; Cuffed;Oral 8 5 mm 4 days                  Lab, Imaging and other studies:   Results from last 7 days   Lab Units 01/05/22 0412 01/04/22 2059 01/04/22  1335 01/04/22  1120 01/04/22  1116 01/04/22  0830 01/04/22  0450 01/03/22  1419 01/03/22  0550   WBC Thousand/uL 24 24*  --   --   --   --   --  14 22*  --  16 28*   HEMOGLOBIN g/dL 11 9* 12 6 12 6  --   --    < > 7 6*   < > 7 9*   I STAT HEMOGLOBIN   --   --   --    < >  --    < >  --   --   --    HEMATOCRIT % 36 1* 37 2 37 7  --   --    < > 24 2*   < > 24 3*   HEMATOCRIT, ISTAT   --   --   --    < >  --    < >  --   --   --    PLATELETS Thousands/uL 140*  --  174  --  17*   < > 53*  --   --     < > = values in this interval not displayed  Results from last 7 days   Lab Units 01/05/22 0411 01/04/22 2059 01/04/22  1716 01/04/22  1335 01/04/22  1335 01/04/22  1247 01/04/22  0450   POTASSIUM mmol/L 4 3 4 1 4 4   < > 4 1  --    < >   CHLORIDE mmol/L 120* 119*  --   --  120*  --    < >   CO2 mmol/L 27 26  --   --  25  --    < >   CO2, I-STAT mmol/L  --   --   --   --   --  30  --    BUN mg/dL 57* 54*  --   --  46*  --    < >   CREATININE mg/dL 1 78* 1 72*  --   --  1 35*  --    < >   GLUCOSE, ISTAT mg/dl  --   --   --   --   --  207*  --    CALCIUM mg/dL 8 8 8 5  --   --  9 5  --    < >    < > = values in this interval not displayed       Results from last 7 days   Lab Units 01/04/22  1335 01/04/22  0556 01/04/22  0450 01/04/22  0214 01/03/22  1138 01/03/22  0644   INR  1 59*  --  1 27*  --   --  1 32*   PTT seconds 34 49*  --  50*   < > 73*    < > = values in this interval not displayed  Recent Labs     01/05/22  0413   PHART 7 337*   [id number] 24 8   PO2ART 82 5   [id number] 47 4*   BEART -1 3           Plan:    Wean Epi gtt as able  Wean Levo gtt as able  Continue Milrinone gtt  Continue Vasopressin gtt  Diuresis  Wean vent and extubate if ready        Poornima Martinez MD  DATE: January 5, 2022  TIME: 8:04 AM

## 2022-01-05 NOTE — RESPIRATORY THERAPY NOTE
RT Ventilator Management Note  Kylah Saunders 61 y o  male MRN: [id number]  Unit/Bed#: [facility] 418-01 Encounter: [id number]      Daily Screen       1/4/2022  1343 1/5/2022  0754          Patient safety screen outcome[de-identified] Failed Failed      Not Ready for Weaning due to[de-identified] Underline problem not resolved Underline problem not resolved              Physical Exam:   Assessment Type: (P) Assess only  General Appearance: (P) Sedated  Respiratory Pattern: (P) Assisted  Chest Assessment: (P) Chest expansion symmetrical  Bilateral Breath Sounds: (P) Diminished,Coarse  Suction: (P) ET Tube      Resp Comments: (P) ET tube advanced to 27 cm per AP  No weaning at this time until FIO2 and peep decreased   Pt resting comfortably at this time

## 2022-01-05 NOTE — UTILIZATION REVIEW
Continued Stay Review    Date: 1/4/2022                         Current Patient Class:  Inpatient   Current Level of Care:  Critical Care     HPI:66 y o  male initially admitted on 12/30/2021in multi-system organ failure and cardiogenic shock, due to a recent MI  Acetaminophen toxic  To the OR on 12/31 - Initiation of venoarterial Extracorporeal Life Support       Assessment/Plan: 1/4/2022 - Pt was on ECMO to start this am  To the OR - PROCEDURES: 1  Repair of post-infarction ventricular septal defect; 2  Venoarterial ECMO decannulation    ANESTHESIA General endotracheal anesthesia with transesophageal echocardiogram guidance  - Chest tubes x 2 - Pacing wires  A x 1 V x 1  - transfused 6u PRBC, 4u Plts, 2u FFP, 2u Cryo  SPECIMENS: Portion of infarcted anterior wall of left ventricle  CV: MAP goal >65  SBP goal <130  CI>2 2  Post-op medications: Epinephrine, 6 mcg/min, Primacor, 0 5 mcg/kg/min, Levophed, 6 mcg/min, Vasopressin, 0 04 Units/min  Wean levo/vaso as able  Volume resuscitation as needed  Monitor rhythm on telemetry  Epicardial pacing wires Ax1, Vx1  Intra-op CORIE LVEF 40%, no residual VSD    Lung:  Wean vent with spontaneous breathing trial with goal to extubate,  low threshold to keep intubated overnight due to hemodynamic instability  1/5 - POD 1 - no acute events overnight, she remains intubated Maintained on milrinone, epi, levo and Vaso gtts  Plan to wean, epi and levophed as able, Maintain Loyd Panda cath and A line  Epicardial pacing wires    Chest tube outpurt remains persistently high, continue to suction today    Chang cath to remain trend UOP, strict I & O's, Bun/Cr    Vital Signs:   Date/Time Temp Pulse Resp BP MAP (mmHg) Arterial Line BP MAP SpO2 FiO2 (%) O2 Device Patient Position - Orthostatic VS CVP (mean)   01/05/22 0600 99 9 °F (37 7 °C) 104 18 -- -- 123/61 77 mmHg 97 % -- -- -- 11 mmHg   01/05/22 0500 99 5 °F (37 5 °C) 109 Abnormal  14 118/69 87 119/59 76 mmHg 96 % -- -- -- 17 mmHg   01/05/22 0400 100 °F (37 8 °C) 112 Abnormal  14 121/62 84 124/59 74 mmHg 95 % -- -- -- 15 mmHg   01/05/22 0349 100 °F (37 8 °C) 128 Abnormal  20 133/72 93 144/76 94 mmHg 96 % -- -- -- 22 mmHg   01/05/22 0340 -- -- -- -- -- -- -- 97 % -- -- -- --   01/05/22 0311 100 2 °F (37 9 °C) 123 Abnormal  18 -- -- 100/52 62 mmHg 97 % -- -- -- 14 mmHg   01/05/22 0300 100 2 °F (37 9 °C) 124 Abnormal  18 98/55 68 104/54 65 mmHg 97 % -- -- -- 13 mmHg   01/05/22 0200 100 2 °F (37 9 °C) 122 Abnormal  18 94/57 70 107/58 70 mmHg 98 % -- -- -- 13 mmHg   01/05/22 0100 100 4 °F (38 °C) 124 Abnormal  18 90/53 66 104/56 67 mmHg 98 % -- -- -- 12 mmHg   01/05/22 0000 100 °F (37 8 °C) 124 Abnormal  18 95/58 71 109/60 71 mmHg 98 % -- -- -- 13 mmHg   01/04/22 2300 100 °F (37 8 °C) 124 Abnormal  18 97/58 69 102/56 66 mmHg 97 % -- -- -- 12 mmHg   01/04/22 2200 99 7 °F (37 6 °C) 124 Abnormal  18 88/53 Abnormal  64 95/52 61 mmHg 97 % -- -- -- 12 mmHg   01/04/22 2100 99 3 °F (37 4 °C) 123 Abnormal  18 101/55 65 96/51 61 mmHg 97 % -- -- -- 13 mmHg   01/04/22 2000 98 8 °F (37 1 °C) 122 Abnormal  18 99/53 65 96/54 64 mmHg 97 % -- -- -- 13 mmHg   01/04/22 1959 -- -- -- -- -- -- -- 97 % -- -- -- --   01/04/22 1900 98 2 °F (36 8 °C) 122 Abnormal  19 95/56 70 106/55 66 mmHg 96 % -- -- -- 16 mmHg   01/04/22 1838 98 2 °F (36 8 °C) 122 Abnormal  18 105/63 76 116/59 72 mmHg 95 % -- -- -- 15 mmHg   01/04/22 1800 98 1 °F (36 7 °C) 120 Abnormal  18 85/54 Abnormal  64 105/54 64 mmHg 95 % -- -- -- 15 mmHg   01/04/22 1700 98 1 °F (36 7 °C) 122 Abnormal  18 86/52 Abnormal  63 94/47 58 mmHg 94 % -- -- -- 13 mmHg   01/04/22 1623 -- -- -- -- -- -- -- 93 % -- -- -- --   01/04/22 1600 97 9 °F (36 6 °C) 120 Abnormal  18 88/55 Abnormal  65 86/52 59 mmHg 93 % 80 -- Lying 19 mmHg   01/04/22 1500 97 7 °F (36 5 °C) 120 Abnormal  18 93/61 71 102/54 64 mmHg 92 % -- -- -- 18 mmHg   01/04/22 1430 97 7 °F (36 5 °C) 123 Abnormal  18 91/58 69 101/54 64 mmHg 93 % -- -- -- 18 mmHg 01/04/22 1420 97 7 °F (36 5 °C) 122 Abnormal  18 -- -- 121/63 76 mmHg 93 % -- -- -- 19 mmHg   01/04/22 1400 97 7 °F (36 5 °C) 124 Abnormal  18 92/56 68 97/51 61 mmHg 93 % -- -- -- 17 mmHg   01/04/22 1345 -- 128 Abnormal  18 -- -- 111/58 70 mmHg 95 % -- -- -- 21 mmHg   01/04/22 1343 -- -- -- -- -- -- -- 98 % -- -- -- --   01/04/22 1330 -- 126 Abnormal  14 -- -- 114/59 71 mmHg 91 % -- -- -- 27 mmHg   01/04/22 1320 -- 126 Abnormal  14 -- -- 125/66 79 mmHg 89 % Abnormal  -- -- -- 20 mmHg   01/04/22 1315 97 7 °F (36 5 °C) 126 Abnormal  14 -- -- 91/42 56 mmHg 90 % -- Ventilator -- --   01/04/22 1312 -- 127 Abnormal  30 Abnormal  -- -- 107/52 64 mmHg 96 % -- -- -- --   01/04/22 0741 -- 67 14 -- -- 68/67 68 mmHg 98 % -- -- -- --   01/04/22 0700 98 8 °F (37 1 °C) 68 13 -- -- 80/73 77 mmHg 99 % -- Ventilator -- 5 mmHg   01/04/22 0645 98 8 °F (37 1 °C) 71 17 --  -- 76/66 72 mmHg 98 % -- -- -- --   01/04/22 0630 98 8 °F (37 1 °C) 72 16 -- -- 70/65 68 mmHg 98 % -- -- -- --   01/04/22 0625 98 8 °F (37 1 °C) 71 14 -- -- 67/62 65 mmHg 98 % -- -- -- --   01/04/22 0620 98 8 °F (37 1 °C) 71 11 Abnormal  -- -- 67/62 63 mmHg 98 % -- -- -- --   01/04/22 0616 98 8 °F (37 1 °C) 70 12 -- -- 60/57 60 mmHg 98 % -- -- -- --   01/04/22 0614 98 8 °F (37 1 °C) 71 24 Abnormal  --  -- -- -- -- -- -- -- --   01/04/22 0600 98 8 °F (37 1 °C) 69 16 -- -- 66/62 65 mmHg 98 % -- -- -- --   01/04/22 0500 98 8 °F (37 1 °C) 67 13 -- -- 62/58 61 mmHg 98 % -- -- -- 4 mmHg   01/04/22 0400 98 8 °F (37 1 °C) 69 13 -- -- 68/66 67 mmHg 98 % -- -- -- 1 mmHg   01/04/22 0300 98 8 °F (37 1 °C) 70 13 -- -- 66/64 66 mmHg 99 % -- -- -- 2 mmHg   01/04/22 0200 98 8 °F (37 1 °C) 70 14 -- -- 68/68 68 mmHg 99 % -- -- -- 1 mmHg   01/04/22 0100 98 8 °F (37 1 °C) 73 14 -- -- 65/63 64 mmHg 99 % -- -- -- 1 mmHg   01/04/22 0000 98 8 °F (37 1 °C) 85 14 -- -- 69/65 68 mmHg 99 % -- -- -- 4 mmHg   01/03/22 2200 98 8 °F (37 1 °C) 72 14 -- -- 68/68 68 mmHg 98 % -- -- -- 2 mmHg 01/03/22 2100 98 8 °F (37 1 °C) 75 14 -- -- 68/66 67 mmHg 99 % -- -- -- 3 mmHg   01/03/22 2000 98 8 °F (37 1 °C) 71 14 -- -- 74/72 73 mmHg 99 % -- -- -- 2 mmHg   01/03/22 1900 98 8 °F (37 1 °C) 72 14 -- -- 73/72 72 mmHg 99 % -- -- -- 3 mmHg   01/03/22 1800 98 6 °F (37 °C) 67 16 -- -- 66/66 66 mmHg 98 % -- -- -- 2 mmHg   01/03/22 1700 98 8 °F (37 1 °C) 68 14 -- -- 71/70 71 mmHg 98 % -- -- -- 2 mmHg   01/03/22 1600 98 8 °F (37 1 °C) 68 14 -- -- 66/65 65 mmHg 99 % -- Ventilator -- 1 mmHg   01/03/22 1547 -- -- -- -- -- -- -- 99 % -- -- -- --   01/03/22 1500 98 6 °F (37 °C) 68 14 -- -- 57/57 57 mmHg 99 % -- -- -- 2 mmHg   01/03/22 1400 98 6 °F (37 °C) 64 14 -- -- 71/71 71 mmHg 99 % -- -- -- 3 mmHg   01/03/22 1300 98 6 °F (37 °C) 66 14 -- -- 75/75 75 mmHg 99 % -- -- -- 4 mmHg   01/03/22 1200 98 6 °F (37 °C) 61 14 -- -- 60/60 60 mmHg 99 % 40 Ventilator -- 4 mmHg     Pertinent Labs/Diagnostic Results:   Results from last 7 days   Lab Units 12/30/21  0956   SARS-COV-2  Negative     Results from last 7 days   Lab Units 01/05/22  0412 01/04/22  2059 01/04/22  1335 01/04/22  1247 01/04/22  1215 01/04/22  1120 01/04/22  1116 01/04/22  0830 01/04/22  0450 01/03/22  1419 01/03/22  0550 01/01/22  1325 01/01/22  0443   WBC Thousand/uL 24 24*  --   --   --   --   --   --   --  14 22*  --  16 28*   < > 28 23*   HEMOGLOBIN g/dL 11 9* 12 6 12 6  --   --   --   --   --  7 6*   < > 7 9*   < > 8 8*   I STAT HEMOGLOBIN g/dl  --   --   --  9 2* 9 2*  9 5*   < >  --    < >  --   --   --    < >  --    HEMATOCRIT % 36 1* 37 2 37 7  --   --   --   --   --  24 2*   < > 24 3*   < > 26 2*   HEMATOCRIT, ISTAT %  --   --   --  27* 27*  28*   < >  --    < >  --   --   --    < >  --    PLATELETS Thousands/uL 140*  --  174  --   --   --  17*   < > 53*  --   --    < > 164   NEUTROS ABS Thousands/µL  --   --   --   --   --   --   --   --   --   --  11 90*  --   --    BANDS PCT %  --   --   --   --   --   --   --   --   --   --   --   --  4    < > = values in this interval not displayed           Results from last 7 days   Lab Units 01/05/22  0412 01/05/22  0411 01/04/22  2242 01/04/22  2059 01/04/22  1716 01/04/22  1335 01/04/22  1247 01/04/22  1215 01/04/22  1153 01/04/22  1153 01/04/22  0830 01/04/22  0450 01/03/22  2358 01/03/22  2358 01/03/22  1138 01/03/22  0550 01/02/22  0530 01/02/22  0529 01/01/22  1325 01/01/22  0433 12/31/21  0411 12/31/21  0400 12/31/21  0036 12/30/21  1632   SODIUM mmol/L  --  152*  --  152*  --  151*  --   --   --   --   --  156*  --  153*   < > 154*   < > 150*   < > 143   < >  --    < > 134*   POTASSIUM mmol/L  --  4 3  --  4 1 4 4 4 1  --   --   --   --   --  4 6   < > 3 7   < > 4 1   < > 3 8   < > 4 1   < >  --    < > 4 7   CHLORIDE mmol/L  --  120*  --  119*  --  120*  --   --   --   --   --  121*  --  118*   < > 120*   < > 113*   < > 108   < >  --    < > 101   CO2 mmol/L  --  27  --  26  --  25  --   --   --   --   --  35*   < > 33*   < > 32   < > 29   < > 24   < >  --    < > 16*   CO2, I-STAT mmol/L  --   --   --   --   --   --  30 29  30   < > 31   < >  --   --   --   --   --    < >  --   --   --    < >  --   --   --    ANION GAP mmol/L  --  5  --  7  --  6  --   --   --   --   --  0*  --  2*   < > 2*   < > 8   < > 11   < >  --    < > 17*   BUN mg/dL  --  57*  --  54*  --  46*  --   --   --   --   --  59*  --  64*   < > 81*   < > 124*   < > 133*   < >  --    < > 147*   CREATININE mg/dL  --  1 78*  --  1 72*  --  1 35*  --   --   --   --   --  1 24  --  1 33*   < > 1 56*   < > 2 32*   < > 3 01*   < >  --    < > 3 53*   EGFR ml/min/1 73sq m  --  39  --  41  --  55  --   --   --   --   --  61  --  56   < > 46   < > 28   < > 20   < >  --    < > 17   CALCIUM mg/dL  --  8 8  --  8 5  --  9 5  --   --   --   --   --  7 7*  --  8 3   < > 8 2*   < > 9 3   < > 8 3   < >  --    < > 8 2*   CALCIUM, IONIZED mmol/L 1 13  --  1 10*  --   --   --   --   --   --   --    < >  --   --   --   --  1 09*   < >  --   --  1 08*   < > 0 95*  -- --    CALCIUM, IONIZED, ISTAT mmol/L  --   --   --   --   --   --  1 33* 1 16  1 19  --  1 33*   < >  --   --   --   --   --    < >  --   --   --    < >  --   --   --    MAGNESIUM mg/dL  --  3 7*  --   --   --   --   --   --   --   --   --  3 1*  --   --   --  3 5*  --  3 5*  --  3 2*   < > 3 4*  --  3 5*   PHOSPHORUS mg/dL  --   --   --   --   --   --   --   --   --   --   --   --   --   --   --  3 8  --  4 6*  --  5 3*  --  6 0*  --  9 0*    < > = values in this interval not displayed       Results from last 7 days   Lab Units 01/05/22 0411 01/04/22  0450 01/03/22  0550 01/02/22  0529 01/01/22  1325   AST U/L 127* 103* 147* 268* 349*   ALT U/L 202* 651* 956* 1,720* 2,325*   ALK PHOS U/L 89 131* 136* 153* 140*   TOTAL PROTEIN g/dL 5 7* 4 8* 5 2* 5 5* 5 7*   ALBUMIN g/dL 3 2* 2 4* 2 6* 3 1* 3 1*   TOTAL BILIRUBIN mg/dL 3 76* 1 45* 1 40* 2 27* 3 27*   BILIRUBIN DIRECT mg/dL 2 82* 0 73* 0 69* 1 25* 1 99*     Results from last 7 days   Lab Units 01/05/22  0613 01/05/22  0413 01/05/22  0149 01/05/22  0011 01/04/22  2210 01/04/22  1959 01/04/22  1756 01/04/22  1558 01/04/22  1339 01/04/22  0557 01/04/22  0416 01/04/22  0217   POC GLUCOSE mg/dl 132 146* 106 114 136 180* 230* 211* 190* 110 98 86     Results from last 7 days   Lab Units 01/05/22 0411 01/04/22 2059 01/04/22  1335 01/04/22  0450 01/03/22  2358 01/03/22  1814 01/03/22  1138 01/03/22  0550 01/02/22  2348 01/02/22  1746 01/02/22  1132 01/02/22  0529   GLUCOSE RANDOM mg/dL 143* 171* 212* 109 160* 153* 120 128 151* 145* 137 127         Results from last 7 days   Lab Units 12/31/21  0400 12/30/21  1007   HEMOGLOBIN A1C % 5 9* 5 7*   EAG mg/dl 123 117     Results from last 7 days   Lab Units 01/05/22  0413 01/04/22  2100 01/04/22  1619   PH ART  7 337* 7 338* 7 338*   PCO2 ART mm Hg 47 4* 45 8* 45 1*   PO2 ART mm Hg 82 5 120 7 88 1   HCO3 ART mmol/L 24 8 24 0 23 7   BASE EXC ART mmol/L -1 3 -1 9 -2 3   O2 CONTENT ART mL/dL 16 7 17 6 17 7   O2 HGB, ARTERIAL % 94 8 97 6* 94 9   ABG SOURCE  Line, Arterial Line, Arterial Line, Arterial     Results from last 7 days   Lab Units 01/05/22  0412 01/04/22  2100 01/04/22  1346   PH JANNET  7 315 7 287* 7 306   PCO2 JANNET mm Hg 52 1* 53 2* 54 0*   PO2 JANNET mm Hg 32 4* 38 8 35 8   HCO3 JANNET mmol/L 25 9 24 8 26 3   BASE EXC JANNET mmol/L -0 8 -2 4 -0 8   O2 CONTENT JANNET ml/dL 10 0 12 5 12 6   O2 HGB, VENOUS % 58 1* 70 3 66 3     Results from last 7 days   Lab Units 01/04/22  1247 01/04/22  1215 01/04/22  1153 01/04/22  1152 01/04/22  1145 01/04/22  1120 01/04/22  1120 01/04/22  1048 01/04/22  1009 01/04/22  0830 01/02/22  0925 12/31/21  1326 12/31/21  1149   PH, JANNET I-STAT   --   --   --   --   --   --   --   --  7 403*  --  7 490*  --  7 316   PCO2, JANNET ISTAT mm HG  --   --   --   --   --   --   --   --  50 2*  --  40 3*  --  39 1*   PO2, JANNET ISTAT mm HG  --   --   --   --   --   --   --   --  36 0  --  40 0  --  62 0*   HCO3, JANNET ISTAT mmol/L  --   --   --   --   --   --   --   --  31 3*  --  30 7*  --  19 9*   I STAT BASE EXC mmol/L 3 1  1 3   < > 3   < > 6*   < > 6*   < > 7*  11*   < > -6*  -4*   I STAT O2 SAT % 100* 67  49* 51*   < > 70   < > 100*   < > 67   < > 79   < > 89*   ISTAT PH ART  7 370  --   --   --  7 457*  --  7 397   < >  --    < > 7 534*   < > 7 359   I STAT ART PCO2 mm HG 49 7*  --   --   --  38 5  --  51 2*   < >  --    < > 41 3   < > 37 4   I STAT ART PO2 mm  0*  --   --   --  35 0*  --  222 0*   < >  --    < > >400 0*   < > >400 0*   I STAT ART HCO3 mmol/L 28 8*  --   --   --  27 2  --  31 5*   < >  --    < > 34 8*   < > 21 1*    < > = values in this interval not displayed           Results from last 7 days   Lab Units 12/30/21  1613 12/30/21  1007   HS TNI 0HR ng/L  --  5,882*   HS TNI 4HR ng/L 4,519*  --    HSTNI D4 ng/L -1,363  --          Results from last 7 days   Lab Units 01/04/22  1335 01/04/22  0556 01/04/22  0450 01/04/22  0214 01/03/22  1138 01/03/22  0644   PROTIME seconds 18 3*  --  15 3*  --   --  15 8* INR  1 59*  --  1 27*  --   --  1 32*   PTT seconds 34 49*  --  50*   < > 73*    < > = values in this interval not displayed  Results from last 7 days   Lab Units 01/04/22  0450 01/03/22  0550 01/02/22  0529 01/01/22  0433 12/31/21  0422   PROCALCITONIN ng/ml 0 31* 0 61* 1 26* 2 90* 2 16*     Results from last 7 days   Lab Units 12/31/21  1650 12/31/21  1326 12/31/21  0836 12/31/21  0544 12/31/21  0036   LACTIC ACID mmol/L 1 5 2 1* 2 0 2 3* 2 8*       Results from last 7 days   Lab Units 12/30/21  1007   NT-PRO BNP pg/mL 43,122*       Results from last 7 days   Lab Units 12/30/21  1223   CLARITY UA  Cloudy   COLOR UA  Dk Yellow   SPEC GRAV UA  1 019   PH UA  5 0   GLUCOSE UA mg/dl Negative   KETONES UA mg/dl Negative   BLOOD UA  Moderate*   PROTEIN UA mg/dl 30 (1+)*   NITRITE UA  Negative   BILIRUBIN UA  Interference- unable to analyze*   UROBILINOGEN UA E U /dl 2 0*   LEUKOCYTES UA  Small*   WBC UA /hpf 2-4   RBC UA /hpf 10-20*   BACTERIA UA /hpf Occasional   EPITHELIAL CELLS WET PREP /hpf Moderate*     Results from last 7 days   Lab Units 12/30/21  0956   INFLUENZA A PCR  Negative   INFLUENZA B PCR  Negative   RSV PCR  Negative     Results from last 7 days   Lab Units 12/31/21  1729 12/30/21  1613 12/30/21  1007   ACETAMINOPHEN LVL ug/mL 2*  --  21*   SALICYLATE LVL mg/dL  --  3  --        Results from last 7 days   Lab Units 12/30/21  1129 12/30/21  1010   BLOOD CULTURE  No Growth After 5 Days  No Growth After 5 Days  Medications:   Scheduled Medications:   Albumin 12 5 G IV Once - 1/5 x2  acetaminophen, 975 mg, Oral, Q8H  amiodarone, 200 mg, Oral, Q8H DENITA  aspirin, 325 mg, Oral, Daily  atorvastatin, 80 mg, Oral, After Dinner  calcium chloride, 1 g, Intravenous, Once - 1/5 x 1  cefazolin, 2,000 mg, Intravenous, Q8H  chlorhexidine, 15 mL, Swish & Spit, Q12H [facility]  fentanyl citrate (PF), 50 mcg, Intravenous, Once  fentanyl citrate (PF), 50 mcg, Intravenous, Once  fondaparinux, 2 5 mg, Subcutaneous, Daily  mupirocin, 1 application, Nasal, Y58V DENITA  pantoprazole, 40 mg, Oral, Early Morning  polyethylene glycol, 17 g, Oral, Daily  Lasix 40 mg iv once  - 1/5 x 1       Continuous IV Infusions:  epinephrine, 1-10 mcg/min, Intravenous, Titrated  fentaNYL, 50 mcg/hr, Intravenous, Continuous  insulin regular (HumuLIN R,NovoLIN R) infusion, 0 3-21 Units/hr, Intravenous, Titrated  milrinone (PRIMACOR) infusion, 0 5 mcg/kg/min, Intravenous, Continuous  norepinephrine, 1-30 mcg/min, Intravenous, Titrated  sodium chloride, 20 mL/hr, Intravenous, Continuous  vasopressin, 0 04 Units/min, Intravenous, Continuous      PRN Meds:  acetaminophen, 650 mg, Rectal, Q4H PRN  bisacodyl, 10 mg, Rectal, Daily PRN  fentanyl citrate (PF), 50 mcg, Intravenous, Q1H PRN - 14 x 3 - 1/5 x 5   furosemide, 40 mg, Intravenous, Q6H PRN  HYDROmorphone, 0 5 mg, Intravenous, Q2H PRN - 1/5 x 3   lactated ringers, 500 mL, Intravenous, Q30 Min PRN  lidocaine (cardiac), 100 mg, Intravenous, Q30 Min PRN  ondansetron, 4 mg, Intravenous, Q6H PRN  oxyCODONE, 2 5 mg, Oral, Q4H PRN  oxyCODONE, 5 mg, Oral, Q4H PRN   potassium chloride, 20 mEq, Intravenous, Once PRN  potassium chloride, 20 mEq, Intravenous, Q1H PRN  potassium chloride, 20 mEq, Intravenous, Q30 Min PRN        Discharge Plan:  [facility]     Network Utilization Review Department  ATTENTION: Please call with any questions or concerns to [phone] and carefully listen to the prompts so that you are directed to the right person  All voicemails are confidential   Raoul No all requests for admission clinical reviews, approved or denied determinations and any other requests to dedicated fax number below belonging to the campus where the patient is receiving treatment   List of dedicated fax numbers for the Facilities:  [address] DENIALS (Administrative/Medical Necessity) [phone]   [address] (Maternity/NICU/Pediatrics) [phone]   [address] [facility] 40 [address] [facility]  [phone]   [address] [facility] [place] [address] [id number] [place] [address] [address] [facility] [phone]

## 2022-01-05 NOTE — OCCUPATIONAL THERAPY NOTE
Occupational Therapy CX        Patient Name: Anju De La Cruz  UJSDE'T Date: 1/5/2022 01/05/22 0809   OT Last Visit   OT Visit Date 01/05/22   Note Type   Note type Evaluation   Cancel Reasons Intubated/sedated   Additional Comments Will cont to follow to see as medically appropriate         Helena Bush MS, OTR/L

## 2022-01-05 NOTE — RESPIRATORY THERAPY NOTE
RT Ventilator Management Note  Trev Pickett 61 y o  male MRN: [id number]  Unit/Bed#: [facility] 418-01 Encounter: [id number]      Daily Screen       1/4/2022  0447 1/4/2022  1343          Patient safety screen outcome[de-identified] Failed Failed      Not Ready for Weaning due to[de-identified] Underline problem not resolved Underline problem not resolved              Physical Exam:   Assessment Type: (P) Assess only  Respiratory Pattern: Assisted  Chest Assessment: Chest expansion symmetrical  Bilateral Breath Sounds: (P) Clear  Cough: None      Resp Comments: (P) Pt  doing well on A/C  FiO2 titrated to 60%  No other changes throughout the night

## 2022-01-05 NOTE — PROGRESS NOTES
PROGRESS NOTE - Cardiology  More Loving 61 y o  male MRN: [id number]  Unit/Bed#: [facility] 418-01 Encounter: [id number]  Assessment/Plan   Late onset MI complicated by VSD, cardiomyopathy and cardiogenic shock, status post VSD repair on 01/04/2022  -TTE on 12/30/2021:  Dilated LV, EF 45%, eccentric hypertrophy, dilated RV, with moderately reduced function, biatrial enlargement, moderate TR  -postop CORIE with EF of 40%, normal RV size and function, trace MR, moderate TR  -hemodynamics: on 12/31/2021 underwent VA-ECMO placement with decannulation on 01/04/2022  Still on multiple pressors, levophed (3), milrinone (0 5), epi (5), vaso (0 04) with cardiac output of about 4 3 liters/minute and CI 2 3  -volume status:  Hypervolemic, CVP 11-16 mmHg, status post IV Lasix 40 mg today  wlll recommend to continue diuresing to keep at least net negative  - on aspirin 325 mg daily, amiodarone 200 mg t i d , atorvastatin 80 mg daily  will reassess left ventricular function after ventricular septal defect repair to further decide on GDMT  -will discuss the timing for left heart catheterization for coronary anatomy delineation      Hypoxic failure status post intubation  -improved oxygen requrements  -would recommend to diurese to keep at least net negative fluid balance     JUDE on CKD  -baseline creatinine around 1, presented with creatinine around 4, with improvement up to 1 24, slight worsening in last 24 hours (1 78 today)  -most likely cardiorenal     Anemia  -hemoglobin at presentation 12 8, received multiple blood during this admission  Hemoglobin today 11 9    Transaminitis  -AST-ALT at presentation [id number], improving, 127-202 today    Interval History: on 1/4/2022: s/p VSD repair, VA- ECMO decannulated  Final CORIE demonstrated LVEF of 40%, normal RV size and function, trace MR, no AI, and moderate TR  There was no demonstrable shunt across the ventricular septum      Vitals significant for sinus tachycardia, on multiple pressors, Still intubated, with increased oxygen requirements  CVPs 11-16 mHg  Documented weight is 220 lbs, which is 25 lbs pounds up from yesterday (will need to clarify), Is/Os: 11L/ 3 L with net positive 7 8 L in the past 24 hours  VO2 Hgb 58 1, Na 152, Cl 120, Cr 1 78, AST//202  Hgb 11 9, pls 140  TELE: normal sinus rhythm with HR in low 100s, no events since surgery    Review of Systems  ROS as noted above, otherwise 12 point review of systems was performed and is negative  Historical Information   History reviewed  No pertinent past medical history  Past Surgical History:   Procedure Laterality Date    EXTRACORPOREAL CIRCULATION N/A 12/31/2021    Procedure: SUPPORT EXTRACORPOREAL MEMBRANE OXYGENATION (ECMO);   Surgeon: Angela Grigsby MD;  Location: [facility];  Service: Cardiac Surgery     Social History     Substance and Sexual Activity   Alcohol Use No     Social History     Substance and Sexual Activity   Drug Use No     Social History     Tobacco Use   Smoking Status Current Every Day Smoker    Types: Pipe   Smokeless Tobacco Never Used   Tobacco Comment    Secondhand smoke exposure     Meds/Allergies   Hospital Medications:   Current Facility-Administered Medications   Medication Dose Route Frequency    acetaminophen (TYLENOL) rectal suppository 650 mg  650 mg Rectal Q4H PRN    acetaminophen (TYLENOL) tablet 650 mg  650 mg Oral Q8H    amiodarone tablet 200 mg  200 mg Oral Q8H [address]    aspirin tablet 325 mg  325 mg Oral Daily    atorvastatin (LIPITOR) tablet 80 mg  80 mg Oral After Dinner    bisacodyl (DULCOLAX) rectal suppository 10 mg  10 mg Rectal Daily PRN    ceFAZolin (ANCEF) IVPB (premix in dextrose) 2,000 mg 50 mL  2,000 mg Intravenous Q8H    chlorhexidine (PERIDEX) 0 12 % oral rinse 15 mL  15 mL Swish & Spit Q12H [address]    EPINEPHrine 6000 mcg (DOUBLE CONCENTRATION) IV in sodium chloride 0 9% 250 mL  1-10 mcg/min Intravenous Titrated    fentaNYL 1000 mcg in sodium chloride 0 9% 100mL infusion  50 mcg/hr Intravenous Continuous    fentanyl citrate (PF) 100 MCG/2ML 50 mcg  50 mcg Intravenous Q1H PRN    fondaparinux (ARIXTRA) subcutaneous injection 2 5 mg  2 5 mg Subcutaneous Q24H    HYDROmorphone (DILAUDID) injection 0 5 mg  0 5 mg Intravenous Q2H PRN    insulin regular (HumuLIN R,NovoLIN R) 1 Units/mL in sodium chloride 0 9 % 100 mL infusion  0 3-21 Units/hr Intravenous Titrated    lidocaine (cardiac) injection 100 mg  100 mg Intravenous Q30 Min PRN    milrinone (PRIMACOR) 20 mg in 100 mL infusion (premix)  0 5 mcg/kg/min Intravenous Continuous    mupirocin (BACTROBAN) 2 % nasal ointment 1 application  1 application Nasal [address] [facility]    norepinephrine (LEVOPHED) 8 mg (DOUBLE CONCENTRATION) IV in sodium chloride 0 9% 250 mL  1-30 mcg/min Intravenous Titrated    ondansetron (ZOFRAN) injection 4 mg  4 mg Intravenous Q6H PRN    oxyCODONE (ROXICODONE) IR tablet 2 5 mg  2 5 mg Oral Q4H PRN    oxyCODONE (ROXICODONE) IR tablet 5 mg  5 mg Oral Q4H PRN    pantoprazole (PROTONIX) EC tablet 40 mg  40 mg Oral Early Morning    polyethylene glycol (MIRALAX) packet 17 g  17 g Oral Daily    potassium chloride 20 mEq IVPB (premix)  20 mEq Intravenous Once PRN    potassium chloride 20 mEq IVPB (premix)  20 mEq Intravenous Q1H PRN    potassium chloride 20 mEq IVPB (premix)  20 mEq Intravenous Q30 Min PRN    sodium chloride infusion 0 45 %  20 mL/hr Intravenous Continuous    vasopressin (PITRESSIN) 20 Units in sodium chloride 0 9 % 100 mL infusion  0 04 Units/min Intravenous Continuous     Home Medications:   Medications Prior to Admission   Medication    ALPRAZolam (XANAX) 0 5 mg tablet    EPINEPHrine (EPIPEN) 0 3 mg/0 3 mL SOAJ     Allergies   Allergen Reactions    Mushroom Extract Complex - Food Allergy Facial Swelling    Peanut Oil - Food Allergy     Strawberry C [Ascorbate - Food Allergy]      Objective   Vitals: Blood pressure 118/69, pulse 104, temperature 99 9 °F (37 7 °C), temperature source Bladder, resp  rate 18, height 5' 5 5" (1 664 m), weight 100 kg (220 lb 14 4 oz), SpO2 97 %  Orthostatic Blood Pressures      Most Recent Value   Blood Pressure 118/69 filed at 01/05/2022 0500   Patient Position - Orthostatic VS Lying filed at 01/04/2022 1600          Intake/Output Summary (Last 24 hours) at 1/5/2022 0737  Last data filed at 1/5/2022 0600  Gross per 24 hour   Intake 84255 86 ml   Output 2960 ml   Net 7778 86 ml     Invasive Devices  Report    Central Venous Catheter Line            CVC Central Lines 12/30/21 Triple Left Internal jugular 5 days    Introducer 12/31/21 4 days    LDA ECMO 12/31/21 Femoral artery  4 days          Arterial Line            Arterial Line 01/01/22 Other (Comment) 3 days          Line            Pacer Wires <1 day    Pacer Wires <1 day          Drain            Urethral Catheter Temperature probe 5 days    Chest Tube 1 Anterior Mediastinal 32 Fr  <1 day    Chest Tube 2 Posterior Mediastinal 32 Fr  <1 day    NG/OG/Enteral Tube Orogastric Center mouth <1 day          Airway            ETT  Hi-Lo; Cuffed;Oral 8 5 mm 4 days              Physical Exam   GEN: intubated, sedated  HEENT: NCAT, PERRL, EOMs intact  NECK: positive JVD   CARDIOVASCULAR: tachycardia, normal S1, S2 without murmurs, rubs, or gallops appreciated  LUNGS: rhonchorus sounds b/l  ABDOMEN: Soft, nontender, nondistended  EXTREMITIES/VASCULAR: perfused without clubbing, cyanosis, trace edema b/l    Lab Results: I have personally reviewed pertinent lab results      Results from last 7 days   Lab Units 01/05/22  0412 01/04/22  2059 01/04/22  1335 01/04/22  1120 01/04/22  1116 01/04/22  0830 01/04/22  0450 01/03/22  1419 01/03/22  0550   WBC Thousand/uL 24 24*  --   --   --   --   --  14 22*  --  16 28*   HEMOGLOBIN g/dL 11 9* 12 6 12 6  --   --    < > 7 6*   < > 7 9*   I STAT HEMOGLOBIN   --   --   --    < >  --    < >  --   --   --    HEMATOCRIT % 36 1* 37 2 37 7  --   --    < > 24 2*   < > 24 3*   HEMATOCRIT, ISTAT   --   --   --    < >  --    < >  --   --   --    PLATELETS Thousands/uL 140*  --  174  --  17*   < > 53*  --   --     < > = values in this interval not displayed  Results from last 7 days   Lab Units 01/05/22 0411 01/04/22 2059 01/04/22  1716 01/04/22  1335 01/04/22  1335 01/04/22  1247 01/04/22  0450   POTASSIUM mmol/L 4 3 4 1 4 4   < > 4 1  --    < >   CHLORIDE mmol/L 120* 119*  --   --  120*  --    < >   CO2 mmol/L 27 26  --   --  25  --    < >   CO2, I-STAT mmol/L  --   --   --   --   --  30  --    BUN mg/dL 57* 54*  --   --  46*  --    < >   CREATININE mg/dL 1 78* 1 72*  --   --  1 35*  --    < >   GLUCOSE, ISTAT mg/dl  --   --   --   --   --  207*  --    CALCIUM mg/dL 8 8 8 5  --   --  9 5  --    < >    < > = values in this interval not displayed  Results from last 7 days   Lab Units 01/04/22 1335 01/04/22  0556 01/04/22  0450 01/04/22  0214 01/03/22  1138 01/03/22  0644   INR  1 59*  --  1 27*  --   --  1 32*   PTT seconds 34 49*  --  50*   < > 73*    < > = values in this interval not displayed  Results from last 7 days   Lab Units 01/05/22 0411 01/04/22  0450 01/03/22  0550   MAGNESIUM mg/dL 3 7* 3 1* 3 5*     Imaging: I have personally reviewed pertinent reports

## 2022-01-05 NOTE — PHYSICAL THERAPY NOTE
Physical Therapy Cancellation Note         01/05/22 6283   Note Type   Note type Evaluation   Cancel Reasons Intubated/sedated     PT order received; chart reviewed; noted pt is currently intubated; will hold PT assessment at this time; will follow as clinical course allows      Radha Owen, PT

## 2022-01-06 ENCOUNTER — APPOINTMENT (INPATIENT)
Dept: RADIOLOGY | Facility: HOSPITAL | Age: 67
DRG: 167 | End: 2022-01-06
Payer: COMMERCIAL

## 2022-01-06 LAB
ALBUMIN SERPL BCP-MCNC: 2.8 G/DL (ref 3.5–5)
ALP SERPL-CCNC: 98 U/L (ref 46–116)
ALT SERPL W P-5'-P-CCNC: 104 U/L (ref 12–78)
ANION GAP SERPL CALCULATED.3IONS-SCNC: 5 MMOL/L (ref 4–13)
ANION GAP SERPL CALCULATED.3IONS-SCNC: 5 MMOL/L (ref 4–13)
ANION GAP SERPL CALCULATED.3IONS-SCNC: 7 MMOL/L (ref 4–13)
AST SERPL W P-5'-P-CCNC: 106 U/L (ref 5–45)
BASE EX.OXY STD BLDV CALC-SCNC: 55.3 % (ref 60–80)
BASE EXCESS BLDA CALC-SCNC: 3.2 MMOL/L
BASE EXCESS BLDV CALC-SCNC: 5.7 MMOL/L
BASOPHILS # BLD AUTO: 0.05 THOUSANDS/ΜL (ref 0–0.1)
BASOPHILS NFR BLD AUTO: 0 % (ref 0–1)
BILIRUB SERPL-MCNC: 3.6 MG/DL (ref 0.2–1)
BUN SERPL-MCNC: 78 MG/DL (ref 5–25)
BUN SERPL-MCNC: 81 MG/DL (ref 5–25)
BUN SERPL-MCNC: 82 MG/DL (ref 5–25)
CA-I BLD-SCNC: 1.03 MMOL/L (ref 1.12–1.32)
CALCIUM ALBUM COR SERPL-MCNC: 8.9 MG/DL (ref 8.3–10.1)
CALCIUM SERPL-MCNC: 7.9 MG/DL (ref 8.3–10.1)
CALCIUM SERPL-MCNC: 8.3 MG/DL (ref 8.3–10.1)
CALCIUM SERPL-MCNC: 8.5 MG/DL (ref 8.3–10.1)
CHLORIDE SERPL-SCNC: 115 MMOL/L (ref 100–108)
CHLORIDE SERPL-SCNC: 115 MMOL/L (ref 100–108)
CHLORIDE SERPL-SCNC: 116 MMOL/L (ref 100–108)
CO2 SERPL-SCNC: 30 MMOL/L (ref 21–32)
CO2 SERPL-SCNC: 30 MMOL/L (ref 21–32)
CO2 SERPL-SCNC: 32 MMOL/L (ref 21–32)
CREAT SERPL-MCNC: 2.03 MG/DL (ref 0.6–1.3)
CREAT SERPL-MCNC: 2.1 MG/DL (ref 0.6–1.3)
CREAT SERPL-MCNC: 2.12 MG/DL (ref 0.6–1.3)
EOSINOPHIL # BLD AUTO: 0.01 THOUSAND/ΜL (ref 0–0.61)
EOSINOPHIL NFR BLD AUTO: 0 % (ref 0–6)
ERYTHROCYTE [DISTWIDTH] IN BLOOD BY AUTOMATED COUNT: 18.2 % (ref 11.6–15.1)
GFR SERPL CREATININE-BSD FRML MDRD: 31 ML/MIN/1.73SQ M
GFR SERPL CREATININE-BSD FRML MDRD: 32 ML/MIN/1.73SQ M
GFR SERPL CREATININE-BSD FRML MDRD: 33 ML/MIN/1.73SQ M
GLUCOSE SERPL-MCNC: 100 MG/DL (ref 65–140)
GLUCOSE SERPL-MCNC: 101 MG/DL (ref 65–140)
GLUCOSE SERPL-MCNC: 103 MG/DL (ref 65–140)
GLUCOSE SERPL-MCNC: 105 MG/DL (ref 65–140)
GLUCOSE SERPL-MCNC: 106 MG/DL (ref 65–140)
GLUCOSE SERPL-MCNC: 112 MG/DL (ref 65–140)
GLUCOSE SERPL-MCNC: 114 MG/DL (ref 65–140)
GLUCOSE SERPL-MCNC: 119 MG/DL (ref 65–140)
GLUCOSE SERPL-MCNC: 129 MG/DL (ref 65–140)
GLUCOSE SERPL-MCNC: 152 MG/DL (ref 65–140)
GLUCOSE SERPL-MCNC: 159 MG/DL (ref 65–140)
GLUCOSE SERPL-MCNC: 160 MG/DL (ref 65–140)
GLUCOSE SERPL-MCNC: 95 MG/DL (ref 65–140)
GLUCOSE SERPL-MCNC: 99 MG/DL (ref 65–140)
HCO3 BLDA-SCNC: 27.9 MMOL/L (ref 22–28)
HCO3 BLDV-SCNC: 32.4 MMOL/L (ref 24–30)
HCT VFR BLD AUTO: 33.9 % (ref 36.5–49.3)
HGB BLD-MCNC: 11.1 G/DL (ref 12–17)
HOROWITZ INDEX BLDA+IHG-RTO: 40 MM[HG]
HOROWITZ INDEX BLDA+IHG-RTO: 40 MM[HG]
IMM GRANULOCYTES # BLD AUTO: 0.35 THOUSAND/UL (ref 0–0.2)
IMM GRANULOCYTES NFR BLD AUTO: 2 % (ref 0–2)
LYMPHOCYTES # BLD AUTO: 0.74 THOUSANDS/ΜL (ref 0.6–4.47)
LYMPHOCYTES NFR BLD AUTO: 3 % (ref 14–44)
MAGNESIUM SERPL-MCNC: 3 MG/DL (ref 1.6–2.6)
MAGNESIUM SERPL-MCNC: 3.1 MG/DL (ref 1.6–2.6)
MCH RBC QN AUTO: 30.7 PG (ref 26.8–34.3)
MCHC RBC AUTO-ENTMCNC: 32.7 G/DL (ref 31.4–37.4)
MCV RBC AUTO: 94 FL (ref 82–98)
MONOCYTES # BLD AUTO: 0.98 THOUSAND/ΜL (ref 0.17–1.22)
MONOCYTES NFR BLD AUTO: 4 % (ref 4–12)
NEUTROPHILS # BLD AUTO: 20.34 THOUSANDS/ΜL (ref 1.85–7.62)
NEUTS SEG NFR BLD AUTO: 91 % (ref 43–75)
NRBC BLD AUTO-RTO: 0 /100 WBCS
O2 CT BLDA-SCNC: 16.2 ML/DL (ref 16–23)
O2 CT BLDV-SCNC: 9.3 ML/DL
OXYHGB MFR BLDA: 96.4 % (ref 94–97)
PCO2 BLDA: 43.1 MM HG (ref 36–44)
PCO2 BLDV: 57.4 MM HG (ref 42–50)
PEEP RESPIRATORY: 6 CM[H2O]
PEEP RESPIRATORY: 6 CM[H2O]
PH BLDA: 7.43 [PH] (ref 7.35–7.45)
PH BLDV: 7.37 [PH] (ref 7.3–7.4)
PHOSPHATE SERPL-MCNC: 4.2 MG/DL (ref 2.3–4.1)
PHOSPHATE SERPL-MCNC: 4.2 MG/DL (ref 2.3–4.1)
PLATELET # BLD AUTO: 114 THOUSANDS/UL (ref 149–390)
PMV BLD AUTO: 12.1 FL (ref 8.9–12.7)
PO2 BLDA: 95.6 MM HG (ref 75–129)
PO2 BLDV: 30.7 MM HG (ref 35–45)
POTASSIUM SERPL-SCNC: 3.5 MMOL/L (ref 3.5–5.3)
POTASSIUM SERPL-SCNC: 3.5 MMOL/L (ref 3.5–5.3)
POTASSIUM SERPL-SCNC: 4.1 MMOL/L (ref 3.5–5.3)
PROT SERPL-MCNC: 5.4 G/DL (ref 6.4–8.2)
RBC # BLD AUTO: 3.61 MILLION/UL (ref 3.88–5.62)
SODIUM SERPL-SCNC: 151 MMOL/L (ref 136–145)
SODIUM SERPL-SCNC: 152 MMOL/L (ref 136–145)
SODIUM SERPL-SCNC: 152 MMOL/L (ref 136–145)
SPECIMEN SOURCE: NORMAL
VENT AC: 18
VENT AC: 18
VENT- AC: AC
VENT- AC: AC
VT SETTING VENT: 450 ML
VT SETTING VENT: 450 ML
WBC # BLD AUTO: 22.47 THOUSAND/UL (ref 4.31–10.16)

## 2022-01-06 PROCEDURE — 99024 POSTOP FOLLOW-UP VISIT: CPT | Performed by: THORACIC SURGERY (CARDIOTHORACIC VASCULAR SURGERY)

## 2022-01-06 PROCEDURE — 83735 ASSAY OF MAGNESIUM: CPT | Performed by: THORACIC SURGERY (CARDIOTHORACIC VASCULAR SURGERY)

## 2022-01-06 PROCEDURE — 85025 COMPLETE CBC W/AUTO DIFF WBC: CPT | Performed by: THORACIC SURGERY (CARDIOTHORACIC VASCULAR SURGERY)

## 2022-01-06 PROCEDURE — 82805 BLOOD GASES W/O2 SATURATION: CPT | Performed by: THORACIC SURGERY (CARDIOTHORACIC VASCULAR SURGERY)

## 2022-01-06 PROCEDURE — 80048 BASIC METABOLIC PNL TOTAL CA: CPT | Performed by: PHYSICIAN ASSISTANT

## 2022-01-06 PROCEDURE — 36569 INSJ PICC 5 YR+ W/O IMAGING: CPT

## 2022-01-06 PROCEDURE — 02HV33Z INSERTION OF INFUSION DEVICE INTO SUPERIOR VENA CAVA, PERCUTANEOUS APPROACH: ICD-10-PCS | Performed by: THORACIC SURGERY (CARDIOTHORACIC VASCULAR SURGERY)

## 2022-01-06 PROCEDURE — 80053 COMPREHEN METABOLIC PANEL: CPT | Performed by: THORACIC SURGERY (CARDIOTHORACIC VASCULAR SURGERY)

## 2022-01-06 PROCEDURE — 84100 ASSAY OF PHOSPHORUS: CPT | Performed by: THORACIC SURGERY (CARDIOTHORACIC VASCULAR SURGERY)

## 2022-01-06 PROCEDURE — 82330 ASSAY OF CALCIUM: CPT | Performed by: THORACIC SURGERY (CARDIOTHORACIC VASCULAR SURGERY)

## 2022-01-06 PROCEDURE — 94760 N-INVAS EAR/PLS OXIMETRY 1: CPT

## 2022-01-06 PROCEDURE — NC001 PR NO CHARGE: Performed by: PHYSICIAN ASSISTANT

## 2022-01-06 PROCEDURE — 99232 SBSQ HOSP IP/OBS MODERATE 35: CPT | Performed by: INTERNAL MEDICINE

## 2022-01-06 PROCEDURE — 82948 REAGENT STRIP/BLOOD GLUCOSE: CPT

## 2022-01-06 PROCEDURE — C1751 CATH, INF, PER/CENT/MIDLINE: HCPCS

## 2022-01-06 PROCEDURE — 71045 X-RAY EXAM CHEST 1 VIEW: CPT

## 2022-01-06 PROCEDURE — 84100 ASSAY OF PHOSPHORUS: CPT | Performed by: PHYSICIAN ASSISTANT

## 2022-01-06 PROCEDURE — 83735 ASSAY OF MAGNESIUM: CPT | Performed by: PHYSICIAN ASSISTANT

## 2022-01-06 PROCEDURE — 94003 VENT MGMT INPAT SUBQ DAY: CPT

## 2022-01-06 PROCEDURE — 99291 CRITICAL CARE FIRST HOUR: CPT | Performed by: ANESTHESIOLOGY

## 2022-01-06 RX ORDER — POTASSIUM CHLORIDE 20MEQ/15ML
20 LIQUID (ML) ORAL ONCE
Status: COMPLETED | OUTPATIENT
Start: 2022-01-06 | End: 2022-01-06

## 2022-01-06 RX ORDER — HYDRALAZINE HYDROCHLORIDE 20 MG/ML
10 INJECTION INTRAMUSCULAR; INTRAVENOUS ONCE
Status: COMPLETED | OUTPATIENT
Start: 2022-01-06 | End: 2022-01-06

## 2022-01-06 RX ORDER — BUMETANIDE 0.25 MG/ML
0.5 INJECTION INTRAMUSCULAR; INTRAVENOUS CONTINUOUS
Status: DISCONTINUED | OUTPATIENT
Start: 2022-01-06 | End: 2022-01-07

## 2022-01-06 RX ORDER — FUROSEMIDE 10 MG/ML
40 INJECTION INTRAMUSCULAR; INTRAVENOUS ONCE
Status: COMPLETED | OUTPATIENT
Start: 2022-01-06 | End: 2022-01-06

## 2022-01-06 RX ORDER — FUROSEMIDE 10 MG/ML
80 INJECTION INTRAMUSCULAR; INTRAVENOUS ONCE
Status: COMPLETED | OUTPATIENT
Start: 2022-01-06 | End: 2022-01-06

## 2022-01-06 RX ORDER — POTASSIUM CHLORIDE 14.9 MG/ML
20 INJECTION INTRAVENOUS
Status: DISCONTINUED | OUTPATIENT
Start: 2022-01-06 | End: 2022-01-06

## 2022-01-06 RX ORDER — POTASSIUM CHLORIDE 20MEQ/15ML
40 LIQUID (ML) ORAL ONCE
Status: COMPLETED | OUTPATIENT
Start: 2022-01-06 | End: 2022-01-06

## 2022-01-06 RX ORDER — POTASSIUM CHLORIDE 29.8 MG/ML
40 INJECTION INTRAVENOUS ONCE
Status: COMPLETED | OUTPATIENT
Start: 2022-01-06 | End: 2022-01-06

## 2022-01-06 RX ORDER — CALCIUM GLUCONATE 20 MG/ML
2 INJECTION, SOLUTION INTRAVENOUS ONCE
Status: COMPLETED | OUTPATIENT
Start: 2022-01-06 | End: 2022-01-06

## 2022-01-06 RX ORDER — HEPARIN SODIUM 5000 [USP'U]/ML
5000 INJECTION, SOLUTION INTRAVENOUS; SUBCUTANEOUS EVERY 8 HOURS SCHEDULED
Status: DISCONTINUED | OUTPATIENT
Start: 2022-01-06 | End: 2022-01-16

## 2022-01-06 RX ORDER — POTASSIUM CHLORIDE 20 MEQ/1
40 TABLET, EXTENDED RELEASE ORAL ONCE
Status: COMPLETED | OUTPATIENT
Start: 2022-01-06 | End: 2022-01-06

## 2022-01-06 RX ORDER — ALBUMIN, HUMAN INJ 5% 5 %
12.5 SOLUTION INTRAVENOUS ONCE
Status: COMPLETED | OUTPATIENT
Start: 2022-01-06 | End: 2022-01-06

## 2022-01-06 RX ORDER — POTASSIUM CHLORIDE 29.8 MG/ML
40 INJECTION INTRAVENOUS ONCE
Status: COMPLETED | OUTPATIENT
Start: 2022-01-06 | End: 2022-01-07

## 2022-01-06 RX ADMIN — FENTANYL CITRATE 50 MCG: 50 INJECTION INTRAMUSCULAR; INTRAVENOUS at 03:39

## 2022-01-06 RX ADMIN — FENTANYL CITRATE 50 MCG: 50 INJECTION INTRAMUSCULAR; INTRAVENOUS at 01:52

## 2022-01-06 RX ADMIN — OXYCODONE HYDROCHLORIDE 5 MG: 5 TABLET ORAL at 03:41

## 2022-01-06 RX ADMIN — HYDROMORPHONE HYDROCHLORIDE 0.5 MG: 1 INJECTION, SOLUTION INTRAMUSCULAR; INTRAVENOUS; SUBCUTANEOUS at 02:38

## 2022-01-06 RX ADMIN — MUPIROCIN 1 APPLICATION: 20 OINTMENT TOPICAL at 09:01

## 2022-01-06 RX ADMIN — MILRINONE LACTATE IN DEXTROSE 0.5 MCG/KG/MIN: 200 INJECTION, SOLUTION INTRAVENOUS at 03:55

## 2022-01-06 RX ADMIN — FUROSEMIDE 40 MG: 10 INJECTION, SOLUTION INTRAVENOUS at 02:15

## 2022-01-06 RX ADMIN — Medication 1 MG/HR: at 09:31

## 2022-01-06 RX ADMIN — AMIODARONE HYDROCHLORIDE 200 MG: 200 TABLET ORAL at 05:08

## 2022-01-06 RX ADMIN — HEPARIN SODIUM 5000 UNITS: 5000 INJECTION INTRAVENOUS; SUBCUTANEOUS at 22:23

## 2022-01-06 RX ADMIN — POLYETHYLENE GLYCOL 3350 17 G: 17 POWDER, FOR SOLUTION ORAL at 09:01

## 2022-01-06 RX ADMIN — FONDAPARINUX SODIUM 2.5 MG: 2.5 INJECTION, SOLUTION SUBCUTANEOUS at 05:07

## 2022-01-06 RX ADMIN — FENTANYL CITRATE 50 MCG: 50 INJECTION INTRAMUSCULAR; INTRAVENOUS at 05:13

## 2022-01-06 RX ADMIN — ACETAMINOPHEN 650 MG: 325 TABLET, FILM COATED ORAL at 05:08

## 2022-01-06 RX ADMIN — POTASSIUM CHLORIDE 20 MEQ: 20 SOLUTION ORAL at 13:24

## 2022-01-06 RX ADMIN — HYDRALAZINE HYDROCHLORIDE 10 MG: 20 INJECTION INTRAMUSCULAR; INTRAVENOUS at 07:50

## 2022-01-06 RX ADMIN — HEPARIN SODIUM 5000 UNITS: 5000 INJECTION INTRAVENOUS; SUBCUTANEOUS at 13:24

## 2022-01-06 RX ADMIN — AMIODARONE HYDROCHLORIDE 200 MG: 200 TABLET ORAL at 13:23

## 2022-01-06 RX ADMIN — MILRINONE LACTATE IN DEXTROSE 0.5 MCG/KG/MIN: 200 INJECTION, SOLUTION INTRAVENOUS at 19:18

## 2022-01-06 RX ADMIN — CALCIUM GLUCONATE 2 G: 20 INJECTION, SOLUTION INTRAVENOUS at 06:44

## 2022-01-06 RX ADMIN — AMIODARONE HYDROCHLORIDE 200 MG: 200 TABLET ORAL at 22:23

## 2022-01-06 RX ADMIN — FUROSEMIDE 80 MG: 10 INJECTION, SOLUTION INTRAVENOUS at 05:58

## 2022-01-06 RX ADMIN — CHLORHEXIDINE GLUCONATE 15 ML: 1.2 SOLUTION ORAL at 21:55

## 2022-01-06 RX ADMIN — NICARDIPINE HYDROCHLORIDE 2 MG/HR: 2.5 INJECTION, SOLUTION INTRAVENOUS at 21:03

## 2022-01-06 RX ADMIN — HEPARIN SODIUM 5000 UNITS: 5000 INJECTION INTRAVENOUS; SUBCUTANEOUS at 09:01

## 2022-01-06 RX ADMIN — ATORVASTATIN CALCIUM 80 MG: 80 TABLET, FILM COATED ORAL at 17:45

## 2022-01-06 RX ADMIN — POTASSIUM CHLORIDE 40 MEQ: 20 SOLUTION ORAL at 02:15

## 2022-01-06 RX ADMIN — ASPIRIN 325 MG ORAL TABLET 325 MG: 325 PILL ORAL at 09:01

## 2022-01-06 RX ADMIN — Medication 1 MG/HR: at 15:06

## 2022-01-06 RX ADMIN — POTASSIUM CHLORIDE 20 MEQ: 14.9 INJECTION, SOLUTION INTRAVENOUS at 20:19

## 2022-01-06 RX ADMIN — CHLORHEXIDINE GLUCONATE 15 ML: 1.2 SOLUTION ORAL at 09:01

## 2022-01-06 RX ADMIN — POTASSIUM CHLORIDE 40 MEQ: 1500 TABLET, EXTENDED RELEASE ORAL at 20:21

## 2022-01-06 RX ADMIN — POTASSIUM CHLORIDE 40 MEQ: 29.8 INJECTION, SOLUTION INTRAVENOUS at 22:22

## 2022-01-06 RX ADMIN — POTASSIUM CHLORIDE 40 MEQ: 400 INJECTION, SOLUTION INTRAVENOUS at 13:24

## 2022-01-06 RX ADMIN — ALBUMIN (HUMAN) 12.5 G: 12.5 INJECTION, SOLUTION INTRAVENOUS at 20:17

## 2022-01-06 RX ADMIN — MILRINONE LACTATE IN DEXTROSE 0.5 MCG/KG/MIN: 200 INJECTION, SOLUTION INTRAVENOUS at 11:35

## 2022-01-06 RX ADMIN — VASOPRESSIN 0.04 UNITS/MIN: 20 INJECTION INTRAVENOUS at 02:12

## 2022-01-06 RX ADMIN — MUPIROCIN 1 APPLICATION: 20 OINTMENT TOPICAL at 20:21

## 2022-01-06 NOTE — PROCEDURES
Insert PICC line    Date/Time: 1/6/2022 9:17 AM  Performed by: Loulou Plascencia RN  Authorized by: Krystin Ventura PA-C     Patient location:  Bedside  Other Assisting Provider: Yes (comment) (Lawanda Posada Infusion Tech)    Consent:     Consent obtained:  Written    Consent given by:  Healthcare agent    Procedural risks discussed: with provider  Alternatives discussed: with provider  Universal protocol:     Procedure explained and questions answered to patient or proxy's satisfaction: yes      Relevant documents present and verified: yes      Test results available and properly labeled: yes      Radiology Images displayed and confirmed  If images not available, report reviewed: yes      Required blood products, implants, devices, and special equipment available: yes      Site/side marked: yes      Immediately prior to procedure, a time out was called: yes      Patient identity confirmed:  Arm band and hospital-assigned identification number  Pre-procedure details:     Hand hygiene: Hand hygiene performed prior to insertion      Sterile barrier technique: All elements of maximal sterile technique followed      Skin preparation:  ChloraPrep    Skin preparation agent: Skin preparation agent completely dried prior to procedure    Indications:     PICC line indications: medications requiring central line    Anesthesia (see MAR for exact dosages):      Anesthesia method:  Local infiltration    Local anesthetic:  Lidocaine 1% w/o epi (2 mls)  Procedure details:     Location:  [place]    Vessel type: vein      Laterality:  Right    Site selection rationale:  A line LINDA, prior arterial thrombus on right arm from old a line, no venous thrombus    Approach: percutaneous technique used      Patient position:  Flat    Procedural supplies:  Triple lumen    Catheter size:  5 Fr    Landmarks identified: yes      Ultrasound guidance: yes      Ultrasound image availability:  Not saved    Sterile ultrasound techniques: Sterile gel and sterile probe covers were used      Number of attempts:  1    Successful placement: yes      Vessel of catheter tip end:  Sherlock 3CG confirmed (ok to use, no xray required)    Total catheter length (cm):  50    Catheter out on skin (cm):  2    Max flow rate:  999    Arm circumference:  38  Post-procedure details:     Post-procedure:  Dressing applied and securement device placed    Assessment:  Blood return through all ports    Post-procedure complications: none      Patient tolerance of procedure: Tolerated well, no immediate complications  Comments:      Patient very edematous in both upper extremities, patient had significant bleeding from first needle stick  Surgifoam and pressure dressing applied after procedure

## 2022-01-06 NOTE — RESPIRATORY THERAPY NOTE
RT Ventilator Management Note  Britton Freshwater 61 y o  male MRN: [id number]  Unit/Bed#: [facility] 418-01 Encounter: [id number]      Daily Screen       1/5/2022  0754 1/6/2022  0816          Patient safety screen outcome[de-identified] Failed Passed      Not Ready for Weaning due to[de-identified] Underline problem not resolved --      Spont breathing trial % for 30 min: -- Yes              Physical Exam:   Assessment Type: (P) Assess only  General Appearance: (P) Drowsy  Respiratory Pattern: (P) Assisted  Chest Assessment: (P) Chest expansion symmetrical,Subcutaneous emphysema  Bilateral Breath Sounds: (P) Clear,Diminished  Suction: (P) ET Tube      Resp Comments: (P) Pt  placed on SBT this morning  Tolerating well  Will wean as tolerated

## 2022-01-06 NOTE — RESPIRATORY THERAPY NOTE
RT Ventilator Management Note  Tommie Hays 61 y o  male MRN: [id number]  Unit/Bed#: [facility] 418-01 Encounter: [id number]      Daily Screen       1/6/2022  0816 1/6/2022  1410          Patient safety screen outcome[de-identified] Passed --      Spont breathing trial % for 30 min: Yes --      Spont breathing trial outcome[de-identified] -- Passed      Name of Medical Team Notified[de-identified] -- ccm      Preparing to extubate/ Notify Nurse: -- Yes      Extubation order obtained: -- Yes      Consider Cuff Test: -- Yes      Patient extubated: -- Yes              Physical Exam:   Assessment Type: Assess only  General Appearance: Drowsy  Respiratory Pattern: Assisted  Chest Assessment: Chest expansion symmetrical,Subcutaneous emphysema  Bilateral Breath Sounds: Clear,Diminished  Suction: ET Tube  O2 Device: (P) nc      Resp Comments: (P) Pt  extubated to 6L nc  No stridor  No resp  distress noted   Pt talking without difficulty

## 2022-01-06 NOTE — PHYSICAL THERAPY NOTE
Physical Therapy Cancellation Note         01/06/22 0743   Note Type   Note type Evaluation   Cancel Reasons Intubated/sedated     Chart reviewed; noted pt remains to be intubated; will cont to hold PT at this time; will follow as clinical course allows      Jose Collins, PT

## 2022-01-06 NOTE — PROGRESS NOTES
Progress Note - Cardiothoracic Surgery   Nitish Segura 61 y o  male MRN: [id number]  Unit/Bed#: [facility] 418-01 Encounter: [id number]      POD 2 Repair of Post-infarction VSD, VA ECMO decannulation  Pt seen/examined    Interval history and data reviewed with critical care team     Epi gtt off  Milrinone gtt is @ 0 5  Vaso gtt off  Levophed gtt off    Remains intubated with 40% FiO2 and PEEP 6, on spontaneous breathing trial with PS 5, pulling Vt's of 800-1000cc      Medications:   Scheduled Meds:  Current Facility-Administered Medications   Medication Dose Route Frequency Provider Last Rate    amiodarone  200 mg Oral Q8H [address] Munira Murdock PA-C      aspirin  325 mg Oral Daily NOA Lyons      atorvastatin  80 mg Oral After [employer]      bisacodyl  10 mg Rectal Daily PRN NOA Lyons      bumetanide  1 mg/hr Intravenous Continuous Yaima Parish PA-C      chlorhexidine  15 mL Swish & Spit Q12H [address] [place] [address]      dexmedetomidine  0 1-0 7 mcg/kg/hr Intravenous Titrated Claudean Bares, PA-C      fentaNYL  50 mcg/hr Intravenous Continuous Eri Gibson PA-C Stopped (01/06/22 3601)    heparin (porcine)  5,000 Units Subcutaneous [facility] Yaima Parish PA-C      HYDROmorphone  0 5 mg Intravenous Q2H PRN NOA Lyons      insulin regular (HumuLIN R,NovoLIN R) infusion  0 3-21 Units/hr Intravenous Titrated NOA Lyons 0 5 Units/hr (01/06/22 0610)    lidocaine (cardiac)  100 mg Intravenous Q30 Min PRN Munira Murdock PA-C      milrinone [facility]) infusion  0 5 mcg/kg/min Intravenous Continuous Ulysses Scott PA-C 0 5 mcg/kg/min (01/06/22 0355)    mupirocin  1 application Nasal F23L [address] Munira Murdock PA-C      niCARdipine  1-15 mg/hr Intravenous Titrated Yaima Parish PA-C      norepinephrine  1-30 mcg/min Intravenous Titrated Eri Gibson PA-C 2 mcg/min (01/06/22 0625)    ondansetron  4 mg Intravenous Q6H PRN [place], PA-C  oxyCODONE  2 5 mg Oral Q4H PRN Ramirez Deng PA-C      oxyCODONE  5 mg Oral Q4H PRN Ramirez Deng PA-C      pantoprazole  40 mg Oral Early Morning Ramirez Deng PA-C      polyethylene glycol  17 g Oral Daily Bren Magallanes      potassium chloride  20 mEq Intravenous Once PRN Ramirez Deng PA-C      potassium chloride  20 mEq Intravenous Q1H PRN Ramirez Deng PA-C Stopped (01/05/22 1600)    potassium chloride  20 mEq Intravenous Q30 Min PRN Ramirez Deng PA-C      sodium chloride  20 mL/hr Intravenous Continuous Mnuira Rodriguez PA-C 20 mL/hr (01/04/22 1410)     Continuous Infusions:bumetanide, 1 mg/hr  dexmedetomidine, 0 1-0 7 mcg/kg/hr  fentaNYL, 50 mcg/hr, Last Rate: Stopped (01/06/22 0625)  insulin regular (HumuLIN R,NovoLIN R) infusion, 0 3-21 Units/hr, Last Rate: 0 5 Units/hr (01/06/22 0610)  milrinone (PRIMACOR) infusion, 0 5 mcg/kg/min, Last Rate: 0 5 mcg/kg/min (01/06/22 0355)  niCARdipine, 1-15 mg/hr  norepinephrine, 1-30 mcg/min, Last Rate: 2 mcg/min (01/06/22 0625)  sodium chloride, 20 mL/hr, Last Rate: 20 mL/hr (01/04/22 1410)      PRN Meds: bisacodyl    HYDROmorphone    lidocaine (cardiac)    ondansetron    oxyCODONE    oxyCODONE    potassium chloride    potassium chloride    potassium chloride    Vitals: Blood pressure 96/52, pulse 101, temperature 99 9 °F (37 7 °C), resp  rate 16, height 5' 5 5" (1 664 m), weight 99 7 kg (219 lb 12 8 oz), SpO2 92 %  ,Body mass index is 36 02 kg/m²  I/O last 24 hours: In: 3787 5 [I V :1407 5; NG/GT:1200; IV Piggyback:200; Feedings:980]  Out: 8543 [Urine:3140;  Chest Tube:100]  Invasive Devices  Report    Central Venous Catheter Line            Introducer 12/31/21 5 days    LDA ECMO 12/31/21 Femoral artery  5 days          Arterial Line            Arterial Line 01/01/22 Other (Comment) 4 days          Line            Pacer Wires 1 day    Pacer Wires 1 day          Drain            Urethral Catheter Temperature probe 6 days Chest Tube 1 Anterior Mediastinal 32 Fr  1 day    Chest Tube 2 Posterior Mediastinal 32 Fr  1 day    NG/OG/Enteral Tube Orogastric Center mouth 1 day          Airway            ETT  Hi-Lo; Cuffed;Oral 8 5 mm 5 days                  Lab, Imaging and other studies:   Results from last 7 days   Lab Units 01/06/22  0407 01/05/22  0412 01/04/22  2059 01/04/22  1335 01/04/22  1335 01/04/22  0830 01/04/22  0450   WBC Thousand/uL 22 47* 24 24*  --   --   --   --  14 22*   HEMOGLOBIN g/dL 11 1* 11 9* 12 6   < > 12 6   < > 7 6*   I STAT HEMOGLOBIN   --   --   --   --   --    < >  --    HEMATOCRIT % 33 9* 36 1* 37 2   < > 37 7   < > 24 2*   HEMATOCRIT, ISTAT   --   --   --   --   --    < >  --    PLATELETS Thousands/uL 114* 140*  --   --  174   < > 53*    < > = values in this interval not displayed  Results from last 7 days   Lab Units 01/06/22  0407 01/05/22  1949 01/05/22  1201 01/04/22  1335 01/04/22  1247   POTASSIUM mmol/L 4 1 3 7 3 8   < >  --    CHLORIDE mmol/L 116* 117* 120*   < >  --    CO2 mmol/L 30 30 28   < >  --    CO2, I-STAT mmol/L  --   --   --   --  30   BUN mg/dL 78* 69* 61*   < >  --    CREATININE mg/dL 2 12* 2 09* 1 93*   < >  --    GLUCOSE, ISTAT mg/dl  --   --   --   --  207*   CALCIUM mg/dL 7 9* 8 7 8 4   < >  --     < > = values in this interval not displayed  Results from last 7 days   Lab Units 01/04/22  1335 01/04/22  0556 01/04/22  0450 01/04/22  0214 01/03/22  1138 01/03/22  0644   INR  1 59*  --  1 27*  --   --  1 32*   PTT seconds 34 49*  --  50*   < > 73*    < > = values in this interval not displayed  Recent Labs     01/06/22  0406   PHART 7 429   [id number] 27 9   PO2ART 95 6   [id number] 43 1   BEART 3 2           Plan:    Bumex gtt  Check CXR  If ok, plan to extubate today  DC TPW's  Cont CT's  Start slow wean of Milrinone        Anatoliy Ndiaye MD  DATE: January 6, 2022  TIME: 8:32 AM

## 2022-01-06 NOTE — PROCEDURES
Procedure: Epicardial Wire Removal    01/06/22    EPW x 1 d/c'd in typical fashion  Atrial lead was with high resistance  Cut at the skin  No immediate complications  Site dressed with dry dressing  Patient and nurse aware of mandatory 1 hour bedrest protocol  Vital signs ordered Q 15 minutes for one hour as per protocol  Dr Niles Galicia present at bedside       Shantel Jane PA-C

## 2022-01-06 NOTE — NUTRITION
If unable to safely advance PO diet in next 24 hrs then consider placing keofeed tube and resume enteral feeding and reconsult RD for recs  Monitor LFTs and renal parameters  Consider Renal, [place] PO diet when deemed safe to swallow

## 2022-01-06 NOTE — PROGRESS NOTES
PROGRESS NOTE - Cardiology  Ivet Soto 61 y o  male MRN: [id number]  Unit/Bed#: [facility] 418-01 Encounter: [id number]  Assessment/Plan   Late onset MI complicated by VSD, cardiomyopathy and cardiogenic shock, status post VSD repair on 01/04/2022  -TTE on 12/30/2021:  Dilated LV, EF 45%, eccentric hypertrophy, dilated RV, with moderately reduced function, biatrial enlargement, moderate TR  -postop CORIE with EF of 40%, normal RV size and function, trace MR, moderate TR  -hemodynamics: on 12/31/2021 underwent VA-ECMO placement with decannulation on 01/04/2022  pressor requirements coming down  currently only on milrinone (0 5), with cardiac output of about 5 5 liters/minute and CI 3   -volume status:  Hypervolemic, CVP 11-14 mmHg, status post IV Lasix 40 mx3 + 80 mg x1 today  starting on bumex gtt 1 milligram/hour  wlll recommend to continue diuresing to keep net negative  - on aspirin 325 mg daily, amiodarone 200 mg t i d , atorvastatin 80 mg daily   -Will introduced beta-blockers and rest of GDMT as hemodynamics and kidney function improve   -will discuss the timing for left heart catheterization for coronary anatomy delineation      Hypoxic failure status post intubation  -improved oxygen requrements, trial to extubate today  -would recommend to diurese to keep net negative fluid balance     JUDE on CKD  -baseline creatinine around 1, presented with creatinine around 4, with improvement up to 1 24, currently up titrating  -most likely cardiorenal, will continue with more aggressive diuresis     Anemia  -hemoglobin at presentation 12 8, received multiple blood during this admission  stable hemoglobins     Transaminitis  -AST-ALT at presentation [id number], improving (106-104) today    Interval History:  No acute events overnight  Continues being intubated  Vasopressor/inotrope  support coming down (on Milrinone 0 5), Low-grade fevers, CVP is around 11-14 mmHg, documented weight 219 lb (1 lb down from yesterday)  Intake and output 3 8 L/3 2 L being net positive about 550 cc (net positive 7 8 L since admission)  Venous O2 hemoglobin 55 3, sodium 151, chloride 116, BUN-creatinine 78-2 12 (continues up titrating), AST--104, white blood cells 22 47, hemoglobin 11 1, platelets 750  Status post IV Lasix 40 mg x 3 and 80 mg this morning  TELE: sinus tachycardia with HR in low 100s, episode of NSVT and atrial tachycardia    Review of Systems  ROS as noted above, otherwise 12 point review of systems was performed and is negative  Historical Information   History reviewed  No pertinent past medical history  Past Surgical History:   Procedure Laterality Date    EXTRACORPOREAL CIRCULATION N/A 12/31/2021    Procedure: SUPPORT EXTRACORPOREAL MEMBRANE OXYGENATION (ECMO);   Surgeon: Dulce Ybarra MD;  Location: [facility];  Service: Cardiac Surgery    NM ECMO/ECLS INITIATION VENOUS N/A 1/4/2022    Procedure: VA ECMO DECANNULATION;  Surgeon: Dulce Ybarra MD;  Location: [facility];  Service: Cardiac Surgery    NM REVSD N/A 1/4/2022    Procedure: REPAIR VENTRICULAR SEPTAL DEFECT (VSD) OPEN WITH BOVINE PERICARDIAL PATCH ;  Surgeon: Dulce Ybarra MD;  Location: [facility];  Service: Cardiac Surgery     Social History     Substance and Sexual Activity   Alcohol Use No     Social History     Substance and Sexual Activity   Drug Use No     Social History     Tobacco Use   Smoking Status Current Every Day Smoker    Types: Pipe   Smokeless Tobacco Never Used   Tobacco Comment    Secondhand smoke exposure     Meds/Allergies   Hospital Medications:   Current Facility-Administered Medications   Medication Dose Route Frequency    acetaminophen (TYLENOL) tablet 650 mg  650 mg Oral Q8H    amiodarone tablet 200 mg  200 mg Oral Q8H [address]    aspirin tablet 325 mg  325 mg Oral Daily    atorvastatin (LIPITOR) tablet 80 mg  80 mg Oral After Dinner    bisacodyl (DULCOLAX) rectal suppository 10 mg  10 mg Rectal Daily PRN    calcium gluconate 2 g in sodium chloride 0 9% 100 mL (premix)  2 g Intravenous Once    chlorhexidine (PERIDEX) 0 12 % oral rinse 15 mL  15 mL Swish & Spit Q12H [address]    fentaNYL 1000 mcg in sodium chloride 0 9% 100mL infusion  50 mcg/hr Intravenous Continuous    fondaparinux (ARIXTRA) subcutaneous injection 2 5 mg  2 5 mg Subcutaneous Q24H    HYDROmorphone (DILAUDID) injection 0 5 mg  0 5 mg Intravenous Q2H PRN    insulin regular (HumuLIN R,NovoLIN R) 1 Units/mL in sodium chloride 0 9 % 100 mL infusion  0 3-21 Units/hr Intravenous Titrated    lidocaine (cardiac) injection 100 mg  100 mg Intravenous Q30 Min PRN    milrinone (PRIMACOR) 20 mg in 100 mL infusion (premix)  0 5 mcg/kg/min Intravenous Continuous    mupirocin (BACTROBAN) 2 % nasal ointment 1 application  1 application Nasal U28P [address]    norepinephrine (LEVOPHED) 8 mg (DOUBLE CONCENTRATION) IV in sodium chloride 0 9% 250 mL  1-30 mcg/min Intravenous Titrated    ondansetron (ZOFRAN) injection 4 mg  4 mg Intravenous Q6H PRN    oxyCODONE (ROXICODONE) IR tablet 2 5 mg  2 5 mg Oral Q4H PRN    oxyCODONE (ROXICODONE) IR tablet 5 mg  5 mg Oral Q4H PRN    pantoprazole (PROTONIX) EC tablet 40 mg  40 mg Oral Early Morning    polyethylene glycol (MIRALAX) packet 17 g  17 g Oral Daily    potassium chloride 20 mEq IVPB (premix)  20 mEq Intravenous Once PRN    potassium chloride 20 mEq IVPB (premix)  20 mEq Intravenous Q1H PRN    potassium chloride 20 mEq IVPB (premix)  20 mEq Intravenous Q30 Min PRN    sodium chloride infusion 0 45 %  20 mL/hr Intravenous Continuous    vasopressin (PITRESSIN) 20 Units in sodium chloride 0 9 % 100 mL infusion  0 04 Units/min Intravenous Continuous     Home Medications:   Medications Prior to Admission   Medication    ALPRAZolam (XANAX) 0 5 mg tablet    EPINEPHrine (EPIPEN) 0 3 mg/0 3 mL SOAJ     Allergies   Allergen Reactions    Mushroom Extract Complex - Food Allergy Facial Swelling    Peanut Oil - Food Allergy     Strawberry C [Ascorbate - Food Allergy]      Objective   Vitals: Blood pressure 96/52, pulse 101, temperature 99 9 °F (37 7 °C), resp  rate 16, height 5' 5 5" (1 664 m), weight 99 7 kg (219 lb 12 8 oz), SpO2 93 %  Orthostatic Blood Pressures      Most Recent Value   Blood Pressure 96/52 filed at 01/06/2022 0600   Patient Position - Orthostatic VS Lying filed at 01/05/2022 1900          Intake/Output Summary (Last 24 hours) at 1/6/2022 0703  Last data filed at 1/6/2022 0600  Gross per 24 hour   Intake 3787 5 ml   Output 3240 ml   Net 547 5 ml     Invasive Devices  Report    Central Venous Catheter Line            CVC Central Lines 12/30/21 Triple Left Internal jugular 6 days    Introducer 12/31/21 5 days    LDA ECMO 12/31/21 Femoral artery  5 days          Arterial Line            Arterial Line 01/01/22 Other (Comment) 4 days          Line            Pacer Wires 1 day    Pacer Wires 1 day          Drain            Urethral Catheter Temperature probe 6 days    Chest Tube 1 Anterior Mediastinal 32 Fr  1 day    Chest Tube 2 Posterior Mediastinal 32 Fr  1 day    NG/OG/Enteral Tube Orogastric Center mouth 1 day          Airway            ETT  Hi-Lo; Cuffed;Oral 8 5 mm 5 days              Physical Exam   GEN:  Intubated, follows commands  HEENT: NCAT, PERRL, EOMs intact  NECK:  Positive JVD  CARDIOVASCULAR: RRR, normal S1, S2 without murmurs, rubs, or gallops appreciated  LUNGS:  Bibasilar crackles  ABDOMEN: Soft, nontender, nondistended  EXTREMITIES/VASCULAR: perfused without clubbing, cyanosis, trace edema b/l    Lab Results: I have personally reviewed pertinent lab results      Results from last 7 days   Lab Units 01/06/22  0407 01/05/22  0412 01/04/22  2059 01/04/22  1335 01/04/22  1335 01/04/22  0830 01/04/22  0450   WBC Thousand/uL 22 47* 24 24*  --   --   --   --  14 22*   HEMOGLOBIN g/dL 11 1* 11 9* 12 6   < > 12 6   < > 7 6*   I STAT HEMOGLOBIN   --   --   --   --   --    < >  --    HEMATOCRIT % 33 9* 36 1* 37 2   < > 37 7   < > 24 2*   HEMATOCRIT, ISTAT   --   --   --   --   --    < >  --    PLATELETS Thousands/uL 114* 140*  --   --  174   < > 53*    < > = values in this interval not displayed  Results from last 7 days   Lab Units 01/06/22  0407 01/05/22  1949 01/05/22  1201 01/04/22  1335 01/04/22  1247   POTASSIUM mmol/L 4 1 3 7 3 8   < >  --    CHLORIDE mmol/L 116* 117* 120*   < >  --    CO2 mmol/L 30 30 28   < >  --    CO2, I-STAT mmol/L  --   --   --   --  30   BUN mg/dL 78* 69* 61*   < >  --    CREATININE mg/dL 2 12* 2 09* 1 93*   < >  --    GLUCOSE, ISTAT mg/dl  --   --   --   --  207*   CALCIUM mg/dL 7 9* 8 7 8 4   < >  --     < > = values in this interval not displayed  Results from last 7 days   Lab Units 01/04/22  1335 01/04/22  0556 01/04/22  0450 01/04/22  0214 01/03/22  1138 01/03/22  0644   INR  1 59*  --  1 27*  --   --  1 32*   PTT seconds 34 49*  --  50*   < > 73*    < > = values in this interval not displayed  Results from last 7 days   Lab Units 01/06/22  0407 01/05/22  0411 01/04/22  0450   MAGNESIUM mg/dL 3 1* 3 7* 3 1*     Imaging: I have personally reviewed pertinent reports

## 2022-01-06 NOTE — OCCUPATIONAL THERAPY NOTE
Occupational Therapy Cancel Note        Patient Name: Vladimir Edwards  MIPTQ'M Date: 1/6/2022 01/06/22 0817   OT Last Visit   OT Visit Date 01/06/22   Note Type   Note type Evaluation   Cancel Reasons Intubated/sedated       OT orders received, chart reviewed  Noted that pt is intubated at this time  OT will hold at this time and continue to follow and see as medically appropriate and able      Doyle Lopez OTR/AMBER

## 2022-01-06 NOTE — PROGRESS NOTES
Progress Note - Critical Care   Bhpuendra Alexander 61 y o  male MRN: [id number]  Unit/Bed#: [facility] 699-48 Encounter: [id number]      Attending Physician: Porsche Wright MD    24 Hour Events/HPI: POD # 2 s/p repair of post-infarction VSD and VA ECMO decannulation  Weaned off epi  Fent off this AM for vent wean  Given 4 doses of lasix in last 24 hours  Milrinone 0 5  Vaso 0 04  Levo 3    ROS: Review of Systems   Unable to perform ROS: Intubated     ---------------------------------------------------------------------------------------------------------------------------------------------------------------------  Impressions:  1  Recent LAD STEMI  2  Post MI VSD s/p VSD repair  3  Cardiogenic shock s/p VA ECMO, now decannulated  4  Acute post operative blood loss anemia  5  Thrombocytopenia  6  JUDE  7  Shock liver, improving  8  Toxic metabolic encephalopathy  9  Coagulopathy   10  Right upper extremity partial arterial occlusion secondary to thrombus at the arterial line site, since removed  11  Tylenol OD s/p NAC on admission  12  Agoraphobia with panic attacks   13  Hyernatremia    Plan:    Neuro:   · Sedation plan: fent drip off  · RASS goal: 0 Alert and Calm  · Pain controlled with: tylenol ATC, oxycodone PRN and dilaudid PRN  · Regulate sleep/wake cycle  · Delirium precautions  · CAM-ICU daily  · Trend neuro exam    CV:   · Cardiac infusions: Primacor, 0 5 mcg/kg/min, Levophed, 3 mcg/min, Vasopressin 0 04  · Wean pressors to off as able  · MAP goal > 65 and CI >2 2  · Keep Veleta Jaspal catheter  · Keep Arterial line  · Rhythm: Sinus Tachycardia  · Follow rhythm on telemetry  · Epicardial pacing wires no longer required  Remove today    Lung:   · Acute respiratory failure; Requiring Intubation with ventilator support  Secondary to pulmonary vascular congestion and lethargy/altered mental status  Continue incentive spirometry/coughing/deep breathing exercises    Wean supplemental oxygen as tolerated for saturation > 90%  · SBT plan: attempt today for goal to extubate  · Wean vent as tolerated  · Chlorhexidine ordered: yes  · Chest tube drainage diminished; D/C today    GI:   · Continue PPI for stress ulcer prophylaxis  · Continue bowel regimen  · Trend abdominal exam and bowel function    FEN:   · Diuretic plan: Lasix infusion for goal negative 1L  · Nutrition/diet plan: TF at goal with FWF  · Trend BMP q 8 hours for hypernatremia  · Replenish electrolytes with goals: K >4 0, Mag >2 0, and Phos >3 0    :   · Indwelling Chang present: yes   · Keep Chang  · Trend UOP and BUN/creat  · Strict I and O    ID:   · Trend temps and WBC count  · Maintain normothermia    Results from last 7 days   Lab Units 01/04/22  0450 01/03/22  0550 01/02/22  0529 01/01/22  0433 12/31/21  0422   PROCALCITONIN ng/ml 0 31* 0 61* 1 26* 2 90* 2 16*     Heme:   · Trend hgb and plts    Endo:   · Glycemic control plan: insulin infusion    Results from last 6 Months   Lab Units 12/31/21  0400 12/30/21  1007 09/13/21  0657   HEMOGLOBIN A1C % 5 9* 5 7* 5 3     MSK/Skin:  · Mobility goal: Bedrest for now  · PT consult: yes  · OT consult: yes  · Frequent turning and pressure off-loading  · Local wound care as needed    Disposition:  Continue ICU care  ---------------------------------------------------------------------------------------------------------------------------------------------------------------------  Allergies:    Allergies   Allergen Reactions    Mushroom Extract Complex - Food Allergy Facial Swelling    Peanut Oil - Food Allergy     Strawberry C [Ascorbate - Food Allergy]      Medications:   Scheduled Meds:  Current Facility-Administered Medications   Medication Dose Route Frequency Provider Last Rate    acetaminophen  650 mg Oral Q8H Yaima Parish PA-C      amiodarone  200 mg Oral Q8H [address] Munira Murdock PA-C      aspirin  325 mg Oral Daily Zayra Waterman PA-C      atorvastatin  80 mg Oral After [employer], NOA      bisacodyl  10 mg Rectal Daily PRN Birdena Mattes, NOA      calcium gluconate  2 g Intravenous Once Yaima Parish PA-C      chlorhexidine  15 mL Swish [facility] Spit Q12H [facility] [place], [place]      fentaNYL  50 mcg/hr Intravenous Continuous Maria Alejandra Desai PA-C Stopped (01/06/22 1170)    fondaparinux  2 5 mg Subcutaneous Q24H Yaima Parish PA-C      HYDROmorphone  0 5 mg Intravenous Q2H PRN Birdena Mattes, NOA      insulin regular (HumuLIN R,NovoLIN R) infusion  0 3-21 Units/hr Intravenous Titrated Darnellena Angelitotes, NOA 0 5 Units/hr (01/06/22 0610)    lidocaine (cardiac)  100 mg Intravenous Q30 Min PRN Birdena Mattes, NOA      milrinone [facility]) infusion  0 5 mcg/kg/min Intravenous Continuous Rosaline Barger PA-C 0 5 mcg/kg/min (01/06/22 0355)    mupirocin  1 application Nasal C40C [address] [facility]      norepinephrine  1-30 mcg/min Intravenous Titrated Maria Alejandra Desai PA-C 2 mcg/min (01/06/22 0625)    ondansetron  4 mg Intravenous Q6H PRN Birdena Mattes, NOA      oxyCODONE  2 5 mg Oral Q4H PRN Birdena Mattes, NOA      oxyCODONE  5 mg Oral Q4H PRN Birdena Mattes, NOA      pantoprazole  40 mg Oral [facility], NOA      polyethylene glycol  17 g Oral Daily [place], [place]      potassium chloride  20 mEq Intravenous Once PRN Birdena Mattes, NOA      potassium chloride  20 mEq Intravenous Q1H PRN Birdena Mattes, NOA Stopped (01/05/22 1600)    potassium chloride  20 mEq Intravenous Q30 Min PRN Birdena Mattes, NOA      sodium chloride  20 mL/hr Intravenous Continuous Darnellena Angelitotes, PA-C 20 mL/hr (01/04/22 1410)    vasopressin  0 04 Units/min Intravenous Continuous Vitaliy Coca, CRNP 0 04 Units/min (01/06/22 0550)     VTE Pharmacologic Prophylaxis: Fondaparinux (Arixtra)  VTE Mechanical Prophylaxis: sequential compression device    Continuous Infusions:fentaNYL, 50 mcg/hr, Last Rate: Stopped (01/06/22 0625)  insulin regular (HumuLIN R,NovoLIN R) infusion, 0 3-21 Units/hr, Last Rate: 0 5 Units/hr (01/06/22 0610)  milrinone (PRIMACOR) infusion, 0 5 mcg/kg/min, Last Rate: 0 5 mcg/kg/min (01/06/22 0355)  norepinephrine, 1-30 mcg/min, Last Rate: 2 mcg/min (01/06/22 0625)  sodium chloride, 20 mL/hr, Last Rate: 20 mL/hr (01/04/22 1410)  vasopressin, 0 04 Units/min, Last Rate: 0 04 Units/min (01/06/22 0500)      PRN Meds:  bisacodyl, 10 mg, Daily PRN  HYDROmorphone, 0 5 mg, Q2H PRN  lidocaine (cardiac), 100 mg, Q30 Min PRN  ondansetron, 4 mg, Q6H PRN  oxyCODONE, 2 5 mg, Q4H PRN  oxyCODONE, 5 mg, Q4H PRN  potassium chloride, 20 mEq, Once PRN  potassium chloride, 20 mEq, Q1H PRN  potassium chloride, 20 mEq, Q30 Min PRN      Home Medications:   Prior to Admission medications    Medication Sig Start Date End Date Taking? Authorizing Provider   ALPRAZolam Earleen Bump) 0 5 mg tablet Take 1-2 tablets 30-60 minutes prior to leaving home prn 3/18/21   Todd Bacon MD   EPINEPHrine (EPIPEN) 0 3 mg/0 3 mL SOAJ Inject 0 3 mL (0 3 mg total) into a muscle once for 1 dose 9/21/18 3/18/21  Rob Walton DO     ---------------------------------------------------------------------------------------------------------------------------------------------------------------------  Vitals:   Vitals:    01/06/22 0515 01/06/22 0534 01/06/22 0600 01/06/22 0625   BP:   96/52    Pulse: 105  104 101   Resp: 18  18 16   Temp: 100 °F (37 8 °C)  100 °F (37 8 °C) 99 9 °F (37 7 °C)   TempSrc:   Bladder    SpO2: 93%  94% 93%   Weight:  99 7 kg (219 lb 12 8 oz)     Height:         Arterial Line:  Arterial Line BP: 106/61  Arterial Line MAP (mmHg): 74 mmHg    Tele Rhythm: Sinus Tachycardia This was personally reviewed by myself      Respiratory:  SpO2: SpO2: 93 %, SpO2 Activity: SpO2 Activity: At Rest, SpO2 Device: O2 Device: Ventilator  Nasal Cannula O2 Flow Rate (L/min): 2 L/min    Ventilator:  Respiratory  Report   Lab Data (Last 4 hours)      01/06 0406            pH, Arterial 7 429             pCO2, Arterial       43 1             pO2, Arterial       95 6             HCO3, Arterial       27 9             Base Excess, Arterial       3 2                  O2/Vent Data           Most Recent         Vent Mode   AC/VC      Resp Rate (BPM) (BPM)   18      Vt (mL) (mL)   450      FIO2 (%) (%)   40      PEEP (cmH2O) (cmH2O)   6      MV   7 8                Temperature: Temp (24hrs), Av °F (37 8 °C), Min:99 5 °F (37 5 °C), Max:100 4 °F (38 °C)  Current: Temperature: 99 9 °F (37 7 °C)    Weights:   Weight (last 2 days)     Date/Time Weight    22 0534 99 7 (219 8)    22 0546 100 (220 9)    22 0600 88 8 (195 77)        Body mass index is 36 02 kg/m²  Hemodynamic Monitoring:  PAP: PAP: , CVP: CVP (mean): 14 mmHg, CO:  , CI:  , SVR: SVR (dyne*sec)/cm5: 784 (dyne*sec)/cm5  PAP: (20-29)/(16-22)     Intake and Outputs:    Intake/Output Summary (Last 24 hours) at 2022 0636  Last data filed at 2022 0600  Gross per 24 hour   Intake 3787 5 ml   Output 3240 ml   Net 547 5 ml     I/O last 24 hours: In: 5684 1 [I V :2594 1; NG/GT:1260; IV Piggyback:850; Feedings:980]  Out: 6675 [Urine:3630;  Chest Tube:160]    UOP: /hour   BM: 0 in the last 24 hours    Chest tube Output:  Mediastinal tubes: 50 mL/8 hours  100 mL/24 hours   Pleural tubes: -- mL/8 hours  -- mL/24 hours     Labs:  Results from last 7 days   Lab Units 22  0407 22  0412 22  2059 22  1335 22  1335 22  0830 22  0450 22  1419 22  0550 22  1325 22  0443 21  0400 21  1007   WBC Thousand/uL 22 47* 24 24*  --   --   --   --  14 22*  --  16 28*   < > 28 23*   < > 17 22*   HEMOGLOBIN g/dL 11 1* 11 9* 12 6   < > 12 6   < > 7 6*   < > 7 9*   < > 8 8*   < > 12 8   I STAT HEMOGLOBIN   --   --   --   --   --    < >  --   --   --    < >  --    < >  --    HEMATOCRIT % 33 9* 36 1* 37 2   < > 37 7   < > 24 2*   < > 24 3*   < > 26 2* < > 40 1   HEMATOCRIT, ISTAT   --   --   --   --   --    < >  --   --   --    < >  --    < >  --    PLATELETS Thousands/uL 114* 140*  --   --  174   < > 53*  --   --    < > 164   < > 217   NEUTROS PCT %  --   --   --   --   --   --   --   --  73  --   --   --   --    MONOS PCT %  --   --   --   --   --   --   --   --  7  --   --   --   --    MONO PCT %  --   --   --   --   --   --   --   --   --   --  8  --  4    < > = values in this interval not displayed  Results from last 7 days   Lab Units 01/06/22  0407 01/05/22  1949 01/05/22  1201 01/05/22  0411 01/05/22  0411 01/04/22  1335 01/04/22  1247 01/04/22  1215 01/04/22  1215 01/04/22  1153 01/04/22  1153 01/04/22  0830 01/04/22  0450   SODIUM mmol/L 151* 151* 152*   < > 152*   < >  --   --   --   --   --   --  156*   POTASSIUM mmol/L 4 1 3 7 3 8   < > 4 3   < >  --   --   --   --   --   --  4 6   CHLORIDE mmol/L 116* 117* 120*   < > 120*   < >  --   --   --   --   --   --  121*   CO2 mmol/L 30 30 28   < > 27   < >  --   --   --   --   --   --  35*   CO2, I-STAT mmol/L  --   --   --   --   --   --  30   < > 29  30   < > 31   < >  --    BUN mg/dL 78* 69* 61*   < > 57*   < >  --   --   --   --   --   --  59*   CREATININE mg/dL 2 12* 2 09* 1 93*   < > 1 78*   < >  --   --   --   --   --   --  1 24   CALCIUM mg/dL 7 9* 8 7 8 4   < > 8 8   < >  --   --   --   --   --   --  7 7*   ALK PHOS U/L 98  --   --   --  89  --   --   --   --   --   --   --  131*   ALT U/L 104*  --   --   --  202*  --   --   --   --   --   --   --  651*   AST U/L 106*  --   --   --  127*  --   --   --   --   --   --   --  103*   GLUCOSE, ISTAT mg/dl  --   --   --   --   --   --  207*  --  200*  208*  --  170*   < >  --     < > = values in this interval not displayed  Baseline Creat: 1  1?     Results from last 7 days   Lab Units 01/06/22  0407 01/05/22  0411 01/04/22  0450   MAGNESIUM mg/dL 3 1* 3 7* 3 1*     Results from last 7 days   Lab Units 01/06/22  0407 01/03/22  0550 01/02/22  8515 PHOSPHORUS mg/dL 4 2* 3 8 4 6*      Results from last 7 days   Lab Units 01/04/22  1335 01/04/22  0556 01/04/22  0450 01/04/22  0214 01/03/22  1138 01/03/22  0644   INR  1 59*  --  1 27*  --   --  1 32*   PTT seconds 34 49*  --  50*   < > 73*    < > = values in this interval not displayed  Results from last 7 days   Lab Units 12/31/21  1650 12/31/21  1326 12/31/21  0836   LACTIC ACID mmol/L 1 5 2 1* 2 0     Results from last 7 days   Lab Units 01/06/22  0406 01/05/22  0413 01/05/22  0413 01/04/22  2100 01/04/22  2100   PH ART  7 429   < > 7 337*   < > 7 338*   PCO2 ART mm Hg 43 1   < > 47 4*   < > 45 8*   PO2 ART mm Hg 95 6   < > 82 5   < > 120 7   HCO3 ART mmol/L 27 9   < > 24 8   < > 24 0   BASE EXC ART mmol/L 3 2   < > -1 3   < > -1 9   ABG SOURCE  Line, Arterial  --  Line, Arterial  --  Line, Arterial    < > = values in this interval not displayed  SVO2: 55%    Micro:   Blood Culture:   Lab Results   Component Value Date    BLOODCX No Growth After 5 Days  12/30/2021    BLOODCX No Growth After 5 Days  12/30/2021     Urine Culture: No results found for: URINECX  Sputum Culture: No components found for: SPUTUMCX  Wound Culure: No results found for: WOUNDCULT    Diagnositic Studies:  None  Nutrition:        Diet Orders   (From admission, onward)             Start     Ordered    01/06/22 0628  Diet Enteral/Parenteral; Tube Feeding No Oral Diet; Jevity 1 5; Continuous; 55; 200; Water; Every 4 hours  Diet effective 0500        References:    Nutrtion Support Algorithm Enteral vs  Parenteral   Question Answer Comment   Diet Type Enteral/Parenteral    Enteral/Parenteral Tube Feeding No Oral Diet    Tube Feeding Formula: Jevity 1 5    Bolus/Cyclic/Continuous Continuous    Tube Feeding Goal Rate (mL/hr): 55    Tube Feeding flush (mL): 200    Water Flush type: Water    Water flush frequency: Every 4 hours    RD to adjust diet per protocol? Yes        01/06/22 0630              Physical Exam  Vitals reviewed  Constitutional:       Interventions: He is sedated and intubated  Neck:      Comments: CVC  Cardiovascular:      Rate and Rhythm: Normal rate and regular rhythm  Heart sounds: No murmur heard  No friction rub  Pulmonary:      Effort: Pulmonary effort is normal  He is intubated  Breath sounds: Normal breath sounds  No wheezing, rhonchi or rales  Genitourinary:     Comments: +Chang  Skin:     General: Skin is warm and dry  Neurological:      Comments: Sedated   Psychiatric:      Comments: Cannot assess       Invasive lines and devices: Invasive Devices  Report    Central Venous Catheter Line            CVC Central Lines 12/30/21 Triple Left Internal jugular 6 days    Introducer 12/31/21 5 days    LDA ECMO 12/31/21 Femoral artery  5 days          Arterial Line            Arterial Line 01/01/22 Other (Comment) 4 days          Line            Pacer Wires 1 day    Pacer Wires 1 day          Drain            Urethral Catheter Temperature probe 6 days    Chest Tube 1 Anterior Mediastinal 32 Fr  1 day    Chest Tube 2 Posterior Mediastinal 32 Fr  1 day    NG/OG/Enteral Tube Orogastric Center mouth 1 day          Airway            ETT  Hi-Lo; Cuffed;Oral 8 5 mm 5 days              ---------------------------------------------------------------------------------------------------------------------------------------------------------------------  Code Status: Level 1 - Full Code    Care Time Delivered:   No Critical Care time spent     Collaborative bedside rounds performed with cardiac surgery attending, critical care attending and bedside RN      SIGNATURE: Isaak Arredondo PA-C  DATE: January 6, 2022  TIME: 6:36 AM

## 2022-01-07 ENCOUNTER — APPOINTMENT (INPATIENT)
Dept: RADIOLOGY | Facility: HOSPITAL | Age: 67
DRG: 167 | End: 2022-01-07
Payer: COMMERCIAL

## 2022-01-07 ENCOUNTER — ANESTHESIA EVENT (INPATIENT)
Dept: PERIOP | Facility: HOSPITAL | Age: 67
DRG: 167 | End: 2022-01-07
Payer: COMMERCIAL

## 2022-01-07 ENCOUNTER — APPOINTMENT (INPATIENT)
Dept: NON INVASIVE DIAGNOSTICS | Facility: HOSPITAL | Age: 67
DRG: 167 | End: 2022-01-07
Attending: THORACIC SURGERY (CARDIOTHORACIC VASCULAR SURGERY)
Payer: COMMERCIAL

## 2022-01-07 ENCOUNTER — APPOINTMENT (INPATIENT)
Dept: NON INVASIVE DIAGNOSTICS | Facility: HOSPITAL | Age: 67
DRG: 167 | End: 2022-01-07
Payer: COMMERCIAL

## 2022-01-07 ENCOUNTER — ANESTHESIA (INPATIENT)
Dept: PERIOP | Facility: HOSPITAL | Age: 67
DRG: 167 | End: 2022-01-07
Payer: COMMERCIAL

## 2022-01-07 PROBLEM — Z87.74 S/P VSD REPAIR: Status: ACTIVE | Noted: 2022-01-07

## 2022-01-07 PROBLEM — T82.9XXA CENTRAL LINE COMPLICATION: Status: ACTIVE | Noted: 2022-01-07

## 2022-01-07 LAB
ABO GROUP BLD: NORMAL
ALBUMIN SERPL BCP-MCNC: 2.9 G/DL (ref 3.5–5)
ALP SERPL-CCNC: 133 U/L (ref 46–116)
ALT SERPL W P-5'-P-CCNC: 74 U/L (ref 12–78)
ANION GAP SERPL CALCULATED.3IONS-SCNC: 5 MMOL/L (ref 4–13)
ANION GAP SERPL CALCULATED.3IONS-SCNC: 5 MMOL/L (ref 4–13)
ANION GAP SERPL CALCULATED.3IONS-SCNC: 9 MMOL/L (ref 4–13)
AST SERPL W P-5'-P-CCNC: 84 U/L (ref 5–45)
ATRIAL RATE: 103 BPM
BASE EX.OXY STD BLDV CALC-SCNC: 64.7 % (ref 60–80)
BASE EXCESS BLDA CALC-SCNC: 14 MMOL/L (ref -2–3)
BASE EXCESS BLDA CALC-SCNC: 5 MMOL/L (ref -2–3)
BASE EXCESS BLDA CALC-SCNC: 6 MMOL/L (ref -2–3)
BASE EXCESS BLDA CALC-SCNC: 6 MMOL/L (ref -2–3)
BASE EXCESS BLDA CALC-SCNC: 8 MMOL/L (ref -2–3)
BASE EXCESS BLDV CALC-SCNC: 11.3 MMOL/L
BILIRUB DIRECT SERPL-MCNC: 2.32 MG/DL (ref 0–0.2)
BILIRUB SERPL-MCNC: 4.27 MG/DL (ref 0.2–1)
BLD GP AB SCN SERPL QL: NEGATIVE
BUN SERPL-MCNC: 73 MG/DL (ref 5–25)
BUN SERPL-MCNC: 83 MG/DL (ref 5–25)
BUN SERPL-MCNC: 83 MG/DL (ref 5–25)
CA-I BLD-SCNC: 0.86 MMOL/L (ref 1.12–1.32)
CA-I BLD-SCNC: 0.92 MMOL/L (ref 1.12–1.32)
CA-I BLD-SCNC: 0.97 MMOL/L (ref 1.12–1.32)
CA-I BLD-SCNC: 0.97 MMOL/L (ref 1.12–1.32)
CA-I BLD-SCNC: 1.17 MMOL/L (ref 1.12–1.32)
CA-I BLD-SCNC: 1.21 MMOL/L (ref 1.12–1.32)
CALCIUM SERPL-MCNC: 7.9 MG/DL (ref 8.3–10.1)
CALCIUM SERPL-MCNC: 8.6 MG/DL (ref 8.3–10.1)
CALCIUM SERPL-MCNC: 9 MG/DL (ref 8.3–10.1)
CHLORIDE SERPL-SCNC: 112 MMOL/L (ref 100–108)
CHLORIDE SERPL-SCNC: 112 MMOL/L (ref 100–108)
CHLORIDE SERPL-SCNC: 117 MMOL/L (ref 100–108)
CO2 SERPL-SCNC: 27 MMOL/L (ref 21–32)
CO2 SERPL-SCNC: 33 MMOL/L (ref 21–32)
CO2 SERPL-SCNC: 34 MMOL/L (ref 21–32)
CREAT SERPL-MCNC: 1.7 MG/DL (ref 0.6–1.3)
CREAT SERPL-MCNC: 1.84 MG/DL (ref 0.6–1.3)
CREAT SERPL-MCNC: 1.88 MG/DL (ref 0.6–1.3)
ERYTHROCYTE [DISTWIDTH] IN BLOOD BY AUTOMATED COUNT: 17.8 % (ref 11.6–15.1)
GFR SERPL CREATININE-BSD FRML MDRD: 36 ML/MIN/1.73SQ M
GFR SERPL CREATININE-BSD FRML MDRD: 37 ML/MIN/1.73SQ M
GFR SERPL CREATININE-BSD FRML MDRD: 41 ML/MIN/1.73SQ M
GLUCOSE SERPL-MCNC: 101 MG/DL (ref 65–140)
GLUCOSE SERPL-MCNC: 101 MG/DL (ref 65–140)
GLUCOSE SERPL-MCNC: 102 MG/DL (ref 65–140)
GLUCOSE SERPL-MCNC: 107 MG/DL (ref 65–140)
GLUCOSE SERPL-MCNC: 108 MG/DL (ref 65–140)
GLUCOSE SERPL-MCNC: 109 MG/DL (ref 65–140)
GLUCOSE SERPL-MCNC: 110 MG/DL (ref 65–140)
GLUCOSE SERPL-MCNC: 113 MG/DL (ref 65–140)
GLUCOSE SERPL-MCNC: 119 MG/DL (ref 65–140)
GLUCOSE SERPL-MCNC: 127 MG/DL (ref 65–140)
GLUCOSE SERPL-MCNC: 130 MG/DL (ref 65–140)
GLUCOSE SERPL-MCNC: 130 MG/DL (ref 65–140)
GLUCOSE SERPL-MCNC: 145 MG/DL (ref 65–140)
GLUCOSE SERPL-MCNC: 149 MG/DL (ref 65–140)
GLUCOSE SERPL-MCNC: 88 MG/DL (ref 65–140)
GLUCOSE SERPL-MCNC: 97 MG/DL (ref 65–140)
GLUCOSE SERPL-MCNC: 98 MG/DL (ref 65–140)
HCO3 BLDA-SCNC: 28.5 MMOL/L (ref 22–28)
HCO3 BLDA-SCNC: 29.3 MMOL/L (ref 22–28)
HCO3 BLDA-SCNC: 29.4 MMOL/L (ref 22–28)
HCO3 BLDA-SCNC: 33 MMOL/L (ref 24–30)
HCO3 BLDA-SCNC: 37.7 MMOL/L (ref 22–28)
HCO3 BLDV-SCNC: 36 MMOL/L (ref 24–30)
HCT VFR BLD AUTO: 34.6 % (ref 36.5–49.3)
HCT VFR BLD AUTO: 41.2 % (ref 36.5–49.3)
HCT VFR BLD CALC: 23 % (ref 36.5–49.3)
HCT VFR BLD CALC: 23 % (ref 36.5–49.3)
HCT VFR BLD CALC: 27 % (ref 36.5–49.3)
HCT VFR BLD CALC: 29 % (ref 36.5–49.3)
HCT VFR BLD CALC: 36 % (ref 36.5–49.3)
HGB BLD-MCNC: 11.2 G/DL (ref 12–17)
HGB BLD-MCNC: 13 G/DL (ref 12–17)
HGB BLDA-MCNC: 12.2 G/DL (ref 12–17)
HGB BLDA-MCNC: 7.8 G/DL (ref 12–17)
HGB BLDA-MCNC: 7.8 G/DL (ref 12–17)
HGB BLDA-MCNC: 9.2 G/DL (ref 12–17)
HGB BLDA-MCNC: 9.9 G/DL (ref 12–17)
KCT BLD-ACNC: 132 SEC (ref 89–137)
KCT BLD-ACNC: 138 SEC (ref 89–137)
KCT BLD-ACNC: 457 SEC (ref 89–137)
KCT BLD-ACNC: 471 SEC (ref 89–137)
KCT BLD-ACNC: 512 SEC (ref 89–137)
MAGNESIUM SERPL-MCNC: 2.8 MG/DL (ref 1.6–2.6)
MCH RBC QN AUTO: 30.6 PG (ref 26.8–34.3)
MCHC RBC AUTO-ENTMCNC: 32.4 G/DL (ref 31.4–37.4)
MCV RBC AUTO: 95 FL (ref 82–98)
O2 CT BLDV-SCNC: 11 ML/DL
P AXIS: 44 DEGREES
PCO2 BLD: 29 MMOL/L (ref 21–32)
PCO2 BLD: 30 MMOL/L (ref 21–32)
PCO2 BLD: 30.6 MM HG (ref 36–44)
PCO2 BLD: 31 MMOL/L (ref 21–32)
PCO2 BLD: 34 MMOL/L (ref 21–32)
PCO2 BLD: 38.1 MM HG (ref 36–44)
PCO2 BLD: 39 MMOL/L (ref 21–32)
PCO2 BLD: 43.6 MM HG (ref 36–44)
PCO2 BLD: 45.8 MM HG (ref 36–44)
PCO2 BLD: 46 MM HG (ref 42–50)
PCO2 BLDV: 47.6 MM HG (ref 42–50)
PH BLD: 7.44 [PH] (ref 7.35–7.45)
PH BLD: 7.46 [PH] (ref 7.3–7.4)
PH BLD: 7.49 [PH] (ref 7.35–7.45)
PH BLD: 7.52 [PH] (ref 7.35–7.45)
PH BLD: 7.58 [PH] (ref 7.35–7.45)
PH BLDV: 7.5 [PH] (ref 7.3–7.4)
PHOSPHATE SERPL-MCNC: 3.5 MG/DL (ref 2.3–4.1)
PLATELET # BLD AUTO: 119 THOUSANDS/UL (ref 149–390)
PLATELET # BLD AUTO: 125 THOUSANDS/UL (ref 149–390)
PLATELET # BLD AUTO: 87 THOUSANDS/UL (ref 149–390)
PMV BLD AUTO: 12.6 FL (ref 8.9–12.7)
PMV BLD AUTO: 13.3 FL (ref 8.9–12.7)
PMV BLD AUTO: 13.4 FL (ref 8.9–12.7)
PO2 BLD: 266 MM HG (ref 75–129)
PO2 BLD: 37 MM HG (ref 35–45)
PO2 BLD: 85 MM HG (ref 75–129)
PO2 BLD: 98 MM HG (ref 75–129)
PO2 BLD: >400 MM HG (ref 75–129)
PO2 BLDV: 34 MM HG (ref 35–45)
POTASSIUM BLD-SCNC: 2.8 MMOL/L (ref 3.5–5.3)
POTASSIUM BLD-SCNC: 3.4 MMOL/L (ref 3.5–5.3)
POTASSIUM BLD-SCNC: 3.4 MMOL/L (ref 3.5–5.3)
POTASSIUM BLD-SCNC: 3.5 MMOL/L (ref 3.5–5.3)
POTASSIUM BLD-SCNC: 3.6 MMOL/L (ref 3.5–5.3)
POTASSIUM SERPL-SCNC: 3.3 MMOL/L (ref 3.5–5.3)
POTASSIUM SERPL-SCNC: 3.4 MMOL/L (ref 3.5–5.3)
POTASSIUM SERPL-SCNC: 3.5 MMOL/L (ref 3.5–5.3)
POTASSIUM SERPL-SCNC: 4 MMOL/L (ref 3.5–5.3)
POTASSIUM SERPL-SCNC: 4.8 MMOL/L (ref 3.5–5.3)
PR INTERVAL: 148 MS
PROT SERPL-MCNC: 5.6 G/DL (ref 6.4–8.2)
QRS AXIS: 75 DEGREES
QRSD INTERVAL: 102 MS
QT INTERVAL: 372 MS
QTC INTERVAL: 487 MS
RBC # BLD AUTO: 3.66 MILLION/UL (ref 3.88–5.62)
RH BLD: POSITIVE
SAO2 % BLD FROM PO2: 100 % (ref 60–85)
SAO2 % BLD FROM PO2: 73 % (ref 60–85)
SAO2 % BLD FROM PO2: 97 % (ref 60–85)
SAO2 % BLD FROM PO2: 99 % (ref 60–85)
SODIUM BLD-SCNC: 152 MMOL/L (ref 136–145)
SODIUM BLD-SCNC: 153 MMOL/L (ref 136–145)
SODIUM BLD-SCNC: 154 MMOL/L (ref 136–145)
SODIUM BLD-SCNC: 154 MMOL/L (ref 136–145)
SODIUM BLD-SCNC: 155 MMOL/L (ref 136–145)
SODIUM SERPL-SCNC: 150 MMOL/L (ref 136–145)
SODIUM SERPL-SCNC: 151 MMOL/L (ref 136–145)
SODIUM SERPL-SCNC: 153 MMOL/L (ref 136–145)
SPECIMEN EXPIRATION DATE: NORMAL
SPECIMEN SOURCE: ABNORMAL
SPECIMEN SOURCE: NORMAL
T WAVE AXIS: 30 DEGREES
VENTRICULAR RATE: 103 BPM
WBC # BLD AUTO: 16.11 THOUSAND/UL (ref 4.31–10.16)

## 2022-01-07 PROCEDURE — 93321 DOPPLER ECHO F-UP/LMTD STD: CPT | Performed by: INTERNAL MEDICINE

## 2022-01-07 PROCEDURE — 93010 ELECTROCARDIOGRAM REPORT: CPT | Performed by: INTERNAL MEDICINE

## 2022-01-07 PROCEDURE — 80076 HEPATIC FUNCTION PANEL: CPT | Performed by: PHYSICIAN ASSISTANT

## 2022-01-07 PROCEDURE — 94760 N-INVAS EAR/PLS OXIMETRY 1: CPT

## 2022-01-07 PROCEDURE — 5A1221Z PERFORMANCE OF CARDIAC OUTPUT, CONTINUOUS: ICD-10-PCS | Performed by: THORACIC SURGERY (CARDIOTHORACIC VASCULAR SURGERY)

## 2022-01-07 PROCEDURE — 84132 ASSAY OF SERUM POTASSIUM: CPT

## 2022-01-07 PROCEDURE — 84132 ASSAY OF SERUM POTASSIUM: CPT | Performed by: PHYSICIAN ASSISTANT

## 2022-01-07 PROCEDURE — 82330 ASSAY OF CALCIUM: CPT

## 2022-01-07 PROCEDURE — 82330 ASSAY OF CALCIUM: CPT | Performed by: THORACIC SURGERY (CARDIOTHORACIC VASCULAR SURGERY)

## 2022-01-07 PROCEDURE — 84295 ASSAY OF SERUM SODIUM: CPT

## 2022-01-07 PROCEDURE — 99291 CRITICAL CARE FIRST HOUR: CPT | Performed by: ANESTHESIOLOGY

## 2022-01-07 PROCEDURE — 33315 EXPLORATORY HEART SURGERY: CPT | Performed by: THORACIC SURGERY (CARDIOTHORACIC VASCULAR SURGERY)

## 2022-01-07 PROCEDURE — 85049 AUTOMATED PLATELET COUNT: CPT | Performed by: PHYSICIAN ASSISTANT

## 2022-01-07 PROCEDURE — 93325 DOPPLER ECHO COLOR FLOW MAPG: CPT

## 2022-01-07 PROCEDURE — 97167 OT EVAL HIGH COMPLEX 60 MIN: CPT

## 2022-01-07 PROCEDURE — 85018 HEMOGLOBIN: CPT | Performed by: PHYSICIAN ASSISTANT

## 2022-01-07 PROCEDURE — 82948 REAGENT STRIP/BLOOD GLUCOSE: CPT

## 2022-01-07 PROCEDURE — 85014 HEMATOCRIT: CPT

## 2022-01-07 PROCEDURE — 94002 VENT MGMT INPAT INIT DAY: CPT

## 2022-01-07 PROCEDURE — 86850 RBC ANTIBODY SCREEN: CPT | Performed by: PHYSICIAN ASSISTANT

## 2022-01-07 PROCEDURE — 80048 BASIC METABOLIC PNL TOTAL CA: CPT | Performed by: PHYSICIAN ASSISTANT

## 2022-01-07 PROCEDURE — 86923 COMPATIBILITY TEST ELECTRIC: CPT

## 2022-01-07 PROCEDURE — 86901 BLOOD TYPING SEROLOGIC RH(D): CPT | Performed by: PHYSICIAN ASSISTANT

## 2022-01-07 PROCEDURE — 84100 ASSAY OF PHOSPHORUS: CPT | Performed by: PHYSICIAN ASSISTANT

## 2022-01-07 PROCEDURE — NC001 PR NO CHARGE: Performed by: PHYSICIAN ASSISTANT

## 2022-01-07 PROCEDURE — 93308 TTE F-UP OR LMTD: CPT

## 2022-01-07 PROCEDURE — 83735 ASSAY OF MAGNESIUM: CPT | Performed by: PHYSICIAN ASSISTANT

## 2022-01-07 PROCEDURE — 02PY02Z REMOVAL OF MONITORING DEVICE FROM GREAT VESSEL, OPEN APPROACH: ICD-10-PCS | Performed by: THORACIC SURGERY (CARDIOTHORACIC VASCULAR SURGERY)

## 2022-01-07 PROCEDURE — 82330 ASSAY OF CALCIUM: CPT | Performed by: PHYSICIAN ASSISTANT

## 2022-01-07 PROCEDURE — 85014 HEMATOCRIT: CPT | Performed by: PHYSICIAN ASSISTANT

## 2022-01-07 PROCEDURE — 97163 PT EVAL HIGH COMPLEX 45 MIN: CPT

## 2022-01-07 PROCEDURE — 99232 SBSQ HOSP IP/OBS MODERATE 35: CPT | Performed by: INTERNAL MEDICINE

## 2022-01-07 PROCEDURE — 86900 BLOOD TYPING SEROLOGIC ABO: CPT | Performed by: PHYSICIAN ASSISTANT

## 2022-01-07 PROCEDURE — 76000 FLUOROSCOPY <1 HR PHYS/QHP: CPT

## 2022-01-07 PROCEDURE — 30233N0 TRANSFUSION OF AUTOLOGOUS RED BLOOD CELLS INTO PERIPHERAL VEIN, PERCUTANEOUS APPROACH: ICD-10-PCS | Performed by: THORACIC SURGERY (CARDIOTHORACIC VASCULAR SURGERY)

## 2022-01-07 PROCEDURE — 33315 EXPLORATORY HEART SURGERY: CPT | Performed by: PHYSICIAN ASSISTANT

## 2022-01-07 PROCEDURE — 0WCD0ZZ EXTIRPATION OF MATTER FROM PERICARDIAL CAVITY, OPEN APPROACH: ICD-10-PCS | Performed by: THORACIC SURGERY (CARDIOTHORACIC VASCULAR SURGERY)

## 2022-01-07 PROCEDURE — 99024 POSTOP FOLLOW-UP VISIT: CPT | Performed by: THORACIC SURGERY (CARDIOTHORACIC VASCULAR SURGERY)

## 2022-01-07 PROCEDURE — 82947 ASSAY GLUCOSE BLOOD QUANT: CPT

## 2022-01-07 PROCEDURE — 93325 DOPPLER ECHO COLOR FLOW MAPG: CPT | Performed by: INTERNAL MEDICINE

## 2022-01-07 PROCEDURE — 5A1223Z PERFORMANCE OF CARDIAC PACING, CONTINUOUS: ICD-10-PCS | Performed by: THORACIC SURGERY (CARDIOTHORACIC VASCULAR SURGERY)

## 2022-01-07 PROCEDURE — 82805 BLOOD GASES W/O2 SATURATION: CPT | Performed by: PHYSICIAN ASSISTANT

## 2022-01-07 PROCEDURE — 93005 ELECTROCARDIOGRAM TRACING: CPT

## 2022-01-07 PROCEDURE — 82803 BLOOD GASES ANY COMBINATION: CPT

## 2022-01-07 PROCEDURE — 94760 N-INVAS EAR/PLS OXIMETRY 1: CPT | Performed by: SOCIAL WORKER

## 2022-01-07 PROCEDURE — 85027 COMPLETE CBC AUTOMATED: CPT | Performed by: PHYSICIAN ASSISTANT

## 2022-01-07 PROCEDURE — 85347 COAGULATION TIME ACTIVATED: CPT

## 2022-01-07 PROCEDURE — 85049 AUTOMATED PLATELET COUNT: CPT | Performed by: THORACIC SURGERY (CARDIOTHORACIC VASCULAR SURGERY)

## 2022-01-07 PROCEDURE — 93308 TTE F-UP OR LMTD: CPT | Performed by: INTERNAL MEDICINE

## 2022-01-07 PROCEDURE — 93355 ECHO TRANSESOPHAGEAL (TEE): CPT

## 2022-01-07 PROCEDURE — 71045 X-RAY EXAM CHEST 1 VIEW: CPT

## 2022-01-07 RX ORDER — CEFAZOLIN SODIUM 1 G/3ML
INJECTION, POWDER, FOR SOLUTION INTRAMUSCULAR; INTRAVENOUS AS NEEDED
Status: DISCONTINUED | OUTPATIENT
Start: 2022-01-07 | End: 2022-01-07

## 2022-01-07 RX ORDER — ACETAMINOPHEN 650 MG/1
650 SUPPOSITORY RECTAL EVERY 4 HOURS PRN
Status: DISCONTINUED | OUTPATIENT
Start: 2022-01-07 | End: 2022-01-08

## 2022-01-07 RX ORDER — AMIODARONE HYDROCHLORIDE 50 MG/ML
INJECTION, SOLUTION INTRAVENOUS
Status: DISPENSED
Start: 2022-01-07 | End: 2022-01-08

## 2022-01-07 RX ORDER — HYDROMORPHONE HCL/PF 1 MG/ML
0.5 SYRINGE (ML) INJECTION EVERY 2 HOUR PRN
Status: DISCONTINUED | OUTPATIENT
Start: 2022-01-07 | End: 2022-01-09

## 2022-01-07 RX ORDER — SODIUM CHLORIDE 9 MG/ML
INJECTION, SOLUTION INTRAVENOUS CONTINUOUS PRN
Status: DISCONTINUED | OUTPATIENT
Start: 2022-01-07 | End: 2022-01-07

## 2022-01-07 RX ORDER — POTASSIUM CHLORIDE 14.9 MG/ML
20 INJECTION INTRAVENOUS ONCE AS NEEDED
Status: DISCONTINUED | OUTPATIENT
Start: 2022-01-07 | End: 2022-01-09

## 2022-01-07 RX ORDER — MAGNESIUM SULFATE HEPTAHYDRATE 40 MG/ML
2 INJECTION, SOLUTION INTRAVENOUS ONCE
Status: COMPLETED | OUTPATIENT
Start: 2022-01-07 | End: 2022-01-07

## 2022-01-07 RX ORDER — MIDAZOLAM HYDROCHLORIDE 2 MG/2ML
INJECTION, SOLUTION INTRAMUSCULAR; INTRAVENOUS AS NEEDED
Status: DISCONTINUED | OUTPATIENT
Start: 2022-01-07 | End: 2022-01-07

## 2022-01-07 RX ORDER — PANTOPRAZOLE SODIUM 40 MG/1
40 TABLET, DELAYED RELEASE ORAL DAILY
Status: DISCONTINUED | OUTPATIENT
Start: 2022-01-08 | End: 2022-01-07

## 2022-01-07 RX ORDER — TEMAZEPAM 15 MG/1
15 CAPSULE ORAL
Status: DISCONTINUED | OUTPATIENT
Start: 2022-01-07 | End: 2022-01-31 | Stop reason: HOSPADM

## 2022-01-07 RX ORDER — FUROSEMIDE 10 MG/ML
40 INJECTION INTRAMUSCULAR; INTRAVENOUS EVERY 8 HOURS
Status: DISCONTINUED | OUTPATIENT
Start: 2022-01-07 | End: 2022-01-07

## 2022-01-07 RX ORDER — POTASSIUM CHLORIDE 29.8 MG/ML
40 INJECTION INTRAVENOUS ONCE
Status: COMPLETED | OUTPATIENT
Start: 2022-01-07 | End: 2022-01-07

## 2022-01-07 RX ORDER — OXYCODONE HYDROCHLORIDE 5 MG/1
2.5 TABLET ORAL EVERY 4 HOURS PRN
Status: DISCONTINUED | OUTPATIENT
Start: 2022-01-07 | End: 2022-01-31 | Stop reason: HOSPADM

## 2022-01-07 RX ORDER — CALCIUM CHLORIDE 100 MG/ML
1 INJECTION INTRAVENOUS; INTRAVENTRICULAR ONCE
Status: DISCONTINUED | OUTPATIENT
Start: 2022-01-07 | End: 2022-01-08

## 2022-01-07 RX ORDER — ROCURONIUM BROMIDE 10 MG/ML
INJECTION, SOLUTION INTRAVENOUS AS NEEDED
Status: DISCONTINUED | OUTPATIENT
Start: 2022-01-07 | End: 2022-01-07

## 2022-01-07 RX ORDER — OXYCODONE HYDROCHLORIDE 5 MG/1
5 TABLET ORAL EVERY 4 HOURS PRN
Status: DISCONTINUED | OUTPATIENT
Start: 2022-01-07 | End: 2022-01-07

## 2022-01-07 RX ORDER — CEFAZOLIN SODIUM 2 G/50ML
2000 SOLUTION INTRAVENOUS ONCE
Status: DISCONTINUED | OUTPATIENT
Start: 2022-01-07 | End: 2022-01-07 | Stop reason: HOSPADM

## 2022-01-07 RX ORDER — HEPARIN SODIUM 1000 [USP'U]/ML
INJECTION, SOLUTION INTRAVENOUS; SUBCUTANEOUS AS NEEDED
Status: DISCONTINUED | OUTPATIENT
Start: 2022-01-07 | End: 2022-01-07

## 2022-01-07 RX ORDER — ONDANSETRON 2 MG/ML
4 INJECTION INTRAMUSCULAR; INTRAVENOUS EVERY 6 HOURS PRN
Status: DISCONTINUED | OUTPATIENT
Start: 2022-01-07 | End: 2022-01-07

## 2022-01-07 RX ORDER — FENTANYL CITRATE 50 UG/ML
50 INJECTION, SOLUTION INTRAMUSCULAR; INTRAVENOUS
Status: DISCONTINUED | OUTPATIENT
Start: 2022-01-07 | End: 2022-01-09

## 2022-01-07 RX ORDER — CHLORHEXIDINE GLUCONATE 0.12 MG/ML
15 RINSE ORAL EVERY 12 HOURS SCHEDULED
Status: CANCELLED | OUTPATIENT
Start: 2022-01-07

## 2022-01-07 RX ORDER — FUROSEMIDE 10 MG/ML
40 INJECTION INTRAMUSCULAR; INTRAVENOUS EVERY 6 HOURS PRN
Status: DISCONTINUED | OUTPATIENT
Start: 2022-01-07 | End: 2022-01-09

## 2022-01-07 RX ORDER — POTASSIUM CHLORIDE 14.9 MG/ML
20 INJECTION INTRAVENOUS
Status: DISCONTINUED | OUTPATIENT
Start: 2022-01-07 | End: 2022-01-09

## 2022-01-07 RX ORDER — ONDANSETRON 2 MG/ML
4 INJECTION INTRAMUSCULAR; INTRAVENOUS EVERY 6 HOURS PRN
Status: DISCONTINUED | OUTPATIENT
Start: 2022-01-07 | End: 2022-01-31 | Stop reason: HOSPADM

## 2022-01-07 RX ORDER — ACETAMINOPHEN 325 MG/1
975 TABLET ORAL EVERY 8 HOURS
Status: DISCONTINUED | OUTPATIENT
Start: 2022-01-07 | End: 2022-01-08

## 2022-01-07 RX ORDER — POLYETHYLENE GLYCOL 3350 17 G/17G
17 POWDER, FOR SOLUTION ORAL DAILY
Status: DISCONTINUED | OUTPATIENT
Start: 2022-01-08 | End: 2022-01-07

## 2022-01-07 RX ORDER — AMINOCAPROIC ACID 250 MG/ML
INJECTION, SOLUTION INTRAVENOUS AS NEEDED
Status: DISCONTINUED | OUTPATIENT
Start: 2022-01-07 | End: 2022-01-07

## 2022-01-07 RX ORDER — OXYCODONE HYDROCHLORIDE 5 MG/1
2.5 TABLET ORAL EVERY 4 HOURS PRN
Status: DISCONTINUED | OUTPATIENT
Start: 2022-01-07 | End: 2022-01-07

## 2022-01-07 RX ORDER — ATORVASTATIN CALCIUM 80 MG/1
80 TABLET, FILM COATED ORAL
Status: DISCONTINUED | OUTPATIENT
Start: 2022-01-07 | End: 2022-01-07

## 2022-01-07 RX ORDER — POTASSIUM CHLORIDE 20 MEQ/1
20 TABLET, EXTENDED RELEASE ORAL
Status: DISCONTINUED | OUTPATIENT
Start: 2022-01-07 | End: 2022-01-08

## 2022-01-07 RX ORDER — LIDOCAINE HYDROCHLORIDE 10 MG/ML
INJECTION, SOLUTION EPIDURAL; INFILTRATION; INTRACAUDAL; PERINEURAL
Status: COMPLETED | OUTPATIENT
Start: 2022-01-07 | End: 2022-01-07

## 2022-01-07 RX ORDER — CHLORHEXIDINE GLUCONATE 0.12 MG/ML
15 RINSE ORAL 2 TIMES DAILY
Status: DISCONTINUED | OUTPATIENT
Start: 2022-01-07 | End: 2022-01-08

## 2022-01-07 RX ORDER — MAGNESIUM HYDROXIDE 1200 MG/15ML
LIQUID ORAL AS NEEDED
Status: DISCONTINUED | OUTPATIENT
Start: 2022-01-07 | End: 2022-01-07 | Stop reason: HOSPADM

## 2022-01-07 RX ORDER — FONDAPARINUX SODIUM 2.5 MG/.5ML
2.5 INJECTION SUBCUTANEOUS DAILY
Status: DISCONTINUED | OUTPATIENT
Start: 2022-01-08 | End: 2022-01-07

## 2022-01-07 RX ORDER — FENTANYL CITRATE 50 UG/ML
50 INJECTION, SOLUTION INTRAMUSCULAR; INTRAVENOUS ONCE
Status: DISCONTINUED | OUTPATIENT
Start: 2022-01-07 | End: 2022-01-08

## 2022-01-07 RX ORDER — LIDOCAINE HYDROCHLORIDE 10 MG/ML
INJECTION, SOLUTION EPIDURAL; INFILTRATION; INTRACAUDAL; PERINEURAL AS NEEDED
Status: DISCONTINUED | OUTPATIENT
Start: 2022-01-07 | End: 2022-01-07

## 2022-01-07 RX ORDER — CEFAZOLIN SODIUM 2 G/50ML
2000 SOLUTION INTRAVENOUS EVERY 8 HOURS
Status: COMPLETED | OUTPATIENT
Start: 2022-01-07 | End: 2022-01-08

## 2022-01-07 RX ORDER — CALCIUM GLUCONATE 20 MG/ML
2 INJECTION, SOLUTION INTRAVENOUS ONCE
Status: COMPLETED | OUTPATIENT
Start: 2022-01-07 | End: 2022-01-07

## 2022-01-07 RX ORDER — OXYCODONE HYDROCHLORIDE 5 MG/1
5 TABLET ORAL EVERY 4 HOURS PRN
Status: DISCONTINUED | OUTPATIENT
Start: 2022-01-07 | End: 2022-01-31 | Stop reason: HOSPADM

## 2022-01-07 RX ORDER — ASPIRIN 325 MG
325 TABLET ORAL DAILY
Status: DISCONTINUED | OUTPATIENT
Start: 2022-01-08 | End: 2022-01-07

## 2022-01-07 RX ORDER — SODIUM CHLORIDE 450 MG/100ML
20 INJECTION, SOLUTION INTRAVENOUS CONTINUOUS
Status: DISCONTINUED | OUTPATIENT
Start: 2022-01-07 | End: 2022-01-08

## 2022-01-07 RX ORDER — PROTAMINE SULFATE 10 MG/ML
INJECTION, SOLUTION INTRAVENOUS AS NEEDED
Status: DISCONTINUED | OUTPATIENT
Start: 2022-01-07 | End: 2022-01-07

## 2022-01-07 RX ORDER — SODIUM CHLORIDE, SODIUM LACTATE, POTASSIUM CHLORIDE, CALCIUM CHLORIDE 600; 310; 30; 20 MG/100ML; MG/100ML; MG/100ML; MG/100ML
INJECTION, SOLUTION INTRAVENOUS CONTINUOUS PRN
Status: DISCONTINUED | OUTPATIENT
Start: 2022-01-07 | End: 2022-01-07

## 2022-01-07 RX ORDER — AMIODARONE HYDROCHLORIDE 200 MG/1
200 TABLET ORAL EVERY 8 HOURS SCHEDULED
Status: DISCONTINUED | OUTPATIENT
Start: 2022-01-07 | End: 2022-01-07

## 2022-01-07 RX ORDER — BISACODYL 10 MG
10 SUPPOSITORY, RECTAL RECTAL DAILY PRN
Status: DISCONTINUED | OUTPATIENT
Start: 2022-01-07 | End: 2022-01-07

## 2022-01-07 RX ORDER — FENTANYL CITRATE 50 UG/ML
INJECTION, SOLUTION INTRAMUSCULAR; INTRAVENOUS AS NEEDED
Status: DISCONTINUED | OUTPATIENT
Start: 2022-01-07 | End: 2022-01-07

## 2022-01-07 RX ORDER — DOCUSATE SODIUM 100 MG/1
100 CAPSULE, LIQUID FILLED ORAL 2 TIMES DAILY
Status: DISCONTINUED | OUTPATIENT
Start: 2022-01-07 | End: 2022-01-11

## 2022-01-07 RX ORDER — KETAMINE HCL IN NACL, ISO-OSM 100MG/10ML
SYRINGE (ML) INJECTION AS NEEDED
Status: DISCONTINUED | OUTPATIENT
Start: 2022-01-07 | End: 2022-01-07

## 2022-01-07 RX ADMIN — AMINOCAPROIC ACID 5 G: 250 INJECTION, SOLUTION INTRAVENOUS at 14:08

## 2022-01-07 RX ADMIN — SODIUM CHLORIDE, SODIUM LACTATE, POTASSIUM CHLORIDE, AND CALCIUM CHLORIDE 250 ML: .6; .31; .03; .02 INJECTION, SOLUTION INTRAVENOUS at 00:44

## 2022-01-07 RX ADMIN — POTASSIUM CHLORIDE 20 MEQ: 14.9 INJECTION, SOLUTION INTRAVENOUS at 17:29

## 2022-01-07 RX ADMIN — ROCURONIUM BROMIDE 50 MG: 50 INJECTION, SOLUTION INTRAVENOUS at 13:20

## 2022-01-07 RX ADMIN — OXYCODONE HYDROCHLORIDE 5 MG: 5 TABLET ORAL at 21:34

## 2022-01-07 RX ADMIN — CHLORHEXIDINE GLUCONATE 15 ML: 1.2 SOLUTION ORAL at 17:29

## 2022-01-07 RX ADMIN — FUROSEMIDE 40 MG: 10 INJECTION, SOLUTION INTRAVENOUS at 08:14

## 2022-01-07 RX ADMIN — SODIUM CHLORIDE 20 ML/HR: 0.45 INJECTION, SOLUTION INTRAVENOUS at 16:30

## 2022-01-07 RX ADMIN — POTASSIUM CHLORIDE 20 MEQ: 1500 TABLET, EXTENDED RELEASE ORAL at 08:14

## 2022-01-07 RX ADMIN — ASPIRIN 325 MG ORAL TABLET 325 MG: 325 PILL ORAL at 08:14

## 2022-01-07 RX ADMIN — AMIODARONE HYDROCHLORIDE 200 MG: 200 TABLET ORAL at 21:34

## 2022-01-07 RX ADMIN — POTASSIUM CHLORIDE 40 MEQ: 29.8 INJECTION, SOLUTION INTRAVENOUS at 05:21

## 2022-01-07 RX ADMIN — CEFAZOLIN SODIUM 2000 MG: 1 INJECTION, POWDER, FOR SOLUTION INTRAMUSCULAR; INTRAVENOUS at 13:41

## 2022-01-07 RX ADMIN — LIDOCAINE HYDROCHLORIDE 1 ML: 10 INJECTION, SOLUTION EPIDURAL; INFILTRATION; INTRACAUDAL; PERINEURAL at 13:47

## 2022-01-07 RX ADMIN — Medication 12.5 MG: at 08:14

## 2022-01-07 RX ADMIN — PROTAMINE SULFATE 250 MG: 10 INJECTION, SOLUTION INTRAVENOUS at 15:06

## 2022-01-07 RX ADMIN — CEFAZOLIN SODIUM 2000 MG: 1 INJECTION, POWDER, FOR SOLUTION INTRAMUSCULAR; INTRAVENOUS at 15:47

## 2022-01-07 RX ADMIN — SODIUM CHLORIDE, SODIUM LACTATE, POTASSIUM CHLORIDE, AND CALCIUM CHLORIDE: .6; .31; .03; .02 INJECTION, SOLUTION INTRAVENOUS at 13:02

## 2022-01-07 RX ADMIN — MUPIROCIN 1 APPLICATION: 20 OINTMENT TOPICAL at 08:14

## 2022-01-07 RX ADMIN — Medication 30 MG: at 13:20

## 2022-01-07 RX ADMIN — EPINEPHRINE 3 MCG/MIN: 1 INJECTION, SOLUTION, CONCENTRATE INTRAVENOUS at 16:30

## 2022-01-07 RX ADMIN — Medication 20 MG: at 14:15

## 2022-01-07 RX ADMIN — POLYETHYLENE GLYCOL 3350 17 G: 17 POWDER, FOR SOLUTION ORAL at 08:14

## 2022-01-07 RX ADMIN — CALCIUM GLUCONATE 2 G: 20 INJECTION, SOLUTION INTRAVENOUS at 06:27

## 2022-01-07 RX ADMIN — NOREPINEPHRINE BITARTRATE 2 MCG/MIN: 1 INJECTION, SOLUTION, CONCENTRATE INTRAVENOUS at 13:40

## 2022-01-07 RX ADMIN — HEPARIN SODIUM 37000 UNITS: 1000 INJECTION INTRAVENOUS; SUBCUTANEOUS at 14:10

## 2022-01-07 RX ADMIN — HEPARIN SODIUM 5000 UNITS: 5000 INJECTION INTRAVENOUS; SUBCUTANEOUS at 21:34

## 2022-01-07 RX ADMIN — MILRINONE LACTATE IN DEXTROSE 0.5 MCG/KG/MIN: 200 INJECTION, SOLUTION INTRAVENOUS at 03:03

## 2022-01-07 RX ADMIN — AMIODARONE HYDROCHLORIDE 200 MG: 200 TABLET ORAL at 05:21

## 2022-01-07 RX ADMIN — SUGAMMADEX 200 MG: 100 INJECTION, SOLUTION INTRAVENOUS at 16:32

## 2022-01-07 RX ADMIN — DOCUSATE SODIUM 100 MG: 100 CAPSULE ORAL at 08:14

## 2022-01-07 RX ADMIN — MUPIROCIN 1 APPLICATION: 20 OINTMENT TOPICAL at 21:34

## 2022-01-07 RX ADMIN — HYDROMORPHONE HYDROCHLORIDE 0.5 MG: 1 INJECTION, SOLUTION INTRAMUSCULAR; INTRAVENOUS; SUBCUTANEOUS at 17:59

## 2022-01-07 RX ADMIN — PANTOPRAZOLE SODIUM 40 MG: 40 TABLET, DELAYED RELEASE ORAL at 05:21

## 2022-01-07 RX ADMIN — FENTANYL CITRATE 150 MCG: 50 INJECTION INTRAMUSCULAR; INTRAVENOUS at 16:07

## 2022-01-07 RX ADMIN — MAGNESIUM SULFATE HEPTAHYDRATE 2 G: 40 INJECTION, SOLUTION INTRAVENOUS at 17:03

## 2022-01-07 RX ADMIN — NOREPINEPHRINE BITARTRATE 10 MCG/MIN: 1 INJECTION, SOLUTION, CONCENTRATE INTRAVENOUS at 16:30

## 2022-01-07 RX ADMIN — FENTANYL CITRATE 100 MCG: 50 INJECTION INTRAMUSCULAR; INTRAVENOUS at 13:19

## 2022-01-07 RX ADMIN — MIDAZOLAM 2 MG: 1 INJECTION INTRAMUSCULAR; INTRAVENOUS at 13:02

## 2022-01-07 RX ADMIN — MILRINONE LACTATE IN DEXTROSE 0.38 MCG/KG/MIN: 200 INJECTION, SOLUTION INTRAVENOUS at 11:22

## 2022-01-07 RX ADMIN — HEPARIN SODIUM 5000 UNITS: 5000 INJECTION INTRAVENOUS; SUBCUTANEOUS at 05:21

## 2022-01-07 RX ADMIN — Medication 3 MCG/KG/MIN: at 15:42

## 2022-01-07 RX ADMIN — SODIUM CHLORIDE: 0.9 INJECTION, SOLUTION INTRAVENOUS at 13:26

## 2022-01-07 NOTE — PLAN OF CARE
Problem: PHYSICAL THERAPY ADULT  Goal: Performs mobility at highest level of function for planned discharge setting  See evaluation for individualized goals  Description: Treatment/Interventions: Functional transfer training,LE strengthening/ROM,Elevations,Therapeutic exercise,Endurance training,Equipment eval/education,Bed mobility,Gait training,Spoke to nursing,OT,Cognitive reorientation  Equipment Recommended:  (r/o rw next visit)       See flowsheet documentation for full assessment, interventions and recommendations  Note: Prognosis: Fair  Problem List: Decreased strength,Decreased endurance,Impaired balance,Decreased mobility,Obesity,Pain,Decreased cognition  Assessment: Pt is 61 y o  male admitted with hx of weakness and shortness of breath and Dx of Cardiogenic shock and ventricular septal defect, acute kidney injury, and acute liver injury; pt underwent Initiation of venoarterial Extracorporeal Life Support on 12/31/2021 and Repair of post-infarction ventricular septal defect and Venoarterial ECMO decannulation on 1/4/2022  Pt 's comorbidities affecting POC include: CKD, Agoraphobia with panic attacks, pipe smoker, Impaired fasting glucose, and obesity and personal factors of: POORNIMA and steps in the house  Pt's clinical presentation is currently unstable/unpredictable which is evident in ongoing telemetry monitoring while in a critical care unit w/ Estrada Gutierrez catheter and a-line in place, abnormal lab values being monitored/trending, suppl O2 needs, CT in place and inability to progress further w/ mobilization/amb at this time w/ max (A)x2 required for transfers  Pt presents w/ postop pain and guarding, generalized weakness, incl signif decreased LE strength, decreased functional endurance and activity tolerance, impaired standing balance requiring (A)x2 at this time for transfers, and fall risk   Will cont to follow pt in PT for progressive mobilization to address above functional deficits and to max level of (I), endurance, and safety  Currently, based on overall status, recommend rehab upon D/C  Will cont to follow until then  Barriers to Discharge: Inaccessible home environment        PT Discharge Recommendation: Post acute rehabilitation services          See flowsheet documentation for full assessment

## 2022-01-07 NOTE — PHYSICAL THERAPY NOTE
Physical Therapy Evaluation     Patient's Name: Tommie Hays    Admitting Diagnosis  Cardiogenic shock ([facility] ) [R57 0]  Liver failure ([facility] ) [K72 90]  Weakness [R53 1]  ST elevation [R94 31]  Acute kidney injury ([facility] ) [N17 9]  Acute respiratory failure with hypoxia (HCC) [J96 01]  Generalized weakness [R53 1]    Problem List  Patient Active Problem List   Diagnosis    Agoraphobia with panic attacks    Hyperlipidemia, mixed    Impaired fasting glucose    Obesity (BMI 30-39  9)    Psychosis, atypical ([facility] )    Pipe smoker    Cardiogenic shock ([facility] )    VSD (ventricular septal defect)    JUDE (acute kidney injury) ([facility] )    Transaminitis    Tylenol ingestion    Acute systolic congestive heart failure (HCC)    Hyperglycemia    Anemia    Personal history of ECMO    Coagulopathy (HCC)    Leukocytosis    Arterial thrombosis ([facility] )       Past Medical History  History reviewed  No pertinent past medical history  Past Surgical History  Past Surgical History:   Procedure Laterality Date    EXTRACORPOREAL CIRCULATION N/A 12/31/2021    Procedure: SUPPORT EXTRACORPOREAL MEMBRANE OXYGENATION (ECMO); Surgeon: Chaim Woodward MD;  [facility];  Service: Cardiac Surgery    IR OTHER  12/31/2021    GA ECMO/ECLS INITIATION VENOUS N/A 1/4/2022    Procedure: VA ECMO DECANNULATION;  Surgeon: Chaim Woodward MD;  [facility];  Service: Cardiac Surgery    GA REVSD N/A 1/4/2022    Procedure: REPAIR VENTRICULAR SEPTAL DEFECT (VSD) OPEN WITH BOVINE PERICARDIAL PATCH ;  Surgeon: Chaim Woodward MD;  [facility];  Service: Cardiac Surgery          01/07/22 0906   PT Last Visit   PT Visit Date 01/07/22   Note Type   Note type Evaluation   Pain Assessment   Pain Assessment Tool FLACC   Pain Location/Orientation Orientation: Mid;Location: Chest;Location: Incision   Pain Onset/Description Onset: Ongoing;Frequency: Intermittent; Descriptor: Aching;Descriptor: Discomfort   Effect of Pain on Daily Activities guarding w/ positional changes   Patient's Stated Pain Goal No pain   Hospital Pain Intervention(s) Repositioned; Ambulation/increased activity; Emotional support   Pain Rating: FLACC (Rest) - Face 0   Pain Rating: FLACC (Rest) - Legs 0   Pain Rating: FLACC (Rest) - Activity 0   Pain Rating: FLACC (Rest) - Cry 0   Pain Rating: FLACC (Rest) - Consolability 0   Score: FLACC (Rest) 0   Pain Rating: FLACC (Activity) - Face 1   Pain Rating: FLACC (Activity) - Legs 0   Pain Rating: FLACC (Activity) - Activity 0   Pain Rating: FLACC (Activity) - Cry 1   Pain Rating: FLACC (Activity) - Consolability 1   Score: FLACC (Activity) 3   Restrictions/Precautions   Braces or Orthoses   (denies)   Other Precautions Cardiac/sternal;Multiple lines;Telemetry;O2;Fall Risk;Pain;Cognitive  (CT; swan catheter; a-line)   Home Living   Type of Home House   Home Layout Two level  (13 steps up w/ hand rail; 2 POORNIMA w/ hand rail)   Prior Function   Level of [place] Independent with ADLs and functional mobility  (amb w/o AD)   Lives With Family  (sister and brother in law)   General   Additional Pertinent History Cleared for assessment (spoke to nsg)   Cognition   Overall Cognitive Status Impaired   Arousal/Participation Responsive   Orientation Level Oriented to person;Oriented to situation   Memory Decreased recall of recent events;Decreased recall of precautions   Following Commands Follows one step commands with increased time or repetition   Subjective   Subjective Pt is observed in the chair/reclined; responds to questions; agreeable to try mobilization   RUE Assessment   RUE Assessment X  (decreased AROM at the shld)   LUE Assessment   LUE Assessment WFL  (AROM)   RLE Assessment   RLE Assessment X  (decreased AROM at the hip)   Strength RLE   RLE Overall Strength   (fair - hip; fair knee and ankle (grossly))   LLE Assessment   LLE Assessment X  (decreased AROM at the hip)   Strength LLE   LLE Overall Strength (fair - hip; fair knee and ankle (grossly))   Transfers   Sit to Stand 2  Maximal assistance  (achieved full standing on the 2nd trial)   Additional items Assist x 2; Increased time required;Verbal cues  (stand by (A) of 3rd for lines)   Stand to Sit 2  Maximal assistance   Additional items Assist x 2; Increased time required;Verbal cues   Ambulation/Elevation   Gait pattern Not appropriate; Not tested   Assistive Device   (side by side w/ use of chair pad)   Balance   Static Sitting Poor +   Dynamic Sitting Poor   Static Standing Zero   Activity Tolerance   Activity Tolerance Patient limited by fatigue;Patient limited by pain   Medical Staff Made Aware Co-eval performed w/ OTR due to complexity of medical status, multiple comorbidities and level of (A) required   Nurse Made Aware spoke to OMA Coats   Assessment   Prognosis Fair   Problem List Decreased strength;Decreased endurance; Impaired balance;Decreased mobility;Obesity;Pain;Decreased cognition   Assessment Pt is 61 y o  male admitted with hx of weakness and shortness of breath and Dx of Cardiogenic shock and ventricular septal defect, acute kidney injury, and acute liver injury; pt underwent Initiation of venoarterial Extracorporeal Life Support on 12/31/2021 and Repair of post-infarction ventricular septal defect and Venoarterial ECMO decannulation on 1/4/2022  Pt 's comorbidities affecting POC include: CKD, Agoraphobia with panic attacks, pipe smoker, Impaired fasting glucose, and obesity and personal factors of: POORNIMA and steps in the house  Pt's clinical presentation is currently unstable/unpredictable which is evident in ongoing telemetry monitoring while in a critical care unit w/ Claretha Siobhan catheter and a-line in place, abnormal lab values being monitored/trending, suppl O2 needs, CT in place and inability to progress further w/ mobilization/amb at this time w/ max (A)x2 required for transfers   Pt presents w/ postop pain and guarding, generalized weakness, incl signif decreased LE strength, decreased functional endurance and activity tolerance, impaired standing balance requiring (A)x2 at this time for transfers, and fall risk  Will cont to follow pt in PT for progressive mobilization to address above functional deficits and to max level of (I), endurance, and safety  Currently, based on overall status, recommend rehab upon D/C  Will cont to follow until then  Barriers to Discharge Inaccessible home environment   Goals   Patient Goals to feel better   STG Expiration Date 01/17/22   Short Term Goal #1 7-10 days  Pt will amb 150 ft w/ rw, (S)x1 in order to facilitate safe return to premorbid environment and to at least household amb status  Pt will negotiate 2 steps w/ hand rail and SPC PRN, (S)x1 in order to assure safe navigation in and out of the premorbid living environment  Pt will achieve (I) level w/ bed mob in order to facilitate safety with OOB and back to bed transitions in own living environment  Pt will perform transfers w/ mod (I) to assure (I) and safety w/ functional mobility/transitions w/ all aspects of mobility/locomotion  Pt will participate in LE therex and balance activities to max progression w/ mobility skills  PT Treatment Day 0   Plan   Treatment/Interventions Functional transfer training;LE strengthening/ROM; Elevations; Therapeutic exercise; Endurance training;Equipment eval/education; Bed mobility;Gait training;Spoke to nursing;OT;Cognitive reorientation   PT Frequency 4-6x/wk   Recommendation   PT Discharge Recommendation Post acute rehabilitation services   Equipment Recommended   (r/o rw next visit)   AM-PAC Basic Mobility Inpatient   Turning in Bed Without Bedrails 2   Lying on Back to Sitting on Edge of Flat Bed 1   Moving Bed to Chair 1   Standing Up From Chair 1   Walk in Room 1   Climb 3-5 Stairs 1   Basic Mobility Inpatient Raw Score 7   Turning Head Towards Sound 4   Follow Simple Instructions 3   Low Function Basic Mobility Raw Score 14   Low Function Basic Mobility Standardized Score 22 01   Highest Level Of Mobility   [facility] Goal 2: Bed activities/Dependent transfer   -[facility] Highest Level of Mobility 3: Sit at edge of bed   -[facility] Goal Achieved Yes   Modified Dooly Scale   Modified Mk Scale 4   End of Consult   Patient Position at End of Consult Bedside chair; All needs within reach  (pillows for UE support; reclined; (L) SCD on)         Manual Roney, PT

## 2022-01-07 NOTE — RESPIRATORY THERAPY NOTE
RT Protocol Note  Kylah Saunders 61 y o  male MRN: [id number]  Unit/Bed#: [facility] 418-01 Encounter: [id number]    Assessment    Principal Problem:    Cardiogenic shock ([facility] )  Active Problems:    Agoraphobia with panic attacks    VSD (ventricular septal defect)    JUDE (acute kidney injury) ([facility] )    Transaminitis    Tylenol ingestion    Acute systolic congestive heart failure (HCC)    Hyperglycemia    Anemia    Personal history of ECMO    Coagulopathy (HCC)    Leukocytosis    Arterial thrombosis ([facility] )      Home Pulmonary Medications:  none       History reviewed  No pertinent past medical history  Social History     Socioeconomic History    Marital status: Single     Spouse name: None    Number of children: None    Years of education: None    Highest education level: None   Occupational History    None   Tobacco Use    Smoking status: Current Every Day Smoker     Types: Pipe    Smokeless tobacco: Never Used    Tobacco comment: Secondhand smoke exposure   Substance and Sexual Activity    Alcohol use: No    Drug use: No    Sexual activity: None   Other Topics Concern    None   Social History Narrative    Always uses seatbelt    No caffeine use     Social Determinants of Health     Financial Resource Strain: Not on file   Food Insecurity: Not on file   Transportation Needs: Not on file   Physical Activity: Not on file   Stress: Not on file   Social Connections: Not on file   Intimate Partner Violence: Not on file   Housing Stability: Not on file       Subjective         Objective    Physical Exam:   Assessment Type: Assess only  General Appearance: Sedated  Respiratory Pattern: Assisted  Chest Assessment: Chest expansion symmetrical  Cough: None  O2 Device: (P) vent    Vitals:  Blood pressure 135/74, pulse 92, temperature 98 6 °F (37 °C), temperature source Bladder, resp  rate 14, height 5' 5 5" (1 664 m), weight 92 1 kg (203 lb), SpO2 100 %      Results from last 7 days   Lab Units 01/06/22  0406 01/01/22  1328 01/01/22  0433   PH ART  7 429   < > 7 365   PCO2 ART mm Hg 43 1   < > 38 9   PO2 ART mm Hg 95 6   < > 370 0*   HCO3 ART mmol/L 27 9   < > 21 7*   BASE EXC ART mmol/L 3 2   < > -3 3   O2 CONTENT ART mL/dL 16 2   < > 14 3*   O2 HGB, ARTERIAL % 96 4   < > 98 6*   ABG SOURCE  Line, Arterial   < > Line, Arterial   REBECCA TEST   --   --  No  No    < > = values in this interval not displayed  Imaging and other studies: I have personally reviewed pertinent reports  O2 Device: (P) vent     Plan    Respiratory Plan: Vent/NIV/HFNC  Airway Clearance Plan: Incentive Spirometer     Resp Comments: (P) Pt  s/p OR for pulmonary artery catheter removal  Pt  placed on vent with ordered settings   Will resume IS after extubation

## 2022-01-07 NOTE — PROGRESS NOTES
Progress Note - Cardiothoracic Surgery   Jordan Oliveira 61 y o  male MRN: [id number]  Unit/Bed#: [facility] 418-01 Encounter: [id number]      POD 3 Repair of Post-infarction VSD, VA ECMO decannulation  Pt seen/examined    Interval history and data reviewed with critical care team     Milrinone gtt is @ 0 5    Extubated, sitting in chair, grossly intact neurologic exam     Medications:   Scheduled Meds:  Current Facility-Administered Medications   Medication Dose Route Frequency Provider Last Rate    amiodarone  200 mg Oral Q8H [address] [place] [address] [place]-      aspirin  325 mg Oral Daily [facility]      atorvastatin  80 mg Oral After [employer], [place]      bisacodyl  10 mg Rectal Daily PRN [facility]      heparin (porcine)  5,000 Units Subcutaneous [place], [place]      insulin regular (HumuLIN R,NovoLIN R) infusion  0 3-21 Units/hr Intravenous Titrated [address] [facility] Stopped (01/06/22 1026)    lidocaine (cardiac)  100 mg Intravenous Q30 Min PRN [facility]      milrinone [facility]) infusion  0 5 mcg/kg/min Intravenous Continuous Rosaline Barger PA-C 0 5 mcg/kg/min (01/07/22 0303)    mupirocin  1 application Nasal [address] [facility], [place]-      ondansetron  4 mg Intravenous Q6H PRN [facility]      oxyCODONE  2 5 mg Oral Q4H PRN [address] [facility]      oxyCODONE  5 mg Oral Q4H PRN [address]      pantoprazole  40 mg Oral Early Morning [facility]      polyethylene glycol  17 g Oral Daily [address] [place], [place]      sodium chloride  20 mL/hr Intravenous Continuous [address] [facility] 20 mL/hr (01/04/22 1410)     Continuous Infusions:insulin regular (HumuLIN R,NovoLIN R) infusion, 0 3-21 Units/hr, Last Rate: Stopped (01/06/22 1026)  milrinone (PRIMACOR) infusion, 0 5 mcg/kg/min, Last Rate: 0 5 mcg/kg/min (01/07/22 0303)  sodium chloride, 20 mL/hr, Last Rate: 20 mL/hr (01/04/22 1410)      PRN Meds: bisacodyl   lidocaine (cardiac)    ondansetron    oxyCODONE    oxyCODONE    Vitals: Blood pressure 111/65, pulse 101, temperature 98 8 °F (37 1 °C), temperature source Bladder, resp  rate 14, height 5' 5 5" (1 664 m), weight 92 2 kg (203 lb 4 2 oz), SpO2 94 %  ,Body mass index is 33 31 kg/m²  I/O last 24 hours: In: 3037 6 [P O :410; I V :896 8; NG/GT:800; IV Piggyback:765 8; Feedings:165]  Out: 3208 [Urine:6325; Chest Tube:140]  Invasive Devices  Report    Peripherally Inserted Central Catheter Line            PICC Line 75/64/20 Right Basilic <1 day          Central Venous Catheter Line            Introducer 12/31/21 6 days    LDA ECMO 12/31/21 Femoral artery  6 days          Arterial Line            Arterial Line 01/01/22 Other (Comment) 5 days          Drain            Urethral Catheter Temperature probe 7 days    Chest Tube 1 Anterior Mediastinal 32 Fr  2 days    Chest Tube 2 Posterior Mediastinal 32 Fr  2 days                  Lab, Imaging and other studies:   Results from last 7 days   Lab Units 01/07/22  0441 01/06/22  0407 01/05/22  0412   WBC Thousand/uL 16 11* 22 47* 24 24*   HEMOGLOBIN g/dL 11 2* 11 1* 11 9*   HEMATOCRIT % 34 6* 33 9* 36 1*   PLATELETS Thousands/uL 125* 114* 140*     Results from last 7 days   Lab Units 01/07/22  0416 01/07/22  0004 01/06/22  1935 01/06/22  1208 01/06/22  1208 01/04/22  1335 01/04/22  1247   POTASSIUM mmol/L 3 4* 4 0 3 5   < > 3 5   < >  --    CHLORIDE mmol/L 112*  --  115*  --  115*   < >  --    CO2 mmol/L 33*  --  32  --  30   < >  --    CO2, I-STAT mmol/L  --   --   --   --   --   --  30   BUN mg/dL 83*  --  81*  --  82*   < >  --    CREATININE mg/dL 1 84*  --  2 03*  --  2 10*   < >  --    GLUCOSE, ISTAT mg/dl  --   --   --   --   --   --  207*   CALCIUM mg/dL 7 9*  --  8 3  --  8 5   < >  --     < > = values in this interval not displayed       Results from last 7 days   Lab Units 01/04/22  1335 01/04/22  0556 01/04/22  0450 01/04/22  0214 01/03/22  1138 01/03/22  0644   INR 1 59*  --  1 27*  --   --  1 32*   PTT seconds 34 49*  --  50*   < > 73*    < > = values in this interval not displayed  Recent Labs     01/06/22  0406   PHART 7 429   GRA7BAS 27 9   PO2ART 95 6   [id number] 43 1   BEART 3 2           Plan:    Intermittent Lasix dosing for negative fluid balance  Creatinine is now improving  DC CT's  Start slow wean of Milrinone  DC PA catheter  Ambulate, PT/OT, Pulmonary toilet  Start low dose BB  Will plan to check TTE when off inotropic support  Will need close outpatient cardiology follow up for assessment of CAD and risk factor modification        Neel Langston MD  DATE: January 7, 2022  TIME: 7:39 AM

## 2022-01-07 NOTE — PROGRESS NOTES
PROGRESS NOTE - Cardiology  Jordan Hard 61 y o  male MRN: [id number]  Unit/Bed#: [facility] 418-01 Encounter: [id number]  Assessment/Plan   CAD with late onset MI complicated by VSD, cardiomyopathy and cardiogenic shock, status post VSD repair on 01/04/2022  -TTE on 12/30/2021:  Dilated LV, EF 45%, eccentric hypertrophy, dilated RV, with moderately reduced function, biatrial enlargement, moderate TR  -postop CORIE with EF of 40%, normal RV size and function, trace MR, moderate TR  -hemodynamics: on 12/31/2021 underwent VA-ECMO placement with decannulation on 01/04/2022   pressor requirements coming down  currently only on milrinone (0 38)  -volume status: improving, still hypervolemic, CVP 11-13 mmHg, status post Bumex drip, transitionned today to intermittent diuresis with IV Lasix 40 mg t i d     wlll recommend to continue diuresing to keep net negative  - on aspirin 325 mg daily, amiodarone 200 mg t i d , atorvastatin 80 mg daily   -Will introduced beta-blockers and rest of GDMT as hemodynamics and kidney function improve   -will repeat limited TTE months of inotropes  -outpatient follow-up with Cardiology for ischemic workup most likely with nuclear stress test      Hypoxic failure status post intubation  -improved oxygen requrements, trial to extubate today  -would recommend to diurese to keep net negative fluid balance     JUDE on CKD  -baseline creatinine around 1, presented with creatinine around 4, with improvement up to 1 24, currently up titrating  -most likely cardiorenal, will continue with more aggressive diuresis     Anemia  -hemoglobin at presentation 12 8, received multiple blood during this admission   stable hemoglobins     Transaminitis  -AST-ALT at presentation [id number], improving (106-104) today    Interval History:  Extubated yesterday, was on Milrinone 0 5 mcg/kg/minute last night, venous hemoglobin O2 64 7 %, creatinine 1 84 (down trending), potassium 3 4, sodium 150, chloride 112, AST-ALT 84-74 (improving), hemoglobin 11 2  Heart rate in low 100s, maps over 65, on 6 L nasal cannula, documented CVP is around 11, documented weight 203 lb (219 lb yesterday), intake and output 3 L/6 3 L net -3 4 L (net positive 4 L since admission), Bumex drip stopped last night secondary to good urine output, and transitionned to intermittent IV Lasix  Unable to remove [place] catheter at bedside, will go down to the hybrid lab to be assessed on fluoroscopy  TELE:  Normal sinus rhythm with heart rate in low 100s, episode of atrial tachycardia, NSVT    Review of Systems  ROS as noted above, otherwise 12 point review of systems was performed and is negative  Historical Information   History reviewed  No pertinent past medical history  Past Surgical History:   Procedure Laterality Date    EXTRACORPOREAL CIRCULATION N/A 12/31/2021    Procedure: SUPPORT EXTRACORPOREAL MEMBRANE OXYGENATION (ECMO);   Surgeon: Josselin Daily MD;  [facility]: [facility];  Service: Cardiac Surgery    IR OTHER  12/31/2021    [place] ECMO/ECLS INITIATION VENOUS N/A 1/4/2022    Procedure: VA ECMO DECANNULATION;  Surgeon: Josselin Daily MD;  [facility];  Service: Cardiac Surgery    [place] REVSD N/A 1/4/2022    Procedure: REPAIR VENTRICULAR SEPTAL DEFECT (VSD) OPEN WITH BOVINE PERICARDIAL PATCH ;  Surgeon: Josselin Daily MD;  Location: [facility];  Service: Cardiac Surgery     Social History     Substance and Sexual Activity   Alcohol Use No     Social History     Substance and Sexual Activity   Drug Use No     Social History     Tobacco Use   Smoking Status Current Every Day Smoker    Types: Pipe   Smokeless Tobacco Never Used   Tobacco Comment    Secondhand smoke exposure     Meds/Allergies   Hospital Medications:   Current Facility-Administered Medications   Medication Dose Route Frequency    amiodarone tablet 200 mg  200 mg Oral Q8H [address]    aspirin tablet 325 mg  325 mg Oral Daily    atorvastatin (LIPITOR) tablet 80 mg  80 mg Oral After Dinner    bisacodyl (DULCOLAX) rectal suppository 10 mg  10 mg Rectal Daily PRN    docusate sodium (COLACE) capsule 100 mg  100 mg Oral BID    furosemide (LASIX) injection 40 mg  40 mg Intravenous Q8H    heparin (porcine) subcutaneous injection 5,000 Units  5,000 Units Subcutaneous Q8H [address]    insulin lispro (HumaLOG) 100 units/mL subcutaneous injection 1-5 Units  1-5 Units Subcutaneous TID AC    insulin lispro (HumaLOG) 100 units/mL subcutaneous injection 1-5 Units  1-5 Units Subcutaneous HS    lidocaine (cardiac) injection 100 mg  100 mg Intravenous Q30 Min PRN    metoprolol tartrate (LOPRESSOR) partial tablet 12 5 mg  12 5 mg Oral Q12H [address]    milrinone (PRIMACOR) 20 mg in 100 mL infusion (premix)  0 38 mcg/kg/min Intravenous Continuous    mupirocin (BACTROBAN) 2 % nasal ointment 1 application  1 application Nasal F57Z [address]    ondansetron (ZOFRAN) injection 4 mg  4 mg Intravenous Q6H PRN    oxyCODONE (ROXICODONE) IR tablet 2 5 mg  2 5 mg Oral Q4H PRN    oxyCODONE (ROXICODONE) IR tablet 5 mg  5 mg Oral Q4H PRN    pantoprazole (PROTONIX) EC tablet 40 mg  40 mg Oral Early Morning    polyethylene glycol (MIRALAX) packet 17 g  17 g Oral Daily    potassium chloride (K-DUR,KLOR-CON) CR tablet 20 mEq  20 mEq Oral TID With Meals    temazepam (RESTORIL) capsule 15 mg  15 mg Oral HS PRN     Home Medications:   Medications Prior to Admission   Medication    ALPRAZolam (XANAX) 0 5 mg tablet    EPINEPHrine (EPIPEN) 0 3 mg/0 3 mL SOAJ     Allergies   Allergen Reactions    Mushroom Extract Complex - Food Allergy Facial Swelling    Peanut Oil - Food Allergy     Strawberry C [Ascorbate - Food Allergy]      Objective   Vitals: Blood pressure 111/65, pulse 101, temperature 98 8 °F (37 1 °C), temperature source Bladder, resp  rate 14, height 5' 5 5" (1 664 m), weight 92 2 kg (203 lb 4 2 oz), SpO2 94 %    Orthostatic Blood Pressures      Most Recent Value   Blood Pressure 111/65 filed at 01/07/2022 0600   Patient Position - Orthostatic VS Lying filed at 01/07/2022 0600          Intake/Output Summary (Last 24 hours) at 1/7/2022 0834  Last data filed at 1/7/2022 0601  Gross per 24 hour   Intake 2540 53 ml   Output 6000 ml   Net -3459 47 ml     Invasive Devices  Report    Peripherally Inserted Central Catheter Line            PICC Line 53/95/38 Right Basilic <1 day          Central Venous Catheter Line            Introducer 12/31/21 6 days    LDA ECMO 12/31/21 Femoral artery  6 days          Arterial Line            Arterial Line 01/01/22 Other (Comment) 5 days          Drain            Urethral Catheter Temperature probe 7 days    Chest Tube 1 Anterior Mediastinal 32 Fr  2 days    Chest Tube 2 Posterior Mediastinal 32 Fr  2 days              Physical Exam   GEN: NAD, alert and oriented  HEENT: NCAT, PERRL, EOMs intact  NECK: mild JVD   CARDIOVASCULAR: tachycardia, normal S1, S2 without murmurs, rubs, or gallops appreciated  LUNGS: Clear to auscultation bilaterally without wheezes, rhonchi, or rales  ABDOMEN: Soft, nontender, nondistended  EXTREMITIES/VASCULAR: perfused without clubbing, cyanosis, trace edema b/l  PSYCH: Normal mood and affect    Lab Results: I have personally reviewed pertinent lab results      Results from last 7 days   Lab Units 01/07/22  0441 01/06/22  0407 01/05/22  0412   WBC Thousand/uL 16 11* 22 47* 24 24*   HEMOGLOBIN g/dL 11 2* 11 1* 11 9*   HEMATOCRIT % 34 6* 33 9* 36 1*   PLATELETS Thousands/uL 125* 114* 140*     Results from last 7 days   Lab Units 01/07/22  0416 01/07/22  0004 01/06/22  1935 01/06/22  1208 01/06/22  1208 01/04/22  1335 01/04/22  1247   POTASSIUM mmol/L 3 4* 4 0 3 5   < > 3 5   < >  --    CHLORIDE mmol/L 112*  --  115*  --  115*   < >  --    CO2 mmol/L 33*  --  32  --  30   < >  --    CO2, I-STAT mmol/L  --   --   --   --   --   --  30   BUN mg/dL 83*  --  81*  --  82*   < >  --    CREATININE mg/dL 1 84*  --  2 03*  --  2 10*   < >  --    GLUCOSE, ISTAT mg/dl  --   --   --   --   --   --  207*   CALCIUM mg/dL 7 9*  --  8 3  --  8 5   < >  --     < > = values in this interval not displayed  Results from last 7 days   Lab Units 01/04/22  1335 01/04/22  0556 01/04/22  0450 01/04/22  0214 01/03/22  1138 01/03/22  0644   INR  1 59*  --  1 27*  --   --  1 32*   PTT seconds 34 49*  --  50*   < > 73*    < > = values in this interval not displayed  Results from last 7 days   Lab Units 01/07/22  0441 01/06/22  1935 01/06/22  0407   MAGNESIUM mg/dL 2 8* 3 0* 3 1*     Imaging: I have personally reviewed pertinent reports

## 2022-01-07 NOTE — PROGRESS NOTES
Post-op Check - Critical Care    Arnaldo Kemp 61 y o  male MRN: [id number]  Unit/Bed#: [facility] 418-01 Encounter: [id number]    HPI: Arnaldo Kemp is a 61 y o  male     POD #0 s/p mediastinal reexploration, removal of retained pulmonary artery catheter with cardiopulmonary bypass  History obtained from chart review due to patient being intubated and sedated  ---------------------------------------------------------------------------------------------------------------------------------------------------------------------  Impressions:  1  Recent LAD STEMI  2  Post MI VSD s/p VSD repair  3  Cardiogenic shock s/p VA ECMO, now decannulated  4  Acute post operative blood loss anemia  5  Thrombocytopenia  6  JUDE  7  Shock liver, improving  8  Toxic metabolic encephalopathy  9  Coagulopathy   10  Right upper extremity partial arterial occlusion secondary to thrombus at the arterial line site, since removed  11  Tylenol OD s/p NAC on admission  12  Agoraphobia with panic attacks   13  Hypernatremia  14  Retained PA catheter s/p removal    Plan:    Neuro: D/C continuous sedation  Oxycodone PRN for pain  Trend neuro exam   Delirium precautions  CV: MAP goal >65  SBP goal <130  CI>2 2  Post-op medications: Epinephrine, 2 mcg/min, Primacor, 0 13 mcg/kg/min, Levophed, 9 mcg/min  Wean to off as able  Volume resuscitation as needed  Monitor rhythm on telemetry  Epicardial pacing wires in place  Intra-op CORIE LVEF 55%  Mod TR  Lung: Check STAT post-op ABG and CXR  Wean vent with spontaneous breathing trial with goal to extubate today  GI: GI prophylaxis with PPI  Bowel regimen  Zofran PRN for nausea  FEN: NPO  Replenish K >4 0, mag >2 0 and calcium >7 0  : Check STAT post-op BMP  Chang in place  Monitor UOP with goal >0 5cc/kg/hour  ID: Prophylactic post-op abx  Maintain normothermia  Trend temps  Heme: Check STAT post-op H/H and platelets   Monitor incision site, invasive lines, and chest tube outputs for bleeding  Send coag panel if needed  Endo: Insulin gtt for blood sugar control       Results from last 6 Months   Lab Units 12/31/21  0400 12/30/21  1007 09/13/21  0657   HEMOGLOBIN A1C % 5 9* 5 7* 5 3     Disposition: ICU Care   ---------------------------------------------------------------------------------------------------------------------------------------------------------------------    Inpatient Medications:  Scheduled Meds:  Current Facility-Administered Medications   Medication Dose Route Frequency Provider Last Rate    acetaminophen  650 mg Rectal Q4H PRN Kamlesh Duckworth PA-C      acetaminophen  975 mg Oral [address] [place], [place]      amiodarone  200 mg Oral [place], [place]      amiodarone  200 mg Oral [place], [place]      aspirin  325 mg Oral Daily [place], [place]      [START ON 1/8/2022] aspirin  325 mg Oral Daily [place], [place]      atorvastatin  80 mg Oral After ANASTASIIA Rubin PA-C      atorvastatin  80 mg Oral Daily With [employer] NOA Senior      bisacodyl  10 mg Rectal Daily PRN Sharan Salmeron PA-C      bisacodyl  10 mg Rectal Daily PRN Kamlesh Duckworth PA-C      calcium chloride  1 g Intravenous Once Sharan Salmeron PA-C      cefazolin  2,000 mg Intravenous [address] [place], [place]      chlorhexidine  15 mL Mouth/Throat BID Leanor MuBren Salcedo      docusate sodium  100 mg Oral BID Sharan Salmeron PA-C      fentanyl citrate (PF)  50 mcg Intravenous Once Sharan Senior PA-C      fentanyl citrate (PF)  50 mcg Intravenous Q1H PRN Sharan Salmeron PA-C      [START ON 1/8/2022] fondaparinux  2 5 mg Subcutaneous Daily Sharan Salmeron [place]      furosemide  40 mg Intravenous [address] Bren Auguste      furosemide  40 mg Intravenous Q6H PRN Kamlesh Duckworth PA-C      heparin (porcine)  5,000 Units Subcutaneous [facility] Sharan Salmeron [place]  HYDROmorphone  0 5 mg Intravenous Q2H PRN Deysi Salmeron PA-C      insulin lispro  1-5 Units Subcutaneous TID AC Deysi Salmeron [place]      insulin lispro  1-5 Units Subcutaneous HS Bren Clayton      insulin regular (HumuLIN R,NovoLIN R) infusion  0 3-21 Units/hr Intravenous Titrated Deysi Salmeron PA-C      insulin regular (HumuLIN R,NovoLIN R) infusion  0 3-21 Units/hr Intravenous Titrated Deysi Senior PA-C      lactated ringers  500 mL Intravenous Q30 Min PRN Deysi Salmeron PA-C      lidocaine (cardiac)  100 mg Intravenous Q30 Min PRN Deysi Salmeron PA-C      lidocaine (cardiac)  100 mg Intravenous Q30 Min PRN Deysi Salmeron PA-C      magnesium sulfate  2 g Intravenous Once Deysi Senior PA-C      metoprolol tartrate  12 5 mg Oral Q12H [facility] [employer] Ольга Salmeron PA-C      milrinone [facility]) infusion  0 38 mcg/kg/min Intravenous Continuous Deysi Senior PA-C 0 38 mcg/kg/min (01/07/22 1303)    mupirocin  1 application Nasal Z10Y [facility] [employer] [facility] [employer] Deysi Salmeron PA-C      mupirocin  1 application Nasal M97U [facility] [employer] Deysi Salmeron [place]      ondansetron  4 mg Intravenous Q6H PRN Deysi Salmeron PA-C      ondansetron  4 mg Intravenous Q6H PRN Deysi Salmeron PA-C      oxyCODONE  2 5 mg Oral Q4H PRN Deysi Salmeron PA-C      oxyCODONE  2 5 mg Oral Q4H PRN Deysi Salmeron [place]      oxyCODONE  5 mg Oral Q4H PRN Deysi Salmeron [place]      oxyCODONE  5 mg Oral Q4H PRN Deysi Salmeron PA-C      pantoprazole  40 mg Oral Early Morning Bren Clayton      [START ON 1/8/2022] pantoprazole  40 mg Oral [place]      polyethylene glycol  17 g Oral Daily Deysi Salmeron [place]      [START ON 1/8/2022] polyethylene glycol  17 g Oral Daily [place]      potassium chloride  20 mEq Oral TID With Meals Deysi Salmeron PA-C      potassium chloride  20 mEq Intravenous Once PRN Sarkis Beckman Haylee Hunter PA-C      potassium chloride  20 mEq Intravenous Q1H PRN Aide Salmeron PA-C      potassium chloride  20 mEq Intravenous Q30 Min PRN Aide Salmeron [place]      sodium chloride  20 mL/hr Intravenous Continuous Aide Salmeron PA-C      temazepam  15 mg Oral HS PRN Aide Senior PA-C       Continuous Infusions:insulin regular (HumuLIN R,NovoLIN R) infusion, 0 3-21 Units/hr  insulin regular (HumuLIN R,NovoLIN R) infusion, 0 3-21 Units/hr  milrinone (PRIMACOR) infusion, 0 38 mcg/kg/min, Last Rate: 0 38 mcg/kg/min (22 1303)  sodium chloride, 20 mL/hr      PRN Meds:  acetaminophen, 650 mg, Q4H PRN  bisacodyl, 10 mg, Daily PRN  bisacodyl, 10 mg, Daily PRN  fentanyl citrate (PF), 50 mcg, Q1H PRN  furosemide, 40 mg, Q6H PRN  HYDROmorphone, 0 5 mg, Q2H PRN  lactated ringers, 500 mL, Q30 Min PRN  lidocaine (cardiac), 100 mg, Q30 Min PRN  lidocaine (cardiac), 100 mg, Q30 Min PRN  ondansetron, 4 mg, Q6H PRN  ondansetron, 4 mg, Q6H PRN  oxyCODONE, 2 5 mg, Q4H PRN  oxyCODONE, 2 5 mg, Q4H PRN  oxyCODONE, 5 mg, Q4H PRN  oxyCODONE, 5 mg, Q4H PRN  potassium chloride, 20 mEq, Once PRN  potassium chloride, 20 mEq, Q1H PRN  potassium chloride, 20 mEq, Q30 Min PRN  temazepam, 15 mg, HS PRN      ---------------------------------------------------------------------------------------------------------------------------------------------------------------------  Vitals:   Vitals:    22 1000 22 1100 22 1140 22 1200   BP:  104/58 132/85 135/74   BP Location:       Pulse: 92 86 90 92   Resp: 13 (!) 10  14   Temp:       TempSrc:       SpO2: 96% 97%  97%   Weight:   92 1 kg (203 lb)    Height:   5' 5 5" (1 664 m)      Arterial Line:  Arterial Line BP: 108/60  Arterial Line MAP (mmHg): 79 mmHg    Temperature: Temp (24hrs), Av 8 °F (37 1 °C), Min:98 4 °F (36 9 °C), Max:99 5 °F (37 5 °C)  Current: Temperature: 98 6 °F (37 °C)    Weights: IBW (Ideal Body Weight): 62 65 kg  Body mass index is 33 27 kg/m²  Ventilator Settings:  Respiratory  Report   Lab Data (Last 4 hours)    None         O2/Vent Data (Last 4 hours)    None              Labs:   Results from last 7 days   Lab Units 01/07/22  1529 01/07/22  1459 01/07/22  1456 01/07/22  1443 01/07/22  1409 01/07/22  0441 01/06/22  0407 01/06/22  0407 01/05/22  0412 01/05/22  0412 01/03/22  1419 01/03/22  0550 01/01/22  1325 01/01/22  0443   WBC Thousand/uL  --   --   --   --   --  16 11*  --  22 47*  --  24 24*   < > 16 28*   < > 28 23*   HEMOGLOBIN g/dL  --   --   --   --   --  11 2*   < > 11 1*   < > 11 9*   < > 7 9*   < > 8 8*   I STAT HEMOGLOBIN g/dl 9 9* 7 8*  --  7 8*   < >  --   --   --   --   --    < >  --    < >  --    HEMATOCRIT %  --   --   --   --   --  34 6*   < > 33 9*   < > 36 1*   < > 24 3*   < > 26 2*   HEMATOCRIT, ISTAT % 29* 23*  --  23*   < >  --   --   --   --   --    < >  --    < >  --    PLATELETS Thousands/uL  --   --  87*  --   --  125*  --  114*   < > 140*   < >  --    < > 164   NEUTROS PCT %  --   --   --   --   --   --   --  91*  --   --   --  73  --   --    MONOS PCT %  --   --   --   --   --   --   --  4  --   --   --  7  --   --    MONO PCT %  --   --   --   --   --   --   --   --   --   --   --   --   --  8    < > = values in this interval not displayed       Results from last 7 days   Lab Units 01/07/22  1529 01/07/22  1459 01/07/22  1443 01/07/22  1409 01/07/22  1207 01/07/22  0441 01/07/22  0416 01/07/22  0416 01/07/22  0004 01/06/22 1935 01/06/22 1935 01/06/22 1208 01/06/22 0407 01/05/22 1201 01/05/22 0411   SODIUM mmol/L  --   --   --   --  151*  --   --  150*  --   --  152*   < > 151*   < > 152*   POTASSIUM mmol/L  --   --   --   --  3 3*  --   --  3 4* 4 0   < > 3 5   < > 4 1   < > 4 3   CHLORIDE mmol/L  --   --   --   --  112*  --   --  112*  --   --  115*   < > 116*   < > 120*   CO2 mmol/L  --   --   --   --  34*  --    < > 33*  --    < > 32   < > 30   < > 27   CO2, I-STAT mmol/L 29 39* 34*   < >  -- --   --   --   --   --   --   --   --   --   --    BUN mg/dL  --   --   --   --  83*  --   --  83*  --   --  81*   < > 78*   < > 57*   CREATININE mg/dL  --   --   --   --  1 88*  --   --  1 84*  --   --  2 03*   < > 2 12*   < > 1 78*   CALCIUM mg/dL  --   --   --   --  9 0  --   --  7 9*  --   --  8 3   < > 7 9*   < > 8 8   ALK PHOS U/L  --   --   --   --   --  133*  --   --   --   --   --   --  98  --  89   ALT U/L  --   --   --   --   --  74  --   --   --   --   --   --  104*  --  202*   AST U/L  --   --   --   --   --  84*  --   --   --   --   --   --  106*  --  127*   GLUCOSE, ISTAT mg/dl 88 102 97   < >  --   --   --   --   --   --   --   --   --   --   --     < > = values in this interval not displayed  Physical Exam  Vitals reviewed  Constitutional:       Interventions: He is sedated and intubated  Cardiovascular:      Rate and Rhythm: Regular rhythm  Tachycardia present  Heart sounds: No murmur heard  No friction rub  Pulmonary:      Effort: Pulmonary effort is normal  He is intubated  Breath sounds: No wheezing, rhonchi or rales  Genitourinary:     Comments: +caity  Neurological:      Comments: Sedated       Invasive lines and devices: Invasive Devices  Report    Peripherally Inserted Central Catheter Line            PICC Line 74/89/83 Right Basilic 1 day          Central Venous Catheter Line            Introducer 12/31/21 7 days          Arterial Line            Arterial Line 01/07/22 Right Radial <1 day          Line            Pacer Wires <1 day    Pacer Wires <1 day          Drain            Urethral Catheter Temperature probe 8 days    Chest Tube 1 Anterior Mediastinal 32 Fr  3 days    Chest Tube 2 Posterior Mediastinal 32 Fr  3 days          Airway            ETT  Hi-Lo; Cuffed;Oral;Inflated 8 mm <1 day ---------------------------------------------------------------------------------------------------------------------------------------------------------------------  Care Time Delivered:   No Critical Care time spent     SIGNATURE: Piedad Kelly PA-C  DATE: January 7, 2022  TIME: 4:25 PM

## 2022-01-07 NOTE — OP NOTE
OPERATIVE REPORT  PATIENT NAME: Emeli Manzano    :  1955  MRN: [id number]  Pt Location: [facility] 02    SURGERY DATE: 2022    SURGEON: Davian Anderson MD     ASSISTANT: Paramjit Ross PA-C    PREOPERATIVE DIAGNOSIS: Status post pos-infarct VSD repair with retained pulmonary artery catheter  POSTOPERATIVE DIAGNOSIS:  Status post pos-infarct VSD repair with retained pulmonary artery catheter  PROCEDURE Mediastinal reexploration, removal of retained pulmonary artery catheter with cardiopulmonary bypass  ANESTHESIA General endotracheal anesthesia with transesophageal echocardiogram guidance, Dr Nadia Yates 200 mL    TRANSFUSIONS None     SPECIMENS None  CARDIOPULMONARY BYPASS TIME: 30 minutes    CROSSCLAMP TIME: 20 minutes    BRIEF HISTORY:  The patient had undergone Repair of a post-infarction VSD with ECMO decannulation three days prior  In the SICU the patient had made considerable progress and was extubated and no longer in need of monitoring lines  The pulmonary artery catheter was not able to be removed in the SICU as resistance was met when attempting removal   The situation was discussed with the patient and family and the patient was taken to the operating room for surgical removal      OPERATIVE TECHNIQUE The patient was taken to the operating room and placed supine on the operating table  Following the satisfactory induction of general anesthesia and with monitoring lines already in place, the patient was prepped and draped in the usual sterile fashion  A time-out procedure was performed  Under fluoroscopy the PA catheter was manipulated and found to be tethered towards the apical aspect of the right ventricle, thereby indicating that it had been caught by a suture in the ventricular septal repair  The median sternotomy incision was reopened and the sternal wires were removed and the retractor was placed   The pericardial space was Returned call, left message for patient to call back regarding medication question  evacuated of a small amount of clot  The patient was systemically heparinized  The patient underwent aortic and bicaval cannulation and an antegrade was placed  The patient was initiated on bypass  The aorta was crossclamped and antegrade [place] cardioplegia was delivered with an excellent arrest       The caval tapes were snared and right atriotomy was performed  Through the right atriotomy, the PA catheter was identified and tracked through the tricuspid valve  With gentle tension on the catheter, I was able to see that the catheter was punctured by a Prolene suture  With a #15 blade, I was able to carefully cut the catheter from both sides without cutting the suture  The proximal end was then easily removed by the anesthesiologist   Then through the tricuspid valve I was able to retrieve the distal end of the catheter and it was easily withdrawn from the PA  The atriotomy was closed with 2 layers of running 4-0 Prolene suture  The heart was deaired and the crossclamp was removed  Atrial and ventricular pacing wires were placed  Following a period of reperfusion, the patient was weaned from cardiopulmonary bypass and decannulated  Protamine was administered with normalization of the ACT  Hemostasis was confirmed in all fields  Thoracostomy tubes were placed  The sternum was closed with stainless steel wires  The fascia, subdermis and skin were closed with multiple layers of running absorbable suture  As the attending surgeon, I was present and scrubbed for all critical portions of this procedure  Sponge, needle, and instrument counts were reported as correct by the nursing staff  Final transesophageal echocardiogram demonstrated low normal LVEF, normal RV size and function, trace MR, moderate TR (unchanged from preop), and no evidence of left to right shunt at the ventricular level       There is no cardiac residency program at this facility and for complex procedures such as this, it is vital to have a physician assistant experienced in cardiac surgery  The assistance of Bren Hutchinson was necessary for gentle retraction of the heart with proper tissue handling techniques, in order to cannulate, retract the atrium and tricuspid valve during PA catheter removal, and assistance was closing the right atriotomy  The assistance of Bren Hutchinson was also necessary for proper closure of the sternum and soft tissues        Shin Ramon MD  DATE: January 7, 2022  TIME: 3:48 PM

## 2022-01-07 NOTE — OCCUPATIONAL THERAPY NOTE
Occupational Therapy Evaluation     Patient Name: Anjali Stinson  HBRWE'W Date: 1/7/2022  Problem List  Principal Problem:    Cardiogenic shock ([facility] [address] )  Active Problems:    Agoraphobia with panic attacks    VSD (ventricular septal defect)    JUDE (acute kidney injury) ([facility] )    Transaminitis    Tylenol ingestion    Acute systolic congestive heart failure (HCC)    Hyperglycemia    Anemia    Personal history of ECMO    Coagulopathy (HCC)    Leukocytosis    Arterial thrombosis ([facility] )    Past Medical History  History reviewed  No pertinent past medical history  Past Surgical History  Past Surgical History:   Procedure Laterality Date    EXTRACORPOREAL CIRCULATION N/A 12/31/2021    Procedure: SUPPORT EXTRACORPOREAL MEMBRANE OXYGENATION (ECMO); Surgeon: Roxann Muse MD;  Location: [facility];  Service: Cardiac Surgery    IR OTHER  12/31/2021    MO ECMO/ECLS INITIATION VENOUS N/A 1/4/2022    Procedure: VA ECMO DECANNULATION;  Surgeon: Roxann Muse MD;  Location[facility];  Service: Cardiac Surgery    MO REVSD N/A 1/4/2022    Procedure: REPAIR VENTRICULAR SEPTAL DEFECT (VSD) OPEN WITH BOVINE PERICARDIAL PATCH ;  Surgeon: Roxann Muse MD;  Location: [facility];  Service: Cardiac Surgery           01/07/22 0907   OT Last Visit   OT Visit Date 01/07/22   Note Type   Note type Evaluation   Restrictions/Precautions   Other Precautions Cardiac/sternal;Cognitive; Chair Alarm;Multiple lines;Telemetry; Fall Risk;Pain;O2  (a-line; CT; swan-avinash cath)   Pain Assessment   Pain Assessment Tool FLACC   Pain Location/Orientation Orientation: Mid;Location: Chest   Hospital Pain Intervention(s) Repositioned; Ambulation/increased activity; Emotional support   Pain Rating: FLACC (Rest) - Face 0   Pain Rating: FLACC (Rest) - Legs 0   Pain Rating: FLACC (Rest) - Activity 0   Pain Rating: FLACC (Rest) - Cry 0   Pain Rating: FLACC (Rest) - Consolability 0   Score: FLACC (Rest) 0   Pain Rating: FLACC (Activity) - Face 1   Pain Rating: FLACC (Activity) - Legs 0   Pain Rating: FLACC (Activity) - Activity 0   Pain Rating: FLACC (Activity) - Cry 1   Pain Rating: FLACC (Activity) - Consolability 1   Score: FLACC (Activity) 3   Home Living   Type of [address] [place] [address] Two level;Stairs to enter with rails   Bathroom Shower/Tub Walk-in shower   Bathroom Toilet Standard   Bathroom Equipment   (denies)   [address]   (denies)   Additional Comments Pt reports living in a 2 story home with 2 POORNIMA  Prior Function   Level of [place] Independent with ADLs and functional mobility   Lives With Spouse   Receives Help From Family   ADL Assistance Independent   IADLs Independent   Vocational Retired   Lifestyle   Autonomy Pt reports being I with ADLS, IADLS and mobility without device PTA  (+)    Reciprocal Relationships Pt lives with his brother and sister in law   Service to Others Retired   [address] Enjoys realxing   Subjective   Subjective "Why did this have to happen to me?"   ADL   Where Assessed Chair   Eating Assistance 4  Minimal Assistance   Grooming Assistance 4  Minimal Assistance   UB [address] 3  Moderate Assistance   LB [address] 2  Maximal Assistance   [address] 3  Moderate Assistance   LB Dressing Assistance 2  Maximal [address]  2  Maximal Assistance   Bed Mobility   Additional Comments Unable to assess, pt seated OOB in chair upon OT arrival  After OT session pt in chair with all needs within reach  Transfers   Sit to Stand 2  Maximal assistance   Additional items Assist x 2; Increased time required;Verbal cues   Stand to Sit 2  Maximal assistance   Additional items Assist x 2; Increased time required;Verbal cues   Functional Mobility   Additional Comments Unable to take any steps at this    Balance   Static Sitting Poor +   Dynamic Sitting Poor   Static Standing Poor -   Activity Tolerance Activity Tolerance Patient limited by fatigue   Medical Staff Made Aware Seen with PT 2* medical complexity/ instability   Nurse Made Aware RN confirmed okay to see pt   RUE Assessment   RUE Assessment X  (decreased AROM overall)   LUE Assessment   LUE Assessment X  (decreased AROM overall)   Hand Function   Gross Motor Coordination Functional   Fine Motor Coordination Impaired  (B/L hand edema noted)   Cognition   Overall Cognitive Status Impaired   Arousal/Participation Alert; Cooperative   Attention Attends with cues to redirect   Orientation Level Oriented to person;Oriented to place; Disoriented to time   Memory Decreased recall of precautions   Following Commands Follows one step commands with increased time or repetition   Comments Pt requires increased processing time and VC for safety and correct technique, but is pleasant and cooperative  Assessment   Limitation Decreased ADL status; Decreased endurance;Decreased self-care trans;Decreased high-level ADLs   Prognosis Good   Assessment Pt is a 61 y o  male admitted to [facility]"" on 12/30/2021 w/ cardiogenic shock s/p ECMO cannulation on 12/31 and ventricular septal defect repair and ECMO decannulation on 1/4  Pt  has a PMH of atypical pyscosis, obesity, and agoraphobia with panic attacks  Pt with active OT orders and ambulate  orders  Pt resides in a 2 story home with 2 POORNIMA  Pt lives with his brother and sister in law  Pt was I w/  ADLS and IADLS, (+) drove, & required no use of DME PTA  Currently pt is max A for functional transfers, mod A for UB ADLS and max A for LB ADLS  Pt is limited at this time 2*: pain, endurance, activity tolerance, functional mobility, forward functional reach, functional standing tolerance, decreased I w/ ADLS/IADLS, cognitive impairments and decreased safety awareness  The following Occupational Performance Areas to address include: eating, grooming, bathing/shower, toilet hygiene, dressing, functional mobility and clothing management  Based on the aforementioned OT evaluation, functional performance deficits, and assessments, pt has been identified as a high complexity evaluation  From OT standpoint, anticipate d/c STR  Pt to continue to benefit from acute immediate OT services to address the following goals 3-5x/week to  w/in 10-14 days: [place] Goals   Patient Goals To feel better   LTG Time Frame 10-14   Long Term Goal #1 See goals below   Plan   Treatment Interventions ADL retraining;Functional transfer training;UE strengthening/ROM; Endurance training;Patient/family training;Equipment evaluation/education; Compensatory technique education;Continued evaluation; Energy conservation; Activityengagement;Cardiac education   Goal Expiration Date 22   OT Frequency 3-5x/wk   Recommendation   OT Discharge Recommendation Post acute rehabilitation services   OT - OK to Discharge Yes  (When medically appropriate)   AM-PAC Daily Activity Inpatient   Lower Body Dressing 1   Bathing 2   Toileting 2   Upper Body Dressing 2   Grooming 3   Eating 3   Daily Activity Raw Score 13   Daily Activity Standardized Score (Calc for Raw Score >=11) 32 03   AM-PAC Applied Cognition Inpatient   Following a Speech/Presentation 2   Understanding Ordinary Conversation 3   Taking Medications 2   Remembering Where Things Are Placed or Put Away 2   Remembering List of 4-5 Errands 2   Taking Care of Complicated Tasks 2   Applied Cognition Raw Score 13   Applied Cognition Standardized Score 30 46   Modified Davison Scale   Modified Davison Scale 4       GOALS    1) Pt will increase activity tolerance to G for 30 min txment sessions    2) Pt will complete UB/LB dressing/self care w/ min A using adaptive device and DME as needed    3) Pt will complete bathing w/ min A w/ use of AE and DME as needed    4) Pt will complete toileting w/ min A w/ G hygiene/thoroughness using DME as needed    5) Pt will improve functional transfers to min A on/off all surfaces using DME as needed w/ G balance/safety     6) Pt will demonstrate G carryover of pt/caregiver education and training as appropriate w/ mod I     7) Pt will engage in cardiac education using the Recovering After Cardiac Surgery packet w/ G participation and G carryover  8) Pt will demonstrate 100% carryover of precautions s/p review w/ mod I w/ G tolerance/participation t/o functional ADL/IADL/leisure tasks      9) Pt will independently identify and utilize 2-3 coping strategies to increase positive affect and promote overall well-being  10) Pt will engage in ongoing cognitive assessment w/ G participation to assist w/ safe d/c planning/recommendations     *OTR to assess functional mobility as appropriate       EUSEBIO Wilks, OTR/L

## 2022-01-07 NOTE — ANESTHESIA PREPROCEDURE EVALUATION
Procedure:  RE-EXPLORATION MEDIASTERNAL CAVITY FOR POST-OP BLEED (BRING BACK) Removal of PA catheter (N/A Chest)    Relevant Problems   CARDIO   (+) Acute systolic congestive heart failure (HCC)   (+) Hyperlipidemia, mixed      /RENAL   (+) JUDE (acute kidney injury) (HCC)      HEMATOLOGY   (+) Anemia   (+) Coagulopathy (HCC)      NEURO/PSYCH   (+) Agoraphobia with panic attacks      PULMONARY   (+) Pipe smoker      Other   (+) Cardiogenic shock (HCC)   (+) Personal history of ECMO   (+) Tylenol ingestion   (+) VSD (ventricular septal defect)        Physical Exam    Airway    Mallampati score: II  TM Distance: >3 FB  Neck ROM: full     Dental       Cardiovascular  Cardiovascular exam normal    Pulmonary  Pulmonary exam normal     Other Findings        Anesthesia Plan  ASA Score- 4     Anesthesia Type- general with ASA Monitors  Additional Monitors: arterial line, pulmonary artery catheter and central venous line  Airway Plan: ETT  Plan Factors-Exercise tolerance (METS): <4 METS  Chart reviewed  EKG reviewed  Imaging results reviewed  Existing labs reviewed  Patient summary reviewed  Induction- intravenous  Postoperative Plan- Plan for postoperative opioid use  Informed Consent- Anesthetic plan and risks discussed with patient  I personally reviewed this patient with the CRNA  Discussed and agreed on the Anesthesia Plan with the ROBERT Lieberman

## 2022-01-07 NOTE — ANESTHESIA PROCEDURE NOTES
Procedure Performed: CORIE Anesthesia  Start Time:         Preanesthesia Checklist    Patient identified, IV assessed, risks and benefits discussed, monitors and equipment assessed, procedure being performed at surgeon's request and anesthesia consent obtained  Procedure    Type of CORIE: monitor patient  Images Saved: ultrasound permanent image saved  Physician Requesting Echo: Patricia Jones MD  Location performed: OR  Intubated  Heart visualized  Insertion of CORIE Probe:  Easy  Modalities:  3D, continuous wave Doppler, color flow mapping and pulse wave Doppler  Echocardiographic and Doppler Measurements    PREPROCEDURE    LEFT VENTRICLE:  Systolic Function: normal  Ejection Fraction: 50%  Cavity size: normal  Regional Wall Motion Abnormalities: Septal hypokinesis  RIGHT VENTRICLE:  Systolic Function: normal   Cavity size normal  No hypertrophy        OTHER VENTRICULAR FINDINGS:  PA catheter appears affixed to the septal RV wall near the apex  AORTIC VALVE:  Leaflets: normal and Lambl's excrescence vs small clot on the non vs right coronary cusp  Leaflet motions normal and normal  Stenosis: none  Regurgitation: none  MITRAL VALVE:  Leaflets: normal  Regurgitation: trace  TRICUSPID VALVE:  Leaflets: normal  Regurgitation: moderate  PULMONIC VALVE:  Leaflets: PA catheter visualized going through the pulmonic valve and normal  Stenosis: mild  ASCENDING AORTA:  Size:  normal      AORTIC ARCH:  Size:  normal      DESCENDING AORTA:  Size: normal              ATRIAL SEPTUM:  Intra-atrial septal morphology: normal          VENTRICULAR SEPTUM:  Intra-ventricular septum morphology: S/p VSD repair  No evidence of residual VSD or shunting   OTHER FINDINGS:  Pericardium:  normal  Pleural Effusion:  left  POSTPROCEDURE    LEFT VENTRICLE: Unchanged   Systolic Function: normal      RIGHT VENTRICLE: Unchanged   Systolic Function: PA catheter removed           AORTIC VALVE: Unchanged   MITRAL VALVE: Unchanged   TRICUSPID VALVE: Unchanged   PULMONIC VALVE: Unchanged           ATRIA: Unchanged   AORTA: Unchanged   REMOVAL:  Probe Removal: atraumatic

## 2022-01-07 NOTE — CASE MANAGEMENT
Case Management Discharge Planning Note    Patient name Emeli Manzano  Location [facility] [id number] MRN [id number]  : 1955 Date 2022       Current Admission Date: 2021  Current Admission Diagnosis:Cardiogenic shock [facility])   Patient Active Problem List    Diagnosis Date Noted    Central line complication 60/15/6552    S/P VSD repair 2022    Leukocytosis 2022    Arterial thrombosis ([facility] ) 2022    Coagulopathy ([facility] ) 2022    Tylenol ingestion     Acute systolic congestive heart failure ([facility] ) 2021    Hyperglycemia 2021    Anemia 2021    Personal history of ECMO 2021    Cardiogenic shock ([facility] [facility] ) 2021    VSD (ventricular septal defect) 2021    [facility] 2021    Transaminitis 2021    Pipe smoker 2018    Hyperlipidemia, mixed 2017    Impaired fasting glucose 2017    Agoraphobia with panic attacks 2015    Obesity (BMI 30-39 9) 2015    Psychosis, atypical ([facility] ) 2015      LOS (days): 8  Geometric Mean LOS (GMLOS) (days):   Days to GMLOS:     OBJECTIVE:  Risk of Unplanned Readmission Score: 22         Current admission status: Inpatient   Preferred Pharmacy:   [id number], [phone] [facility]  [address] [address]  Phone: [phone] Fax: [phone]    Primary Care Provider: Elizabeth Jamison MD    Primary Insurance: [address] [place] [address]  Secondary Insurance:     DISCHARGE DETAILS:       Additional Comments: cardiogenic shock, liver and resp  failure   Decannulated from ECMO, had VSD repair 22, Extubated today to [email or web address], weaning Primacor gtt, will need rehab at discharge, CM will discuss with pt and family when medically more stable

## 2022-01-07 NOTE — PROGRESS NOTES
Progress Note - Critical Care   Matthew Hinson 61 y o  male MRN: [id number]  Unit/Bed#: [facility] 119-93 Encounter: [id number]      Attending Physician: Joe Gibson MD    24 Hour Events/HPI: POD # 3 s/p repair of post-infarction VSD repair and VA ECMO decannulation  Weaned off all pressors and extubated  Started on bumex drip for UOP and stopped overnight due to copious production  Feels well  Milrinone 0 5    ROS: Review of Systems   Respiratory: Negative for chest tightness and shortness of breath  Cardiovascular: Negative for chest pain  Gastrointestinal: Negative for constipation, nausea and vomiting  All other systems reviewed and are negative     ---------------------------------------------------------------------------------------------------------------------------------------------------------------------  Impressions:  1  Recent LAD STEMI  2  Post MI VSD s/p VSD repair  3  Cardiogenic shock s/p VA ECMO, now decannulated  4  Acute post operative blood loss anemia  5  Thrombocytopenia  6  JUDE  7  Shock liver, improving  8  Toxic metabolic encephalopathy  9  Coagulopathy   10  Right upper extremity partial arterial occlusion secondary to thrombus at the arterial line site, since removed  11  Tylenol OD s/p NAC on admission  12  Agoraphobia with panic attacks   13  Hyernatremia    Plan:    Neuro:   · Pain controlled with: oxycodone PRN  · Regulate sleep/wake cycle  · Delirium precautions  · CAM-ICU daily  · Trend neuro exam    CV:   · Cardiac infusions: Primacor, 0 5 mcg/kg/min  · Wean to 0 38 today as able  · MAP goal > 65 and CI >2 2  · Keep [place] Razia catheter  · Keep Arterial line  · Rhythm: Sinus Tachycardia  · Follow rhythm on telemetry  · Lopressor 12 5 mg PO BID  · Epicardial pacing wires out    Lung:   · Acute post-op pulmonary insufficiency; Requiring 6 liters via nasal cannula, secondary to pulmonary vascular congestion and poor inspiratory effort secondary to pain   Continue incentive spirometry/coughing/deep breathing exercises  Wean supplemental oxygen as tolerated for saturation > 90%  · Chest tube drainage diminished; D/C today    GI:   · Continue PPI for stress ulcer prophylaxis  · Continue bowel regimen  · Trend abdominal exam and bowel function    FEN:   · Diuretic plan: Lasix 40 mg IV q BID  · K-dur 20 mEQ PO q BID  · Nutrition/diet plan: PO diet  · Replenish electrolytes with goals: K >4 0, Mag >2 0, and Phos >3 0    :   · Indwelling Chang present: yes   · Keep Chang  · Trend UOP and BUN/creat  · Strict I and O    ID:   · Trend temps and WBC count  · Maintain normothermia    Results from last 7 days   Lab Units 01/04/22  0450 01/03/22  0550 01/02/22  0529 01/01/22  0433   PROCALCITONIN ng/ml 0 31* 0 61* 1 26* 2 90*     Heme:   · Trend hgb and plts    Endo:   · Glycemic control plan: consider SQI once tolerating PO    Results from last 6 Months   Lab Units 12/31/21  0400 12/30/21  1007 09/13/21  0657   HEMOGLOBIN A1C % 5 9* 5 7* 5 3     MSK/Skin:  · Mobility goal: OOB  · PT consult: yes  · OT consult: yes  · Frequent turning and pressure off-loading  · Local wound care as needed    Disposition:  Continue ICU care  ---------------------------------------------------------------------------------------------------------------------------------------------------------------------  Allergies:    Allergies   Allergen Reactions    Mushroom Extract Complex - Food Allergy Facial Swelling    Peanut Oil - Food Allergy     Strawberry C [Ascorbate - Food Allergy]      Medications:   Scheduled Meds:  Current Facility-Administered Medications   Medication Dose Route Frequency Provider Last Rate    amiodarone  200 mg Oral Q8H Herbert Flores PA-C      aspirin  325 mg Oral Daily [facility], NOA      atorvastatin  80 mg Oral After [employer]      bisacodyl  10 mg Rectal Daily PRN [facility], NOA      calcium gluconate  2 g Intravenous Once Heidimarbrent I NOA Da Silva 2 g (01/07/22 0627)    heparin (porcine)  5,000 Units Subcutaneous [facility] Yaima Parish PA-C      insulin regular (HumuLIN R,NovoLIN R) infusion  0 3-21 Units/hr Intravenous Titrated Roxie Mayorga PA-C Stopped (01/06/22 1026)    lidocaine (cardiac)  100 mg Intravenous Q30 Min PRN Roxie Mayorga PA-C      milrinone [facility]) infusion  0 5 mcg/kg/min Intravenous Continuous Florence Villalpando PA-C 0 5 mcg/kg/min (01/07/22 0303)    mupirocin  1 application Nasal T[address] Munira Murdock PA-C      ondansetron  4 mg Intravenous Q6H PRN Roxie Mayorga PA-C      oxyCODONE  2 5 mg Oral Q4H PRN Roxie Mayorga PA-C      oxyCODONE  5 mg Oral Q4H PRN Roxie Mayorga PA-C      pantoprazole  40 mg Oral Early Morning Roxie Mayorga PA-C      polyethylene glycol  17 g Oral Daily Roxie Mayorga PA-C      potassium chloride  40 mEq Intravenous Once Hemarciano Da Silva PA-C 40 mEq (01/07/22 0521)    sodium chloride  20 mL/hr Intravenous Continuous Roxie Mayorga PA-C 20 mL/hr (01/04/22 1410)     VTE Pharmacologic Prophylaxis: Heparin  VTE Mechanical Prophylaxis: sequential compression device    Continuous Infusions:insulin regular (HumuLIN R,NovoLIN R) infusion, 0 3-21 Units/hr, Last Rate: Stopped (01/06/22 1026)  milrinone (PRIMACOR) infusion, 0 5 mcg/kg/min, Last Rate: 0 5 mcg/kg/min (01/07/22 0303)  sodium chloride, 20 mL/hr, Last Rate: 20 mL/hr (01/04/22 1410)      PRN Meds:  bisacodyl, 10 mg, Daily PRN  lidocaine (cardiac), 100 mg, Q30 Min PRN  ondansetron, 4 mg, Q6H PRN  oxyCODONE, 2 5 mg, Q4H PRN  oxyCODONE, 5 mg, Q4H PRN      Home Medications:   Prior to Admission medications    Medication Sig Start Date End Date Taking?  Authorizing Provider   ALPRAZolam Panda Salazar) 0 5 mg tablet Take 1-2 tablets 30-60 minutes prior to leaving home prn 3/18/21   Ida Kenney MD   EPINEPHrine (EPIPEN) 0 3 mg/0 3 mL SOAJ Inject 0 3 mL (0 3 mg total) into a muscle once for 1 dose 9/21/18 3/18/21  Rob CLARK Isabelle DO     ---------------------------------------------------------------------------------------------------------------------------------------------------------------------  Vitals:   Vitals:    22 0300 22 0400 22 0500 22 0600   BP: 103/59 121/72 113/72 111/65   BP Location:    Left arm   Pulse: 104 (!) 107 102 101   Resp: 17 15 13 14   Temp: 98 8 °F (37 1 °C) 98 8 °F (37 1 °C) 98 8 °F (37 1 °C) 98 8 °F (37 1 °C)   TempSrc:    Bladder   SpO2: 93% 94% 95% 94%   Weight:    92 2 kg (203 lb 4 2 oz)   Height:         Arterial Line:  Arterial Line BP: 97/55  Arterial Line MAP (mmHg): 67 mmHg    Tele Rhythm: Sinus Tachycardia This was personally reviewed by myself  Respiratory:  SpO2: SpO2: 94 %, SpO2 Activity: SpO2 Activity: At Rest, SpO2 Device: O2 Device: Nasal cannula  Nasal Cannula O2 Flow Rate (L/min): 6 L/min    Ventilator:  Respiratory  Report   Lab Data (Last 4 hours)    None         O2/Vent Data (Last 4 hours)    None              Temperature: Temp (24hrs), Av 2 °F (37 3 °C), Min:98 4 °F (36 9 °C), Max:99 9 °F (37 7 °C)  Current: Temperature: 98 8 °F (37 1 °C)    Weights:   Weight (last 2 days)     Date/Time Weight    22 0600 92 2 (203 26)    22 0534 99 7 (219 8)    22 0546 100 (220 9)        Body mass index is 33 31 kg/m²  Hemodynamic Monitoring:  PAP: PAP: 17/12, CVP: CVP (mean): 11 mmHg, CO: 5  , CI: 2 5  , SVR: SVR (dyne*sec)/cm5: 954 (dyne*sec)/cm5    Intake and Outputs:    Intake/Output Summary (Last 24 hours) at 2022 0648  Last data filed at 2022 0601  Gross per 24 hour   Intake 3037 63 ml   Output 6465 ml   Net -3427 37 ml     I/O last 24 hours: In: 4856 9 [P O :410; I V :1556; NG/GT:1300; IV Piggyback:765 8;  Feedings:825]  Out: 8736 [Urine:7575; Chest Tube:190]    UOP: /hour   BM: 1 in the last 24 hours    Chest tube Output:  Mediastinal tubes: 120 mL/8 hours  140 mL/24 hours   Pleural tubes: -- mL/8 hours  -- mL/24 hours Labs:  Results from last 7 days   Lab Units 01/07/22  0441 01/06/22  0407 01/05/22  0412 01/03/22  1419 01/03/22  0550 01/01/22  1325 01/01/22  0443   WBC Thousand/uL 16 11* 22 47* 24 24*   < > 16 28*   < > 28 23*   HEMOGLOBIN g/dL 11 2* 11 1* 11 9*   < > 7 9*   < > 8 8*   I STAT HEMOGLOBIN   --   --   --    < >  --    < >  --    HEMATOCRIT % 34 6* 33 9* 36 1*   < > 24 3*   < > 26 2*   HEMATOCRIT, ISTAT   --   --   --    < >  --    < >  --    PLATELETS Thousands/uL 125* 114* 140*   < >  --    < > 164   NEUTROS PCT %  --  91*  --   --  73  --   --    MONOS PCT %  --  4  --   --  7  --   --    MONO PCT %  --   --   --   --   --   --  8    < > = values in this interval not displayed       Results from last 7 days   Lab Units 01/07/22  0441 01/07/22  0416 01/07/22  0004 01/06/22  1935 01/06/22  1208 01/06/22  1208 01/06/22  0407 01/06/22  0407 01/05/22  1201 01/05/22  0411 01/04/22  1335 01/04/22  1247 01/04/22  1215 01/04/22  1215 01/04/22  1153 01/04/22  1153   SODIUM mmol/L  --  150*  --  152*  --  152*   < > 151*   < > 152*   < >  --   --   --   --   --    POTASSIUM mmol/L  --  3 4* 4 0 3 5   < > 3 5   < > 4 1   < > 4 3   < >  --   --   --   --   --    CHLORIDE mmol/L  --  112*  --  115*  --  115*   < > 116*   < > 120*   < >  --   --   --   --   --    CO2 mmol/L  --  33*  --  32  --  30   < > 30   < > 27   < >  --   --   --   --   --    CO2, I-STAT mmol/L  --   --   --   --   --   --   --   --   --   --   --  30   < > 29  30   < > 31   BUN mg/dL  --  83*  --  81*  --  82*   < > 78*   < > 57*   < >  --   --   --   --   --    CREATININE mg/dL  --  1 84*  --  2 03*  --  2 10*   < > 2 12*   < > 1 78*   < >  --   --   --   --   --    CALCIUM mg/dL  --  7 9*  --  8 3  --  8 5   < > 7 9*   < > 8 8   < >  --   --   --   --   --    ALK PHOS U/L 133*  --   --   --   --   --   --  98  --  89  --   --   --   --   --   --    ALT U/L 74  --   --   --   --   --   --  104*  --  202*  --   --   --   --   --   --    AST U/L 84* --   --   --   --   --   --  106*  --  127*  --   --   --   --   --   --    GLUCOSE, ISTAT mg/dl  --   --   --   --   --   --   --   --   --   --   --  207*  --  200*  208*  --  170*    < > = values in this interval not displayed  Baseline Creat: 1 1    Results from last 7 days   Lab Units 01/07/22  0441 01/06/22  1935 01/06/22  0407   MAGNESIUM mg/dL 2 8* 3 0* 3 1*     Results from last 7 days   Lab Units 01/07/22  0441 01/06/22  1935 01/06/22  0407   PHOSPHORUS mg/dL 3 5 4 2* 4 2*      Results from last 7 days   Lab Units 01/04/22  1335 01/04/22  0556 01/04/22  0450 01/04/22  0214 01/03/22  1138 01/03/22  0644   INR  1 59*  --  1 27*  --   --  1 32*   PTT seconds 34 49*  --  50*   < > 73*    < > = values in this interval not displayed  Results from last 7 days   Lab Units 12/31/21  1650 12/31/21  1326 12/31/21  0836   LACTIC ACID mmol/L 1 5 2 1* 2 0     Results from last 7 days   Lab Units 01/06/22  0406 01/05/22  0413 01/05/22  0413 01/04/22  2100 01/04/22  2100   PH ART  7 429   < > 7 337*   < > 7 338*   PCO2 ART mm Hg 43 1   < > 47 4*   < > 45 8*   PO2 ART mm Hg 95 6   < > 82 5   < > 120 7   HCO3 ART mmol/L 27 9   < > 24 8   < > 24 0   BASE EXC ART mmol/L 3 2   < > -1 3   < > -1 9   ABG SOURCE  Line, Arterial  --  Line, Arterial  --  Line, Arterial    < > = values in this interval not displayed  SVO2: 64<<55%    Micro:   Blood Culture:   Lab Results   Component Value Date    BLOODCX No Growth After 5 Days  12/30/2021    BLOODCX No Growth After 5 Days  12/30/2021     Urine Culture: No results found for: URINECX  Sputum Culture: No components found for: SPUTUMCX  Wound Culure: No results found for: WOUNDCULT    Diagnositic Studies:  01/06/22 CXR: New adequately positioned right upper extremity PICC line  Persistent small left pleural effusion  This was personally reviewed by myself in PACS      Nutrition:        Diet Orders   (From admission, onward)             Start     Ordered    01/07/22 7999 Diet Cardiovascular; Cardiac; Fluid Restriction 1800 ML  Diet effective now        References:    Nutrtion Support Algorithm Enteral vs  Parenteral   Question Answer Comment   Diet Type Cardiovascular    Cardiac Cardiac    Other Restriction(s): Fluid Restriction 1800 ML    RD to adjust diet per protocol? Yes        01/07/22 9485              Physical Exam  Vitals reviewed  Constitutional:       General: He is awake  He is not in acute distress  Interventions: Nasal cannula in place  Neck:      Comments: ROQUE PA catheter  Cardiovascular:      Rate and Rhythm: Normal rate and regular rhythm  Heart sounds: No murmur heard  No friction rub  Pulmonary:      Effort: Pulmonary effort is normal       Breath sounds: Normal breath sounds  No wheezing, rhonchi or rales  Genitourinary:     Comments: +Charlene  Skin:     General: Skin is warm and dry  Neurological:      General: No focal deficit present  Mental Status: He is alert and oriented to person, place, and time  Mental status is at baseline  Psychiatric:         Mood and Affect: Mood and affect normal          Speech: Speech normal          Behavior: Behavior is cooperative  Invasive lines and devices:   Invasive Devices  Report    Peripherally Inserted Central Catheter Line            PICC Line 51/40/82 Right Basilic <1 day          Central Venous Catheter Line            Introducer 12/31/21 6 days    LDA ECMO 12/31/21 Femoral artery  6 days          Arterial Line            Arterial Line 01/01/22 Other (Comment) 5 days          Drain            Urethral Catheter Temperature probe 7 days    Chest Tube 1 Anterior Mediastinal 32 Fr  2 days    Chest Tube 2 Posterior Mediastinal 32 Fr  2 days              ---------------------------------------------------------------------------------------------------------------------------------------------------------------------  Code Status: Level 1 - Full Code    Care Time Delivered:   No Critical Care time spent     Collaborative bedside rounds performed with cardiac surgery attending, critical care attending and bedside RN      SIGNATURE: Farzaneh Andersen PA-C  DATE: January 7, 2022  TIME: 6:48 AM

## 2022-01-07 NOTE — PLAN OF CARE
Problem: OCCUPATIONAL THERAPY ADULT  Goal: Performs self-care activities at highest level of function for planned discharge setting  See evaluation for individualized goals  Description: Treatment Interventions: ADL retraining,Functional transfer training,UE strengthening/ROM,Endurance training,Patient/family training,Equipment evaluation/education,Compensatory technique education,Continued evaluation,Energy conservation,Activityengagement,Cardiac education          See flowsheet documentation for full assessment, interventions and recommendations  Note: Limitation: Decreased ADL status,Decreased endurance,Decreased self-care trans,Decreased high-level ADLs  Prognosis: Good  Assessment: Pt is a 61 y o  male admitted [facility] on 2021 w/ cardiogenic shock s/p ECMO cannulation on  and ventricular septal defect repair and ECMO decannulation on   Pt  has a PMH of atypical pyscosis, obesity, and agoraphobia with panic attacks  Pt with active OT orders and ambulate  orders  Pt resides in a 2 story home with 2 POORNIMA  Pt lives with his brother and sister in law  Pt was I w/  ADLS and IADLS, (+) drove, & required no use of DME PTA  Currently pt is max A for functional transfers, mod A for UB ADLS and max A for LB ADLS  Pt is limited at this time 2*: pain, endurance, activity tolerance, functional mobility, forward functional reach, functional standing tolerance, decreased I w/ ADLS/IADLS, cognitive impairments and decreased safety awareness  The following Occupational Performance Areas to address include: eating, grooming, bathing/shower, toilet hygiene, dressing, functional mobility and clothing management  Based on the aforementioned OT evaluation, functional performance deficits, and assessments, pt has been identified as a high complexity evaluation  From OT standpoint, anticipate d/c STR   Pt to continue to benefit from acute immediate OT services to address the following goals 3-5x/week to  w/in 10-14 days:        OT Discharge Recommendation: Post acute rehabilitation services  OT - OK to Discharge: Yes (When medically appropriate)

## 2022-01-07 NOTE — ANESTHESIA POSTPROCEDURE EVALUATION
Post-Op Assessment Note    CV Status:  Stable  Pain Score: 0    Pain management: adequate     Mental Status:  Sleepy   PONV Controlled:  None   Airway Patency:  Patent  Airway: intubated      Post Op Vitals Reviewed: Yes      Staff: Anesthesiologist         No complications documented      BP      Temp      Pulse     Resp      SpO2

## 2022-01-07 NOTE — ANESTHESIA PROCEDURE NOTES
Arterial Line Insertion  Performed by: Ye Li MD  Authorized by: Ye Li MD   Consent: Verbal consent obtained  Risks and benefits: risks, benefits and alternatives were discussed  Consent given by: patient  Patient understanding: patient states understanding of the procedure being performed  Patient consent: the patient's understanding of the procedure matches consent given  Procedure consent: procedure consent matches procedure scheduled  Relevant documents: relevant documents present and verified  Test results: test results available and properly labeled  Site marked: the operative site was marked  Radiology Images: Radiology Images displayed and confirmed  If images not available, report reviewed  Required items: required blood products, implants, devices, and special equipment available  Patient identity confirmed: verbally with patient, arm band and provided demographic data  Time out: Immediately prior to procedure a "time out" was called to verify the correct patient, procedure, equipment, support staff and site/side marked as required  Preparation: Patient was prepped and draped in the usual sterile fashion  Indications: hemodynamic monitoring  Orientation:  Right  Location: radial arterylidocaine (PF) (XYLOCAINE-MPF) 1 % infiltration, 1 mL  Sedation:  Patient sedated: yes  Analgesia: see MAR for details  Vitals: Vital signs were monitored during sedation      Procedure Details:  Tico's test normal: yes  Needle gauge: 20  Number of attempts: 1    Post-procedure:  Post-procedure: dressing applied  Waveform: good waveform  Post-procedure CNS: normal  Patient tolerance: Patient tolerated the procedure well with no immediate complications and patient tolerated the procedure well with no immediate complications

## 2022-01-08 ENCOUNTER — APPOINTMENT (INPATIENT)
Dept: RADIOLOGY | Facility: HOSPITAL | Age: 67
DRG: 167 | End: 2022-01-08
Payer: COMMERCIAL

## 2022-01-08 LAB
ABO GROUP BLD BPU: NORMAL
ALBUMIN SERPL BCP-MCNC: 2.6 G/DL (ref 3.5–5)
ALP SERPL-CCNC: 114 U/L (ref 46–116)
ALT SERPL W P-5'-P-CCNC: 53 U/L (ref 12–78)
ANION GAP SERPL CALCULATED.3IONS-SCNC: 7 MMOL/L (ref 4–13)
AST SERPL W P-5'-P-CCNC: 65 U/L (ref 5–45)
BASE EX.OXY STD BLDV CALC-SCNC: 60.5 % (ref 60–80)
BASE EX.OXY STD BLDV CALC-SCNC: 67.7 % (ref 60–80)
BASE EXCESS BLDV CALC-SCNC: 1.8 MMOL/L
BASE EXCESS BLDV CALC-SCNC: 3.6 MMOL/L
BILIRUB DIRECT SERPL-MCNC: 1.16 MG/DL (ref 0–0.2)
BILIRUB SERPL-MCNC: 2.55 MG/DL (ref 0.2–1)
BPU ID: NORMAL
BUN SERPL-MCNC: 82 MG/DL (ref 5–25)
CA-I BLD-SCNC: 1.04 MMOL/L (ref 1.12–1.32)
CALCIUM SERPL-MCNC: 8.1 MG/DL (ref 8.3–10.1)
CHLORIDE SERPL-SCNC: 116 MMOL/L (ref 100–108)
CO2 SERPL-SCNC: 28 MMOL/L (ref 21–32)
CREAT SERPL-MCNC: 2.15 MG/DL (ref 0.6–1.3)
CROSSMATCH: NORMAL
ERYTHROCYTE [DISTWIDTH] IN BLOOD BY AUTOMATED COUNT: 18.1 % (ref 11.6–15.1)
GFR SERPL CREATININE-BSD FRML MDRD: 31 ML/MIN/1.73SQ M
GLUCOSE SERPL-MCNC: 104 MG/DL (ref 65–140)
GLUCOSE SERPL-MCNC: 110 MG/DL (ref 65–140)
GLUCOSE SERPL-MCNC: 112 MG/DL (ref 65–140)
GLUCOSE SERPL-MCNC: 113 MG/DL (ref 65–140)
GLUCOSE SERPL-MCNC: 114 MG/DL (ref 65–140)
GLUCOSE SERPL-MCNC: 116 MG/DL (ref 65–140)
GLUCOSE SERPL-MCNC: 117 MG/DL (ref 65–140)
GLUCOSE SERPL-MCNC: 122 MG/DL (ref 65–140)
GLUCOSE SERPL-MCNC: 127 MG/DL (ref 65–140)
HCO3 BLDV-SCNC: 27.3 MMOL/L (ref 24–30)
HCO3 BLDV-SCNC: 29.3 MMOL/L (ref 24–30)
HCT VFR BLD AUTO: 36.4 % (ref 36.5–49.3)
HCT VFR BLD AUTO: 36.4 % (ref 36.5–49.3)
HCT VFR BLD AUTO: 39.3 % (ref 36.5–49.3)
HGB BLD-MCNC: 11.1 G/DL (ref 12–17)
HGB BLD-MCNC: 11.3 G/DL (ref 12–17)
HGB BLD-MCNC: 12.3 G/DL (ref 12–17)
MAGNESIUM SERPL-MCNC: 3.7 MG/DL (ref 1.6–2.6)
MAGNESIUM SERPL-MCNC: 3.7 MG/DL (ref 1.6–2.6)
MCH RBC QN AUTO: 30.2 PG (ref 26.8–34.3)
MCHC RBC AUTO-ENTMCNC: 30.5 G/DL (ref 31.4–37.4)
MCV RBC AUTO: 99 FL (ref 82–98)
NASAL CANNULA: 6
O2 CT BLDV-SCNC: 10 ML/DL
O2 CT BLDV-SCNC: 11.5 ML/DL
PCO2 BLDV: 46.4 MM HG (ref 42–50)
PCO2 BLDV: 49.4 MM HG (ref 42–50)
PH BLDV: 7.39 [PH] (ref 7.3–7.4)
PH BLDV: 7.39 [PH] (ref 7.3–7.4)
PHOSPHATE SERPL-MCNC: 5.2 MG/DL (ref 2.3–4.1)
PLATELET # BLD AUTO: 108 THOUSANDS/UL (ref 149–390)
PMV BLD AUTO: 13.6 FL (ref 8.9–12.7)
PO2 BLDV: 34.9 MM HG (ref 35–45)
PO2 BLDV: 38.7 MM HG (ref 35–45)
POTASSIUM SERPL-SCNC: 4.1 MMOL/L (ref 3.5–5.3)
POTASSIUM SERPL-SCNC: 4.3 MMOL/L (ref 3.5–5.3)
PROT SERPL-MCNC: 5 G/DL (ref 6.4–8.2)
RBC # BLD AUTO: 3.68 MILLION/UL (ref 3.88–5.62)
SODIUM SERPL-SCNC: 151 MMOL/L (ref 136–145)
UNIT DISPENSE STATUS: NORMAL
UNIT PRODUCT CODE: NORMAL
UNIT PRODUCT VOLUME: 300 ML
UNIT PRODUCT VOLUME: 350 ML
UNIT RH: NORMAL
WBC # BLD AUTO: 14.3 THOUSAND/UL (ref 4.31–10.16)

## 2022-01-08 PROCEDURE — 85018 HEMOGLOBIN: CPT | Performed by: PHYSICIAN ASSISTANT

## 2022-01-08 PROCEDURE — 85027 COMPLETE CBC AUTOMATED: CPT | Performed by: PHYSICIAN ASSISTANT

## 2022-01-08 PROCEDURE — 85014 HEMATOCRIT: CPT | Performed by: PHYSICIAN ASSISTANT

## 2022-01-08 PROCEDURE — 93005 ELECTROCARDIOGRAM TRACING: CPT

## 2022-01-08 PROCEDURE — 82805 BLOOD GASES W/O2 SATURATION: CPT | Performed by: PHYSICIAN ASSISTANT

## 2022-01-08 PROCEDURE — 71045 X-RAY EXAM CHEST 1 VIEW: CPT

## 2022-01-08 PROCEDURE — 99232 SBSQ HOSP IP/OBS MODERATE 35: CPT | Performed by: INTERNAL MEDICINE

## 2022-01-08 PROCEDURE — 99233 SBSQ HOSP IP/OBS HIGH 50: CPT | Performed by: ANESTHESIOLOGY

## 2022-01-08 PROCEDURE — 83735 ASSAY OF MAGNESIUM: CPT | Performed by: PHYSICIAN ASSISTANT

## 2022-01-08 PROCEDURE — 82330 ASSAY OF CALCIUM: CPT | Performed by: PHYSICIAN ASSISTANT

## 2022-01-08 PROCEDURE — 99024 POSTOP FOLLOW-UP VISIT: CPT | Performed by: THORACIC SURGERY (CARDIOTHORACIC VASCULAR SURGERY)

## 2022-01-08 PROCEDURE — 82948 REAGENT STRIP/BLOOD GLUCOSE: CPT

## 2022-01-08 PROCEDURE — 84100 ASSAY OF PHOSPHORUS: CPT | Performed by: PHYSICIAN ASSISTANT

## 2022-01-08 PROCEDURE — 84132 ASSAY OF SERUM POTASSIUM: CPT | Performed by: PHYSICIAN ASSISTANT

## 2022-01-08 PROCEDURE — 80076 HEPATIC FUNCTION PANEL: CPT | Performed by: PHYSICIAN ASSISTANT

## 2022-01-08 PROCEDURE — 80048 BASIC METABOLIC PNL TOTAL CA: CPT | Performed by: PHYSICIAN ASSISTANT

## 2022-01-08 RX ORDER — DEXTROSE MONOHYDRATE 50 MG/ML
100 INJECTION, SOLUTION INTRAVENOUS CONTINUOUS
Status: DISCONTINUED | OUTPATIENT
Start: 2022-01-08 | End: 2022-01-10

## 2022-01-08 RX ORDER — ASPIRIN 325 MG
325 TABLET ORAL DAILY
Status: DISCONTINUED | OUTPATIENT
Start: 2022-01-08 | End: 2022-01-31 | Stop reason: HOSPADM

## 2022-01-08 RX ORDER — FUROSEMIDE 10 MG/ML
40 INJECTION INTRAMUSCULAR; INTRAVENOUS
Status: DISCONTINUED | OUTPATIENT
Start: 2022-01-08 | End: 2022-01-09

## 2022-01-08 RX ORDER — ALBUMIN, HUMAN INJ 5% 5 %
12.5 SOLUTION INTRAVENOUS ONCE
Status: COMPLETED | OUTPATIENT
Start: 2022-01-08 | End: 2022-01-08

## 2022-01-08 RX ORDER — CALCIUM GLUCONATE 20 MG/ML
2 INJECTION, SOLUTION INTRAVENOUS ONCE
Status: COMPLETED | OUTPATIENT
Start: 2022-01-08 | End: 2022-01-08

## 2022-01-08 RX ORDER — POTASSIUM CHLORIDE 20 MEQ/1
20 TABLET, EXTENDED RELEASE ORAL 2 TIMES DAILY
Status: DISCONTINUED | OUTPATIENT
Start: 2022-01-08 | End: 2022-01-09

## 2022-01-08 RX ADMIN — HEPARIN SODIUM 5000 UNITS: 5000 INJECTION INTRAVENOUS; SUBCUTANEOUS at 06:46

## 2022-01-08 RX ADMIN — DEXTROSE 75 ML/HR: 5 SOLUTION INTRAVENOUS at 08:25

## 2022-01-08 RX ADMIN — ASPIRIN 325 MG ORAL TABLET 325 MG: 325 PILL ORAL at 08:36

## 2022-01-08 RX ADMIN — Medication 12.5 MG: at 09:13

## 2022-01-08 RX ADMIN — POTASSIUM CHLORIDE 20 MEQ: 1500 TABLET, EXTENDED RELEASE ORAL at 17:52

## 2022-01-08 RX ADMIN — POTASSIUM CHLORIDE 20 MEQ: 1500 TABLET, EXTENDED RELEASE ORAL at 08:24

## 2022-01-08 RX ADMIN — MILRINONE LACTATE IN DEXTROSE 0.13 MCG/KG/MIN: 200 INJECTION, SOLUTION INTRAVENOUS at 09:20

## 2022-01-08 RX ADMIN — FUROSEMIDE 40 MG: 10 INJECTION, SOLUTION INTRAVENOUS at 17:52

## 2022-01-08 RX ADMIN — MUPIROCIN 1 APPLICATION: 20 OINTMENT TOPICAL at 08:36

## 2022-01-08 RX ADMIN — HEPARIN SODIUM 5000 UNITS: 5000 INJECTION INTRAVENOUS; SUBCUTANEOUS at 21:48

## 2022-01-08 RX ADMIN — AMIODARONE HYDROCHLORIDE 200 MG: 200 TABLET ORAL at 14:52

## 2022-01-08 RX ADMIN — MUPIROCIN 1 APPLICATION: 20 OINTMENT TOPICAL at 21:48

## 2022-01-08 RX ADMIN — PANTOPRAZOLE SODIUM 40 MG: 40 TABLET, DELAYED RELEASE ORAL at 06:46

## 2022-01-08 RX ADMIN — ACETAMINOPHEN 975 MG: 325 TABLET, FILM COATED ORAL at 08:36

## 2022-01-08 RX ADMIN — CEFAZOLIN SODIUM 2000 MG: 2 SOLUTION INTRAVENOUS at 00:16

## 2022-01-08 RX ADMIN — ACETAMINOPHEN 975 MG: 325 TABLET, FILM COATED ORAL at 00:16

## 2022-01-08 RX ADMIN — POLYETHYLENE GLYCOL 3350 17 G: 17 POWDER, FOR SOLUTION ORAL at 08:35

## 2022-01-08 RX ADMIN — FUROSEMIDE 40 MG: 10 INJECTION, SOLUTION INTRAVENOUS at 11:38

## 2022-01-08 RX ADMIN — ATORVASTATIN CALCIUM 80 MG: 80 TABLET, FILM COATED ORAL at 17:52

## 2022-01-08 RX ADMIN — CEFAZOLIN SODIUM 2000 MG: 2 SOLUTION INTRAVENOUS at 06:34

## 2022-01-08 RX ADMIN — CEFAZOLIN SODIUM 2000 MG: 2 SOLUTION INTRAVENOUS at 14:38

## 2022-01-08 RX ADMIN — AMIODARONE HYDROCHLORIDE 200 MG: 200 TABLET ORAL at 21:48

## 2022-01-08 RX ADMIN — Medication 12.5 MG: at 21:47

## 2022-01-08 RX ADMIN — ALBUMIN (HUMAN) 12.5 G: 12.5 INJECTION, SOLUTION INTRAVENOUS at 01:36

## 2022-01-08 RX ADMIN — OXYCODONE HYDROCHLORIDE 5 MG: 5 TABLET ORAL at 21:48

## 2022-01-08 RX ADMIN — CALCIUM GLUCONATE 2 G: 20 INJECTION, SOLUTION INTRAVENOUS at 08:23

## 2022-01-08 RX ADMIN — HEPARIN SODIUM 5000 UNITS: 5000 INJECTION INTRAVENOUS; SUBCUTANEOUS at 14:38

## 2022-01-08 RX ADMIN — DOCUSATE SODIUM 100 MG: 100 CAPSULE ORAL at 08:36

## 2022-01-08 RX ADMIN — AMIODARONE HYDROCHLORIDE 200 MG: 200 TABLET ORAL at 06:45

## 2022-01-08 RX ADMIN — HYDROMORPHONE HYDROCHLORIDE 0.5 MG: 1 INJECTION, SOLUTION INTRAMUSCULAR; INTRAVENOUS; SUBCUTANEOUS at 04:42

## 2022-01-08 NOTE — PROGRESS NOTES
Progress Note - Cardiothoracic Surgery   Sejal Sandoval 61 y o  male MRN: [id number]  Unit/Bed#: [facility] 418-01 Encounter: [id number]      POD 4 Repair of Post-infarction VSD, VA ECMO decannulation  POD 1 from re-op, removal of [place] catheter    Pt seen/examined    Interval history and data reviewed with critical care team     Milrinjacqueline gtt is @ 0 125    Extubated, sitting in chair, grossly intact neurologic exam      Medications:   Scheduled Meds:  Current Facility-Administered Medications   Medication Dose Route Frequency Provider Last Rate    acetaminophen  650 mg Rectal Q4H PRN Ella Villatoro PA-C      acetaminophen  975 mg Oral [address] [facility] [place]      amiodarone  0 5 mg/min Intravenous Continuous Heidiabraham Da Silva PA-C      amiodarone           amiodarone  200 mg Oral Q8H [address] Bren Zamorano      aspirin  325 mg Oral Daily BROOKE Banegas      atorvastatin  80 mg Oral After R R  NOA Rubin      cefazolin  2,000 mg Intravenous Q8H Monica Salemron PA-C Stopped (01/08/22 0700)    dextrose  75 mL/hr Intravenous Continuous BROOKE Banegas 75 mL/hr (01/08/22 0825)    docusate sodium  100 mg Oral BID Monica Salmeron PA-C      epinephrine  1-10 mcg/min Intravenous Titrated Lesley Mcdaniel PA-C Stopped (01/07/22 1721)    fentanyl citrate (PF)  50 mcg Intravenous Q1H PRN Ella Villatoro PA-C      furosemide  40 mg Intravenous Q6H PRN Monica Salmeron PA-C      furosemide  40 mg Intravenous BID (diuretic) BROOKE Fish      heparin (porcine)  5,000 Units Subcutaneous [facility] Bren Olson      HYDROmorphone  0 5 mg Intravenous Q2H PRN Monica Senior PA-C      insulin lispro  1-6 Units Subcutaneous TID AC BROOKE Banegas      insulin lispro  1-6 Units Subcutaneous HS IGNACIO BanegasNP      lactated ringers  500 mL Intravenous Q30 Min PRN Monica Salmeron PA-C      lidocaine (cardiac)  100 mg Intravenous Q30 Min PRN Troybernardo Salmeron [place]      metoprolol tartrate  12 5 mg Oral Q12H [address] [facility] [place]      milrinone [facility]) infusion  0 13 mcg/kg/min Intravenous Continuous Vaughn OuNOA 0 13 mcg/kg/min (01/08/22 0920)    mupirocin  1 application Nasal A56F [address] [facility] [place]      norepinephrine  1-30 mcg/min Intravenous Titrated Vaughn Ou, NOA Stopped (01/07/22 1838)    ondansetron  4 mg Intravenous Q6H PRN Vaughn Ou, NOA      oxyCODONE  2 5 mg Oral Q4H PRN Vaughn Ou, NOA      oxyCODONE  5 mg Oral Q4H PRN Vaughn Ou, NOA      pantoprazole  40 mg Oral Early Morning [facility] [place], [place]      polyethylene glycol  17 g Oral Daily [facility] [place]      potassium chloride  20 mEq Oral TID With Meals Aide Salmeron PA-C      potassium chloride  20 mEq Oral BID Wendi SpiroBROOKE Arechiga      potassium chloride  20 mEq Intravenous Once PRN Yadira Pat PA-C      potassium chloride  20 mEq Intravenous Q1H PRN Yadira Pat PA-C 20 mEq (01/07/22 1729)    potassium chloride  20 mEq Intravenous Q30 Min PRN Aide Salmeron PA-C      temazepam  15 mg Oral HS PRN Aide Senior PA-C       Continuous Infusions:amiodarone, 0 5 mg/min  dextrose, 75 mL/hr, Last Rate: 75 mL/hr (01/08/22 0825)  epinephrine, 1-10 mcg/min, Last Rate: Stopped (01/07/22 1721)  milrinone (PRIMACOR) infusion, 0 13 mcg/kg/min, Last Rate: 0 13 mcg/kg/min (01/08/22 0920)  norepinephrine, 1-30 mcg/min, Last Rate: Stopped (01/07/22 1838)      PRN Meds:   acetaminophen    fentanyl citrate (PF)    furosemide    HYDROmorphone    lactated ringers    lidocaine (cardiac)    ondansetron    oxyCODONE    oxyCODONE    potassium chloride    potassium chloride    potassium chloride    temazepam    Vitals: Blood pressure 111/64, pulse 86, temperature 97 5 °F (36 4 °C), resp   rate 15, height 5' 5 5" (1 664 m), weight 98 8 kg (217 lb 13 oz), SpO2 95 %  ,Body mass index is 35 69 kg/m²  I/O last 24 hours: In: 4153 9 [P O :720; I V :2583 9; IV Piggyback:350]  Out: 7441 [Urine:2200; Blood:500; Chest Tube:135]  Invasive Devices  Report    Peripherally Inserted Central Catheter Line            PICC Line 59/20/44 Right Basilic 2 days          Central Venous Catheter Line            Introducer 12/31/21 7 days          Arterial Line            Arterial Line 01/07/22 Right Radial <1 day          Line            Pacer Wires <1 day    Pacer Wires <1 day          Drain            Urethral Catheter Temperature probe 8 days    Chest Tube 1 Anterior Mediastinal 32 Fr  3 days    Chest Tube 2 Posterior Mediastinal 32 Fr  3 days                  Lab, Imaging and other studies:   Results from last 7 days   Lab Units 01/08/22  0947 01/08/22  0426 01/08/22  0008 01/07/22  1639 01/07/22  1637 01/07/22  1459 01/07/22  1456 01/07/22  1409 01/07/22  0441 01/06/22  0407 01/06/22  0407   WBC Thousand/uL  --  14 30*  --   --   --   --   --   --  16 11*  --  22 47*   HEMOGLOBIN g/dL 11 3* 11 1* 12 3   < > 13 0  --   --   --  11 2*   < > 11 1*   I STAT HEMOGLOBIN   --   --   --    < >  --    < >  --    < >  --   --   --    HEMATOCRIT % 36 4* 36 4* 39 3   < > 41 2  --   --   --  34 6*   < > 33 9*   HEMATOCRIT, ISTAT   --   --   --    < >  --    < >  --    < >  --   --   --    PLATELETS Thousands/uL  --  108*  --   --  119*  --  87*  --  125*   < > 114*    < > = values in this interval not displayed       Results from last 7 days   Lab Units 01/08/22  0426 01/08/22  0008 01/07/22 2009 01/07/22  1639 01/07/22  1637 01/07/22  1637 01/07/22  1529 01/07/22  1409 01/07/22  1207   POTASSIUM mmol/L 4 1 4 3 4 8  --    < > 3 5  --    < > 3 3*   CHLORIDE mmol/L 116*  --   --   --   --  117*  --   --  112*   CO2 mmol/L 28  --   --   --   --  27  --    < > 34*   CO2, I-STAT mmol/L  --   --   --  31  --   --   --    < >  --    BUN mg/dL 82*  --   --   --   --  73* --   --  83*   CREATININE mg/dL 2 15*  --   --   --   --  1 70*  --   --  1 88*   GLUCOSE, ISTAT mg/dl  --   --   --  149*  --   --    < >   < >  --    CALCIUM mg/dL 8 1*  --   --   --   --  8 6  --   --  9 0    < > = values in this interval not displayed  Results from last 7 days   Lab Units 01/04/22  1335 01/04/22  0556 01/04/22  0450 01/04/22  0214 01/03/22  1138 01/03/22  0644   INR  1 59*  --  1 27*  --   --  1 32*   PTT seconds 34 49*  --  50*   < > 73*    < > = values in this interval not displayed  Recent Labs     01/06/22  0406   PHART 7 429   [id number] 27 9   PO2ART 95 6   [id number] 43 1   BEART 3 2           Plan:    Intermittent Lasix dosing for negative fluid balance  Keep drains  Keep milrinone today Milrinone  Ambulate, PT/OT, Pulmonary toilet  Frank Castelan   Possible transfer to step down      SIGNATURE: Deb Carreon,   DATE: January 8, 2022  TIME: 10:25 AM

## 2022-01-08 NOTE — PROGRESS NOTES
PROGRESS NOTE - Cardiology  Jack House 61 y o  male MRN: [id number]  Unit/Bed#: [facility] 418-01 Encounter: [id number]    Assessment/Plan   CAD with late onset MI complicated by VSD, cardiomyopathy and cardiogenic shock, status post VSD repair on 01/04/2022  -TTE on 12/30/2021:  Dilated LV, EF 45%, eccentric hypertrophy, dilated RV, with moderately reduced function, biatrial enlargement, moderate TR  -intraop CORIE on 01/03/2022 with EF of 40%, normal RV size and function, trace MR, moderate TR  -intraop CORIE on 01/08/2022 was slightly improvement EF 50%  -hemodynamics: on 12/31/2021 underwent VA-ECMO placement with decannulation on 01/04/2022   pressor requirements coming down  currently on milrinone (0 13) with cardiac output of 6 8 liters/minute, cardiac index 3 2 L/m/m2  -volume status: still hypervolemic, status post Bumex drip 2 days ago with good urine output, transitionned to intermittent diuresis with IV Lasix 40 mg b i d     wlll recommend to continue diuresing to keep net negative  - on aspirin 325 mg daily, amiodarone 200 mg t i d , atorvastatin 80 mg daily  -started on metoprolol tartrate 12 5 mg b i d , will start rest of GDMT as hemodynamics and kidney function improve   -will repeat limited TTE months off inotropes    -outpatient follow-up with Cardiology for ischemic workup most likely with nuclear stress test      Hypoxic failure status post intubation  -improved oxygen requrements  -would recommend to diurese to keep net negative fluid balance     JUDE on CKD  -baseline creatinine around 1, presented with creatinine around 4, with improvement up to 1 24, currently up slightly titrating  -most likely cardiorenal, will continue with more aggressive diuresis     Anemia  -hemoglobin at presentation 12 8, received multiple blood during this admission  stable hemoglobins     Transaminitis  -AST-ALT at presentation 6528-3220, improving     Perioperative atrial fibrillation  -currently in normal sinus rhythm  -continue with amiodarone 200 mg t i d , Given short episode in perioperative time might not need systemic anticoagulation if no recurrence  Interval History:  Status post mediastinal re-exploration, removal of retained pulmonary artery catheter with cardiopulmonary bypass  Was extubated yesterday evening  Still has pacing wires, has chest tubes  Intake and output 4 2 L/2 8 L with net positive 1 3 L (net positive 5 7 L since admission)  Venous O2 hemoglobin 67 7, sodium 151, chloride 116, creatinine 2 15 (slightly worsened from yesterday), hemoglobin 11 1  TELE:  Normal sinus rhythm with heart rate in 90s, episodes of atrial fibrillation last night    Review of Systems  ROS as noted above, otherwise 12 point review of systems was performed and is negative  Historical Information   History reviewed  No pertinent past medical history  Past Surgical History:   Procedure Laterality Date    EXTRACORPOREAL CIRCULATION N/A 12/31/2021    Procedure: SUPPORT EXTRACORPOREAL MEMBRANE OXYGENATION (ECMO);   Surgeon: Fifi Victor MD;  [facility];  Service: Cardiac Surgery    IR OTHER  12/31/2021    ID ECMO/ECLS INITIATION VENOUS N/A 1/4/2022    Procedure: VA ECMO DECANNULATION;  Surgeon: Fifi Victor MD;  [facility];  Service: Cardiac Surgery    ID REVSD N/A 1/4/2022    Procedure: REPAIR VENTRICULAR SEPTAL DEFECT (VSD) OPEN WITH BOVINE PERICARDIAL PATCH ;  Surgeon: Fifi Vicotr MD;  [facility];  Service: Cardiac Surgery     Social History     Substance and Sexual Activity   Alcohol Use No     Social History     Substance and Sexual Activity   Drug Use No     Social History     Tobacco Use   Smoking Status Current Every Day Smoker    Types: Pipe   Smokeless Tobacco Never Used   Tobacco Comment    Secondhand smoke exposure     Meds/Allergies   Hospital Medications:   Current Facility-Administered Medications   Medication Dose Route Frequency    amiodarone (CORDARONE) 900 mg in dextrose 5 % 500 mL infusion  0 5 mg/min Intravenous Continuous    amiodarone tablet 200 mg  200 mg Oral Q8H [address]    aspirin tablet 325 mg  325 mg Oral Daily    atorvastatin (LIPITOR) tablet 80 mg  80 mg Oral After Dinner    ceFAZolin (ANCEF) IVPB (premix in dextrose) 2,000 mg 50 mL  2,000 mg Intravenous Q8H    dextrose 5 % infusion  75 mL/hr Intravenous Continuous    docusate sodium (COLACE) capsule 100 mg  100 mg Oral BID    EPINEPHrine 3000 mcg (STANDARD CONCENTRATION) IV in sodium chloride 0 9% 250 mL  1-10 mcg/min Intravenous Titrated    fentanyl citrate (PF) 100 MCG/2ML 50 mcg  50 mcg Intravenous Q1H PRN    furosemide (LASIX) injection 40 mg  40 mg Intravenous Q6H PRN    furosemide (LASIX) injection 40 mg  40 mg Intravenous BID (diuretic)    heparin (porcine) subcutaneous injection 5,000 Units  5,000 Units Subcutaneous Q8H [address]    HYDROmorphone (DILAUDID) injection 0 5 mg  0 5 mg Intravenous Q2H PRN    insulin lispro (HumaLOG) 100 units/mL subcutaneous injection 1-6 Units  1-6 Units Subcutaneous TID AC    insulin lispro (HumaLOG) 100 units/mL subcutaneous injection 1-6 Units  1-6 Units Subcutaneous HS    lactated ringers bolus 500 mL  500 mL Intravenous Q30 Min PRN    lidocaine (cardiac) injection 100 mg  100 mg Intravenous Q30 Min PRN    metoprolol tartrate (LOPRESSOR) partial tablet 12 5 mg  12 5 mg Oral Q12H DENITA    milrinone (PRIMACOR) 20 mg in 100 mL infusion (premix)  0 13 mcg/kg/min Intravenous Continuous    mupirocin (BACTROBAN) 2 % nasal ointment 1 application  1 application Nasal V06E [address]    norepinephrine (LEVOPHED) 4 mg (STANDARD CONCENTRATION) IV in sodium chloride 0 9% 250 mL  1-30 mcg/min Intravenous Titrated    ondansetron (ZOFRAN) injection 4 mg  4 mg Intravenous Q6H PRN    oxyCODONE (ROXICODONE) IR tablet 2 5 mg  2 5 mg Oral Q4H PRN    oxyCODONE (ROXICODONE) IR tablet 5 mg  5 mg Oral Q4H PRN    pantoprazole (PROTONIX) EC tablet 40 mg  40 mg Oral Early Morning    polyethylene glycol (MIRALAX) packet 17 g  17 g Oral Daily    potassium chloride (K-DUR,KLOR-CON) CR tablet 20 mEq  20 mEq Oral BID    potassium chloride 20 mEq IVPB (premix)  20 mEq Intravenous Once PRN    potassium chloride 20 mEq IVPB (premix)  20 mEq Intravenous Q1H PRN    potassium chloride 20 mEq IVPB (premix)  20 mEq Intravenous Q30 Min PRN    temazepam (RESTORIL) capsule 15 mg  15 mg Oral HS PRN     Home Medications:   Medications Prior to Admission   Medication    ALPRAZolam (XANAX) 0 5 mg tablet    EPINEPHrine (EPIPEN) 0 3 mg/0 3 mL SOAJ     Allergies   Allergen Reactions    Mushroom Extract Complex - Food Allergy Facial Swelling    Peanut Oil - Food Allergy     Strawberry C [Ascorbate - Food Allergy]      Objective   Vitals: Blood pressure 101/58, pulse 96, temperature 98 1 °F (36 7 °C), resp  rate 13, height 5' 5 5" (1 664 m), weight 98 8 kg (217 lb 13 oz), SpO2 94 %    Orthostatic Blood Pressures      Most Recent Value   Blood Pressure 101/58 filed at 01/08/2022 1000   Patient Position - Orthostatic VS Lying filed at 01/07/2022 1630          Intake/Output Summary (Last 24 hours) at 1/8/2022 1243  Last data filed at 1/8/2022 0700  Gross per 24 hour   Intake 3847 46 ml   Output 1780 ml   Net 2067 46 ml     Invasive Devices  Report    Peripherally Inserted Central Catheter Line            PICC Line 62/53/74 Right Basilic 2 days          Central Venous Catheter Line            Introducer 12/31/21 8 days          Arterial Line            Arterial Line 01/07/22 Right Radial <1 day          Line            Pacer Wires <1 day    Pacer Wires <1 day          Drain            Urethral Catheter Temperature probe 9 days    Chest Tube 1 Anterior Mediastinal 32 Fr  4 days    Chest Tube 2 Posterior Mediastinal 32 Fr  4 days              Physical Exam   GEN: NAD, alert and oriented  SKIN: dry without significant lesions or rashes  HEENT: NCAT, PERRL, EOMs intact  NECK: positive JVD or carotid bruits appreciated  CARDIOVASCULAR: RRR, normal S1, S2 without murmurs, rubs, or gallops appreciated  LUNGS:  Bilateral bibasilar crackles  ABDOMEN: Soft, nontender, nondistended  EXTREMITIES/VASCULAR: perfused without clubbing, cyanosis, trace edema b/l  PSYCH: Normal mood and affect    Lab Results: I have personally reviewed pertinent lab results  Results from last 7 days   Lab Units 01/08/22  0947 01/08/22  0426 01/08/22  0008 01/07/22  1639 01/07/22  1637 01/07/22  1459 01/07/22  1456 01/07/22  1409 01/07/22  0441 01/06/22  0407 01/06/22  0407   WBC Thousand/uL  --  14 30*  --   --   --   --   --   --  16 11*  --  22 47*   HEMOGLOBIN g/dL 11 3* 11 1* 12 3   < > 13 0  --   --   --  11 2*   < > 11 1*   I STAT HEMOGLOBIN   --   --   --    < >  --    < >  --    < >  --   --   --    HEMATOCRIT % 36 4* 36 4* 39 3   < > 41 2  --   --   --  34 6*   < > 33 9*   HEMATOCRIT, ISTAT   --   --   --    < >  --    < >  --    < >  --   --   --    PLATELETS Thousands/uL  --  108*  --   --  119*  --  87*  --  125*   < > 114*    < > = values in this interval not displayed  Results from last 7 days   Lab Units 01/08/22  0426 01/08/22  0008 01/07/22 2009 01/07/22  1639 01/07/22  1637 01/07/22  1637 01/07/22  1529 01/07/22  1409 01/07/22  1207   POTASSIUM mmol/L 4 1 4 3 4 8  --    < > 3 5  --    < > 3 3*   CHLORIDE mmol/L 116*  --   --   --   --  117*  --   --  112*   CO2 mmol/L 28  --   --   --   --  27  --    < > 34*   CO2, I-STAT mmol/L  --   --   --  31  --   --   --    < >  --    BUN mg/dL 82*  --   --   --   --  73*  --   --  83*   CREATININE mg/dL 2 15*  --   --   --   --  1 70*  --   --  1 88*   GLUCOSE, ISTAT mg/dl  --   --   --  149*  --   --    < >   < >  --    CALCIUM mg/dL 8 1*  --   --   --   --  8 6  --   --  9 0    < > = values in this interval not displayed       Results from last 7 days   Lab Units 01/04/22  1335 01/04/22  0556 01/04/22  0450 01/04/22  0214 01/03/22  1138 01/03/22  0644   INR  1 59* --  1 27*  --   --  1 32*   PTT seconds 34 49*  --  50*   < > 73*    < > = values in this interval not displayed  Results from last 7 days   Lab Units 01/08/22  0426 01/07/22  0441 01/06/22  1935   MAGNESIUM mg/dL 3 7*  3 7* 2 8* 3 0*     Imaging: I have personally reviewed pertinent reports

## 2022-01-08 NOTE — PROGRESS NOTES
Progress Note - Critical Care   Emeli Manzano 61 y o  male MRN: [id number]  Unit/Bed#: [facility] 220-76 Encounter: [id number]      Attending Physician: Eliu Purcell MD    24 Hour Events/HPI: POD # 4 s/p repair of post-infarction VSD repair and VA ECMO decannulation  POD #1 s/p mediastinal reexploration, removal of retained pulmonary artery catheter with cardiopulmonary bypass  Received 500mL 5% Albumin overnight for low urine output  Continues on Milrinone 0 13mcg (weaned from 0 5mcg yesterday AM)  ROS: Review of Systems   All other systems reviewed and are negative     ---------------------------------------------------------------------------------------------------------------------------------------------------------------------  Impressions:  1  Recent LAD STEMI  2  Post MI VSD s/p VSD repair  3  Cardiogenic shock s/p VA ECMO, now decannulated  4  S/p mediastinal re-exploration for retained PA catheter   5  Acute post operative blood loss anemia  6  Acute post-op pulmonary insuffiencey  7  Thrombocytopenia  8  JUDE  9  Shock liver, improving  10  Toxic metabolic encephalopathy  11  Coagulopathy   12  Right upper extremity partial arterial occlusion secondary to thrombus at the arterial line site, since removed  13  Tylenol OD s/p NAC on admission  14  Agoraphobia with panic attacks   15  Hypernatremia    Plan:    Neuro:   · Pain controlled with: ATC Tylenol, oxy 2 5/5, dilaudid for breakthrough  · Regulate sleep/wake cycle  · Delirium precautions  · CAM-ICU daily  · Trend neuro exam    CV:   · Cardiac infusions: Primacor, 0 13 mcg/kg/min  · Wean Primacor as able  · MAP goal > 65   · continue Arterial line  · Rhythm: NSR  · Follow rhythm on telemetry  · Lopressor 12 5 mg PO BID  · Epicardial pacing wires out  · Continue Amio, Statin, and ASA therapy     Lung:   · Acute post-op pulmonary insufficiency; Requiring 6 liters via nasal cannula, secondary to atelectasis and pulmonary vascular congestion  Continue incentive spirometry/coughing/deep breathing exercises  Wean supplemental oxygen as tolerated for saturation > 90%  · Wean NC as tolerated  · Chest tube drainage diminished; D/C today     GI:   · Continue PPI for stress ulcer prophylaxis  · Continue bowel regimen  · Trend abdominal exam and bowel function    FEN:   · Diuretic plan: PRN diuresis, give 40mg IV Lasix this morning  · PRN K repletion   · Nutrition/diet plan: Cardiac diet with 1 8L FR   · Replenish electrolytes with goals: K >4 0, Mag >2 0, and Phos >3 0    :   · Indwelling Chang present: yes   · continue Chang  · Trend UOP and BUN/creat  · Strict I and O    ID:   · Trend temps and WBC count  · Maintain normothermia    Results from last 7 days   Lab Units 01/04/22  0450 01/03/22  0550 01/02/22  0529   PROCALCITONIN ng/ml 0 31* 0 61* 1 26*     Heme:   · Trend hgb and plts    Endo:   · Glycemic control plan: Glucose well-controlled  Discontinue continuous insulin infusion and add Insulin sliding scale coverage    Results from last 6 Months   Lab Units 12/31/21  0400 12/30/21  1007 09/13/21  0657   HEMOGLOBIN A1C % 5 9* 5 7* 5 3     MSK/Skin:  · Mobility goal: OOB to chair, ambulate as tolerated  · PT consult: yes  · OT consult: yes  · Frequent turning and pressure off-loading  · Local wound care as needed    Disposition:  Continue ICU care  ---------------------------------------------------------------------------------------------------------------------------------------------------------------------  Allergies:    Allergies   Allergen Reactions    Mushroom Extract Complex - Food Allergy Facial Swelling    Peanut Oil - Food Allergy     Strawberry C [Ascorbate - Food Allergy]      Medications:   Scheduled Meds:  Current Facility-Administered Medications   Medication Dose Route Frequency Provider Last Rate    acetaminophen  650 mg Rectal Q4H PRN Cory Curiel PA-C      acetaminophen  975 mg Oral Whit Dotson [address], [facility] amiodarone  0 5 mg/min Intravenous Continuous Heidiabraham Da Silva PA-C      amiodarone           amiodarone  200 mg Oral Q8H [address] [place], [place]      aspirin  325 mg Oral Daily Wendi Spironello V, CRNP      atorvastatin  80 mg Oral After R R  NOA Rubin      calcium gluconate  2 g Intravenous Once Clayflorencio Dodrill V, CRNP 2 g (01/08/22 0823)    cefazolin  2,000 mg Intravenous Q8H [facility] Josephine Salmeron PA-C Stopped (01/08/22 0700)    dextrose  75 mL/hr Intravenous Continuous Wendi Spironello V, CRNP 75 mL/hr (01/08/22 0825)    docusate sodium  100 mg Oral BID [facility] NOA Salmeron      epinephrine  1-10 mcg/min Intravenous Titrated Geovany Price PA-C Stopped (01/07/22 1721)    fentanyl citrate (PF)  50 mcg Intravenous Q1H PRN Shayy Man PA-C      furosemide  40 mg Intravenous Q6H PRN [facility] NOA Salmeron      heparin (porcine)  5,000 Units Subcutaneous [place], [place]      HYDROmorphone  0 5 mg Intravenous Q2H PRN [facility] NOA Salmeron      insulin regular (HumuLIN R,NovoLIN R) infusion  0 3-21 Units/hr Intravenous Titrated [facility] NOA Senior      lactated ringers  500 mL Intravenous Q30 Min PRN [facility] NOA Salmeron      lidocaine (cardiac)  100 mg Intravenous Q30 Min PRN [facility] NOA Salmeron      metoprolol tartrate  12 5 mg Oral Q12H [address] [facility] NOA Salmeron      milrinone [facility]) infusion  0 13 mcg/kg/min Intravenous Continuous Geovany Price PA-C 0 13 mcg/kg/min (01/07/22 1646)    mupirocin  1 application Nasal G43C [address] [facility] NOA Salmeron      norepinephrine  1-30 mcg/min Intravenous Titrated Geovany Price PA-C Stopped (01/07/22 1838)    ondansetron  4 mg Intravenous Q6H PRN Geovany Albert, PA-C      oxyCODONE  2 5 mg Oral Q4H PRN Geovany Albert, PA-C      oxyCODONE  5 mg Oral Q4H PRN Geovany Albert, PA-C      pantoprazole  40 mg Oral Early Morning [facility] [place], [place]     Cuco Gonzalez polyethylene glycol  17 g Oral Daily Bren Meraz      potassium chloride  20 mEq Oral TID With Meals Jennifer Salmeron PA-C      potassium chloride  20 mEq Intravenous Once PRN Velia Hoover PA-C      potassium chloride  20 mEq Intravenous Q1H PRN Velia Hoover PA-C 20 mEq (01/07/22 1729)    potassium chloride  20 mEq Intravenous Q30 Min PRN Jennifer Salmeron PA-C      temazepam  15 mg Oral HS PRN Velia Hoover PA-C       VTE Pharmacologic Prophylaxis: Heparin  VTE Mechanical Prophylaxis: sequential compression device    Continuous Infusions:amiodarone, 0 5 mg/min  dextrose, 75 mL/hr, Last Rate: 75 mL/hr (01/08/22 0825)  epinephrine, 1-10 mcg/min, Last Rate: Stopped (01/07/22 1721)  insulin regular (HumuLIN R,NovoLIN R) infusion, 0 3-21 Units/hr  milrinone (PRIMACOR) infusion, 0 13 mcg/kg/min, Last Rate: 0 13 mcg/kg/min (01/07/22 1646)  norepinephrine, 1-30 mcg/min, Last Rate: Stopped (01/07/22 1838)      PRN Meds:  acetaminophen, 650 mg, Q4H PRN  fentanyl citrate (PF), 50 mcg, Q1H PRN  furosemide, 40 mg, Q6H PRN  HYDROmorphone, 0 5 mg, Q2H PRN  lactated ringers, 500 mL, Q30 Min PRN  lidocaine (cardiac), 100 mg, Q30 Min PRN  ondansetron, 4 mg, Q6H PRN  oxyCODONE, 2 5 mg, Q4H PRN  oxyCODONE, 5 mg, Q4H PRN  potassium chloride, 20 mEq, Once PRN  potassium chloride, 20 mEq, Q1H PRN  potassium chloride, 20 mEq, Q30 Min PRN  temazepam, 15 mg, HS PRN      Home Medications:   Prior to Admission medications    Medication Sig Start Date End Date Taking?  Authorizing Provider   ALPRAZolam Shashank Bathe) 0 5 mg tablet Take 1-2 tablets 30-60 minutes prior to leaving home prn 3/18/21   León Browne MD   EPINEPHrine (EPIPEN) 0 3 mg/0 3 mL SOAJ Inject 0 3 mL (0 3 mg total) into a muscle once for 1 dose 9/21/18 3/18/21  Rob Walton, DO ---------------------------------------------------------------------------------------------------------------------------------------------------------------------  Vitals:   Vitals:    22 0500 22 0600 22 0700 22 0800   BP: 94/50 97/52 106/60 107/67   Pulse: 83 84 85 86   Resp: 12 (!) 10 16 15   Temp: 97 9 °F (36 6 °C) 97 7 °F (36 5 °C) 97 7 °F (36 5 °C) 97 5 °F (36 4 °C)   TempSrc:       SpO2: 94% 94% 95% 95%   Weight:  98 8 kg (217 lb 13 oz)     Height:         Arterial Line:  Arterial Line BP: 112/65  Arterial Line MAP (mmHg): 78 mmHg    Tele Rhythm: NSR This was personally reviewed by myself  Respiratory:  SpO2: SpO2: 95 %, SpO2 Activity: SpO2 Activity: At Rest, SpO2 Device: O2 Device: Nasal cannula  Nasal Cannula O2 Flow Rate (L/min): 6 L/min    Ventilator:  Respiratory  Report   Lab Data (Last 4 hours)    None         O2/Vent Data (Last 4 hours)    None              Temperature: Temp (24hrs), Av 3 °F (36 8 °C), Min:97 5 °F (36 4 °C), Max:99 1 °F (37 3 °C)  Current: Temperature: 97 5 °F (36 4 °C)    Weights:   Weight (last 2 days)     Date/Time Weight    22 0600 98 8 (217 81)    22 1140 92 1 (203)    22 0600 92 2 (203 26)    22 0534 99 7 (219 8)        Body mass index is 35 69 kg/m²  Hemodynamic Monitoring:  PAP: PAP: 28/18, CVP: CVP (mean): 70 mmHg, CO:  , CI:  , SVR: SVR (dyne*sec)/cm5: 954 (dyne*sec)/cm5       Intake and Outputs:    Intake/Output Summary (Last 24 hours) at 2022 0911  Last data filed at 2022 0700  Gross per 24 hour   Intake 3887 86 ml   Output 2835 ml   Net 1052 86 ml     I/O last 24 hours: In: 4153 9 [P O :720;  I V :2583 9; IV Piggyback:350]  Out: 9335 [Urine:2200; Blood:500; Chest Tube:135]    UOP: 40mL/hour   BM: last BM      Chest tube Output:  Mediastinal tubes: 0 mL/8 hours  135 mL/24 hours     Labs:  Results from last 7 days   Lab Units 22  0426 22  0008 22  1639 22  1637 22  1529 01/07/22  1459 01/07/22  1456 01/07/22  1409 01/07/22  0441 01/06/22  0407 01/06/22  0407 01/03/22  1419 01/03/22  0550   WBC Thousand/uL 14 30*  --   --   --   --   --   --   --  16 11*  --  22 47*   < > 16 28*   HEMOGLOBIN g/dL 11 1* 12 3  --  13 0  --   --   --   --  11 2*   < > 11 1*   < > 7 9*   I STAT HEMOGLOBIN g/dl  --   --  12 2  --    < >   < >  --    < >  --   --   --    < >  --    HEMATOCRIT % 36 4* 39 3  --  41 2  --   --   --   --  34 6*   < > 33 9*   < > 24 3*   HEMATOCRIT, ISTAT %  --   --  36*  --    < >   < >  --    < >  --   --   --    < >  --    PLATELETS Thousands/uL 108*  --   --  119*  --   --  87*  --  125*   < > 114*   < >  --    NEUTROS PCT %  --   --   --   --   --   --   --   --   --   --  91*  --  73   MONOS PCT %  --   --   --   --   --   --   --   --   --   --  4  --  7    < > = values in this interval not displayed       Results from last 7 days   Lab Units 01/08/22  0426 01/08/22  0008 01/07/22 2009 01/07/22  1639 01/07/22  1637 01/07/22  1637 01/07/22  1529 01/07/22  1459 01/07/22  1459 01/07/22  1409 01/07/22  1207 01/07/22  1207 01/07/22  0441 01/07/22  0416 01/06/22  1208 01/06/22  0407   SODIUM mmol/L 151*  --   --   --   --  153*  --   --   --   --   --  151*  --    < >   < > 151*   POTASSIUM mmol/L 4 1 4 3 4 8  --    < > 3 5  --   --   --   --    < > 3 3*  --    < >   < > 4 1   CHLORIDE mmol/L 116*  --   --   --   --  117*  --   --   --   --   --  112*  --    < >   < > 116*   CO2 mmol/L 28  --   --   --   --  27  --   --   --   --    < > 34*  --    < >   < > 30   CO2, I-STAT mmol/L  --   --   --  31  --   --  29   < > 39*   < >  --   --   --   --   --   --    BUN mg/dL 82*  --   --   --   --  73*  --   --   --   --   --  83*  --    < >   < > 78*   CREATININE mg/dL 2 15*  --   --   --   --  1 70*  --   --   --   --   --  1 88*  --    < >   < > 2 12*   CALCIUM mg/dL 8 1*  --   --   --   --  8 6  --   --   --   --   --  9 0  --    < >   < > 7 9*   ALK PHOS U/L 114  --   --   -- --   --   --   --   --   --   --   --  133*  --   --  98   ALT U/L 53  --   --   --   --   --   --   --   --   --   --   --  74  --   --  104*   AST U/L 65*  --   --   --   --   --   --   --   --   --   --   --  84*  --   --  106*   GLUCOSE, ISTAT mg/dl  --   --   --  149*  --   --  88  --  102   < >  --   --   --   --   --   --     < > = values in this interval not displayed  Baseline Creat: 1 1    Results from last 7 days   Lab Units 01/08/22  0426 01/07/22 0441 01/06/22  1935   MAGNESIUM mg/dL 3 7*  3 7* 2 8* 3 0*     Results from last 7 days   Lab Units 01/08/22  0426 01/07/22  0441 01/06/22  1935   PHOSPHORUS mg/dL 5 2* 3 5 4 2*      Results from last 7 days   Lab Units 01/04/22  1335 01/04/22  0556 01/04/22  0450 01/04/22  0214 01/03/22  1138 01/03/22  0644   INR  1 59*  --  1 27*  --   --  1 32*   PTT seconds 34 49*  --  50*   < > 73*    < > = values in this interval not displayed  Results from last 7 days   Lab Units 01/06/22  0406 01/05/22  0413 01/05/22  0413 01/04/22  2100 01/04/22  2100   PH ART  7 429   < > 7 337*   < > 7 338*   PCO2 ART mm Hg 43 1   < > 47 4*   < > 45 8*   PO2 ART mm Hg 95 6   < > 82 5   < > 120 7   HCO3 ART mmol/L 27 9   < > 24 8   < > 24 0   BASE EXC ART mmol/L 3 2   < > -1 3   < > -1 9   ABG SOURCE  Line, Arterial  --  Line, Arterial  --  Line, Arterial    < > = values in this interval not displayed  SVO2: 68%    Micro:   Blood Culture:   Lab Results   Component Value Date    BLOODCX No Growth After 5 Days  12/30/2021    BLOODCX No Growth After 5 Days  12/30/2021     Urine Culture: No results found for: URINECX  Sputum Culture: No components found for: SPUTUMCX  Wound Culure: No results found for: WOUNDCULT    Diagnositic Studies:  01/08/22 CXR: RIJ TLC in proper position  Mild pulmonary vascular congestion  Poor inspiratory effort, bibasilar atelectasis  This was personally reviewed by myself in PACS   01/08/22 EKG: NSR-rate 87, incomplete RBBB   Persistent ST elevations V2-V4, prolonged QTc 502  This was personally reviewed by myself  Nutrition:        Diet Orders   (From admission, onward)             Start     Ordered    22 0000  Diet Cardiovascular; Cardiac; Fluid Restriction 1800 ML, Consistent Carbohydrate Diet Level 2 (5 carb servings/75 grams CHO/meal)  Diet effective 0500        References:    Nutrtion Support Algorithm Enteral vs  Parenteral   Question Answer Comment   Diet Type Cardiovascular    Cardiac Cardiac    Other Restriction(s): Fluid Restriction 1800 ML    Other Restriction(s): Consistent Carbohydrate Diet Level 2 (5 carb servings/75 grams CHO/meal)    RD to adjust diet per protocol? No        22 1623              Physical Exam  Vitals reviewed: "I want to get up and get moving"   Constitutional:       General: He is awake  Appearance: Normal appearance  He is well-developed  HENT:      Head: Normocephalic and atraumatic  Eyes:      General: Lids are normal       Extraocular Movements: Extraocular movements intact  Conjunctiva/sclera: Conjunctivae normal    Neck:      Trachea: Trachea normal    Cardiovascular:      Rate and Rhythm: Normal rate and regular rhythm  Pulses: Normal pulses  Radial pulses are 2+ on the right side and 2+ on the left side  Dorsalis pedis pulses are 2+ on the right side and 2+ on the left side  Heart sounds: Normal heart sounds, S1 normal and S2 normal    Pulmonary:      Effort: Pulmonary effort is normal       Breath sounds: Decreased breath sounds present  Chest:      Comments: Mediastinal incision clean dry intact with acticoat  Abdominal:      General: Bowel sounds are normal       Palpations: Abdomen is soft  Musculoskeletal:      Cervical back: Full passive range of motion without pain, normal range of motion and neck supple  Right lower le+ Edema present  Left lower le+ Edema present        Comments: Normal ROM   Skin:     General: Skin is warm and dry  Capillary Refill: Capillary refill takes less than 2 seconds  Coloration: Skin is pale  Neurological:      General: No focal deficit present  Mental Status: He is alert and oriented to person, place, and time  GCS: GCS eye subscore is 4  GCS verbal subscore is 5  GCS motor subscore is 6  Psychiatric:         Attention and Perception: Attention and perception normal          Mood and Affect: Mood and affect normal          Speech: Speech normal          Behavior: Behavior normal  Behavior is cooperative  Thought Content: Thought content normal          Cognition and Memory: Cognition and memory normal          Judgment: Judgment normal        Invasive lines and devices: Invasive Devices  Report    Peripherally Inserted Central Catheter Line            PICC Line 29/28/20 Right Basilic 1 day          Central Venous Catheter Line            Introducer 12/31/21 7 days          Arterial Line            Arterial Line 01/07/22 Right Radial <1 day          Line            Pacer Wires <1 day    Pacer Wires <1 day          Drain            Urethral Catheter Temperature probe 8 days    Chest Tube 1 Anterior Mediastinal 32 Fr  3 days    Chest Tube 2 Posterior Mediastinal 32 Fr  3 days              ---------------------------------------------------------------------------------------------------------------------------------------------------------------------  Code Status: Level 1 - Full Code    Care Time Delivered:   No Critical Care time spent     Collaborative bedside rounds performed with cardiac surgery attending, critical care attending and bedside RN      SIGNATURE: BROOKE Rich  DATE: January 8, 2022  TIME: 9:11 AM

## 2022-01-09 PROBLEM — I74.9 ARTERIAL THROMBOSIS (HCC): Status: RESOLVED | Noted: 2022-01-02 | Resolved: 2022-01-09

## 2022-01-09 PROBLEM — D72.829 LEUKOCYTOSIS: Status: RESOLVED | Noted: 2022-01-02 | Resolved: 2022-01-09

## 2022-01-09 PROBLEM — R73.9 HYPERGLYCEMIA: Status: RESOLVED | Noted: 2021-12-31 | Resolved: 2022-01-09

## 2022-01-09 LAB
ALBUMIN SERPL BCP-MCNC: 2.3 G/DL (ref 3.5–5)
ALP SERPL-CCNC: 115 U/L (ref 46–116)
ALT SERPL W P-5'-P-CCNC: 35 U/L (ref 12–78)
ANION GAP SERPL CALCULATED.3IONS-SCNC: 3 MMOL/L (ref 4–13)
ANION GAP SERPL CALCULATED.3IONS-SCNC: 4 MMOL/L (ref 4–13)
ANION GAP SERPL CALCULATED.3IONS-SCNC: 5 MMOL/L (ref 4–13)
AST SERPL W P-5'-P-CCNC: 54 U/L (ref 5–45)
BASE EX.OXY STD BLDV CALC-SCNC: 49.2 % (ref 60–80)
BASE EX.OXY STD BLDV CALC-SCNC: 51.6 % (ref 60–80)
BASE EXCESS BLDV CALC-SCNC: 2 MMOL/L
BASE EXCESS BLDV CALC-SCNC: 5.7 MMOL/L
BILIRUB SERPL-MCNC: 1.47 MG/DL (ref 0.2–1)
BUN SERPL-MCNC: 64 MG/DL (ref 5–25)
BUN SERPL-MCNC: 67 MG/DL (ref 5–25)
BUN SERPL-MCNC: 80 MG/DL (ref 5–25)
CA-I BLD-SCNC: 1.04 MMOL/L (ref 1.12–1.32)
CALCIUM ALBUM COR SERPL-MCNC: 9.1 MG/DL (ref 8.3–10.1)
CALCIUM SERPL-MCNC: 7.6 MG/DL (ref 8.3–10.1)
CALCIUM SERPL-MCNC: 7.7 MG/DL (ref 8.3–10.1)
CALCIUM SERPL-MCNC: 8.4 MG/DL (ref 8.3–10.1)
CHLORIDE SERPL-SCNC: 107 MMOL/L (ref 100–108)
CHLORIDE SERPL-SCNC: 115 MMOL/L (ref 100–108)
CHLORIDE SERPL-SCNC: 115 MMOL/L (ref 100–108)
CO2 SERPL-SCNC: 28 MMOL/L (ref 21–32)
CO2 SERPL-SCNC: 30 MMOL/L (ref 21–32)
CO2 SERPL-SCNC: 32 MMOL/L (ref 21–32)
CREAT SERPL-MCNC: 1.41 MG/DL (ref 0.6–1.3)
CREAT SERPL-MCNC: 1.62 MG/DL (ref 0.6–1.3)
CREAT SERPL-MCNC: 1.83 MG/DL (ref 0.6–1.3)
ERYTHROCYTE [DISTWIDTH] IN BLOOD BY AUTOMATED COUNT: 17.6 % (ref 11.6–15.1)
GFR SERPL CREATININE-BSD FRML MDRD: 38 ML/MIN/1.73SQ M
GFR SERPL CREATININE-BSD FRML MDRD: 44 ML/MIN/1.73SQ M
GFR SERPL CREATININE-BSD FRML MDRD: 52 ML/MIN/1.73SQ M
GLUCOSE SERPL-MCNC: 102 MG/DL (ref 65–140)
GLUCOSE SERPL-MCNC: 106 MG/DL (ref 65–140)
GLUCOSE SERPL-MCNC: 114 MG/DL (ref 65–140)
GLUCOSE SERPL-MCNC: 115 MG/DL (ref 65–140)
GLUCOSE SERPL-MCNC: 127 MG/DL (ref 65–140)
GLUCOSE SERPL-MCNC: 148 MG/DL (ref 65–140)
GLUCOSE SERPL-MCNC: 374 MG/DL (ref 65–140)
GLUCOSE SERPL-MCNC: 398 MG/DL (ref 65–140)
HCO3 BLDV-SCNC: 26 MMOL/L (ref 24–30)
HCO3 BLDV-SCNC: 30.9 MMOL/L (ref 24–30)
HCT VFR BLD AUTO: 34.3 % (ref 36.5–49.3)
HGB BLD-MCNC: 10.6 G/DL (ref 12–17)
MAGNESIUM SERPL-MCNC: 2.9 MG/DL (ref 1.6–2.6)
MCH RBC QN AUTO: 30.5 PG (ref 26.8–34.3)
MCHC RBC AUTO-ENTMCNC: 30.9 G/DL (ref 31.4–37.4)
MCV RBC AUTO: 99 FL (ref 82–98)
O2 CT BLDV-SCNC: 8 ML/DL
O2 CT BLDV-SCNC: 8 ML/DL
PCO2 BLDV: 38.6 MM HG (ref 42–50)
PCO2 BLDV: 47.6 MM HG (ref 42–50)
PH BLDV: 7.43 [PH] (ref 7.3–7.4)
PH BLDV: 7.45 [PH] (ref 7.3–7.4)
PHOSPHATE SERPL-MCNC: 3.5 MG/DL (ref 2.3–4.1)
PLATELET # BLD AUTO: 122 THOUSANDS/UL (ref 149–390)
PMV BLD AUTO: 13.9 FL (ref 8.9–12.7)
PO2 BLDV: 28.2 MM HG (ref 35–45)
PO2 BLDV: 29.1 MM HG (ref 35–45)
POTASSIUM SERPL-SCNC: 2.9 MMOL/L (ref 3.5–5.3)
POTASSIUM SERPL-SCNC: 3.6 MMOL/L (ref 3.5–5.3)
POTASSIUM SERPL-SCNC: 4.1 MMOL/L (ref 3.5–5.3)
PROT SERPL-MCNC: 4.9 G/DL (ref 6.4–8.2)
RBC # BLD AUTO: 3.48 MILLION/UL (ref 3.88–5.62)
SODIUM SERPL-SCNC: 140 MMOL/L (ref 136–145)
SODIUM SERPL-SCNC: 148 MMOL/L (ref 136–145)
SODIUM SERPL-SCNC: 151 MMOL/L (ref 136–145)
WBC # BLD AUTO: 13.07 THOUSAND/UL (ref 4.31–10.16)

## 2022-01-09 PROCEDURE — 99024 POSTOP FOLLOW-UP VISIT: CPT | Performed by: THORACIC SURGERY (CARDIOTHORACIC VASCULAR SURGERY)

## 2022-01-09 PROCEDURE — NC001 PR NO CHARGE: Performed by: NURSE PRACTITIONER

## 2022-01-09 PROCEDURE — 80053 COMPREHEN METABOLIC PANEL: CPT | Performed by: NURSE PRACTITIONER

## 2022-01-09 PROCEDURE — 85027 COMPLETE CBC AUTOMATED: CPT | Performed by: NURSE PRACTITIONER

## 2022-01-09 PROCEDURE — 99232 SBSQ HOSP IP/OBS MODERATE 35: CPT | Performed by: INTERNAL MEDICINE

## 2022-01-09 PROCEDURE — 82330 ASSAY OF CALCIUM: CPT | Performed by: NURSE PRACTITIONER

## 2022-01-09 PROCEDURE — 82805 BLOOD GASES W/O2 SATURATION: CPT | Performed by: NURSE PRACTITIONER

## 2022-01-09 PROCEDURE — 82948 REAGENT STRIP/BLOOD GLUCOSE: CPT

## 2022-01-09 PROCEDURE — 99291 CRITICAL CARE FIRST HOUR: CPT | Performed by: ANESTHESIOLOGY

## 2022-01-09 PROCEDURE — 83735 ASSAY OF MAGNESIUM: CPT | Performed by: NURSE PRACTITIONER

## 2022-01-09 PROCEDURE — 80048 BASIC METABOLIC PNL TOTAL CA: CPT | Performed by: NURSE PRACTITIONER

## 2022-01-09 PROCEDURE — 84100 ASSAY OF PHOSPHORUS: CPT | Performed by: NURSE PRACTITIONER

## 2022-01-09 RX ORDER — POTASSIUM CHLORIDE 20 MEQ/1
40 TABLET, EXTENDED RELEASE ORAL ONCE
Status: COMPLETED | OUTPATIENT
Start: 2022-01-09 | End: 2022-01-09

## 2022-01-09 RX ORDER — POTASSIUM CHLORIDE 29.8 MG/ML
40 INJECTION INTRAVENOUS ONCE
Status: COMPLETED | OUTPATIENT
Start: 2022-01-09 | End: 2022-01-11

## 2022-01-09 RX ORDER — CALCIUM GLUCONATE 20 MG/ML
2 INJECTION, SOLUTION INTRAVENOUS ONCE
Status: COMPLETED | OUTPATIENT
Start: 2022-01-09 | End: 2022-01-09

## 2022-01-09 RX ORDER — POTASSIUM CHLORIDE 20 MEQ/1
40 TABLET, EXTENDED RELEASE ORAL ONCE
Status: DISCONTINUED | OUTPATIENT
Start: 2022-01-09 | End: 2022-01-09

## 2022-01-09 RX ORDER — POTASSIUM CHLORIDE 20 MEQ/1
20 TABLET, EXTENDED RELEASE ORAL ONCE
Status: COMPLETED | OUTPATIENT
Start: 2022-01-09 | End: 2022-01-09

## 2022-01-09 RX ORDER — POTASSIUM CHLORIDE 29.8 MG/ML
40 INJECTION INTRAVENOUS ONCE
Status: COMPLETED | OUTPATIENT
Start: 2022-01-09 | End: 2022-01-09

## 2022-01-09 RX ADMIN — DEXTROSE 75 ML/HR: 5 SOLUTION INTRAVENOUS at 01:00

## 2022-01-09 RX ADMIN — HEPARIN SODIUM 5000 UNITS: 5000 INJECTION INTRAVENOUS; SUBCUTANEOUS at 22:05

## 2022-01-09 RX ADMIN — PANTOPRAZOLE SODIUM 40 MG: 40 TABLET, DELAYED RELEASE ORAL at 07:15

## 2022-01-09 RX ADMIN — ATORVASTATIN CALCIUM 80 MG: 80 TABLET, FILM COATED ORAL at 20:20

## 2022-01-09 RX ADMIN — AMIODARONE HYDROCHLORIDE 200 MG: 200 TABLET ORAL at 22:05

## 2022-01-09 RX ADMIN — CALCIUM GLUCONATE 2 G: 20 INJECTION, SOLUTION INTRAVENOUS at 07:11

## 2022-01-09 RX ADMIN — MUPIROCIN 1 APPLICATION: 20 OINTMENT TOPICAL at 08:13

## 2022-01-09 RX ADMIN — HEPARIN SODIUM 5000 UNITS: 5000 INJECTION INTRAVENOUS; SUBCUTANEOUS at 07:15

## 2022-01-09 RX ADMIN — Medication 12.5 MG: at 20:20

## 2022-01-09 RX ADMIN — MILRINONE LACTATE IN DEXTROSE 0.13 MCG/KG/MIN: 200 INJECTION, SOLUTION INTRAVENOUS at 12:37

## 2022-01-09 RX ADMIN — Medication 12.5 MG: at 08:13

## 2022-01-09 RX ADMIN — TEMAZEPAM 15 MG: 15 CAPSULE ORAL at 22:05

## 2022-01-09 RX ADMIN — POTASSIUM CHLORIDE 20 MEQ: 1500 TABLET, EXTENDED RELEASE ORAL at 08:13

## 2022-01-09 RX ADMIN — CALCIUM GLUCONATE 2 G: 20 INJECTION, SOLUTION INTRAVENOUS at 16:42

## 2022-01-09 RX ADMIN — POTASSIUM CHLORIDE 40 MEQ: 1500 TABLET, EXTENDED RELEASE ORAL at 07:10

## 2022-01-09 RX ADMIN — HEPARIN SODIUM 5000 UNITS: 5000 INJECTION INTRAVENOUS; SUBCUTANEOUS at 13:50

## 2022-01-09 RX ADMIN — AMIODARONE HYDROCHLORIDE 200 MG: 200 TABLET ORAL at 13:50

## 2022-01-09 RX ADMIN — ASPIRIN 325 MG ORAL TABLET 325 MG: 325 PILL ORAL at 08:16

## 2022-01-09 RX ADMIN — POTASSIUM CHLORIDE 40 MEQ: 29.8 INJECTION, SOLUTION INTRAVENOUS at 19:19

## 2022-01-09 RX ADMIN — AMIODARONE HYDROCHLORIDE 200 MG: 200 TABLET ORAL at 07:17

## 2022-01-09 RX ADMIN — DEXTROSE 100 ML/HR: 5 SOLUTION INTRAVENOUS at 12:37

## 2022-01-09 RX ADMIN — POTASSIUM CHLORIDE 40 MEQ: 29.8 INJECTION, SOLUTION INTRAVENOUS at 07:15

## 2022-01-09 NOTE — PROGRESS NOTES
Progress Note - Critical Care   Vladimir Edwards 61 y o  male MRN: [id number]  Unit/Bed#: [facility] 816-05 Encounter: [id number]      Attending Physician: Deven Jacobs MD    24 Hour Events/HPI: POD # 5 s/p repair of post-infarction VSD repair and VA ECMO decannulation  POD #2 s/p mediastinal reexploration, removal of retained pulmonary artery catheter with cardiopulmonary bypass  ROS: Review of Systems   All other systems reviewed and are negative     ---------------------------------------------------------------------------------------------------------------------------------------------  Impressions:  1  Recent LAD STEMI  2  Post MI VSD s/p VSD repair  3  Cardiogenic shock s/p VA ECMO, now decannulated  4  S/p mediastinal re-exploration for retained PA catheter   5  Acute post operative blood loss anemia  6  Acute post-op pulmonary insuffiencey  7  Thrombocytopenia  8  JUDE  9  Shock liver, improving  10  Toxic metabolic encephalopathy  11  Coagulopathy   12  Right upper extremity partial arterial occlusion secondary to thrombus at the arterial line site, since removed  13  Tylenol OD s/p NAC on admission  14  Agoraphobia with panic attacks   15  Hypernatremia     Plan:     Neuro:   · Pain controlled with: ATC Tylenol, oxy 2 5/5  · Regulate sleep/wake cycle  · Delirium precautions  § CAM-ICU daily  · Trend neuro exam     CV:   · Cardiac infusions: Primacor, 0 13 mcg/kg/min  ? Wean Primacor as able  ? MAP goal > 65   § continue Arterial line  · Rhythm: NSR  § Follow rhythm on telemetry  § Lopressor 12 5 mg PO BID  ? D/C EPW   · Continue Amio, Statin, and ASA therapy      Lung:   · Acute post-op pulmonary insufficiency; Requiring 2 liters via nasal cannula, secondary to atelectasis and pulmonary vascular congestion  Continue incentive spirometry/coughing/deep breathing exercises    Wean supplemental oxygen as tolerated for saturation > 90%  § Wean NC as tolerated  · Chest tube drainage diminished; D/C today GI:   · Continue PPI for stress ulcer prophylaxis  · Continue bowel regimen  · Trend abdominal exam and bowel function     FEN:   · Diuretic plan: PRN diuresis, hold lasix this morning secondary to hypernatremia/contraction alkalosis  § Hypokalemia  § K 2 9, replete with 100meQ in total, repeat BMP @ 1200  § Hypernatremia  § Continue D5W, increase rate from 75 to 100mL/hr  · Nutrition/diet plan: Cardiac diet with 1 8L FR   · Replenish electrolytes with goals: K >4 0, Mag >2 0, and Phos >3 0     :   · Indwelling Chang present: yes   § continue Chang  · Trend UOP and BUN/creat  · Strict I and O     ID:   · Trend temps and WBC count  · Maintain normothermia            Results from last 7 days   Lab Units 01/04/22  0450 01/03/22  0550 01/02/22  0529   PROCALCITONIN ng/ml 0 31* 0 61* 1 26*      Heme:   · Trend hgb and plts     Endo:   · Glycemic control plan: Pre-diabetes  Glucose well-controlled  Continue SSI coverage              Results from last 6 Months   Lab Units 12/31/21  0400 12/30/21  1007 09/13/21  0657   HEMOGLOBIN A1C % 5 9* 5 7* 5 3      MSK/Skin:  · Mobility goal: OOB to chair, ambulate as tolerated  § PT consult: yes  § OT consult: yes  · Frequent turning and pressure off-loading  · Local wound care as needed     Disposition:  Continue ICU care    ---------------------------------------------------------------------------------------------------------------------------------------------------------------------  Allergies:    Allergies   Allergen Reactions    Mushroom Extract Complex - Food Allergy Facial Swelling    Peanut Oil - Food Allergy     Strawberry C [Ascorbate - Food Allergy]      Medications:   Scheduled Meds:  Current Facility-Administered Medications   Medication Dose Route Frequency Provider Last Rate    amiodarone  200 mg Oral [facility] Dhruv Salmeron PA-C      aspirin  325 mg Oral Daily BROOKE Banegas      atorvastatin  80 mg Oral After ANASTASIIA Rubin, NOA      calcium gluconate  2 g Intravenous Once [employer] V, CRNP      dextrose  100 mL/hr Intravenous Continuous Wendi Spironello V, CRNP 75 mL/hr (01/09/22 0100)    docusate sodium  100 mg Oral BID Ki Gooden PA-C      heparin (porcine)  5,000 Units Subcutaneous [facility] [place]      insulin lispro  1-6 Units Subcutaneous TID AC Wendi Spironello V, CRNP      insulin lispro  1-6 Units Subcutaneous HS Wendi Spironello V, CRNP      metoprolol tartrate  12 5 mg Oral Q12H [address] Ольга Salmeron PA-C      milrinone [facility]) infusion  0 13 mcg/kg/min Intravenous Continuous Catherine Alu, NOA 0 13 mcg/kg/min (01/08/22 0920)    mupirocin  1 application Nasal I10U [address] Louisa Salmeron PA-C      ondansetron  4 mg Intravenous Q6H PRN Catherine AluNOA      oxyCODONE  2 5 mg Oral Q4H PRN Catherine Alu, NOA      oxyCODONE  5 mg Oral Q4H PRN Catherine Alu, NOA      pantoprazole  40 mg Oral Early Morning Bonner Katherine [place], [place]      polyethylene glycol  17 g Oral Daily [place], [place]      potassium chloride  20 mEq Oral Once [employer], CRNP      potassium chloride  40 mEq Oral Once [employer]BROOKE      potassium chloride  40 mEq Intravenous Once [employer] ROSELIA, BROOKE      temazepam  15 mg Oral HS PRN Ki Gooden PA-C       VTE Pharmacologic Prophylaxis: Heparin  VTE Mechanical Prophylaxis: sequential compression device    Continuous Infusions:dextrose, 100 mL/hr, Last Rate: 75 mL/hr (01/09/22 0100)  milrinone (PRIMACOR) infusion, 0 13 mcg/kg/min, Last Rate: 0 13 mcg/kg/min (01/08/22 0920)      PRN Meds:  ondansetron, 4 mg, Q6H PRN  oxyCODONE, 2 5 mg, Q4H PRN  oxyCODONE, 5 mg, Q4H PRN  temazepam, 15 mg, HS PRN      Home Medications:   Prior to Admission medications    Medication Sig Start Date End Date Taking?  Authorizing Provider   ALPRAZolam Nanette Ready) 0 5 mg tablet Take 1-2 tablets 30-60 minutes prior to leaving home prn 3/18/21   Penelope Walters MD   EPINEPHrine (EPIPEN) 0 3 mg/0 3 mL SOAJ Inject 0 3 mL (0 3 mg total) into a muscle once for 1 dose 9/21/18 3/18/21  Rob Walton, DO     ---------------------------------------------------------------------------------------------------------------------------------------------------------------------  Vitals:   Vitals:    22 0100 22 0200 22 0300 22 0400   BP: (!) 89/50 90/54 100/62 95/54   Pulse: 75 78 78 79   Resp: 14 13 13 14   Temp: (!) 97 3 °F (36 3 °C) (!) 97 3 °F (36 3 °C) (!) 97 3 °F (36 3 °C) 97 5 °F (36 4 °C)   TempSrc:       SpO2: 95% 95% 93% 94%   Weight:       Height:         Arterial Line:  Arterial Line BP: 100/53  Arterial Line MAP (mmHg): 65 mmHg    Tele Rhythm: NSR This was personally reviewed by myself  Respiratory:  SpO2: SpO2: 94 %, SpO2 Activity: SpO2 Activity: At Rest, SpO2 Device: O2 Device: Nasal cannula  Nasal Cannula O2 Flow Rate (L/min): 2 L/min    Ventilator:  Respiratory  Report   Lab Data (Last 4 hours)    None         O2/Vent Data (Last 4 hours)    None              Temperature: Temp (24hrs), Av 6 °F (36 4 °C), Min:97 3 °F (36 3 °C), Max:98 1 °F (36 7 °C)  Current: Temperature: 97 5 °F (36 4 °C)    Weights:   Weight (last 2 days)     Date/Time Weight    22 0600 98 8 (217 81)    22 1140 92 1 (203)    22 0600 92 2 (203 26)        Body mass index is 35 69 kg/m²  Hemodynamic Monitoring:  PAP: PAP: 28/18, CVP: CVP (mean): 14 mmHg, CO:  , CI:  , SVR: SVR (dyne*sec)/cm5: 954 (dyne*sec)/cm5       Intake and Outputs:    Intake/Output Summary (Last 24 hours) at 2022 0638  Last data filed at 2022 0400  Gross per 24 hour   Intake 1753 7 ml   Output 2655 ml   Net -901 3 ml     I/O last 24 hours: In: 2805 7 [P O :720; I V :1835 7; IV Piggyback:250]  Out: 2159 [Urine:3015;  Chest Tube:190]    BM:     Chest tube Output:  Mediastinal tubes: 20 mL/8 hours  140 mL/24 hours     Labs:  Results from last 7 days   Lab Units 01/09/22  0422 01/08/22  0947 01/08/22  0426 01/07/22  1639 01/07/22  1637 01/07/22  1409 01/07/22  0441 01/06/22  0407 01/06/22  0407 01/03/22  1419 01/03/22  0550   WBC Thousand/uL 13 07*  --  14 30*  --   --   --  16 11*   < > 22 47*   < > 16 28*   HEMOGLOBIN g/dL 10 6* 11 3* 11 1*   < > 13 0  --  11 2*   < > 11 1*   < > 7 9*   I STAT HEMOGLOBIN   --   --   --    < >  --    < >  --   --   --    < >  --    HEMATOCRIT % 34 3* 36 4* 36 4*   < > 41 2  --  34 6*   < > 33 9*   < > 24 3*   HEMATOCRIT, ISTAT   --   --   --    < >  --    < >  --   --   --    < >  --    PLATELETS Thousands/uL 122*  --  108*  --  119*   < > 125*   < > 114*   < >  --    NEUTROS PCT %  --   --   --   --   --   --   --   --  91*  --  73   MONOS PCT %  --   --   --   --   --   --   --   --  4  --  7    < > = values in this interval not displayed       Results from last 7 days   Lab Units 01/09/22  0422 01/08/22  0426 01/08/22  0008 01/07/22  2009 01/07/22  1639 01/07/22  1637 01/07/22  1529 01/07/22  1529 01/07/22  1459 01/07/22  1459 01/07/22  1207 01/07/22  0441   SODIUM mmol/L 151* 151*  --   --   --  153*  --   --   --   --    < >  --    POTASSIUM mmol/L 2 9* 4 1 4 3   < >  --  3 5  --   --   --   --    < >  --    CHLORIDE mmol/L 115* 116*  --   --   --  117*  --   --   --   --    < >  --    CO2 mmol/L 32 28  --   --   --  27  --   --   --   --    < >  --    CO2, I-STAT mmol/L  --   --   --   --  31  --    < > 29   < > 39*   < >  --    BUN mg/dL 80* 82*  --   --   --  73*  --   --   --   --    < >  --    CREATININE mg/dL 1 83* 2 15*  --   --   --  1 70*  --   --   --   --    < >  --    CALCIUM mg/dL 7 7* 8 1*  --   --   --  8 6  --   --   --   --    < >  --    ALK PHOS U/L 115 114  --   --   --   --   --   --   --   --   --  133*   ALT U/L 35 53  --   --   --   --   --   --   --   --   --  74   AST U/L 54* 65*  --   --   --   --   --   --   --   --   --  84*   GLUCOSE, ISTAT mg/dl  --   --   --   --  149*  -- --  88  --  102   < >  --     < > = values in this interval not displayed  Baseline Creat: 1 1    Results from last 7 days   Lab Units 01/09/22  0422 01/08/22  0426 01/07/22  0441   MAGNESIUM mg/dL 2 9* 3 7*  3 7* 2 8*     Results from last 7 days   Lab Units 01/09/22  0422 01/08/22  0426 01/07/22  0441   PHOSPHORUS mg/dL 3 5 5 2* 3 5      Results from last 7 days   Lab Units 01/04/22  1335 01/04/22  0556 01/04/22  0450 01/04/22  0214 01/03/22  1138 01/03/22  0644   INR  1 59*  --  1 27*  --   --  1 32*   PTT seconds 34 49*  --  50*   < > 73*    < > = values in this interval not displayed  Results from last 7 days   Lab Units 01/06/22  0406 01/05/22  0413 01/05/22  0413 01/04/22  2100 01/04/22  2100   PH ART  7 429   < > 7 337*   < > 7 338*   PCO2 ART mm Hg 43 1   < > 47 4*   < > 45 8*   PO2 ART mm Hg 95 6   < > 82 5   < > 120 7   HCO3 ART mmol/L 27 9   < > 24 8   < > 24 0   BASE EXC ART mmol/L 3 2   < > -1 3   < > -1 9   ABG SOURCE  Line, Arterial  --  Line, Arterial  --  Line, Arterial    < > = values in this interval not displayed  SVO2: 49%    Micro:   Blood Culture:   Lab Results   Component Value Date    BLOODCX No Growth After 5 Days  12/30/2021    BLOODCX No Growth After 5 Days   12/30/2021     Urine Culture: No results found for: URINECX  Sputum Culture: No components found for: SPUTUMCX  Wound Culure: No results found for: WOUNDCULT    Diagnositic Studies:  No new imaging     Nutrition:        Diet Orders   (From admission, onward)             Start     Ordered    01/08/22 0000  Diet Cardiovascular; Cardiac; Fluid Restriction 1800 ML, Consistent Carbohydrate Diet Level 2 (5 carb servings/75 grams CHO/meal)  Diet effective 0500        References:    Nutrtion Support Algorithm Enteral vs  Parenteral   Question Answer Comment   Diet Type Cardiovascular    Cardiac Cardiac    Other Restriction(s): Fluid Restriction 1800 ML    Other Restriction(s): Consistent Carbohydrate Diet Level 2 (5 carb servings/75 grams CHO/meal)    RD to adjust diet per protocol? No        22 1623              Physical Exam  Constitutional:       General: He is awake  Appearance: Normal appearance  He is well-developed  HENT:      Head: Normocephalic and atraumatic  Eyes:      General: Lids are normal       Extraocular Movements: Extraocular movements intact  Conjunctiva/sclera: Conjunctivae normal    Neck:      Trachea: Trachea normal    Cardiovascular:      Rate and Rhythm: Normal rate and regular rhythm  Pulses: Normal pulses  Radial pulses are 2+ on the right side and 2+ on the left side  Dorsalis pedis pulses are 2+ on the right side and 2+ on the left side  Heart sounds: Normal heart sounds, S1 normal and S2 normal    Pulmonary:      Effort: Pulmonary effort is normal       Breath sounds: Normal breath sounds  Chest:      Comments: Mediastinal incision clean dry intact with acticoat  Abdominal:      General: Bowel sounds are normal       Palpations: Abdomen is soft  Musculoskeletal:      Cervical back: Full passive range of motion without pain, normal range of motion and neck supple  Right lower le+ Edema present  Left lower le+ Edema present  Comments: Unable to bear weight on lower extremities, otherwise normal ROM   Skin:     General: Skin is cool and dry  Capillary Refill: Capillary refill takes less than 2 seconds  Coloration: Skin is pale  Neurological:      General: No focal deficit present  Mental Status: He is alert and oriented to person, place, and time  GCS: GCS eye subscore is 4  GCS verbal subscore is 5  GCS motor subscore is 6  Psychiatric:         Attention and Perception: Attention and perception normal          Mood and Affect: Mood and affect normal          Speech: Speech normal          Behavior: Behavior normal  Behavior is cooperative  Thought Content:  Thought content normal          Cognition and Memory: Cognition and memory normal          Judgment: Judgment normal        Invasive lines and devices: Invasive Devices  Report    Peripherally Inserted Central Catheter Line            PICC Line 44/80/44 Right Basilic 2 days          Central Venous Catheter Line            Introducer 12/31/21 8 days          Arterial Line            Arterial Line 01/07/22 Right Radial 1 day          Line            Pacer Wires 1 day    Pacer Wires 1 day          Drain            Urethral Catheter Temperature probe 9 days    Chest Tube 1 Anterior Mediastinal 32 Fr  4 days    Chest Tube 2 Posterior Mediastinal 32 Fr  4 days              ---------------------------------------------------------------------------------------------------------------------------------------------------------------------  Code Status: Level 1 - Full Code    Care Time Delivered:   No Critical Care time spent     Collaborative bedside rounds performed with cardiac surgery attending, critical care attending and bedside RN      SIGNATURE: BROOKE Fitch  DATE: January 9, 2022  TIME: 6:38 AM

## 2022-01-09 NOTE — RESTORATIVE TECHNICIAN NOTE
Restorative Technician Note      Patient Name: Jack House     [facility] Tech Visit Date: 01/09/22  Note Type: Mobility  Patient Position Upon Consult: Bedside chair  Activity Performed: Stood  Assistive Device: Other (Comment) (HHA - assist of 3 staff)  Patient Position at End of Consult: All needs within reach;  Bedside chair

## 2022-01-09 NOTE — RESTORATIVE TECHNICIAN NOTE
Restorative Technician Note      Patient Name: Carissa Parada     Restorative Tech Visit Date: 01/09/22  Note Type: Mobility  Patient Position Upon Consult: Bedside chair  Activity Performed: Transferred  Assistive Device: Other (Comment) (smooth )  Patient Position at End of Consult: All needs within reach;  Supine

## 2022-01-09 NOTE — PROCEDURES
Procedure: Chest Tube Removal    01/09/22    Mediastinal CT x 2 d/c'd in typical fashion  Patient tolerated procedure well  No immediate complications  Site dressed with Acticoat dressing  Patient's nurse was made aware of removal      Procedure: Epicardial Wire Removal    01/09/22    Patient was returned to bed  Atrial EPW d/c'd in typical fashion  Ventricular wire unable to be removed d/t resistance, site prepped with chlorhexidine and wire cut at skin level under tension  Wire recoiled under skin  No immediate complications  Site dressed with acticoat  Patient and nurse aware of mandatory 1 hour bedrest protocol  Vital signs ordered Q 15 minutes for one hour as per protocol      BROOKE Deras

## 2022-01-09 NOTE — PROGRESS NOTES
Progress Note - Cardiothoracic Surgery   Anjali Stinson 61 y o  male MRN: [id number]  Unit/Bed#: [facility] 418-01 Encounter: [id number]      POD 5 Repair of Post-infarction VSD, VA ECMO decannulation  POD 2 from re-op, removal of Pelzer-Althea catheter    Pt seen/examined    Interval history and data reviewed with critical care team   Milrinone gtt is @ 0 125      Medications:   Scheduled Meds:  Current Facility-Administered Medications   Medication Dose Route Frequency Provider Last Rate    amiodarone  200 mg Oral [facility] Neeru Salmeron PA-C      aspirin  325 mg Oral Daily Wendi Spiroanurago BROOKE VELOZ      atorvastatin  80 mg Oral After R AANSTASIIA Rubin PA-C      dextrose  100 mL/hr Intravenous Continuous Wendi Spiroanurago V, CRNP 100 mL/hr (01/09/22 0714)    docusate sodium  100 mg Oral BID Blanca Hunter PA-C      heparin (porcine)  5,000 Units Subcutaneous [facility] Bren Castro      insulin lispro  1-6 Units Subcutaneous TID AC Wendi Spiroanurago V, CRNP      insulin lispro  1-6 Units Subcutaneous HS Wendi Marks V, BROOKE      metoprolol tartrate  12 5 mg Oral Q12H [address] Ольга Sri Salmeron PA-C      milrinone [facility]) infusion  0 13 mcg/kg/min Intravenous Continuous Piedad NOA Mims 0 13 mcg/kg/min (01/08/22 0920)    ondansetron  4 mg Intravenous Q6H PRN Piedad ClinNOA almodovar      oxyCODONE  2 5 mg Oral Q4H PRN Piedad ClinNOA almodovar      oxyCODONE  5 mg Oral Q4H PRN Piedad NOA Mims      pantoprazole  40 mg Oral Early Morning Bren Castro      polyethylene glycol  17 g Oral Daily Bren Castro      temazepam  15 mg Oral HS PRN Neeru Senior PA-C       Continuous Infusions:dextrose, 100 mL/hr, Last Rate: 100 mL/hr (01/09/22 0714)  milrinone (PRIMACOR) infusion, 0 13 mcg/kg/min, Last Rate: 0 13 mcg/kg/min (01/08/22 0920)      PRN Meds: ondansetron    oxyCODONE    oxyCODONE    temazepam    Vitals: Blood pressure 113/54, pulse 84, temperature 98 1 °F (36 7 °C), resp  rate 19, height 5' 5 5" (1 664 m), weight 99 6 kg (219 lb 9 3 oz), SpO2 95 %  ,Body mass index is 35 98 kg/m²  I/O last 24 hours: In: 2864 9 [P O :598; I V :1916 9; IV Piggyback:350]  Out: 4170 [Urine:3015; Chest Tube:180]  Invasive Devices  Report    Peripherally Inserted Central Catheter Line            PICC Line 95/99/45 Right Basilic 3 days          Central Venous Catheter Line            Introducer 12/31/21 9 days          Arterial Line            Arterial Line 01/07/22 Right Radial 1 day          Line            Pacer Wires 1 day    Pacer Wires 1 day          Drain            Urethral Catheter Temperature probe 9 days    Chest Tube 1 Anterior Mediastinal 32 Fr  4 days    Chest Tube 2 Posterior Mediastinal 32 Fr  4 days                  Lab, Imaging and other studies:   Results from last 7 days   Lab Units 01/09/22  0422 01/08/22  0947 01/08/22  0426 01/07/22  1639 01/07/22  1637 01/07/22  1409 01/07/22  0441   WBC Thousand/uL 13 07*  --  14 30*  --   --   --  16 11*   HEMOGLOBIN g/dL 10 6* 11 3* 11 1*   < > 13 0  --  11 2*   I STAT HEMOGLOBIN   --   --   --    < >  --    < >  --    HEMATOCRIT % 34 3* 36 4* 36 4*   < > 41 2  --  34 6*   HEMATOCRIT, ISTAT   --   --   --    < >  --    < >  --    PLATELETS Thousands/uL 122*  --  108*  --  119*   < > 125*    < > = values in this interval not displayed       Results from last 7 days   Lab Units 01/09/22  0422 01/08/22  0426 01/08/22  0008 01/07/22  2009 01/07/22  1639 01/07/22  1637 01/07/22  1529   POTASSIUM mmol/L 2 9* 4 1 4 3   < >  --  3 5  --    CHLORIDE mmol/L 115* 116*  --   --   --  117*  --    CO2 mmol/L 32 28  --   --   --  27  --    CO2, I-STAT mmol/L  --   --   --   --  31  --    < >   BUN mg/dL 80* 82*  --   --   --  73*  --    CREATININE mg/dL 1 83* 2 15*  --   --   --  1 70*  --    GLUCOSE, ISTAT mg/dl  --   --   --   --  149*  --    < >   CALCIUM mg/dL 7 7* 8 1*  --   --   --  8 6  --     < > = values in this interval not displayed  Results from last 7 days   Lab Units 01/04/22  1335 01/04/22  0556 01/04/22  0450 01/04/22  0214 01/03/22  1138 01/03/22  0644   INR  1 59*  --  1 27*  --   --  1 32*   PTT seconds 34 49*  --  50*   < > 73*    < > = values in this interval not displayed  No results for input(s): PHART, BYH2QMT, PO2ART, LNU6AGB, M8HLEJOX, BEART in the last 72 hours  Plan:    Intermittent Lasix dosing for negative fluid balance  Remove wires and drains  Keep milrinone  Ambulate, PT/OT   Pulmonary toilet    Possible transfer to step down      SIGNATURE: Malika Hills DO  DATE: January 9, 2022  TIME: 10:32 AM

## 2022-01-09 NOTE — PROGRESS NOTES
Cardiology Progress note  Unit/Bed#: [facility] 418-01 Encounter: [id number]        Bethany Lorenzo 61 y o  male [phone]  Hospital Stay Days: 10    Assessment and Plan      Current Problem List   Principal Problem:    Cardiogenic shock [facility])  Active Problems:    Agoraphobia with panic attacks    VSD (ventricular septal defect)    JUDE (acute kidney injury) ([facility] [address] )    Transaminitis    Tylenol ingestion    Acute systolic congestive heart failure (HCC)    Hyperglycemia    Anemia    Personal history of ECMO    Coagulopathy (HCC)    Leukocytosis    Arterial thrombosis ([address] )    Central line complication    S/P VSD repair    Assessment/Plan:    1  CAD with late onset MI complicated by VSD, cardiomyopathy and repair on 22    ·  Patient noted to have EF 45%, down to 40 then up to 50 %  · Had mediastinal reexploration and removal of artery catheter with cardiopulmonary bypass  2   Acute heart failure   · Cont  Current dosage of milrinone - VBG today showed central venous vas of 49 2  · Patient was net negative 800 last 24 hours not currently receiving diuretics - but will get PRN lasix dosing for net negative fluid balance  · Pt/OT  · May transfer to step down today  3  Hypoxic resp failure  ·  Improving oxygen requirements - PRN lasix for net negative  4  JUDE on baseline CKD  5  Perioperative a fib  ·  Cont  Patients amiodarone 200 mg tid  6  Anemia   7  S/P VSD repairs  Subjective     Patient seen and examined  Cont  On milrinone 0 13  Patient states he feels better  Remains on oxygen  Attempted to get up this morning  Otherwise no acute events overnight      Objective     Vitals: Temp (24hrs), Av 5 °F (36 4 °C), Min:97 3 °F (36 3 °C), Max:98 1 °F (36 7 °C)  Current: Temperature: 98 1 °F (36 7 °C)  Patient Vitals for the past 24 hrs:   BP Temp Temp src Pulse Resp SpO2 Weight   22 0813 113/54 -- -- 84 -- -- --   22 0800 -- 98 1 °F (36 7 °C) -- 86 19 -- --   22 0700 -- 97 7 °F (36 5 °C) -- 79 12 95 % --   01/09/22 0600 -- -- -- 80 (!) 23 -- 99 6 kg (219 lb 9 3 oz)   01/09/22 0500 101/56 97 5 °F (36 4 °C) -- 78 12 95 % --   01/09/22 0400 95/54 97 5 °F (36 4 °C) -- 79 14 94 % --   01/09/22 0300 100/62 (!) 97 3 °F (36 3 °C) -- 78 13 93 % --   01/09/22 0200 90/54 (!) 97 3 °F (36 3 °C) -- 78 13 95 % --   01/09/22 0100 (!) 89/50 (!) 97 3 °F (36 3 °C) -- 75 14 95 % --   01/09/22 0000 90/55 (!) 97 3 °F (36 3 °C) -- 75 (!) 11 97 % --   01/08/22 2300 98/56 (!) 97 3 °F (36 3 °C) -- 78 (!) 11 95 % --   01/08/22 2200 96/56 (!) 97 3 °F (36 3 °C) -- 82 16 95 % --   01/08/22 2147 115/58 -- -- 83 -- -- --   01/08/22 2100 101/57 -- -- 82 15 94 % --   01/08/22 2000 105/58 98 °F (36 7 °C) Oral 82 14 93 % --   01/08/22 1900 97/63 -- -- 84 21 97 % --   01/08/22 1800 (!) 88/57 97 7 °F (36 5 °C) -- 85 21 96 % --   01/08/22 1700 91/53 (!) 97 3 °F (36 3 °C) -- 82 16 93 % --   01/08/22 1600 93/56 97 5 °F (36 4 °C) Bladder 82 19 95 % --   01/08/22 1500 99/56 97 5 °F (36 4 °C) -- 81 14 95 % --   01/08/22 1400 97/60 97 7 °F (36 5 °C) -- 81 12 96 % --   01/08/22 1300 94/60 97 5 °F (36 4 °C) -- 80 16 99 % --   01/08/22 1200 (!) 86/59 97 7 °F (36 5 °C) Bladder 77 18 99 % --    Body mass index is 35 98 kg/m²  Physical Exam:  GENERAL: NAD  HEENT:  NC/AT,   CARDIAC:  RRR, +S1/S2, no S3/S4 heard, no m/g/r  PULMONARY NT/ND  NEUROLOGIC: Grossly intact  SKIN:  Warm skin bilaterally     Psych: Normal affect    Invasive Devices  Report    Peripherally Inserted Central Catheter Line            PICC Line 98/38/37 Right Basilic 3 days          Central Venous Catheter Line            Introducer 12/31/21 9 days          Arterial Line            Arterial Line 01/07/22 Right Radial 1 day          Line            Pacer Wires 1 day    Pacer Wires 1 day          Drain            Urethral Catheter Temperature probe 9 days    Chest Tube 1 Anterior Mediastinal 32 Fr  4 days    Chest Tube 2 Posterior Mediastinal 32 Fr  4 days Labs:   Results from last 7 days   Lab Units 01/09/22  0422 01/08/22  0947 01/08/22  0426 01/08/22  0008 01/07/22  1639 01/07/22  1637 01/07/22  1529 01/07/22  1459 01/07/22  1456 01/07/22  1443 01/07/22  1409 01/07/22  0441 01/06/22  0407 01/05/22  0412 01/04/22  2059 01/04/22  1335 01/04/22  1247 01/04/22  1215 01/04/22  1153 01/04/22  1152 01/04/22  1145 01/04/22  1120 01/04/22  1116 01/04/22  1048 01/04/22  1009 01/04/22  0939 01/04/22  0920 01/04/22  0830 01/04/22  0450 01/03/22  1419 01/03/22  0550 01/03/22  0550   WBC Thousand/uL 13 07*  --  14 30*  --   --   --   --   --   --   --   --  16 11* 22 47* 24 24*  --   --   --   --   --   --   --   --   --   --   --   --   --   --  14 22*  --   --  16 28*   HEMOGLOBIN g/dL 10 6* 11 3* 11 1* 12 3  --  13 0  --   --   --   --   --  11 2* 11 1* 11 9* 12 6 12 6  --   --   --   --   --   --   --   --   --   --   --   --  7 6* 8 4*  --  7 9*   I STAT HEMOGLOBIN g/dl  --   --   --   --  12 2  --  9 9* 7 8*  --  7 8* 9 2*  --   --   --   --   --  9 2* 9 2*  9 5* 8 5* 8 2* 7 1* 8 2*  --  8 5* 7 1* 5 8* 6 5* 7 5*  --   --   --   --    HEMATOCRIT % 34 3* 36 4* 36 4* 39 3  --  41 2  --   --   --   --   --  34 6* 33 9* 36 1* 37 2 37 7  --   --   --   --   --   --   --   --   --   --   --   --  24 2* 26 3*  --  24 3*   HEMATOCRIT, ISTAT %  --   --   --   --  36*  --  29* 23*  --  23* 27*  --   --   --   --   --  27* 27*  28* 25* 24* 21* 24*  --  25* 21* 17* 19* 22*  --   --   --   --    PLATELETS Thousands/uL 122*  --  108*  --   --  119*  --   --  87*  --   --  125* 114* 140*  --  174  --   --   --   --   --   --  17*  --   --   --   --   --  53*  --   --   --    NEUTROS PCT %  --   --   --   --   --   --   --   --   --   --   --   --  91*  --   --   --   --   --   --   --   --   --   --   --   --   --   --   --   --   --    < > 73   MONOS PCT %  --   --   --   --   --   --   --   --   --   --   --   --  4  --   --   --   --   --   --   --   --   --   --   --   --   -- --   --   --   --   --  7    < > = values in this interval not displayed        Results from last 7 days   Lab Units 01/09/22  0422 01/08/22  0426 01/08/22  0008 01/07/22  2009 01/07/22  1639 01/07/22  1637 01/07/22  1529 01/07/22  1459 01/07/22  1443 01/07/22  1409 01/07/22  1207 01/07/22  0441 01/07/22  0416 01/07/22  0004 01/06/22  1935 01/06/22  1208 01/06/22  0407 01/05/22  1949 01/05/22  1201 01/05/22  0411 01/04/22  2059 01/04/22  1716 01/04/22  1335 01/04/22  1247 01/04/22  1215 01/04/22  1153 01/04/22  1152 01/04/22  1145 01/04/22  1120 01/04/22  1048 01/04/22  1009 01/04/22  0939 01/04/22  0920 01/04/22  0830 01/04/22  0556 01/04/22  0450 01/04/22  0214 01/03/22  2358 01/03/22  2201 01/03/22  1814 01/03/22  1605 01/03/22  1419 01/03/22  1138 01/03/22  0644 01/03/22  0550 01/03/22  0350 01/02/22  2348 01/02/22  1935 01/02/22  1746 01/02/22  1542   SODIUM mmol/L 151* 151*  --   --   --  153*  --   --   --   --  151*  --  150*  --  152* 152* 151* 151* 152* 152* 152*  --  151*  --   --   --   --   --   --   --   --   --   --   --   --  156*  --  153*  --  155*  --   --  155*  --  154*  --  154*  --  150*  --    POTASSIUM mmol/L 2 9* 4 1 4 3 4 8  --  3 5  --   --   --   --  3 3*  --  3 4* 4 0 3 5 3 5 4 1 3 7 3 8 4 3 4 1 4 4 4 1  --   --   --   --   --   --   --   --   --   --   --   --  4 6  --  3 7  --  3 9  --   --  4 2  --  4 1  --  3 6  --  4 0  --    CHLORIDE mmol/L 115* 116*  --   --   --  117*  --   --   --   --  112*  --  112*  --  115* 115* 116* 117* 120* 120* 119*  --  120*  --   --   --   --   --   --   --   --   --   --   --   --  121*  --  118*  --  121*  --   --  121*  --  120*  --  117*  --  117*  --    CO2 mmol/L 32 28  --   --   --  27  --   --   --   --  34*  --  33*  --  32 30 30 30 28 27 26  --  25  --   --   --   --   --   --   --   --   --   --   --   --  35*  --  33*  --  33*  --   --  32  --  32  --  33*  --  31  --    CO2, I-STAT mmol/L  --   --   --   --  31  --  29 39* 34* 30  --   -- --   --   --   --   --   --   --   --   --   --   --  30 29  30 31 30 28 33* 34* 33* 32 37* 32  --   --   --   --   --   --   --   --   --   --   --   --   --   --   --   --    BUN mg/dL 80* 82*  --   --   --  73*  --   --   --   --  83*  --  83*  --  81* 82* 78* 69* 61* 57* 54*  --  46*  --   --   --   --   --   --   --   --   --   --   --   --  59*  --  64*  --  73*  --   --  77*  --  81*  --  92*  --  103*  --    CREATININE mg/dL 1 83* 2 15*  --   --   --  1 70*  --   --   --   --  1 88*  --  1 84*  --  2 03* 2 10* 2 12* 2 09* 1 93* 1 78* 1 72*  --  1 35*  --   --   --   --   --   --   --   --   --   --   --   --  1 24  --  1 33*  --  1 43*  --   --  1 45*  --  1 56*  --  1 70*  --  1 87*  --    CALCIUM mg/dL 7 7* 8 1*  --   --   --  8 6  --   --   --   --  9 0  --  7 9*  --  8 3 8 5 7 9* 8 7 8 4 8 8 8 5  --  9 5  --   --   --   --   --   --   --   --   --   --   --   --  7 7*  --  8 3  --  8 1*  --   --  8 4  --  8 2*  --  8 1*  --  8 3  --    ALK PHOS U/L 115 114  --   --   --   --   --   --   --   --   --  133*  --   --   --   --  98  --   --  89  --   --   --   --   --   --   --   --   --   --   --   --   --   --   --  131*  --   --   --   --   --   --   --   --  136*  --   --   --   --   --    ALT U/L 35 53  --   --   --   --   --   --   --   --   --  74  --   --   --   --  104*  --   --  202*  --   --   --   --   --   --   --   --   --   --   --   --   --   --   --  651*  --   --   --   --   --   --   --   --  956*  --   --   --   --   --    AST U/L 54* 65*  --   --   --   --   --   --   --   --   --  84*  --   --   --   --  106*  --   --  127*  --   --   --   --   --   --   --   --   --   --   --   --   --   --   --  103*  --   --   --   --   --   --   --   --  147*  --   --   --   --   --    GLUCOSE, ISTAT mg/dl  --   --   --   --  149*  --  88 102 97 98  --   --   --   --   --   --   --   --   --   --   --   --   --  207* 200*  208* 170* 166* 159* 161* 162* 152* 155* 140 129  --   --   --   --   -- --   --   --   --   --   --   --   --   --   --   --    MAGNESIUM mg/dL 2 9* 3 7*  3 7*  --   --   --   --   --   --   --   --   --  2 8*  --   --  3 0*  --  3 1*  --   --  3 7*  --   --   --   --   --   --   --   --   --   --   --   --   --   --   --  3 1*  --   --   --   --   --   --   --   --  3 5*  --   --   --   --   --    PHOSPHORUS mg/dL 3 5 5 2*  --   --   --   --   --   --   --   --   --  3 5  --   --  4 2*  --  4 2*  --   --   --   --   --   --   --   --   --   --   --   --   --   --   --   --   --   --   --   --   --   --   --   --   --   --   --  3 8  --   --   --   --   --    INR   --   --   --   --   --   --   --   --   --   --   --   --   --   --   --   --   --   --   --   --   --   --  1 59*  --   --   --   --   --   --   --   --   --   --   --   --  1 27*  --   --   --   --   --   --   --  1 32* 1 37*  --   --   --   --   --    PTT seconds  --   --   --   --   --   --   --   --   --   --   --   --   --   --   --   --   --   --   --   --   --   --  34  --   --   --   --   --   --   --   --   --   --   --  49*  --  50*  --  51* 55* 55* 57* 61* 73*  --  63* 57* 58*  --  56*   EGFR ml/min/1 73sq m 38 31  --   --   --  41  --   --   --   --  36  --  37  --  33 32 31 32 35 39 41  --  55  --   --   --   --   --   --   --   --   --   --   --   --  61  --  56  --  51  --   --  50  --  46  --  41  --  37  --      Results from last 7 days   Lab Units 01/04/22  1335 01/04/22  0556 01/04/22  0450 01/04/22  0214 01/03/22  2201 01/03/22  1814 01/03/22  1605 01/03/22  1419 01/03/22  1138 01/03/22  0644 01/03/22  0550 01/03/22  0350 01/02/22  2348 01/02/22  1935 01/02/22  1542   INR  1 59*  --  1 27*  --   --   --   --   --   --  1 32* 1 37*  --   --   --   --    PTT seconds 34 49*  --  50* 51* 55* 55* 57* 61* 73*  --  63* 57* 58* 56*             No results found for: PHART, BYF2DQE, PO2ART, MCI3SVA, T5GFOMAA, BEART, SOURCE  No components found for: HIV1X2  Lab Results   Component Value Date    HEPCAB Non-reactive 03/15/2021     No results found for: SPEP, UPEP   Lab Results   Component Value Date    HGBA1C 5 9 (H) 12/31/2021    HGBA1C 5 7 (H) 12/30/2021    HGBA1C 5 3 09/13/2021     No results found for: CHOL   Lab Results   Component Value Date    HDL 13 (L) 12/30/2021    HDL 38 (L) 09/13/2021    HDL 42 03/15/2021      Lab Results   Component Value Date    LDLCALC 107 (H) 12/30/2021    LDLCALC 134 (H) 09/13/2021    LDLCALC 133 (H) 03/15/2021      Lab Results   Component Value Date    TRIG 152 (H) 12/30/2021    TRIG 137 09/13/2021    TRIG 136 03/15/2021     No components found for: PROCAL      Micro:      Urinalysis:  Lab Results   Component Value Date    ACTMNPHEN 2 (L) 12/31/2021    ACTMNPHEN 21 (HH) 96/23/3401    SALICYLATE 3 07/08/8092          Invalid input(s): URIBILINOGEN        Intake and Outputs:  I/O       01/07 0701 01/08 0700 01/08 0701  01/09 0700 01/09 0701  01/10 0700    P  O  720 480 118    I V  (mL/kg) 2583 9 (26 2) 1553 7 (15 6) 363 2 (3 6)    NG/GT       IV Piggyback 350 150 200    Feedings       Cell Saver 500      Total Intake(mL/kg) 4153 9 (42) 2183 7 (21 9) 681 2 (6 8)    Urine (mL/kg/hr) 2200 (0 9) 2865 (1 2) 150 (0 3)    Stool  0     Blood 500      Chest Tube 135 180 0    Total Output 2835 3045 150    Net +1318 9 -861 3 +531 2           Unmeasured Stool Occurrence  1 x         Nutrition:  Diet Cardiovascular; Cardiac; Fluid Restriction 1800 ML, Consistent Carbohydrate Diet Level 2 (5 carb servings/75 grams CHO/meal)  Radiology Results:   XR chest portable ICU   Final Result by Jaja August MD (01/07 1653)      Interval removal of the enteric tube  The study is otherwise stable  Workstation performed: [id number]         XR chest portable   Final Result by Felton Tabor MD (01/06 1000)      New adequately positioned right upper extremity PICC line  Persistent small left pleural effusion              Workstation performed: [id number]         XR chest portable   Final Result by Jet Cisse MD (01/05 4379)      Tubes and lines as above  Persistent small left pleural effusion  Workstation performed: [id number]         XR chest portable ICU   Final Result by Marybeth Worrell MD (01/05 0900)      1  Stable small left pleural effusion  2   Small focus of subcutaneous gas in the right neck, not seen on the most recent prior x-ray  Workstation performed: [id number]         XR chest portable ICU   Final Result by Sohan Dye MD (01/04 1635)      Tubes and lines positioned as above  Redirected right El[place]-Althea catheter compared to 2 days ago, position within normal limits  Mild interstitial edema, small left effusion and low lung volumes  Workstation performed: [id number]         VAS lower limb vein mapping bypass graft   Final Result by Valentino Slates, MD (01/03 1339)      XR chest portable   Final Result by Mariaa Espino DO (01/02 1229)      Lines and tubes as above  Left retrocardiac consolidation may represent atelectasis or pneumonia  Workstation performed: [id number]         XR chest portable   Final Result by Kevin Valero DO (01/01 1120)      Lines and tubes as above  No acute findings  Workstation performed: [id number]         XR chest portable ICU   Final Result by Cony Rush MD (01/01 0710)      Bibasilar atelectasis  Lines and tubes unchanged  Workstation performed: [id number]         XR chest portable ICU   Final Result by Maude Contreras MD (12/31 1509)      ECMO catheter tip projects over the right mainstem bronchus  Mild prominence of the pulmonary vasculature and right basilar subsegmental atelectasis  No pneumothorax  Workstation performed: [id number]         IR other   Final Result by Anita Gonzales (01/06 1416)      XR chest 1 view portable   Final Result by Destiny Escobar MD (12/30 2846)      1  Central vascular congestion with diffuse bilateral hazy ground glass opacity likely representing edema, though with underlying infectious process not excluded in the context of leukocytosis  2   Probable new trace left pleural effusion  Workstation performed: [id number]         XR chest 1 view portable   ED Interpretation by Gen Avalos MD (12/30 1119)   Fluids overload vs COVID  Final Result by Jhonathan Pham MD (12/30 1044)      Low lung volumes with vascular crowding  Question superimposed mild pulmonary edema                    Workstation performed: [id number]         IR other    (Results Pending)   XR chest portable    (Results Pending)     Scheduled Medications:  amiodarone, 200 mg, Q8H [address]  aspirin, 325 mg, Daily  atorvastatin, 80 mg, After Dinner  docusate sodium, 100 mg, BID  heparin (porcine), 5,000 Units, Q8H [address]  insulin lispro, 1-6 Units, TID AC  insulin lispro, 1-6 Units, HS  metoprolol tartrate, 12 5 mg, Q12H [place]ITA  pantoprazole, 40 mg, Early Morning  polyethylene glycol, 17 g, Daily      PRN MEDS:  ondansetron, 4 mg, Q6H PRN  oxyCODONE, 2 5 mg, Q4H PRN  oxyCODONE, 5 mg, Q4H PRN  temazepam, 15 mg, HS PRN      Last 24 Hour Meds: :   Medication Administration - last 24 hours from 01/08/2022 1146 to 01/09/2022 1146       Date/Time Order Dose Route Action Action by     01/09/2022 0715 pantoprazole (PROTONIX) EC tablet 40 mg 40 mg Oral Given Shirin Lopez RN     01/09/2022 0813 mupirocin (BACTROBAN) 2 % nasal ointment 1 application 1 application Nasal Given Shirin Lopez RN     01/08/2022 2148 mupirocin (BACTROBAN) 2 % nasal ointment 1 application 1 application Nasal Given Shirin Lopez RN     01/09/2022 0817 polyethylene glycol (MIRALAX) packet 17 g 17 g Oral Not Given Shirin Lopez RN     01/09/2022 9262 amiodarone tablet 200 mg 200 mg Oral Given Shirin Lopez RN     01/08/2022 2148 amiodarone tablet 200 mg 200 mg Oral Given Shirin Lopez RN     01/08/2022 7572 amiodarone tablet 200 mg 200 mg Oral Given Maribel Raygoza RN     01/08/2022 1752 atorvastatin (LIPITOR) tablet 80 mg 80 mg Oral Given Maribel Raygoza RN     01/09/2022 0715 heparin (porcine) subcutaneous injection 5,000 Units 5,000 Units Subcutaneous Given Ericka Vivar RN     01/08/2022 2148 heparin (porcine) subcutaneous injection 5,000 Units 5,000 Units Subcutaneous Given Ericka Vivar RN     01/08/2022 1438 heparin (porcine) subcutaneous injection 5,000 Units 5,000 Units Subcutaneous Given Maribel Raygoza RN     01/09/2022 0813 metoprolol tartrate (LOPRESSOR) partial tablet 12 5 mg 12 5 mg Oral Given Ericka Vivar RN     01/08/2022 2147 metoprolol tartrate (LOPRESSOR) partial tablet 12 5 mg 12 5 mg Oral Given Ericka Vivar RN     01/09/2022 0816 docusate sodium (COLACE) capsule 100 mg 100 mg Oral Not Given Ericka Vivar RN     01/08/2022 1753 docusate sodium (COLACE) capsule 100 mg 100 mg Oral Not Given Maribel Raygoza RN     01/09/2022 0800 potassium chloride 20 mEq IVPB (premix) 0 mEq Intravenous Stopped Ericka Vivar RN     01/08/2022 1600 ceFAZolin (ANCEF) IVPB (premix in dextrose) 2,000 mg 50 mL 0 mg Intravenous Stopped Maribel Raygoza RN     01/08/2022 1438 ceFAZolin (ANCEF) IVPB (premix in dextrose) 2,000 mg 50 mL 2,000 mg Intravenous [address] Maribel Raygoza RN     01/08/2022 2148 oxyCODONE (ROXICODONE) IR tablet 5 mg 5 mg Oral Given Ericka Vivar RN     01/09/2022 0714 dextrose 5 % infusion 100 mL/hr Intravenous Rate/Dose Change Ericka Vivar RN     01/09/2022 0100 dextrose 5 % infusion 75 mL/hr Intravenous OMA Lynn     01/09/2022 8252 aspirin tablet 325 mg 325 mg Oral Given Ericka Vivar RN     01/08/2022 1752 furosemide (LASIX) injection 40 mg 40 mg Intravenous Given Maribel Raygoza RN     01/08/2022 9702 potassium chloride (K-DUR,KLOR-CON) CR tablet 20 mEq 20 mEq Oral Given Maribel Raygoza RN     01/09/2022 2906 insulin lispro (HumaLOG) 100 units/mL subcutaneous injection 1-6 Units 1 Units Subcutaneous Not Given [place], OAM     01/08/2022 1632 insulin lispro (HumaLOG) 100 units/mL subcutaneous injection 1-6 Units 1 Units Subcutaneous Not Given Bud Arevalo RN     01/08/2022 2148 insulin lispro (HumaLOG) 100 units/mL subcutaneous injection 1-6 Units 1 Units Subcutaneous Not Given [place], RN     01/09/2022 0710 potassium chloride (K-DUR,KLOR-CON) CR tablet 40 mEq 40 mEq Oral Given [place] RN     01/09/2022 0800 potassium chloride 40 mEq IVPB (premix) 0 mEq Intravenous Stopped [place], RN     01/09/2022 0715 potassium chloride 40 mEq IVPB (premix) 40 mEq Intravenous New Bag [place], OMA     01/09/2022 0800 calcium gluconate 2 g in sodium chloride 0 9% 100 mL (premix) 0 g Intravenous Stopped [place], OMA     01/09/2022 1818 calcium gluconate 2 g in sodium chloride 0 9% 100 mL (premix) 2 g Intravenous OMA Lynn     01/09/2022 0813 potassium chloride (K-DUR,KLOR-CON) CR tablet 20 mEq 20 mEq Oral Given [place], OMA          PLEASE NOTE:  This encounter was completed utilizing the [employer] Direct Speech Voice Recognition Software  Grammatical errors, random word insertions, pronoun errors and incomplete sentences are occasional consequences of the system due to software limitations, ambient noise and hardware issues  These may be missed by proof reading prior to affixing electronic signature  Any questions or concerns about the content, text or information contained within the body of this dictation should be directly addressed to the physician for clarification  Please do not hesitate to call me directly if you have any any questions or concerns

## 2022-01-10 ENCOUNTER — APPOINTMENT (INPATIENT)
Dept: NON INVASIVE DIAGNOSTICS | Facility: HOSPITAL | Age: 67
DRG: 167 | End: 2022-01-10
Payer: COMMERCIAL

## 2022-01-10 LAB
ALBUMIN SERPL BCP-MCNC: 2.2 G/DL (ref 3.5–5)
ALP SERPL-CCNC: 152 U/L (ref 46–116)
ALT SERPL W P-5'-P-CCNC: 40 U/L (ref 12–78)
ANION GAP SERPL CALCULATED.3IONS-SCNC: 2 MMOL/L (ref 4–13)
APICAL FOUR CHAMBER EJECTION FRACTION: 36 %
AST SERPL W P-5'-P-CCNC: 52 U/L (ref 5–45)
BASE EX.OXY STD BLDV CALC-SCNC: 43.2 % (ref 60–80)
BASE EX.OXY STD BLDV CALC-SCNC: 56.2 % (ref 60–80)
BASE EXCESS BLDV CALC-SCNC: 2.7 MMOL/L
BASE EXCESS BLDV CALC-SCNC: 3.1 MMOL/L
BILIRUB SERPL-MCNC: 1.79 MG/DL (ref 0.2–1)
BODY TEMPERATURE: 98.2 DEGREES FEHRENHEIT
BUN SERPL-MCNC: 54 MG/DL (ref 5–25)
CA-I BLD-SCNC: 1.08 MMOL/L (ref 1.12–1.32)
CALCIUM ALBUM COR SERPL-MCNC: 9.5 MG/DL (ref 8.3–10.1)
CALCIUM SERPL-MCNC: 8.1 MG/DL (ref 8.3–10.1)
CHLORIDE SERPL-SCNC: 115 MMOL/L (ref 100–108)
CO2 SERPL-SCNC: 30 MMOL/L (ref 21–32)
CREAT SERPL-MCNC: 1.24 MG/DL (ref 0.6–1.3)
E WAVE DECELERATION TIME: 121 MS
ERYTHROCYTE [DISTWIDTH] IN BLOOD BY AUTOMATED COUNT: 17.3 % (ref 11.6–15.1)
GFR SERPL CREATININE-BSD FRML MDRD: 61 ML/MIN/1.73SQ M
GLUCOSE SERPL-MCNC: 101 MG/DL (ref 65–140)
GLUCOSE SERPL-MCNC: 107 MG/DL (ref 65–140)
GLUCOSE SERPL-MCNC: 108 MG/DL (ref 65–140)
GLUCOSE SERPL-MCNC: 80 MG/DL (ref 65–140)
GLUCOSE SERPL-MCNC: 85 MG/DL (ref 65–140)
GLUCOSE SERPL-MCNC: 97 MG/DL (ref 65–140)
HCO3 BLDV-SCNC: 27 MMOL/L (ref 24–30)
HCO3 BLDV-SCNC: 27.2 MMOL/L (ref 24–30)
HCT VFR BLD AUTO: 34.1 % (ref 36.5–49.3)
HGB BLD-MCNC: 10.4 G/DL (ref 12–17)
MAGNESIUM SERPL-MCNC: 2.6 MG/DL (ref 1.6–2.6)
MCH RBC QN AUTO: 30.5 PG (ref 26.8–34.3)
MCHC RBC AUTO-ENTMCNC: 30.5 G/DL (ref 31.4–37.4)
MCV RBC AUTO: 100 FL (ref 82–98)
MV PEAK A VEL: 0.75 M/S
MV PEAK E VEL: 54 CM/S
MV STENOSIS PRESSURE HALF TIME: 0 MS
NON VENT ROOM AIR: 21 %
O2 CT BLDV-SCNC: 7.3 ML/DL
O2 CT BLDV-SCNC: 8.2 ML/DL
PA SYSTOLIC PRESSURE: 28 MMHG
PCO2 BLDV: 40 MM HG (ref 42–50)
PCO2 BLDV: 40.6 MM HG (ref 42–50)
PH BLDV: 7.44 [PH] (ref 7.3–7.4)
PH BLDV: 7.45 [PH] (ref 7.3–7.4)
PHOSPHATE SERPL-MCNC: 2.4 MG/DL (ref 2.3–4.1)
PLATELET # BLD AUTO: 118 THOUSANDS/UL (ref 149–390)
PMV BLD AUTO: 13.8 FL (ref 8.9–12.7)
PO2 BLDV: 25.4 MM HG (ref 35–45)
PO2 BLDV: 30.7 MM HG (ref 35–45)
POTASSIUM SERPL-SCNC: 3.7 MMOL/L (ref 3.5–5.3)
PROT SERPL-MCNC: 4.9 G/DL (ref 6.4–8.2)
RBC # BLD AUTO: 3.41 MILLION/UL (ref 3.88–5.62)
SL CV LV EF: 40
SODIUM SERPL-SCNC: 147 MMOL/L (ref 136–145)
TR PEAK VELOCITY: 2.6 M/S
TRICUSPID VALVE PEAK REGURGITATION VELOCITY: 2.66 M/S
TV PEAK GRADIENT: 28 MMHG
WBC # BLD AUTO: 10.53 THOUSAND/UL (ref 4.31–10.16)

## 2022-01-10 PROCEDURE — 82330 ASSAY OF CALCIUM: CPT | Performed by: THORACIC SURGERY (CARDIOTHORACIC VASCULAR SURGERY)

## 2022-01-10 PROCEDURE — 99024 POSTOP FOLLOW-UP VISIT: CPT | Performed by: THORACIC SURGERY (CARDIOTHORACIC VASCULAR SURGERY)

## 2022-01-10 PROCEDURE — 82805 BLOOD GASES W/O2 SATURATION: CPT | Performed by: THORACIC SURGERY (CARDIOTHORACIC VASCULAR SURGERY)

## 2022-01-10 PROCEDURE — 84100 ASSAY OF PHOSPHORUS: CPT | Performed by: THORACIC SURGERY (CARDIOTHORACIC VASCULAR SURGERY)

## 2022-01-10 PROCEDURE — 93321 DOPPLER ECHO F-UP/LMTD STD: CPT

## 2022-01-10 PROCEDURE — 93308 TTE F-UP OR LMTD: CPT | Performed by: INTERNAL MEDICINE

## 2022-01-10 PROCEDURE — 93308 TTE F-UP OR LMTD: CPT

## 2022-01-10 PROCEDURE — 99233 SBSQ HOSP IP/OBS HIGH 50: CPT | Performed by: INTERNAL MEDICINE

## 2022-01-10 PROCEDURE — 93321 DOPPLER ECHO F-UP/LMTD STD: CPT | Performed by: INTERNAL MEDICINE

## 2022-01-10 PROCEDURE — 93325 DOPPLER ECHO COLOR FLOW MAPG: CPT | Performed by: INTERNAL MEDICINE

## 2022-01-10 PROCEDURE — 99233 SBSQ HOSP IP/OBS HIGH 50: CPT | Performed by: ANESTHESIOLOGY

## 2022-01-10 PROCEDURE — 93325 DOPPLER ECHO COLOR FLOW MAPG: CPT

## 2022-01-10 PROCEDURE — 83735 ASSAY OF MAGNESIUM: CPT | Performed by: THORACIC SURGERY (CARDIOTHORACIC VASCULAR SURGERY)

## 2022-01-10 PROCEDURE — 82948 REAGENT STRIP/BLOOD GLUCOSE: CPT

## 2022-01-10 PROCEDURE — 85027 COMPLETE CBC AUTOMATED: CPT | Performed by: THORACIC SURGERY (CARDIOTHORACIC VASCULAR SURGERY)

## 2022-01-10 PROCEDURE — 93010 ELECTROCARDIOGRAM REPORT: CPT | Performed by: INTERNAL MEDICINE

## 2022-01-10 PROCEDURE — 82805 BLOOD GASES W/O2 SATURATION: CPT | Performed by: STUDENT IN AN ORGANIZED HEALTH CARE EDUCATION/TRAINING PROGRAM

## 2022-01-10 PROCEDURE — 80053 COMPREHEN METABOLIC PANEL: CPT | Performed by: THORACIC SURGERY (CARDIOTHORACIC VASCULAR SURGERY)

## 2022-01-10 RX ORDER — MILRINONE LACTATE 0.2 MG/ML
0.13 INJECTION, SOLUTION INTRAVENOUS CONTINUOUS
Status: DISCONTINUED | OUTPATIENT
Start: 2022-01-10 | End: 2022-01-17

## 2022-01-10 RX ORDER — POTASSIUM CHLORIDE 29.8 MG/ML
40 INJECTION INTRAVENOUS ONCE
Status: COMPLETED | OUTPATIENT
Start: 2022-01-10 | End: 2022-01-11

## 2022-01-10 RX ORDER — DOCUSATE SODIUM 100 MG/1
100 CAPSULE, LIQUID FILLED ORAL 2 TIMES DAILY
Status: DISCONTINUED | OUTPATIENT
Start: 2022-01-10 | End: 2022-01-10

## 2022-01-10 RX ORDER — FUROSEMIDE 10 MG/ML
40 INJECTION INTRAMUSCULAR; INTRAVENOUS
Status: DISCONTINUED | OUTPATIENT
Start: 2022-01-10 | End: 2022-01-11

## 2022-01-10 RX ORDER — HYDRALAZINE HYDROCHLORIDE 10 MG/1
10 TABLET, FILM COATED ORAL EVERY 8 HOURS SCHEDULED
Status: DISCONTINUED | OUTPATIENT
Start: 2022-01-10 | End: 2022-01-11

## 2022-01-10 RX ORDER — TEMAZEPAM 15 MG/1
15 CAPSULE ORAL
Status: DISCONTINUED | OUTPATIENT
Start: 2022-01-10 | End: 2022-01-10

## 2022-01-10 RX ADMIN — ATORVASTATIN CALCIUM 80 MG: 80 TABLET, FILM COATED ORAL at 17:31

## 2022-01-10 RX ADMIN — AMIODARONE HYDROCHLORIDE 200 MG: 200 TABLET ORAL at 06:05

## 2022-01-10 RX ADMIN — HEPARIN SODIUM 5000 UNITS: 5000 INJECTION INTRAVENOUS; SUBCUTANEOUS at 21:53

## 2022-01-10 RX ADMIN — HEPARIN SODIUM 5000 UNITS: 5000 INJECTION INTRAVENOUS; SUBCUTANEOUS at 06:05

## 2022-01-10 RX ADMIN — ASPIRIN 325 MG ORAL TABLET 325 MG: 325 PILL ORAL at 08:13

## 2022-01-10 RX ADMIN — FUROSEMIDE 40 MG: 10 INJECTION, SOLUTION INTRAVENOUS at 16:21

## 2022-01-10 RX ADMIN — DOCUSATE SODIUM 100 MG: 100 CAPSULE ORAL at 17:31

## 2022-01-10 RX ADMIN — HYDRALAZINE HYDROCHLORIDE 10 MG: 10 TABLET, FILM COATED ORAL at 21:53

## 2022-01-10 RX ADMIN — AMIODARONE HYDROCHLORIDE 200 MG: 200 TABLET ORAL at 21:53

## 2022-01-10 RX ADMIN — PANTOPRAZOLE SODIUM 40 MG: 40 TABLET, DELAYED RELEASE ORAL at 06:05

## 2022-01-10 RX ADMIN — FUROSEMIDE 40 MG: 10 INJECTION, SOLUTION INTRAVENOUS at 08:06

## 2022-01-10 RX ADMIN — POTASSIUM CHLORIDE 40 MEQ: 29.8 INJECTION, SOLUTION INTRAVENOUS at 08:00

## 2022-01-10 RX ADMIN — HEPARIN SODIUM 5000 UNITS: 5000 INJECTION INTRAVENOUS; SUBCUTANEOUS at 14:49

## 2022-01-10 RX ADMIN — Medication 12.5 MG: at 08:13

## 2022-01-10 RX ADMIN — PERFLUTREN 1 ML/MIN: 6.52 INJECTION, SUSPENSION INTRAVENOUS at 09:20

## 2022-01-10 RX ADMIN — AMIODARONE HYDROCHLORIDE 200 MG: 200 TABLET ORAL at 14:49

## 2022-01-10 RX ADMIN — POLYETHYLENE GLYCOL 3350 17 G: 17 POWDER, FOR SOLUTION ORAL at 08:13

## 2022-01-10 RX ADMIN — DOCUSATE SODIUM 100 MG: 100 CAPSULE ORAL at 08:13

## 2022-01-10 RX ADMIN — MILRINONE LACTATE IN DEXTROSE 0.13 MCG/KG/MIN: 200 INJECTION, SOLUTION INTRAVENOUS at 13:55

## 2022-01-10 NOTE — PROGRESS NOTES
Cardiology Progress Note - Chase Lot 61 y o  male MRN: [id number]    Unit/Bed#: [facility] 418-01 Encounter: [id number]      Assessment & Plan:  Principal Problem:    Cardiogenic shock ([facility] )  Active Problems:    Agoraphobia with panic attacks    VSD (ventricular septal defect)    JUDE (acute kidney injury) ([facility] )    Transaminitis    Tylenol ingestion    Acute systolic congestive heart failure ([facility] )    Anemia    Personal history of ECMO    Coagulopathy ([facility] )    Central line complication    S/P VSD repair    # Late presenting anterior STEMI c/b anteroapical VSD s/p open patch repair POD#5  # Redo sternotomy, mediastinal exploration for retained swan avinash catheter POD #2  # HFrEF with EF 35-40% as per my assessment on most recent CORIE  # Hypernatremia    Patient net positive since admission  Milrinone was discontinued this am      Plan:  - Check VBG as 3 hours after milrinone was stopped patient appeared more lethargic   - c/w aspirin and atorvastatin  - hold off on lopressor 12 5 mg BID for today  - c/w lasix 40 mg IV BID  - monitor sodium levels  - atorvastatin for afib prevention    Subjective:   Patient seen and examined  No significant events overnight  Patient was conversant this morning while eating breakfast without any complaints  However in the afternoon patient appeared more somnolent  Objective:     Vitals: Blood pressure 110/54, pulse 76, temperature 98 6 °F (37 °C), temperature source Oral, resp  rate 19, height 5' 5 5" (1 664 m), weight 103 kg (226 lb), SpO2 95 %  , Body mass index is 37 04 kg/m² ,   Orthostatic Blood Pressures      Most Recent Value   Blood Pressure 110/54 filed at 01/10/2022 1200   Patient Position - Orthostatic VS Lying filed at 01/10/2022 1200            Intake/Output Summary (Last 24 hours) at 1/10/2022 1309  Last data filed at 1/10/2022 1200  Gross per 24 hour   Intake 2574 29 ml   Output 2640 ml   Net -65 71 ml           Physical Exam:    GEN: Robbie Momin Somnolent  HEENT: anicteric, mucous membranes moist  NECK: no jvd, carotid bruits   HEART: regular rhythm, normal S1 and S2, no murmurs, clicks, gallops or rubs   CHEST: Sternotomy incision site healing well  LUNGS: clear to auscultation bilaterally; no wheezes, rales, or rhonchi   ABDOMEN: normal bowel sounds, soft, no tenderness, no distention  EXTREMITIES: peripheral pulses normal; no clubbing, cyanosis, or edema, Cold  NEURO: no focal findings   SKIN: normal without suspicious lesions on exposed skin      Current Facility-Administered Medications:     amiodarone tablet 200 mg, 200 mg, Oral, Q8H [facility] NOA Senior, 200 mg at 01/10/22 0605    aspirin tablet 325 mg, 325 mg, Oral, Daily, BROOKE Banegas, 325 mg at 01/10/22 0813    atorvastatin (LIPITOR) tablet 80 mg, 80 mg, Oral, After [facility] NOA Dodson, 80 mg at 01/09/22 2020    docusate sodium (COLACE) capsule 100 mg, 100 mg, Oral, BID, [facility], 100 mg at 01/10/22 0813    furosemide (LASIX) injection 40 mg, 40 mg, Intravenous, BID (diuretic), Franciscajus Tapia PA-C, 40 mg at 01/10/22 0806    heparin (porcine) subcutaneous injection 5,000 Units, 5,000 Units, Subcutaneous, Q8H [facility] NOA Salmeron, 5,000 Units at 01/10/22 0605    insulin lispro (HumaLOG) 100 units/mL subcutaneous injection 1-6 Units, 1-6 Units, Subcutaneous, TID AC **AND** Fingerstick Glucose (POCT), , , TID AC, BROOKE Banegas    insulin lispro (HumaLOG) 100 units/mL subcutaneous injection 1-6 Units, 1-6 Units, Subcutaneous, HS, BROOKE Banegas    metoprolol tartrate (LOPRESSOR) partial tablet 12 5 mg, 12 5 mg, Oral, Q12H [facility], Jennifer Hirlevon Senior PA-C, 12 5 mg at 01/10/22 0813    ondansetron (ZOFRAN) injection 4 mg, 4 mg, Intravenous, Q6H PRN, Puja Aguirre PA-C    oxyCODONE (ROXICODONE) IR tablet 2 5 mg, 2 5 mg, Oral, Q4H PRN, Puja Aguirre PA-C    oxyCODONE (ROXICODONE) IR tablet 5 mg, 5 mg, Oral, Q4H PRN, PIPE Lyons-C, 5 mg at 01/08/22 2148    pantoprazole (PROTONIX) EC tablet 40 mg, 40 mg, Oral, Early Morning, Marii Senior PA-C, 40 mg at 01/10/22 0605    polyethylene glycol (MIRALAX) packet 17 g, 17 g, Oral, Daily, Marii Senior PA-C, 17 g at 01/10/22 0813    temazepam (RESTORIL) capsule 15 mg, 15 mg, Oral, HS PRN, PIPE Cortez-EMILIANO, 15 mg at 01/09/22 2205    Labs & Results:    No results found for: CKTOTAL, CKMB, CKMBINDEX, TROPONINI    Lab Results   Component Value Date    GLUCOSE 149 (H) 01/07/2022    CALCIUM 8 1 (L) 01/10/2022    K 3 7 01/10/2022    CO2 30 01/10/2022     (H) 01/10/2022    BUN 54 (H) 01/10/2022    CREATININE 1 24 01/10/2022       Lab Results   Component Value Date    WBC 10 53 (H) 01/10/2022    HGB 10 4 (L) 01/10/2022    HCT 34 1 (L) 01/10/2022     (H) 01/10/2022     (L) 01/10/2022     Results from last 7 days   Lab Units 01/04/22  1335   INR  1 59*       No results found for: CHOL  Lab Results   Component Value Date    HDL 13 (L) 12/30/2021    HDL 38 (L) 09/13/2021     Lab Results   Component Value Date    LDLCALC 107 (H) 12/30/2021    LDLCALC 134 (H) 09/13/2021     Lab Results   Component Value Date    TRIG 152 (H) 12/30/2021    TRIG 137 09/13/2021       Lab Results   Component Value Date    ALT 40 01/10/2022    AST 52 (H) 01/10/2022         EKG personally reviewed by )Tara Anderson MD  No acute changes   TELE: No significant arrhythmias seen on telemetry review

## 2022-01-10 NOTE — WOUND OSTOMY CARE
Consult Note - Wound   Jacy Moore 61 y o  male MRN: [id number]  Unit/Bed#: [facility] 427-01 Encounter: [id number]        History and Present Illness: Patient is seen for wound care consult today   The patient is a 61year old male that was admitted with cardiogenic shock due to anteroseptal MI complicated by VSD on [place] and ECMO s/p VSD repair with large bovine pericardial patch   ECMO decannulation on 1/4/2022   Incontinent of bowel and angela care provided for the patient   Assessment Findings:   1  Bilateral heels dry and intact   2  Right groin wound related to previous ECMO line   Wound ed pink and white tissue scant serous drainage  3  Sacral - hospital acquired evolving DTI suspect stage 3 stage 4 or unstageable   Wound bed is mixed with 20% pink 30% yellow and 50% dark purple maroon   Due to incontinence will order calazime            Skin care plans:  1-Calazime to sacrum, buttocks TID and PRN  2-Hydraguard to bilateral heel BID and PRN  3-Elevate heels to offload pressure  4-Ehob cushion when out of bed  5-Turn/repoisiton q2h or when medically stable for pressure re-distribution on skin  6-Moisturize skin daily with skin nourishing cream  7  Right thigh cleanse with soap and water then apply adaptic then a ABD and change daily   8  P - 500 low air loss mattress         Wounds:  Wound 12/31/21 Groin Right (Active)   Wound Image   01/10/22 1031   Wound Description Clean; Intact; Epithelialization 01/10/22 1200   Angela-wound Assessment Clean;Dry; Intact 01/10/22 1031   Wound Length (cm) 4 5 cm 01/10/22 1031   Wound Width (cm) 4 5 cm 01/10/22 1031   Wound Depth (cm) 0 1 cm 01/10/22 1031   Wound Surface Area (cm^2) 20 25 cm^2 01/10/22 1031   Wound Volume (cm^3) 2 025 cm^3 01/10/22 1031   Calculated Wound Volume (cm^3) 2 03 cm^3 01/10/22 1031   Wound Site Closure Open to air 01/10/22 0400   Drainage Amount None 01/10/22 1200   Drainage Description Serous 01/10/22 1031   Treatments Cleansed;Site care 01/10/22 1031 Dressing Dry dressing 01/10/22 1031   Dressing Changed New 01/10/22 0800   Patient Tolerance Tolerated well 01/10/22 1031   Dressing Status Clean;Dry; Intact 01/10/22 1200           Wound 01/05/22 Pressure Injury Buttocks Medial;Mid; Inner (Active)   Wound Image   01/10/22 1032   Wound Description Pink;Fragile 01/10/22 1200   Pressure Injury Stage DTPI 01/10/22 1032   Angela-wound Assessment Clean;Dry; Intact 01/10/22 1032   Wound Length (cm) 3 5 cm 01/10/22 1032   Wound Width (cm) 2 5 cm 01/10/22 1032   Wound Depth (cm) 0 1 cm 01/10/22 1032   Wound Surface Area (cm^2) 8 75 cm^2 01/10/22 1032   Wound Volume (cm^3) 0 875 cm^3 01/10/22 1032   Calculated Wound Volume (cm^3) 0 88 cm^3 01/10/22 1032   Drainage Amount Scant 01/10/22 1200   Drainage Description Bloody 01/10/22 1200   Non-staged Wound Description Partial thickness 01/10/22 1200   Treatments Cleansed;Irrigation with NSS;Site care 01/10/22 1032   Dressing Protective barrier 01/10/22 1032   Patient Tolerance Tolerated well 01/10/22 1032       Wound care will follow weekly alissa or tiger text with questions or concerns     Maninder Coats RN BSN [id number]

## 2022-01-10 NOTE — PROGRESS NOTES
Progress Note - Cardiothoracic Surgery   Vivia Fothergill 61 y o  male MRN: [id number]  Unit/Bed#: [facility] 418-01 Encounter: [id number]      POD 6 Repair of Post-infarction VSD, VA ECMO decannulation  POD 3 from re-op, removal of [place] catheter    Milrinone gtt is @ 0 125      Medications:   Scheduled Meds:  Current Facility-Administered Medications   Medication Dose Route Frequency Provider Last Rate    amiodarone  200 mg Oral [facility] Damion Salmeron PA-C      aspirin  325 mg Oral Daily BROOKE Banegas      atorvastatin  80 mg Oral After R R  NOA Rubin      docusate sodium  100 mg Oral BID Damion Salmeron PA-C      furosemide  40 mg Intravenous BID (diuretic) Valentino Fordyce, PA-C      heparin (porcine)  5,000 Units Subcutaneous [facility] Bren Vásquez      insulin lispro  1-6 Units Subcutaneous TID AC BROOKE Banegas      insulin lispro  1-6 Units Subcutaneous HS BROOKE Banegas      metoprolol tartrate  12 5 mg Oral Q12H [address] Damion Salmeron PA-C      ondansetron  4 mg Intravenous Q6H PRN Iman Moyer PA-C      oxyCODONE  2 5 mg Oral Q4H PRN Iman Moyer PA-C      oxyCODONE  5 mg Oral Q4H PRN Iman Moyer PA-C      pantoprazole  40 mg Oral Early Morning [facility] Bren Vásquez      polyethylene glycol  17 g Oral Daily [facility] Bren Vásquez      potassium chloride  40 mEq Intravenous Once Valentino Fordyce, PA-C      temazepam  15 mg Oral HS PRN Damion Senior PA-C       Continuous Infusions:   PRN Meds: ondansetron    oxyCODONE    oxyCODONE    temazepam    Vitals: Blood pressure 108/53, pulse 80, temperature 98 6 °F (37 °C), resp  rate 18, height 5' 5 5" (1 664 m), weight 103 kg (226 lb 3 1 oz), SpO2 94 %  ,Body mass index is 37 07 kg/m²  I/O last 24 hours: In: 3308 2 [P O :268; I V :2640 2;  IV Piggyback:400]  Out: 2075 [Urine:2025; Chest Tube:50]  Invasive Devices  Report    Peripherally Inserted Central Catheter Line            PICC Line 37/40/31 Right Basilic 3 days          Drain            Urethral Catheter Temperature probe 10 days                  Lab, Imaging and other studies:   Results from last 7 days   Lab Units 01/10/22  0407 01/09/22  0422 01/08/22  0947 01/08/22  0426 01/08/22  0426   WBC Thousand/uL 10 53* 13 07*  --   --  14 30*   HEMOGLOBIN g/dL 10 4* 10 6* 11 3*   < > 11 1*   HEMATOCRIT % 34 1* 34 3* 36 4*   < > 36 4*   PLATELETS Thousands/uL 118* 122*  --   --  108*    < > = values in this interval not displayed  Results from last 7 days   Lab Units 01/10/22  0429 01/09/22  2119 01/09/22  1516 01/07/22 2009 01/07/22  1639   POTASSIUM mmol/L 3 7 4 1 3 6   < >  --    CHLORIDE mmol/L 115* 115* 107   < >  --    CO2 mmol/L 30 30 28   < >  --    CO2, I-STAT mmol/L  --   --   --   --  31   BUN mg/dL 54* 64* 67*   < >  --    CREATININE mg/dL 1 24 1 41* 1 62*   < >  --    GLUCOSE, ISTAT mg/dl  --   --   --   --  149*   CALCIUM mg/dL 8 1* 8 4 7 6*   < >  --     < > = values in this interval not displayed  Results from last 7 days   Lab Units 01/04/22  1335 01/04/22  0556 01/04/22  0450 01/04/22  0214   INR  1 59*  --  1 27*  --    PTT seconds 34 49*  --  50*     No results for input(s): PHART, FYK4LUD, PO2ART, XPB5UGU, H6HCPUEM, BEART in the last 72 hours  Plan:    Intermittent Lasix dosing for negative fluid balance  Wean milrinone to off  Ambulate, PT/OT   Pulmonary toilet  Transfer to floor    Check TTE       Lexii Awan MD  DATE: January 10, 2022  TIME: 7:41 AM

## 2022-01-10 NOTE — PLAN OF CARE
Problem: Potential for Falls  Goal: Patient will remain free of falls  Description: INTERVENTIONS:  - Educate patient/family on patient safety including physical limitations  - Instruct patient to call for assistance with activity   - Consult OT/PT to assist with strengthening/mobility   - Keep Call bell within reach  - Keep bed low and locked with side rails adjusted as appropriate  - Keep care items and personal belongings within reach  -   Problem: PAIN - ADULT  Goal: Verbalizes/displays adequate comfort level or baseline comfort level  Description: Interventions:  - Encourage patient to monitor pain and request assistance  - Assess pain using appropriate pain scale  - Administer analgesics based on type and severity of pain and evaluate response  - Implement non-pharmacological measures as appropriate and evaluate response  - Consider cultural and social influences on pain and pain management  - Notify physician/advanced practitioner if interventions unsuccessful or patient reports new pain  Outcome: Progressing     Problem: RESPIRATORY - ADULT  Goal: Achieves optimal ventilation and oxygenation  Description: INTERVENTIONS:  - Assess for changes in respiratory status  - Assess for changes in mentation and behavior  - Position to facilitate oxygenation and minimize respiratory effort  - Oxygen administered by appropriate delivery if ordered  - Initiate smoking cessation education as indicated  - Encourage broncho-pulmonary hygiene including cough, deep breathe, Incentive Spirometry  - Assess the need for suctioning and aspirate as needed  - Assess and instruct to report SOB or any respiratory difficulty  - Respiratory Therapy support as indicated  Outcome: Progressing     Problem: GASTROINTESTINAL - ADULT  Goal: Oral mucous membranes remain intact  Description: INTERVENTIONS  - Assess oral mucosa and hygiene practices  - Implement preventative oral hygiene regimen  - Implement oral medicated treatments as ordered  - Initiate Nutrition services referral as needed  Outcome: Progressing     Problem: SKIN/TISSUE INTEGRITY - ADULT  Goal: Incision(s), wounds(s) or drain site(s) healing without S/S of infection  Description: INTERVENTIONS  - Assess and document dressing, incision, wound bed, drain sites and surrounding tissue  - Provide patient and family education  - Perform skin care every shift  Outcome: Progressing   maintain comfort rounds  - Make Fall Risk Sign visible to staff  - Offer Toileting every 2 Hours, in advance of need  - Initiate/Maintain bed/chair alarm  - Apply yellow socks and bracelet for high fall risk patients  - Consider moving patient to room near nurses station  Outcome: Progressing

## 2022-01-10 NOTE — NUTRITION
01/10/22 1400   Recommendations/Interventions   Interventions Diet: continued as ordered; Supplement continue; Initiate Appetite Stimulant   Nutrition Recommendations Continue diet as ordered; Other (Specify)  (Recommend appetite stimulant)

## 2022-01-10 NOTE — QUICK NOTE
Palliative Care originally consulted for goals of care with consideration for ECMO  Fortunately patient appears to have made slow but steady progress towards improvement  At this time [facility] will sign off on Mr Momin but please do not hesitate to contact our service with any questions or concerns, or if patient's clinical condition deteriorates

## 2022-01-10 NOTE — PROGRESS NOTES
Progress Note - Critical Care   More Loving 61 y o  male MRN: [id number]  Unit/Bed#: [facility] 154-87 Encounter: [id number]      Attending Physician: Mariely Barahona MD    24 Hour Events/HPI: POD # 6 s/p repair of post-infarction VSD and VA ECMO cannulation  POD # 3 s/p mediastinal reexploration, removal of retained pulmonary artery catheter  Remains on primacor 0 13, SvO2 56% this AM  No acute overnight events  ROS: Review of Systems  ---------------------------------------------------------------------------------------------------------------------------------------------------------------------  Impressions:  1  Recent LAD STEMI  2  Post MI VSD s/p VSD repair  3  Cardiogenic shock s/p VA ECMO, now decannulated  4  S/p mediastinal re-exploration for retained PA catheter   5  Acute post operative blood loss anemia  6  Acute post-op pulmonary insuffiencey  7  Thrombocytopenia  8  JUDE  9  Shock liver, improving  10  Toxic metabolic encephalopathy  11  Coagulopathy   12  Right upper extremity partial arterial occlusion secondary to thrombus at the arterial line site, since removed  13  Tylenol OD s/p NAC on admission  14  Agoraphobia with panic attacks   15  Hypernatremia  1       Plan:    Neuro:   · Pain controlled with: Scheduled tylenol, oxycodone prn  · Regulate sleep/wake cycle  · Delirium precautions  · CAM-ICU daily  · Trend neuro exam    CV:   · Cardiac infusions: Primacor, 0 13 mcg/kg/min  · Wean as able  · MAP goal > 65   · Rhythm: NSR  · Follow rhythm on telemetry  · Lopressor 12 5 mg PO BID  · Epicardial pacing wires out    Lung:   · Good Room air oxygen saturation; Continue incentive spirometry/Coughing/Deep breathing exercises  · Chlorhexidine ordered: yes  · Chest tubes have been discontinued    GI:   · Continue PPI for stress ulcer prophylaxis  · Continue bowel regimen  · Trend abdominal exam and bowel function    FEN:   · Diuretic plan: Lasix prn - on hold 2/2 hypernatremia and contraction alkalosis  · Hypernatremia  · D5 infusion at 100cc/hr  Trend sodium  · Nutrition/diet plan: Cardiac diet  · Replenish electrolytes with goals: K >4 0, Mag >2 0, and Phos >3 0    :   · Indwelling Chang present: yes   · Discontinue Chang  · Trend UOP and BUN/creat  · Strict I and O    ID:   · Trend temps and WBC count  · Maintain normothermia    Results from last 7 days   Lab Units 01/04/22  0450   PROCALCITONIN ng/ml 0 31*     Heme:   · Trend hgb and plts    Endo:   · Glycemic control plan: SSI    Results from last 6 Months   Lab Units 12/31/21  0400 12/30/21  1007 09/13/21  0657   HEMOGLOBIN A1C % 5 9* 5 7* 5 3     MSK/Skin:  · Mobility goal:   · PT consult: yes  · OT consult: yes  · Frequent turning and pressure off-loading  · Local wound care as needed    Disposition:  Transfer to SD level 2  ---------------------------------------------------------------------------------------------------------------------------------------------------------------------  Allergies:    Allergies   Allergen Reactions    Mushroom Extract Complex - Food Allergy Facial Swelling    Peanut Oil - Food Allergy     Strawberry C [Ascorbate - Food Allergy]      Medications:   Scheduled Meds:  Current Facility-Administered Medications   Medication Dose Route Frequency Provider Last Rate    amiodarone  200 mg Oral Q8H [address] Ольга Sri Salmeron PA-C      aspirin  325 mg Oral Daily Wendi Spiroanurago V CRNP      atorvastatin  80 mg Oral After ANASTASIIA Rubin PA-C      dextrose  100 mL/hr Intravenous Continuous IGNACIO BanegasNP 100 mL/hr (01/09/22 1237)    docusate sodium  100 mg Oral BID Margot Morataya PA-C      heparin (porcine)  5,000 Units Subcutaneous [facility] Dhruvshailesh [place]      insulin lispro  1-6 Units Subcutaneous TID AC Wendi Collinso BROOKE VELOZ      insulin lispro  1-6 Units Subcutaneous HS Wendi Marks VIGNACIONP      metoprolol tartrate  12 5 mg Oral Q12H [facility], NOA      milrinone (PRIMACOR) infusion  0 13 mcg/kg/min Intravenous Continuous Birdena Matanya, NOA 0 13 mcg/kg/min (01/09/22 1237)    ondansetron  4 mg Intravenous Q6H PRN Birdena Mattes, NOA      oxyCODONE  2 5 mg Oral Q4H PRN Birdena Mattes, NOA      oxyCODONE  5 mg Oral Q4H PRN Birdena Mattes, NOA      pantoprazole  40 mg Oral Early Morning [facility] Rigo [place]on, [place]      polyethylene glycol  17 g Oral Daily [facility] [place] [place]      potassium chloride  40 mEq Intravenous Once Marisela RobynNOA cavazos      temazepam  15 mg Oral HS PRN Mason Guido PA-C       VTE Pharmacologic Prophylaxis: Heparin  VTE Mechanical Prophylaxis: sequential compression device    Continuous Infusions:dextrose, 100 mL/hr, Last Rate: 100 mL/hr (01/09/22 1237)  milrinone (PRIMACOR) infusion, 0 13 mcg/kg/min, Last Rate: 0 13 mcg/kg/min (01/09/22 1237)      PRN Meds:  ondansetron, 4 mg, Q6H PRN  oxyCODONE, 2 5 mg, Q4H PRN  oxyCODONE, 5 mg, Q4H PRN  temazepam, 15 mg, HS PRN      Home Medications:   Prior to Admission medications    Medication Sig Start Date End Date Taking?  Authorizing Provider   ALPRAZolam Dalbert Carton) 0 5 mg tablet Take 1-2 tablets 30-60 minutes prior to leaving home prn 3/18/21   Penelope Walters MD   EPINEPHrine (EPIPEN) 0 3 mg/0 3 mL SOAJ Inject 0 3 mL (0 3 mg total) into a muscle once for 1 dose 9/21/18 3/18/21  Rob Walton, DO     ---------------------------------------------------------------------------------------------------------------------------------------------------------------------  Vitals:   Vitals:    01/10/22 0300 01/10/22 0400 01/10/22 0500 01/10/22 0600   BP: 98/52 102/57 99/59 108/53   BP Location: Left arm      Pulse: 75 76 78 80   Resp: 19 19 20 18   Temp: 98 2 °F (36 8 °C) 98 1 °F (36 7 °C) 98 4 °F (36 9 °C) 98 6 °F (37 °C)   TempSrc:  Bladder     SpO2: 94% 95% 93% 94%   Weight:    103 kg (226 lb 3 1 oz)   Height:           Tele Rhythm: NSR This was personally reviewed by myself  Respiratory:  SpO2: SpO2: 94 %, SpO2 Activity: SpO2 Activity: At Rest, SpO2 Device: O2 Device: None (Room air)    Ventilator:  Respiratory  Report   Lab Data (Last 4 hours)    None         O2/Vent Data (Last 4 hours)    None              Temperature: Temp (24hrs), Av 5 °F (36 9 °C), Min:98 1 °F (36 7 °C), Max:98 8 °F (37 1 °C)  Current: Temperature: 98 6 °F (37 °C)    Weights:   Weight (last 2 days)     Date/Time Weight    01/10/22 0600 103 (226 19)    22 0600 99 6 (219 58)    22 0600 98 8 (217 81)        Body mass index is 37 07 kg/m²  Intake and Outputs:    Intake/Output Summary (Last 24 hours) at 1/10/2022 0710  Last data filed at 1/10/2022 0600  Gross per 24 hour   Intake 3308 17 ml   Output 2075 ml   Net 1233 17 ml     I/O last 24 hours: In: 3308 2 [P O :268; I V :2640 2; IV Piggyback:400]  Out: 2075 [Urine:; Chest Tube:50]    UOP: 86cc/hour     Labs:  Results from last 7 days   Lab Units 01/10/22  0407 22  0422 22  0947 22  0426 22  0426 22  0441 22  0407   WBC Thousand/uL 10 53* 13 07*  --   --  14 30*   < > 22 47*   HEMOGLOBIN g/dL 10 4* 10 6* 11 3*   < > 11 1*   < > 11 1*   I STAT HEMOGLOBIN   --   --   --   --   --    < >  --    HEMATOCRIT % 34 1* 34 3* 36 4*   < > 36 4*   < > 33 9*   HEMATOCRIT, ISTAT   --   --   --   --   --    < >  --    PLATELETS Thousands/uL 118* 122*  --   --  108*   < > 114*   NEUTROS PCT %  --   --   --   --   --   --  91*   MONOS PCT %  --   --   --   --   --   --  4    < > = values in this interval not displayed       Results from last 7 days   Lab Units 01/10/22  0429 22  2119 22  1516 22  0422 22  0422 22  0426 22  0426 22  2009 22  1639 22  1637 22  1529 22  1459 22  1459   SODIUM mmol/L 147* 148* 140   < > 151*   < > 151*   < >  --    < >  --   --   --    POTASSIUM mmol/L 3 7 4 1 3 6   < > 2 9*   < > 4 1   < >  --    < >  --   --   -- CHLORIDE mmol/L 115* 115* 107   < > 115*   < > 116*   < >  --    < >  --   --   --    CO2 mmol/L 30 30 28   < > 32   < > 28   < >  --    < >  --   --   --    CO2, I-STAT mmol/L  --   --   --   --   --   --   --   --  31   < > 29   < > 39*   BUN mg/dL 54* 64* 67*   < > 80*   < > 82*   < >  --    < >  --   --   --    CREATININE mg/dL 1 24 1 41* 1 62*   < > 1 83*   < > 2 15*   < >  --    < >  --   --   --    CALCIUM mg/dL 8 1* 8 4 7 6*   < > 7 7*   < > 8 1*   < >  --    < >  --   --   --    ALK PHOS U/L 152*  --   --   --  115  --  114  --   --   --   --   --   --    ALT U/L 40  --   --   --  35  --  53  --   --   --   --   --   --    AST U/L 52*  --   --   --  54*  --  65*  --   --   --   --   --   --    GLUCOSE, ISTAT mg/dl  --   --   --   --   --   --   --   --  149*  --  88  --  102    < > = values in this interval not displayed  Baseline Creat: 1 1    Results from last 7 days   Lab Units 01/10/22  0407 01/09/22  0422 01/08/22  0426   MAGNESIUM mg/dL 2 6 2 9* 3 7*  3 7*     Results from last 7 days   Lab Units 01/10/22  0407 01/09/22  0422 01/08/22  0426   PHOSPHORUS mg/dL 2 4 3 5 5 2*      Results from last 7 days   Lab Units 01/04/22  1335 01/04/22  0556 01/04/22  0450 01/04/22  0214   INR  1 59*  --  1 27*  --    PTT seconds 34 49*  --  50*         Results from last 7 days   Lab Units 01/06/22  0406 01/05/22  0413 01/05/22  0413 01/04/22  2100 01/04/22  2100   PH ART  7 429   < > 7 337*   < > 7 338*   PCO2 ART mm Hg 43 1   < > 47 4*   < > 45 8*   PO2 ART mm Hg 95 6   < > 82 5   < > 120 7   HCO3 ART mmol/L 27 9   < > 24 8   < > 24 0   BASE EXC ART mmol/L 3 2   < > -1 3   < > -1 9   ABG SOURCE  Line, Arterial  --  Line, Arterial  --  Line, Arterial    < > = values in this interval not displayed  SVO2: 56 2%      Diagnositic Studies:  01/10/22 CXR: No new imaging  This was personally reviewed by myself in PACS      Nutrition:        Diet Orders   (From admission, onward)             Start     Ordered 01/09/22 1232  Dietary nutrition supplements  Once        Question Answer Comment   Select Supplement: Ensure Enlive-Chocolate    Frequency Breakfast, Lunch, Dinner        01/09/22 1232    01/08/22 0000  Diet Cardiovascular; Cardiac; Fluid Restriction 1800 ML, Consistent Carbohydrate Diet Level 2 (5 carb servings/75 grams CHO/meal)  Diet effective 0500        References:    Nutrtion Support Algorithm Enteral vs  Parenteral   Question Answer Comment   Diet Type Cardiovascular    Cardiac Cardiac    Other Restriction(s): Fluid Restriction 1800 ML    Other Restriction(s): Consistent Carbohydrate Diet Level 2 (5 carb servings/75 grams CHO/meal)    RD to adjust diet per protocol? No        01/07/22 1623              Physical Exam  Vitals and nursing note reviewed  Constitutional:       General: He is not in acute distress  Appearance: He is obese  Comments: In bedside chair   HENT:      Head: Normocephalic and atraumatic  Eyes:      Pupils: Pupils are equal, round, and reactive to light  Cardiovascular:      Rate and Rhythm: Normal rate and regular rhythm  Heart sounds: Normal heart sounds  Heart sounds not distant  No murmur heard  No friction rub  No gallop  Pulmonary:      Effort: Pulmonary effort is normal       Breath sounds: Normal breath sounds  No decreased breath sounds, wheezing, rhonchi or rales  Abdominal:      General: There is no distension  Palpations: Abdomen is soft  Tenderness: There is no abdominal tenderness  Musculoskeletal:      Right lower leg: Edema present  Left lower leg: Edema present  Comments: Prior ECMO cannulation sites with dressings, C/D/I   Skin:     General: Skin is warm and dry  Neurological:      Mental Status: He is alert and oriented to person, place, and time  GCS: GCS eye subscore is 4  GCS verbal subscore is 5  GCS motor subscore is 6  Psychiatric:         Behavior: Behavior is cooperative         Invasive lines and devices: Invasive Devices  Report    Peripherally Inserted Central Catheter Line            PICC Line 72/54/55 Right Basilic 3 days          Drain            Urethral Catheter Temperature probe 10 days              ---------------------------------------------------------------------------------------------------------------------------------------------------------------------  Code Status: Level 1 - Full Code    Care Time Delivered:   No Critical Care time spent     Collaborative bedside rounds performed with cardiac surgery attending, critical care attending and bedside RN      SIGNATURE: Latrice Owens PA-C  DATE: January 10, 2022  TIME: 7:10 AM

## 2022-01-10 NOTE — DISCHARGE INSTR - OTHER ORDERS
Skin care plans:  1-Calazime to sacrum, buttocks TID and PRN  2-Hydraguard to bilateral heel BID and PRN  3-Elevate heels to offload pressure  4-Ehob cushion when out of bed  5-Turn/repoisiton q2h or when medically stable for pressure re-distribution on skin  6-Moisturize skin daily with skin nourishing cream  7  Right thigh cleanse with soap and water then apply adaptic then a ABD and change daily   8   P - 500 low air loss mattress

## 2022-01-10 NOTE — UTILIZATION REVIEW
Continued Stay Review    Date: 01/09/2022                       Current Patient Class: Inpatient Current Level of Care: Med/Surg    HPI:66 y o  male initially admitted on 12/30/2021  POD # 5 s/p repair of post-infarction VSD repair and VA ECMO decannulation    POD #2 s/p mediastinal reexploration, removal of retained pulmonary artery catheter with cardiopulmonary bypass  Assessment/Plan:   Continue Primacor gtt  Trend Neuro checks  Continue A  Line right radial / Introducer  Telemetry  Maintain Epicardial pacing wires A/V  Continue Amio, Statin, ASA therapies  Maintain chest tubes #1,2 to suction (-20cm)  Continue IV flds  Repeat BMP @ 1200  Monitor and trend UOP & BUN/Cr  Consult PT/OT  CV diet with fld restriction 1800ml  VTE Pharmacologic Prophylaxis: Heparin  VTE Mechanical Prophylaxis: sequential compression device    Vital Signs:   /57   Pulse 81   Temp 98 6 °F (37 °C)   Resp 18   Ht 5' 5 5" (1 664 m)   Wt 103 kg (226 lb)   SpO2 94%   BMI 37 04 kg/m²     Pertinent Labs/Diagnostic Results:     Results from last 7 days   Lab Units 01/10/22  0407 01/09/22  0422 01/08/22  0947 01/08/22  0426 01/08/22  0008 01/07/22  1637 01/07/22  0441 01/06/22  0407   WBC Thousand/uL 10 53* 13 07*  --  14 30*  --   --    < > 22 47*   HEMOGLOBIN g/dL 10 4* 10 6* 11 3* 11 1* 12 3  --    < > 11 1*   I STAT HEMOGLOBIN   --   --   --   --   --   --    < >  --    HEMATOCRIT % 34 1* 34 3* 36 4* 36 4* 39 3  --    < > 33 9*   HEMATOCRIT, ISTAT   --   --   --   --   --   --    < >  --    PLATELETS Thousands/uL 118* 122*  --  108*  --    < >   < > 114*   NEUTROS ABS Thousands/µL  --   --   --   --   --   --   --  20 34*    < > = values in this interval not displayed       Results from last 7 days   Lab Units 01/10/22  0429 01/10/22  0407 01/09/22  2119 01/09/22  1516 01/09/22  0422 01/08/22  0426 01/07/22  1637 01/07/22  1529 01/07/22  1459 01/07/22  1459 01/07/22  1207 01/07/22  0441 01/07/22  0004 01/06/22  1935   SODIUM mmol/L 147*  --  148* 140 151* 151*   < >  --   --   --    < >  --    < > 152*   POTASSIUM mmol/L 3 7  --  4 1 3 6 2 9* 4 1   < >  --   --   --    < >  --    < > 3 5   CHLORIDE mmol/L 115*  --  115* 107 115* 116*   < >  --   --   --    < >  --    < > 115*   CO2 mmol/L 30  --  30 28 32 28   < >  --   --   --    < >  --    < > 32   CO2, I-STAT   --   --   --   --   --   --    < > 29   < > 39*   < >  --   --   --    ANION GAP mmol/L 2*  --  3* 5 4 7   < >  --   --   --    < >  --    < > 5   BUN mg/dL 54*  --  64* 67* 80* 82*   < >  --   --   --    < >  --    < > 81*   CREATININE mg/dL 1 24  --  1 41* 1 62* 1 83* 2 15*   < >  --   --   --    < >  --    < > 2 03*   EGFR ml/min/1 73sq m 61  --  52 44 38 31   < >  --   --   --    < >  --    < > 33   CALCIUM mg/dL 8 1*  --  8 4 7 6* 7 7* 8 1*   < >  --   --   --    < >  --    < > 8 3   CALCIUM, IONIZED mmol/L  --  1 08*  --   --  1 04* 1 04*  --   --   --   --    < > 0 97*  --   --    CALCIUM, IONIZED, ISTAT mmol/L  --   --   --   --   --   --   --  1 17  --  1 21   < >  --   --   --    MAGNESIUM mg/dL  --  2 6  --   --  2 9* 3 7*  3 7*  --   --   --   --   --  2 8*  --  3 0*   PHOSPHORUS mg/dL  --  2 4  --   --  3 5 5 2*  --   --   --   --   --  3 5  --  4 2*    < > = values in this interval not displayed       Results from last 7 days   Lab Units 01/10/22  0429 01/09/22  0422 01/08/22  0426 01/07/22  0441 01/06/22  0407 01/05/22  0411 01/05/22  0411 01/04/22  0450 01/04/22  0450   AST U/L 52* 54* 65* 84* 106*   < > 127*   < > 103*   ALT U/L 40 35 53 74 104*   < > 202*   < > 651*   ALK PHOS U/L 152* 115 114 133* 98   < > 89   < > 131*   TOTAL PROTEIN g/dL 4 9* 4 9* 5 0* 5 6* 5 4*   < > 5 7*   < > 4 8*   ALBUMIN g/dL 2 2* 2 3* 2 6* 2 9* 2 8*   < > 3 2*   < > 2 4*   TOTAL BILIRUBIN mg/dL 1 79* 1 47* 2 55* 4 27* 3 60*   < > 3 76*   < > 1 45*   BILIRUBIN DIRECT mg/dL  --   --  1 16* 2 32*  --   --  2 82*  --  0 73*    < > = values in this interval not displayed       Results from last 7 days   Lab Units 01/10/22  0604 01/10/22  0603 01/09/22  2144 01/09/22  1517 01/09/22  1515 01/09/22  1101 01/09/22  0628 01/08/22  2104 01/08/22  1606 01/08/22  1004 01/08/22  0801 01/08/22  0650   POC GLUCOSE mg/dl 85 80 127 148* 398* 102 114 116 127 117 104 114     Results from last 7 days   Lab Units 01/10/22  0429 01/09/22  2119 01/09/22  1516 01/09/22  0422 01/08/22  0426 01/07/22  1637 01/07/22  1207 01/07/22  0416 01/06/22  1935 01/06/22  1208 01/06/22  0407 01/05/22  1949   GLUCOSE RANDOM mg/dL 101 106 374* 115 122 145* 127 113 119 106 159* 162*      Results from last 7 days   Lab Units 01/06/22  0406 01/05/22  0413 01/04/22  2100   PH ART  7 429 7 337* 7 338*   PCO2 ART mm Hg 43 1 47 4* 45 8*   PO2 ART mm Hg 95 6 82 5 120 7   HCO3 ART mmol/L 27 9 24 8 24 0   BASE EXC ART mmol/L 3 2 -1 3 -1 9   O2 CONTENT ART mL/dL 16 2 16 7 17 6   O2 HGB, ARTERIAL % 96 4 94 8 97 6*   ABG SOURCE  Line, Arterial Line, Arterial Line, Arterial     Results from last 7 days   Lab Units 01/10/22  0407 01/09/22  1349 01/09/22  0422   PH JANNET  7 441* 7 447* 7 430*   PCO2 JANNET mm Hg 40 6* 38 6* 47 6   PO2 JANNET mm Hg 30 7* 29 1* 28 2*   HCO3 JANNET mmol/L 27 0 26 0 30 9*   BASE EXC JANNET mmol/L 2 7 2 0 5 7   O2 CONTENT JANNET ml/dL 8 2 8 0 8 0   O2 HGB, VENOUS % 56 2* 51 6* 49 2*     Results from last 7 days   Lab Units 01/07/22  1639 01/07/22  1529 01/07/22  1459 01/07/22  1443 01/07/22  1443 01/04/22  1048 01/04/22  1009   PH, JANNET I-STAT   --   --   --   --  7 464*  --  7 403*   PCO2, JANNET ISTAT mm HG  --   --   --   --  46 0  --  50 2*   PO2, JANNET ISTAT mm HG  --   --   --   --  37 0  --  36 0   HCO3, JANNET ISTAT mmol/L  --   --   --   --  33 0*  --  31 3*   I STAT BASE EXC mmol/L 5* 6* 14*   < > 8*   < > 6*   I STAT O2 SAT % 100* 99*  --   --  73   < > 67   ISTAT PH ART  7 437 7 578* 7 524*   < >  --    < >  --    I STAT ART PCO2 mm HG 43 6 30 6* 45 8*   < >  --    < >  --    I STAT ART PO2 mm  0* 98 0 >400 0*   < >  --    < >  --    I STAT ART HCO3 mmol/L 29 4* 28 5* 37 7*   < >  --    < >  --     < > = values in this interval not displayed  Results from last 7 days   Lab Units 01/04/22  1335 01/04/22  0556 01/04/22  0450 01/04/22  0214   PROTIME seconds 18 3*  --  15 3*  --    INR  1 59*  --  1 27*  --    PTT seconds 34 49*  --  50*     Results from last 7 days   Lab Units 01/04/22  0450   PROCALCITONIN ng/ml 0 31*     Medications:   Scheduled Medications:  amiodarone, 200 mg, Oral, Q8H [address]  aspirin, 325 mg, Oral, Daily  atorvastatin, 80 mg, Oral, After Dinner  docusate sodium, 100 mg, Oral, BID  furosemide, 40 mg, Intravenous, BID (diuretic)  heparin (porcine), 5,000 Units, Subcutaneous, Q8H DENITA  insulin lispro, 1-6 Units, Subcutaneous, TID AC  insulin lispro, 1-6 Units, Subcutaneous, HS  metoprolol tartrate, 12 5 mg, Oral, Q12H DENITA  pantoprazole, 40 mg, Oral, Early Morning  polyethylene glycol, 17 g, Oral, Daily  potassium chloride, 40 mEq, Intravenous, Once      Continuous IV Infusions:  dextrose, 100 mL/hr, Last Rate: 75 mL/hr  milrinone (PRIMACOR) infusion, 0 13 mcg/kg/min, Last Rate: 0 13 mcg/kg/min     PRN Meds:  ondansetron, 4 mg, Intravenous, Q6H PRN  oxyCODONE, 2 5 mg, Oral, Q4H PRN  oxyCODONE, 5 mg, Oral, Q4H PRN  temazepam, 15 mg, Oral, HS PRN        Discharge Plan: TBD    Network Utilization Review Department  ATTENTION: Please call with any questions or concerns to [phone] and carefully listen to the prompts so that you are directed to the right person  All voicemails are confidential   Sudha Hamm all requests for admission clinical reviews, approved or denied determinations and any other requests to dedicated fax number below belonging to the campus where the patient is receiving treatment   List of dedicated fax numbers for the Facilities:  FACILITY NAME UR FAX NUMBER   ADMISSION DENIALS (Administrative/Medical Necessity) [phone]   [address] (Maternity/NICU/Pediatrics) [address] [facility],[address]  [phone]   [address] [place] [address] [id number] [place] [address] [phone] [place] [address] [phone]

## 2022-01-11 ENCOUNTER — APPOINTMENT (INPATIENT)
Dept: RADIOLOGY | Facility: HOSPITAL | Age: 67
DRG: 167 | End: 2022-01-11
Payer: COMMERCIAL

## 2022-01-11 PROBLEM — E83.51 HYPOCALCEMIA: Status: ACTIVE | Noted: 2022-01-11

## 2022-01-11 PROBLEM — D69.6 THROMBOCYTOPENIA (HCC): Status: ACTIVE | Noted: 2022-01-11

## 2022-01-11 PROBLEM — E87.8 HYPERCHLOREMIA: Status: ACTIVE | Noted: 2022-01-11

## 2022-01-11 PROBLEM — E87.0 HYPERNATREMIA: Status: ACTIVE | Noted: 2022-01-11

## 2022-01-11 LAB
ANION GAP SERPL CALCULATED.3IONS-SCNC: 3 MMOL/L (ref 4–13)
ANION GAP SERPL CALCULATED.3IONS-SCNC: 8 MMOL/L (ref 4–13)
ATRIAL RATE: 70 BPM
BASE EX.OXY STD BLDV CALC-SCNC: 48.4 % (ref 60–80)
BASE EX.OXY STD BLDV CALC-SCNC: 53.3 % (ref 60–80)
BASE EXCESS BLDV CALC-SCNC: 1.5 MMOL/L
BASE EXCESS BLDV CALC-SCNC: 3.4 MMOL/L
BUN SERPL-MCNC: 48 MG/DL (ref 5–25)
BUN SERPL-MCNC: 49 MG/DL (ref 5–25)
CALCIUM SERPL-MCNC: 7.4 MG/DL (ref 8.3–10.1)
CALCIUM SERPL-MCNC: 7.8 MG/DL (ref 8.3–10.1)
CHLORIDE SERPL-SCNC: 114 MMOL/L (ref 100–108)
CHLORIDE SERPL-SCNC: 115 MMOL/L (ref 100–108)
CO2 SERPL-SCNC: 25 MMOL/L (ref 21–32)
CO2 SERPL-SCNC: 31 MMOL/L (ref 21–32)
CREAT SERPL-MCNC: 1.21 MG/DL (ref 0.6–1.3)
CREAT SERPL-MCNC: 1.52 MG/DL (ref 0.6–1.3)
ERYTHROCYTE [DISTWIDTH] IN BLOOD BY AUTOMATED COUNT: 17.5 % (ref 11.6–15.1)
ERYTHROCYTE [DISTWIDTH] IN BLOOD BY AUTOMATED COUNT: 17.8 % (ref 11.6–15.1)
GFR SERPL CREATININE-BSD FRML MDRD: 47 ML/MIN/1.73SQ M
GFR SERPL CREATININE-BSD FRML MDRD: 62 ML/MIN/1.73SQ M
GLUCOSE SERPL-MCNC: 132 MG/DL (ref 65–140)
GLUCOSE SERPL-MCNC: 138 MG/DL (ref 65–140)
GLUCOSE SERPL-MCNC: 152 MG/DL (ref 65–140)
GLUCOSE SERPL-MCNC: 157 MG/DL (ref 65–140)
GLUCOSE SERPL-MCNC: 85 MG/DL (ref 65–140)
GLUCOSE SERPL-MCNC: 95 MG/DL (ref 65–140)
GLUCOSE SERPL-MCNC: 97 MG/DL (ref 65–140)
HCO3 BLDV-SCNC: 25.7 MMOL/L (ref 24–30)
HCO3 BLDV-SCNC: 26.5 MMOL/L (ref 24–30)
HCT VFR BLD AUTO: 36.7 % (ref 36.5–49.3)
HCT VFR BLD AUTO: 36.8 % (ref 36.5–49.3)
HGB BLD-MCNC: 11.2 G/DL (ref 12–17)
HGB BLD-MCNC: 11.4 G/DL (ref 12–17)
INR PPP: 1.22 (ref 0.84–1.19)
MAGNESIUM SERPL-MCNC: 2.3 MG/DL (ref 1.6–2.6)
MAGNESIUM SERPL-MCNC: 2.4 MG/DL (ref 1.6–2.6)
MCH RBC QN AUTO: 30.5 PG (ref 26.8–34.3)
MCH RBC QN AUTO: 31.1 PG (ref 26.8–34.3)
MCHC RBC AUTO-ENTMCNC: 30.5 G/DL (ref 31.4–37.4)
MCHC RBC AUTO-ENTMCNC: 31 G/DL (ref 31.4–37.4)
MCV RBC AUTO: 100 FL (ref 82–98)
MCV RBC AUTO: 100 FL (ref 82–98)
O2 CT BLDV-SCNC: 7.8 ML/DL
O2 CT BLDV-SCNC: 9.7 ML/DL
PCO2 BLDV: 35.3 MM HG (ref 42–50)
PCO2 BLDV: 38.9 MM HG (ref 42–50)
PH BLDV: 7.44 [PH] (ref 7.3–7.4)
PH BLDV: 7.49 [PH] (ref 7.3–7.4)
PLATELET # BLD AUTO: 135 THOUSANDS/UL (ref 149–390)
PLATELET # BLD AUTO: 137 THOUSANDS/UL (ref 149–390)
PMV BLD AUTO: 13.5 FL (ref 8.9–12.7)
PMV BLD AUTO: 14 FL (ref 8.9–12.7)
PO2 BLDV: 27.2 MM HG (ref 35–45)
PO2 BLDV: 29.8 MM HG (ref 35–45)
POTASSIUM SERPL-SCNC: 3.4 MMOL/L (ref 3.5–5.3)
POTASSIUM SERPL-SCNC: 3.5 MMOL/L (ref 3.5–5.3)
PROTHROMBIN TIME: 14.9 SECONDS (ref 11.6–14.5)
QRS AXIS: 87 DEGREES
QRSD INTERVAL: 114 MS
QT INTERVAL: 338 MS
QTC INTERVAL: 537 MS
RBC # BLD AUTO: 3.67 MILLION/UL (ref 3.88–5.62)
RBC # BLD AUTO: 3.67 MILLION/UL (ref 3.88–5.62)
SODIUM SERPL-SCNC: 148 MMOL/L (ref 136–145)
SODIUM SERPL-SCNC: 148 MMOL/L (ref 136–145)
T WAVE AXIS: -68 DEGREES
VENTRICULAR RATE: 152 BPM
WBC # BLD AUTO: 14.19 THOUSAND/UL (ref 4.31–10.16)
WBC # BLD AUTO: 14.74 THOUSAND/UL (ref 4.31–10.16)

## 2022-01-11 PROCEDURE — 93010 ELECTROCARDIOGRAM REPORT: CPT | Performed by: INTERNAL MEDICINE

## 2022-01-11 PROCEDURE — 83735 ASSAY OF MAGNESIUM: CPT | Performed by: THORACIC SURGERY (CARDIOTHORACIC VASCULAR SURGERY)

## 2022-01-11 PROCEDURE — 99254 IP/OBS CNSLTJ NEW/EST MOD 60: CPT | Performed by: PHYSICAL MEDICINE & REHABILITATION

## 2022-01-11 PROCEDURE — 85027 COMPLETE CBC AUTOMATED: CPT | Performed by: PHYSICIAN ASSISTANT

## 2022-01-11 PROCEDURE — 82805 BLOOD GASES W/O2 SATURATION: CPT | Performed by: THORACIC SURGERY (CARDIOTHORACIC VASCULAR SURGERY)

## 2022-01-11 PROCEDURE — 82805 BLOOD GASES W/O2 SATURATION: CPT | Performed by: STUDENT IN AN ORGANIZED HEALTH CARE EDUCATION/TRAINING PROGRAM

## 2022-01-11 PROCEDURE — 99024 POSTOP FOLLOW-UP VISIT: CPT | Performed by: THORACIC SURGERY (CARDIOTHORACIC VASCULAR SURGERY)

## 2022-01-11 PROCEDURE — 80048 BASIC METABOLIC PNL TOTAL CA: CPT | Performed by: PHYSICIAN ASSISTANT

## 2022-01-11 PROCEDURE — 71045 X-RAY EXAM CHEST 1 VIEW: CPT

## 2022-01-11 PROCEDURE — 97535 SELF CARE MNGMENT TRAINING: CPT

## 2022-01-11 PROCEDURE — 97530 THERAPEUTIC ACTIVITIES: CPT

## 2022-01-11 PROCEDURE — 85027 COMPLETE CBC AUTOMATED: CPT | Performed by: THORACIC SURGERY (CARDIOTHORACIC VASCULAR SURGERY)

## 2022-01-11 PROCEDURE — 82948 REAGENT STRIP/BLOOD GLUCOSE: CPT

## 2022-01-11 PROCEDURE — 83735 ASSAY OF MAGNESIUM: CPT | Performed by: PHYSICIAN ASSISTANT

## 2022-01-11 PROCEDURE — 85610 PROTHROMBIN TIME: CPT | Performed by: PHYSICIAN ASSISTANT

## 2022-01-11 PROCEDURE — 80048 BASIC METABOLIC PNL TOTAL CA: CPT | Performed by: THORACIC SURGERY (CARDIOTHORACIC VASCULAR SURGERY)

## 2022-01-11 PROCEDURE — 99232 SBSQ HOSP IP/OBS MODERATE 35: CPT | Performed by: INTERNAL MEDICINE

## 2022-01-11 RX ORDER — LISINOPRIL 5 MG/1
5 TABLET ORAL DAILY
Status: DISCONTINUED | OUTPATIENT
Start: 2022-01-11 | End: 2022-01-11

## 2022-01-11 RX ORDER — BISACODYL 10 MG
10 SUPPOSITORY, RECTAL RECTAL DAILY PRN
Status: DISCONTINUED | OUTPATIENT
Start: 2022-01-11 | End: 2022-01-31 | Stop reason: HOSPADM

## 2022-01-11 RX ORDER — POTASSIUM CHLORIDE 29.8 MG/ML
40 INJECTION INTRAVENOUS ONCE
Status: COMPLETED | OUTPATIENT
Start: 2022-01-11 | End: 2022-01-12

## 2022-01-11 RX ORDER — ISOSORBIDE DINITRATE 10 MG/1
10 TABLET ORAL
Status: DISCONTINUED | OUTPATIENT
Start: 2022-01-11 | End: 2022-01-11

## 2022-01-11 RX ORDER — ALBUMIN, HUMAN INJ 5% 5 %
SOLUTION INTRAVENOUS
Status: DISPENSED
Start: 2022-01-11 | End: 2022-01-12

## 2022-01-11 RX ORDER — ALBUMIN (HUMAN) 12.5 G/50ML
12.5 SOLUTION INTRAVENOUS ONCE
Status: DISCONTINUED | OUTPATIENT
Start: 2022-01-11 | End: 2022-01-11

## 2022-01-11 RX ORDER — WARFARIN SODIUM 2.5 MG/1
2.5 TABLET ORAL
Status: COMPLETED | OUTPATIENT
Start: 2022-01-11 | End: 2022-01-11

## 2022-01-11 RX ORDER — ACETAMINOPHEN 325 MG/1
975 TABLET ORAL EVERY 8 HOURS
Status: DISCONTINUED | OUTPATIENT
Start: 2022-01-11 | End: 2022-01-23

## 2022-01-11 RX ORDER — POLYETHYLENE GLYCOL 3350 17 G/17G
17 POWDER, FOR SOLUTION ORAL DAILY PRN
Status: DISCONTINUED | OUTPATIENT
Start: 2022-01-11 | End: 2022-01-31 | Stop reason: HOSPADM

## 2022-01-11 RX ORDER — DOCUSATE SODIUM 100 MG/1
100 CAPSULE, LIQUID FILLED ORAL 2 TIMES DAILY PRN
Status: DISCONTINUED | OUTPATIENT
Start: 2022-01-11 | End: 2022-01-31 | Stop reason: HOSPADM

## 2022-01-11 RX ORDER — POTASSIUM CHLORIDE 20 MEQ/1
20 TABLET, EXTENDED RELEASE ORAL
Status: DISCONTINUED | OUTPATIENT
Start: 2022-01-11 | End: 2022-01-12

## 2022-01-11 RX ORDER — ALBUMIN, HUMAN INJ 5% 5 %
25 SOLUTION INTRAVENOUS ONCE
Status: COMPLETED | OUTPATIENT
Start: 2022-01-11 | End: 2022-01-11

## 2022-01-11 RX ORDER — FUROSEMIDE 10 MG/ML
40 INJECTION INTRAMUSCULAR; INTRAVENOUS
Status: DISCONTINUED | OUTPATIENT
Start: 2022-01-11 | End: 2022-01-12

## 2022-01-11 RX ORDER — ACETAMINOPHEN 325 MG/1
650 TABLET ORAL EVERY 6 HOURS PRN
Status: DISCONTINUED | OUTPATIENT
Start: 2022-01-11 | End: 2022-01-31 | Stop reason: HOSPADM

## 2022-01-11 RX ADMIN — AMIODARONE HYDROCHLORIDE 200 MG: 200 TABLET ORAL at 21:50

## 2022-01-11 RX ADMIN — FUROSEMIDE 40 MG: 10 INJECTION, SOLUTION INTRAVENOUS at 19:24

## 2022-01-11 RX ADMIN — HEPARIN SODIUM 5000 UNITS: 5000 INJECTION INTRAVENOUS; SUBCUTANEOUS at 05:25

## 2022-01-11 RX ADMIN — PHENYLEPHRINE HYDROCHLORIDE 50 MCG/MIN: 10 INJECTION INTRAVENOUS at 13:15

## 2022-01-11 RX ADMIN — HYDRALAZINE HYDROCHLORIDE 10 MG: 10 TABLET, FILM COATED ORAL at 05:26

## 2022-01-11 RX ADMIN — ALBUMIN (HUMAN) 25 G: 12.5 INJECTION, SOLUTION INTRAVENOUS at 13:05

## 2022-01-11 RX ADMIN — Medication 12.5 MG: at 10:26

## 2022-01-11 RX ADMIN — ATORVASTATIN CALCIUM 80 MG: 80 TABLET, FILM COATED ORAL at 19:25

## 2022-01-11 RX ADMIN — ASPIRIN 325 MG ORAL TABLET 325 MG: 325 PILL ORAL at 09:48

## 2022-01-11 RX ADMIN — POTASSIUM CHLORIDE 20 MEQ: 1500 TABLET, EXTENDED RELEASE ORAL at 19:25

## 2022-01-11 RX ADMIN — PANTOPRAZOLE SODIUM 40 MG: 40 TABLET, DELAYED RELEASE ORAL at 05:25

## 2022-01-11 RX ADMIN — ACETAMINOPHEN 975 MG: 325 TABLET, FILM COATED ORAL at 09:48

## 2022-01-11 RX ADMIN — HEPARIN SODIUM 5000 UNITS: 5000 INJECTION INTRAVENOUS; SUBCUTANEOUS at 21:50

## 2022-01-11 RX ADMIN — MILRINONE LACTATE IN DEXTROSE 0.13 MCG/KG/MIN: 200 INJECTION, SOLUTION INTRAVENOUS at 05:55

## 2022-01-11 RX ADMIN — LISINOPRIL 5 MG: 5 TABLET ORAL at 10:26

## 2022-01-11 RX ADMIN — PHENYLEPHRINE HYDROCHLORIDE 120 MCG/MIN: 10 INJECTION INTRAVENOUS at 17:48

## 2022-01-11 RX ADMIN — POTASSIUM CHLORIDE 40 MEQ: 29.8 INJECTION, SOLUTION INTRAVENOUS at 20:00

## 2022-01-11 RX ADMIN — AMIODARONE HYDROCHLORIDE 200 MG: 200 TABLET ORAL at 05:25

## 2022-01-11 RX ADMIN — WARFARIN SODIUM 2.5 MG: 2.5 TABLET ORAL at 19:25

## 2022-01-11 NOTE — PROGRESS NOTES
Progress Note - Cardiothoracic Surgery   Geraldene Lesches 61 y o  male MRN: [id number]  Unit/Bed#: [facility] 427-01 Encounter: [id number]    Cardiogenic shock  S/P [facility] ECMO; POD # 11  VSD/cardiogenic shock  S/P VSD repair w/ VA ECMO decannulation; POD # 7  Retained PA catheter  S/P Redo sternotomy and removal PA catheter w/ CPB; POD # 4    24 Hour Events: Restarted on milrinone per HF for SVO2 drop, Hydralazine also added  Seen by nutrition who recommend starting appetite stimulant  No other events  No complaints  Tolerating diet (ate pancake & some Ensure this AM)  (+) 4BM/24hrs  Not yet ambulating much  Pain controlled      Medications:   Scheduled Meds:  Current Facility-Administered Medications   Medication Dose Route Frequency Provider Last Rate    amiodarone  200 mg Oral [facility] Jennifer Morrow, PA-C      aspirin  325 mg Oral Daily Jennifer Morrow, PA-C      atorvastatin  80 mg Oral After [employer], PA-C      docusate sodium  100 mg Oral BID Jennifer Morrow, PA-C      furosemide  40 mg Intravenous BID (diuretic) Jennifer Morrow, PA-C      heparin (porcine)  5,000 Units Subcutaneous [facility] Jennifer Morrow, PA-C      hydrALAZINE  10 mg Oral [facility] Morrow, PA-C      insulin lispro  1-6 Units Subcutaneous TID AC Jennifer Morrow, PA-C      insulin lispro  1-6 Units Subcutaneous HS Jennifer Morrow, PA-C      milrinone [facility]) infusion  0 13 mcg/kg/min Intravenous Continuous Gato Vences MD 0 13 mcg/kg/min (01/11/22 0555)    ondansetron  4 mg Intravenous Q6H PRN Jeninfer Morrow, PA-C      oxyCODONE  2 5 mg Oral Q4H PRN Jennifer Morrow, PA-C      oxyCODONE  5 mg Oral Q4H PRN Jennifer Morrow, PA-C      pantoprazole  40 mg Oral Early Morning Jennifer Morrow, PA-C      polyethylene glycol  17 g Oral Daily Jennifer Morrow, PA-C      temazepam  15 mg Oral HS PRN Jennifer Morrow, PA-C       Continuous Infusions:milrinone [facility]) infusion, 0 13 mcg/kg/min, Last Rate: 0 13 mcg/kg/min (01/11/22 0555)      PRN Meds: ondansetron    oxyCODONE    oxyCODONE    temazepam    Vitals:   Vitals:    01/11/22 0306 01/11/22 0517 01/11/22 0557 01/11/22 0653   BP: 108/67 109/67  106/64   BP Location:       Pulse: 93 68  96   Resp:    17   Temp: 98 4 °F (36 9 °C)   98 °F (36 7 °C)   TempSrc:       SpO2: 93% (!) 84%  94%   Weight:   102 kg (225 lb 8 5 oz)    Height:         Bp x24hrs: 100-110/50-70    Telemetry: NSR; Heart Rate: 96; 6 beat run a tach    Respiratory:   SpO2: SpO2: 94 %, SpO2 Activity: SpO2 Activity: At Rest, SpO2 Device: O2 Device: None (Room air);  Room Air    Intake/Output:     Intake/Output Summary (Last 24 hours) at 1/11/2022 0810  Last data filed at 1/11/2022 0600  Gross per 24 hour   Intake 641 62 ml   Output 1467 ml   Net -825 38 ml      UOP - 567cc/8hrs; 1667cc/24hrs w/ 3 unmeasured occurences    Stool - 4/24hrs    Chest tube Output:  CTs & EPWs have been discontinued    Weights:   Weight (last 2 days)     Date/Time Weight    01/11/22 0557 102 (225 53)    01/10/22 0940 103 (226)    01/10/22 0600 103 (226 19)    01/09/22 0600 99 6 (219 58)        Admission weight: 96kg - up 6kg from admission weight    Results:   Results from last 7 days   Lab Units 01/11/22  0523 01/10/22  0407 01/09/22  0422   WBC Thousand/uL 14 19* 10 53* 13 07*   HEMOGLOBIN g/dL 11 4* 10 4* 10 6*   HEMATOCRIT % 36 8 34 1* 34 3*   PLATELETS Thousands/uL 137* 118* 122*     Results from last 7 days   Lab Units 01/11/22  0523 01/10/22  0429 01/09/22  2119 01/07/22 2009 01/07/22  1639   SODIUM mmol/L 148* 147* 148*   < >  --    POTASSIUM mmol/L 3 5 3 7 4 1   < >  --    CHLORIDE mmol/L 114* 115* 115*   < >  --    CO2 mmol/L 31 30 30   < >  --    CO2, I-STAT mmol/L  --   --   --   --  31   BUN mg/dL 49* 54* 64*   < >  --    CREATININE mg/dL 1 21 1 24 1 41*   < >  --    GLUCOSE, ISTAT mg/dl  --   --   --   --  149*   CALCIUM mg/dL 7 8* 8 1* 8 4   < >  --     < > = values in this interval not displayed  Results from last 7 days   Lab Units 01/04/22  1335   INR  1 59*   PTT seconds 34     Point of care glucose: 132 - 97 - 108 - 107 - 85 - 80 - 127    SVO2 1/11: 53 3    Studies:  TTE 1/10: EF 40%, mod TR, left pleural effusion noted    I have personally reviewed pertinent reports  and I have personally reviewed pertinent films in PACS    Invasive Lines/Tubes:  Invasive Devices  Report    Peripherally Inserted Central Catheter Line            PICC Line 03/05/24 Right Basilic 4 days                Physical Exam:    HEENT/NECK:  Normocephalic  Atraumatic  No jugular venous distention  Cardiac: Regular rate and rhythm and No murmurs/rubs/gallops  Pulmonary:  good inspiratory distress, CTA to anterolateral lung fields  Abdomen:  Non-tender, Non-distended and Normal bowel sounds  Incisions: Sternum is stable  Incision is clean, dry, and intact  Extremities: Extremities warm/dry and 2+ pitting edema B/L LE & 1+ UE b/l  Neuro: Alert and oriented X 3  Skin: Warm/Dry, without rashes or lesions  Assessment:  Principal Problem:    Cardiogenic shock (HCC)  Active Problems:    Agoraphobia with panic attacks    VSD (ventricular septal defect)    JUDE (acute kidney injury) ([facility] [address] )    Transaminitis    Tylenol ingestion    Acute systolic congestive heart failure (HCC)    Anemia    Personal history of ECMO    Coagulopathy (HCC)    Central line complication    S/P VSD repair     Cardiogenic shock  S/P VA ECMO; POD # 11  VSD/cardiogenic shock  S/P VSD repair w/ VA ECMO decannulation; POD # 7  Retained PA catheter  S/P Redo sternotomy and removal PA catheter w/ CPB; POD # 4    Plan:    1  Cardiac:   NSR; HR/BP well-controlled  D/w HF discontinuing milrinone & starting beta blocker therapy  Continue Hydralazine 10mg PO Q89  Continue ASA and Statin therapy  Epicardial pacing wires out  PICC  Continue DVT prophylaxis    2   Pulmonary:   Good Room air oxygen saturation; Continue incentive spirometry/Coughing/Deep breathing exercises  Chest tubes have been discontinued    3  Renal:   Intake/Output net: (-)810 1 mL/24 hours  Diuretic Regimen:  Continue Lasix 40 mg IV BID -- increase to TID  Continue Potassium Chloride 20 mEq PO BID -- increase to TID  Post op Creatinine stable; Follow up labs prn    4  Neuro:  Neurologically intact; No active issues  Incisional pain well-controlled  Continue Tylenol, 975 mg PO q 8, standing dose  Continue Oxycodone, 2 5 to 5 mg PO q 4 hours prn pain    5  GI:  Tolerating TLC 2 3 gm sodium diet  D/w surgeon Megace for appetite stimulant or not  Maintain 1800 mL daily fluid restriction   Continue stool softeners and prn suppository -- hold bowel regimen w/ frequent BMs  Continue GI prophylaxis    6  Endo:   Glucose well-controlled with sliding scale coverage    7    Hematology:    Post-operative acute blood loss anemia; Hemoglobin 11 4; trend prn   Leukocytosis, afebrile, likely reactive   Thrombocytopenia, no active bleeding   Hypernatremia, continue free water flushes   Hyperchloremia    8     Disposition:      Follow daily PT/OT recommendations regarding home vs  rehab when medically cleared for discharge    VTE Pharmacologic Prophylaxis: Heparin  VTE Mechanical Prophylaxis: sequential compression device    Collaborative rounds completed with supervising physician  Plan of care discussed with bedside nurse    SIGNATURE: Sosa Gould PA-C  DATE: January 11, 2022  TIME: 8:10 AM

## 2022-01-11 NOTE — PLAN OF CARE
Problem: OCCUPATIONAL THERAPY ADULT  Goal: Performs self-care activities at highest level of function for planned discharge setting  See evaluation for individualized goals  Description: Treatment Interventions: ADL retraining,Functional transfer training,UE strengthening/ROM,Endurance training,Patient/family training,Equipment evaluation/education,Compensatory technique education,Continued evaluation,Energy conservation,Activityengagement,Cardiac education          See flowsheet documentation for full assessment, interventions and recommendations  Outcome: Progressing  Note: Limitation: Decreased ADL status,Decreased endurance,Decreased self-care trans,Decreased high-level ADLs  Prognosis: Good  Assessment: Patient participated in Skilled OT session this date with interventions consisting of ADL re training with the use of correct body mechanics,  therapeutic activities to: increase activity tolerance, increase postural control, increase trunk control and increase OOB/ sitting tolerance   Patient agreeable to OT treatment session, upon arrival patient was found supine in bed  Patient requiring frequent rest periods  Patient continues to be functioning below baseline level, occupational performance remains limited secondary to factors listed above and increased risk for falls and injury  From OT standpoint, recommendation at time of d/c would be Short Term Rehab  Patient to benefit from continued Occupational Therapy treatment while in the hospital to address deficits as defined above and maximize level of functional independence with ADLs and functional mobility        OT Discharge Recommendation: Post acute rehabilitation services  OT - OK to Discharge: Yes (When medically appropriate)

## 2022-01-11 NOTE — PLAN OF CARE
Problem: PHYSICAL THERAPY ADULT  Goal: Performs mobility at highest level of function for planned discharge setting  See evaluation for individualized goals  Description: Treatment/Interventions: Functional transfer training,LE strengthening/ROM,Elevations,Therapeutic exercise,Endurance training,Equipment eval/education,Bed mobility,Gait training,Spoke to nursing,OT,Cognitive reorientation  Equipment Recommended:  (r/o rw next visit)       See flowsheet documentation for full assessment, interventions and recommendations  Outcome: Progressing  Note: Prognosis: Fair  Problem List: Decreased strength,Decreased endurance,Impaired balance,Decreased mobility,Obesity  Assessment: Pt cont to demonstrate signif functional mobility deficits w/ max (A)x2 required for supine <--> sit bed mob and (A)x1 to maintain sitting balance on edge of bed; pt was unable to progress to transfer trials at bedside due to persistent dizziness w/ BP stable; LE therex performed in supine w/ rest periods provided as needed; overall, cont to recommend rehab upon D/C when medically cleared; will follow  Barriers to Discharge: Inaccessible home environment        PT Discharge Recommendation: Post acute rehabilitation services          See flowsheet documentation for full assessment

## 2022-01-11 NOTE — PHYSICAL THERAPY NOTE
PHYSICAL THERAPY NOTE          Patient Name: Harika Carter  UFFJS'S Date: 1/11/2022 01/11/22 0930   PT Last Visit   PT Visit Date 01/11/22   Note Type   Note Type Treatment   End of Consult   Patient Position at End of Consult Supine; All needs within reach   Pain Assessment   Pain Assessment Tool 0-10   Pain Score No Pain   Restrictions/Precautions   Other Precautions Cardiac/sternal;Multiple lines;Telemetry; Fall Risk   General   Chart Reviewed Yes   Additional Pertinent History cleared for Tx session (spoke to nsg)    Response to Previous Treatment Patient with no complaints from previous session  Cognition   Overall Cognitive Status Impaired   Arousal/Participation Alert; Cooperative   Attention Attends with cues to redirect   Orientation Level Oriented to person;Oriented to situation   Memory Decreased recall of recent events;Decreased recall of precautions   Following Commands Follows one step commands with increased time or repetition   Subjective   Subjective Alert; in bed; agreeable to perform therex and mobliize   Bed Mobility   Rolling R 3  Moderate assistance   Additional items Assist x 2; Increased time required;Verbal cues;LE management   Rolling L 3  Moderate assistance   Additional items Assist x 2; Increased time required;Verbal cues;LE management   Supine to Sit 2  Maximal assistance   Additional items Assist x 2; Increased time required;Verbal cues;LE management   Sit to Supine 2  Maximal assistance   Additional items Assist x 2; Increased time required;Verbal cues;LE management  (repositioned higher in bed w/ (A)x3)   Transfers   Sit to Stand Unable to assess  (pt c/o persistent dizziness while on edge of bed; BP stable)   Balance   Static Sitting Poor +  (3 min on edge of bed)   Dynamic Sitting Poor -  ((L) lateral scoot w/ (A)x2)   Activity Tolerance   Activity Tolerance Patient limited by fatigue;Treatment limited secondary to medical complications (Comment)  (dizziness; /66)   Medical Staff Made Aware Co-Tx performed w/ OTR due to complexity of medical status and level of skilled assistance required;   Nurse Made Aware spoke to [place] Supine;10 reps;Bilateral  (actively; HEP)   Heelslides Supine;15 reps;AAROM; Bilateral  (hip/knee flex/ext)   Hip Abduction Supine;15 reps;AAROM; Bilateral   Knee AROM Short Arc Quad Supine;15 reps;AAROM;AROM; Bilateral   Ankle Pumps Supine;15 reps;AROM; Bilateral  (HEP)   Assessment   Prognosis Fair   Problem List Decreased strength;Decreased endurance; Impaired balance;Decreased mobility;Obesity   Assessment Pt cont to demonstrate signif functional mobility deficits w/ max (A)x2 required for supine <--> sit bed mob and (A)x1 to maintain sitting balance on edge of bed; pt was unable to progress to transfer trials at bedside due to persistent dizziness w/ BP stable; LE therex performed in supine w/ rest periods provided as needed; overall, cont to recommend rehab upon D/C when medically cleared; will follow  Barriers to Discharge Inaccessible home environment   Goals   Patient Goals to move his legs more   STG Expiration Date 01/17/22   PT Treatment Day 1   Plan   Treatment/Interventions Functional transfer training;LE strengthening/ROM; Therapeutic exercise; Endurance training;Bed mobility;Gait training;Spoke to nursing;OT   Progress Slow progress, decreased activity tolerance   PT Frequency 4-6x/wk   Recommendation   PT Discharge Recommendation Post acute rehabilitation services   AM-PAC Basic Mobility Inpatient   Turning in Bed Without Bedrails 2   Lying on Back to Sitting on Edge of Flat Bed 1   Moving Bed to Chair 1   Standing Up From Chair 1   Walk in Room 1   Climb 3-5 Stairs 1   Basic Mobility Inpatient Raw Score 7   Turning Head Towards Sound 4   Follow Simple Instructions 3   Low Function Basic Mobility Raw Score 14   Low Function Basic Mobility Standardized Score 22 01   Highest Level Of Mobility   -[facility] Goal 2: Bed activities/Dependent transfer   -[facility] Highest Level of Mobility 3: Sit at edge of bed   -[facility] Goal Achieved Yes   Education   Education Provided Mobility training;Home exercise program   Patient Reinforcement needed;Demonstrates verbal understanding   End of Consult   Patient Position at End of Consult Supine; All needs within reach       [facility],

## 2022-01-11 NOTE — CONSULTS
PHYSICAL MEDICINE AND REHABILITATION CONSULT NOTE  Litzy Suarez 61 y o  male MRN: [id number]  Unit/Bed#: [facility] 427-01 Encounter: [id number]      CC: cardiogenic shock/MI    History of Present Illness:   Litzy Suarez is a 61 y o  male who presented to the [facility] with several days of lethargy and fatigue and was found to be in multisystem organ failure with renal failure, elevated LFTs, and encephalopathy that was felt to be due to cardiogenic shock from an MI; additionally patient also found to have VSD  Patient required VA ECMO and ultimately underwent ECMO decannulation and VSD repair on 1/4  Patient also underwent mediastinal reexploration & removal of retained pulmonary catheter on 1/7  Location: heart   Quality: cardiogenic shock  Severity: severe  Duration: unknown suspected in the last few weeks  Timing: acute   Context: MI  Modifying factors: ECMO     Associating signs & symptoms: fatigue, lethargy       Subjective: patient seen at bedside and was comfortable     Review of Systems: A 10-point review of systems was performed  Negative except as listed above       Plan:     cardoogenic shock: was on ECMO, s/p ECMO decannulation on 1/4 & mediastinal reexploration & removal of retained pulmonary catheter on 1/7, however currently on milrinone gtt    VSD: s/p repair on 1/4, per attending addendum to CTS note of 1/11 on coumadin for VSD patch    MI: on statin & AP, not currently on BB due to cardiogenic shock     Ambulatory/ADL dysfunction: patient was independent with ADLs and functional mobility PTA and lives with family in a 2  with 2 POORNIMA, currently patient is max x 2 assist for transfer on 1/7, not appropriate for amb on 1/7 , mod-max assist for ADLs on 1/7; as last therapies are from 1/7 and patient is still on milrinone gtt will continue to follow    Drug regimen reviewed, all potential adverse effects identified and addressed:    Scheduled Meds:  Current Facility-Administered Medications   Medication Dose Route Frequency Provider Last Rate    acetaminophen  650 mg Oral Q6H PRN Anabelle Art PA-C      acetaminophen  975 mg Oral Q8H Anabelle Art PA-C      amiodarone  200 mg Oral Q8H [facility] Mellisa Xiong PA-C      aspirin  325 mg Oral Daily Mellisa Xiong PA-C      atorvastatin  80 mg Oral After [employer]      bisacodyl  10 mg Rectal Daily PRN Anabelle Art PA-C      docusate sodium  100 mg Oral BID PRN Anabelle Art PA-C      furosemide  40 mg Intravenous TID (diuretic) Anabelle Art PA-C      heparin (porcine)  5,000 Units Subcutaneous [facility] Mellisa Xiong PA-C      hydrALAZINE  10 mg Oral [facility] Mellisa Xiong PA-C      insulin lispro  1-6 Units Subcutaneous TID AC Mellisa Xiong PA-C      insulin lispro  1-6 Units Subcutaneous HS Mellisa Xiong PA-C      lisinopril  5 mg Oral Daily Anabelle Art PA-C      metoprolol tartrate  12 5 mg Oral Q12H [facility] Anabelle Art PA-C      milrinone (PRIMACOR) infusion  0 13 mcg/kg/min Intravenous Continuous Arnel Aguirre MD 0 13 mcg/kg/min (01/11/22 0555)    ondansetron  4 mg Intravenous Q6H PRN Mellisa Xiong PA-C      oxyCODONE  2 5 mg Oral Q4H PRN Mellisa Xiong PA-C      oxyCODONE  5 mg Oral Q4H PRN Mellisa Xiong PA-C      pantoprazole  40 mg Oral Early Morning Mellisa Xiong PA-C      polyethylene glycol  17 g Oral Daily PRN Anabelle Art PA-C      potassium chloride  20 mEq Oral TID With Meals Anabelle Art PA-C      temazepam  15 mg Oral HS PRN Mellisa Xiong PA-C      warfarin  2 5 mg Oral Once (warfarin) Anabelle Art PA-C                 Physical Exam:  Vitals:    01/11/22 1039   BP: 108/69   Pulse: 95   Resp: 20   Temp:    SpO2: 96%         General: NAD  HEENT:  Eye: Normal external eye, conjunctiva, lids cornea  Ears: Normal external ears  Nose: Normal external nose, mucus membranes  Pulmonary: respirations unlabored   Cardiovascular: +S1/2  Abdomen: soft NT   Extremity/skin: no cyanosis or clubbing  Neurologic: CN 2-12 grossly intact, lt touch grossly intact, 2/5 throughout on MMT   Psych: mood/affect currently stable         Laboratory:    Results from last 7 days   Lab Units 01/11/22  0523 01/10/22  0407 01/09/22  0422   HEMOGLOBIN g/dL 11 4* 10 4* 10 6*   HEMATOCRIT % 36 8 34 1* 34 3*   WBC Thousand/uL 14 19* 10 53* 13 07*     Results from last 7 days   Lab Units 01/11/22  0523 01/10/22  0429 01/09/22  2119 01/09/22  1516 01/09/22  0422 01/08/22  0426 01/08/22  0426 01/07/22 2009 01/07/22  1639 01/07/22  1637 01/07/22  1529 01/07/22  1459   0000   BUN mg/dL 49* 54* 64*   < > 80*   < > 82*   < >  --    < >  --   --   --    SODIUM mmol/L 148* 147* 148*   < > 151*   < > 151*   < >  --    < >  --   --   --    POTASSIUM mmol/L 3 5 3 7 4 1   < > 2 9*   < > 4 1   < >  --    < >  --   --   --    CHLORIDE mmol/L 114* 115* 115*   < > 115*   < > 116*   < >  --    < >  --   --   --    GLUCOSE, ISTAT mg/dl  --   --   --   --   --   --   --   --  149*  --  88 102  --    CREATININE mg/dL 1 21 1 24 1 41*   < > 1 83*   < > 2 15*   < >  --    < >  --   --   --    AST U/L  --  52*  --   --  54*  --  65*  --   --   --   --   --    < >   ALT U/L  --  40  --   --  35  --  53  --   --   --   --   --    < >    < > = values in this interval not displayed  Results from last 7 days   Lab Units 01/11/22  1036 01/04/22  1335   PROTIME seconds 14 9* 18 3*   INR  1 22* 1 59*            Past Medical History:   Past Surgical History:   Family History:   Social history:   History reviewed  No pertinent past medical history  Past Surgical History:   Procedure Laterality Date    EXTRACORPOREAL CIRCULATION N/A 12/31/2021    Procedure: SUPPORT EXTRACORPOREAL MEMBRANE OXYGENATION (ECMO);   Surgeon: Geovanny Meza MD;  [facility];  Service: Cardiac Surgery    IR OTHER  12/31/2021    KS ECMO/ECLS INITIATION VENOUS N/A 1/4/2022 Procedure: VA ECMO DECANNULATION;  Surgeon: Ervin Sanchez MD;  [facility];  Service: Cardiac Surgery    RI EXPLOR POSTOP BLEED,INFEC,CLOT-CHST N/A 1/7/2022    Procedure: RE-EXPLORATION MEDIASTERNAL CAVITY FOR POST-OP BLEED (BRING BACK) Removal of PA catheter;  Surgeon: Ervin Sanchez MD;  [facility];  Service: Cardiac Surgery    RI REVSD N/A 1/4/2022    Procedure: REPAIR VENTRICULAR SEPTAL DEFECT (VSD) OPEN WITH BOVINE PERICARDIAL PATCH ;  Surgeon: Ervin Sanchez MD;  [facility];  Service: Cardiac Surgery     Family History   Problem Relation Age of Onset    Other Mother         Claustrophobia    Drug abuse Family     Alcohol abuse Family       Social History     Socioeconomic History    Marital status: Single     Spouse name: None    Number of children: None    Years of education: None    Highest education level: None   Occupational History    None   Tobacco Use    Smoking status: Current Every Day Smoker     Types: Pipe    Smokeless tobacco: Never Used    Tobacco comment: Secondhand smoke exposure   Substance and Sexual Activity    Alcohol use: No    Drug use: No    Sexual activity: None   Other Topics Concern    None   Social History Narrative    Always uses seatbelt    No caffeine use     Social Determinants of Health     Financial Resource Strain: Not on file   Food Insecurity: Not on file   Transportation Needs: Not on file   Physical Activity: Not on file   Stress: Not on file   Social Connections: Not on file   Intimate Partner Violence: Not on file   Housing Stability: Not on file          Current Medical Diagnosis Allergies   Patient Active Problem List   Diagnosis    Agoraphobia with panic attacks    Hyperlipidemia, mixed    Impaired fasting glucose    Obesity (BMI 30-39  9)    [facility]osis, [facility] ([facility] [address]    Pipe smoker    Cardiogenic shock ([facility] [address]    VSD (ventricular septal defect)    JUDE (acute kidney injury) ([facility] [address] )    Transaminitis    Tylenol ingestion    Acute systolic congestive heart failure (HCC)    Anemia    Personal history of ECMO    Coagulopathy (HCC)    Leukocytosis    Central line complication    S/P VSD repair    Thrombocytopenia (HCC)    Hypernatremia    Hyperchloremia    Hypocalcemia    Allergies   Allergen Reactions    Mushroom Extract Complex - Food Allergy Facial Swelling    Peanut Oil - Food Allergy     Strawberry C [Ascorbate - Food Allergy]

## 2022-01-11 NOTE — PLAN OF CARE
Problem: MOBILITY - ADULT  Goal: Maintain or return to baseline ADL function  Description: INTERVENTIONS:  -  Assess patient's ability to carry out ADLs; assess patient's baseline for ADL function and identify physical deficits which impact ability to perform ADLs (bathing, care of mouth/teeth, toileting, grooming, dressing, etc )  - Assess/evaluate cause of self-care deficits   - Assess range of motion  - Assess patient's mobility; develop plan if impaired  - Assess patient's need for assistive devices and provide as appropriate  - Encourage maximum independence but intervene and supervise when necessary  - Involve family in performance of ADLs  - Assess for home care needs following discharge   - Consider OT consult to assist with ADL evaluation and planning for discharge  - Provide patient education as appropriate  Outcome: Progressing  Goal: Maintains/Returns to pre admission functional level  Description: INTERVENTIONS:  - Perform BMAT or MOVE assessment daily    - Set and communicate daily mobility goal to care team and patient/family/caregiver     - Collaborate with rehabilitation services on mobility goals if consulted  - Out of bed for toileting  - Record patient progress and toleration of activity level   Outcome: Progressing     Problem: Potential for Falls  Goal: Patient will remain free of falls  Description: INTERVENTIONS:  -  Assess patient's ability to carry out ADLs; assess patient's baseline for ADL function and identify physical deficits which impact ability to perform ADLs (bathing, care of mouth/teeth, toileting, grooming, dressing, etc )  - Assess/evaluate cause of self-care deficits   - Assess range of motion  - Assess patient's mobility; develop plan if impaired  - Assess patient's need for assistive devices and provide as appropriate  - Encourage maximum independence but intervene and supervise when necessary  - Involve family in performance of ADLs  - Assess for home care needs following discharge   - Consider OT consult to assist with ADL evaluation and planning for discharge  - Provide patient education as appropriate  Outcome: Progressing     Problem: Prexisting or High Potential for Compromised Skin Integrity  Goal: Skin integrity is maintained or improved  Description: INTERVENTIONS:  - Identify patients at risk for skin breakdown  - Assess and monitor skin integrity  - Assess and monitor nutrition and hydration status  - Monitor labs   - Assess for incontinence   - Turn and reposition patient  - Assist with mobility/ambulation  - Relieve pressure over bony prominences  - Avoid friction and shearing  - Provide appropriate hygiene as needed including keeping skin clean and dry  - Evaluate need for skin moisturizer/barrier cream  - Collaborate with interdisciplinary team   - Patient/family teaching  - Consider wound care consult   Outcome: Progressing     Problem: PAIN - ADULT  Goal: Verbalizes/displays adequate comfort level or baseline comfort level  Description: Interventions:  - Encourage patient to monitor pain and request assistance  - Assess pain using appropriate pain scale  - Administer analgesics based on type and severity of pain and evaluate response  - Implement non-pharmacological measures as appropriate and evaluate response  - Consider cultural and social influences on pain and pain management  - Notify physician/advanced practitioner if interventions unsuccessful or patient reports new pain  Outcome: Progressing     Problem: INFECTION - ADULT  Goal: Absence or prevention of progression during hospitalization  Description: INTERVENTIONS:  - Assess and monitor for signs and symptoms of infection  - Monitor lab/diagnostic results  - Monitor all insertion sites, i e  indwelling lines, tubes, and drains  - Monitor endotracheal if appropriate and nasal secretions for changes in amount and color  - [place] appropriate cooling/warming therapies per order  - Administer medications as ordered  - Instruct and encourage patient and family to use good hand hygiene technique  - Identify and instruct in appropriate isolation precautions for identified infection/condition  Outcome: Progressing  Goal: Absence of fever/infection during neutropenic period  Description: INTERVENTIONS:  - Monitor WBC    Outcome: Progressing     Problem: SAFETY ADULT  Goal: Maintain or return to baseline ADL function  Description: INTERVENTIONS:  -  Assess patient's ability to carry out ADLs; assess patient's baseline for ADL function and identify physical deficits which impact ability to perform ADLs (bathing, care of mouth/teeth, toileting, grooming, dressing, etc )  - Assess/evaluate cause of self-care deficits   - Assess range of motion  - Assess patient's mobility; develop plan if impaired  - Assess patient's need for assistive devices and provide as appropriate  - Encourage maximum independence but intervene and supervise when necessary  - Involve family in performance of ADLs  - Assess for home care needs following discharge   - Consider OT consult to assist with ADL evaluation and planning for discharge  - Provide patient education as appropriate  Outcome: Progressing  Goal: Maintains/Returns to pre admission functional level  Description: INTERVENTIONS:  - Perform BMAT or MOVE assessment daily    - Set and communicate daily mobility goal to care team and patient/family/caregiver  - Collaborate with rehabilitation services on mobility goals if consulted  - Perform Range of Motion 2-3 times a day  - Reposition patient every 2 hours    - Dangle patient 1 times a day  - Stand patient 1 times a day  - Ambulate patient 1-3 times a day  - Out of bed to chair 1 times a day   - Out of bed for meals 1 times a day  - Out of bed for toileting  - Record patient progress and toleration of activity level   Outcome: Progressing  Goal: Patient will remain free of falls  Description: INTERVENTIONS:  -  Assess patient's ability to carry out ADLs; assess patient's baseline for ADL function and identify physical deficits which impact ability to perform ADLs (bathing, care of mouth/teeth, toileting, grooming, dressing, etc )  - Assess/evaluate cause of self-care deficits   - Assess range of motion  - Assess patient's mobility; develop plan if impaired  - Assess patient's need for assistive devices and provide as appropriate  - Encourage maximum independence but intervene and supervise when necessary  - Involve family in performance of ADLs  - Assess for home care needs following discharge   - Consider OT consult to assist with ADL evaluation and planning for discharge  - Provide patient education as appropriate  Outcome: Progressing     Problem: DISCHARGE PLANNING  Goal: Discharge to home or other facility with appropriate resources  Description: INTERVENTIONS:  - Identify barriers to discharge w/patient and caregiver  - Arrange for needed discharge resources and transportation as appropriate  - Identify discharge learning needs (meds, wound care, etc )  - Arrange for interpretive services to assist at discharge as needed  - Refer to Case Management Department for coordinating discharge planning if the patient needs post-hospital services based on physician/advanced practitioner order or complex needs related to functional status, cognitive ability, or social support system  Outcome: Progressing     Problem: Knowledge Deficit  Goal: Patient/family/caregiver demonstrates understanding of disease process, treatment plan, medications, and discharge instructions  Description: Complete learning assessment and assess knowledge base    Interventions:  - Provide teaching at level of understanding  - Provide teaching via preferred learning methods  Outcome: Progressing     Problem: CARDIOVASCULAR - ADULT  Goal: Maintains optimal cardiac output and hemodynamic stability  Description: INTERVENTIONS:  - Monitor I/O, vital signs and rhythm  - Monitor for S/S and trends of decreased cardiac output  - Administer and titrate ordered vasoactive medications to optimize hemodynamic stability  - Assess quality of pulses, skin color and temperature  - Assess for signs of decreased coronary artery perfusion  - Instruct patient to report change in severity of symptoms  Outcome: Progressing  Goal: Absence of cardiac dysrhythmias or at baseline rhythm  Description: INTERVENTIONS:  - Continuous cardiac monitoring, vital signs, obtain 12 lead EKG if ordered  - Administer antiarrhythmic and heart rate control medications as ordered  - Monitor electrolytes and administer replacement therapy as ordered  Outcome: Progressing     Problem: RESPIRATORY - ADULT  Goal: Achieves optimal ventilation and oxygenation  Description: INTERVENTIONS:  - Assess for changes in respiratory status  - Assess for changes in mentation and behavior  - Position to facilitate oxygenation and minimize respiratory effort  - Oxygen administered by appropriate delivery if ordered  - Initiate smoking cessation education as indicated  - Encourage broncho-pulmonary hygiene including cough, deep breathe, Incentive Spirometry  - Assess the need for suctioning and aspirate as needed  - Assess and instruct to report SOB or any respiratory difficulty  - Respiratory Therapy support as indicated  Outcome: Progressing     Problem: GASTROINTESTINAL - ADULT  Goal: Oral mucous membranes remain intact  Description: INTERVENTIONS  - Assess oral mucosa and hygiene practices  - Implement preventative oral hygiene regimen  - Implement oral medicated treatments as ordered  - Initiate Nutrition services referral as needed  Outcome: Progressing     Problem: GENITOURINARY - ADULT  Goal: Maintains or returns to baseline urinary function  Description: INTERVENTIONS:  - Assess urinary function  - Encourage oral fluids to ensure adequate hydration if ordered  - Administer IV fluids as ordered to ensure adequate hydration  - Administer ordered medications as needed  - Offer frequent toileting  - Follow urinary retention protocol if ordered  Outcome: Progressing  Goal: Urinary catheter remains patent  Description: INTERVENTIONS:  - Assess patency of urinary catheter  - If patient has a chronic [place], consider changing catheter if non-functioning  - Follow guidelines for intermittent irrigation of non-functioning urinary catheter  Outcome: Progressing     Problem: METABOLIC, FLUID AND ELECTROLYTES - ADULT  Goal: Electrolytes maintained within normal limits  Description: INTERVENTIONS:  - Monitor labs and assess patient for signs and symptoms of electrolyte imbalances  - Administer electrolyte replacement as ordered  - Monitor response to electrolyte replacements, including repeat lab results as appropriate  - Instruct patient on fluid and nutrition as appropriate  Outcome: Progressing  Goal: Fluid balance maintained  Description: INTERVENTIONS:  - Monitor labs   - Monitor I/O and WT  - Instruct patient on fluid and nutrition as appropriate  - Assess for signs & symptoms of volume excess or deficit  Outcome: Progressing  Goal: Glucose maintained within target range  Description: INTERVENTIONS:  - Monitor Blood Glucose as ordered  - Assess for signs and symptoms of hyperglycemia and hypoglycemia  - Administer ordered medications to maintain glucose within target range  - Assess nutritional intake and initiate nutrition service referral as needed  Outcome: Progressing     Problem: SKIN/TISSUE INTEGRITY - ADULT  Goal: Skin Integrity remains intact(Skin Breakdown Prevention)  Description: Assess:  -Assess extremities for adequate circulation and sensation     Bed Management:  -Have minimal linens on bed & keep smooth, unwrinkled  -Change linens as needed when moist or perspiring    Skin Care:  -Avoid use of baby powder, tape, friction and shearing, hot water or constrictive clothing    Outcome: Progressing  Goal: Incision(s), wounds(s) or drain site(s) healing without S/S of infection  Description: INTERVENTIONS  - Assess and document dressing, incision, wound bed, drain sites and surrounding tissue  - Provide patient and family education  - Perform skin care/dressing changes every   Outcome: Progressing  Goal: Pressure injury heals and does not worsen  Description: Interventions:  - Implement low air loss mattress or specialty surface (Criteria met)  - Apply silicone foam dressing  - Instruct/assist with weight shifting every 120 minutes when in chair     - Consider nutrition services referral as needed  Outcome: Progressing     Problem: HEMATOLOGIC - ADULT  Goal: Maintains hematologic stability  Description: INTERVENTIONS  - Assess for signs and symptoms of bleeding or hemorrhage  - Monitor labs  - Administer supportive blood products/factors as ordered and appropriate  Outcome: Progressing     Problem: Nutrition/Hydration-ADULT  Goal: Nutrient/Hydration intake appropriate for improving, restoring or maintaining nutritional needs  Description: Monitor and assess patient's nutrition/hydration status for malnutrition  Collaborate with interdisciplinary team and initiate plan and interventions as ordered  Monitor patient's weight and dietary intake as ordered or per policy  Utilize nutrition screening tool and intervene as necessary  Determine patient's food preferences and provide high-protein, high-caloric foods as appropriate       INTERVENTIONS:  - Monitor oral intake, urinary output, labs, and treatment plans  - Assess nutrition and hydration status and recommend course of action  - Evaluate amount of meals eaten  - Assist patient with eating if necessary   - Allow adequate time for meals  - Recommend/ encourage appropriate diets, oral nutritional supplements, and vitamin/mineral supplements  - Order, calculate, and assess calorie counts as needed  - Recommend, monitor, and adjust tube feedings and TPN/PPN based on assessed needs  - Assess need for intravenous fluids  - Provide specific nutrition/hydration education as appropriate  - Include patient/family/caregiver in decisions related to nutrition  Outcome: Progressing

## 2022-01-11 NOTE — PROGRESS NOTES
Cardiology Progress Note - Arley Pro 61 y o  male MRN: [id number]    Unit/Bed#: [facility] 427-01 Encounter: [id number]      Assessment & Plan:  Principal Problem:    Cardiogenic shock ([facility] [address] )  Active Problems:    Agoraphobia with panic attacks    VSD (ventricular septal defect)    JUDE (acute kidney injury) ([facility] [address] )    Transaminitis    Tylenol ingestion    Acute systolic congestive heart failure (HCC)    Anemia    Personal history of ECMO    Coagulopathy (HCC)    Leukocytosis    Central line complication    S/P VSD repair    Thrombocytopenia (HCC)    Hypernatremia    Hyperchloremia    Hypocalcemia    # Late presenting anterior STEMI c/b anteroapical VSD s/p open patch repair POD#5  # Redo sternotomy, mediastinal exploration for retained swan avinash catheter POD #2  # HFrEF with EF 35-40% as per my assessment on most recent CORIE  # Hypernatremia    BP: 110s/60s  HR: 60s  UOP: 1 6 L with net negative 800 cc  Venous O2 sat this am: 53 3  Patient feels better on low dose milrinone  Plan:  - c/w milrinone for today  - hydralazine 10 mg TID started yesterday  Add isordil 10 mg TID today  - Aggressive diuresis  Increase lasix to 40 mg IV tID  - c/w aspirin and atorvastatin  - hold off on lopressor 12 5 mg BID for today  - monitor sodium levels  Free water  - atorvastatin for afib prevention    Subjective:   Patient seen and examined  No significant events overnight  Patient was conversant this morning  Objective:     Vitals: Blood pressure 106/64, pulse 96, temperature 98 °F (36 7 °C), resp  rate 17, height 5' 5 5" (1 664 m), weight 102 kg (225 lb 8 5 oz), SpO2 94 %  , Body mass index is 36 96 kg/m² ,   Orthostatic Blood Pressures      Most Recent Value   Blood Pressure 106/64 filed at 01/11/2022 0283   Patient Position - Orthostatic VS Lying filed at 01/10/2022 1200            Intake/Output Summary (Last 24 hours) at 1/11/2022 0909  Last data filed at 1/11/2022 0600  Gross per 24 hour   Intake 641 62 ml   Output 1467 ml   Net -825 38 ml           Physical Exam:    GEN: Robbie Momin Awake and alert  HEENT: anicteric, mucous membranes moist  NECK: JVD elevated, carotid bruits   HEART: regular rhythm, normal S1 and S2, no murmurs, clicks, gallops or rubs   CHEST: Sternotomy incision site healing well  LUNGS: clear to auscultation bilaterally; Crackles present  ABDOMEN: normal bowel sounds, soft, no tenderness, no distention  EXTREMITIES: peripheral pulses normal; 3+ edema, Warm  NEURO: no focal findings   SKIN: normal without suspicious lesions on exposed skin      Current Facility-Administered Medications:     acetaminophen (TYLENOL) tablet 650 mg, 650 mg, Oral, Q6H PRN, Anabelle Art PA-C    acetaminophen (TYLENOL) tablet 975 mg, 975 mg, Oral, Q8H, Anabelle Art PA-C    amiodarone tablet 200 mg, 200 mg, Oral, Q8H [facility], Latrice Owens PA-C, 200 mg at 01/11/22 8931    aspirin tablet 325 mg, 325 mg, Oral, Daily, Latrice Owens PA-C, 325 mg at 01/10/22 0813    atorvastatin (LIPITOR) tablet 80 mg, 80 mg, Oral, After Codie Xiong PA-C, 80 mg at 01/10/22 1731    bisacodyl (DULCOLAX) rectal suppository 10 mg, 10 mg, Rectal, Daily PRN, Anabelle Art PA-C    docusate sodium (COLACE) capsule 100 mg, 100 mg, Oral, BID PRN, Anabelle Art PA-C    furosemide (LASIX) injection 40 mg, 40 mg, Intravenous, BID (diuretic), Latrice Owens PA-C, 40 mg at 01/10/22 1621    heparin (porcine) subcutaneous injection 5,000 Units, 5,000 Units, Subcutaneous, Q8H [facility], Latrice Owens PA-C, 5,000 Units at 01/11/22 0525    hydrALAZINE (APRESOLINE) tablet 10 mg, 10 mg, Oral, Q8H [facility], Latrice Owens PA-C, 10 mg at 01/11/22 0526    insulin lispro (HumaLOG) 100 units/mL subcutaneous injection 1-6 Units, 1-6 Units, Subcutaneous, TID AC **AND** Fingerstick Glucose (POCT), , , TID AC, Latrice Owens PA-C    insulin lispro (HumaLOG) 100 units/mL subcutaneous injection 1-6 Units, 1-6 Units, Subcutaneous, Jeaneth Nessa Camejo PA-C    milrinone [facility]) 20 mg in 100 mL infusion (premix), 0 13 mcg/kg/min, Intravenous, Continuous, Florecita Sanchez MD, Last Rate: 4 mL/hr at 01/11/22 0555, 0 13 mcg/kg/min at 01/11/22 0555    ondansetron (ZOFRAN) injection 4 mg, 4 mg, Intravenous, Q6H PRN, Marisela Carlson PA-C    oxyCODONE (ROXICODONE) IR tablet 2 5 mg, 2 5 mg, Oral, Q4H PRN, Marisela Carlson PA-C    oxyCODONE (ROXICODONE) IR tablet 5 mg, 5 mg, Oral, Q4H PRN, Marisela Carlson PA-C, 5 mg at 01/08/22 2148    pantoprazole (PROTONIX) EC tablet 40 mg, 40 mg, Oral, Early Morning, Marisela Carlson PA-C, 40 mg at 01/11/22 0525    polyethylene glycol (MIRALAX) packet 17 g, 17 g, Oral, Daily PRN, Anabelle Art PA-C    temazepam (RESTORIL) capsule 15 mg, 15 mg, Oral, HS PRN, Marisela Carlson PA-C, 15 mg at 01/09/22 2205    Labs & Results:    No results found for: CKTOTAL, CKMB, CKMBINDEX, TROPONINI    Lab Results   Component Value Date    GLUCOSE 149 (H) 01/07/2022    CALCIUM 7 8 (L) 01/11/2022    K 3 5 01/11/2022    CO2 31 01/11/2022     (H) 01/11/2022    BUN 49 (H) 01/11/2022    CREATININE 1 21 01/11/2022       Lab Results   Component Value Date    WBC 14 19 (H) 01/11/2022    HGB 11 4 (L) 01/11/2022    HCT 36 8 01/11/2022     (H) 01/11/2022     (L) 01/11/2022     Results from last 7 days   Lab Units 01/04/22  1335   INR  1 59*       No results found for: CHOL  Lab Results   Component Value Date    HDL 13 (L) 12/30/2021    HDL 38 (L) 09/13/2021     Lab Results   Component Value Date    LDLCALC 107 (H) 12/30/2021    LDLCALC 134 (H) 09/13/2021     Lab Results   Component Value Date    TRIG 152 (H) 12/30/2021    TRIG 137 09/13/2021       Lab Results   Component Value Date    ALT 40 01/10/2022    AST 52 (H) 01/10/2022         EKG personally reviewed by )Conrado Watson MD  No acute changes   TELE: No significant arrhythmias seen on telemetry review

## 2022-01-11 NOTE — OCCUPATIONAL THERAPY NOTE
Occupational Therapy Progress Note     Patient Name: Emeli SOLIZ Date: 1/11/2022  Problem List  Principal Problem:    Cardiogenic shock ([facility] [address] )  Active Problems:    Agoraphobia with panic attacks    VSD (ventricular septal defect)    UJDE (acute kidney injury) ([facility] [address] )    Transaminitis    Tylenol ingestion    Acute systolic congestive heart failure (HCC)    Anemia    Personal history of ECMO    Coagulopathy (HCC)    Leukocytosis    Central line complication    S/P VSD repair    Thrombocytopenia (HCC)    Hypernatremia    Hyperchloremia    Hypocalcemia              01/11/22 0923   OT Last Visit   OT Visit Date 01/11/22   Note Type   Note Type Treatment   Restrictions/Precautions   Other Precautions Cardiac/sternal;Multiple lines;Telemetry; Fall Risk   General   Response to Previous Treatment Patient with no complaints from previous session   Lifestyle   Autonomy Pt reports being I with ADLS, IADLS and mobility without device PTA  (+)    Reciprocal Relationships Pt lives with his brother and sister in law   Service to Others Retired   [facility]   Pain Assessment   Pain Assessment Tool 0-10   Pain Score No Pain   ADL   Where Assessed Supine, bed   LB Dressing Assistance 2  Maximal Assistance   LB Dressing Deficit Setup;Supervision/safety; Don/doff R sock; Don/doff L sock   Bed Mobility   Rolling R 3  Moderate assistance   Additional items Assist x 2; Increased time required;Verbal cues;LE management   Rolling L 3  Moderate assistance   Additional items Assist x 2; Increased time required;Verbal cues;LE management   Supine to Sit 2  Maximal assistance   Additional items Assist x 2; Increased time required;Verbal cues;LE management   Sit to Supine 2  Maximal assistance   Additional items Assist x 2; Increased time required;Verbal cues;LE management   Additional Comments Pt sat EOB with mod A for trunk control  Pt complaining of dizziness and returned to supine- BP WFL      Transfers Sit to Stand Unable to assess   Stand to Sit Unable to assess   Cognition   Overall Cognitive Status Impaired   Arousal/Participation Alert; Cooperative   Attention Attends with cues to redirect   Orientation Level Oriented to person;Oriented to time   Memory Decreased recall of precautions   Following Commands Follows one step commands with increased time or repetition   Comments Pt presents overall lethargic with flat affect, but is pleasant and cooperative  Activity Tolerance   Activity Tolerance Patient limited by pain; Patient limited by fatigue   Medical Staff Made Aware RN confirmed okay to see pt   Assessment   Assessment Patient participated in Skilled OT session this date with interventions consisting of ADL re training with the use of correct body mechanics,  therapeutic activities to: increase activity tolerance, increase postural control, increase trunk control and increase OOB/ sitting tolerance   Patient agreeable to OT treatment session, upon arrival patient was found supine in bed  Patient requiring frequent rest periods  Patient continues to be functioning below baseline level, occupational performance remains limited secondary to factors listed above and increased risk for falls and injury  From OT standpoint, recommendation at time of d/c would be Short Term Rehab  Patient to benefit from continued Occupational Therapy treatment while in the hospital to address deficits as defined above and maximize level of functional independence with ADLs and functional mobility  Plan   Treatment Interventions ADL retraining;Functional transfer training;UE strengthening/ROM; Endurance training   Goal Expiration Date 01/21/22   OT Treatment Day 1   OT Frequency 3-5x/wk   Recommendation   OT Discharge Recommendation Post acute rehabilitation services   OT - OK to Discharge Yes  (When medically appropriate)   AM-PAC Daily Activity Inpatient   Lower Body Dressing 2   Bathing 2   Toileting 2   Upper Body Dressing 2   Grooming 3   Eating 3   Daily Activity Raw Score 14   Daily Activity Standardized Score (Calc for Raw Score >=11) 33 39   AM-PAC Applied Cognition Inpatient   Following a Speech/Presentation 2   Understanding Ordinary Conversation 3   Taking Medications 2   Remembering Where Things Are Placed or Put Away 2   Remembering List of 4-5 Errands 2   Taking Care of Complicated Tasks 1   Applied Cognition Raw Score 12   Applied Cognition Standardized Score 28 82   Modified [place] Scale   Modified Mk Scale 4       Shamika Luna, EUSEBIO, OTR/L

## 2022-01-11 NOTE — CASE MANAGEMENT
Case Management Discharge Planning Note    Patient name Harika Carter  Location [facility] 427/[facility] 869-56 MRN [id number]  : 1955 Date 2022       Current Admission Date: 2021  Current Admission Diagnosis:Cardiogenic shock [facility])   Patient Active Problem List    Diagnosis Date Noted    Thrombocytopenia ([facility] [address] ) 2022    Hypernatremia 2022    Hyperchloremia 2022    Hypocalcemia 2022    Central line complication     S/P VSD repair 2022    Leukocytosis 2022    Coagulopathy ([facility] [address] ) 2022    Tylenol ingestion     Acute systolic congestive heart failure ([facility] [address] ) 2021    Anemia 2021    Personal history of ECMO 2021    Cardiogenic shock ([facility] [address] ) 2021    VSD (ventricular septal defect) 2021    JUDE (acute kidney injury) ([facility] [address] ) 2021    Transaminitis 2021    Pipe smoker 2018    Hyperlipidemia, mixed 2017    Impaired fasting glucose 2017    Agoraphobia with panic attacks 2015    Obesity (BMI 30-39 9) 2015    Psychosis, atypical ([facility] [address] ) 2015      LOS (days): 12  Geometric Mean LOS (GMLOS) (days):   Days to GMLOS:     OBJECTIVE:  Risk of Unplanned Readmission Score: 26         Current admission status: Inpatient   Preferred Pharmacy:   [id number] [address]   [place] [address]  Phone: [phone] Fax: [phone]    Primary Care Provider: Moses Garcia MD    Primary Insurance: [email or web address]  Secondary Insurance:     DISCHARGE DETAILS:    Discharge planning discussed with[de-identified] Pt  discussed rehab with [facility] PA, is very interested in [place] when ready  Referral sent    [place] of Choice: Yes        Were Treatment Team discharge recommendations reviewed with patient/caregiver?: Yes  Did patient/caregiver verbalize understanding of patient care needs?: Yes  Were patient/caregiver advised of the risks associated with not following Treatment Team discharge recommendations?: Yes                   Other Referral/Resources/Interventions Provided:  Interventions: Acute Rehab  Referral Comments: referral made to  [facility]    Would you like to participate in our [address] service program?  : No - Declined    Treatment Team Recommendation: Acute Rehab  Discharge Destination Plan[de-identified] Acute Rehab                                         Additional Comments: s/p cardio, liver, and resp  failure  Off ECMO, had VSD repair  Has Milrinone and Fredy gtt weaning   referral to [facility] when medically ready

## 2022-01-12 LAB
ALBUMIN SERPL BCP-MCNC: 2.5 G/DL (ref 3.5–5)
ALP SERPL-CCNC: 159 U/L (ref 46–116)
ALT SERPL W P-5'-P-CCNC: 56 U/L (ref 12–78)
ANION GAP SERPL CALCULATED.3IONS-SCNC: 3 MMOL/L (ref 4–13)
AST SERPL W P-5'-P-CCNC: 45 U/L (ref 5–45)
BILIRUB SERPL-MCNC: 1.76 MG/DL (ref 0.2–1)
BUN SERPL-MCNC: 49 MG/DL (ref 5–25)
CALCIUM ALBUM COR SERPL-MCNC: 8.8 MG/DL (ref 8.3–10.1)
CALCIUM SERPL-MCNC: 7.6 MG/DL (ref 8.3–10.1)
CHLORIDE SERPL-SCNC: 116 MMOL/L (ref 100–108)
CO2 SERPL-SCNC: 29 MMOL/L (ref 21–32)
CREAT SERPL-MCNC: 1.39 MG/DL (ref 0.6–1.3)
ERYTHROCYTE [DISTWIDTH] IN BLOOD BY AUTOMATED COUNT: 17.5 % (ref 11.6–15.1)
GFR SERPL CREATININE-BSD FRML MDRD: 53 ML/MIN/1.73SQ M
GLUCOSE SERPL-MCNC: 114 MG/DL (ref 65–140)
GLUCOSE SERPL-MCNC: 124 MG/DL (ref 65–140)
GLUCOSE SERPL-MCNC: 128 MG/DL (ref 65–140)
GLUCOSE SERPL-MCNC: 130 MG/DL (ref 65–140)
GLUCOSE SERPL-MCNC: 91 MG/DL (ref 65–140)
HCT VFR BLD AUTO: 33.7 % (ref 36.5–49.3)
HGB BLD-MCNC: 10.3 G/DL (ref 12–17)
INR PPP: 1.21 (ref 0.84–1.19)
MCH RBC QN AUTO: 30.6 PG (ref 26.8–34.3)
MCHC RBC AUTO-ENTMCNC: 30.6 G/DL (ref 31.4–37.4)
MCV RBC AUTO: 100 FL (ref 82–98)
PLATELET # BLD AUTO: 157 THOUSANDS/UL (ref 149–390)
PMV BLD AUTO: 13.2 FL (ref 8.9–12.7)
POTASSIUM SERPL-SCNC: 3.4 MMOL/L (ref 3.5–5.3)
POTASSIUM SERPL-SCNC: 4 MMOL/L (ref 3.5–5.3)
PROT SERPL-MCNC: 5.4 G/DL (ref 6.4–8.2)
PROTHROMBIN TIME: 14.8 SECONDS (ref 11.6–14.5)
RBC # BLD AUTO: 3.37 MILLION/UL (ref 3.88–5.62)
SODIUM SERPL-SCNC: 148 MMOL/L (ref 136–145)
WBC # BLD AUTO: 16.87 THOUSAND/UL (ref 4.31–10.16)

## 2022-01-12 PROCEDURE — 99024 POSTOP FOLLOW-UP VISIT: CPT | Performed by: PHYSICIAN ASSISTANT

## 2022-01-12 PROCEDURE — 85027 COMPLETE CBC AUTOMATED: CPT | Performed by: PHYSICIAN ASSISTANT

## 2022-01-12 PROCEDURE — 80053 COMPREHEN METABOLIC PANEL: CPT | Performed by: PHYSICIAN ASSISTANT

## 2022-01-12 PROCEDURE — 82948 REAGENT STRIP/BLOOD GLUCOSE: CPT

## 2022-01-12 PROCEDURE — 99231 SBSQ HOSP IP/OBS SF/LOW 25: CPT | Performed by: INTERNAL MEDICINE

## 2022-01-12 PROCEDURE — 85610 PROTHROMBIN TIME: CPT | Performed by: PHYSICIAN ASSISTANT

## 2022-01-12 PROCEDURE — 84132 ASSAY OF SERUM POTASSIUM: CPT | Performed by: PHYSICIAN ASSISTANT

## 2022-01-12 RX ORDER — FUROSEMIDE 10 MG/ML
40 INJECTION INTRAMUSCULAR; INTRAVENOUS ONCE
Status: COMPLETED | OUTPATIENT
Start: 2022-01-12 | End: 2022-01-12

## 2022-01-12 RX ORDER — POTASSIUM CHLORIDE 29.8 MG/ML
40 INJECTION INTRAVENOUS ONCE
Status: COMPLETED | OUTPATIENT
Start: 2022-01-12 | End: 2022-01-12

## 2022-01-12 RX ORDER — POTASSIUM CHLORIDE 20 MEQ/1
40 TABLET, EXTENDED RELEASE ORAL 2 TIMES DAILY
Status: DISCONTINUED | OUTPATIENT
Start: 2022-01-12 | End: 2022-01-23

## 2022-01-12 RX ORDER — FUROSEMIDE 10 MG/ML
80 INJECTION INTRAMUSCULAR; INTRAVENOUS
Status: DISCONTINUED | OUTPATIENT
Start: 2022-01-12 | End: 2022-01-13

## 2022-01-12 RX ADMIN — FUROSEMIDE 40 MG: 10 INJECTION, SOLUTION INTRAVENOUS at 05:46

## 2022-01-12 RX ADMIN — ACETAMINOPHEN 975 MG: 325 TABLET, FILM COATED ORAL at 01:17

## 2022-01-12 RX ADMIN — POTASSIUM CHLORIDE 40 MEQ: 29.8 INJECTION, SOLUTION INTRAVENOUS at 11:50

## 2022-01-12 RX ADMIN — PANTOPRAZOLE SODIUM 40 MG: 40 TABLET, DELAYED RELEASE ORAL at 05:46

## 2022-01-12 RX ADMIN — AMIODARONE HYDROCHLORIDE 200 MG: 200 TABLET ORAL at 05:46

## 2022-01-12 RX ADMIN — HEPARIN SODIUM 5000 UNITS: 5000 INJECTION INTRAVENOUS; SUBCUTANEOUS at 13:28

## 2022-01-12 RX ADMIN — MILRINONE LACTATE IN DEXTROSE 0.13 MCG/KG/MIN: 200 INJECTION, SOLUTION INTRAVENOUS at 06:10

## 2022-01-12 RX ADMIN — ACETAMINOPHEN 975 MG: 325 TABLET, FILM COATED ORAL at 10:18

## 2022-01-12 RX ADMIN — POTASSIUM CHLORIDE 20 MEQ: 1500 TABLET, EXTENDED RELEASE ORAL at 10:18

## 2022-01-12 RX ADMIN — TEMAZEPAM 15 MG: 15 CAPSULE ORAL at 21:43

## 2022-01-12 RX ADMIN — HEPARIN SODIUM 5000 UNITS: 5000 INJECTION INTRAVENOUS; SUBCUTANEOUS at 05:45

## 2022-01-12 RX ADMIN — ASPIRIN 325 MG ORAL TABLET 325 MG: 325 PILL ORAL at 10:19

## 2022-01-12 RX ADMIN — FUROSEMIDE 40 MG: 10 INJECTION, SOLUTION INTRAMUSCULAR; INTRAVENOUS at 11:51

## 2022-01-12 RX ADMIN — FUROSEMIDE 80 MG: 10 INJECTION, SOLUTION INTRAMUSCULAR; INTRAVENOUS at 16:39

## 2022-01-12 RX ADMIN — PHENYLEPHRINE HYDROCHLORIDE 60 MCG/MIN: 10 INJECTION INTRAVENOUS at 03:21

## 2022-01-12 RX ADMIN — ATORVASTATIN CALCIUM 80 MG: 80 TABLET, FILM COATED ORAL at 17:49

## 2022-01-12 RX ADMIN — HEPARIN SODIUM 5000 UNITS: 5000 INJECTION INTRAVENOUS; SUBCUTANEOUS at 21:44

## 2022-01-12 RX ADMIN — POTASSIUM CHLORIDE 40 MEQ: 1500 TABLET, EXTENDED RELEASE ORAL at 17:49

## 2022-01-12 RX ADMIN — AMIODARONE HYDROCHLORIDE 200 MG: 200 TABLET ORAL at 21:44

## 2022-01-12 RX ADMIN — AMIODARONE HYDROCHLORIDE 200 MG: 200 TABLET ORAL at 13:28

## 2022-01-12 RX ADMIN — ACETAMINOPHEN 975 MG: 325 TABLET, FILM COATED ORAL at 16:38

## 2022-01-12 NOTE — PROGRESS NOTES
Progress Note - Cardiothoracic Surgery   Deette Pro 61 y o  male MRN: [id number]  Unit/Bed#: [facility] 427-01 Encounter: [id number]  Cardiogenic shock  S/P [facility] ECMO; POD # 12  VSD/cardiogenic shock  S/P VSD repair w/ [facility] ECMO decannulation; POD # 8  Retained PA catheter  S/P Redo sternotomy and removal PA catheter w/ CPB; POD # 5    24 Hour Events: Issues w/ hypotension yesterday s/p bp meds, given 500cc albumin, started on kyleigh, labs w/ JUDE  No other events      Medications:   Scheduled Meds:  Current Facility-Administered Medications   Medication Dose Route Frequency Provider Last Rate    acetaminophen  650 mg Oral Q6H PRN Anabelle Art PA-C      acetaminophen  975 mg Oral Q8H Anabelle Art PA-C      amiodarone  200 mg Oral Q8H [address] Rayne Reddy PA-C      aspirin  325 mg Oral Daily Rayne Reddy PA-C      atorvastatin  80 mg Oral After [employer]      bisacodyl  10 mg Rectal Daily PRN Anabelle Art PA-C      docusate sodium  100 mg Oral BID PRN Anabelle Art PA-C      furosemide  40 mg Intravenous TID (diuretic) Anabelle Art PA-C      heparin (porcine)  5,000 Units Subcutaneous [facility] PIPE Gray-C      insulin lispro  1-6 Units Subcutaneous TID AC Rayne Reddy PA-C      insulin lispro  1-6 Units Subcutaneous HS Rayne Reddy PA-C      milrinone [facility]) infusion  0 13 mcg/kg/min Intravenous Continuous Des Drummond MD 0 13 mcg/kg/min (01/12/22 0610)    ondansetron  4 mg Intravenous Q6H PRN Rayne PIPE Reddy-EMILIANO      oxyCODONE  2 5 mg Oral Q4H PRN Rayne PIPE Reddy-C      oxyCODONE  5 mg Oral Q4H PRN Rayne PIPE Reddy-EMILIANO      pantoprazole  40 mg Oral Early Morning PIPE Gray-C      phenylephine   mcg/min Intravenous Titrated PIPE Gray-C 10 mcg/min (01/12/22 0640)    polyethylene glycol  17 g Oral Daily PRN Anabelle Art PA-C      potassium chloride  20 mEq Oral TID With Meals Anabelle Art PA-C  temazepam  15 mg Oral HS PRN Patricio Ag PA-C       Continuous Infusions:milrinone [facility]) infusion, 0 13 mcg/kg/min, Last Rate: 0 13 mcg/kg/min (01/12/22 0610)  phenylephine,  mcg/min, Last Rate: 10 mcg/min (01/12/22 0640)      PRN Meds:   acetaminophen    bisacodyl    docusate sodium    ondansetron    oxyCODONE    oxyCODONE    polyethylene glycol    temazepam    Vitals:   Vitals:    01/12/22 0530 01/12/22 0600 01/12/22 0630 01/12/22 0655   BP: 118/70 120/75 127/77 111/68   BP Location:    Left arm   Pulse:    89   Resp:    20   Temp:    (!) 96 9 °F (36 1 °C)   TempSrc:    Oral   SpO2:    97%   Weight:       Height:         Bp x24hrs: 110-120/40-70    Telemetry: NSR w/ occasional PVCs & stable T wave inversion in V5; Heart Rate: 89; no events/24rs    Respiratory:   SpO2: SpO2: 97 %, SpO2 Activity: SpO2 Activity: At Rest, SpO2 Device: O2 Device: None (Room air);  Room Air    Intake/Output:     Intake/Output Summary (Last 24 hours) at 1/12/2022 1055  Last data filed at 1/12/2022 0839  Gross per 24 hour   Intake 1885 55 ml   Output --   Net 1885 55 ml      UOP - 3 unmeasured/8hrs; 6 unmeasured/24hrs    Stool - 4/24hours    Chest tube Output:  CTs & EPWs have been discontnued    Weights:   Weight (last 2 days)     Date/Time Weight    01/11/22 0557 102 (225 53)    01/10/22 0940 103 (226)    01/10/22 0600 103 (226 19)        Admission weight: 93kg - up 9kg from admission weight    Results:   Results from last 7 days   Lab Units 01/12/22  0515 01/11/22  1313 01/11/22  0523   WBC Thousand/uL 16 87* 14 74* 14 19*   HEMOGLOBIN g/dL 10 3* 11 2* 11 4*   HEMATOCRIT % 33 7* 36 7 36 8   PLATELETS Thousands/uL 157 135* 137*     Results from last 7 days   Lab Units 01/12/22  0515 01/11/22  1313 01/11/22  0523 01/07/22 2009 01/07/22  1639   SODIUM mmol/L 148* 148* 148*   < >  --    POTASSIUM mmol/L 3 4* 3 4* 3 5   < >  --    CHLORIDE mmol/L 116* 115* 114*   < >  --    CO2 mmol/L 29 25 31   < >  -- CO2, I-STAT mmol/L  --   --   --   --  31   BUN mg/dL 49* 48* 49*   < >  --    CREATININE mg/dL 1 39* 1 52* 1 21   < >  --    GLUCOSE, ISTAT mg/dl  --   --   --   --  149*   CALCIUM mg/dL 7 6* 7 4* 7 8*   < >  --     < > = values in this interval not displayed  Results from last 7 days   Lab Units 01/12/22  0515 01/11/22  1036   INR  1 21* 1 22*     Coumadin mg 1/11 - 2 5          Point of care glucose: 91 - 138 - 95 - 157 - 97 - 132 - 97    Studies:  CXR 1/11: small left pleural effusion & left basilar atelectasis    I have personally reviewed pertinent reports  and I have personally reviewed pertinent films in PACS    Invasive Lines/Tubes:  Invasive Devices  Report    Peripherally Inserted Central Catheter Line            PICC Line 20/20/87 Right Basilic 6 days                Physical Exam:    HEENT/NECK:  Normocephalic  Atraumatic  Cardiac: Regular rate and rhythm  Pulmonary:  Crackles bilaterally and Poor inspiratory effort  Abdomen:  Non-distended  Incisions: Sternum is stable  Incision is clean, dry, and intact  Extremities: cool extremities, 2+ pitting edema to b/l LE & UE  Neuro: Alert and oriented X 3  Skin: Warm/Dry, without rashes or lesions  Assessment:  Principal Problem:    Cardiogenic shock (HCC)  Active Problems:    Agoraphobia with panic attacks    VSD (ventricular septal defect)    JUDE (acute kidney injury) ([address] )    Transaminitis    Tylenol ingestion    Acute systolic congestive heart failure (HCC)    Anemia    Personal history of ECMO    Coagulopathy (HCC)    Leukocytosis    Central line complication    S/P VSD repair    Thrombocytopenia (HCC)    Hypernatremia    Hyperchloremia    Hypocalcemia     Cardiogenic shock  S/P VA ECMO; POD # 12  VSD/cardiogenic shock  S/P VSD repair w/ VA ECMO decannulation; POD # 8  Retained PA catheter  S/P Redo sternotomy and removal PA catheter w/ CPB; POD # 5    Plan:    1   Cardiac:   NSR; HR/BP well-controlled  Fredy 10 -- wean to off  Continue milrinone 0 125 per HF  INR 1 21, dose Coumadin tonight  Continue ASA and Statin therapy  Epicardial pacing wires out  PICC  Continue DVT prophylaxis    2  Pulmonary:   Good Room air oxygen saturation; Continue incentive spirometry/Coughing/Deep breathing exercises  Chest tubes have been discontinued    3  Renal:   Intake/Output net: (+)1885 6 mL/24 hours -- innacurate I/Os  Diuretic Regimen:  Continue Lasix 40 mg IV TID -- 80mg BID per HF  Continue Potassium Chloride 20 mEq PO TID -- adjust w/ Lasix dosing  JUDE, Cr downtrending, good UOP   Condom cath for accurate I/Os    4  Neuro:  Neurologically intact; No active issues  Incisional pain well-controlled  Continue Tylenol, 975 mg PO q 8, standing dose  Continue Oxycodone, 2 5 to 5 mg PO q 4 hours prn pain    5  GI:  Tolerating TLC 2 3 gm sodium diet  Maintain 1800 mL daily fluid restriction   Continue stool softeners and prn suppository -- holding bowel regimen  Continue GI prophylaxis    6  Endo:   Glucose well-controlled with sliding scale coverage    7    Hematology:    Post-operative acute blood loss anemia; Hemoglobin 10 3; trend prn   Leukocytosis, slightly up, afebrile, likely reactive   Hyperkalemia, replete today   Hypocalcemia    8     Disposition:      Awaiting rehab placement, Anticipate discharge to rehab once more medically stable     VTE Pharmacologic Prophylaxis: Heparin  VTE Mechanical Prophylaxis: sequential compression device    Collaborative rounds completed with supervising physician  Plan of care discussed with bedside nurse    SIGNATURE: Yajaira Andres PA-C  DATE: January 12, 2022  TIME: 10:55 AM

## 2022-01-12 NOTE — PROGRESS NOTES
Cardiology Progress Note - Liang Harrington 61 y o  male MRN: [id number]    Unit/Bed#: [facility] 427-01 Encounter: [id number]      Assessment & Plan:  Principal Problem:    Cardiogenic shock ([facility] [address] )  Active Problems:    Agoraphobia with panic attacks    VSD (ventricular septal defect)    JUDE (acute kidney injury) ([facility] [address] )    Transaminitis    Tylenol ingestion    Acute systolic congestive heart failure (HCC)    Anemia    Personal history of ECMO    Coagulopathy (HCC)    Leukocytosis    Central line complication    S/P VSD repair    Thrombocytopenia (HCC)    Hypernatremia    Hyperchloremia    Hypocalcemia    # Late presenting anterior STEMI c/b anteroapical VSD s/p open patch repair   # Redo sternotomy, mediastinal exploration for retained swan avinash catheter  # HFrEF with EF 35-40% as per my assessment on most recent CORIE  # Hypernatremia    BP: 110s-120s/60s  HR: 60s  UOP not charted, wt not done today      Plan:  - c/w milrinone for today  - Patient needs aggressive diuresis  Restart lasix 40 mg TID  Escalate dose if dose not response  Condom cath for accurate I/O   - Hold off on BB  Giving BB during a low flow state is counterproductive  Once we are able to wean off milrinone after adequate diuresis and SVR reduction low dose BB for long term better long term outcomes can be attempted  - restart low dose hydralazine in the afternoon if patient can tolerate  - obtain VBG  - c/w aspirin and atorvastatin  - monitor sodium levels  Free water  - atorvastatin for afib prevention    Subjective:   Patient feels lethargic this morning  Hydralazine and isordil was stopped yesterday afternoon and patient was started on lisinopril and lopressor  Patient became hypotensive to 60s late afternoon  Lasix, lopressor and lisinopril was held  Patient received low a dose of albumin  Patient was briefly on kyleigh  Objective:     Vitals: Blood pressure 111/68, pulse 89, temperature (!) 96 9 °F (36 1 °C), temperature source Oral, resp   rate 20, height 5' 5 5" (1 664 m), weight 102 kg (225 lb 8 5 oz), SpO2 97 %  , Body mass index is 36 96 kg/m² ,   Orthostatic Blood Pressures      Most Recent Value   Blood Pressure 111/68 filed at 01/12/2022 4698   Patient Position - Orthostatic VS Lying filed at 01/12/2022 7508            Intake/Output Summary (Last 24 hours) at 1/12/2022 6600  Last data filed at 1/12/2022 7824  Gross per 24 hour   Intake 1885 55 ml   Output --   Net 1885 55 ml           Physical Exam:    GEN: Robbie Momin Lethargic  HEENT: anicteric, mucous membranes moist  NECK: JVD elevated, carotid bruits   HEART: regular rhythm, normal S1 and S2, no murmurs, clicks, gallops or rubs   CHEST: Sternotomy incision site healing well  LUNGS: clear to auscultation bilaterally; Crackles present  ABDOMEN: normal bowel sounds, soft, no tenderness, no distention  EXTREMITIES: peripheral pulses normal; 3+ edema, Warm  NEURO: no focal findings    SKIN: normal without suspicious lesions on exposed skin      Current Facility-Administered Medications:     acetaminophen (TYLENOL) tablet 650 mg, 650 mg, Oral, Q6H PRN, Anabelle Art PA-C    acetaminophen (TYLENOL) tablet 975 mg, 975 mg, Oral, Q8H, Anabelle Art PA-C, 975 mg at 01/12/22 0117    amiodarone tablet 200 mg, 200 mg, Oral, Q8H [address], Ruddy Ramos PA-C, 200 mg at 01/12/22 0546    aspirin tablet 325 mg, 325 mg, Oral, Daily, Ruddy Ramos PA-C, 325 mg at 01/11/22 0948    atorvastatin (LIPITOR) tablet 80 mg, 80 mg, Oral, After Dinner, Ruddy Ramos PA-C, 80 mg at 01/11/22 1925    bisacodyl (DULCOLAX) rectal suppository 10 mg, 10 mg, Rectal, Daily PRN, Anabelle Art PA-C    docusate sodium (COLACE) capsule 100 mg, 100 mg, Oral, BID PRN, Anabelle Art PA-C    furosemide (LASIX) injection 40 mg, 40 mg, Intravenous, TID (diuretic), Anabelle Art PA-C, 40 mg at 01/12/22 0546    heparin (porcine) subcutaneous injection 5,000 Units, 5,000 Units, Subcutaneous, Q8H [address], Ruddy Ramos, NOA, 5,000 Units at 01/12/22 0545    insulin lispro (HumaLOG) 100 units/mL subcutaneous injection 1-6 Units, 1-6 Units, Subcutaneous, TID AC **AND** Fingerstick Glucose (POCT), , , TID AC, Jesse Lemus PA-C    insulin lispro (HumaLOG) 100 units/mL subcutaneous injection 1-6 Units, 1-6 Units, Subcutaneous, HS, Jesse Lemus PA-C    milrinone (PRIMACOR) 20 mg in 100 mL infusion (premix), 0 13 mcg/kg/min, Intravenous, Continuous, Gillian Keene MD, Last Rate: 4 mL/hr at 01/12/22 0610, 0 13 mcg/kg/min at 01/12/22 0610    ondansetron (ZOFRAN) injection 4 mg, 4 mg, Intravenous, Q6H PRN, Jesse Lemus PA-C    oxyCODONE (ROXICODONE) IR tablet 2 5 mg, 2 5 mg, Oral, Q4H PRN, Jesse Lemus PA-C    oxyCODONE (ROXICODONE) IR tablet 5 mg, 5 mg, Oral, Q4H PRN, Jesse Lemus PA-C, 5 mg at 01/08/22 2148    pantoprazole (PROTONIX) EC tablet 40 mg, 40 mg, Oral, Early Morning, Jesse Lemus PA-C, 40 mg at 01/12/22 0546    phenylephrine (SE-SYNEPHRINE) 50 mg (STANDARD CONCENTRATION) in sodium chloride 0 9% 250 mL,  mcg/min, Intravenous, Titrated, Jesse Lemus PA-C, Last Rate: 3 mL/hr at 01/12/22 0640, 10 mcg/min at 01/12/22 0640    polyethylene glycol (MIRALAX) packet 17 g, 17 g, Oral, Daily PRN, Anabelle Art PA-C    potassium chloride (K-DUR,KLOR-CON) CR tablet 20 mEq, 20 mEq, Oral, TID With Meals, Anabelle Art PA-C, 20 mEq at 01/11/22 1925    temazepam (RESTORIL) capsule 15 mg, 15 mg, Oral, HS PRN, Jesse Lemus PA-C, 15 mg at 01/09/22 2205    Labs & Results:    No results found for: CKTOTAL, CKMB, CKMBINDEX, TROPONINI    Lab Results   Component Value Date    GLUCOSE 149 (H) 01/07/2022    CALCIUM 7 6 (L) 01/12/2022    K 3 4 (L) 01/12/2022    CO2 29 01/12/2022     (H) 01/12/2022    BUN 49 (H) 01/12/2022    CREATININE 1 39 (H) 01/12/2022       Lab Results   Component Value Date    WBC 16 87 (H) 01/12/2022    HGB 10 3 (L) 01/12/2022    HCT 33 7 (L) 01/12/2022  (H) 01/12/2022     01/12/2022     Results from last 7 days   Lab Units 01/12/22  0515   INR  1 21*       No results found for: CHOL  Lab Results   Component Value Date    HDL 13 (L) 12/30/2021    HDL 38 (L) 09/13/2021     Lab Results   Component Value Date    LDLCALC 107 (H) 12/30/2021    LDLCALC 134 (H) 09/13/2021     Lab Results   Component Value Date    TRIG 152 (H) 12/30/2021    TRIG 137 09/13/2021       Lab Results   Component Value Date    ALT 56 01/12/2022    AST 45 01/12/2022         EKG personally reviewed by )Amirah Spann MD  No acute changes   TELE: No significant arrhythmias seen on telemetry review

## 2022-01-12 NOTE — ARC ADMISSION
[facility] continues to follow patient's case at this time, monitoring for medical stability and functional progress  Will update CM as able

## 2022-01-13 ENCOUNTER — APPOINTMENT (INPATIENT)
Dept: NON INVASIVE DIAGNOSTICS | Facility: HOSPITAL | Age: 67
DRG: 167 | End: 2022-01-13
Payer: COMMERCIAL

## 2022-01-13 PROBLEM — N17.9 AKI (ACUTE KIDNEY INJURY) (HCC): Status: RESOLVED | Noted: 2021-12-30 | Resolved: 2022-01-13

## 2022-01-13 LAB
ANION GAP SERPL CALCULATED.3IONS-SCNC: 2 MMOL/L (ref 4–13)
APICAL FOUR CHAMBER EJECTION FRACTION: 50 %
AV LVOT MEAN GRADIENT: 4 MMHG
AV LVOT PEAK GRADIENT: 6 MMHG
BASE EX.OXY STD BLDV CALC-SCNC: 75.3 % (ref 60–80)
BASE EXCESS BLDV CALC-SCNC: 2.3 MMOL/L
BUN SERPL-MCNC: 42 MG/DL (ref 5–25)
CALCIUM SERPL-MCNC: 7.7 MG/DL (ref 8.3–10.1)
CHLORIDE SERPL-SCNC: 111 MMOL/L (ref 100–108)
CO2 SERPL-SCNC: 29 MMOL/L (ref 21–32)
CREAT SERPL-MCNC: 1.27 MG/DL (ref 0.6–1.3)
DOP CALC LVOT AREA: 2.83 CM2
DOP CALC LVOT DIAMETER: 1.9 CM
DOP CALC LVOT PEAK VEL VTI: 17.7 CM
DOP CALC LVOT PEAK VEL: 1.27 M/S
DOP CALC LVOT STROKE INDEX: 23.8 ML/M2
DOP CALC LVOT STROKE VOLUME: 50.16 CM3
ERYTHROCYTE [DISTWIDTH] IN BLOOD BY AUTOMATED COUNT: 17.6 % (ref 11.6–15.1)
GFR SERPL CREATININE-BSD FRML MDRD: 59 ML/MIN/1.73SQ M
GLUCOSE SERPL-MCNC: 109 MG/DL (ref 65–140)
GLUCOSE SERPL-MCNC: 113 MG/DL (ref 65–140)
GLUCOSE SERPL-MCNC: 150 MG/DL (ref 65–140)
GLUCOSE SERPL-MCNC: 78 MG/DL (ref 65–140)
GLUCOSE SERPL-MCNC: 78 MG/DL (ref 65–140)
HCO3 BLDV-SCNC: 25.9 MMOL/L (ref 24–30)
HCT VFR BLD AUTO: 34.1 % (ref 36.5–49.3)
HGB BLD-MCNC: 10.5 G/DL (ref 12–17)
LACTATE SERPL-SCNC: 1.9 MMOL/L (ref 0.5–2)
MCH RBC QN AUTO: 30.8 PG (ref 26.8–34.3)
MCHC RBC AUTO-ENTMCNC: 30.8 G/DL (ref 31.4–37.4)
MCV RBC AUTO: 100 FL (ref 82–98)
O2 CT BLDV-SCNC: 12.1 ML/DL
PCO2 BLDV: 36.4 MM HG (ref 42–50)
PH BLDV: 7.47 [PH] (ref 7.3–7.4)
PLATELET # BLD AUTO: 137 THOUSANDS/UL (ref 149–390)
PMV BLD AUTO: 13.4 FL (ref 8.9–12.7)
PO2 BLDV: 41.4 MM HG (ref 35–45)
POTASSIUM SERPL-SCNC: 3.7 MMOL/L (ref 3.5–5.3)
RBC # BLD AUTO: 3.41 MILLION/UL (ref 3.88–5.62)
SL CV LV EF: 55
SODIUM SERPL-SCNC: 142 MMOL/L (ref 136–145)
TRICUSPID VALVE PEAK REGURGITATION VELOCITY: 2.69 M/S
TV PEAK GRADIENT: 29 MMHG
WBC # BLD AUTO: 10.51 THOUSAND/UL (ref 4.31–10.16)

## 2022-01-13 PROCEDURE — 80048 BASIC METABOLIC PNL TOTAL CA: CPT | Performed by: PHYSICIAN ASSISTANT

## 2022-01-13 PROCEDURE — 99231 SBSQ HOSP IP/OBS SF/LOW 25: CPT | Performed by: INTERNAL MEDICINE

## 2022-01-13 PROCEDURE — 93321 DOPPLER ECHO F-UP/LMTD STD: CPT | Performed by: INTERNAL MEDICINE

## 2022-01-13 PROCEDURE — 85027 COMPLETE CBC AUTOMATED: CPT | Performed by: PHYSICIAN ASSISTANT

## 2022-01-13 PROCEDURE — 97530 THERAPEUTIC ACTIVITIES: CPT

## 2022-01-13 PROCEDURE — 93325 DOPPLER ECHO COLOR FLOW MAPG: CPT | Performed by: INTERNAL MEDICINE

## 2022-01-13 PROCEDURE — 93321 DOPPLER ECHO F-UP/LMTD STD: CPT

## 2022-01-13 PROCEDURE — 93325 DOPPLER ECHO COLOR FLOW MAPG: CPT

## 2022-01-13 PROCEDURE — 99024 POSTOP FOLLOW-UP VISIT: CPT | Performed by: THORACIC SURGERY (CARDIOTHORACIC VASCULAR SURGERY)

## 2022-01-13 PROCEDURE — 82805 BLOOD GASES W/O2 SATURATION: CPT | Performed by: STUDENT IN AN ORGANIZED HEALTH CARE EDUCATION/TRAINING PROGRAM

## 2022-01-13 PROCEDURE — 93308 TTE F-UP OR LMTD: CPT | Performed by: INTERNAL MEDICINE

## 2022-01-13 PROCEDURE — 83605 ASSAY OF LACTIC ACID: CPT | Performed by: STUDENT IN AN ORGANIZED HEALTH CARE EDUCATION/TRAINING PROGRAM

## 2022-01-13 PROCEDURE — 93308 TTE F-UP OR LMTD: CPT

## 2022-01-13 PROCEDURE — 82948 REAGENT STRIP/BLOOD GLUCOSE: CPT

## 2022-01-13 RX ORDER — BUMETANIDE 0.25 MG/ML
2 INJECTION, SOLUTION INTRAMUSCULAR; INTRAVENOUS 3 TIMES DAILY
Status: DISCONTINUED | OUTPATIENT
Start: 2022-01-13 | End: 2022-01-14

## 2022-01-13 RX ADMIN — BUMETANIDE 2 MG: 0.25 INJECTION INTRAMUSCULAR; INTRAVENOUS at 21:43

## 2022-01-13 RX ADMIN — AMIODARONE HYDROCHLORIDE 200 MG: 200 TABLET ORAL at 05:26

## 2022-01-13 RX ADMIN — FUROSEMIDE 80 MG: 10 INJECTION, SOLUTION INTRAMUSCULAR; INTRAVENOUS at 08:35

## 2022-01-13 RX ADMIN — PANTOPRAZOLE SODIUM 40 MG: 40 TABLET, DELAYED RELEASE ORAL at 05:26

## 2022-01-13 RX ADMIN — TEMAZEPAM 15 MG: 15 CAPSULE ORAL at 21:44

## 2022-01-13 RX ADMIN — OXYCODONE HYDROCHLORIDE 2.5 MG: 5 TABLET ORAL at 05:25

## 2022-01-13 RX ADMIN — ACETAMINOPHEN 975 MG: 325 TABLET, FILM COATED ORAL at 16:19

## 2022-01-13 RX ADMIN — BUMETANIDE 2 MG: 0.25 INJECTION INTRAMUSCULAR; INTRAVENOUS at 16:19

## 2022-01-13 RX ADMIN — AMIODARONE HYDROCHLORIDE 200 MG: 200 TABLET ORAL at 13:16

## 2022-01-13 RX ADMIN — ATORVASTATIN CALCIUM 80 MG: 80 TABLET, FILM COATED ORAL at 17:38

## 2022-01-13 RX ADMIN — POTASSIUM CHLORIDE 40 MEQ: 1500 TABLET, EXTENDED RELEASE ORAL at 08:34

## 2022-01-13 RX ADMIN — ASPIRIN 325 MG ORAL TABLET 325 MG: 325 PILL ORAL at 08:35

## 2022-01-13 RX ADMIN — HEPARIN SODIUM 5000 UNITS: 5000 INJECTION INTRAVENOUS; SUBCUTANEOUS at 13:16

## 2022-01-13 RX ADMIN — MILRINONE LACTATE IN DEXTROSE 0.25 MCG/KG/MIN: 200 INJECTION, SOLUTION INTRAVENOUS at 20:11

## 2022-01-13 RX ADMIN — POTASSIUM CHLORIDE 40 MEQ: 1500 TABLET, EXTENDED RELEASE ORAL at 17:39

## 2022-01-13 RX ADMIN — MILRINONE LACTATE IN DEXTROSE 0.25 MCG/KG/MIN: 200 INJECTION, SOLUTION INTRAVENOUS at 17:39

## 2022-01-13 RX ADMIN — HEPARIN SODIUM 5000 UNITS: 5000 INJECTION INTRAVENOUS; SUBCUTANEOUS at 05:26

## 2022-01-13 RX ADMIN — AMIODARONE HYDROCHLORIDE 200 MG: 200 TABLET ORAL at 21:44

## 2022-01-13 RX ADMIN — ACETAMINOPHEN 975 MG: 325 TABLET, FILM COATED ORAL at 08:35

## 2022-01-13 RX ADMIN — HEPARIN SODIUM 5000 UNITS: 5000 INJECTION INTRAVENOUS; SUBCUTANEOUS at 21:43

## 2022-01-13 RX ADMIN — MILRINONE LACTATE IN DEXTROSE 0.13 MCG/KG/MIN: 200 INJECTION, SOLUTION INTRAVENOUS at 07:41

## 2022-01-13 NOTE — PLAN OF CARE
Problem: PHYSICAL THERAPY ADULT  Goal: Performs mobility at highest level of function for planned discharge setting  See evaluation for individualized goals  Description: Treatment/Interventions: Functional transfer training,LE strengthening/ROM,Elevations,Therapeutic exercise,Endurance training,Equipment eval/education,Bed mobility,Gait training,Spoke to nursing,OT,Cognitive reorientation  Equipment Recommended:  (r/o rw next visit)       See flowsheet documentation for full assessment, interventions and recommendations  Outcome: Progressing  Note: Prognosis: Fair  Problem List: Decreased strength,Decreased endurance,Impaired balance,Decreased mobility  Assessment: PT session limited today 2* pt's dizziness and limited activity tolerance  Pt continues to require Ax2 for supine <> sit transfers and once EOB requires modA for trunk control  Pt only able to tolerate sitting EOB ~2 minutes before requesting to return to supine 2* dizziness  BP assessed once back in supine, WNL  LE therex deferred due to pt's lethargy and request to rest at this time  Pt would benefit from continued acute skilled PT to address above deficits and progress to OOB transfers  Continuing to recommend rehab upon d/c    Barriers to Discharge: Inaccessible home environment        PT Discharge Recommendation: Post acute rehabilitation services          See flowsheet documentation for full assessment

## 2022-01-13 NOTE — PROGRESS NOTES
Progress Note - Cardiothoracic Surgery   Vladimir Edwards 61 y o  male MRN: [id number]  Unit/Bed#: [facility] 427-01 Encounter: [id number]  Cardiogenic shock  S/P VA ECMO; POD # 13  VSD/cardiogenic shock  S/P VSD repair w/ VA ECMO decannulation; POD # 9  Retained PA catheter  S/P Redo sternotomy and removal PA catheter w/ CPB; POD # 6    24 Hour Events: Milrinone w/ desire to be increased by HF, no orders placed, remains on 0 13 this AM  Unable to add afterload reduction yesterday due to intermittent kyleigh requirements  No other events  Feeling tired overall      Medications:   Scheduled Meds:  Current Facility-Administered Medications   Medication Dose Route Frequency Provider Last Rate    acetaminophen  650 mg Oral Q6H PRN Anabelle Art PA-C      acetaminophen  975 mg Oral Q8H Anabelle Art PA-C      amiodarone  200 mg Oral Q8H [address] Abe Jo PA-C      aspirin  325 mg Oral Daily Abe Jo PA-C      atorvastatin  80 mg Oral After [employer]      bisacodyl  10 mg Rectal Daily PRN Anabelle Art PA-C      docusate sodium  100 mg Oral BID PRN Anabelle Art PA-C      furosemide  80 mg Intravenous BID (diuretic) Anabelle Art PA-C      heparin (porcine)  5,000 Units Subcutaneous [facility] Abe Jo PA-C      insulin lispro  1-6 Units Subcutaneous TID AC Abe Jo PA-C      insulin lispro  1-6 Units Subcutaneous HS Abe Jo PA-C      milrinone [facility]) infusion  0 13 mcg/kg/min Intravenous Continuous Anabel Hall MD 0 13 mcg/kg/min (01/12/22 2000)    ondansetron  4 mg Intravenous Q6H PRN Abe Jo PA-C      oxyCODONE  2 5 mg Oral Q4H PRN Abe Jo PA-C      oxyCODONE  5 mg Oral Q4H PRN Abe Jo PA-C      pantoprazole  40 mg Oral Early Morning Abe Jo PA-C      phenylephine   mcg/min Intravenous Titrated Abe Jo PA-C Stopped (01/12/22 0730)    polyethylene glycol  17 g Oral Daily PRN Anabelle Art PA-C      potassium chloride  40 mEq Oral BID Anabelle Art PA-C      temazepam  15 mg Oral HS PRN Balbina Beauchamp PA-C       Continuous Infusions:milrinone [facility]) infusion, 0 13 mcg/kg/min, Last Rate: 0 13 mcg/kg/min (01/12/22 2000)  phenylephine,  mcg/min, Last Rate: Stopped (01/12/22 0730)      PRN Meds:   acetaminophen    bisacodyl    docusate sodium    ondansetron    oxyCODONE    oxyCODONE    polyethylene glycol    temazepam    Vitals:   Vitals:    01/13/22 0300 01/13/22 0345 01/13/22 0530 01/13/22 0700   BP: 99/58 96/59  102/62   Pulse: 82   87   Resp:    19   Temp: 97 7 °F (36 5 °C)   (!) 96 5 °F (35 8 °C)   TempSrc:    Oral   SpO2: 99%   100%   Weight:   103 kg (227 lb 11 8 oz)    Height:         Bp x24hrs: /50-60    Telemetry: NSR w/ stable T wave inversions in N5; Heart Rate: 87; 2 & 3 & 2 runs of NSVT    Respiratory:   SpO2: SpO2: 100 %, SpO2 Activity: SpO2 Activity: At Rest, SpO2 Device: O2 Device: None (Room air);  Room Air    Intake/Output:     Intake/Output Summary (Last 24 hours) at 1/13/2022 0731  Last data filed at 1/13/2022 0400  Gross per 24 hour   Intake 410 ml   Output 874 ml   Net -464 ml      UOP - 275cc w/ 1 unmeasured occurrence & 1 unmeasured shift    Stool - 4/24hrs    Chest tube Output:  CTs & EPWs have been discontinued    Weights:   Weight (last 2 days)       Date/Time Weight    01/13/22 0530 103 (227 74)    01/11/22 0557 102 (225 53)          Admission weight: 93kg - up 10kg from admission weight    Results:   CBC & BMP PENDING  Results from last 7 days   Lab Units 01/12/22  0515 01/11/22  1313 01/11/22  0523   WBC Thousand/uL 16 87* 14 74* 14 19*   HEMOGLOBIN g/dL 10 3* 11 2* 11 4*   HEMATOCRIT % 33 7* 36 7 36 8   PLATELETS Thousands/uL 157 135* 137*     Results from last 7 days   Lab Units 01/12/22  1452 01/12/22  0515 01/11/22  1313 01/11/22  0523 01/11/22  0523 01/07/22 2009 01/07/22  1639   SODIUM mmol/L  --  148* 148*  --  148*   < >  --    POTASSIUM mmol/L 4 0 3 4* 3 4*   < > 3 5   < >  --    CHLORIDE mmol/L  --  116* 115*  --  114*   < >  --    CO2 mmol/L  --  29 25  --  31   < >  --    CO2, I-STAT mmol/L  --   --   --   --   --   --  31   BUN mg/dL  --  49* 48*  --  49*   < >  --    CREATININE mg/dL  --  1 39* 1 52*  --  1 21   < >  --    GLUCOSE, ISTAT mg/dl  --   --   --   --   --   --  149*   CALCIUM mg/dL  --  7 6* 7 4*  --  7 8*   < >  --     < > = values in this interval not displayed  Results from last 7 days   Lab Units 01/12/22  0515 01/11/22  1036   INR  1 21* 1 22*     Point of care glucose: 78 - 124 - 130 - 114 - 91 - 138 - 95    Studies:  No new studies    I have personally reviewed pertinent reports  and I have personally reviewed pertinent films in PACS    Invasive Lines/Tubes:  Invasive Devices  Report      Peripherally Inserted Central Catheter Line              PICC Line 51/37/45 Right Basilic 6 days              Drain              External Urinary Catheter Small <1 day                    Physical Exam:    HEENT/NECK:  Normocephalic  Atraumatic  Cardiac: Regular rate and rhythm and No murmurs/rubs/gallops  Pulmonary:  anterolatera llung fields CTA, poor inspiratory effort  Abdomen:  Non-tender, Non-distended and Hypo-active bowel sounds  Incisions: Sternum is stable  Incision is clean, dry, and intact  and Groin puncture sites clean, dry, and intact without hematoma  Extremities: Extremities warm/dry and 2+ pitting edema B/L to UE & LE  Neuro: Alert and oriented X 3  Skin: Warm/Dry, without rashes or lesions      Assessment:  Principal Problem:    Cardiogenic shock (HCC)  Active Problems:    Agoraphobia with panic attacks    VSD (ventricular septal defect)    JUDE (acute kidney injury) ([facility] [address] )    Transaminitis    Tylenol ingestion    Acute systolic congestive heart failure (HCC)    Anemia    Personal history of ECMO    Coagulopathy (HCC)    Leukocytosis    Central line complication    S/P VSD repair Thrombocytopenia (HCC)    Hypernatremia    Hyperchloremia    Hypocalcemia     Cardiogenic shock  S/P VA ECMO; POD # 13  VSD/cardiogenic shock  S/P VSD repair w/ VA ECMO decannulation; POD # 9  Retained PA catheter  S/P Redo sternotomy and removal PA catheter w/ CPB; POD # 6    Plan:    1  Cardiac:   NSR; HR/BP well-controlled  Possible start afterload reduction today pending HF input  Milrinone 0 13 per HF  Continue ASA and Statin therapy  Epicardial pacing wires out  PICC  Continue DVT prophylaxis    2  Pulmonary:   Good Room air oxygen saturation; Continue incentive spirometry/Coughing/Deep breathing exercises  Chest tubes have been discontinued    3  Renal:   Intake/Output net: (-)456 5 mL/24 hours -- innacurate I/Os  Diuretic Regimen:  Continue Lasix 80 mg IV BID  Continue Potassium Chloride 40 mEq PO BID  BMP pending    4  Neuro:  Neurologically intact; No active issues  Incisional pain well-controlled  Continue Tylenol, 975 mg PO q 8, standing dose  Continue Oxycodone, 2 5 to 5 mg PO q 4 hours prn pain    5  GI:  Tolerating TLC 2 3 gm sodium diet  Maintain 1800 mL daily fluid restriction   Continue stool softeners and prn suppository -- has been off bowel regimen for 2 days, d/w surgeon checking c diff  Continue GI prophylaxis    6  Endo:   Glucose well-controlled with sliding scale coverage    7    Hematology:    CBC pending    8     Disposition:      Awaiting rehab placement, Anticipate discharge to rehab once cleared by HF     VTE Pharmacologic Prophylaxis: Heparin  VTE Mechanical Prophylaxis: sequential compression device    Collaborative rounds completed with supervising physician  Plan of care discussed with bedside nurse    SIGNATURE: Talita Nathan PA-C  DATE: January 13, 2022  TIME: 7:31 AM

## 2022-01-13 NOTE — PHYSICAL THERAPY NOTE
Physical Therapy Treatment Note    Patient's Name: Sejal Sandoval  : 22 1046   PT Last Visit   PT Visit Date 22   Note Type   Note Type Treatment   End of Consult   Patient Position at End of Consult Supine; All needs within reach   Pain Assessment   Pain Assessment Tool 0-10   Pain Score No Pain   Restrictions/Precautions   Other Precautions Cardiac/sternal;Multiple lines;Telemetry; Fall Risk   General   Chart Reviewed Yes   Family/Caregiver Present No   Cognition   Overall Cognitive Status Impaired   Arousal/Participation Alert; Cooperative   Attention Attends with cues to redirect   Orientation Level Oriented X4   Following Commands Follows one step commands with increased time or repetition   Comments pt is pleasant and cooperative, but very lethargic   Bed Mobility   Supine to Sit 2  Maximal assistance   Additional items Assist x 2;Bedrails;HOB elevated; Increased time required;Verbal cues;LE management   Sit to Supine 2  Maximal assistance   Additional items Assist x 2; Increased time required;Verbal cues;LE management   Additional Comments Pt required modA for trunk control at EOB  Only able to tolerate sitting EOB ~2 minutes, reported dizziness, returned to supine, BP assessed and WNL  Transfers   Sit to Stand Unable to assess  (2* dizziness while seated EOB)   Ambulation/Elevation   Gait pattern Not appropriate   Balance   Static Sitting Poor +   Dynamic Sitting Poor -   Activity Tolerance   Activity Tolerance Patient limited by fatigue; Other (Comment)  (dizziness)   Medical Staff Made Aware Seen with OT for co-treatment 2* pt's medical complexity and limited activity tolerance  PT focus on functional transfers and sitting balance   Nurse Made Aware ok to see per RN   Exercises   Balance training  supine therex deferred 2* pt fatigue/lethargy   Assessment   Prognosis Fair   Problem List Decreased strength;Decreased endurance; Impaired balance;Decreased mobility Assessment PT session limited today 2* pt's dizziness and limited activity tolerance  Pt continues to require Ax2 for supine <> sit transfers and once EOB requires modA for trunk control  Pt only able to tolerate sitting EOB ~2 minutes before requesting to return to supine 2* dizziness  BP assessed once back in supine, WNL  LE therex deferred due to pt's lethargy and request to rest at this time  Pt would benefit from continued acute skilled PT to address above deficits and progress to OOB transfers  Continuing to recommend rehab upon d/c  Goals   Patient Goals to feel better   [facility] Expiration Date 01/17/22   PT Treatment Day 2   Plan   Treatment/Interventions Functional transfer training;LE strengthening/ROM; Therapeutic exercise; Endurance training;Equipment eval/education; Bed mobility;Spoke to nursing;OT   Progress Slow progress, decreased activity tolerance   PT Frequency 4-6x/wk   Recommendation   PT Discharge Recommendation Post acute rehabilitation services   AM-PAC Basic Mobility Inpatient   Turning in Bed Without Bedrails 2   Lying on Back to Sitting on Edge of Flat Bed 1   Moving Bed to Chair 1   Standing Up From Chair 1   Walk in Room 1   Climb 3-5 Stairs 1   Basic Mobility Inpatient Raw Score 7   Turning Head Towards Sound 4   Follow Simple Instructions 3   Low Function Basic Mobility Raw Score 14   Low Function Basic Mobility Standardized Score 22 01   Highest Level Of Mobility   JH-HLM Goal 2: Bed activities/Dependent transfer   JH-HLM Highest Level of Mobility 3: Sit at edge of bed   JH-HLM Goal Achieved Yes   End of Consult   Patient Position at End of Consult Supine; All needs within reach       Thuy Chow, PT, DPT

## 2022-01-13 NOTE — OCCUPATIONAL THERAPY NOTE
Occupational Therapy Progress Note     Patient Name: Tommie Hays  VUBLT'W Date: 1/13/2022  Problem List  Principal Problem:    Cardiogenic shock ([address] )  Active Problems:    Agoraphobia with panic attacks    VSD (ventricular septal defect)    Transaminitis    Tylenol ingestion    Acute systolic congestive heart failure (HCC)    Anemia    Personal history of ECMO    Coagulopathy (HCC)    Leukocytosis    Central line complication    S/P VSD repair    Thrombocytopenia (HCC)    Hypernatremia    Hyperchloremia    Hypocalcemia            01/13/22 1045   OT Last Visit   OT Visit Date 01/13/22   Note Type   Note Type Treatment   Restrictions/Precautions   Other Precautions Cardiac/sternal;Multiple lines;Telemetry; Fall Risk;Pain   General   Response to Previous Treatment Patient with no complaints from previous session   Lifestyle   Autonomy Pt reports being I with ADLS, IADLS and mobility without device PTA  (+)    Reciprocal Relationships Pt lives with his brother and sister in law   Service to Others Retired   [address] Enjoys realxing   Pain Assessment   Pain Assessment Tool 0-10   Pain Score No Pain   ADL   Where Assessed Supine, bed   LB Dressing Assistance 2  Maximal Assistance   LB Dressing Deficit Setup;Supervision/safety; Increased time to complete   Bed Mobility   Supine to Sit 2  Maximal assistance   Additional items Assist x 2; Increased time required;Verbal cues;LE management   Sit to Supine 2  Maximal assistance   Additional items Assist x 2; Increased time required;Verbal cues;LE management   Additional Comments Pt sat EOB with mod A for trunk control  Only able to tolerate sitting for ~2 minutes before asking to return to supoine 2* dizziness  BP WFL  After OT session pt in bed with all needs within reach      Transfers   Sit to Stand Unable to assess   Stand to Sit Unable to assess   Cognition   Overall Cognitive Status Impaired   Arousal/Participation Responsive;Arousable   Attention Attends with cues to redirect   Orientation Level Oriented X4   Memory Decreased recall of precautions   Following Commands Follows one step commands with increased time or repetition   Comments Pt presents lethargic, but overall pleasant and cooperative  Activity Tolerance   Activity Tolerance Patient limited by fatigue   Medical Staff Made Aware RN confirmed okay to see pt    Assessment   Assessment Patient participated in Skilled OT session this date with interventions consisting of ADL re training with the use of correct body mechanics, deep breathing technique, maintaining sternal precautions,  therapeutic activities to: increase activity tolerance, increase postural control, increase trunk control and increase OOB/ sitting tolerance   Patient agreeable to OT treatment session, upon arrival patient was found supine in bed  Patient requiring frequent rest periods  Patient continues to be functioning below baseline level, occupational performance remains limited secondary to factors listed above and increased risk for falls and injury  From OT standpoint, recommendation at time of d/c would be Short Term Rehab  Patient to benefit from continued Occupational Therapy treatment while in the hospital to address deficits as defined above and maximize level of functional independence with ADLs and functional mobility  Plan   Treatment Interventions ADL retraining;Functional transfer training;UE strengthening/ROM; Cognitive reorientation;Cardiac education   Goal Expiration Date 01/21/22   OT Treatment Day 2   OT Frequency 3-5x/wk   Recommendation   OT Discharge Recommendation Post acute rehabilitation services   OT - OK to Discharge Yes  (When medically appropriate)   AM-PAC Daily Activity Inpatient   Lower Body Dressing 2   Bathing 2   Toileting 2   Upper Body Dressing 2   Grooming 3   Eating 3   Daily Activity Raw Score 14   Daily Activity Standardized Score (Calc for Raw Score >=11) 33 39   AM-PAC Applied Cognition Inpatient   Following a Speech/Presentation 2   Understanding Ordinary Conversation 3   Taking Medications 3   Remembering Where Things Are Placed or Put Away 3   Remembering List of 4-5 Errands 2   Taking Care of Complicated Tasks 2   Applied Cognition Raw Score 15   Applied Cognition Standardized Score 33 54   Modified Mk Scale   Modified Mk Scale 4       Shamika Luna, EUSEBIO, OTR/L

## 2022-01-13 NOTE — UTILIZATION REVIEW
Continued Stay Review    Date: 01/13/2022                         Current Patient Class: Inpatient  Current Level of Care: Level 1 stepdown    HPI:66 y o  male initially admitted on 12/30/2022   Cardiogenic shock  S/P VA ECMO; POD # 13  VSD/cardiogenic shock  S/P VSD repair w/ VA ECMO decannulation; POD # 9  Retained PA catheter  S/P Redo sternotomy and removal PA catheter w/ CPB; POD # 6  Assessment/Plan:  Milrinone w/ desire to be increased by HF, no orders placed, remains on 0 13 this AM  Unable to add afterload reduction yesterday due to intermittent kyleigh requirements  Feeling tired overall  Continue IV Primacor and IV kyleigh-synephrine gtts  Continue Diuresis:  IV lasix 80mg BID & PO KCL  CV diet with fld restriction 1800ml  Up with assistance / Up as tolerated / POD#1 OOB to chair and for all meals  Ambulate q8h  PT/OT  VTE Pharmacologic Prophylaxis: Heparin  VTE Mechanical Prophylaxis: sequential compression device    Vital Signs: BP (!) 89/61   Pulse 90   Temp (!) 97 °F (36 1 °C) (Oral)   Resp 20   Ht 5' 5 5" (1 664 m)   Wt 103 kg (227 lb 11 8 oz)   SpO2 100%   BMI 37 32 kg/m²     Pertinent Labs/Diagnostic Results:     Results from last 7 days   Lab Units 01/13/22  0843 01/12/22  0515 01/11/22  1313 01/11/22  0523 01/11/22  0523 01/10/22  0407 01/10/22  0407   WBC Thousand/uL 10 51* 16 87* 14 74*   < > 14 19*   < > 10 53*   HEMOGLOBIN g/dL 10 5* 10 3* 11 2*  --  11 4*  --  10 4*   HEMATOCRIT % 34 1* 33 7* 36 7  --  36 8  --  34 1*   PLATELETS Thousands/uL 137* 157 135*   < > 137*   < > 118*    < > = values in this interval not displayed       Results from last 7 days   Lab Units 01/13/22  0843 01/12/22  1452 01/12/22  0515 01/11/22  1313 01/11/22  2268 01/10/22  0429 01/10/22  0429 01/10/22  0407 01/09/22  2119 01/09/22  1516 01/09/22  0422 01/08/22  3253 01/08/22  6384 01/07/22  1637 01/07/22  1529 01/07/22  1459 01/07/22  1459 01/07/22  1207 01/07/22  0441 01/07/22  0004 01/06/22  1935 SODIUM mmol/L 142  --  148* 148* 148*  --  147*  --    < >   < > 151*   < > 151*   < >  --   --   --    < >  --    < > 152*   POTASSIUM mmol/L 3 7 4 0 3 4* 3 4* 3 5   < > 3 7  --    < >   < > 2 9*   < > 4 1   < >  --   --   --    < >  --    < > 3 5   CHLORIDE mmol/L 111*  --  116* 115* 114*  --  115*  --    < >   < > 115*   < > 116*   < >  --   --   --    < >  --    < > 115*   CO2 mmol/L 29  --  29 25 31  --  30  --    < >   < > 32   < > 28   < >  --   --   --    < >  --    < > 32   CO2, I-STAT   --   --   --   --   --   --   --   --   --   --   --   --   --    < > 29   < > 39*   < >  --   --   --    ANION GAP mmol/L 2*  --  3* 8 3*  --  2*  --    < >   < > 4   < > 7   < >  --   --   --    < >  --    < > 5   BUN mg/dL 42*  --  49* 48* 49*  --  54*  --    < >   < > 80*   < > 82*   < >  --   --   --    < >  --    < > 81*   CREATININE mg/dL 1 27  --  1 39* 1 52* 1 21  --  1 24  --    < >   < > 1 83*   < > 2 15*   < >  --   --   --    < >  --    < > 2 03*   EGFR ml/min/1 73sq m 59  --  53 47 62  --  61  --    < >   < > 38   < > 31   < >  --   --   --    < >  --    < > 33   CALCIUM mg/dL 7 7*  --  7 6* 7 4* 7 8*  --  8 1*  --    < >   < > 7 7*   < > 8 1*   < >  --   --   --    < >  --    < > 8 3   CALCIUM, IONIZED mmol/L  --   --   --   --   --   --   --  1 08*  --   --  1 04*  --  1 04*  --   --   --   --    < > 0 97*  --   --    CALCIUM, IONIZED, ISTAT mmol/L  --   --   --   --   --   --   --   --   --   --   --   --   --   --  1 17  --  1 21   < >  --   --   --    MAGNESIUM mg/dL  --   --   --  2 3 2 4  --   --  2 6  --   --  2 9*  --  3 7*  3 7*  --   --   --   --   --  2 8*   < > 3 0*   PHOSPHORUS mg/dL  --   --   --   --   --   --   --  2 4  --   --  3 5  --  5 2*  --   --   --   --   --  3 5  --  4 2*    < > = values in this interval not displayed       Results from last 7 days   Lab Units 01/12/22  0515 01/10/22  0429 01/09/22  0422 01/08/22  0426 01/07/22  0441   AST U/L 45 52* 54* 65* 84*   ALT U/L 56 40 35 53 74   ALK PHOS U/L 159* 152* 115 114 133*   TOTAL PROTEIN g/dL 5 4* 4 9* 4 9* 5 0* 5 6*   ALBUMIN g/dL 2 5* 2 2* 2 3* 2 6* 2 9*   TOTAL BILIRUBIN mg/dL 1 76* 1 79* 1 47* 2 55* 4 27*   BILIRUBIN DIRECT mg/dL  --   --   --  1 16* 2 32*     Results from last 7 days   Lab Units 01/13/22  1047 01/13/22  0542 01/12/22  2104 01/12/22  1630 01/12/22  1104 01/12/22  0641 01/11/22  2127 01/11/22  1639 01/11/22  1316 01/11/22  1039 01/11/22  0554 01/10/22  2059   POC GLUCOSE mg/dl 78 78 124 130 114 91 138 95 157* 97 132 97     Results from last 7 days   Lab Units 01/13/22  0843 01/12/22  0515 01/11/22  1313 01/11/22  0523 01/10/22  0429 01/09/22  2119 01/09/22  1516 01/09/22  0422 01/08/22  0426 01/07/22  1637 01/07/22  1207 01/07/22  0416   GLUCOSE RANDOM mg/dL 150* 128 152* 85 101 106 374* 115 122 145* 127 113      Results from last 7 days   Lab Units 01/11/22  1313 01/11/22  0523 01/10/22  1305   PH JANNET  7 437* 7 494* 7 451*   PCO2 JANNET mm Hg 38 9* 35 3* 40 0*   PO2 JANNET mm Hg 27 2* 29 8* 25 4*   HCO3 JANNET mmol/L 25 7 26 5 27 2   BASE EXC JANNET mmol/L 1 5 3 4 3 1   O2 CONTENT JANNET ml/dL 7 8 9 7 7 3   O2 HGB, VENOUS % 48 4* 53 3* 43 2*     Results from last 7 days   Lab Units 01/07/22  1639 01/07/22  1529 01/07/22  1459 01/07/22  1443 01/07/22  1443   PH, JANNET I-STAT   --   --   --   --  7 464*   PCO2, JANNET ISTAT mm HG  --   --   --   --  46 0   PO2, JANNET ISTAT mm HG  --   --   --   --  37 0   HCO3, JANNET ISTAT mmol/L  --   --   --   --  33 0*   I STAT BASE EXC mmol/L 5* 6* 14*   < > 8*   I STAT O2 SAT % 100* 99*  --   --  73   ISTAT PH ART  7 437 7 578* 7 524*   < >  --    I STAT ART PCO2 mm HG 43 6 30 6* 45 8*   < >  --    I STAT ART PO2 mm  0* 98 0 >400 0*   < >  --    I STAT ART HCO3 mmol/L 29 4* 28 5* 37 7*   < >  --     < > = values in this interval not displayed       Results from last 7 days   Lab Units 01/12/22  0515 01/11/22  1036   PROTIME seconds 14 8* 14 9*   INR  1 21* 1 22*     Medications:   Scheduled Medications:  acetaminophen, 975 mg, Oral, Q8H  amiodarone, 200 mg, Oral, Q8H DENITA  aspirin, 325 mg, Oral, Daily  atorvastatin, 80 mg, Oral, After Dinner  furosemide, 80 mg, Intravenous, BID (diuretic)  heparin (porcine), 5,000 Units, Subcutaneous, Q8H DENITA  insulin lispro, 1-6 Units, Subcutaneous, TID AC  insulin lispro, 1-6 Units, Subcutaneous, HS  pantoprazole, 40 mg, Oral, Early Morning  potassium chloride, 40 mEq, Oral, BID      Continuous IV Infusions:  milrinone (PRIMACOR) infusion, 0 38 mcg/kg/min, Intravenous, Continuous  phenylephine,  mcg/min, Intravenous, Titrated      PRN Meds:  acetaminophen, 650 mg, Oral, Q6H PRN  bisacodyl, 10 mg, Rectal, Daily PRN  docusate sodium, 100 mg, Oral, BID PRN  ondansetron, 4 mg, Intravenous, Q6H PRN  oxyCODONE, 2 5 mg, Oral, Q4H PRN  oxyCODONE, 5 mg, Oral, Q4H PRN  polyethylene glycol, 17 g, Oral, Daily PRN  temazepam, 15 mg, Oral, HS PRN        Discharge Plan: D    Network Utilization Review Department  ATTENTION: Please call with any questions or concerns to [phone] and carefully listen to the prompts so that you are directed to the right person  All voicemails are confidential   Sonya Hawkins all requests for admission clinical reviews, approved or denied determinations and any other requests to dedicated fax number below belonging to the campus where the patient is receiving treatment   List of dedicated fax numbers for the Facilities:  [address] DENIALS (Administrative/Medical Necessity) [phone]   [address] [place] [address] (Maternity/NICU/Pediatrics) 261 [facility],7Th Floor [facility] 40 125 [facility]  [address] [facility] [place] Sunil Marin 1277 [facility] 203 [place] [address] Whit Potts 1481 [address] [facility]82 [facility] [phone]

## 2022-01-13 NOTE — PROGRESS NOTES
Cardiology Progress Note - Mahesh Hernández 61 y o  male MRN: [id number]    Unit/Bed#: [facility] 427-01 Encounter: [id number]      Assessment & Plan:  Principal Problem:    Cardiogenic shock ([address] )  Active Problems:    Agoraphobia with panic attacks    VSD (ventricular septal defect)    Transaminitis    Tylenol ingestion    Acute systolic congestive heart failure (HCC)    Anemia    Personal history of ECMO    Coagulopathy (HCC)    Leukocytosis    Central line complication    S/P VSD repair    Thrombocytopenia (HCC)    Hypernatremia    Hyperchloremia    Hypocalcemia    # Late presenting anterior STEMI c/b anteroapical VSD s/p open patch repair   # Redo sternotomy, mediastinal exploration for retained swan avinash catheter  # HFrEF with EF 35-40%     BP: 110s-120s/60s  HR: 60s BPM  UOP ~ 800 cc yesterday     Plan:  - Increase milrinone to 0 375 mcg/kg/min  - Patient needs aggressive diuresis  Lasix 80 mg BID  Escalate dose if dose not response  Condom cath for accurate I/O   - Hold off on BB  Giving BB during a low flow state is counterproductive  Once we are able to wean off milrinone after adequate diuresis and SVR reduction low dose BB for long term better long term outcomes can be attempted  - obtain VBG  - c/w aspirin and atorvastatin  - monitor sodium levels  Free water  - atorvastatin for afib prevention    Subjective:   Patient continues to be lethargic this morning  He required intermittent kyleigh yesterday  Objective:     Vitals: Blood pressure 102/62, pulse 87, temperature (!) 96 5 °F (35 8 °C), temperature source Oral, resp  rate 19, height 5' 5 5" (1 664 m), weight 103 kg (227 lb 11 8 oz), SpO2 100 %  , Body mass index is 37 32 kg/m² ,   Orthostatic Blood Pressures      Most Recent Value   Blood Pressure 102/62 filed at 01/13/2022 0700   Patient Position - Orthostatic VS Lying filed at 01/13/2022 0700            Intake/Output Summary (Last 24 hours) at 1/13/2022 1004  Last data filed at 1/13/2022 0900  Gross per 24 hour   Intake 308 ml   Output 874 ml   Net -566 ml           Physical Exam:    GEN: Robbie Momin Lethargic  HEENT: anicteric, mucous membranes moist  NECK: JVD elevated, carotid bruits   HEART: regular rhythm, normal S1 and S2, no murmurs, clicks, gallops or rubs   CHEST: Sternotomy incision site healing well  LUNGS: clear to auscultation bilaterally; Crackles present  ABDOMEN: normal bowel sounds, soft, no tenderness, no distention  EXTREMITIES: peripheral pulses normal; 3+ edema, Warm  NEURO: no focal findings    SKIN: normal without suspicious lesions on exposed skin      Current Facility-Administered Medications:     acetaminophen (TYLENOL) tablet 650 mg, 650 mg, Oral, Q6H PRN, Anabelle Art PA-C    acetaminophen (TYLENOL) tablet 975 mg, 975 mg, Oral, Q8H, Anabelle Art PA-C, 975 mg at 01/13/22 0835    amiodarone tablet 200 mg, 200 mg, Oral, Q8H [facility], Suyapa Cisneros PA-C, 200 mg at 01/13/22 4092    aspirin tablet 325 mg, 325 mg, Oral, Daily, Suyapa Cisneros PA-C, 325 mg at 01/13/22 4361    atorvastatin (LIPITOR) tablet 80 mg, 80 mg, Oral, After Melissa Lombardo PA-C, 80 mg at 01/12/22 1749    bisacodyl (DULCOLAX) rectal suppository 10 mg, 10 mg, Rectal, Daily PRN, Anabelle Art PA-C    docusate sodium (COLACE) capsule 100 mg, 100 mg, Oral, BID PRN, Anabelle Art PA-C    furosemide (LASIX) injection 80 mg, 80 mg, Intravenous, BID (diuretic), Anabelle Art PA-C, 80 mg at 01/13/22 0835    heparin (porcine) subcutaneous injection 5,000 Units, 5,000 Units, Subcutaneous, Q8H [facility], Suyapa Cisneros PA-C, 5,000 Units at 01/13/22 0526    insulin lispro (HumaLOG) 100 units/mL subcutaneous injection 1-6 Units, 1-6 Units, Subcutaneous, TID AC **AND** Fingerstick Glucose (POCT), , , TID AC, Suyapa Cisneros PA-C    insulin lispro (HumaLOG) 100 units/mL subcutaneous injection 1-6 Units, 1-6 Units, Subcutaneous, HS, Suyapa Cisneros PA-C    milrinone (PRIMACOR) 20 mg in 100 mL infusion (premix), 0 13 mcg/kg/min, Intravenous, Continuous, Bassam Lora MD, Last Rate: 4 mL/hr at 01/13/22 0741, 0 13 mcg/kg/min at 01/13/22 0741    ondansetron (ZOFRAN) injection 4 mg, 4 mg, Intravenous, Q6H PRN, Janalyn Oppenheim, PA-C    oxyCODONE (ROXICODONE) IR tablet 2 5 mg, 2 5 mg, Oral, Q4H PRN, Janalyn Oppenheim, PA-C, 2 5 mg at 01/13/22 0525    oxyCODONE (ROXICODONE) IR tablet 5 mg, 5 mg, Oral, Q4H PRN, Janalyn Oppenheim, PA-C, 5 mg at 01/08/22 2148    pantoprazole (PROTONIX) EC tablet 40 mg, 40 mg, Oral, Early Morning, Janalyn Oppenheim, PA-C, 40 mg at 01/13/22 0526    phenylephrine (SE-SYNEPHRINE) 50 mg (STANDARD CONCENTRATION) in sodium chloride 0 9% 250 mL,  mcg/min, Intravenous, Titrated, Janalyn Oppenheim, PA-C, Stopped at 01/12/22 0730    polyethylene glycol (MIRALAX) packet 17 g, 17 g, Oral, Daily PRN, Anabelle Art PA-C    potassium chloride (K-DUR,KLOR-CON) CR tablet 40 mEq, 40 mEq, Oral, BID, Anabelle Art PA-C, 40 mEq at 01/13/22 0834    temazepam (RESTORIL) capsule 15 mg, 15 mg, Oral, HS PRN, Janalyn Oppenheim, PA-C, 15 mg at 01/12/22 2143    Labs & Results:    No results found for: CKTOTAL, CKMB, CKMBINDEX, TROPONINI    Lab Results   Component Value Date    GLUCOSE 149 (H) 01/07/2022    CALCIUM 7 7 (L) 01/13/2022    K 3 7 01/13/2022    CO2 29 01/13/2022     (H) 01/13/2022    BUN 42 (H) 01/13/2022    CREATININE 1 27 01/13/2022       Lab Results   Component Value Date    WBC 10 51 (H) 01/13/2022    HGB 10 5 (L) 01/13/2022    HCT 34 1 (L) 01/13/2022     (H) 01/13/2022     (L) 01/13/2022     Results from last 7 days   Lab Units 01/12/22  0515   INR  1 21*       No results found for: CHOL  Lab Results   Component Value Date    HDL 13 (L) 12/30/2021    HDL 38 (L) 09/13/2021     Lab Results   Component Value Date    LDLCALC 107 (H) 12/30/2021    LDLCALC 134 (H) 09/13/2021     Lab Results   Component Value Date    TRIG 152 (H) 12/30/2021    TRIG 137 09/13/2021       Lab Results   Component Value Date    ALT 56 01/12/2022    AST 45 01/12/2022         EKG personally reviewed by )Amirah Spann MD  No acute changes   TELE: No significant arrhythmias seen on telemetry review

## 2022-01-13 NOTE — PLAN OF CARE
Problem: OCCUPATIONAL THERAPY ADULT  Goal: Performs self-care activities at highest level of function for planned discharge setting  See evaluation for individualized goals  Description: Treatment Interventions: ADL retraining,Functional transfer training,UE strengthening/ROM,Endurance training,Patient/family training,Equipment evaluation/education,Compensatory technique education,Continued evaluation,Energy conservation,Activityengagement,Cardiac education          See flowsheet documentation for full assessment, interventions and recommendations  Outcome: Progressing  Note: Limitation: Decreased ADL status,Decreased endurance,Decreased self-care trans,Decreased high-level ADLs  Prognosis: Good  Assessment: Patient participated in Skilled OT session this date with interventions consisting of ADL re training with the use of correct body mechanics, deep breathing technique, maintaining sternal precautions,  therapeutic activities to: increase activity tolerance, increase postural control, increase trunk control and increase OOB/ sitting tolerance   Patient agreeable to OT treatment session, upon arrival patient was found supine in bed  Patient requiring frequent rest periods  Patient continues to be functioning below baseline level, occupational performance remains limited secondary to factors listed above and increased risk for falls and injury  From OT standpoint, recommendation at time of d/c would be Short Term Rehab  Patient to benefit from continued Occupational Therapy treatment while in the hospital to address deficits as defined above and maximize level of functional independence with ADLs and functional mobility        OT Discharge Recommendation: Post acute rehabilitation services  OT - OK to Discharge: Yes (When medically appropriate)

## 2022-01-14 LAB
ANION GAP SERPL CALCULATED.3IONS-SCNC: 7 MMOL/L (ref 4–13)
BUN SERPL-MCNC: 46 MG/DL (ref 5–25)
CALCIUM SERPL-MCNC: 7.8 MG/DL (ref 8.3–10.1)
CHLORIDE SERPL-SCNC: 109 MMOL/L (ref 100–108)
CO2 SERPL-SCNC: 26 MMOL/L (ref 21–32)
CREAT SERPL-MCNC: 1.72 MG/DL (ref 0.6–1.3)
ERYTHROCYTE [DISTWIDTH] IN BLOOD BY AUTOMATED COUNT: 17.2 % (ref 11.6–15.1)
GFR SERPL CREATININE-BSD FRML MDRD: 41 ML/MIN/1.73SQ M
GLUCOSE SERPL-MCNC: 108 MG/DL (ref 65–140)
GLUCOSE SERPL-MCNC: 113 MG/DL (ref 65–140)
GLUCOSE SERPL-MCNC: 121 MG/DL (ref 65–140)
GLUCOSE SERPL-MCNC: 91 MG/DL (ref 65–140)
GLUCOSE SERPL-MCNC: 96 MG/DL (ref 65–140)
HCT VFR BLD AUTO: 34.2 % (ref 36.5–49.3)
HGB BLD-MCNC: 10.5 G/DL (ref 12–17)
INR PPP: 1.12 (ref 0.84–1.19)
MCH RBC QN AUTO: 30.3 PG (ref 26.8–34.3)
MCHC RBC AUTO-ENTMCNC: 30.7 G/DL (ref 31.4–37.4)
MCV RBC AUTO: 99 FL (ref 82–98)
PLATELET # BLD AUTO: 132 THOUSANDS/UL (ref 149–390)
PMV BLD AUTO: 13.4 FL (ref 8.9–12.7)
POTASSIUM SERPL-SCNC: 4 MMOL/L (ref 3.5–5.3)
PROTHROMBIN TIME: 14 SECONDS (ref 11.6–14.5)
RBC # BLD AUTO: 3.47 MILLION/UL (ref 3.88–5.62)
SODIUM SERPL-SCNC: 142 MMOL/L (ref 136–145)
WBC # BLD AUTO: 9.46 THOUSAND/UL (ref 4.31–10.16)

## 2022-01-14 PROCEDURE — 99232 SBSQ HOSP IP/OBS MODERATE 35: CPT | Performed by: INTERNAL MEDICINE

## 2022-01-14 PROCEDURE — 82948 REAGENT STRIP/BLOOD GLUCOSE: CPT

## 2022-01-14 PROCEDURE — 97530 THERAPEUTIC ACTIVITIES: CPT

## 2022-01-14 PROCEDURE — 97110 THERAPEUTIC EXERCISES: CPT

## 2022-01-14 PROCEDURE — 97535 SELF CARE MNGMENT TRAINING: CPT

## 2022-01-14 PROCEDURE — 85027 COMPLETE CBC AUTOMATED: CPT | Performed by: PHYSICIAN ASSISTANT

## 2022-01-14 PROCEDURE — 85610 PROTHROMBIN TIME: CPT | Performed by: PHYSICIAN ASSISTANT

## 2022-01-14 PROCEDURE — 99024 POSTOP FOLLOW-UP VISIT: CPT | Performed by: THORACIC SURGERY (CARDIOTHORACIC VASCULAR SURGERY)

## 2022-01-14 PROCEDURE — 80048 BASIC METABOLIC PNL TOTAL CA: CPT | Performed by: PHYSICIAN ASSISTANT

## 2022-01-14 RX ORDER — WARFARIN SODIUM 5 MG/1
5 TABLET ORAL
Status: COMPLETED | OUTPATIENT
Start: 2022-01-14 | End: 2022-01-14

## 2022-01-14 RX ORDER — BUMETANIDE 0.25 MG/ML
0.5 INJECTION INTRAMUSCULAR; INTRAVENOUS CONTINUOUS
Status: DISCONTINUED | OUTPATIENT
Start: 2022-01-14 | End: 2022-01-17

## 2022-01-14 RX ADMIN — PANTOPRAZOLE SODIUM 40 MG: 40 TABLET, DELAYED RELEASE ORAL at 05:13

## 2022-01-14 RX ADMIN — ACETAMINOPHEN 975 MG: 325 TABLET, FILM COATED ORAL at 17:30

## 2022-01-14 RX ADMIN — HEPARIN SODIUM 5000 UNITS: 5000 INJECTION INTRAVENOUS; SUBCUTANEOUS at 21:44

## 2022-01-14 RX ADMIN — HEPARIN SODIUM 5000 UNITS: 5000 INJECTION INTRAVENOUS; SUBCUTANEOUS at 05:13

## 2022-01-14 RX ADMIN — POTASSIUM CHLORIDE 40 MEQ: 1500 TABLET, EXTENDED RELEASE ORAL at 17:30

## 2022-01-14 RX ADMIN — POTASSIUM CHLORIDE 40 MEQ: 1500 TABLET, EXTENDED RELEASE ORAL at 08:20

## 2022-01-14 RX ADMIN — AMIODARONE HYDROCHLORIDE 200 MG: 200 TABLET ORAL at 13:31

## 2022-01-14 RX ADMIN — AMIODARONE HYDROCHLORIDE 200 MG: 200 TABLET ORAL at 05:13

## 2022-01-14 RX ADMIN — ASPIRIN 325 MG ORAL TABLET 325 MG: 325 PILL ORAL at 08:20

## 2022-01-14 RX ADMIN — Medication 1 MG/HR: at 16:37

## 2022-01-14 RX ADMIN — WARFARIN SODIUM 5 MG: 5 TABLET ORAL at 17:30

## 2022-01-14 RX ADMIN — AMIODARONE HYDROCHLORIDE 200 MG: 200 TABLET ORAL at 21:44

## 2022-01-14 RX ADMIN — HEPARIN SODIUM 5000 UNITS: 5000 INJECTION INTRAVENOUS; SUBCUTANEOUS at 13:31

## 2022-01-14 RX ADMIN — ACETAMINOPHEN 975 MG: 325 TABLET, FILM COATED ORAL at 08:21

## 2022-01-14 RX ADMIN — ATORVASTATIN CALCIUM 80 MG: 80 TABLET, FILM COATED ORAL at 17:30

## 2022-01-14 RX ADMIN — ACETAMINOPHEN 975 MG: 325 TABLET, FILM COATED ORAL at 23:41

## 2022-01-14 RX ADMIN — Medication 1 MG/HR: at 23:39

## 2022-01-14 RX ADMIN — BUMETANIDE 2 MG: 0.25 INJECTION INTRAMUSCULAR; INTRAVENOUS at 08:20

## 2022-01-14 RX ADMIN — MILRINONE LACTATE IN DEXTROSE 0.13 MCG/KG/MIN: 200 INJECTION, SOLUTION INTRAVENOUS at 08:21

## 2022-01-14 NOTE — PROGRESS NOTES
Cardiology Progress Note - Thirza Spatz 61 y o  male MRN: [id number]    Unit/Bed#: [facility] 427-01 Encounter: [id number]      Assessment & Plan:  Principal Problem:    Cardiogenic shock ([facility] [address] )  Active Problems:    Agoraphobia with panic attacks    VSD (ventricular septal defect)    JUDE (acute kidney injury) ([facility] [address] )    Transaminitis    Tylenol ingestion    Acute systolic congestive heart failure (HCC)    Anemia    Personal history of ECMO    Coagulopathy (HCC)    Leukocytosis    Central line complication    S/P VSD repair    Thrombocytopenia (HCC)    Hypernatremia    Hyperchloremia    Hypocalcemia    # Late presenting anterior STEMI c/b anteroapical VSD s/p open patch repair   # Redo sternotomy, mediastinal exploration for retained swan avinash catheter  # HFrEF with EF 35-40% as per my assessment on most recent CORIE    BP: 110s-120s/60s  HR: 70s-100s  UOP: 1 5 L with net negative 1 L    Plan:  - Milrinone weaned down to 0 135  - Patient needs aggressive diuresis  C/w bumex 2 mg TID  If patient does not diurese, will start Bumex drip  - Hold off on BB  Giving BB during a low flow state is counterproductive  Once we are able to wean off milrinone after adequate diuresis and SVR reduction low dose BB for long term better long term outcomes can be attempted  - obtain VBG this afternoon  - c/w aspirin and atorvastatin  - atorvastatin for afib prevention    Subjective:       Objective:     Vitals: Blood pressure 110/59, pulse 100, temperature 97 9 °F (36 6 °C), temperature source Oral, resp  rate 18, height 5' 5 5" (1 664 m), weight 106 kg (232 lb 12 9 oz), SpO2 100 %  , Body mass index is 38 15 kg/m² ,   Orthostatic Blood Pressures      Most Recent Value   Blood Pressure 110/59 filed at 01/14/2022 0730   Patient Position - Orthostatic VS Lying filed at 01/14/2022 0730            Intake/Output Summary (Last 24 hours) at 1/14/2022 0955  Last data filed at 1/14/2022 0623  Gross per 24 hour   Intake 178 45 ml   Output 1498 ml   Net Problem: Falls - Risk of:  Goal: Will remain free from falls  Description: Will remain free from falls  Outcome: Met This Shift  Goal: Absence of physical injury  Description: Absence of physical injury  Outcome: Met This Shift     Problem:  Body Temperature - Imbalanced:  Goal: Ability to maintain a body temperature in the normal range will improve  Description: Ability to maintain a body temperature in the normal range will improve  Outcome: Met This Shift     Problem: Pain:  Goal: Pain level will decrease  Description: Pain level will decrease  Outcome: Met This Shift  Goal: Control of acute pain  Description: Control of acute pain  Outcome: Met This Shift     Problem: Skin Integrity - Impaired:  Goal: Will show no infection signs and symptoms  Description: Will show no infection signs and symptoms  Outcome: Met This Shift     Problem: Pain:  Goal: Pain level will decrease  Description: Pain level will decrease  Outcome: Met This Shift     Problem: Discharge Planning:  Goal: Discharged to appropriate level of care  Description: Discharged to appropriate level of care  Outcome: Ongoing     Problem: Mobility - Impaired:  Goal: Mobility will improve to maximum level  Description: Mobility will improve to maximum level  Outcome: Ongoing     Problem: Pain:  Goal: Control of chronic pain  Description: Control of chronic pain  Outcome: Ongoing -1319 55 ml           Physical Exam:    GEN: Robbie Momin Lethargic  HEENT: anicteric, mucous membranes moist  NECK: JVD elevated, carotid bruits   HEART: regular rhythm, normal S1 and S2, no murmurs, clicks, gallops or rubs   CHEST: Sternotomy incision site healing well  LUNGS: clear to auscultation bilaterally; Crackles present  ABDOMEN: normal bowel sounds, soft, no tenderness, no distention  EXTREMITIES: peripheral pulses normal; 3+ edema, Warm  NEURO: no focal findings    SKIN: normal without suspicious lesions on exposed skin      Current Facility-Administered Medications:     acetaminophen (TYLENOL) tablet 650 mg, 650 mg, Oral, Q6H PRN, Anabelle Art PA-C    acetaminophen (TYLENOL) tablet 975 mg, 975 mg, Oral, Q8H, Anabelle Art PA-C, 975 mg at 01/14/22 8159    amiodarone tablet 200 mg, 200 mg, Oral, Q8H [address], Neeru Lizama PA-C, 200 mg at 01/14/22 4407    aspirin tablet 325 mg, 325 mg, Oral, Daily, Neeru Lizama PA-C, 325 mg at 01/14/22 0820    atorvastatin (LIPITOR) tablet 80 mg, 80 mg, Oral, After Darcie Addison PA-C, 80 mg at 01/13/22 1738    bisacodyl (DULCOLAX) rectal suppository 10 mg, 10 mg, Rectal, Daily PRN, Anabelle Art PA-C    bumetanide (BUMEX) injection 2 mg, 2 mg, Intravenous, TID, Elsie Jimenez MD, 2 mg at 01/14/22 0820    docusate sodium (COLACE) capsule 100 mg, 100 mg, Oral, BID PRN, Anabelle Art PA-C    heparin (porcine) subcutaneous injection 5,000 Units, 5,000 Units, Subcutaneous, Q8H [address], Neeru Lizama PA-C, 5,000 Units at 01/14/22 0513    insulin lispro (HumaLOG) 100 units/mL subcutaneous injection 1-6 Units, 1-6 Units, Subcutaneous, TID AC **AND** Fingerstick Glucose (POCT), , , TID AC, Neeru Lizama PA-C    insulin lispro (HumaLOG) 100 units/mL subcutaneous injection 1-6 Units, 1-6 Units, Subcutaneous, HS, Neeru Lizama PA-C    milrinone (PRIMACOR) 20 mg in 100 mL infusion (premix), 0 13 mcg/kg/min, Intravenous, Continuous, Anabelle Art PA-C, Last Rate: 4 mL/hr at 01/14/22 0821, 0 13 mcg/kg/min at 01/14/22 0821    ondansetron (ZOFRAN) injection 4 mg, 4 mg, Intravenous, Q6H PRN, Birtha Skiff, PA-C    oxyCODONE (ROXICODONE) IR tablet 2 5 mg, 2 5 mg, Oral, Q4H PRN, Birtha Skiff, PA-C, 2 5 mg at 01/13/22 0525    oxyCODONE (ROXICODONE) IR tablet 5 mg, 5 mg, Oral, Q4H PRN, Birtha Skiff, PA-C, 5 mg at 01/08/22 2148    pantoprazole (PROTONIX) EC tablet 40 mg, 40 mg, Oral, Early Morning, Birtha Skiff, PA-C, 40 mg at 01/14/22 0513    polyethylene glycol (MIRALAX) packet 17 g, 17 g, Oral, Daily PRN, Anabelle Art PA-C    potassium chloride (K-DUR,KLOR-CON) CR tablet 40 mEq, 40 mEq, Oral, BID, Anabelle Art PA-C, 40 mEq at 01/14/22 0820    temazepam (RESTORIL) capsule 15 mg, 15 mg, Oral, HS PRN, Birtha Skiff, PA-C, 15 mg at 01/13/22 2144    warfarin (COUMADIN) tablet 5 mg, 5 mg, Oral, Once (warfarin), Anabelle Art PA-C    Labs & Results:    No results found for: CKTOTAL, CKMB, CKMBINDEX, TROPONINI    Lab Results   Component Value Date    GLUCOSE 149 (H) 01/07/2022    CALCIUM 7 8 (L) 01/14/2022    K 4 0 01/14/2022    CO2 26 01/14/2022     (H) 01/14/2022    BUN 46 (H) 01/14/2022    CREATININE 1 72 (H) 01/14/2022       Lab Results   Component Value Date    WBC 9 46 01/14/2022    HGB 10 5 (L) 01/14/2022    HCT 34 2 (L) 01/14/2022    MCV 99 (H) 01/14/2022     (L) 01/14/2022     Results from last 7 days   Lab Units 01/12/22  0515   INR  1 21*       No results found for: CHOL  Lab Results   Component Value Date    HDL 13 (L) 12/30/2021    HDL 38 (L) 09/13/2021     Lab Results   Component Value Date    LDLCALC 107 (H) 12/30/2021    LDLCALC 134 (H) 09/13/2021     Lab Results   Component Value Date    TRIG 152 (H) 12/30/2021    TRIG 137 09/13/2021       Lab Results   Component Value Date    ALT 56 01/12/2022    AST 45 01/12/2022         EKG personally reviewed by )Tara Anderson MD  No acute changes TELE: No significant arrhythmias seen on telemetry review

## 2022-01-14 NOTE — OCCUPATIONAL THERAPY NOTE
Occupational Therapy Progress Note     Patient Name: Litzy Suarez  [id number] Date: 1/14/2022  Problem List  Principal Problem:    Cardiogenic shock ([facility] [address] )  Active Problems:    Agoraphobia with panic attacks    VSD (ventricular septal defect)    JUDE (acute kidney injury) ([facility] [address] )    Transaminitis    Tylenol ingestion    Acute systolic congestive heart failure (HCC)    Anemia    Personal history of ECMO    Coagulopathy (HCC)    Leukocytosis    Central line complication    S/P VSD repair    Thrombocytopenia (HCC)    Hypernatremia    Hyperchloremia    Hypocalcemia            01/14/22 1148   OT Last Visit   OT Visit Date 01/14/22   Note Type   Note Type Treatment   Restrictions/Precautions   Other Precautions Cardiac/sternal;Multiple lines;Telemetry; Fall Risk;Pain   General   Response to Previous Treatment Patient with no complaints from previous session   Lifestyle   Autonomy Pt reports being I with ADLS, IADLS and mobility without device PTA  (+)    Reciprocal Relationships Pt lives with his brother and sister in law   Service to Others Retired   [address] Enjoys realxing   Pain Assessment   Pain Assessment Tool FLACC   Pain Location/Orientation Orientation: Left; Location: Leg   Hospital Pain Intervention(s) Repositioned; Ambulation/increased activity; Emotional support   Pain Rating: FLACC (Rest) - Face 0   Pain Rating: FLACC (Rest) - Legs 0   Pain Rating: FLACC (Rest) - Activity 0   Pain Rating: FLACC (Rest) - Cry 0   Pain Rating: FLACC (Rest) - Consolability 0   Score: FLACC (Rest) 0   Pain Rating: FLACC (Activity) - Face 1   Pain Rating: FLACC (Activity) - Legs 0   Pain Rating: FLACC (Activity) - Activity 0   Pain Rating: FLACC (Activity) - Cry 1   Pain Rating: FLACC (Activity) - Consolability 1   Score: FLACC (Activity) 3   ADL   Where Assessed Edge of bed   UB Dressing Assistance 3  Moderate Assistance   UB Dressing Deficit Setup;Supervision/safety; Increased time to complete; Thread RUE; Thread LUE;Pull around back   LB Dressing Assistance 2  Maximal Assistance   LB Dressing Deficit Setup;Supervision/safety; Increased time to complete; Don/doff L sock; Don/doff R sock   Toileting Assistance  2  Maximal Assistance   Toileting Deficit Setup;Supervison/safety; Increased time to complete   Bed Mobility   Rolling R 3  Moderate assistance   Additional items Assist x 2; Increased time required;Verbal cues;LE management   Supine to Sit 2  Maximal assistance   Additional items Assist x 2; Increased time required;Verbal cues;LE management   Sit to Supine 2  Maximal assistance   Additional items Assist x 2; Increased time required;Verbal cues;LE management   Additional Comments Pt able to sit EOB with min A for trunk control  Pt complains of dizziness, BP stable throughout  After OT session pt in bed with all needs within reach  Transfers   Sit to Stand 2  Maximal assistance   Additional items Assist x 2; Increased time required;Verbal cues   Stand to Sit 2  Maximal assistance   Additional items Assist x 2; Increased time required;Verbal cues   Additional Comments Able to stand with max A x2 on curb step with B/L HHA and knee blocking    Cognition   Overall Cognitive Status Impaired   Arousal/Participation Arousable;Responsive; Cooperative   Attention Attends with cues to redirect   Orientation Level Oriented to person;Oriented to place   Memory Decreased recall of precautions   Following Commands Follows one step commands with increased time or repetition   Comments Pt is somewhat lethargic but is pleasant and cooperative      Activity Tolerance   Activity Tolerance Patient limited by fatigue;Patient limited by pain   Medical Staff Made Aware RN confirmed okay to see pt; PT Deborah    Assessment   Assessment Patient participated in Skilled OT session this date with interventions consisting of ADL re training with the use of correct body mechanics, maintaining sternal precautions,  therapeutic activities to: increase activity tolerance, increase postural control, increase trunk control and increase OOB/ sitting tolerance   Patient agreeable to OT treatment session, upon arrival patient was found supine in bed  In comparison to previous session, patient with improvements in sitting tolerance  Patient requiring frequent rest periods  Patient continues to be functioning below baseline level, occupational performance remains limited secondary to factors listed above and increased risk for falls and injury  From OT standpoint, recommendation at time of d/c would be Short Term Rehab  Patient to benefit from continued Occupational Therapy treatment while in the hospital to address deficits as defined above and maximize level of functional independence with ADLs and functional mobility  Plan   Treatment Interventions ADL retraining;Functional transfer training; Endurance training;Cardiac education;Continued evaluation   Goal Expiration Date 01/21/22   OT Treatment Day 3   OT Frequency 3-5x/wk   Recommendation   OT Discharge Recommendation Post acute rehabilitation services   OT - OK to Discharge Yes  (When medically appropriate)   AM-PAC Daily Activity Inpatient   Lower Body Dressing 2   Bathing 2   Toileting 2   Upper Body Dressing 2   Grooming 3   Eating 3   Daily Activity Raw Score 14   Daily Activity Standardized Score (Calc for Raw Score >=11) 33 39   AM-PAC Applied Cognition Inpatient   Following a Speech/Presentation 2   Understanding Ordinary Conversation 3   Taking Medications 3   Remembering Where Things Are Placed or Put Away 3   Remembering List of 4-5 Errands 2   Taking Care of Complicated Tasks 2   Applied Cognition Raw Score 15   Applied Cognition Standardized Score 33 54   Modified [place] Scale   Modified [place] Scale 4       EUSEBIO Cardona, OTR/L

## 2022-01-14 NOTE — PLAN OF CARE
Problem: PHYSICAL THERAPY ADULT  Goal: Performs mobility at highest level of function for planned discharge setting  See evaluation for individualized goals  Description: Treatment/Interventions: Functional transfer training,LE strengthening/ROM,Elevations,Therapeutic exercise,Endurance training,Equipment eval/education,Bed mobility,Gait training,Spoke to nursing,OT,Cognitive reorientation  Equipment Recommended:  (r/o rw next visit)       See flowsheet documentation for full assessment, interventions and recommendations  Outcome: Progressing  Note: Prognosis: Fair  Problem List: Decreased strength,Decreased endurance,Impaired balance,Decreased mobility,Pain  Assessment: Pt seen for PT session today with a focus on functional transfers, sitting tolerance, standing tolerance, and LE therex  While pt reported some dizziness with sitting EOB initially, BP was WNL and pt was able to tolerate sitting up for ~8-10 minutes  Pt required varying levels of assist from close supervision to [place] at EOB  Performed STS transfers with HHAx2 and b/l knee blocking during which pt was able to stand ~10-15s before LEs fatigued  After standing trial, pt requesting to lay back down 2* fatigue  Pt educated on supine therex to help prevent further deconditioning, able to demonstrate with good technique  Pt would benefit from continued acute skilled PT to address above deficits  Continuing to recommend rehab upon d/c    Barriers to Discharge: Inaccessible home environment        PT Discharge Recommendation: Post acute rehabilitation services          See flowsheet documentation for full assessment

## 2022-01-14 NOTE — PHYSICAL THERAPY NOTE
Physical Therapy Treatment Note    Patient's Name: Chase Gomez  : 22 1049   PT Last Visit   PT Visit Date 22   Note Type   Note Type Treatment   Pain Assessment   Pain Assessment Tool FLACC   Pain Location/Orientation Orientation: Left; Location: Leg   Hospital Pain Intervention(s) Repositioned; Ambulation/increased activity   Pain Rating: FLACC (Rest) - Face 0   Pain Rating: FLACC (Rest) - Legs 0   Pain Rating: FLACC (Rest) - Activity 0   Pain Rating: FLACC (Rest) - Cry 0   Pain Rating: FLACC (Rest) - Consolability 0   Score: FLACC (Rest) 0   Pain Rating: FLACC (Activity) - Face 1   Pain Rating: FLACC (Activity) - Legs 0   Pain Rating: FLACC (Activity) - Activity 0   Pain Rating: FLACC (Activity) - Cry 1   Pain Rating: FLACC (Activity) - Consolability 1   Score: FLACC (Activity) 3   Restrictions/Precautions   Other Precautions Cardiac/sternal;Multiple lines;Telemetry; Fall Risk;Pain   General   Chart Reviewed Yes   Family/Caregiver Present No   Cognition   Arousal/Participation Alert; Cooperative   Attention Attends with cues to redirect   Orientation Level Oriented to person   Memory Decreased recall of precautions   Following Commands Follows one step commands with increased time or repetition   Comments pt is pleasant and cooperative   Bed Mobility   Rolling R 3  Moderate assistance   Additional items Assist x 2; Increased time required;LE management;Verbal cues   Supine to Sit 2  Maximal assistance   Additional items Assist x 2;HOB elevated; Increased time required;Verbal cues;LE management   Sit to Supine 2  Maximal assistance   Additional items Assist x 2; Increased time required;Verbal cues;LE management   Additional Comments Pt was able to sit EOB with Desmond/close supervision assist today  Reported initial dizziness however BP was WNL  Transfers   Sit to Stand 2  Maximal assistance   Additional items Assist x 2; Increased time required;Verbal cues   Stand to Sit 2 Maximal assistance   Additional items Assist x 2; Increased time required;Verbal cues   Additional Comments Performed 1 STS transfer with HHAx2 and b/l knee blocking, curb step used to raise floor height  Pt able to stand ~10-15s  Ambulation/Elevation   Gait pattern Not appropriate  (poor standing tolerance)   Balance   Static Sitting Poor +   Dynamic Sitting Poor   Static Standing Poor -   Activity Tolerance   Activity Tolerance Patient limited by fatigue   Medical Staff Made Aware Seen with OT 2* pt's medical complexity and multiple comorbidities  PT focus on functional transfers, sitting balance and LE therex  Nurse Made Aware ok to see per RN   Exercises   Quad Sets Supine;10 reps;AROM; Bilateral  (w/ 3s iso hold )   Glute Sets   (reviewed for HEP)   Knee AROM Long Arc Quad Sitting;10 reps;AROM; Bilateral   Ankle Pumps Supine;Sitting;15 reps;AROM; Bilateral  (1 set in supine before sitting up, 1 set seated EOB)   Assessment   Prognosis Fair   Problem List Decreased strength;Decreased endurance; Impaired balance;Decreased mobility;Pain   Assessment Pt seen for PT session today with a focus on functional transfers, sitting tolerance, standing tolerance, and LE therex  While pt reported some dizziness with sitting EOB initially, BP was WNL and pt was able to tolerate sitting up for ~8-10 minutes  Pt required varying levels of assist from close supervision to Desmond at EOB  Performed STS transfers with HHAx2 and b/l knee blocking during which pt was able to stand ~10-15s before LEs fatigued  After standing trial, pt requesting to lay back down 2* fatigue  Pt educated on supine therex to help prevent further deconditioning, able to demonstrate with good technique  Pt would benefit from continued acute skilled PT to address above deficits  Continuing to recommend rehab upon d/c      Goals   Patient Goals to get stronger   STG Expiration Date 01/17/22   PT Treatment Day 3   Plan   Treatment/Interventions Functional transfer training;LE strengthening/ROM; Therapeutic exercise; Endurance training;Equipment eval/education; Bed mobility;Spoke to nursing;Spoke to case management;OT   Progress Slow progress, decreased activity tolerance   PT Frequency 4-6x/wk   Recommendation   PT Discharge Recommendation Post acute rehabilitation services   AM-PAC Basic Mobility Inpatient   Turning in Bed Without Bedrails 2   Lying on Back to Sitting on Edge of Flat Bed 1   Moving Bed to Chair 1   Standing Up From Chair 1   Walk in Room 1   Climb 3-5 Stairs 1   Basic Mobility Inpatient Raw Score 7   Turning Head Towards Sound 4   Follow Simple Instructions 3   Low Function Basic Mobility Raw Score 14   Low Function Basic Mobility Standardized Score 22 01   Highest Level Of Mobility   JH-HLM Goal 2: Bed activities/Dependent transfer   JH-HLM Highest Level of Mobility 3: Sit at edge of bed   JH-HLM Goal Achieved Yes   Education   Education Provided Home exercise program   Patient Demonstrates verbal understanding   End of Consult   Patient Position at End of Consult Supine; All needs within reach       Petey Batres, PT, DPT

## 2022-01-14 NOTE — PLAN OF CARE
Problem: OCCUPATIONAL THERAPY ADULT  Goal: Performs self-care activities at highest level of function for planned discharge setting  See evaluation for individualized goals  Description: Treatment Interventions: ADL retraining,Functional transfer training,UE strengthening/ROM,Endurance training,Patient/family training,Equipment evaluation/education,Compensatory technique education,Continued evaluation,Energy conservation,Activityengagement,Cardiac education          See flowsheet documentation for full assessment, interventions and recommendations  Outcome: Progressing  Note: Limitation: Decreased ADL status,Decreased endurance,Decreased self-care trans,Decreased high-level ADLs  Prognosis: Good  Assessment: Patient participated in Skilled OT session this date with interventions consisting of ADL re training with the use of correct body mechanics, maintaining sternal precautions,  therapeutic activities to: increase activity tolerance, increase postural control, increase trunk control and increase OOB/ sitting tolerance   Patient agreeable to OT treatment session, upon arrival patient was found supine in bed  In comparison to previous session, patient with improvements in sitting tolerance  Patient requiring frequent rest periods  Patient continues to be functioning below baseline level, occupational performance remains limited secondary to factors listed above and increased risk for falls and injury  From OT standpoint, recommendation at time of d/c would be Short Term Rehab  Patient to benefit from continued Occupational Therapy treatment while in the hospital to address deficits as defined above and maximize level of functional independence with ADLs and functional mobility        OT Discharge Recommendation: Post acute rehabilitation services  OT - OK to Discharge: Yes (When medically appropriate)

## 2022-01-14 NOTE — PROGRESS NOTES
Progress Note - Cardiothoracic Surgery   Louetta Aschoff 61 y o  male MRN: [id number]  Unit/Bed#: [facility] 427-01 Encounter: [id number]  Cardiogenic shock  S/P VA ECMO; POD # 14  VSD/cardiogenic shock  S/P VSD repair w/ VA ECMO decannulation; POD # 10  Retained PA catheter  S/P Redo sternotomy and removal PA catheter w/ CPB; POD # 7    24 Hour Events: Milrinone increased to 0 38 per HF, SVO2 75 w/ TTE showing good wall motion, weaned back down to 0 25  Oliguria, diuretics transitioned to Bumex per HF  No other events      Medications:   Scheduled Meds:  Current Facility-Administered Medications   Medication Dose Route Frequency Provider Last Rate    acetaminophen  650 mg Oral Q6H PRN Aanbelle Art PA-C      acetaminophen  975 mg Oral Q8H Anabelle Art PA-C      amiodarone  200 mg Oral Q8H [facility] Rosales Joseph PA-C      aspirin  325 mg Oral Daily Rosales Joseph PA-C      atorvastatin  80 mg Oral After [employer], PA-C      bisacodyl  10 mg Rectal Daily PRN Anabelle Art PA-C      bumetanide  2 mg Intravenous TID Marcellus Lechuga MD      docusate sodium  100 mg Oral BID PRN Anabelle Art PA-C      heparin (porcine)  5,000 Units Subcutaneous [facility] Rosales Joseph PA-C      insulin lispro  1-6 Units Subcutaneous TID AC Rosales Joseph PA-C      insulin lispro  1-6 Units Subcutaneous HS Rosales Joseph PA-C      milrinone [facility]) infusion  0 25 mcg/kg/min Intravenous Continuous Anabelle Art PA-C 0 25 mcg/kg/min (01/13/22 2011)    ondansetron  4 mg Intravenous Q6H PRN Rosales Joseph PA-C      oxyCODONE  2 5 mg Oral Q4H PRN Rosales Joseph PA-C      oxyCODONE  5 mg Oral Q4H PRN Rosales Joseph PA-C      pantoprazole  40 mg Oral Early Morning Rosales Joseph PA-C      phenylephine   mcg/min Intravenous Titrated Rosales Joseph PA-C Stopped (01/12/22 0730)    polyethylene glycol  17 g Oral Daily PRN Anabelle Art PA-C      potassium chloride  40 mEq Oral BID Anabelle Art PA-C      temazepam  15 mg Oral HS PRN Mellisa Xiong PA-C       Continuous Infusions:milrinone [facility]) infusion, 0 25 mcg/kg/min, Last Rate: 0 25 mcg/kg/min (01/13/22 2011)  phenylephine,  mcg/min, Last Rate: Stopped (01/12/22 0730)      PRN Meds:   acetaminophen    bisacodyl    docusate sodium    ondansetron    oxyCODONE    oxyCODONE    polyethylene glycol    temazepam    Vitals:   Vitals:    01/13/22 2000 01/13/22 2313 01/14/22 0244 01/14/22 0600   BP:  102/65 117/56    BP Location:  Left arm Left arm    Pulse:  75 74    Resp:  19 19    Temp:  (!) 97 3 °F (36 3 °C) 97 5 °F (36 4 °C)    TempSrc:  Oral Oral    SpO2: 99% 98% 98%    Weight:    106 kg (232 lb 12 9 oz)   Height:         Bp x24hrs: /50-60    Telemetry: Sinus Tachycardia; Heart Rate: 103 w/ stable T wave inversions in II/V5; multiple short runs of NSVT (no more than 3 beats)    Respiratory:   SpO2: SpO2: 98 %, SpO2 Activity: SpO2 Activity: At Rest, SpO2 Device: O2 Device: None (Room air);  Room Air    Intake/Output:     Intake/Output Summary (Last 24 hours) at 1/14/2022 0735  Last data filed at 1/14/2022 5722  Gross per 24 hour   Intake 298 45 ml   Output 1498 ml   Net -1199 55 ml      UOP - 1148cc/8hrs; 1498cc/24hrs    Chest tube Output:  CTs & EPWs have been discontinued    Weights:   Weight (last 2 days)       Date/Time Weight    01/14/22 0600 106 (232 81)    01/13/22 1325 103 (227)    01/13/22 0530 103 (227 74)          Admission weight: 92 3kg - up 14kg from admission weight    Results:   Results from last 7 days   Lab Units 01/14/22  0508 01/13/22  0843 01/12/22  0515   WBC Thousand/uL 9 46 10 51* 16 87*   HEMOGLOBIN g/dL 10 5* 10 5* 10 3*   HEMATOCRIT % 34 2* 34 1* 33 7*   PLATELETS Thousands/uL 132* 137* 157     Results from last 7 days   Lab Units 01/14/22  0508 01/13/22  0843 01/12/22  1452 01/12/22  0515 01/12/22  0515 01/07/22 2009 01/07/22  1639   SODIUM mmol/L 142 142  --   --  148* < >  --    POTASSIUM mmol/L 4 0 3 7 4 0   < > 3 4*   < >  --    CHLORIDE mmol/L 109* 111*  --   --  116*   < >  --    CO2 mmol/L 26 29  --   --  29   < >  --    CO2, I-STAT mmol/L  --   --   --   --   --   --  31   BUN mg/dL 46* 42*  --   --  49*   < >  --    CREATININE mg/dL 1 72* 1 27  --   --  1 39*   < >  --    GLUCOSE, ISTAT mg/dl  --   --   --   --   --   --  149*   CALCIUM mg/dL 7 8* 7 7*  --   --  7 6*   < >  --     < > = values in this interval not displayed  Results from last 7 days   Lab Units 01/12/22  0515 01/11/22  1036   INR  1 21* 1 22*     Coumadin mg 1/11 - 2 5          Point of care glucose: 96 - 113 - 109 - 78 - 78 - 124 - 130    Studies:  TTE 1/13: EF 55%, mild TR    I have personally reviewed pertinent reports  and I have personally reviewed pertinent films in PACS    Invasive Lines/Tubes:  Invasive Devices  Report      Peripherally Inserted Central Catheter Line              PICC Line 72/83/77 Right Basilic 7 days              Drain              External Urinary Catheter Small 1 day                    Physical Exam:    HEENT/NECK:  Normocephalic  Atraumatic  Cardiac: Regular rate and rhythm  Pulmonary:  CAT to anterolateral lung fields, unable to sit up on exam  Abdomen:  Non-tender, Non-distended and Normal bowel sounds  Incisions: Sternum is stable  Incision is clean, dry, and intact  Extremities: Extremities warm/dry and 2-3+ pitting edema B/L UE & LE  Neuro: Alert and oriented X 3  Skin: Warm/Dry, without rashes or lesions  Assessment:  Principal Problem:    Cardiogenic shock (HCC)  Active Problems:    Agoraphobia with panic attacks    VSD (ventricular septal defect)    Transaminitis    Tylenol ingestion    Acute systolic congestive heart failure (HCC)    Anemia    Personal history of ECMO    Coagulopathy (HCC)    Leukocytosis    Central line complication    S/P VSD repair    Thrombocytopenia (HCC)    Hypernatremia    Hyperchloremia    Hypocalcemia     Cardiogenic shock  S/P VA ECMO; POD # 14  VSD/cardiogenic shock  S/P VSD repair w/ VA ECMO decannulation; POD # 10  Retained PA catheter  S/P Redo sternotomy and removal PA catheter w/ CPB; POD # 7    Plan:    1  Cardiac:   NSR; HR/BP well-controlled  Will need afterload reduction eventually  Milrinone 0 25 -- wean to 0 13  Continue ASA and Statin therapy  F/u INR, dose Coumadin tonight  No full TTE anymore per Dr Lata Velazco  Epicardial pacing wires out  PICC  Continue DVT prophylaxis    2  Pulmonary:   Good Room air oxygen saturation; Continue incentive spirometry/Coughing/Deep breathing exercises  Chest tubes have been discontinued    3  Renal:   Intake/Output net: (-)1199 6 mL/24 hours  Diuretic Regimen:  Continue Bumex  2  mg IV TID  Continue Potassium Chloride 40 mEq PO BID  JUDE, good UOP    4  Neuro:  Neurologically intact; No active issues  Incisional pain well-controlled  Continue Tylenol, 975 mg PO q 8, standing dose  Continue Oxycodone, 2 5 to 5 mg PO q 4 hours prn pain    5  GI:  Tolerating TLC 2 3 gm sodium diet  Maintain 1800 mL daily fluid restriction   Continue stool softeners and prn suppository  Continue GI prophylaxis    6  Endo:   Glucose well-controlled with sliding scale coverage    7    Hematology:    Post-operative acute blood loss anemia; Hemoglobin 10 5; trend prn   Thrombocytopenia, no active bleeding   Hyperchloremia    8     Disposition:      Awaiting rehab placement, Anticipate discharge to rehab once medically stable     VTE Pharmacologic Prophylaxis: Heparin  VTE Mechanical Prophylaxis: sequential compression device    Collaborative rounds completed with supervising physician  Plan of care discussed with bedside nurse    SIGNATURE: Belen Fam PA-C  DATE: January 14, 2022  TIME: 7:35 AM

## 2022-01-15 LAB
ANION GAP SERPL CALCULATED.3IONS-SCNC: 3 MMOL/L (ref 4–13)
ANION GAP SERPL CALCULATED.3IONS-SCNC: 5 MMOL/L (ref 4–13)
ANION GAP SERPL CALCULATED.3IONS-SCNC: 6 MMOL/L (ref 4–13)
BASE EX.OXY STD BLDV CALC-SCNC: 50.4 % (ref 60–80)
BASE EXCESS BLDV CALC-SCNC: 0.2 MMOL/L
BUN SERPL-MCNC: 47 MG/DL (ref 5–25)
BUN SERPL-MCNC: 47 MG/DL (ref 5–25)
BUN SERPL-MCNC: 53 MG/DL (ref 5–25)
CALCIUM SERPL-MCNC: 7.9 MG/DL (ref 8.3–10.1)
CHLORIDE SERPL-SCNC: 105 MMOL/L (ref 100–108)
CHLORIDE SERPL-SCNC: 106 MMOL/L (ref 100–108)
CHLORIDE SERPL-SCNC: 110 MMOL/L (ref 100–108)
CO2 SERPL-SCNC: 26 MMOL/L (ref 21–32)
CO2 SERPL-SCNC: 27 MMOL/L (ref 21–32)
CO2 SERPL-SCNC: 27 MMOL/L (ref 21–32)
CREAT SERPL-MCNC: 2.04 MG/DL (ref 0.6–1.3)
CREAT SERPL-MCNC: 2.25 MG/DL (ref 0.6–1.3)
CREAT SERPL-MCNC: 2.43 MG/DL (ref 0.6–1.3)
GFR SERPL CREATININE-BSD FRML MDRD: 27 ML/MIN/1.73SQ M
GFR SERPL CREATININE-BSD FRML MDRD: 29 ML/MIN/1.73SQ M
GFR SERPL CREATININE-BSD FRML MDRD: 33 ML/MIN/1.73SQ M
GLUCOSE SERPL-MCNC: 121 MG/DL (ref 65–140)
GLUCOSE SERPL-MCNC: 130 MG/DL (ref 65–140)
GLUCOSE SERPL-MCNC: 148 MG/DL (ref 65–140)
GLUCOSE SERPL-MCNC: 158 MG/DL (ref 65–140)
GLUCOSE SERPL-MCNC: 62 MG/DL (ref 65–140)
GLUCOSE SERPL-MCNC: 83 MG/DL (ref 65–140)
GLUCOSE SERPL-MCNC: 90 MG/DL (ref 65–140)
HCO3 BLDV-SCNC: 24 MMOL/L (ref 24–30)
INR PPP: 1.24 (ref 0.84–1.19)
O2 CT BLDV-SCNC: 8.8 ML/DL
PCO2 BLDV: 36 MM HG (ref 42–50)
PH BLDV: 7.44 [PH] (ref 7.3–7.4)
PO2 BLDV: 28.1 MM HG (ref 35–45)
POTASSIUM SERPL-SCNC: 4.1 MMOL/L (ref 3.5–5.3)
POTASSIUM SERPL-SCNC: 4.2 MMOL/L (ref 3.5–5.3)
POTASSIUM SERPL-SCNC: 4.4 MMOL/L (ref 3.5–5.3)
PROTHROMBIN TIME: 15.1 SECONDS (ref 11.6–14.5)
SODIUM SERPL-SCNC: 137 MMOL/L (ref 136–145)
SODIUM SERPL-SCNC: 138 MMOL/L (ref 136–145)
SODIUM SERPL-SCNC: 140 MMOL/L (ref 136–145)

## 2022-01-15 PROCEDURE — 80048 BASIC METABOLIC PNL TOTAL CA: CPT | Performed by: PHYSICIAN ASSISTANT

## 2022-01-15 PROCEDURE — 82805 BLOOD GASES W/O2 SATURATION: CPT | Performed by: STUDENT IN AN ORGANIZED HEALTH CARE EDUCATION/TRAINING PROGRAM

## 2022-01-15 PROCEDURE — 99233 SBSQ HOSP IP/OBS HIGH 50: CPT | Performed by: INTERNAL MEDICINE

## 2022-01-15 PROCEDURE — 85610 PROTHROMBIN TIME: CPT | Performed by: PHYSICIAN ASSISTANT

## 2022-01-15 PROCEDURE — 82948 REAGENT STRIP/BLOOD GLUCOSE: CPT

## 2022-01-15 PROCEDURE — 99024 POSTOP FOLLOW-UP VISIT: CPT | Performed by: THORACIC SURGERY (CARDIOTHORACIC VASCULAR SURGERY)

## 2022-01-15 RX ORDER — WARFARIN SODIUM 5 MG/1
5 TABLET ORAL
Status: COMPLETED | OUTPATIENT
Start: 2022-01-15 | End: 2022-01-15

## 2022-01-15 RX ORDER — HYDRALAZINE HYDROCHLORIDE 10 MG/1
10 TABLET, FILM COATED ORAL EVERY 8 HOURS SCHEDULED
Status: DISCONTINUED | OUTPATIENT
Start: 2022-01-15 | End: 2022-01-19

## 2022-01-15 RX ADMIN — HEPARIN SODIUM 5000 UNITS: 5000 INJECTION INTRAVENOUS; SUBCUTANEOUS at 22:07

## 2022-01-15 RX ADMIN — POTASSIUM CHLORIDE 40 MEQ: 1500 TABLET, EXTENDED RELEASE ORAL at 17:35

## 2022-01-15 RX ADMIN — INSULIN LISPRO 1 UNITS: 100 INJECTION, SOLUTION INTRAVENOUS; SUBCUTANEOUS at 22:10

## 2022-01-15 RX ADMIN — WARFARIN SODIUM 5 MG: 5 TABLET ORAL at 17:35

## 2022-01-15 RX ADMIN — AMIODARONE HYDROCHLORIDE 200 MG: 200 TABLET ORAL at 22:07

## 2022-01-15 RX ADMIN — ASPIRIN 325 MG ORAL TABLET 325 MG: 325 PILL ORAL at 08:56

## 2022-01-15 RX ADMIN — AMIODARONE HYDROCHLORIDE 200 MG: 200 TABLET ORAL at 05:11

## 2022-01-15 RX ADMIN — MILRINONE LACTATE IN DEXTROSE 0.13 MCG/KG/MIN: 200 INJECTION, SOLUTION INTRAVENOUS at 11:57

## 2022-01-15 RX ADMIN — POTASSIUM CHLORIDE 40 MEQ: 1500 TABLET, EXTENDED RELEASE ORAL at 08:56

## 2022-01-15 RX ADMIN — HYDRALAZINE HYDROCHLORIDE 10 MG: 10 TABLET, FILM COATED ORAL at 13:58

## 2022-01-15 RX ADMIN — HEPARIN SODIUM 5000 UNITS: 5000 INJECTION INTRAVENOUS; SUBCUTANEOUS at 13:58

## 2022-01-15 RX ADMIN — Medication 1 MG/HR: at 11:57

## 2022-01-15 RX ADMIN — HYDRALAZINE HYDROCHLORIDE 10 MG: 10 TABLET, FILM COATED ORAL at 22:07

## 2022-01-15 RX ADMIN — PANTOPRAZOLE SODIUM 40 MG: 40 TABLET, DELAYED RELEASE ORAL at 05:11

## 2022-01-15 RX ADMIN — HEPARIN SODIUM 5000 UNITS: 5000 INJECTION INTRAVENOUS; SUBCUTANEOUS at 05:11

## 2022-01-15 RX ADMIN — ACETAMINOPHEN 975 MG: 325 TABLET, FILM COATED ORAL at 08:56

## 2022-01-15 RX ADMIN — ATORVASTATIN CALCIUM 80 MG: 80 TABLET, FILM COATED ORAL at 17:35

## 2022-01-15 RX ADMIN — Medication 1 MG/HR: at 22:27

## 2022-01-15 RX ADMIN — HYDRALAZINE HYDROCHLORIDE 10 MG: 10 TABLET, FILM COATED ORAL at 09:47

## 2022-01-15 RX ADMIN — AMIODARONE HYDROCHLORIDE 200 MG: 200 TABLET ORAL at 13:57

## 2022-01-15 NOTE — NUTRITION
01/14/22 2100   Recommendations/Interventions   Nutrition Recommendations Other (Specify)  (Pt is not eating    Will liberalize diet, continue supplement, recommend appetite stimulant, please consult nutrition for TF recommendations )

## 2022-01-15 NOTE — PLAN OF CARE
Problem: MOBILITY - ADULT  Goal: Maintain or return to baseline ADL function  Description: INTERVENTIONS:  -  Assess patient's ability to carry out ADLs; assess patient's baseline for ADL function and identify physical deficits which impact ability to perform ADLs (bathing, care of mouth/teeth, toileting, grooming, dressing, etc )  - Assess/evaluate cause of self-care deficits   - Assess range of motion  - Assess patient's mobility; develop plan if impaired  - Assess patient's need for assistive devices and provide as appropriate  - Encourage maximum independence but intervene and supervise when necessary  - Involve family in performance of ADLs  - Assess for home care needs following discharge   - Consider OT consult to assist with ADL evaluation and planning for discharge  - Provide patient education as appropriate  Outcome: Progressing  Goal: Maintains/Returns to pre admission functional level  Description: INTERVENTIONS:  - Perform BMAT or MOVE assessment daily    - Set and communicate daily mobility goal to care team and patient/family/caregiver  - Collaborate with rehabilitation services on mobility goals if consulted  - Perform Range of Motion 4 times a day  - Reposition patient every 4 hours    - Dangle patient 4 times a day  - Stand patient 4 times a day  - Ambulate patient 4 times a day  - Out of bed to chair 4 times a day   - Out of bed for meals 4 times a day  - Out of bed for toileting  - Record patient progress and toleration of activity level   Outcome: Progressing

## 2022-01-15 NOTE — PROGRESS NOTES
Progress Note - Cardiothoracic Surgery   Tommie Hays 61 y o  male MRN: [id number]  Unit/Bed#: [facility] 427-01 Encounter: [id number]  Cardiogenic shock  S/P VA ECMO; POD # 15  VSD/cardiogenic shock  S/P VSD repair w/ VA ECMO decannulation; POD # 11  Retained PA catheter  S/P Redo sternotomy and removal PA catheter w/ CPB; POD # 8      24 Hour Events: Started on Bumex gtt per HF  No other events  No complaints      Medications:   Scheduled Meds:  Current Facility-Administered Medications   Medication Dose Route Frequency Provider Last Rate    acetaminophen  650 mg Oral Q6H PRN Anabelle Art PA-C      acetaminophen  975 mg Oral Q8H Anabelle Art PA-C      amiodarone  200 mg Oral Q8H [address] Leatha Singh PA-C      aspirin  325 mg Oral Daily Leatha Singh PA-C      atorvastatin  80 mg Oral After [employer]      bisacodyl  10 mg Rectal Daily PRN Anabelle Art PA-C      bumetanide  1 mg/hr Intravenous Continuous Martha Thomas MD 1 mg/hr (01/14/22 7949)    docusate sodium  100 mg Oral BID PRN Anabelle Art PA-C      heparin (porcine)  5,000 Units Subcutaneous [facility] Leatha Singh PA-C      insulin lispro  1-6 Units Subcutaneous TID AC Leatha Singh PA-C      insulin lispro  1-6 Units Subcutaneous HS Leatha Singh PA-C      milrinone [facility]) infusion  0 13 mcg/kg/min Intravenous Continuous Anabelle Art PA-C 0 13 mcg/kg/min (01/14/22 1912)    ondansetron  4 mg Intravenous Q6H PRN Leatha Singh PA-C      oxyCODONE  2 5 mg Oral Q4H PRN Leatha Singh PA-C      oxyCODONE  5 mg Oral Q4H PRN Laetha Singh PA-C      pantoprazole  40 mg Oral Early Morning Leatha Singh PA-C      polyethylene glycol  17 g Oral Daily PRN Anabelle Art PA-C      potassium chloride  40 mEq Oral BID Anabelle Art PA-C      temazepam  15 mg Oral HS PRN Leatha Singh PA-C       Continuous Infusions:bumetanide, 1 mg/hr, Last Rate: 1 mg/hr (01/14/22 2066)  milrinone (PRIMACOR) infusion, 0 13 mcg/kg/min, Last Rate: 0 13 mcg/kg/min (01/14/22 1912)      PRN Meds:   acetaminophen    bisacodyl    docusate sodium    ondansetron    oxyCODONE    oxyCODONE    polyethylene glycol    temazepam    Vitals:   Vitals:    01/14/22 1500 01/14/22 1600 01/14/22 2300 01/15/22 0259   BP: 105/62  112/70 132/75   BP Location: Left arm  Left arm Right arm   Pulse: 96  90 91   Resp: 18  18 14   Temp: 97 7 °F (36 5 °C)  99 1 °F (37 3 °C) 97 6 °F (36 4 °C)   TempSrc: Oral  Oral Oral   SpO2: 100% 100% 99% 100%   Weight:       Height:         Bp x24hrs: /50-70    Telemetry: NSR w/ stable T wave inversion sin II/V5; Heart Rate: 96; 3 beat run of NSVT since last eval    Respiratory:   SpO2: SpO2: 100 %, SpO2 Activity: SpO2 Activity: At Rest, SpO2 Device: O2 Device: None (Room air);  Room Air    Intake/Output:     Intake/Output Summary (Last 24 hours) at 1/15/2022 0634  Last data filed at 1/15/2022 6452  Gross per 24 hour   Intake 176 36 ml   Output 2310 ml   Net -2133 64 ml      UOP - 1600cc/8hrs; 2310cc/24hrs w/ 1 unmeasured occurrence    Chest tube Output:  CTs & EPWs have been discontinued    Weights:   Weight (last 2 days)     Date/Time Weight    01/14/22 0600 106 (232 81)    01/13/22 1325 103 (227)    01/13/22 0530 103 (227 74)        Admission weight 90 5kg -- no new weight today    Results:   NO NEW CBC TODAY  BMP PENDING THIS AM  Results from last 7 days   Lab Units 01/14/22  0508 01/13/22  0843 01/12/22  0515   WBC Thousand/uL 9 46 10 51* 16 87*   HEMOGLOBIN g/dL 10 5* 10 5* 10 3*   HEMATOCRIT % 34 2* 34 1* 33 7*   PLATELETS Thousands/uL 132* 137* 157     Results from last 7 days   Lab Units 01/14/22  0508 01/13/22  0843 01/12/22  1452 01/12/22  0515 01/12/22  0515   SODIUM mmol/L 142 142  --   --  148*   POTASSIUM mmol/L 4 0 3 7 4 0   < > 3 4*   CHLORIDE mmol/L 109* 111*  --   --  116*   CO2 mmol/L 26 29  --   --  29   BUN mg/dL 46* 42*  --   --  49*   CREATININE mg/dL 1 72* 1 27  --   --  1 39*   CALCIUM mg/dL 7 8* 7 7*  --   --  7 6*    < > = values in this interval not displayed  Results from last 7 days   Lab Units 01/15/22  0508 01/14/22  1003 01/12/22  0515   INR  1 24* 1 12 1 21*     Coumadin mg 1/14 - 5  1/11 - 2 5          Point of care glucose: 62 - 121 - 108 - 91 - 96 - 113 - 109    Studies:  No new studies    I have personally reviewed pertinent reports  and I have personally reviewed pertinent films in PACS    Invasive Lines/Tubes:  Invasive Devices  Report    Peripherally Inserted Central Catheter Line            PICC Line 52/33/76 Right Basilic 8 days          Drain            External Urinary Catheter Small 2 days                Physical Exam:    HEENT/NECK:  Normocephalic  Atraumatic  Cardiac: Regular rate and rhythm  Pulmonary:  anterolateral lung fields CTA b/l  Abdomen:  Non-tender, Non-distended and Normal bowel sounds  Incisions: Sternum is stable  Incision is clean, dry, and intact  Extremities: cool LE b/l, warm UE b/l, 2-3+ pitting edema to UE b/l, 2-3+ pitting edema to pedal/pretibial LE b/l  Neuro: Alert and oriented X 3  Skin: Warm/Dry, without rashes or lesions  Assessment:  Principal Problem:    Cardiogenic shock (HCC)  Active Problems:    Agoraphobia with panic attacks    VSD (ventricular septal defect)    JUDE (acute kidney injury) ([address] )    Transaminitis    Tylenol ingestion    Acute systolic congestive heart failure (HCC)    Anemia    Personal history of ECMO    Coagulopathy (HCC)    Leukocytosis    Central line complication    S/P VSD repair    Thrombocytopenia (HCC)    Hypernatremia    Hyperchloremia    Hypocalcemia     Cardiogenic shock  S/P VA ECMO; POD # 15  VSD/cardiogenic shock  S/P VSD repair w/ VA ECMO decannulation; POD # 11  Retained PA catheter  S/P Redo sternotomy and removal PA catheter w/ CPB; POD # 8    Plan:    1   Cardiac:   Sinus Tachycardia; HR/BP well-controlled  Will need afterload reduction eventually -- d/w HF option for today as pressures can allow  Milrinone 0 13 -- d/w surgeon continue or discontinue  Continue ASA and Statin therapy  INR 1 24, dose Coumadin tonight  Epicardial pacing wires out  PICC  Continue DVT prophylaxis    2  Pulmonary:   Good Room air oxygen saturation; Continue incentive spirometry/Coughing/Deep breathing exercises  Chest tubes have been discontinued    3  Renal:   Intake/Output net: (-)2133 6 mL/24 hours  Diuretic Regimen:  Bumex gtt -- continue today if pressures allow  Start Potassium supplementation once BMP back  BMP pending    4  Neuro:  Neurologically intact; No active issues  Incisional pain well-controlled  Continue Tylenol, 975 mg PO q 8, standing dose  Continue Oxycodone, 2 5 to 5 mg PO q 4 hours prn pain    5  GI:  Tolerating TLC 2 3 gm sodium diet  Maintain 1800 mL daily fluid restriction   Continue stool softeners and prn suppository  Continue GI prophylaxis    6  Endo:   Glucose well-controlled with sliding scale coverage    7     Disposition:      Awaiting rehab placement, Anticipate discharge to rehab once medically stable (once off milrinone & more HF optimized -- mid this coming week)    VTE Pharmacologic Prophylaxis: Heparin  VTE Mechanical Prophylaxis: sequential compression device    Collaborative rounds completed with supervising physician  Plan of care discussed with bedside nurse    SIGNATURE: Modesto Ling PA-C  DATE: January 15, 2022  TIME: 6:34 AM

## 2022-01-15 NOTE — [facility] ADMISSION
Patient is not medically ready for [facility] admission at this time  Continues on milrinone drip and bumex drips  Admissions team will continue to monitor patient's case and follow up

## 2022-01-15 NOTE — PROGRESS NOTES
Heart Failure/ Pulmonary Hypertension Progress Note - Sharon Goodson 61 y o  male MRN: [id number]    Unit/Bed#: [facility] 427-01 Encounter: [id number]      Assessment:    Principal Problem:    Cardiogenic shock ([facility] [address] )  Active Problems:    Agoraphobia with panic attacks    VSD (ventricular septal defect)    JUDE (acute kidney injury) ([facility] [address] )    Transaminitis    Tylenol ingestion    Acute systolic congestive heart failure (HCC)    Anemia    Personal history of ECMO    Coagulopathy (HCC)    Leukocytosis    Central line complication    S/P VSD repair    Thrombocytopenia (HCC)    Hypernatremia    Hyperchloremia    Hypocalcemia    Cardiogenic shock due to anteroseptal MI complicated by VSD on [facility] ECMO 12/31/21 s/p VSD repair with large bovine pericardial patch and ECMO decannulation 1/4/2022, also with vasodilatory component post op; s/p multiple blood product transfusions     11/15/22:  VBG 50% - on milrinone 0 125mcg/kg/min  Hb 10 5  Jhon CI 1 8    1/14/22: 75%  1/11/22  SVO2 53 5%- on milrinone     1/10/22  SVO2 4 am: 56 2- on milrinone  SVO2 1 PM: 43 2%- off milrinone  CO/CI by Jhon: 3 8/ 1 7 L/min     1/7/22  SVO2 65% - on milrinone 0 5  Hb 11 2  Jhon 2 8  CVP 7-11     1/6/22:  4AM SvO2 55% - on milrinone 0 5, levo 4   Jhon CI 2     RA 11-13  PA 23/18/20  ALDEN 0 4  CVP:PCWP 0 7     1/5/22:  Drips:  Milrinone 0 5  Levo 3  Epi 5  Vaso 0 04  Hemodynamic monitoring  Sv02 58%  Jhon CI 2 08  Jhon CO 4 3  MAP 69  RA 11  PA 28/20/24  ALDEN 0 7  CVP:PCWP 0 55  SVR 1079     Studies personally reviewed by myself:     Echocardiogram 12/30/21  LVEF: 40-45%  LVIDd: 4 5cm  RV: normal size, moderately reduced systolic function  MR: none seen  PASP: 65mmHg + RAP  RVOT: shortened PAAT  Other: muscular VSD with large left to right shunt; dyskinetic septum     EKG 1/3/2022: sinus rhythm, IRBBB, anteroseptal q waves  EKG 12/30/21: sinus tachycardia, RBBB, anteroseptal ST segment elevation     Neurohormonal Blockade: - none at this time  --Beta-Blocker: metoprolol tartrate 12 5 mg BID- will hold  --ACEi, ARB or ARNi:    (or SVR reduction) hydralazine 10 mg Q8  --Aldosterone Receptor Blocker:  --Diuretic: bumex 2 mg IV TID     Sudden Cardiac Death Risk Reduction:  --ICD: LVEF > 35%     Electrical Resynchronization:  -- QRSd 98msec        Advanced Therapies (If appropriate): --We will continue to monitor  Current smoker  Small LV and VSD precluding LVAD     # Coronary artery disease, undefined anatomy  Rx: asa 325 mg  daily, atorvastatin 80 mg     # Acute kidney injury on CKD  Baseline 1 05-1 16, peaked at 4 this admission, down to 1 56 1/3, peaked 1 78 post op likely cardiorenal, up to 2 1 this morning  Acute hypoxic respiratory failure  Transaminitis, resolving  Anemia  Hb 12 8 on presentation, down to 7 9 today  LDH:  1055 1/2/2022  853 1/3/2022      Plan:  Continue diuresis with IV bumex  Consider increasing inotropic support with milrinone to 0 25 vs SVR reduction with hydralazine 10mg TID and uptitrate as BP tolerates to  Improve cardiac output  Will hold off on betablocker since still reduced CO and volume overloaded    Subjective:   Patient seen and examined  No significant events overnight  Feeling pain in his feet, cool  Fatigued, no shortness of breath    Review of Systems   Constitutional: Positive for fatigue  Respiratory: Negative for chest tightness and shortness of breath  Cardiovascular: Positive for leg swelling  Negative for chest pain and palpitations  Gastrointestinal: Negative for abdominal distention and abdominal pain  Neurological: Negative for dizziness and light-headedness  Objective: Intake/ Output: [id number] - inaccurate I/O in AM, on bumex 1mg/hr  Weight: 232 lbs on bedscale  Tele: sinus rhythm      Vitals: Blood pressure 123/80, pulse 100, temperature 98 °F (36 7 °C), temperature source Oral, resp  rate 16, height 5' 5 5" (1 664 m), weight 106 kg (232 lb 12 9 oz), SpO2 100 %  , Body mass index is 38 15 kg/m² , I/O last 3 completed shifts: In: 354 8 [I V :354 8]  Out: 5341 [Urine:3758]  I/O this shift:  In: -   Out: 450 [Urine:450]  Wt Readings from Last 3 Encounters:   01/14/22 106 kg (232 lb 12 9 oz)   10/07/21 93 1 kg (205 lb 3 2 oz)   03/18/21 93 4 kg (206 lb)       Intake/Output Summary (Last 24 hours) at 1/15/2022 0855  Last data filed at 1/15/2022 0854  Gross per 24 hour   Intake 176 36 ml   Output 2760 ml   Net -2583 64 ml     I/O last 3 completed shifts: In: 354 8 [I V :354 8]  Out: 3570 [Urine:3758]    No significant arrhythmias seen on telemetry review         Physical Exam:  Vitals:    01/14/22 1600 01/14/22 2300 01/15/22 0259 01/15/22 0732   BP:  112/70 132/75 123/80   BP Location:  Left arm Right arm Right arm   Pulse:  90 91 100   Resp:  18 14 16   Temp:  99 1 °F (37 3 °C) 97 6 °F (36 4 °C) 98 °F (36 7 °C)   TempSrc:  Oral Oral Oral   SpO2: 100% 99% 100% 100%   Weight:       Height:           GEN: Vladimir Edwards appears well, alert and oriented x 3, pleasant and cooperative   HEENT: NC/AT, moist mucosa, anicteric sclerae; extraocular muscles intact  NECK: supple, no carotid bruits   HEART: regular rhythm, normal S1 and S2, no murmurs, clicks, gallops or rubs, JVP is elevated  LUNGS: clear to auscultation bilaterally; no wheezes, rales, or rhonchi   ABDOMEN: normal bowel sounds, soft, no tenderness, no distention  EXTREMITIES: peripheral pulses normal; no clubbing, cyanosis; cool distal legs/feet and distal arms/hands; bilateral upper and lower extremity edema  NEURO: no focal findings   SKIN: normal without suspicious lesions on exposed skin      Current Facility-Administered Medications:     acetaminophen (TYLENOL) tablet 650 mg, 650 mg, Oral, Q6H PRN, Anabelle Art PA-C    acetaminophen (TYLENOL) tablet 975 mg, 975 mg, Oral, Q8H, Anabelle Art PA-C, 975 mg at 01/14/22 2341    amiodarone tablet 200 mg, 200 mg, Oral, Q8H [facility], Abe Jo PA-C, 200 mg at 01/15/22 0511    aspirin tablet 325 mg, 325 mg, Oral, Daily, Danielle Al PA-C, 325 mg at 01/14/22 0820    atorvastatin (LIPITOR) tablet 80 mg, 80 mg, Oral, After Woody Wm, NOA, 80 mg at 01/14/22 1730    bisacodyl (DULCOLAX) rectal suppository 10 mg, 10 mg, Rectal, Daily PRN, Anabelle Art PA-C    bumetanide (BUMEX) 12 5 mg infusion 50 mL, 1 mg/hr, Intravenous, Continuous, Joel Schaeffer MD, Last Rate: 4 mL/hr at 01/14/22 2339, 1 mg/hr at 01/14/22 2339    docusate sodium (COLACE) capsule 100 mg, 100 mg, Oral, BID PRN, Anabelle Art PA-C    heparin (porcine) subcutaneous injection 5,000 Units, 5,000 Units, Subcutaneous, Q8H [facility], Danielle Al PA-C, 5,000 Units at 01/15/22 0511    insulin lispro (HumaLOG) 100 units/mL subcutaneous injection 1-6 Units, 1-6 Units, Subcutaneous, TID AC **AND** Fingerstick Glucose (POCT), , , TID AC, Danielle Al PA-C    insulin lispro (HumaLOG) 100 units/mL subcutaneous injection 1-6 Units, 1-6 Units, Subcutaneous, HS, Danielle Al PA-C    milrinone (PRIMACOR) 20 mg in 100 mL infusion (premix), 0 13 mcg/kg/min, Intravenous, Continuous, Anabelle Art PA-C, Last Rate: 4 mL/hr at 01/14/22 1912, 0 13 mcg/kg/min at 01/14/22 1912    ondansetron (ZOFRAN) injection 4 mg, 4 mg, Intravenous, Q6H PRN, Danielle Al PA-C    oxyCODONE (ROXICODONE) IR tablet 2 5 mg, 2 5 mg, Oral, Q4H PRN, Danielle Al PA-C, 2 5 mg at 01/13/22 0525    oxyCODONE (ROXICODONE) IR tablet 5 mg, 5 mg, Oral, Q4H PRN, Danielle Al PA-C, 5 mg at 01/08/22 2148    pantoprazole (PROTONIX) EC tablet 40 mg, 40 mg, Oral, Early Morning, Danielle Al PA-C, 40 mg at 01/15/22 0511    polyethylene glycol (MIRALAX) packet 17 g, 17 g, Oral, Daily PRN, Anabelle Art PA-C    potassium chloride (K-DUR,KLOR-CON) CR tablet 40 mEq, 40 mEq, Oral, BID, Anabelle Art PA-C, 40 mEq at 01/14/22 1730    temazepam (RESTORIL) capsule 15 mg, 15 mg, Oral, HS PRN, Danielle Al PA-C, 15 mg at 01/13/22 2144    warfarin (COUMADIN) tablet 5 mg, 5 mg, Oral, Once (warfarin), Anabelle Art PA-C      Labs & Results:        Results from last 7 days   Lab Units 01/14/22  0508 01/13/22  0843 01/12/22  0515   WBC Thousand/uL 9 46 10 51* 16 87*   HEMOGLOBIN g/dL 10 5* 10 5* 10 3*   HEMATOCRIT % 34 2* 34 1* 33 7*   PLATELETS Thousands/uL 132* 137* 157         Results from last 7 days   Lab Units 01/15/22  0738 01/15/22  0508 01/14/22  0508 01/12/22  1452 01/12/22  0515 01/11/22  0523 01/10/22  0429 01/09/22  1516 01/09/22  0422   POTASSIUM mmol/L 4 2 4 1 4 0   < > 3 4*   < > 3 7   < > 2 9*   CHLORIDE mmol/L 105 106 109*   < > 116*   < > 115*   < > 115*   CO2 mmol/L 26 27 26   < > 29   < > 30   < > 32   BUN mg/dL 47* 47* 46*   < > 49*   < > 54*   < > 80*   CREATININE mg/dL 2 25* 2 04* 1 72*   < > 1 39*   < > 1 24   < > 1 83*   CALCIUM mg/dL 7 9* 7 9* 7 8*   < > 7 6*   < > 8 1*   < > 7 7*   ALK PHOS U/L  --   --   --   --  159*  --  152*  --  115   ALT U/L  --   --   --   --  56  --  40  --  35   AST U/L  --   --   --   --  45  --  52*  --  54*    < > = values in this interval not displayed       Results from last 7 days   Lab Units 01/15/22  0508 01/14/22  1003 01/12/22  0515   INR  1 24* 1 12 1 21*         Cleveland Chen MD  Advanced Heart Failure and Mechanical Circulatory [facility] 116

## 2022-01-16 PROBLEM — Z79.01 ANTICOAGULATED ON COUMADIN: Status: ACTIVE | Noted: 2022-01-16

## 2022-01-16 PROBLEM — Z79.01 ANTICOAGULATION GOAL OF INR 2 TO 3: Status: ACTIVE | Noted: 2022-01-16

## 2022-01-16 PROBLEM — Z51.81 ANTICOAGULATION GOAL OF INR 2 TO 3: Status: ACTIVE | Noted: 2022-01-16

## 2022-01-16 LAB
ANION GAP SERPL CALCULATED.3IONS-SCNC: 5 MMOL/L (ref 4–13)
ANION GAP SERPL CALCULATED.3IONS-SCNC: 5 MMOL/L (ref 4–13)
ANION GAP SERPL CALCULATED.3IONS-SCNC: 7 MMOL/L (ref 4–13)
BASE EX.OXY STD BLDV CALC-SCNC: 56.3 % (ref 60–80)
BASE EXCESS BLDV CALC-SCNC: 2 MMOL/L
BUN SERPL-MCNC: 53 MG/DL (ref 5–25)
BUN SERPL-MCNC: 53 MG/DL (ref 5–25)
BUN SERPL-MCNC: 54 MG/DL (ref 5–25)
CALCIUM SERPL-MCNC: 8 MG/DL (ref 8.3–10.1)
CALCIUM SERPL-MCNC: 8.1 MG/DL (ref 8.3–10.1)
CALCIUM SERPL-MCNC: 8.2 MG/DL (ref 8.3–10.1)
CHLORIDE SERPL-SCNC: 106 MMOL/L (ref 100–108)
CHLORIDE SERPL-SCNC: 108 MMOL/L (ref 100–108)
CHLORIDE SERPL-SCNC: 109 MMOL/L (ref 100–108)
CO2 SERPL-SCNC: 26 MMOL/L (ref 21–32)
CO2 SERPL-SCNC: 28 MMOL/L (ref 21–32)
CO2 SERPL-SCNC: 29 MMOL/L (ref 21–32)
CREAT SERPL-MCNC: 2.1 MG/DL (ref 0.6–1.3)
CREAT SERPL-MCNC: 2.27 MG/DL (ref 0.6–1.3)
CREAT SERPL-MCNC: 2.42 MG/DL (ref 0.6–1.3)
GFR SERPL CREATININE-BSD FRML MDRD: 27 ML/MIN/1.73SQ M
GFR SERPL CREATININE-BSD FRML MDRD: 29 ML/MIN/1.73SQ M
GFR SERPL CREATININE-BSD FRML MDRD: 32 ML/MIN/1.73SQ M
GLUCOSE SERPL-MCNC: 109 MG/DL (ref 65–140)
GLUCOSE SERPL-MCNC: 110 MG/DL (ref 65–140)
GLUCOSE SERPL-MCNC: 139 MG/DL (ref 65–140)
GLUCOSE SERPL-MCNC: 153 MG/DL (ref 65–140)
GLUCOSE SERPL-MCNC: 224 MG/DL (ref 65–140)
GLUCOSE SERPL-MCNC: 92 MG/DL (ref 65–140)
GLUCOSE SERPL-MCNC: 98 MG/DL (ref 65–140)
HCO3 BLDV-SCNC: 25.7 MMOL/L (ref 24–30)
INR PPP: 2.4 (ref 0.84–1.19)
O2 CT BLDV-SCNC: 9.7 ML/DL
PCO2 BLDV: 36.9 MM HG (ref 42–50)
PH BLDV: 7.46 [PH] (ref 7.3–7.4)
PO2 BLDV: 29.9 MM HG (ref 35–45)
POTASSIUM SERPL-SCNC: 4.1 MMOL/L (ref 3.5–5.3)
POTASSIUM SERPL-SCNC: 4.1 MMOL/L (ref 3.5–5.3)
POTASSIUM SERPL-SCNC: 4.2 MMOL/L (ref 3.5–5.3)
PROTHROMBIN TIME: 25 SECONDS (ref 11.6–14.5)
SODIUM SERPL-SCNC: 140 MMOL/L (ref 136–145)
SODIUM SERPL-SCNC: 141 MMOL/L (ref 136–145)
SODIUM SERPL-SCNC: 142 MMOL/L (ref 136–145)

## 2022-01-16 PROCEDURE — 82805 BLOOD GASES W/O2 SATURATION: CPT | Performed by: INTERNAL MEDICINE

## 2022-01-16 PROCEDURE — 99024 POSTOP FOLLOW-UP VISIT: CPT | Performed by: THORACIC SURGERY (CARDIOTHORACIC VASCULAR SURGERY)

## 2022-01-16 PROCEDURE — 85610 PROTHROMBIN TIME: CPT | Performed by: PHYSICIAN ASSISTANT

## 2022-01-16 PROCEDURE — 80048 BASIC METABOLIC PNL TOTAL CA: CPT | Performed by: PHYSICIAN ASSISTANT

## 2022-01-16 PROCEDURE — 99231 SBSQ HOSP IP/OBS SF/LOW 25: CPT | Performed by: INTERNAL MEDICINE

## 2022-01-16 PROCEDURE — 82948 REAGENT STRIP/BLOOD GLUCOSE: CPT

## 2022-01-16 RX ADMIN — MILRINONE LACTATE IN DEXTROSE 0.13 MCG/KG/MIN: 200 INJECTION, SOLUTION INTRAVENOUS at 16:33

## 2022-01-16 RX ADMIN — Medication 1 MG/HR: at 13:25

## 2022-01-16 RX ADMIN — HYDRALAZINE HYDROCHLORIDE 10 MG: 10 TABLET, FILM COATED ORAL at 06:35

## 2022-01-16 RX ADMIN — POTASSIUM CHLORIDE 40 MEQ: 1500 TABLET, EXTENDED RELEASE ORAL at 08:44

## 2022-01-16 RX ADMIN — HYDRALAZINE HYDROCHLORIDE 10 MG: 10 TABLET, FILM COATED ORAL at 13:25

## 2022-01-16 RX ADMIN — POTASSIUM CHLORIDE 40 MEQ: 1500 TABLET, EXTENDED RELEASE ORAL at 17:16

## 2022-01-16 RX ADMIN — AMIODARONE HYDROCHLORIDE 200 MG: 200 TABLET ORAL at 13:25

## 2022-01-16 RX ADMIN — PANTOPRAZOLE SODIUM 40 MG: 40 TABLET, DELAYED RELEASE ORAL at 06:35

## 2022-01-16 RX ADMIN — HEPARIN SODIUM 5000 UNITS: 5000 INJECTION INTRAVENOUS; SUBCUTANEOUS at 06:34

## 2022-01-16 RX ADMIN — Medication 0.5 MG/HR: at 16:33

## 2022-01-16 RX ADMIN — Medication 0.5 MG/HR: at 22:00

## 2022-01-16 RX ADMIN — INSULIN LISPRO 2 UNITS: 100 INJECTION, SOLUTION INTRAVENOUS; SUBCUTANEOUS at 22:01

## 2022-01-16 RX ADMIN — ACETAMINOPHEN 975 MG: 325 TABLET, FILM COATED ORAL at 08:44

## 2022-01-16 RX ADMIN — ATORVASTATIN CALCIUM 80 MG: 80 TABLET, FILM COATED ORAL at 17:16

## 2022-01-16 RX ADMIN — ASPIRIN 325 MG ORAL TABLET 325 MG: 325 PILL ORAL at 08:43

## 2022-01-16 RX ADMIN — AMIODARONE HYDROCHLORIDE 200 MG: 200 TABLET ORAL at 22:03

## 2022-01-16 RX ADMIN — AMIODARONE HYDROCHLORIDE 200 MG: 200 TABLET ORAL at 06:35

## 2022-01-16 RX ADMIN — ACETAMINOPHEN 975 MG: 325 TABLET, FILM COATED ORAL at 00:58

## 2022-01-16 RX ADMIN — HYDRALAZINE HYDROCHLORIDE 10 MG: 10 TABLET, FILM COATED ORAL at 22:05

## 2022-01-16 NOTE — PROGRESS NOTES
Pt c/o not able to urinate and feeling pressure down there  He did have yellow urine in canister  Bladder scanned for 999mL and pt urinated again for 60mL  Critical care PA ok to straight cath  UO for 1500mL, pink-tinged d/t a little resistance tube going in  Rosalva Charlton made aware  purewick put back on  Will monitor closely

## 2022-01-16 NOTE — PROGRESS NOTES
Heart Failure/ Pulmonary Hypertension Progress Note - Gissel Donnelly 61 y o  male MRN: [id number]    Unit/Bed#: [facility] 427-01 Encounter: [id number]      Assessment:    Principal Problem:    Cardiogenic shock ([facility] [address] )  Active Problems:    Agoraphobia with panic attacks    VSD (ventricular septal defect)    JUDE (acute kidney injury) ([facility] [address] )    Transaminitis    Tylenol ingestion    Acute systolic congestive heart failure (HCC)    Anemia    Personal history of ECMO    Coagulopathy (HCC)    Leukocytosis    Central line complication    S/P VSD repair    Thrombocytopenia (HCC)    Hypernatremia    Hyperchloremia    Hypocalcemia    Anticoagulated on Coumadin    Anticoagulation goal of INR 2 to 3    Cardiogenic shock due to anteroseptal MI complicated by VSD on VA ECMO 12/31/21 s/p VSD repair with large bovine pericardial patch and ECMO decannulation 1/4/2022, also with vasodilatory component post op; s/p multiple blood product transfusions     1/16/22:  VBG 56% - milrinone 0 125  Jhon CI 2 2    11/15/22:  VBG 50% - on milrinone 0 125mcg/kg/min  Hb 10 5  Jhon CI 1 8    1/14/22: 75%  1/11/22  SVO2 53 5%- on milrinone     1/10/22  SVO2 4 am: 56 2- on milrinone  SVO2 1 PM: 43 2%- off milrinone  CO/CI by Jhon: 3 8/ 1 7 L/min     1/7/22  SVO2 65% - on milrinone 0 5  Hb 11 2  Jhon 2 8  CVP 7-11     1/6/22:  4AM SvO2 55% - on milrinone 0 5, levo 4   Jhon CI 2     RA 11-13  PA 23/18/20  ALDEN 0 4  CVP:PCWP 0 7     1/5/22:  Drips:  Milrinone 0 5  Levo 3  Epi 5  Vaso 0 04  Hemodynamic monitoring  Sv02 58%  Jhon CI 2 08  Jhon CO 4 3  MAP 69  RA 11  PA 28/20/24  ALDEN 0 7  CVP:PCWP 0 55  SVR 1079     Studies personally reviewed by myself:     Echocardiogram 12/30/21  LVEF: 40-45%  LVIDd: 4 5cm  RV: normal size, moderately reduced systolic function  MR: none seen  PASP: 65mmHg + RAP  RVOT: shortened PAAT  Other: muscular VSD with large left to right shunt; dyskinetic septum     EKG 1/3/2022: sinus rhythm, IRBBB, anteroseptal q waves  EKG 12/30/21: sinus tachycardia, RBBB, anteroseptal ST segment elevation     Neurohormonal Blockade: - none at this time  --Beta-Blocker: metoprolol tartrate 12 5 mg BID-  On hold  --ACEi, ARB or ARNi:    (or SVR reduction) hydralazine 10 mg Q8  --Aldosterone Receptor Blocker:  --Diuretic: bumex 1mg/hr     Sudden Cardiac Death Risk Reduction:  --ICD: LVEF > 35%     Electrical Resynchronization:  -- QRSd 98msec        Advanced Therapies (If appropriate): --We will continue to monitor  Current smoker  Small LV and VSD precluding LVAD     # Coronary artery disease, undefined anatomy  Rx: asa 325 mg  daily, atorvastatin 80 mg     # Acute kidney injury on CKD  Baseline 1 05-1 16, peaked at 4 this admission, down to 1 56 1/3, peaked 1 78 post op likely cardiorenal, up to 2 4 this morning, he was having fluid retention with 1L of urine, [place] catheter placed  Acute hypoxic respiratory failure  Transaminitis, resolving  Anemia  Hb 12 8 on presentation, down to 7 9 today  LDH:  1055 1/2/2022  853 1/3/2022      Plan:  Continue diuresis with IV bumex  Continue low dose milrinone  Continue low dose hydralazine, uptitrate if BP allows  Will hold off on betablocker since still will significant volume overload on inotropic support with milrinone  [place] catheter placed for urinary retention    Subjective:   Patient seen and examined  No significant events overnight  Review of Systems   Constitutional: Positive for fatigue  Respiratory: Negative for chest tightness and shortness of breath  Cardiovascular: Positive for leg swelling  Negative for chest pain and palpitations  Gastrointestinal: Negative for abdominal distention and abdominal pain  Neurological: Negative for dizziness and light-headedness  Objective: Intake/ Output: 193?/3225, -3031  Weight: 226 lbs on bedscale  Tele: sinus rhythm, HR 100s, no events      Vitals: Blood pressure 116/72, pulse (!) 108, temperature 97 7 °F (36 5 °C), temperature source Oral, resp  rate 18, height 5' 5 5" (1 664 m), weight 103 kg (226 lb 13 7 oz), SpO2 95 %  , Body mass index is 37 18 kg/m² , I/O last 3 completed shifts: In: 314 2 [I V :314 2]  Out: 5275 [LZXJY:6429]  I/O this shift:  In: -   Out: 4998 [Urine:1725]  Wt Readings from Last 3 Encounters:   01/16/22 103 kg (226 lb 13 7 oz)   10/07/21 93 1 kg (205 lb 3 2 oz)   03/18/21 93 4 kg (206 lb)       Intake/Output Summary (Last 24 hours) at 1/16/2022 0915  Last data filed at 1/16/2022 0911  Gross per 24 hour   Intake 193 88 ml   Output 4500 ml   Net -4306 12 ml     I/O last 3 completed shifts: In: 314 2 [I V :314 2]  Out: 8898 [Urine:5275]    No significant arrhythmias seen on telemetry review         Physical Exam:  Vitals:    01/16/22 0245 01/16/22 0600 01/16/22 0635 01/16/22 0700   BP: 99/62  107/75 116/72   BP Location: Right arm      Pulse: 97   (!) 108   Resp: 18      Temp:    97 7 °F (36 5 °C)   TempSrc:    Oral   SpO2: 97%   95%   Weight:  103 kg (226 lb 13 7 oz)     Height:           GEN: Jacy Moore appears well, alert and oriented x 3, pleasant and cooperative   HEENT: NC/AT, moist mucosa, anicteric sclerae; extraocular muscles intact  NECK: supple, no carotid bruits   HEART: regular rhythm, normal S1 and S2, no murmurs, clicks, gallops or rubs, JVP is elevated  LUNGS: clear to auscultation bilaterally; no wheezes, rales, or rhonchi   ABDOMEN: normal bowel sounds, soft, no tenderness, no distention  EXTREMITIES: peripheral pulses normal; no clubbing, cyanosis; cool distal legs/feet and distal arms/hands; bilateral upper and lower extremity edema  NEURO: no focal findings   SKIN: normal without suspicious lesions on exposed skin      Current Facility-Administered Medications:     acetaminophen (TYLENOL) tablet 650 mg, 650 mg, Oral, Q6H PRN, Anabelle Art PA-C    acetaminophen (TYLENOL) tablet 975 mg, 975 mg, Oral, Q8H, Anabelle Art PA-C, 975 mg at 01/16/22 0844    amiodarone tablet 200 mg, 200 mg, Oral, Q8H [address], Fely Almendarez ANASTASIIA Wooten PA-C, 200 mg at 01/16/22 9123    aspirin tablet 325 mg, 325 mg, Oral, Daily, Ethan Post PA-C, 325 mg at 01/16/22 0803    atorvastatin (LIPITOR) tablet 80 mg, 80 mg, Oral, After Meagan Condon PA-C, 80 mg at 01/15/22 1735    bisacodyl (DULCOLAX) rectal suppository 10 mg, 10 mg, Rectal, Daily PRN, Anabelle Art PA-C    bumetanide (BUMEX) 12 5 mg infusion 50 mL, 1 mg/hr, Intravenous, Continuous, Milly Moya MD, Last Rate: 4 mL/hr at 01/15/22 2227, 1 mg/hr at 01/15/22 2227    docusate sodium (COLACE) capsule 100 mg, 100 mg, Oral, BID PRN, Anabelle Art PA-C    hydrALAZINE (APRESOLINE) tablet 10 mg, 10 mg, Oral, Q8H [address], Anabelle rAt PA-C, 10 mg at 01/16/22 0635    insulin lispro (HumaLOG) 100 units/mL subcutaneous injection 1-6 Units, 1-6 Units, Subcutaneous, TID AC **AND** Fingerstick Glucose (POCT), , , TID AC, Ethan Post PA-C    insulin lispro (HumaLOG) 100 units/mL subcutaneous injection 1-6 Units, 1-6 Units, Subcutaneous, HS, Ethan Post PA-C, 1 Units at 01/15/22 2210    milrinone (PRIMACOR) 20 mg in 100 mL infusion (premix), 0 13 mcg/kg/min, Intravenous, Continuous, Anabelle Art PA-C, Last Rate: 4 mL/hr at 01/15/22 1936, 0 13 mcg/kg/min at 01/15/22 1936    ondansetron (ZOFRAN) injection 4 mg, 4 mg, Intravenous, Q6H PRN, Ethan Post PA-C    oxyCODONE (ROXICODONE) IR tablet 2 5 mg, 2 5 mg, Oral, Q4H PRN, Ethan Post PA-C, 2 5 mg at 01/13/22 0525    oxyCODONE (ROXICODONE) IR tablet 5 mg, 5 mg, Oral, Q4H PRN, Ethan Post PA-C, 5 mg at 01/08/22 2148    pantoprazole (PROTONIX) EC tablet 40 mg, 40 mg, Oral, Early Morning, Ethan Post PA-C, 40 mg at 01/16/22 8534    polyethylene glycol (MIRALAX) packet 17 g, 17 g, Oral, Daily PRN, Anabelle Art PA-C    potassium chloride (K-DUR,KLOR-CON) CR tablet 40 mEq, 40 mEq, Oral, BID, Anabelle Art PA-C, 40 mEq at 01/16/22 0844    temazepam (RESTORIL) capsule 15 mg, 15 mg, Oral, HS PRN, Ruddy Ramos PA-C, 15 mg at 01/13/22 2144      Labs & Results:        Results from last 7 days   Lab Units 01/14/22  0508 01/13/22  0843 01/12/22  0515   WBC Thousand/uL 9 46 10 51* 16 87*   HEMOGLOBIN g/dL 10 5* 10 5* 10 3*   HEMATOCRIT % 34 2* 34 1* 33 7*   PLATELETS Thousands/uL 132* 137* 157         Results from last 7 days   Lab Units 01/16/22  0801 01/15/22  2347 01/15/22  1622 01/12/22  1452 01/12/22  0515 01/11/22  0523 01/10/22  0429   POTASSIUM mmol/L 4 1 4 2 4 4   < > 3 4*   < > 3 7   CHLORIDE mmol/L 108 109* 110*   < > 116*   < > 115*   CO2 mmol/L 26 28 27   < > 29   < > 30   BUN mg/dL 53* 54* 53*   < > 49*   < > 54*   CREATININE mg/dL 2 42* 2 27* 2 43*   < > 1 39*   < > 1 24   CALCIUM mg/dL 8 1* 8 0* 7 9*   < > 7 6*   < > 8 1*   ALK PHOS U/L  --   --   --   --  159*  --  152*   ALT U/L  --   --   --   --  56  --  40   AST U/L  --   --   --   --  45  --  52*    < > = values in this interval not displayed       Results from last 7 days   Lab Units 01/16/22  0433 01/15/22  0508 01/14/22  1003   INR  2 40* 1 24* 1 12         Arleen Selby MD  Advanced Heart Failure and Mechanical Circulatory   [address]

## 2022-01-16 NOTE — PROGRESS NOTES
Progress Note - Cardiothoracic Surgery   Carissa Parada 61 y o  male MRN: [id number]  Unit/Bed#: [facility] 427-01 Encounter: [id number]  Cardiogenic shock  S/P VA ECMO; POD # 16  VSD/cardiogenic shock  S/P VSD repair w/ VA ECMO decannulation; POD # 12  Retained PA catheter  S/P Redo sternotomy and removal PA catheter w/ CPB; POD # 9    24 Hour Events: Straight cath x1 QHS  Started on Hydralazine per HF  No other events  C/o feeling tired today but overall motivated to get better & understanding of acuity of illness/situation      Medications:   Scheduled Meds:  Current Facility-Administered Medications   Medication Dose Route Frequency Provider Last Rate    acetaminophen  650 mg Oral Q6H PRN Anabelle Art PA-C      acetaminophen  975 mg Oral Q8H Anabelle Art PA-C      amiodarone  200 mg Oral Q8H [address] Edgardo Guthrie PA-C      aspirin  325 mg Oral Daily Edgardo Guthrie PA-C      atorvastatin  80 mg Oral After [employer]      bisacodyl  10 mg Rectal Daily PRN Anabelle Art PA-C      bumetanide  1 mg/hr Intravenous Continuous Freddy Saldivar MD 1 mg/hr (01/15/22 2227)    docusate sodium  100 mg Oral BID PRN Anabelle Art PA-C      heparin (porcine)  5,000 Units Subcutaneous [facility] Edgardo Guthrie PA-C      hydrALAZINE  10 mg Oral Q8H [address] Anabelle Art PA-C      insulin lispro  1-6 Units Subcutaneous TID AC Edgardo Guthrie PA-C      insulin lispro  1-6 Units Subcutaneous HS Edgardo Guthrie PA-C      milrinone [facility]) infusion  0 13 mcg/kg/min Intravenous Continuous Anabelle Art PA-C 0 13 mcg/kg/min (01/15/22 1936)    ondansetron  4 mg Intravenous Q6H PRN Edgardo Guthrie PA-C      oxyCODONE  2 5 mg Oral Q4H PRN Edgardo Guthrie PA-C      oxyCODONE  5 mg Oral Q4H PRN Edgardo Guthrie PA-C      pantoprazole  40 mg Oral Early Morning Edgardo Guthrie PA-C      polyethylene glycol  17 g Oral Daily PRN Anabelle Art PA-C      potassium chloride  40 mEq Oral BID Anabelle Art PA-C      temazepam  15 mg Oral HS PRN Suyapa Cisneros PA-C       Continuous Infusions:bumetanide, 1 mg/hr, Last Rate: 1 mg/hr (01/15/22 2227)  milrinone (PRIMACOR) infusion, 0 13 mcg/kg/min, Last Rate: 0 13 mcg/kg/min (01/15/22 1936)      PRN Meds:   acetaminophen    bisacodyl    docusate sodium    ondansetron    oxyCODONE    oxyCODONE    polyethylene glycol    temazepam    Vitals:   Vitals:    01/15/22 1936 01/15/22 2207 01/16/22 0245 01/16/22 0635   BP: 109/63 99/59 99/62 107/75   BP Location: Right arm  Right arm    Pulse: 103  97    Resp: 17  18    Temp: (!) 97 2 °F (36 2 °C)      TempSrc: Oral      SpO2: 99%  97%    Weight:       Height:         Bp x24hrs: /50-80    Telemetry: Sinus Tachycardia 1/ stable T wave inversions in II/V5; Heart Rate: 101; no events/24hrs    Respiratory:   SpO2: SpO2: 97 %, SpO2 Activity: SpO2 Activity: At Rest, SpO2 Device: O2 Device: None (Room air);  Room Air    Intake/Output:     Intake/Output Summary (Last 24 hours) at 1/16/2022 0635  Last data filed at 1/15/2022 2157  Gross per 24 hour   Intake 109 34 ml   Output 2725 ml   Net -2615 66 ml      UOP - 500cc/8hrs; 3225cc/24hrs    Stool - 1/24hrs    Chest tube Output:  CTs & EPWs have been discontinued    Weights:   Weight (last 2 days)     Date/Time Weight    01/14/22 0600 106 (232 81)        Admission weight: 89 6kg - no new weight today    Results:   NO NEW CBC TODAY  BMP PENDING  Results from last 7 days   Lab Units 01/14/22  0508 01/13/22  0843 01/12/22  0515   WBC Thousand/uL 9 46 10 51* 16 87*   HEMOGLOBIN g/dL 10 5* 10 5* 10 3*   HEMATOCRIT % 34 2* 34 1* 33 7*   PLATELETS Thousands/uL 132* 137* 157     Results from last 7 days   Lab Units 01/15/22  2347 01/15/22  1622 01/15/22  0738   SODIUM mmol/L 142 140 137   POTASSIUM mmol/L 4 2 4 4 4 2   CHLORIDE mmol/L 109* 110* 105   CO2 mmol/L 28 27 26   BUN mg/dL 54* 53* 47*   CREATININE mg/dL 2 27* 2 43* 2 25*   CALCIUM mg/dL 8 0* 7 9* 7 9* Results from last 7 days   Lab Units 01/16/22  0433 01/15/22  0508 01/14/22  1003   INR  2 40* 1 24* 1 12     Coumadin mg 1/14 - 5  1/15 - 5  1/11 - 2 5          Point of care glucose: 110 - 158 - 130 - 90 - 62 - 121 - 108    Studies:  No new studies    I have personally reviewed pertinent reports  and I have personally reviewed pertinent films in PACS    Invasive Lines/Tubes:  Invasive Devices  Report    Peripherally Inserted Central Catheter Line            PICC Line 17/58/39 Right Basilic 9 days          Drain            External Urinary Catheter Small 3 days                Physical Exam:    HEENT/NECK:  Normocephalic  Atraumatic  Cardiac: Regular rate and rhythm  Pulmonary:  CTA b/l anterolatera lung fields (unable to sit up for exam)  Abdomen:  Non-tender, Non-distended and Normal bowel sounds  Incisions: Sternum is stable  Incision is clean, dry, and intact  Extremities: cool b/l LE, warm b/l UE, 2-3+ pitting pretibial/pedal LE & dorsal UE edema (improved from exam 1/15)  Neuro: Alert and oriented X 3  Skin: Warm/Dry, without rashes or lesions  Assessment:  Principal Problem:    Cardiogenic shock (HCC)  Active Problems:    Agoraphobia with panic attacks    VSD (ventricular septal defect)    JUDE (acute kidney injury) ([address] )    Transaminitis    Tylenol ingestion    Acute systolic congestive heart failure (HCC)    Anemia    Personal history of ECMO    Coagulopathy (HCC)    Leukocytosis    Central line complication    S/P VSD repair    Thrombocytopenia (HCC)    Hypernatremia    Hyperchloremia    Hypocalcemia     Cardiogenic shock  S/P VA ECMO; POD # 16  VSD/cardiogenic shock  S/P VSD repair w/ VA ECMO decannulation; POD # 12  Retained PA catheter  S/P Redo sternotomy and removal PA catheter w/ CPB; POD # 9    Plan:    1   Cardiac:   NSR; HR/BP well-controlled  Hold beta blocker per HF  Continue Hydralazine 10mg PO Q8  Continue milrionone 0 13 -- d/w HF & surgeon today to continue or discontinue  INR 2 40, hold Coumadin tonight  Continue ASA and Statin therapy  Epicardial pacing wires out  PICC  Continue DVT prophylaxis    2  Pulmonary:   Good Room air oxygen saturation; Continue incentive spirometry/Coughing/Deep breathing exercises  Chest tubes have been discontinued    3  Renal:   Intake/Output net: (-)3031  11 mL/24 hours  Diuretic Regimen:  Bumex gtt, BMP Q8  Continue Potassium Chloride 40 mEq PO BID  JUDE, Cr downtrending, good UOP -- AM BMP pending   BS Qshift    4  Neuro:  Neurologically intact; No active issues  Incisional pain well-controlled  Continue Tylenol, 975 mg PO q 8, standing dose  Continue Oxycodone, 2 5 to 5 mg PO q 4 hours prn pain    5  GI:  Tolerating TLC 2 3 gm sodium diet  Maintain 1800 mL daily fluid restriction   Continue stool softeners and prn suppository  Continue GI prophylaxis    6  Endo:   Glucose well-controlled with sliding scale coverage    7     Disposition:      Awaiting rehab placement, rehab once cleared by HF (likely mid to later this coming week)    VTE Pharmacologic Prophylaxis: Warfarin (Coumadin)  VTE Mechanical Prophylaxis: sequential compression device    Collaborative rounds completed with supervising physician  Plan of care discussed with bedside nurse    SIGNATURE: Belen Fam PA-C  DATE: January 16, 2022  TIME: 6:35 AM

## 2022-01-17 LAB
ANION GAP SERPL CALCULATED.3IONS-SCNC: 3 MMOL/L (ref 4–13)
ANION GAP SERPL CALCULATED.3IONS-SCNC: 5 MMOL/L (ref 4–13)
ANION GAP SERPL CALCULATED.3IONS-SCNC: 6 MMOL/L (ref 4–13)
BUN SERPL-MCNC: 43 MG/DL (ref 5–25)
BUN SERPL-MCNC: 47 MG/DL (ref 5–25)
BUN SERPL-MCNC: 49 MG/DL (ref 5–25)
CALCIUM SERPL-MCNC: 8.1 MG/DL (ref 8.3–10.1)
CALCIUM SERPL-MCNC: 8.2 MG/DL (ref 8.3–10.1)
CALCIUM SERPL-MCNC: 8.4 MG/DL (ref 8.3–10.1)
CHLORIDE SERPL-SCNC: 106 MMOL/L (ref 100–108)
CHLORIDE SERPL-SCNC: 106 MMOL/L (ref 100–108)
CHLORIDE SERPL-SCNC: 107 MMOL/L (ref 100–108)
CO2 SERPL-SCNC: 28 MMOL/L (ref 21–32)
CO2 SERPL-SCNC: 28 MMOL/L (ref 21–32)
CO2 SERPL-SCNC: 29 MMOL/L (ref 21–32)
CREAT SERPL-MCNC: 1.75 MG/DL (ref 0.6–1.3)
CREAT SERPL-MCNC: 1.77 MG/DL (ref 0.6–1.3)
CREAT SERPL-MCNC: 1.86 MG/DL (ref 0.6–1.3)
GFR SERPL CREATININE-BSD FRML MDRD: 37 ML/MIN/1.73SQ M
GFR SERPL CREATININE-BSD FRML MDRD: 39 ML/MIN/1.73SQ M
GFR SERPL CREATININE-BSD FRML MDRD: 40 ML/MIN/1.73SQ M
GLUCOSE SERPL-MCNC: 103 MG/DL (ref 65–140)
GLUCOSE SERPL-MCNC: 104 MG/DL (ref 65–140)
GLUCOSE SERPL-MCNC: 112 MG/DL (ref 65–140)
GLUCOSE SERPL-MCNC: 114 MG/DL (ref 65–140)
GLUCOSE SERPL-MCNC: 120 MG/DL (ref 65–140)
GLUCOSE SERPL-MCNC: 122 MG/DL (ref 65–140)
GLUCOSE SERPL-MCNC: 80 MG/DL (ref 65–140)
INR PPP: 3 (ref 0.84–1.19)
POTASSIUM SERPL-SCNC: 3.5 MMOL/L (ref 3.5–5.3)
POTASSIUM SERPL-SCNC: 3.9 MMOL/L (ref 3.5–5.3)
POTASSIUM SERPL-SCNC: 4.3 MMOL/L (ref 3.5–5.3)
PROTHROMBIN TIME: 29.6 SECONDS (ref 11.6–14.5)
SODIUM SERPL-SCNC: 138 MMOL/L (ref 136–145)
SODIUM SERPL-SCNC: 140 MMOL/L (ref 136–145)
SODIUM SERPL-SCNC: 140 MMOL/L (ref 136–145)

## 2022-01-17 PROCEDURE — 99024 POSTOP FOLLOW-UP VISIT: CPT | Performed by: THORACIC SURGERY (CARDIOTHORACIC VASCULAR SURGERY)

## 2022-01-17 PROCEDURE — 80048 BASIC METABOLIC PNL TOTAL CA: CPT | Performed by: PHYSICIAN ASSISTANT

## 2022-01-17 PROCEDURE — 97535 SELF CARE MNGMENT TRAINING: CPT

## 2022-01-17 PROCEDURE — 82948 REAGENT STRIP/BLOOD GLUCOSE: CPT

## 2022-01-17 PROCEDURE — 97530 THERAPEUTIC ACTIVITIES: CPT

## 2022-01-17 PROCEDURE — 99231 SBSQ HOSP IP/OBS SF/LOW 25: CPT | Performed by: INTERNAL MEDICINE

## 2022-01-17 PROCEDURE — 85610 PROTHROMBIN TIME: CPT | Performed by: PHYSICIAN ASSISTANT

## 2022-01-17 RX ORDER — BUMETANIDE 0.25 MG/ML
2 INJECTION, SOLUTION INTRAMUSCULAR; INTRAVENOUS EVERY 8 HOURS
Status: DISCONTINUED | OUTPATIENT
Start: 2022-01-17 | End: 2022-01-19

## 2022-01-17 RX ORDER — ALBUMIN, HUMAN INJ 5% 5 %
SOLUTION INTRAVENOUS
Status: COMPLETED
Start: 2022-01-17 | End: 2022-01-17

## 2022-01-17 RX ORDER — BUMETANIDE 0.25 MG/ML
2 INJECTION, SOLUTION INTRAMUSCULAR; INTRAVENOUS EVERY 8 HOURS
Status: DISCONTINUED | OUTPATIENT
Start: 2022-01-17 | End: 2022-01-17

## 2022-01-17 RX ORDER — ALBUMIN, HUMAN INJ 5% 5 %
12.5 SOLUTION INTRAVENOUS ONCE
Status: COMPLETED | OUTPATIENT
Start: 2022-01-17 | End: 2022-01-17

## 2022-01-17 RX ADMIN — ATORVASTATIN CALCIUM 80 MG: 80 TABLET, FILM COATED ORAL at 17:20

## 2022-01-17 RX ADMIN — BUMETANIDE 2 MG: 0.25 INJECTION INTRAMUSCULAR; INTRAVENOUS at 10:52

## 2022-01-17 RX ADMIN — POTASSIUM CHLORIDE 40 MEQ: 1500 TABLET, EXTENDED RELEASE ORAL at 17:20

## 2022-01-17 RX ADMIN — ALBUMIN (HUMAN) 12.5 G: 12.5 INJECTION, SOLUTION INTRAVENOUS at 02:35

## 2022-01-17 RX ADMIN — ASPIRIN 325 MG ORAL TABLET 325 MG: 325 PILL ORAL at 09:00

## 2022-01-17 RX ADMIN — ACETAMINOPHEN 975 MG: 325 TABLET, FILM COATED ORAL at 17:20

## 2022-01-17 RX ADMIN — BUMETANIDE 2 MG: 0.25 INJECTION INTRAMUSCULAR; INTRAVENOUS at 17:20

## 2022-01-17 RX ADMIN — AMIODARONE HYDROCHLORIDE 200 MG: 200 TABLET ORAL at 05:19

## 2022-01-17 RX ADMIN — ACETAMINOPHEN 975 MG: 325 TABLET, FILM COATED ORAL at 09:00

## 2022-01-17 RX ADMIN — HYDRALAZINE HYDROCHLORIDE 10 MG: 10 TABLET, FILM COATED ORAL at 21:16

## 2022-01-17 RX ADMIN — AMIODARONE HYDROCHLORIDE 200 MG: 200 TABLET ORAL at 14:09

## 2022-01-17 RX ADMIN — PANTOPRAZOLE SODIUM 40 MG: 40 TABLET, DELAYED RELEASE ORAL at 05:19

## 2022-01-17 RX ADMIN — HYDRALAZINE HYDROCHLORIDE 10 MG: 10 TABLET, FILM COATED ORAL at 14:09

## 2022-01-17 RX ADMIN — POTASSIUM CHLORIDE 40 MEQ: 1500 TABLET, EXTENDED RELEASE ORAL at 09:00

## 2022-01-17 RX ADMIN — ACETAMINOPHEN 975 MG: 325 TABLET, FILM COATED ORAL at 00:03

## 2022-01-17 RX ADMIN — ALBUMIN (HUMAN) 12.5 G: 12.5 INJECTION, SOLUTION INTRAVENOUS at 01:51

## 2022-01-17 RX ADMIN — ALBUMIN, HUMAN INJ 5% 12.5 G: 5 SOLUTION at 02:35

## 2022-01-17 RX ADMIN — AMIODARONE HYDROCHLORIDE 200 MG: 200 TABLET ORAL at 21:16

## 2022-01-17 RX ADMIN — Medication 12.5 MG: at 10:52

## 2022-01-17 NOTE — PROGRESS NOTES
Spiritual Care Progress Note    2022  Patient: Jordan Oliveira : 1955  Admission Date & Time: 2021 0940  MRN: [id number] [facility]: [id number]       attempted to visit to provide introduction to spiritual care  Patient was unavailable  Spiritual care will continue to be available       22 1000   Clinical Encounter Type   Visited With Patient not available

## 2022-01-17 NOTE — PLAN OF CARE
Problem: MOBILITY - ADULT  Goal: Maintain or return to baseline ADL function  Description: INTERVENTIONS:  -  Assess patient's ability to carry out ADLs; assess patient's baseline for ADL function and identify physical deficits which impact ability to perform ADLs (bathing, care of mouth/teeth, toileting, grooming, dressing, etc )  - Assess/evaluate cause of self-care deficits   - Assess range of motion  - Assess patient's mobility; develop plan if impaired  - Assess patient's need for assistive devices and provide as appropriate  - Encourage maximum independence but intervene and supervise when necessary  - Involve family in performance of ADLs  - Assess for home care needs following discharge   - Consider OT consult to assist with ADL evaluation and planning for discharge  - Provide patient education as appropriate  Outcome: Progressing  Goal: Maintains/Returns to pre admission functional level  Description: INTERVENTIONS:  - Perform BMAT or MOVE assessment daily    - Set and communicate daily mobility goal to care team and patient/family/caregiver  - Collaborate with rehabilitation services on mobility goals if consulted  - Perform Range of Motion 3 times a day  - Reposition patient every 2 hours    - Record patient progress and toleration of activity level   Outcome: Progressing     Problem: Prexisting or High Potential for Compromised Skin Integrity  Goal: Skin integrity is maintained or improved  Description: INTERVENTIONS:  - Identify patients at risk for skin breakdown  - Assess and monitor skin integrity  - Assess and monitor nutrition and hydration status  - Monitor labs   - Assess for incontinence   - Turn and reposition patient  - Assist with mobility/ambulation  - Relieve pressure over bony prominences  - Avoid friction and shearing  - Provide appropriate hygiene as needed including keeping skin clean and dry  - Evaluate need for skin moisturizer/barrier cream  - Collaborate with interdisciplinary team   - Patient/family teaching  - Consider wound care consult   Outcome: Progressing     Problem: PAIN - ADULT  Goal: Verbalizes/displays adequate comfort level or baseline comfort level  Description: Interventions:  - Encourage patient to monitor pain and request assistance  - Assess pain using appropriate pain scale  - Administer analgesics based on type and severity of pain and evaluate response  - Implement non-pharmacological measures as appropriate and evaluate response  - Consider cultural and social influences on pain and pain management  - Notify physician/advanced practitioner if interventions unsuccessful or patient reports new pain  Outcome: Progressing     Problem: INFECTION - ADULT  Goal: Absence or prevention of progression during hospitalization  Description: INTERVENTIONS:  - Assess and monitor for signs and symptoms of infection  - Monitor lab/diagnostic results  - Monitor all insertion sites, i e  indwelling lines, tubes, and drains  - Monitor endotracheal if appropriate and nasal secretions for changes in amount and color  - Warriors Mark appropriate cooling/warming therapies per order  - Administer medications as ordered  - Instruct and encourage patient and family to use good hand hygiene technique  - Identify and instruct in appropriate isolation precautions for identified infection/condition  Outcome: Progressing    Goal: Patient will remain free of falls  Description: INTERVENTIONS:  - Educate patient/family on patient safety including physical limitations  - Instruct patient to call for assistance with activity   - Consult OT/PT to assist with strengthening/mobility   - Keep Call bell within reach  - Keep bed low and locked with side rails adjusted as appropriate  - Keep care items and personal belongings within reach  - Initiate and maintain comfort rounds  - Make Fall Risk Sign visible to staff  - Apply yellow socks and bracelet for high fall risk patients  - Consider moving patient to room near nurses station  Outcome: Progressing     Problem: Knowledge Deficit  Goal: Patient/family/caregiver demonstrates understanding of disease process, treatment plan, medications, and discharge instructions  Description: Complete learning assessment and assess knowledge base    Interventions:  - Provide teaching at level of understanding  - Provide teaching via preferred learning methods  Outcome: Progressing     Problem: CARDIOVASCULAR - ADULT  Goal: Maintains optimal cardiac output and hemodynamic stability  Description: INTERVENTIONS:  - Monitor I/O, vital signs and rhythm  - Monitor for S/S and trends of decreased cardiac output  - Administer and titrate ordered vasoactive medications to optimize hemodynamic stability  - Assess quality of pulses, skin color and temperature  - Assess for signs of decreased coronary artery perfusion  - Instruct patient to report change in severity of symptoms  Outcome: Progressing  Goal: Absence of cardiac dysrhythmias or at baseline rhythm  Description: INTERVENTIONS:  - Continuous cardiac monitoring, vital signs, obtain 12 lead EKG if ordered  - Administer antiarrhythmic and heart rate control medications as ordered  - Monitor electrolytes and administer replacement therapy as ordered  Outcome: Progressing     Goal: Urinary catheter remains patent  Description: INTERVENTIONS:  - Assess patency of urinary catheter  - If patient has a chronic [place], consider changing catheter if non-functioning  - Follow guidelines for intermittent irrigation of non-functioning urinary catheter  Outcome: Progressing     Goal: Pressure injury heals and does not worsen  Description: Interventions:  - Implement low air loss mattress or specialty surface (Criteria met)  - Apply silicone foam dressing  - Apply fecal or urinary incontinence containment device   - Turn and reposition patient & offload bony prominences every 2 hours   - Utilize friction reducing device or surface for transfers   - Consider nutrition services referral as needed  Outcome: Progressing     Problem: Nutrition/Hydration-ADULT  Goal: Nutrient/Hydration intake appropriate for improving, restoring or maintaining nutritional needs  Description: Monitor and assess patient's nutrition/hydration status for malnutrition  Collaborate with interdisciplinary team and initiate plan and interventions as ordered  Monitor patient's weight and dietary intake as ordered or per policy  Utilize nutrition screening tool and intervene as necessary  Determine patient's food preferences and provide high-protein, high-caloric foods as appropriate       INTERVENTIONS:  - Monitor oral intake, urinary output, labs, and treatment plans  - Assess nutrition and hydration status and recommend course of action  - Evaluate amount of meals eaten  - Assist patient with eating if necessary   - Allow adequate time for meals  - Recommend/ encourage appropriate diets, oral nutritional supplements, and vitamin/mineral supplements  - Order, calculate, and assess calorie counts as needed  - Recommend, monitor, and adjust tube feedings and TPN/PPN based on assessed needs  - Assess need for intravenous fluids  - Provide specific nutrition/hydration education as appropriate  - Include patient/family/caregiver in decisions related to nutrition  Outcome: Progressing

## 2022-01-17 NOTE — OCCUPATIONAL THERAPY NOTE
Occupational Therapy Progress Note     Patient Name: Trev Pickett  YYPKS'L Date: 1/17/2022  Problem List  Principal Problem:    S/P VSD repair  Active Problems:    Agoraphobia with panic attacks    Cardiogenic shock (HCC)    VSD (ventricular septal defect)    JUDE (acute kidney injury) ([address] )    Transaminitis    Tylenol ingestion    Acute systolic congestive heart failure (HCC)    Anemia    Personal history of ECMO    Coagulopathy (HCC)    Leukocytosis    Central line complication    Thrombocytopenia (HCC)    Hypernatremia    Hyperchloremia    Hypocalcemia    Anticoagulated on Coumadin    Anticoagulation goal of INR 2 to 3              01/17/22 1138   OT Last Visit   OT Visit Date 01/17/22   Note Type   Note Type Treatment   Restrictions/Precautions   Other Precautions Cardiac/sternal;Multiple lines;Telemetry; Fall Risk   General   Response to Previous Treatment Patient with no complaints from previous session   Lifestyle   Autonomy Pt reports being I with ADLS, IADLS and mobility without device PTA  (+)    Reciprocal Relationships Pt lives with his brother and sister in law   Service to Others Retired   [address] Enjoys realxing   Pain Assessment   Pain Assessment Tool 0-10   Pain Score No Pain   ADL   Eating Assistance 3  Moderate Assistance   Eating Deficit Setup;Supervision/safety; Increased time to complete;Bringing food to mouth assist   Eating Comments A to cut and bring food to mouth    Grooming Assistance 3  Moderate Assistance   Grooming Deficit Setup;Supervision/safety; Increased time to complete   Grooming Comments A to shave and wash face   LB Dressing Assistance 2  Maximal Assistance   LB Dressing Deficit Setup;Supervision/safety; Increased time to complete; Don/doff L sock; Don/doff R sock   Bed Mobility   Additional Comments Pt seen for OT treatment while pt was being tilted with restorative staff      Therapeutic Exercise - ROM   UE-ROM Yes   ROM- Right Upper Extremities   R Shoulder AAROM   R Elbow AAROM   R Wrist AROM   R Hand AROM   ROM - Left Upper Extremities    L Shoulder AAROM   L Elbow AAROM   L Wrist AROM   L Hand AROM   Cognition   Overall Cognitive Status Impaired   Arousal/Participation Responsive;Arousable   Attention Attends with cues to redirect   Orientation Level Oriented X4   Memory Decreased recall of precautions   Following Commands Follows one step commands without difficulty   Comments Pt is lethargic but overall pleasant and cooperative  Activity Tolerance   Activity Tolerance Patient limited by fatigue   Medical Staff Made Aware RN confirmed okay to see pt   Assessment   Assessment Patient participated in Skilled OT session this date with interventions consisting of ADL re training with the use of correct body mechanics, maintaining sternal precautions,  therapeutic activities to: increase activity tolerance, increase postural control, increase trunk control and increase OOB/ sitting tolerance   Patient agreeable to OT treatment session, upon arrival patient was found supine in bed  Patient requiring frequent rest periods  Patient continues to be functioning below baseline level, occupational performance remains limited secondary to factors listed above and increased risk for falls and injury  From OT standpoint, recommendation at time of d/c would be Short Term Rehab  Patient to benefit from continued Occupational Therapy treatment while in the hospital to address deficits as defined above and maximize level of functional independence with ADLs and functional mobility  Plan   Treatment Interventions ADL retraining;UE strengthening/ROM; Endurance training;Cardiac education   Goal Expiration Date 01/21/22   OT Treatment Day 4   OT Frequency 3-5x/wk   Recommendation   OT Discharge Recommendation Post acute rehabilitation services   OT - OK to Discharge Yes  (When medically appropriate)   AM-PAC Daily Activity Inpatient   Lower Body Dressing 2   Bathing 2 Toileting 2   Upper Body Dressing 2   Grooming 2   Eating 2   Daily Activity Raw Score 12   Daily Activity Standardized Score (Calc for Raw Score >=11) 30 6   AM-PAC Applied Cognition Inpatient   Following a Speech/Presentation 3   Understanding Ordinary Conversation 4   Taking Medications 3   Remembering Where Things Are Placed or Put Away 3   Remembering List of 4-5 Errands 3   Taking Care of Complicated Tasks 2   Applied Cognition Raw Score 18   Applied Cognition Standardized Score 38 07   Modified Mk Scale   Modified Hamilton Scale 4       Shamika Luna, EUSEBIO, OTR/L

## 2022-01-17 NOTE — PROGRESS NOTES
Progress Note - Cardiothoracic Surgery   Carissa Parada 61 y o  male MRN: [id number]  Unit/Bed#: [facility] 427-01 Encounter: [id number]    Cardiogenic shock  S/P VA ECMO; POD # 16  VSD/cardiogenic shock  S/P VSD repair w/ VA ECMO decannulation; POD # 12  Retained PA catheter   S/P Redo sternotomy and removal PA catheter w/ CPB; POD # 9    24 Hour Events: Bumex at 0 5mg/hr and diuresing around 500ml/hr had a period of hypotension resolved with return of albumin 500 ml x 1  Bumex off since around 0140  Telemetry with frequent PVCs and BBB, now in the NSR with rate in the 90s     Medications:   Scheduled Meds:  Current Facility-Administered Medications   Medication Dose Route Frequency Provider Last Rate    acetaminophen  650 mg Oral Q6H PRN Anabelle Art PA-C      acetaminophen  975 mg Oral Q8H Anabelle Art PA-C      amiodarone  200 mg Oral Q8H [address] Edgardo Guthrie PA-C      aspirin  325 mg Oral Daily Edgardo Guthrie PA-C      atorvastatin  80 mg Oral After [employer]      bisacodyl  10 mg Rectal Daily PRN Anabelle Art PA-C      bumetanide  0 5 mg/hr Intravenous Continuous Anabelle Art PA-C Stopped (01/17/22 0140)    docusate sodium  100 mg Oral BID PRN Anabelle Art PA-C      hydrALAZINE  10 mg Oral Q8H [address] Anabelle Art PA-C      insulin lispro  1-6 Units Subcutaneous TID AC Edgardo Guthrie PA-C      insulin lispro  1-6 Units Subcutaneous HS Edgardo Guthrie PA-C      milrinone [facility]) infusion  0 13 mcg/kg/min Intravenous Continuous Anabelle Art PA-C 0 13 mcg/kg/min (01/16/22 1633)    ondansetron  4 mg Intravenous Q6H PRN Edgardo Guthrie PA-C      oxyCODONE  2 5 mg Oral Q4H PRN Edgardo Guthrie PA-C      oxyCODONE  5 mg Oral Q4H PRN Edgardo Guthrie PA-C      pantoprazole  40 mg Oral Early Morning Edgardo Guthrie PA-C      polyethylene glycol  17 g Oral Daily PRN Anabelle Art PA-C      potassium chloride  40 mEq Oral BID Anabelle Art PA-C  temazepam  15 mg Oral HS PRN Suyapa Cisneros PA-C       Continuous Infusions:bumetanide, 0 5 mg/hr, Last Rate: Stopped (01/17/22 0140)  milrinone (PRIMACOR) infusion, 0 13 mcg/kg/min, Last Rate: 0 13 mcg/kg/min (01/16/22 1633)      PRN Meds:   acetaminophen    bisacodyl    docusate sodium    ondansetron    oxyCODONE    oxyCODONE    polyethylene glycol    temazepam    Vitals:   Vitals:    01/16/22 2300 01/17/22 0300 01/17/22 0600 01/17/22 0727   BP: 95/54 90/53  101/63   BP Location: Left arm Left arm  Left arm   Pulse: 94 93  95   Resp: 18 18  18   Temp: 97 6 °F (36 4 °C) 97 9 °F (36 6 °C)  98 °F (36 7 °C)   TempSrc: Oral Oral  Oral   SpO2: 100% 97%  98%   Weight:   101 kg (221 lb 12 5 oz)    Height:           Telemetry: NSR; Heart Rate: 95    Respiratory:   SpO2: SpO2: 98 %;  Room Air    Intake/Output:     Intake/Output Summary (Last 24 hours) at 1/17/2022 0844  Last data filed at 1/17/2022 0736  Gross per 24 hour   Intake 1461 97 ml   Output 6525 ml   Net -5063 03 ml   Urine output:  300 to 500 ml /hr since yesterday PM     Weights:   Weight (last 2 days)     Date/Time Weight    01/17/22 0600 101 (221 78)    01/16/22 0600 103 (226 85)     Comments:   Weight: Pt  too weak to stand at 01/16/22 0600       Results:   Results from last 7 days   Lab Units 01/14/22  0508 01/13/22  0843 01/12/22  0515   WBC Thousand/uL 9 46 10 51* 16 87*   HEMOGLOBIN g/dL 10 5* 10 5* 10 3*   HEMATOCRIT % 34 2* 34 1* 33 7*   PLATELETS Thousands/uL 132* 137* 157     Results from last 7 days   Lab Units 01/17/22  0020 01/16/22  1604 01/16/22  0801   SODIUM mmol/L 140 140 141   POTASSIUM mmol/L 4 3 4 1 4 1   CHLORIDE mmol/L 106 106 108   CO2 mmol/L 29 29 26   BUN mg/dL 49* 53* 53*   CREATININE mg/dL 1 86* 2 10* 2 42*   CALCIUM mg/dL 8 1* 8 2* 8 1*     Results from last 7 days   Lab Units 01/17/22  0427 01/16/22  0433 01/15/22  0508   INR  3 00* 2 40* 1 24*     1 16 - 0  1 15 - 5 mg  1 14 - 5 mg     Point of care glucose:     Invasive Lines/Tubes:  Invasive Devices  Report    Peripherally Inserted Central Catheter Line            PICC Line 53/43/99 Right Basilic 10 days          Drain            Urethral Catheter Non-latex 16 Fr  <1 day                Physical Exam:    HEENT/NECK:  Normocephalic  Atraumatic  No jugular venous distention  Cardiac: Regular rate and rhythm  Pulmonary:  Breath sounds clear bilaterally  Abdomen:  Non-tender, Non-distended and Normal bowel sounds  Incisions: Sternum is stable  Incision is clean, dry, and intact  Extremities: Extremities warm/dry and 2+ edema B/L  Neuro: Alert and oriented X 3  Skin: Warm/Dry, without rashes or lesions  Assessment:  Principal Problem:    S/P VSD repair  Active Problems:    Agoraphobia with panic attacks    Cardiogenic shock (HCC)    VSD (ventricular septal defect)    JUDE (acute kidney injury) ([facility] [address] )    Transaminitis    Tylenol ingestion    Acute systolic congestive heart failure (HCC)    Anemia    Personal history of ECMO    Coagulopathy (HCC)    Leukocytosis    Central line complication    Thrombocytopenia (HCC)    Hypernatremia    Hyperchloremia    Hypocalcemia    Anticoagulated on Coumadin    Anticoagulation goal of INR 2 to 3       Cardiogenic shock  S/P VA ECMO; POD # 16  VSD/cardiogenic shock  S/P VSD repair w/ VA ECMO decannulation; POD # 12  Retained PA catheter  S/P Redo sternotomy and removal PA catheter w/ CPB; POD # 9    Plan:    1   Cardiac:   NSR; frequent PVCs on the monitor and BBB rate in the 90s   D/C Milrinone will d/w HF Start low dose ACEi if BP allows did have a period of hypotension overnight due to over diuretic with return of albumin x 1   May be able to trial beta blocker therapy today  Bumex held since 12 - transition to 2 mg IV TID dosing this morning, after first dose will recheck u/o to modify further dosing as patient needs for negative balance --- will only receive 2 doses today with new scheduling  BMP this PM   Continue ASA and Statin therapy  Coumadin dosing  Epicardial pacing wires out  Maintain central IV access today for access - PICC line   Continue DVT prophylaxis    2  Pulmonary:   Good Room air oxygen saturation; Continue incentive spirometry/Coughing/Deep breathing exercises  Chest tubes have been discontinued    3  Renal:   Intake/Output net: -5036 mL/24 hours  Diuretic Regimen: Decreased diuretics to half dosing, may need less depending on urine output after first IV bolus dosing of bumex will monitor after dose for further   Post op Creatinine stable; Follow up labs prn    4  Neuro:  Neurologically intact; No active issues  Incisional pain well-controlled  Continue Tylenol, 975 mg PO q 8, standing dose  Continue Oxycodone, 2 5 to 5 mg PO q 4 hours prn pain    5  GI:  Tolerating TLC 2 3 gm sodium diet  Maintain 1800 mL daily fluid restriction   Continue stool softeners and prn suppository  Continue GI prophylaxis    6  Endo:   Glucose well-controlled with sliding scale coverage    7    Hematology:    Post-operative blood count acceptable; Trend prn    8     Disposition:      Follow daily PT/OT recommendations regarding home vs  rehab when medically cleared for discharge    VTE Pharmacologic Prophylaxis: Warfarin (Coumadin)  VTE Mechanical Prophylaxis: sequential compression device    Collaborative rounds completed with supervising physician  Plan of care discussed with bedside nurse    SIGNATURE: Maria E Rasmussen PA-C  DATE: January 17, 2022  TIME: 8:44 AM

## 2022-01-17 NOTE — ARC ADMISSION
ARC continues to follow patient's case at this time, monitoring for medical stability and functional progress  PM&R to follow-up with patient Tuesday vs Wednesday  Will update CM as able

## 2022-01-17 NOTE — PROGRESS NOTES
Cardiology Progress Note - Mirna Agudelo 61 y o  male MRN: [id number]    Unit/Bed#: [facility] 427-01 Encounter: [id number]      Assessment & Plan:  Principal Problem:    S/P VSD repair  Active Problems:    Agoraphobia with panic attacks    Cardiogenic shock (HCC)    VSD (ventricular septal defect)    JUDE (acute kidney injury) ([facility] [address] )    Transaminitis    Tylenol ingestion    Acute systolic congestive heart failure (HCC)    Anemia    Personal history of ECMO    Coagulopathy (HCC)    Leukocytosis    Central line complication    Thrombocytopenia (HCC)    Hypernatremia    Hyperchloremia    Hypocalcemia    Anticoagulated on Coumadin    Anticoagulation goal of INR 2 to 3    # Late presenting anterior STEMI c/b anteroapical VSD s/p open patch repair   # Redo sternotomy, mediastinal exploration for retained swan avinash catheter  # HFrEF with EF 35-40% as per my assessment on most recent CORIE    BP: 90s-100s/60s  HR: 90s  UOP: 6 L UOP yesterday with net negative 5 L  Wt: 226->221 lb  Patient had an episode of hypotension yesterday requiring an albumin bolus  Frequent ectopy on tele yesterday    Plan:  - milrinone stopped  Check VBG in the afternoon  - Patient needs aggressive diuresis  C/w bumex 2 mg TID  - continue hydralazine 10 mg TID  - Hold off on BB  Giving BB during a low flow state is counterproductive  Once we are able to wean off milrinone after adequate diuresis and SVR reduction low dose BB for long term better long term outcomes can be attempted  - c/w aspirin and atorvastatin  - atorvastatin for afib prevention    Subjective:   Patient feels better  He was able to sit up at bedside  He has an appetite and has broken sleep  Objective:     Vitals: Blood pressure 101/63, pulse 95, temperature 98 °F (36 7 °C), temperature source Oral, resp  rate 18, height 5' 5 5" (1 664 m), weight 101 kg (221 lb 12 5 oz), SpO2 98 %  , Body mass index is 36 35 kg/m² ,   Orthostatic Blood Pressures      Most Recent Value   Blood Pressure 101/63 filed at 01/17/2022 2612   Patient Position - Orthostatic VS Lying filed at 01/17/2022 8506            Intake/Output Summary (Last 24 hours) at 1/17/2022 0944  Last data filed at 1/17/2022 0736  Gross per 24 hour   Intake 1461 97 ml   Output 4800 ml   Net -3338 03 ml           Physical Exam:    GEN: [facility] [place] Lethargic  HEENT: anicteric, mucous membranes moist  NECK: JVD elevated, carotid bruits   HEART: regular rhythm, normal S1 and S2, no murmurs, clicks, gallops or rubs   CHEST: Sternotomy incision site healing well  LUNGS: clear to auscultation bilaterally; Crackles present  ABDOMEN: normal bowel sounds, soft, no tenderness, no distention  EXTREMITIES: peripheral pulses normal; 3+ edema, Warm  NEURO: no focal findings    SKIN: normal without suspicious lesions on exposed skin      Current Facility-Administered Medications:     acetaminophen (TYLENOL) tablet 650 mg, 650 mg, Oral, Q6H PRN, Anabelle Art PA-C    acetaminophen (TYLENOL) tablet 975 mg, 975 mg, Oral, Q8H, Anabelle Art PA-C, 975 mg at 01/17/22 0900    amiodarone tablet 200 mg, 200 mg, Oral, Q8H [facility], Mellisa Xiong PA-C, 200 mg at 01/17/22 4447    aspirin tablet 325 mg, 325 mg, Oral, Daily, Mellisa Xiong PA-C, 325 mg at 01/17/22 0900    atorvastatin (LIPITOR) tablet 80 mg, 80 mg, Oral, After Dinner, Melilsa Xiong PA-C, 80 mg at 01/16/22 1716    bisacodyl (DULCOLAX) rectal suppository 10 mg, 10 mg, Rectal, Daily PRN, Anabelle Art PA-C    bumetanide (BUMEX) injection 2 mg, 2 mg, Intravenous, Q8H, Lacy Senior PA-C    docusate sodium (COLACE) capsule 100 mg, 100 mg, Oral, BID PRN, Anabelle Art PA-C    hydrALAZINE (APRESOLINE) tablet 10 mg, 10 mg, Oral, Q8H [facility], Anabelle Art PA-C, 10 mg at 01/16/22 6730    insulin lispro (HumaLOG) 100 units/mL subcutaneous injection 1-6 Units, 1-6 Units, Subcutaneous, TID AC **AND** Fingerstick Glucose (POCT), , , TID AC, Mellisa Xiong PA-C    insulin lispro (HumaLOG) 100 units/mL subcutaneous injection 1-6 Units, 1-6 Units, Subcutaneous, HS, PIPE Melendrez-EMILIANO, 2 Units at 01/16/22 2201    ondansetron [facility]) injection 4 mg, 4 mg, Intravenous, Q6H PRN, Suyapa Cisneros PA-C    oxyCODONE (ROXICODONE) IR tablet 2 5 mg, 2 5 mg, Oral, Q4H PRN, Suyapa Cisneros PA-C, 2 5 mg at 01/13/22 0525    oxyCODONE (ROXICODONE) IR tablet 5 mg, 5 mg, Oral, Q4H PRN, Suyapa Cisneros PA-C, 5 mg at 01/08/22 2148    pantoprazole (PROTONIX) EC tablet 40 mg, 40 mg, Oral, Early Morning, Suyapa Cisneros PA-C, 40 mg at 01/17/22 0519    polyethylene glycol (MIRALAX) packet 17 g, 17 g, Oral, Daily PRN, Anabelle Art PA-C    potassium chloride (K-DUR,KLOR-CON) CR tablet 40 mEq, 40 mEq, Oral, BID, Anabelle Art PA-C, 40 mEq at 01/17/22 0900    temazepam (RESTORIL) capsule 15 mg, 15 mg, Oral, HS PRN, PIPE Melendrez-C, 15 mg at 01/13/22 2144    Labs & Results:    No results found for: CKTOTAL, CKMB, CKMBINDEX, TROPONINI    Lab Results   Component Value Date    GLUCOSE 149 (H) 01/07/2022    CALCIUM 8 4 01/17/2022    K 3 5 01/17/2022    CO2 28 01/17/2022     01/17/2022    BUN 43 (H) 01/17/2022    CREATININE 1 75 (H) 01/17/2022       Lab Results   Component Value Date    WBC 9 46 01/14/2022    HGB 10 5 (L) 01/14/2022    HCT 34 2 (L) 01/14/2022    MCV 99 (H) 01/14/2022     (L) 01/14/2022     Results from last 7 days   Lab Units 01/17/22  0427   INR  3 00*       No results found for: CHOL  Lab Results   Component Value Date    HDL 13 (L) 12/30/2021    HDL 38 (L) 09/13/2021     Lab Results   Component Value Date    LDLCALC 107 (H) 12/30/2021    LDLCALC 134 (H) 09/13/2021     Lab Results   Component Value Date    TRIG 152 (H) 12/30/2021    TRIG 137 09/13/2021       Lab Results   Component Value Date    ALT 56 01/12/2022    AST 45 01/12/2022         EKG personally reviewed by )Donna Wright MD  No acute changes   TELE: No significant arrhythmias seen on telemetry review

## 2022-01-17 NOTE — PLAN OF CARE
Problem: OCCUPATIONAL THERAPY ADULT  Goal: Performs self-care activities at highest level of function for planned discharge setting  See evaluation for individualized goals  Description: Treatment Interventions: ADL retraining,Functional transfer training,UE strengthening/ROM,Endurance training,Patient/family training,Equipment evaluation/education,Compensatory technique education,Continued evaluation,Energy conservation,Activityengagement,Cardiac education          See flowsheet documentation for full assessment, interventions and recommendations  Outcome: Progressing  Note: Limitation: Decreased ADL status,Decreased endurance,Decreased self-care trans,Decreased high-level ADLs  Prognosis: Good  Assessment: Patient participated in Skilled OT session this date with interventions consisting of ADL re training with the use of correct body mechanics, maintaining sternal precautions,  therapeutic activities to: increase activity tolerance, increase postural control, increase trunk control and increase OOB/ sitting tolerance   Patient agreeable to OT treatment session, upon arrival patient was found supine in bed  Patient requiring frequent rest periods  Patient continues to be functioning below baseline level, occupational performance remains limited secondary to factors listed above and increased risk for falls and injury  From OT standpoint, recommendation at time of d/c would be Short Term Rehab  Patient to benefit from continued Occupational Therapy treatment while in the hospital to address deficits as defined above and maximize level of functional independence with ADLs and functional mobility        OT Discharge Recommendation: Post acute rehabilitation services  OT - OK to Discharge: Yes (When medically appropriate)

## 2022-01-18 LAB
ANION GAP SERPL CALCULATED.3IONS-SCNC: 5 MMOL/L (ref 4–13)
BUN SERPL-MCNC: 39 MG/DL (ref 5–25)
CALCIUM SERPL-MCNC: 8.2 MG/DL (ref 8.3–10.1)
CHLORIDE SERPL-SCNC: 105 MMOL/L (ref 100–108)
CO2 SERPL-SCNC: 30 MMOL/L (ref 21–32)
CREAT SERPL-MCNC: 1.34 MG/DL (ref 0.6–1.3)
GFR SERPL CREATININE-BSD FRML MDRD: 55 ML/MIN/1.73SQ M
GLUCOSE SERPL-MCNC: 104 MG/DL (ref 65–140)
GLUCOSE SERPL-MCNC: 129 MG/DL (ref 65–140)
GLUCOSE SERPL-MCNC: 78 MG/DL (ref 65–140)
GLUCOSE SERPL-MCNC: 78 MG/DL (ref 65–140)
GLUCOSE SERPL-MCNC: 87 MG/DL (ref 65–140)
INR PPP: 2.31 (ref 0.84–1.19)
POTASSIUM SERPL-SCNC: 3.6 MMOL/L (ref 3.5–5.3)
PROTHROMBIN TIME: 24.2 SECONDS (ref 11.6–14.5)
SODIUM SERPL-SCNC: 140 MMOL/L (ref 136–145)

## 2022-01-18 PROCEDURE — 99024 POSTOP FOLLOW-UP VISIT: CPT | Performed by: PHYSICIAN ASSISTANT

## 2022-01-18 PROCEDURE — 82948 REAGENT STRIP/BLOOD GLUCOSE: CPT

## 2022-01-18 PROCEDURE — 97110 THERAPEUTIC EXERCISES: CPT

## 2022-01-18 PROCEDURE — 97164 PT RE-EVAL EST PLAN CARE: CPT

## 2022-01-18 PROCEDURE — 85610 PROTHROMBIN TIME: CPT | Performed by: PHYSICIAN ASSISTANT

## 2022-01-18 PROCEDURE — 99231 SBSQ HOSP IP/OBS SF/LOW 25: CPT | Performed by: INTERNAL MEDICINE

## 2022-01-18 PROCEDURE — 80048 BASIC METABOLIC PNL TOTAL CA: CPT | Performed by: PHYSICIAN ASSISTANT

## 2022-01-18 RX ORDER — WARFARIN SODIUM 1 MG/1
3 TABLET ORAL
Status: COMPLETED | OUTPATIENT
Start: 2022-01-18 | End: 2022-01-18

## 2022-01-18 RX ADMIN — ASPIRIN 325 MG ORAL TABLET 325 MG: 325 PILL ORAL at 09:37

## 2022-01-18 RX ADMIN — HYDRALAZINE HYDROCHLORIDE 10 MG: 10 TABLET, FILM COATED ORAL at 05:17

## 2022-01-18 RX ADMIN — POTASSIUM CHLORIDE 40 MEQ: 1500 TABLET, EXTENDED RELEASE ORAL at 09:37

## 2022-01-18 RX ADMIN — ATORVASTATIN CALCIUM 80 MG: 80 TABLET, FILM COATED ORAL at 17:22

## 2022-01-18 RX ADMIN — WARFARIN SODIUM 3 MG: 1 TABLET ORAL at 17:22

## 2022-01-18 RX ADMIN — ACETAMINOPHEN 975 MG: 325 TABLET, FILM COATED ORAL at 09:37

## 2022-01-18 RX ADMIN — BUMETANIDE 2 MG: 0.25 INJECTION INTRAMUSCULAR; INTRAVENOUS at 02:05

## 2022-01-18 RX ADMIN — ACETAMINOPHEN 975 MG: 325 TABLET, FILM COATED ORAL at 17:22

## 2022-01-18 RX ADMIN — BUMETANIDE 2 MG: 0.25 INJECTION INTRAMUSCULAR; INTRAVENOUS at 09:37

## 2022-01-18 RX ADMIN — AMIODARONE HYDROCHLORIDE 200 MG: 200 TABLET ORAL at 13:50

## 2022-01-18 RX ADMIN — HYDRALAZINE HYDROCHLORIDE 10 MG: 10 TABLET, FILM COATED ORAL at 21:46

## 2022-01-18 RX ADMIN — BUMETANIDE 2 MG: 0.25 INJECTION INTRAMUSCULAR; INTRAVENOUS at 17:22

## 2022-01-18 RX ADMIN — AMIODARONE HYDROCHLORIDE 200 MG: 200 TABLET ORAL at 21:46

## 2022-01-18 RX ADMIN — PANTOPRAZOLE SODIUM 40 MG: 40 TABLET, DELAYED RELEASE ORAL at 05:17

## 2022-01-18 RX ADMIN — POTASSIUM CHLORIDE 40 MEQ: 1500 TABLET, EXTENDED RELEASE ORAL at 17:22

## 2022-01-18 RX ADMIN — AMIODARONE HYDROCHLORIDE 200 MG: 200 TABLET ORAL at 05:17

## 2022-01-18 RX ADMIN — HYDRALAZINE HYDROCHLORIDE 10 MG: 10 TABLET, FILM COATED ORAL at 13:52

## 2022-01-18 NOTE — ARC ADMISSION
ARC continues to follow patient's case at this time - PM&R to followup tomorrow  Will followup with CM as able

## 2022-01-18 NOTE — CASE MANAGEMENT
Case Management Discharge Planning Note    Patient name Jacy Moore  Location [facility] 106-45 MRN [id number]  : 1955 Date 2022       Current Admission Date: 2021  Current Admission Diagnosis:S/P VSD repair   Patient Active Problem List    Diagnosis Date Noted    Anticoagulated on Coumadin 2022    Anticoagulation goal of INR 2 to 3 2022    Thrombocytopenia (HCC) 2022    Hypernatremia 2022    Hyperchloremia 2022    Hypocalcemia 2022    Central line complication     S/P VSD repair 2022    Leukocytosis 2022    Coagulopathy ([facility] [address] ) 2022    Tylenol ingestion     Acute systolic congestive heart failure ([facility] [address] ) 2021    Anemia 2021    Personal history of ECMO 2021    Cardiogenic shock ([facility] [address] ) 2021    VSD (ventricular septal defect) 2021    JUDE (acute kidney injury) ([facility] [address] ) 2021    Transaminitis 2021    Pipe smoker 2018    Hyperlipidemia, mixed 2017    Impaired fasting glucose 2017    Agoraphobia with panic attacks 2015    Obesity (BMI 30-39 9) 2015    Psychosis, atypical ([facility] [address] ) 2015      LOS (days): 19  Geometric Mean LOS (GMLOS) (days):   Days to GMLOS:     OBJECTIVE:  Risk of Unplanned Readmission Score: 24         Current admission status: Inpatient   Preferred Pharmacy:   [id number], [id number]  [address] [place] [address]  Phone: [phone] Fax: [phone]    Primary Care Provider: Todd Bacon MD    Primary Insurance: Anjum Lightning  Secondary Insurance:     DISCHARGE DETAILS:    Amilcar Marie at [facility] PM&R to followup today vs tomorrow

## 2022-01-18 NOTE — PROGRESS NOTES
Cardiology Progress Note - Ivet Soto 61 y o  male MRN: [id number]    Unit/Bed#: [facility] 427-01 Encounter: [id number]      Assessment & Plan:  Principal Problem:    S/P VSD repair  Active Problems:    Agoraphobia with panic attacks    Cardiogenic shock (HCC)    VSD (ventricular septal defect)    JUDE (acute kidney injury) ([address] )    Transaminitis    Tylenol ingestion    Acute systolic congestive heart failure (HCC)    Anemia    Personal history of ECMO    Coagulopathy (HCC)    Leukocytosis    Central line complication    Thrombocytopenia (HCC)    Hypernatremia    Hyperchloremia    Hypocalcemia    Anticoagulated on Coumadin    Anticoagulation goal of INR 2 to 3    # Late presenting anterior STEMI c/b anteroapical VSD s/p open patch repair   # Redo sternotomy, mediastinal exploration for retained swan avinash catheter  # HFrEF with EF 35-40% as per my assessment on most recent CORIE    BP: 90s-100s/60s  HR: 70-80s  UOP: 3 7 L UOP yesterday with net negative -3 3 L  Wt: 226->221 lb  Creatinine: 1 7->1 3    Plan:n  - Patient needs aggressive diuresis  C/w bumex 2 mg TID  - continue hydralazine 10 mg TID  - c/w BB 12 5 mg BID  - c/w aspirin and atorvastatin  - amiodarone for afib prevention  - encourage OOBTC    Subjective:   Patient feels more tired compared to yesterday  However he was able to sit up today and had a decent appetite  Objective:     Vitals: Blood pressure 108/62, pulse 83, temperature 98 2 °F (36 8 °C), temperature source Oral, resp  rate 20, height 5' 5 5" (1 664 m), weight 101 kg (222 lb 10 6 oz), SpO2 98 %  , Body mass index is 36 49 kg/m² ,   Orthostatic Blood Pressures      Most Recent Value   Blood Pressure 108/62 filed at 01/18/2022 0720   Patient Position - Orthostatic VS Lying filed at 01/18/2022 0720            Intake/Output Summary (Last 24 hours) at 1/18/2022 0919  Last data filed at 1/18/2022 0839  Gross per 24 hour   Intake 46 ml   Output 3700 ml   Net -3654 ml           Physical Exam:    GEN: [place] Lethargic  HEENT: anicteric, mucous membranes moist  NECK: JVD elevated, carotid bruits   HEART: regular rhythm, normal S1 and S2, no murmurs, clicks, gallops or rubs   CHEST: Sternotomy incision site healing well  LUNGS: clear to auscultation bilaterally; Crackles present  ABDOMEN: normal bowel sounds, soft, no tenderness, no distention  EXTREMITIES: peripheral pulses normal; 3+ edema, Warm  NEURO: no focal findings    SKIN: normal without suspicious lesions on exposed skin      Current Facility-Administered Medications:     acetaminophen (TYLENOL) tablet 650 mg, 650 mg, Oral, Q6H PRN, Anabelle Art PA-C    acetaminophen (TYLENOL) tablet 975 mg, 975 mg, Oral, Q8H, Anabelle Art PA-C, 975 mg at 01/17/22 1720    amiodarone tablet 200 mg, 200 mg, Oral, Q8H [address], Barrett Kuhn PA-C, 200 mg at 01/18/22 1312    aspirin tablet 325 mg, 325 mg, Oral, Daily, Barrett Kuhn PA-C, 325 mg at 01/17/22 0900    atorvastatin (LIPITOR) tablet 80 mg, 80 mg, Oral, After Dinner, Barrett Kuhn PA-C, 80 mg at 01/17/22 1720    bisacodyl (DULCOLAX) rectal suppository 10 mg, 10 mg, Rectal, Daily PRN, Anabelle Art PA-C    bumetanide (BUMEX) injection 2 mg, 2 mg, Intravenous, Q8H, Jad Hernandez MD, 2 mg at 01/18/22 0205    docusate sodium (COLACE) capsule 100 mg, 100 mg, Oral, BID PRN, Anabelle Art PA-C    hydrALAZINE (APRESOLINE) tablet 10 mg, 10 mg, Oral, Q8H [address], Anabelle Art PA-C, 10 mg at 01/18/22 0517    insulin lispro (HumaLOG) 100 units/mL subcutaneous injection 1-6 Units, 1-6 Units, Subcutaneous, TID AC **AND** Fingerstick Glucose (POCT), , , TID AC, Barrett Kuhn PA-C    insulin lispro (HumaLOG) 100 units/mL subcutaneous injection 1-6 Units, 1-6 Units, Subcutaneous, HS, Barrett Kuhn PA-C, 2 Units at 01/16/22 2201    metoprolol tartrate (LOPRESSOR) partial tablet 12 5 mg, 12 5 mg, Oral, Q12H [address], Evelyn Sauce NOA Senior, 12 5 mg at 01/17/22 1052    ondansetron [facility]) injection 4 mg, 4 mg, Intravenous, Q6H PRN, Leatha Singh, PA-C    oxyCODONE (ROXICODONE) IR tablet 2 5 mg, 2 5 mg, Oral, Q4H PRN, Leatha [place], PA-C, 2 5 mg at 01/13/22 0525    oxyCODONE (ROXICODONE) IR tablet 5 mg, 5 mg, Oral, Q4H PRN, Leatha Francisco, PA-C, 5 mg at 01/08/22 2148    pantoprazole (PROTONIX) EC tablet 40 mg, 40 mg, Oral, Early Morning, Leatha Luupiter, PA-C, 40 mg at 01/18/22 0517    polyethylene glycol (MIRALAX) packet 17 g, 17 g, Oral, Daily PRN, Anabelle Art PA-C    potassium chloride (K-DUR,KLOR-CON) CR tablet 40 mEq, 40 mEq, Oral, BID, PIPE Seo-C, 40 mEq at 01/17/22 1720    temazepam (RESTORIL) capsule 15 mg, 15 mg, Oral, HS PRN, Leatha Luupiter, PA-C, 15 mg at 01/13/22 2144    Labs & Results:    No results found for: CKTOTAL, CKMB, CKMBINDEX, TROPONINI    Lab Results   Component Value Date    GLUCOSE 149 (H) 01/07/2022    CALCIUM 8 2 (L) 01/18/2022    K 3 6 01/18/2022    CO2 30 01/18/2022     01/18/2022    BUN 39 (H) 01/18/2022    CREATININE 1 34 (H) 01/18/2022       Lab Results   Component Value Date    WBC 9 46 01/14/2022    HGB 10 5 (L) 01/14/2022    HCT 34 2 (L) 01/14/2022    MCV 99 (H) 01/14/2022     (L) 01/14/2022     Results from last 7 days   Lab Units 01/17/22  0427   INR  3 00*       No results found for: CHOL  Lab Results   Component Value Date    HDL 13 (L) 12/30/2021    HDL 38 (L) 09/13/2021     Lab Results   Component Value Date    LDLCALC 107 (H) 12/30/2021    LDLCALC 134 (H) 09/13/2021     Lab Results   Component Value Date    TRIG 152 (H) 12/30/2021    TRIG 137 09/13/2021       Lab Results   Component Value Date    ALT 56 01/12/2022    AST 45 01/12/2022         EKG personally reviewed by )Rodrick Eng MD  No acute changes   TELE: No significant arrhythmias seen on telemetry review

## 2022-01-18 NOTE — PHYSICAL THERAPY NOTE
Physical Therapy Re-Evaluation + Treatment Session    Patient's Name: Anju De La Cruz    Admitting Diagnosis  Cardiogenic shock ([facility] [address] ) [R57 0]  Liver failure ([facility] [address] ) [K72 90]  Weakness [R53 1]  ST elevation [R94 31]  Acute kidney injury ([facility] [address] ) [N17 9]  Acute respiratory failure with hypoxia (HCC) [J96 01]  Generalized weakness [R53 1]    Problem List  Patient Active Problem List   Diagnosis    Agoraphobia with panic attacks    Hyperlipidemia, mixed    Impaired fasting glucose    Obesity (BMI 30-39  9)    Psychosis, atypical ([facility] )    Pipe smoker    Cardiogenic shock ([facility] )    VSD (ventricular septal defect)    JUDE (acute kidney injury) ([facility] )    Transaminitis    Tylenol ingestion    Acute systolic congestive heart failure (HCC)    Anemia    Personal history of ECMO    Coagulopathy (HCC)    Leukocytosis    Central line complication    S/P VSD repair    Thrombocytopenia (HCC)    Hypernatremia    Hyperchloremia    Hypocalcemia    Anticoagulated on Coumadin    Anticoagulation goal of INR 2 to 3       Past Medical History  History reviewed  No pertinent past medical history  Past Surgical History  Past Surgical History:   Procedure Laterality Date    EXTRACORPOREAL CIRCULATION N/A 12/31/2021    Procedure: SUPPORT EXTRACORPOREAL MEMBRANE OXYGENATION (ECMO);   Surgeon: Eliza Ibanez MD;  [facility];  Service: Cardiac Surgery    IR OTHER  12/31/2021    IR OTHER  1/7/2022    MA ECMO/ECLS INITIATION VENOUS N/A 1/4/2022    Procedure: VA ECMO DECANNULATION;  Surgeon: Eliza Ibanez MD;  [facility];  Service: Cardiac Surgery    MA EXPLOR POSTOP BLEED,INFEC,CLOT-CHST N/A 1/7/2022    Procedure: RE-EXPLORATION MEDIASTERNAL CAVITY FOR POST-OP BLEED (BRING BACK) Removal of PA catheter;  Surgeon: Eliza Ibanez MD;  [facility];  Service: Cardiac Surgery    MA REVSD N/A 1/4/2022    Procedure: REPAIR VENTRICULAR SEPTAL DEFECT (VSD) OPEN WITH BOVINE PERICARDIAL PATCH ;  Surgeon: Dulce Ybarra MD;  [facility];  Service: Cardiac Surgery        01/18/22 1045   PT Last Visit   PT Visit Date 01/18/22   Note Type   Note type Re-Evaluation  (+ followup treatment session)   Pain Assessment   Pain Assessment Tool 0-10   Pain Score No Pain   Restrictions/Precautions   Other Precautions Cardiac/sternal;Multiple lines;Telemetry; Fall Risk   Home Living   Type of [address] Two level;Stairs to enter with rails   Home Equipment   (deines)   Additional Comments Pt reports living in a [place] with 2 POORNIMA  Was ambulating I without AD PTA   Prior Function   Level of [place] Independent with ADLs and functional mobility   Lives With Family  (sister and CINTHIA )   Receives Help From Family   ADL Assistance Independent   IADLs Independent   Cognition   Arousal/Participation Alert   Attention Attends with cues to redirect   Orientation Level Oriented X4   Memory Decreased recall of precautions   Following Commands Follows one step commands without difficulty   Comments pt is pleasant and cooperative, but requires some encouragement to work with therapy   RLE Assessment   RLE Assessment X   Strength RLE   RLE Overall Strength 2/5  (grossly)   LLE Assessment   LLE Assessment X   Strength LLE   LLE Overall Strength 2/5  (grossly)   Bed Mobility   Rolling R 3  Moderate assistance   Additional items Assist x 1;Bedrails; Increased time required;Verbal cues   Rolling L 3  Moderate assistance   Additional items Assist x 1;Bedrails;Verbal cues; Increased time required   Supine to Sit 3  Moderate assistance   Additional items Assist x 1;Assist x 3;Bedrails; Increased time required;Verbal cues;LE management   Sit to Supine 2  Maximal assistance   Additional items Assist x 2; Increased time required;Verbal cues;LE management   Additional Comments Pt required Desmond at EOB for trunk control- reported dizzines while sitting EOB, BP assessed and WNL   Transfers   Sit to Stand 2  Maximal assistance   Additional items Assist x 2; Increased time required;Verbal cues   Stand to Sit 2  Maximal assistance   Additional items Assist x 2; Increased time required;Verbal cues   Additional Comments Performed STS transfers with HHAx2 and b/l knee blocking with use of curb step  Able to achieve ~75% buttocks clearance, poor standing tolerance with pt reporting dizziness and requesting to go back to bed   Ambulation/Elevation   Gait pattern Not appropriate  (poor standing tolerance)   Balance   Static Sitting Poor +   Dynamic Sitting Poor   Static Standing Poor -   Endurance Deficit   Endurance Deficit Yes   Endurance Deficit Description weakness, deconditioning, fatigue   Activity Tolerance   Activity Tolerance Patient limited by fatigue   Nurse Made Aware ok to see per RN   Assessment   Prognosis Fair   Problem List Decreased strength;Decreased endurance; Impaired balance;Decreased mobility   Assessment Pt seen for re-evaluation today s/p goal expiration  Pt was initially admitted to [facility] on 12/30/2021 with SOB and weakness, diagnosed with cardiogenic shock  Pt with complicated medical course since admission inclduing "Cardiogenic shock due to anteroseptal MI complicated by VSD on VA ECMO 12/31/21 s/p VSD repair with large bovine pericardial patch and ECMO decannulation 1/4/2022, also with vasodilatory component post op; s/p multiple blood product transfusions " Other active comorbidities at this time include: JUDE, transaminitis, anemia, thrombocytopenia, central line complication, agoraphobia with panic attacks (refer to EMR for extensive PMH)  Pt with active PT evaluation and treatment orders  PTA pt was independent with ADLs and ambulating without AD  Pt lives with his sister and brother in law in a [facility] with 2 POORNIMA and full flight up to 2nd floor  Pt has made slow progress since initial evaluation   At this time, pt requires modAx1 for rolling; mod-maxAx2 for supine <> sit transfers; Desmond for sitting EOB; and maxAx2 for STS transfer with HHA and b/l knee blocking  Pt presents at PT eval functioning below baseline and currently w/ overall mobility deficits 2* to: BLE weakness, impaired balance, decreased endurance, decreased activity tolerance compared to baseline, decreased functional mobility tolerance compared to baseline, fall risk, SOB upon exertion  Pt currently at a fall risk 2* to impairments listed above  Pt will continue to benefit from skilled acute PT interventions to address stated impairments; to maximize functional mobility; for ongoing pt/ family training; and DME needs  At conclusion of PT session pt returned BTB with phone and call bell within reach  Pt denies any further questions at this time  The patient's AM-PAC Basic Mobility Inpatient Short Form Raw Score is 7  A Raw score of less than or equal to 16 suggests the patient may benefit from discharge to post-acute rehabilitation services  Please also refer to the recommendation of the Physical Therapist for safe discharge planning  Recommend rehab upon hospital D/C  Barriers to Discharge Inaccessible home environment;Decreased caregiver support   Goals   Patient Goals to get stronger   STG Expiration Date 02/01/22   Short Term Goal #1 In 10-14 days pt will be able to: 1  Demonstrate ability to perform all aspects of bed mobility with Ax1 to increase functional independence  2  Perform functional transfers with Ax1 to facilitate safe return to previous living environment  3   Ambulate 150 ft with RW and modAx1 with stable vitals to improve safety with household distances and reduce fall risk  4   Improve LE strength grades by 1 to increase ease of functional mobility with transfers and gait  5  Pt will demonstrate improved balance by one grade order to decrease risk of falls  PT Treatment Day 4   Plan   Treatment/Interventions Functional transfer training; Therapeutic exercise; Endurance training;LE strengthening/ROM; Equipment eval/education; Bed mobility;Spoke to nursing   PT Frequency 4-6x/wk   Recommendation   PT Discharge Recommendation Post acute rehabilitation services   AM-PAC Basic Mobility Inpatient   Turning in Bed Without Bedrails 2   Lying on Back to Sitting on Edge of Flat Bed 1   Moving Bed to Chair 1   Standing Up From Chair 1   Walk in Room 1   Climb 3-5 Stairs 1   Basic Mobility Inpatient Raw Score 7   Turning Head Towards Sound 4   Follow Simple Instructions 3   Low Function Basic Mobility Raw Score 14   Low Function Basic Mobility Standardized Score 22 01   Highest Level Of Mobility   [facility] Goal 2: Bed activities/Dependent transfer   -[facility] Highest Level of Mobility 3: Sit at edge of bed   -[facility] Goal Achieved Yes   Modified Mk Scale   Modified [place] Scale 4   Additional Treatment Session   Start Time 1022   End Time 1045   Treatment Assessment Pt seen for additional treatment session with a focus on seated LE therex and sitting balance  Pt was only able to tolerate sitting EOB ~5 minutes 2* dizziness (BP assessed and WNL)  Returned back to bed where pt was put into chair position and performed the below stated therex  Pt quickly fatigues requiring frequent rest breaks  Educated pt on performing LE therex independently when in chair position 2-3x/day with pt verbalizing understanding  Pt would benefit from continued acute skilled PT to address above deficits  Continuing to recommend rehab upon d/c  Exercises   Hip Abduction Sitting;10 reps;Bilateral  (3s iso hold against pillow)   Hip Adduction Sitting;10 reps;AAROM; Bilateral   Knee AROM Short Arc Quad Sitting;20 reps;AROM; Bilateral  (x10, x5, x5)   Ankle Pumps Supine;20 reps;AROM; Bilateral   End of Consult   Patient Position at End of Consult Supine; All needs within reach       Brian Jones, PT, DPT

## 2022-01-18 NOTE — PLAN OF CARE
Problem: MOBILITY - ADULT  Goal: Maintain or return to baseline ADL function  Description: INTERVENTIONS:  -  Assess patient's ability to carry out ADLs; assess patient's baseline for ADL function and identify physical deficits which impact ability to perform ADLs (bathing, care of mouth/teeth, toileting, grooming, dressing, etc )  - Assess/evaluate cause of self-care deficits   - Assess range of motion  - Assess patient's mobility; develop plan if impaired  - Assess patient's need for assistive devices and provide as appropriate  - Encourage maximum independence but intervene and supervise when necessary  - Involve family in performance of ADLs  - Assess for home care needs following discharge   - Consider OT consult to assist with ADL evaluation and planning for discharge  - Provide patient education as appropriate  Outcome: Progressing  Goal: Maintains/Returns to pre admission functional level  Description: INTERVENTIONS:  - Perform BMAT or MOVE assessment daily    - Set and communicate daily mobility goal to care team and patient/family/caregiver     - Collaborate with rehabilitation services on mobility goals if consulted  - Ambulate patient 4 times a day  - Out of bed to chair 3 times a day   - Out of bed for meals 3 times a day  - Out of bed for toileting  - Record patient progress and toleration of activity level   Outcome: Progressing     Problem: Potential for Falls  Goal: Patient will remain free of falls  Description: INTERVENTIONS:  - Educate patient/family on patient safety including physical limitations  - Instruct patient to call for assistance with activity   - Consult OT/PT to assist with strengthening/mobility   - Keep Call bell within reach  - Keep bed low and locked with side rails adjusted as appropriate  - Keep care items and personal belongings within reach  - Initiate and maintain comfort rounds  - Make Fall Risk Sign visible to staff  - Offer Toileting every 3 Hours, in advance of need  - Initiate/Maintain bed alarm  - Obtain necessary fall risk management equipment: nonskid socks, walker  - Apply yellow socks and bracelet for high fall risk patients  - Consider moving patient to room near nurses station  Outcome: Progressing     Problem: Prexisting or High Potential for Compromised Skin Integrity  Goal: Skin integrity is maintained or improved  Description: INTERVENTIONS:  - Identify patients at risk for skin breakdown  - Assess and monitor skin integrity  - Assess and monitor nutrition and hydration status  - Monitor labs   - Assess for incontinence   - Turn and reposition patient  - Assist with mobility/ambulation  - Relieve pressure over bony prominences  - Avoid friction and shearing  - Provide appropriate hygiene as needed including keeping skin clean and dry  - Evaluate need for skin moisturizer/barrier cream  - Collaborate with interdisciplinary team   - Patient/family teaching  - Consider wound care consult   Outcome: Progressing     Problem: PAIN - ADULT  Goal: Verbalizes/displays adequate comfort level or baseline comfort level  Description: Interventions:  - Encourage patient to monitor pain and request assistance  - Assess pain using appropriate pain scale  - Administer analgesics based on type and severity of pain and evaluate response  - Implement non-pharmacological measures as appropriate and evaluate response  - Consider cultural and social influences on pain and pain management  - Notify physician/advanced practitioner if interventions unsuccessful or patient reports new pain  Outcome: Progressing     Problem: INFECTION - ADULT  Goal: Absence or prevention of progression during hospitalization  Description: INTERVENTIONS:  - Assess and monitor for signs and symptoms of infection  - Monitor lab/diagnostic results  - Monitor all insertion sites, i e  indwelling lines, tubes, and drains  - Monitor endotracheal if appropriate and nasal secretions for changes in amount and color  - [place] appropriate cooling/warming therapies per order  - Administer medications as ordered  - Instruct and encourage patient and family to use good hand hygiene technique  - Identify and instruct in appropriate isolation precautions for identified infection/condition  Outcome: Progressing  Goal: Absence of fever/infection during neutropenic period  Description: INTERVENTIONS:  - Monitor WBC    Outcome: Progressing     Problem: SAFETY ADULT  Goal: Maintain or return to baseline ADL function  Description: INTERVENTIONS:  -  Assess patient's ability to carry out ADLs; assess patient's baseline for ADL function and identify physical deficits which impact ability to perform ADLs (bathing, care of mouth/teeth, toileting, grooming, dressing, etc )  - Assess/evaluate cause of self-care deficits   - Assess range of motion  - Assess patient's mobility; develop plan if impaired  - Assess patient's need for assistive devices and provide as appropriate  - Encourage maximum independence but intervene and supervise when necessary  - Involve family in performance of ADLs  - Assess for home care needs following discharge   - Consider OT consult to assist with ADL evaluation and planning for discharge  - Provide patient education as appropriate  Outcome: Progressing  Goal: Maintains/Returns to pre admission functional level  Description: INTERVENTIONS:  - Perform BMAT or MOVE assessment daily    - Set and communicate daily mobility goal to care team and patient/family/caregiver     - Collaborate with rehabilitation services on mobility goals if consulted  - Ambulate patient 4 times a day  - Out of bed to chair 3 times a day   - Out of bed for meals 3 times a day  - Out of bed for toileting  - Record patient progress and toleration of activity level   Outcome: Progressing  Goal: Patient will remain free of falls  Description: INTERVENTIONS:  - Educate patient/family on patient safety including physical limitations  - Instruct patient to call for assistance with activity   - Consult OT/PT to assist with strengthening/mobility   - Keep Call bell within reach  - Keep bed low and locked with side rails adjusted as appropriate  - Keep care items and personal belongings within reach  - Initiate and maintain comfort rounds  - Make Fall Risk Sign visible to staff  - Offer Toileting every 3 Hours, in advance of need  - Initiate/Maintain bed alarm  - Obtain necessary fall risk management equipment: nonskid socks  - Apply yellow socks and bracelet for high fall risk patients  - Consider moving patient to room near nurses station  Outcome: Progressing     Problem: DISCHARGE PLANNING  Goal: Discharge to home or other facility with appropriate resources  Description: INTERVENTIONS:  - Identify barriers to discharge w/patient and caregiver  - Arrange for needed discharge resources and transportation as appropriate  - Identify discharge learning needs (meds, wound care, etc )  - Arrange for interpretive services to assist at discharge as needed  - Refer to Case Management Department for coordinating discharge planning if the patient needs post-hospital services based on physician/advanced practitioner order or complex needs related to functional status, cognitive ability, or social support system  Outcome: Progressing     Problem: Knowledge Deficit  Goal: Patient/family/caregiver demonstrates understanding of disease process, treatment plan, medications, and discharge instructions  Description: Complete learning assessment and assess knowledge base    Interventions:  - Provide teaching at level of understanding  - Provide teaching via preferred learning methods  Outcome: Progressing     Problem: CARDIOVASCULAR - ADULT  Goal: Maintains optimal cardiac output and hemodynamic stability  Description: INTERVENTIONS:  - Monitor I/O, vital signs and rhythm  - Monitor for S/S and trends of decreased cardiac output  - Administer and titrate ordered vasoactive medications to optimize hemodynamic stability  - Assess quality of pulses, skin color and temperature  - Assess for signs of decreased coronary artery perfusion  - Instruct patient to report change in severity of symptoms  Outcome: Progressing  Goal: Absence of cardiac dysrhythmias or at baseline rhythm  Description: INTERVENTIONS:  - Continuous cardiac monitoring, vital signs, obtain 12 lead EKG if ordered  - Administer antiarrhythmic and heart rate control medications as ordered  - Monitor electrolytes and administer replacement therapy as ordered  Outcome: Progressing     Problem: RESPIRATORY - ADULT  Goal: Achieves optimal ventilation and oxygenation  Description: INTERVENTIONS:  - Assess for changes in respiratory status  - Assess for changes in mentation and behavior  - Position to facilitate oxygenation and minimize respiratory effort  - Oxygen administered by appropriate delivery if ordered  - Initiate smoking cessation education as indicated  - Encourage broncho-pulmonary hygiene including cough, deep breathe, Incentive Spirometry  - Assess the need for suctioning and aspirate as needed  - Assess and instruct to report SOB or any respiratory difficulty  - Respiratory Therapy support as indicated  Outcome: Progressing     Problem: GASTROINTESTINAL - ADULT  Goal: Oral mucous membranes remain intact  Description: INTERVENTIONS  - Assess oral mucosa and hygiene practices  - Implement preventative oral hygiene regimen  - Implement oral medicated treatments as ordered  - Initiate Nutrition services referral as needed  Outcome: Progressing     Problem: GENITOURINARY - ADULT  Goal: Maintains or returns to baseline urinary function  Description: INTERVENTIONS:  - Assess urinary function  - Encourage oral fluids to ensure adequate hydration if ordered  - Administer IV fluids as ordered to ensure adequate hydration  - Administer ordered medications as needed  - Offer frequent toileting  - Follow urinary retention protocol if ordered  Outcome: Progressing  Goal: Urinary catheter remains patent  Description: INTERVENTIONS:  - Assess patency of urinary catheter  - If patient has a chronic [place], consider changing catheter if non-functioning  - Follow guidelines for intermittent irrigation of non-functioning urinary catheter  Outcome: Progressing     Problem: METABOLIC, FLUID AND ELECTROLYTES - ADULT  Goal: Electrolytes maintained within normal limits  Description: INTERVENTIONS:  - Monitor labs and assess patient for signs and symptoms of electrolyte imbalances  - Administer electrolyte replacement as ordered  - Monitor response to electrolyte replacements, including repeat lab results as appropriate  - Instruct patient on fluid and nutrition as appropriate  Outcome: Progressing  Goal: Fluid balance maintained  Description: INTERVENTIONS:  - Monitor labs   - Monitor I/O and WT  - Instruct patient on fluid and nutrition as appropriate  - Assess for signs & symptoms of volume excess or deficit  Outcome: Progressing  Goal: Glucose maintained within target range  Description: INTERVENTIONS:  - Monitor Blood Glucose as ordered  - Assess for signs and symptoms of hyperglycemia and hypoglycemia  - Administer ordered medications to maintain glucose within target range  - Assess nutritional intake and initiate nutrition service referral as needed  Outcome: Progressing     Problem: SKIN/TISSUE INTEGRITY - ADULT  Goal: Skin Integrity remains intact(Skin Breakdown Prevention)  Description: Assess:  -Perform Narinder assessment every shift  -Clean and moisturize skin every shift  -Inspect skin when repositioning, toileting, and assisting with ADLS  -Assess extremities for adequate circulation and sensation     Bed Management:  -Have minimal linens on bed & keep smooth, unwrinkled  -Change linens as needed when moist or perspiring  -Avoid sitting or lying in one position for more than 3 hours while in bed  -Keep HOB at 45 degrees Toileting:  -Offer bedside commode  -Assess for incontinence every shift    Activity:  -Mobilize patient 4 times a day  -Encourage activity and walks on unit  -Encourage or provide ROM exercises   -Turn and reposition patient every 2 Hours  -Use appropriate equipment to lift or move patient in bed  -Instruct/ Assist with weight shifting every 15 minutes when out of bed in chair  -Consider limitation of chair time 3 hour intervals    Skin Care:  -Avoid use of baby powder, tape, friction and shearing, hot water or constrictive clothing  -Relieve pressure over bony prominences using Allevyn  -Do not massage red bony areas    Next Steps:  -Consider consults to  interdisciplinary teams such as wound care  Outcome: Progressing  Goal: Incision(s), wounds(s) or drain site(s) healing without S/S of infection  Description: INTERVENTIONS  - Assess and document dressing, incision, wound bed, drain sites and surrounding tissue  - Provide patient and family education  - Perform skin care/dressing changes every shift  Outcome: Progressing  Goal: Pressure injury heals and does not worsen  Description: Interventions:  - Implement low air loss mattress or specialty surface (Criteria met)  - Apply silicone foam dressing  - Instruct/assist with weight shifting every 15 minutes when in chair   - Limit chair time to 3 hour intervals  - Use special pressure reducing interventions such as cushion when in chair   - Apply fecal or urinary incontinence containment device   - Turn and reposition patient & offload bony prominences every 2 hours   - Utilize friction reducing device or surface for transfers   - Consider consults to  interdisciplinary teams such as wound care  - Use incontinent care products after each incontinent episode such as moisturizer  - Consider nutrition services referral as needed  Outcome: Progressing     Problem: HEMATOLOGIC - ADULT  Goal: Maintains hematologic stability  Description: INTERVENTIONS  - Assess for signs and symptoms of bleeding or hemorrhage  - Monitor labs  - Administer supportive blood products/factors as ordered and appropriate  Outcome: Progressing     Problem: Nutrition/Hydration-ADULT  Goal: Nutrient/Hydration intake appropriate for improving, restoring or maintaining nutritional needs  Description: Monitor and assess patient's nutrition/hydration status for malnutrition  Collaborate with interdisciplinary team and initiate plan and interventions as ordered  Monitor patient's weight and dietary intake as ordered or per policy  Utilize nutrition screening tool and intervene as necessary  Determine patient's food preferences and provide high-protein, high-caloric foods as appropriate       INTERVENTIONS:  - Monitor oral intake, urinary output, labs, and treatment plans  - Assess nutrition and hydration status and recommend course of action  - Evaluate amount of meals eaten  - Assist patient with eating if necessary   - Allow adequate time for meals  - Recommend/ encourage appropriate diets, oral nutritional supplements, and vitamin/mineral supplements  - Order, calculate, and assess calorie counts as needed  - Recommend, monitor, and adjust tube feedings and TPN/PPN based on assessed needs  - Assess need for intravenous fluids  - Provide specific nutrition/hydration education as appropriate  - Include patient/family/caregiver in decisions related to nutrition  Outcome: Progressing

## 2022-01-18 NOTE — PLAN OF CARE
Problem: PHYSICAL THERAPY ADULT  Goal: Performs mobility at highest level of function for planned discharge setting  See evaluation for individualized goals  Description: Treatment/Interventions: Functional transfer training,LE strengthening/ROM,Elevations,Therapeutic exercise,Endurance training,Equipment eval/education,Bed mobility,Gait training,Spoke to nursing,OT,Cognitive reorientation  Equipment Recommended:  (r/o rw next visit)       See flowsheet documentation for full assessment, interventions and recommendations  Outcome: Progressing  Note: Prognosis: Fair  Problem List: Decreased strength,Decreased endurance,Impaired balance,Decreased mobility  Assessment: Pt seen for re-evaluation today s/p goal expiration  Pt was [facility] to [facility] on [facility] with SOB and weakness, diagnosed with cardiogenic shock  Pt with complicated medical course since admission inclduing "Cardiogenic shock due to anteroseptal MI complicated by VSD on VA ECMO 12/31/21 s/p VSD repair with large bovine pericardial patch and ECMO decannulation 1/4/2022, also with vasodilatory component post op; s/p multiple blood product transfusions " Other active comorbidities at this time include: JUDE, transaminitis, anemia, thrombocytopenia, central line complication, agoraphobia with panic attacks (refer to EMR for extensive PMH)  Pt with active PT evaluation and treatment orders  PTA pt was independent with ADLs and ambulating without AD  Pt lives with his sister and brother in law in a [facility] with 2 [address]IM[address] and full flight up to 2nd floor  Pt has made slow progress since initial evaluation  At this time, pt requires modAx1 for rolling; mod-maxAx2 for supine <> sit transfers; [place] for sitting EOB; and maxAx2 for STS transfer with HHA and b/l knee blocking   Pt presents at PT eval functioning below baseline and currently w/ overall mobility deficits 2* to: BLE weakness, impaired balance, decreased endurance, decreased activity tolerance compared to baseline, decreased functional mobility tolerance compared to baseline, fall risk, SOB upon exertion  Pt currently at a fall risk 2* to impairments listed above  Pt will continue to benefit from skilled acute PT interventions to address stated impairments; to maximize functional mobility; for ongoing pt/ family training; and DME needs  At conclusion of PT session pt returned BTB with phone and call bell within reach  Pt denies any further questions at this time  The patient's AM-PAC Basic Mobility Inpatient Short Form Raw Score is 7  A Raw score of less than or equal to 16 suggests the patient may benefit from discharge to post-acute rehabilitation services  Please also refer to the recommendation of the Physical Therapist for safe discharge planning  Recommend rehab upon hospital D/C  Barriers to Discharge: Inaccessible home environment,Decreased caregiver support        PT Discharge Recommendation: Post acute rehabilitation services          See flowsheet documentation for full assessment

## 2022-01-19 LAB
ANION GAP SERPL CALCULATED.3IONS-SCNC: 4 MMOL/L (ref 4–13)
BUN SERPL-MCNC: 35 MG/DL (ref 5–25)
CALCIUM SERPL-MCNC: 8 MG/DL (ref 8.3–10.1)
CHLORIDE SERPL-SCNC: 104 MMOL/L (ref 100–108)
CO2 SERPL-SCNC: 29 MMOL/L (ref 21–32)
CREAT SERPL-MCNC: 1.27 MG/DL (ref 0.6–1.3)
GFR SERPL CREATININE-BSD FRML MDRD: 59 ML/MIN/1.73SQ M
GLUCOSE SERPL-MCNC: 103 MG/DL (ref 65–140)
GLUCOSE SERPL-MCNC: 145 MG/DL (ref 65–140)
GLUCOSE SERPL-MCNC: 76 MG/DL (ref 65–140)
GLUCOSE SERPL-MCNC: 82 MG/DL (ref 65–140)
GLUCOSE SERPL-MCNC: 92 MG/DL (ref 65–140)
INR PPP: 2.8 (ref 0.84–1.19)
POTASSIUM SERPL-SCNC: 3.8 MMOL/L (ref 3.5–5.3)
PROTHROMBIN TIME: 28.1 SECONDS (ref 11.6–14.5)
SODIUM SERPL-SCNC: 137 MMOL/L (ref 136–145)

## 2022-01-19 PROCEDURE — 99232 SBSQ HOSP IP/OBS MODERATE 35: CPT | Performed by: INTERNAL MEDICINE

## 2022-01-19 PROCEDURE — 85610 PROTHROMBIN TIME: CPT | Performed by: PHYSICIAN ASSISTANT

## 2022-01-19 PROCEDURE — 82948 REAGENT STRIP/BLOOD GLUCOSE: CPT

## 2022-01-19 PROCEDURE — 99024 POSTOP FOLLOW-UP VISIT: CPT | Performed by: THORACIC SURGERY (CARDIOTHORACIC VASCULAR SURGERY)

## 2022-01-19 PROCEDURE — 80048 BASIC METABOLIC PNL TOTAL CA: CPT | Performed by: PHYSICIAN ASSISTANT

## 2022-01-19 PROCEDURE — 97530 THERAPEUTIC ACTIVITIES: CPT

## 2022-01-19 RX ORDER — BUMETANIDE 0.25 MG/ML
2 INJECTION, SOLUTION INTRAMUSCULAR; INTRAVENOUS 2 TIMES DAILY
Status: DISCONTINUED | OUTPATIENT
Start: 2022-01-19 | End: 2022-01-19

## 2022-01-19 RX ORDER — ALBUMIN, HUMAN INJ 5% 5 %
12.5 SOLUTION INTRAVENOUS ONCE
Status: COMPLETED | OUTPATIENT
Start: 2022-01-19 | End: 2022-01-19

## 2022-01-19 RX ORDER — BUMETANIDE 1 MG/1
2 TABLET ORAL 2 TIMES DAILY
Status: DISCONTINUED | OUTPATIENT
Start: 2022-01-19 | End: 2022-01-20

## 2022-01-19 RX ORDER — LOSARTAN POTASSIUM 25 MG/1
25 TABLET ORAL DAILY
Status: DISCONTINUED | OUTPATIENT
Start: 2022-01-19 | End: 2022-01-21

## 2022-01-19 RX ADMIN — ACETAMINOPHEN 975 MG: 325 TABLET, FILM COATED ORAL at 16:19

## 2022-01-19 RX ADMIN — AMIODARONE HYDROCHLORIDE 200 MG: 200 TABLET ORAL at 05:00

## 2022-01-19 RX ADMIN — Medication 12.5 MG: at 21:26

## 2022-01-19 RX ADMIN — AMIODARONE HYDROCHLORIDE 200 MG: 200 TABLET ORAL at 21:26

## 2022-01-19 RX ADMIN — ACETAMINOPHEN 975 MG: 325 TABLET, FILM COATED ORAL at 09:07

## 2022-01-19 RX ADMIN — ATORVASTATIN CALCIUM 80 MG: 80 TABLET, FILM COATED ORAL at 17:52

## 2022-01-19 RX ADMIN — ALBUMIN (HUMAN) 12.5 G: 12.5 INJECTION, SOLUTION INTRAVENOUS at 16:19

## 2022-01-19 RX ADMIN — HYDRALAZINE HYDROCHLORIDE 10 MG: 10 TABLET, FILM COATED ORAL at 05:00

## 2022-01-19 RX ADMIN — POTASSIUM CHLORIDE 40 MEQ: 1500 TABLET, EXTENDED RELEASE ORAL at 09:07

## 2022-01-19 RX ADMIN — POTASSIUM CHLORIDE 40 MEQ: 1500 TABLET, EXTENDED RELEASE ORAL at 17:52

## 2022-01-19 RX ADMIN — AMIODARONE HYDROCHLORIDE 200 MG: 200 TABLET ORAL at 14:42

## 2022-01-19 RX ADMIN — BUMETANIDE 2 MG: 0.25 INJECTION INTRAMUSCULAR; INTRAVENOUS at 02:06

## 2022-01-19 RX ADMIN — BUMETANIDE 2 MG: 0.25 INJECTION INTRAMUSCULAR; INTRAVENOUS at 09:07

## 2022-01-19 RX ADMIN — ASPIRIN 325 MG ORAL TABLET 325 MG: 325 PILL ORAL at 09:07

## 2022-01-19 RX ADMIN — PANTOPRAZOLE SODIUM 40 MG: 40 TABLET, DELAYED RELEASE ORAL at 05:00

## 2022-01-19 RX ADMIN — Medication 12.5 MG: at 09:12

## 2022-01-19 NOTE — PROGRESS NOTES
Cardiology Progress Note - Paralee Cage 61 y o  male MRN: [id number]    Unit/Bed#: [facility] 427-01 Encounter: [id number]      Assessment & Plan:  Principal Problem:    S/P VSD repair  Active Problems:    Agoraphobia with panic attacks    Cardiogenic shock (HCC)    VSD (ventricular septal defect)    JUDE (acute kidney injury) ([facility] [address] )    Transaminitis    Tylenol ingestion    Acute systolic congestive heart failure (HCC)    Anemia    Personal history of ECMO    Coagulopathy (HCC)    Leukocytosis    Central line complication    Thrombocytopenia (HCC)    Hypernatremia    Hyperchloremia    Hypocalcemia    Anticoagulated on Coumadin    Anticoagulation goal of INR 2 to 3    # Late presenting anterior STEMI c/b anteroapical VSD s/p open patch repair   # Redo sternotomy, mediastinal exploration for retained swan avinash catheter  # HFrEF with EF 35-40% as per my assessment on most recent CORIE  # Deconditioning    BP: 90s-100s/60s  HR: 70-80s  UOP: 4 L UOP yesterday   Creatinine: 1 7->1 3->1 2  Volume status improving  JVD mildly elevated, no crackles, 1+ LE edema  Plan:  - Switch to PO diuresis starting tomorrow, 2 mg Bumex PO BID  - Stop hydralazine and switch to losartan  - c/w BB 12 5 mg BID today  Will escalate tomorrow  - c/w aspirin and atorvastatin  - amiodarone for afib prevention  - encourage OOBTC and physical therapy    Subjective:   Patient feels better compared to yesterday  He did have blood tinged urine last night  Objective:     Vitals: Blood pressure 112/58, pulse 91, temperature 98 3 °F (36 8 °C), temperature source Oral, resp  rate 21, height 5' 5 5" (1 664 m), weight 101 kg (222 lb 14 2 oz), SpO2 98 %  , Body mass index is 36 53 kg/m² ,   Orthostatic Blood Pressures      Most Recent Value   Blood Pressure 112/58 filed at 01/19/2022 0912   Patient Position - Orthostatic VS Lying filed at 01/19/2022 0744            Intake/Output Summary (Last 24 hours) at 1/19/2022 0983  Last data filed at 1/19/2022 0915  Gross per 24 hour   Intake 350 ml   Output 4575 ml   Net -4225 ml           Physical Exam:    GEN: Robbie Momin Lethargic  HEENT: anicteric, mucous membranes moist  NECK: JVD coming down, carotid bruits   HEART: regular rhythm, normal S1 and S2, no murmurs, clicks, gallops or rubs   CHEST: Sternotomy incision site healing well  LUNGS: clear to auscultation bilaterally; Crackles present  ABDOMEN: normal bowel sounds, soft, no tenderness, no distention  EXTREMITIES: peripheral pulses normal; 1+ edema, Warm  NEURO: no focal findings    SKIN: normal without suspicious lesions on exposed skin      Current Facility-Administered Medications:     acetaminophen (TYLENOL) tablet 650 mg, 650 mg, Oral, Q6H PRN, Anabelle Art PA-C    acetaminophen (TYLENOL) tablet 975 mg, 975 mg, Oral, Q8H, Anabelle Art PA-C, 975 mg at 01/19/22 0907    amiodarone tablet 200 mg, 200 mg, Oral, Q8H [facility], Whitley Clinton PA-C, 200 mg at 01/19/22 0500    aspirin tablet 325 mg, 325 mg, Oral, Daily, Whitley Clinton PA-C, 325 mg at 01/19/22 0907    atorvastatin (LIPITOR) tablet 80 mg, 80 mg, Oral, After Dinner, Whitley Clinton PA-C, 80 mg at 01/18/22 1722    bisacodyl (DULCOLAX) rectal suppository 10 mg, 10 mg, Rectal, Daily PRN, Anabelle Art PA-C    bumetanide (BUMEX) injection 2 mg, 2 mg, Intravenous, Q8H, Nuris Martin MD, 2 mg at 01/19/22 0907    docusate sodium (COLACE) capsule 100 mg, 100 mg, Oral, BID PRN, Anabelle Art PA-C    hydrALAZINE (APRESOLINE) tablet 10 mg, 10 mg, Oral, Q8H [facility], Anabelle Art PA-C, 10 mg at 01/19/22 0500    insulin lispro (HumaLOG) 100 units/mL subcutaneous injection 1-6 Units, 1-6 Units, Subcutaneous, TID AC **AND** Fingerstick Glucose (POCT), , , TID AC, Whitley Clinton PA-C    insulin lispro (HumaLOG) 100 units/mL subcutaneous injection 1-6 Units, 1-6 Units, Subcutaneous, HS, Whitley Clinton PA-C, 2 Units at 01/16/22 2201    metoprolol tartrate (LOPRESSOR) partial tablet 12 5 mg, 12 5 mg, Oral, Q12H [address], Leanor Yomi Senior, PA-EMILIANO, 12 5 mg at 01/19/22 0912    [facility]etron [facility]) injection 4 mg, 4 mg, Intravenous, Q6H PRN, Danielle Al PA-C    oxyCODONE (ROXICODONE) IR tablet 2 5 mg, 2 5 mg, Oral, Q4H PRN, Danielle Al PA-C, 2 5 mg at 01/13/22 0525    oxyCODONE (ROXICODONE) IR tablet 5 mg, 5 mg, Oral, Q4H PRN, Danielle Al PA-C, 5 mg at 01/08/22 2148    pantoprazole (PROTONIX) EC tablet 40 mg, 40 mg, Oral, Early Morning, Danielle Al PA-C, 40 mg at 01/19/22 0500    polyethylene glycol (MIRALAX) packet 17 g, 17 g, Oral, Daily PRN, Anabelle Art PA-C    potassium chloride (K-DUR,KLOR-CON) CR tablet 40 mEq, 40 mEq, Oral, BID, Anabelle Art PA-C, 40 mEq at 01/19/22 0907    temazepam (RESTORIL) capsule 15 mg, 15 mg, Oral, HS PRN, Danielle Al PA-C, 15 mg at 01/13/22 2144    Labs & Results:    No results found for: CKTOTAL, CKMB, CKMBINDEX, TROPONINI    Lab Results   Component Value Date    GLUCOSE 149 (H) 01/07/2022    CALCIUM 8 0 (L) 01/19/2022    K 3 8 01/19/2022    CO2 29 01/19/2022     01/19/2022    BUN 35 (H) 01/19/2022    CREATININE 1 27 01/19/2022       Lab Results   Component Value Date    WBC 9 46 01/14/2022    HGB 10 5 (L) 01/14/2022    HCT 34 2 (L) 01/14/2022    MCV 99 (H) 01/14/2022     (L) 01/14/2022     Results from last 7 days   Lab Units 01/19/22  0443   INR  2 80*       No results found for: CHOL  Lab Results   Component Value Date    HDL 13 (L) 12/30/2021    HDL 38 (L) 09/13/2021     Lab Results   Component Value Date    LDLCALC 107 (H) 12/30/2021    LDLCALC 134 (H) 09/13/2021     Lab Results   Component Value Date    TRIG 152 (H) 12/30/2021    TRIG 137 09/13/2021       Lab Results   Component Value Date    ALT 56 01/12/2022    AST 45 01/12/2022         EKG personally reviewed by )Alberto Iqbal MD  No acute changes   TELE: No significant arrhythmias seen on telemetry review

## 2022-01-19 NOTE — RESTORATIVE TECHNICIAN NOTE
Restorative Technician Note      Patient Name: Thirza Spatz     Restorative Tech Visit Date: 01/19/22  Note Type: Mobility  Patient Position Upon Consult: Supine  Activity Performed: Range of motion  Range of Motion: Right leg; Left leg  Patient Position at End of Consult: All needs within reach;  Supine

## 2022-01-19 NOTE — UTILIZATION REVIEW
Continued Stay Review    Date: 1/19/2022                   Current Patient Class: inpatient  Current Level of Care: Lv1 1 step down unit as of 1/11  HPI:66 y o  male initially admitted on 12/30 with Late presenting anterior STEMI c/b anteroapical VSD s/p open patch repair   # Redo sternotomy, mediastinal exploration for retained swan avinash catheter  # HFrEF with EF 35-40% per most recent CORIE  Assessment/Plan: Pt with no complaints this AM  [place] cath with blood tinged urine  NRS on tele  PICC line in place  UOP: 4 L UOP yesterday  Creatinine: 1 7->1 3->1 2  Volume status improving  JVD mildly elevated, no crackles, 1+ LE edema  Cardiology following -- continue IV diuretics with switch to po diuresis tomorrow, 2 mg bumex po bid  Stop hydralazine and switch to losartan  Continue with beta blocker 12 5 mg BID today, escalate tomorrow  Continue with asa, statin, amiodarone  Encourage OOB to chair  PT/OT,  D/c [place] cath today, try condom cath  Reg diet with 1800 ml/day fluid restriction      Vital Signs:   Date/Time Temp Pulse Resp BP MAP (mmHg) SpO2 O2 Device   01/19/22 0912 -- 91 -- 112/58 -- -- --   01/19/22 0800 -- -- -- -- -- -- None (Room air)   01/19/22 0744 98 3 °F (36 8 °C) 90 21 117/68 88 98 % None (Room air)   01/19/22 0500 -- -- -- 105/64 -- -- --   01/19/22 02:47:36 97 7 °F (36 5 °C) 80 18 116/64 83 100 % --   01/18/22 2146 -- -- -- 99/60 -- -- --   01/18/22 2000 -- -- -- -- -- 97 % None (Room air)   01/18/22 1900 97 8 °F (36 6 °C) 88 18 103/65 -- 97 % --   01/18/22 1526 97 7 °F (36 5 °C) 81 18 97/61 75 97 % None (Room air)   01/18/22 1352 -- -- -- 103/58 -- -- --   01/18/22 1101 97 2 °F (36 2 °C) Abnormal  88 18 96/59 72 99 % --   01/18/22 0936 -- -- -- 100/58 -- -- --   01/18/22 0800 -- -- -- -- -- -- None (Room air)   01/18/22 0720 98 2 °F (36 8 °C) 83 20 108/62 80 98 % None (Room air)   01/18/22 0517 -- -- -- 108/61 -- -- --   01/18/22 0300 97 5 °F (36 4 °C) 76 17 109/70 85 100 % None (Room air) 01/17/22 2327 98 1 °F (36 7 °C) 74 18 103/64 78 98 % None (Room air)   01/17/22 2116 -- 75 -- 101/57 -- -- --   01/17/22 2000 -- -- -- -- -- -- None (Room air)   01/17/22 1902 97 4 °F (36 3 °C) Abnormal  78 18 97/57 72 100 % None (Room air)   01/17/22 1520 97 5 °F (36 4 °C) 75 15 94/52 68 99 % None (Room air)   01/17/22 1409 -- -- -- 99/55 -- -- --   01/17/22 1106 -- 90 18 106/58 77 98 % None (Room air)   01/17/22 0727 98 °F (36 7 °C) 95 18 101/63 77 98 % None (Room air)   01/17/22 0300 97 9 °F (36 6 °C) 93 18 90/53 65 97 % None (Room air)       Pertinent Labs/Diagnostic Results:     Results from last 7 days   Lab Units 01/19/22  0443 01/18/22  0434 01/17/22  1509 01/17/22  0858 01/17/22  0020   SODIUM mmol/L 137 140 138 140 140   POTASSIUM mmol/L 3 8 3 6 3 9 3 5 4 3   CHLORIDE mmol/L 104 105 107 106 106   CO2 mmol/L 29 30 28 28 29   ANION GAP mmol/L 4 5 3* 6 5   BUN mg/dL 35* 39* 47* 43* 49*   CREATININE mg/dL 1 27 1 34* 1 77* 1 75* 1 86*   EGFR ml/min/1 73sq m 59 55 39 40 37   CALCIUM mg/dL 8 0* 8 2* 8 2* 8 4 8 1*       Results from last 7 days   Lab Units 01/19/22  0628 01/18/22  2044 01/18/22  1629 01/18/22  1059 01/18/22  0557 01/17/22  2103 01/17/22  1550 01/17/22  1105 01/17/22  0553 01/16/22  2049 01/16/22  1635 01/16/22  1121   POC GLUCOSE mg/dl 76 129 87 104 78 114 122 80 104 224* 92 139     Results from last 7 days   Lab Units 01/19/22  0443 01/18/22  0434 01/17/22  1509 01/17/22  0858 01/17/22  0020 01/16/22  1604 01/16/22  0801 01/15/22  2347 01/15/22  1622 01/15/22  0738 01/15/22  0508 01/14/22  0508   GLUCOSE RANDOM mg/dL 82 78 112 120 103 98 153* 109 121 148* 83 113     Results from last 7 days   Lab Units 01/19/22  0443 01/18/22  1050 01/17/22  0427   PROTIME seconds 28 1* 24 2* 29 6*   INR  2 80* 2 31* 3 00*       Medications:   Scheduled Medications:  acetaminophen, 975 mg, Oral, Q8H  amiodarone, 200 mg, Oral, Q8H DENITA  aspirin, 325 mg, Oral, Daily  atorvastatin, 80 mg, Oral, After Dinner  bumetanide, 2 mg, Intravenous, BID  insulin lispro, 1-6 Units, Subcutaneous, TID AC  insulin lispro, 1-6 Units, Subcutaneous, HS  losartan, 25 mg, Oral, Daily  metoprolol tartrate, 12 5 mg, Oral, Q12H DENITA  pantoprazole, 40 mg, Oral, Early Morning  potassium chloride, 40 mEq, Oral, BID    PRN Meds:  acetaminophen, 650 mg, Oral, Q6H PRN  bisacodyl, 10 mg, Rectal, Daily PRN  docusate sodium, 100 mg, Oral, BID PRN  ondansetron, 4 mg, Intravenous, Q6H PRN  oxyCODONE, 2 5 mg, Oral, Q4H PRN  oxyCODONE, 5 mg, Oral, Q4H PRN  polyethylene glycol, 17 g, Oral, Daily PRN  temazepam, 15 mg, Oral, HS PRN        Discharge Plan: D    Network Utilization Review Department  ATTENTION: Please call with any questions or concerns to [phone] and carefully listen to the prompts so that you are directed to the right person  All voicemails are confidential   Sonya Hawkins all requests for admission clinical reviews, approved or denied determinations and any other requests to dedicated fax number below belonging to the campus where the patient is receiving treatment   List of dedicated fax numbers for the Facilities:  [address] DENIALS (Administrative/Medical Necessity) [phone]   [address] (Maternity/NICU/Pediatrics) [phone]   [address]  [address] [facility] [place] [address] [id number] [place] [address] [address] [facility] [phone]

## 2022-01-19 NOTE — PLAN OF CARE
Problem: MOBILITY - ADULT  Goal: Maintain or return to baseline ADL function  Description: INTERVENTIONS:  -  Assess patient's ability to carry out ADLs; assess patient's baseline for ADL function and identify physical deficits which impact ability to perform ADLs (bathing, care of mouth/teeth, toileting, grooming, dressing, etc )  - Assess/evaluate cause of self-care deficits   - Assess range of motion  - Assess patient's mobility; develop plan if impaired  - Assess patient's need for assistive devices and provide as appropriate  - Encourage maximum independence but intervene and supervise when necessary  - Involve family in performance of ADLs  - Assess for home care needs following discharge   - Consider OT consult to assist with ADL evaluation and planning for discharge  - Provide patient education as appropriate  Outcome: Progressing  Goal: Maintains/Returns to pre admission functional level  Description: INTERVENTIONS:  - Perform BMAT or MOVE assessment daily    - Set and communicate daily mobility goal to care team and patient/family/caregiver     - Collaborate with rehabilitation services on mobility goals if consulted  - Ambulate patient 4 times a day  - Out of bed to chair 3 times a day   - Out of bed for meals 3 times a day  - Out of bed for toileting  - Record patient progress and toleration of activity level   Outcome: Progressing     Problem: Potential for Falls  Goal: Patient will remain free of falls  Description: INTERVENTIONS:  - Educate patient/family on patient safety including physical limitations  - Instruct patient to call for assistance with activity   - Consult OT/PT to assist with strengthening/mobility   - Keep Call bell within reach  - Keep bed low and locked with side rails adjusted as appropriate  - Keep care items and personal belongings within reach  - Initiate and maintain comfort rounds  - Make Fall Risk Sign visible to staff  - Offer Toileting every 3 Hours, in advance of need  - Obtain necessary fall risk management equipment: nonskid socks  - Apply yellow socks and bracelet for high fall risk patients  - Consider moving patient to room near nurses station  Outcome: Progressing     Problem: Prexisting or High Potential for Compromised Skin Integrity  Goal: Skin integrity is maintained or improved  Description: INTERVENTIONS:  - Identify patients at risk for skin breakdown  - Assess and monitor skin integrity  - Assess and monitor nutrition and hydration status  - Monitor labs   - Assess for incontinence   - Turn and reposition patient  - Assist with mobility/ambulation  - Relieve pressure over bony prominences  - Avoid friction and shearing  - Provide appropriate hygiene as needed including keeping skin clean and dry  - Evaluate need for skin moisturizer/barrier cream  - Collaborate with interdisciplinary team   - Patient/family teaching  - Consider wound care consult   Outcome: Progressing     Problem: PAIN - ADULT  Goal: Verbalizes/displays adequate comfort level or baseline comfort level  Description: Interventions:  - Encourage patient to monitor pain and request assistance  - Assess pain using appropriate pain scale  - Administer analgesics based on type and severity of pain and evaluate response  - Implement non-pharmacological measures as appropriate and evaluate response  - Consider cultural and social influences on pain and pain management  - Notify physician/advanced practitioner if interventions unsuccessful or patient reports new pain  Outcome: Progressing     Problem: INFECTION - ADULT  Goal: Absence or prevention of progression during hospitalization  Description: INTERVENTIONS:  - Assess and monitor for signs and symptoms of infection  - Monitor lab/diagnostic results  - Monitor all insertion sites, i e  indwelling lines, tubes, and drains  - Monitor endotracheal if appropriate and nasal secretions for changes in amount and color  - [place] appropriate cooling/warming therapies per order  - Administer medications as ordered  - Instruct and encourage patient and family to use good hand hygiene technique  - Identify and instruct in appropriate isolation precautions for identified infection/condition  Outcome: Progressing  Goal: Absence of fever/infection during neutropenic period  Description: INTERVENTIONS:  - Monitor WBC    Outcome: Progressing     Problem: SAFETY ADULT  Goal: Maintain or return to baseline ADL function  Description: INTERVENTIONS:  -  Assess patient's ability to carry out ADLs; assess patient's baseline for ADL function and identify physical deficits which impact ability to perform ADLs (bathing, care of mouth/teeth, toileting, grooming, dressing, etc )  - Assess/evaluate cause of self-care deficits   - Assess range of motion  - Assess patient's mobility; develop plan if impaired  - Assess patient's need for assistive devices and provide as appropriate  - Encourage maximum independence but intervene and supervise when necessary  - Involve family in performance of ADLs  - Assess for home care needs following discharge   - Consider OT consult to assist with ADL evaluation and planning for discharge  - Provide patient education as appropriate  Outcome: Progressing  Goal: Maintains/Returns to pre admission functional level  Description: INTERVENTIONS:  - Perform BMAT or MOVE assessment daily    - Set and communicate daily mobility goal to care team and patient/family/caregiver     - Collaborate with rehabilitation services on mobility goals if consulted  - Ambulate patient 4 times a day  - Out of bed to chair 3 times a day   - Out of bed for meals 3 times a day  - Out of bed for toileting  - Record patient progress and toleration of activity level   Outcome: Progressing  Goal: Patient will remain free of falls  Description: INTERVENTIONS:  - Educate patient/family on patient safety including physical limitations  - Instruct patient to call for assistance with activity   - Consult OT/PT to assist with strengthening/mobility   - Keep Call bell within reach  - Keep bed low and locked with side rails adjusted as appropriate  - Keep care items and personal belongings within reach  - Initiate and maintain comfort rounds  - Make Fall Risk Sign visible to staff  - Offer Toileting every 3 Hours, in advance of need  - Obtain necessary fall risk management equipment: nonskid socks  - Apply yellow socks and bracelet for high fall risk patients  - Consider moving patient to room near nurses station  Outcome: Progressing     Problem: DISCHARGE PLANNING  Goal: Discharge to home or other facility with appropriate resources  Description: INTERVENTIONS:  - Identify barriers to discharge w/patient and caregiver  - Arrange for needed discharge resources and transportation as appropriate  - Identify discharge learning needs (meds, wound care, etc )  - Arrange for interpretive services to assist at discharge as needed  - Refer to Case Management Department for coordinating discharge planning if the patient needs post-hospital services based on physician/advanced practitioner order or complex needs related to functional status, cognitive ability, or social support system  Outcome: Progressing     Problem: Knowledge Deficit  Goal: Patient/family/caregiver demonstrates understanding of disease process, treatment plan, medications, and discharge instructions  Description: Complete learning assessment and assess knowledge base    Interventions:  - Provide teaching at level of understanding  - Provide teaching via preferred learning methods  Outcome: Progressing     Problem: CARDIOVASCULAR - ADULT  Goal: Maintains optimal cardiac output and hemodynamic stability  Description: INTERVENTIONS:  - Monitor I/O, vital signs and rhythm  - Monitor for S/S and trends of decreased cardiac output  - Administer and titrate ordered vasoactive medications to optimize hemodynamic stability  - Assess quality of pulses, skin color and temperature  - Assess for signs of decreased coronary artery perfusion  - Instruct patient to report change in severity of symptoms  Outcome: Progressing  Goal: Absence of cardiac dysrhythmias or at baseline rhythm  Description: INTERVENTIONS:  - Continuous cardiac monitoring, vital signs, obtain 12 lead EKG if ordered  - Administer antiarrhythmic and heart rate control medications as ordered  - Monitor electrolytes and administer replacement therapy as ordered  Outcome: Progressing     Problem: RESPIRATORY - ADULT  Goal: Achieves optimal ventilation and oxygenation  Description: INTERVENTIONS:  - Assess for changes in respiratory status  - Assess for changes in mentation and behavior  - Position to facilitate oxygenation and minimize respiratory effort  - Oxygen administered by appropriate delivery if ordered  - Initiate smoking cessation education as indicated  - Encourage broncho-pulmonary hygiene including cough, deep breathe, Incentive Spirometry  - Assess the need for suctioning and aspirate as needed  - Assess and instruct to report SOB or any respiratory difficulty  - Respiratory Therapy support as indicated  Outcome: Progressing     Problem: GASTROINTESTINAL - ADULT  Goal: Oral mucous membranes remain intact  Description: INTERVENTIONS  - Assess oral mucosa and hygiene practices  - Implement preventative oral hygiene regimen  - Implement oral medicated treatments as ordered  - Initiate Nutrition services referral as needed  Outcome: Progressing     Problem: GENITOURINARY - ADULT  Goal: Maintains or returns to baseline urinary function  Description: INTERVENTIONS:  - Assess urinary function  - Encourage oral fluids to ensure adequate hydration if ordered  - Administer IV fluids as ordered to ensure adequate hydration  - Administer ordered medications as needed  - Offer frequent toileting  - Follow urinary retention protocol if ordered  Outcome: Progressing  Goal: Urinary catheter remains patent  Description: INTERVENTIONS:  - Assess patency of urinary catheter  - If patient has a chronic [place], consider changing catheter if non-functioning  - Follow guidelines for intermittent irrigation of non-functioning urinary catheter  Outcome: Progressing     Problem: METABOLIC, FLUID AND ELECTROLYTES - ADULT  Goal: Electrolytes maintained within normal limits  Description: INTERVENTIONS:  - Monitor labs and assess patient for signs and symptoms of electrolyte imbalances  - Administer electrolyte replacement as ordered  - Monitor response to electrolyte replacements, including repeat lab results as appropriate  - Instruct patient on fluid and nutrition as appropriate  Outcome: Progressing  Goal: Fluid balance maintained  Description: INTERVENTIONS:  - Monitor labs   - Monitor I/O and WT  - Instruct patient on fluid and nutrition as appropriate  - Assess for signs & symptoms of volume excess or deficit  Outcome: Progressing  Goal: Glucose maintained within target range  Description: INTERVENTIONS:  - Monitor Blood Glucose as ordered  - Assess for signs and symptoms of hyperglycemia and hypoglycemia  - Administer ordered medications to maintain glucose within target range  - Assess nutritional intake and initiate nutrition service referral as needed  Outcome: Progressing     Problem: HEMATOLOGIC - ADULT  Goal: Maintains hematologic stability  Description: INTERVENTIONS  - Assess for signs and symptoms of bleeding or hemorrhage  - Monitor labs  - Administer supportive blood products/factors as ordered and appropriate  Outcome: Progressing     Problem: Nutrition/Hydration-ADULT  Goal: Nutrient/Hydration intake appropriate for improving, restoring or maintaining nutritional needs  Description: Monitor and assess patient's nutrition/hydration status for malnutrition  Collaborate with interdisciplinary team and initiate plan and interventions as ordered    Monitor patient's weight and dietary intake as ordered or per policy  Utilize nutrition screening tool and intervene as necessary  Determine patient's food preferences and provide high-protein, high-caloric foods as appropriate       INTERVENTIONS:  - Monitor oral intake, urinary output, labs, and treatment plans  - Assess nutrition and hydration status and recommend course of action  - Evaluate amount of meals eaten  - Assist patient with eating if necessary   - Allow adequate time for meals  - Recommend/ encourage appropriate diets, oral nutritional supplements, and vitamin/mineral supplements  - Order, calculate, and assess calorie counts as needed  - Recommend, monitor, and adjust tube feedings and TPN/PPN based on assessed needs  - Assess need for intravenous fluids  - Provide specific nutrition/hydration education as appropriate  - Include patient/family/caregiver in decisions related to nutrition  Outcome: Progressing

## 2022-01-19 NOTE — PHYSICAL THERAPY NOTE
Physical Therapy Treatment Note    Patient's Name: Geraldene Lesches  : 22 1434   PT Last Visit   PT Visit Date 22   Note Type   Note Type Treatment   Pain Assessment   Pain Assessment Tool 0-10   Pain Score No Pain   Restrictions/Precautions   Other Precautions Cardiac/sternal;Multiple lines;Telemetry; Fall Risk   General   Chart Reviewed Yes   Family/Caregiver Present No   Cognition   Arousal/Participation Alert; Cooperative   Attention Within functional limits   Orientation Level Oriented X4   Memory Decreased recall of precautions   Following Commands Follows one step commands without difficulty   Comments pt much more alert and conversational this session   Bed Mobility   Rolling R 3  Moderate assistance   Additional items Assist x 1;Bedrails; Increased time required;LE management   Supine to Sit 3  Moderate assistance   Additional items Assist x 1;HOB elevated; Increased time required;Verbal cues;LE management   Sit to Supine 3  Moderate assistance   Additional items Assist x 2; Increased time required;Verbal cues;LE management   Additional Comments Pt was able to site EOB ~8 minutes this session with close supervision assist     Transfers   Sit to Stand 2  Maximal assistance   Additional items Assist x 2; Increased time required;Verbal cues   Stand to Sit 2  Maximal assistance   Additional items Assist x 2; Increased time required;Verbal cues   Additional Comments Performed 1 STS transfers with HHAx2 and b/l knee blocking  Able to achieve ~75% buttocks clearance, but only able to tolerate standing ~10s before stating he was dizzy and needing to lay down  Returned to supine and dizziness resolved      Ambulation/Elevation   Gait pattern Not appropriate   Balance   Static Sitting Poor +   Dynamic Sitting Poor   Static Standing Poor -   Endurance Deficit   Endurance Deficit Yes   Endurance Deficit Description weakness, deconditioning   Activity Tolerance   Activity Tolerance Patient limited by fatigue   Nurse Made Aware ok to see per RN   Exercises   Ankle Pumps Sitting;15 reps;AROM; Bilateral  (heel raises)   Assessment   Prognosis Fair   Problem List Decreased strength;Decreased endurance; Impaired balance;Decreased mobility   Assessment Pt seen for PT session today with a focus on functional transfers, sitting tolerance, LE strengthening  Pt started the session much more alert and conversational than previous session  Pt was able to transfer from supine > sit with modA and once EOB was able to maintain sitting balance with close supervision assist  With fatigue, pt would rest forearms on his knees while sitting EOB, requiring VCs for improved posture  Attempted 1 STS during which pt was able to achieve ~75% buttocks clearance, but quickly reported dizziness, requesting to lay back down  Dizziness resolved upon returning to supine  Pt would benefit from continued acute skilled PT to address above deficits  Continuing to recommend rehab upon d/c  Goals   Patient Goals to go to rehab   STG Expiration Date 02/01/22   PT Treatment Day 5   Plan   Treatment/Interventions LE strengthening/ROM; Functional transfer training; Therapeutic exercise; Endurance training;Equipment eval/education; Bed mobility;Spoke to nursing;Spoke to case management;OT   Progress Slow progress, decreased activity tolerance   PT Frequency 4-6x/wk   Recommendation   PT Discharge Recommendation Post acute rehabilitation services   AM-PAC Basic Mobility Inpatient   Turning in Bed Without Bedrails 2   Lying on Back to Sitting on Edge of Flat Bed 1   Moving Bed to Chair 1   Standing Up From Chair 1   Walk in Room 1   Climb 3-5 Stairs 1   Basic Mobility Inpatient Raw Score 7   Turning Head Towards Sound 4   Follow Simple Instructions 3   Low Function Basic Mobility Raw Score 14   Low Function Basic Mobility Standardized Score 22 01   Highest Level Of Mobility   -[facility] [place] Goal 2: Bed activities/Dependent transfer   -[facility] [place] Highest Level of Mobility 3: Sit at edge of bed   -[facility] Goal Achieved Yes   End of Consult   Patient Position at End of Consult Supine; All needs within reach       Isa Kat, PT, DPT

## 2022-01-19 NOTE — PLAN OF CARE
Problem: PHYSICAL THERAPY ADULT  Goal: Performs mobility at highest level of function for planned discharge setting  See evaluation for individualized goals  Description: Treatment/Interventions: Functional transfer training,LE strengthening/ROM,Elevations,Therapeutic exercise,Endurance training,Equipment eval/education,Bed mobility,Gait training,Spoke to nursing,OT,Cognitive reorientation  Equipment Recommended:  (r/o rw next visit)       See flowsheet documentation for full assessment, interventions and recommendations  Outcome: Progressing  Note: Prognosis: Fair  Problem List: Decreased strength,Decreased endurance,Impaired balance,Decreased mobility  Assessment: Pt seen for PT session today with a focus on functional transfers, sitting tolerance, LE strengthening  Pt started the session much more alert and conversational than previous session  Pt was able to transfer from supine > sit with modA and once EOB was able to maintain sitting balance with close supervision assist  With fatigue, pt would rest forearms on his knees while sitting EOB, requiring VCs for improved posture  Attempted 1 STS during which pt was able to achieve ~75% buttocks clearance, but quickly reported dizziness, requesting to lay back down  Dizziness resolved upon returning to supine  Pt would benefit from continued acute skilled PT to address above deficits  Continuing to recommend rehab upon d/c    Barriers to Discharge: Inaccessible home environment,Decreased caregiver support        PT Discharge Recommendation: Post acute rehabilitation services          See flowsheet documentation for full assessment

## 2022-01-19 NOTE — ARC ADMISSION
Per PM&R follow-up, patient is recommended for SNF/subacute rehab for longer, slower paced therapy needs, as patient likely unable to tolerate aggressive rehab program  CM has been updated

## 2022-01-19 NOTE — PROGRESS NOTES
Progress Note - Cardiothoracic Surgery   Deette Pro 61 y o  male MRN: [id number]  Unit/Bed#: [facility] 427-01 Encounter: [id number]    Cardiogenic shock  S/P VA ECMO; POD # 18  VSD/cardiogenic shock  S/P VSD repair w/ VA ECMO decannulation; POD # 14    24 Hour Events: No events overnight    No complaints this morning  Chang catheter with blood tinged urine    Medications:   Scheduled Meds:  Current Facility-Administered Medications   Medication Dose Route Frequency Provider Last Rate    acetaminophen  650 mg Oral Q6H PRN Anabelle Art PA-C      acetaminophen  975 mg Oral Q8H Anabelle Art PA-C      amiodarone  200 mg Oral Q8H [facility] Rayne Reddy PA-C      aspirin  325 mg Oral Daily Rayne Reddy PA-C      atorvastatin  80 mg Oral After [employer]      bisacodyl  10 mg Rectal Daily PRN Anabelle Art PA-C      bumetanide  2 mg Intravenous Damaris Leyva MD      docusate sodium  100 mg Oral BID PRN Anabelle Art PA-C      hydrALAZINE  10 mg Oral Q8H [facility] Anabelle Art PA-C      insulin lispro  1-6 Units Subcutaneous TID AC Rayne Reddy PA-C      insulin lispro  1-6 Units Subcutaneous HS Rayne Reddy PA-C      metoprolol tartrate  12 5 mg Oral Q12H [facility] [place]      ondansetron  4 mg Intravenous Q6H PRN Rayne Reddy PA-C      oxyCODONE  2 5 mg Oral Q4H PRN Rayne Reddy PA-C      oxyCODONE  5 mg Oral Q4H PRN Rayne Reddy PA-C      pantoprazole  40 mg Oral Early Morning Rayne Reddy PA-C      polyethylene glycol  17 g Oral Daily PRN Anabelle Art PA-C      potassium chloride  40 mEq Oral BID Anabelle Art PA-C      temazepam  15 mg Oral HS PRN Rayne Reddy PA-C       Continuous Infusions:   PRN Meds:   acetaminophen    bisacodyl    docusate sodium    ondansetron    oxyCODONE    oxyCODONE    polyethylene glycol    temazepam    Vitals:   Vitals:    01/19/22 0247 01/19/22 0500 01/19/22 0542 01/19/22 0744   BP: 116/64 105/64  117/68   BP Location: Left arm   Left arm   Pulse: 80   90   Resp: 18   21   Temp: 97 7 °F (36 5 °C)   98 3 °F (36 8 °C)   TempSrc: Axillary   Oral   SpO2: 100%   98%   Weight:   101 kg (222 lb 14 2 oz)    Height:           Telemetry: NSR; Heart Rate: 87    Respiratory:   SpO2: SpO2: 98 %; Room Air    Intake/Output:     Intake/Output Summary (Last 24 hours) at 1/19/2022 0842  Last data filed at 1/19/2022 0820  Gross per 24 hour   Intake 350 ml   Output 4125 ml   Net -3775 ml      Weights:   Weight (last 2 days)     Date/Time Weight    01/19/22 0542 101 (222 89)    01/18/22 0534 101 (222 66)    01/17/22 0600 101 (221 78)        Results:   Results from last 7 days   Lab Units 01/14/22  0508 01/13/22  0843   WBC Thousand/uL 9 46 10 51*   HEMOGLOBIN g/dL 10 5* 10 5*   HEMATOCRIT % 34 2* 34 1*   PLATELETS Thousands/uL 132* 137*     Results from last 7 days   Lab Units 01/19/22  0443 01/18/22  0434 01/17/22  1509   SODIUM mmol/L 137 140 138   POTASSIUM mmol/L 3 8 3 6 3 9   CHLORIDE mmol/L 104 105 107   CO2 mmol/L 29 30 28   BUN mg/dL 35* 39* 47*   CREATININE mg/dL 1 27 1 34* 1 77*   CALCIUM mg/dL 8 0* 8 2* 8 2*     Results from last 7 days   Lab Units 01/19/22  0443 01/18/22  1050 01/17/22  0427   INR  2 80* 2 31* 3 00*   Coumadin dosing:      3   0mg    Point of care glucose:     Invasive Lines/Tubes:  Invasive Devices  Report    Peripherally Inserted Central Catheter Line            PICC Line 14/92/78 Right Basilic 12 days          Drain            Urethral Catheter Non-latex 16 Fr  2 days                Physical Exam:    HEENT/NECK:  Normocephalic  Atraumatic  No jugular venous distention  Cardiac: Regular rate and rhythm  Pulmonary:  Breath sounds clear bilaterally  Abdomen:  Non-tender, Non-distended and Normal bowel sounds  Incisions: Sternum is stable  Incision is clean, dry, and intact     Extremities: Extremities warm/dry and Trace edema B/L hands are edematous bilaterally  Neuro: Alert and oriented X 3  Skin: Warm/Dry, without rashes or lesions  Assessment:  Principal Problem:    S/P VSD repair  Active Problems:    Agoraphobia with panic attacks    Cardiogenic shock (HCC)    VSD (ventricular septal defect)    JUDE (acute kidney injury) ([facility] )    Transaminitis    Tylenol ingestion    Acute systolic congestive heart failure (HCC)    Anemia    Personal history of ECMO    Coagulopathy (HCC)    Leukocytosis    Central line complication    Thrombocytopenia (HCC)    Hypernatremia    Hyperchloremia    Hypocalcemia    Anticoagulated on Coumadin    Anticoagulation goal of INR 2 to 3       Cardiogenic shock  S/P VA ECMO; POD # 18  VSD/cardiogenic shock  S/P VSD repair w/ VA ECMO decannulation; POD # 14  Retained PA catheter  S/P Redo sternotomy and removal PA catheter w/ CPB; POD # 11    Plan:    1  Cardiac:   NSR; HR/BP well-controlled  On metoprolol 12 5 mg bid  Hydralazine for afterload reduction 10 mg po q8h per HF service  Coumadin dosing w VSD patch - 3 mg tonight  Continue ASA and Statin therapy  Epicardial pacing wires out  PICC line in place for access  Continue DVT prophylaxis    2  Pulmonary:   Good Room air oxygen saturation; Continue incentive spirometry/Coughing/Deep breathing exercises  Chest tubes have been discontinued    3  Renal:   Intake/Output net: -3775 mL/24 hours  Diuretic Regimen: Bumex 2 mg IV q8 hours  As he transitions to going to rehab change to po dosing and monitor response  Post op Creatinine stable; Follow up labs prn    4  Neuro:  Neurologically intact; No active issues  Incisional pain well-controlled  Continue Tylenol, 975 mg PO q 8, standing dose  Continue Oxycodone, 2 5 to 5 mg PO q 4 hours prn pain    5  GI:  Tolerating diet   Maintain 1800 mL daily fluid restriction   Continue stool softeners and prn suppository  Continue GI prophylaxis    6   Endo:   Glucose well-controlled with sliding scale coverage    7    Hematology:    Post-operative blood count acceptable; Trend prn    8     Disposition:      Awaiting rehab placement    VTE Pharmacologic Prophylaxis: Warfarin (Coumadin)  VTE Mechanical Prophylaxis: sequential compression device    Collaborative rounds completed with supervising physician  Plan of care discussed with bedside nurse    SIGNATURE: Ki Gooden PA-C  DATE: January 19, 2022  TIME: 8:42 AM

## 2022-01-20 LAB
ANION GAP SERPL CALCULATED.3IONS-SCNC: 4 MMOL/L (ref 4–13)
BACTERIA UR QL AUTO: ABNORMAL /HPF
BILIRUB UR QL STRIP: ABNORMAL
BUN SERPL-MCNC: 33 MG/DL (ref 5–25)
CALCIUM SERPL-MCNC: 6.9 MG/DL (ref 8.3–10.1)
CHLORIDE SERPL-SCNC: 110 MMOL/L (ref 100–108)
CLARITY UR: ABNORMAL
CO2 SERPL-SCNC: 26 MMOL/L (ref 21–32)
COLOR UR: ABNORMAL
CREAT SERPL-MCNC: 1.04 MG/DL (ref 0.6–1.3)
GFR SERPL CREATININE-BSD FRML MDRD: 75 ML/MIN/1.73SQ M
GLUCOSE SERPL-MCNC: 118 MG/DL (ref 65–140)
GLUCOSE SERPL-MCNC: 119 MG/DL (ref 65–140)
GLUCOSE SERPL-MCNC: 160 MG/DL (ref 65–140)
GLUCOSE SERPL-MCNC: 81 MG/DL (ref 65–140)
GLUCOSE SERPL-MCNC: 89 MG/DL (ref 65–140)
GLUCOSE UR STRIP-MCNC: ABNORMAL MG/DL
HGB UR QL STRIP.AUTO: ABNORMAL
INR PPP: 3.45 (ref 0.84–1.19)
KETONES UR STRIP-MCNC: NEGATIVE MG/DL
LEUKOCYTE ESTERASE UR QL STRIP: ABNORMAL
NITRITE UR QL STRIP: POSITIVE
NON-SQ EPI CELLS URNS QL MICRO: ABNORMAL /HPF
PH UR STRIP.AUTO: 6.5 [PH]
POTASSIUM SERPL-SCNC: 3.6 MMOL/L (ref 3.5–5.3)
PROT UR STRIP-MCNC: ABNORMAL MG/DL
PROTHROMBIN TIME: 32.9 SECONDS (ref 11.6–14.5)
RBC #/AREA URNS AUTO: ABNORMAL /HPF
SODIUM SERPL-SCNC: 140 MMOL/L (ref 136–145)
SP GR UR STRIP.AUTO: 1.02 (ref 1–1.03)
UROBILINOGEN UR QL STRIP.AUTO: 2 E.U./DL
WBC #/AREA URNS AUTO: ABNORMAL /HPF

## 2022-01-20 PROCEDURE — 85610 PROTHROMBIN TIME: CPT | Performed by: PHYSICIAN ASSISTANT

## 2022-01-20 PROCEDURE — 82948 REAGENT STRIP/BLOOD GLUCOSE: CPT

## 2022-01-20 PROCEDURE — 87077 CULTURE AEROBIC IDENTIFY: CPT | Performed by: THORACIC SURGERY (CARDIOTHORACIC VASCULAR SURGERY)

## 2022-01-20 PROCEDURE — 81001 URINALYSIS AUTO W/SCOPE: CPT | Performed by: THORACIC SURGERY (CARDIOTHORACIC VASCULAR SURGERY)

## 2022-01-20 PROCEDURE — 80048 BASIC METABOLIC PNL TOTAL CA: CPT | Performed by: PHYSICIAN ASSISTANT

## 2022-01-20 PROCEDURE — 87086 URINE CULTURE/COLONY COUNT: CPT | Performed by: THORACIC SURGERY (CARDIOTHORACIC VASCULAR SURGERY)

## 2022-01-20 PROCEDURE — 87186 SC STD MICRODIL/AGAR DIL: CPT | Performed by: THORACIC SURGERY (CARDIOTHORACIC VASCULAR SURGERY)

## 2022-01-20 PROCEDURE — 99232 SBSQ HOSP IP/OBS MODERATE 35: CPT | Performed by: INTERNAL MEDICINE

## 2022-01-20 PROCEDURE — 99024 POSTOP FOLLOW-UP VISIT: CPT | Performed by: THORACIC SURGERY (CARDIOTHORACIC VASCULAR SURGERY)

## 2022-01-20 RX ORDER — TAMSULOSIN HYDROCHLORIDE 0.4 MG/1
0.4 CAPSULE ORAL
Status: DISCONTINUED | OUTPATIENT
Start: 2022-01-20 | End: 2022-01-31 | Stop reason: HOSPADM

## 2022-01-20 RX ORDER — BUMETANIDE 1 MG/1
2 TABLET ORAL DAILY
Status: DISCONTINUED | OUTPATIENT
Start: 2022-01-20 | End: 2022-01-24

## 2022-01-20 RX ORDER — POTASSIUM CHLORIDE 14.9 MG/ML
20 INJECTION INTRAVENOUS ONCE
Status: COMPLETED | OUTPATIENT
Start: 2022-01-20 | End: 2022-01-20

## 2022-01-20 RX ADMIN — AMIODARONE HYDROCHLORIDE 200 MG: 200 TABLET ORAL at 06:09

## 2022-01-20 RX ADMIN — ATORVASTATIN CALCIUM 80 MG: 80 TABLET, FILM COATED ORAL at 17:50

## 2022-01-20 RX ADMIN — BUMETANIDE 2 MG: 1 TABLET ORAL at 09:30

## 2022-01-20 RX ADMIN — ACETAMINOPHEN 975 MG: 325 TABLET, FILM COATED ORAL at 00:13

## 2022-01-20 RX ADMIN — POTASSIUM CHLORIDE 40 MEQ: 1500 TABLET, EXTENDED RELEASE ORAL at 17:50

## 2022-01-20 RX ADMIN — TAMSULOSIN HYDROCHLORIDE 0.4 MG: 0.4 CAPSULE ORAL at 17:50

## 2022-01-20 RX ADMIN — AMIODARONE HYDROCHLORIDE 200 MG: 200 TABLET ORAL at 15:05

## 2022-01-20 RX ADMIN — ACETAMINOPHEN 975 MG: 325 TABLET, FILM COATED ORAL at 09:23

## 2022-01-20 RX ADMIN — POTASSIUM CHLORIDE 20 MEQ: 14.9 INJECTION, SOLUTION INTRAVENOUS at 12:22

## 2022-01-20 RX ADMIN — POTASSIUM CHLORIDE 40 MEQ: 1500 TABLET, EXTENDED RELEASE ORAL at 09:23

## 2022-01-20 RX ADMIN — ASPIRIN 325 MG ORAL TABLET 325 MG: 325 PILL ORAL at 09:23

## 2022-01-20 RX ADMIN — AMIODARONE HYDROCHLORIDE 200 MG: 200 TABLET ORAL at 21:35

## 2022-01-20 RX ADMIN — PANTOPRAZOLE SODIUM 40 MG: 40 TABLET, DELAYED RELEASE ORAL at 06:09

## 2022-01-20 RX ADMIN — ACETAMINOPHEN 975 MG: 325 TABLET, FILM COATED ORAL at 17:50

## 2022-01-20 RX ADMIN — INSULIN LISPRO 1 UNITS: 100 INJECTION, SOLUTION INTRAVENOUS; SUBCUTANEOUS at 21:34

## 2022-01-20 NOTE — PLAN OF CARE
Progressing with some goals, mobility and genitourinary still having some issues which are being addressed

## 2022-01-20 NOTE — PROGRESS NOTES
Progress Note - Cardiothoracic Surgery   Cathern  61 y o  male MRN: [id number]  Unit/Bed#: [facility] 427-01 Encounter: [id number]    Cardiogenic shock  S/P VA ECMO; POD # 19  VSD/cardiogenic shock  S/P VSD repair w/ VA ECMO decannulation; POD # 15    24 Hour Events: No overnight events  No complaints this morning  No pain  Appetite is good       Medications:   Scheduled Meds:  Current Facility-Administered Medications   Medication Dose Route Frequency Provider Last Rate    acetaminophen  650 mg Oral Q6H PRN Anabelle Art PA-C      acetaminophen  975 mg Oral Q8H Anabelle Art PA-C      amiodarone  200 mg Oral Q8H [facility] Latrice Owens PA-C      aspirin  325 mg Oral Daily Latrice Owens PA-C      atorvastatin  80 mg Oral After Rosia MoNOA coppola      bisacodyl  10 mg Rectal Daily PRN Anabelle Art PA-C      bumetanide  2 mg Oral BID Valla PuttNOA      docusate sodium  100 mg Oral BID PRN Anabelle Art PA-C      insulin lispro  1-6 Units Subcutaneous TID AC Latrice Owens PA-C      insulin lispro  1-6 Units Subcutaneous HS Latrice Owens PA-C      losartan  25 mg Oral Daily Donivan Bence, MD      metoprolol tartrate  12 5 mg Oral Q12H [place], [place]      ondansetron  4 mg Intravenous Q6H PRN Latrice Owens PA-C      oxyCODONE  2 5 mg Oral Q4H PRN Latrice Owens PA-C      oxyCODONE  5 mg Oral Q4H PRN Latrice Owens PA-C      pantoprazole  40 mg Oral Early Morning Latrice Owens PA-C      polyethylene glycol  17 g Oral Daily PRN Anabelle Art PA-C      potassium chloride  40 mEq Oral BID Anabelle Art PA-C      temazepam  15 mg Oral HS PRN Latrice Owens PA-C       Continuous Infusions:   PRN Meds:   acetaminophen    bisacodyl    docusate sodium    ondansetron    oxyCODONE    oxyCODONE    polyethylene glycol    temazepam    Vitals:   Vitals:    01/19/22 2239 01/20/22 0401 01/20/22 0600 01/20/22 0709   BP: 102/59 114/63  96/62   BP Location: Left arm Left arm  Left arm   Pulse: 68 67  73   Resp: 13 16  18   Temp: 97 5 °F (36 4 °C) 98 1 °F (36 7 °C)  97 6 °F (36 4 °C)   TempSrc: Oral Oral  Oral   SpO2: 99% 100%  96%   Weight:   99 3 kg (218 lb 14 7 oz)    Height:           Telemetry: NSR; Heart Rate: 72    Respiratory:   SpO2: SpO2: 96 %; Room Air    Intake/Output:     Intake/Output Summary (Last 24 hours) at 1/20/2022 0853  Last data filed at 1/20/2022 0809  Gross per 24 hour   Intake 236 ml   Output 1600 ml   Net -1364 ml      Weights:   Weight (last 2 days)     Date/Time Weight    01/20/22 0600 99 3 (218 92)    01/19/22 0542 101 (222 89)    01/18/22 0534 101 (222 66)        Results:   Results from last 7 days   Lab Units 01/14/22  0508   WBC Thousand/uL 9 46   HEMOGLOBIN g/dL 10 5*   HEMATOCRIT % 34 2*   PLATELETS Thousands/uL 132*     Results from last 7 days   Lab Units 01/20/22  0445 01/19/22  0443 01/18/22  0434   SODIUM mmol/L 140 137 140   POTASSIUM mmol/L 3 6 3 8 3 6   CHLORIDE mmol/L 110* 104 105   CO2 mmol/L 26 29 30   BUN mg/dL 33* 35* 39*   CREATININE mg/dL 1 04 1 27 1 34*   CALCIUM mg/dL 6 9* 8 0* 8 2*     Results from last 7 days   Lab Units 01/20/22  0445 01/19/22  0443 01/18/22  1050   INR  3 45* 2 80* 2 31*     Point of care glucose:     Invasive Lines/Tubes:  Invasive Devices  Report    Peripherally Inserted Central Catheter Line            PICC Line 92/01/53 Right Basilic 13 days          Drain            External Urinary Catheter Medium <1 day                Physical Exam:    HEENT/NECK:  Normocephalic  Atraumatic  No jugular venous distention  Cardiac: Regular rate and rhythm  Pulmonary:  Breath sounds clear bilaterally  Abdomen:  Non-tender, Non-distended and Normal bowel sounds  Incisions: Sternum is stable  Incision is clean, dry, and intact     Extremities: Extremities warm/dry and 1+ edema B/L legs improved, hand edema bilaterally  Neuro: Alert and oriented X 3  Skin: Warm/Dry, without rashes or lesions  Assessment:  Principal Problem:    S/P VSD repair  Active Problems:    Agoraphobia with panic attacks    Cardiogenic shock (HCC)    VSD (ventricular septal defect)    JUDE (acute kidney injury) ([facility] [address] )    Transaminitis    Tylenol ingestion    Acute systolic congestive heart failure (HCC)    Anemia    Personal history of ECMO    Coagulopathy (HCC)    Leukocytosis    Central line complication    Thrombocytopenia (HCC)    Hypernatremia    Hyperchloremia    Hypocalcemia    Anticoagulated on Coumadin    Anticoagulation goal of INR 2 to 3       Cardiogenic shock  S/P VA ECMO; POD # 19  VSD/cardiogenic shock  S/P VSD repair w/ VA ECMO decannulation; POD # 15    Plan:    1  Cardiac:   NSR; HR/BP well-controlled  Toprol xl 12 5 mg bid  Losartan 25 mg daily  No Coumadin needed for VSD patch per Dr Elvin Gutierrez ASA and Statin therapy  Epicardial pacing wires out  PICC line in place for IV access   Continue DVT prophylaxis    2  Pulmonary:   Good Room air oxygen saturation; Continue incentive spirometry/Coughing/Deep breathing exercises  Chest tubes have been discontinued    3  Renal:   Intake/Output net: -1364 mL/24 hours  Diuretic Regimen: Bumex 2 mg po bid - decrease to 2 mg daily and monitor I/O   Post op Creatinine stable; Follow up labs prn    4  Neuro:  Neurologically intact; No active issues  Incisional pain well-controlled  Continue Tylenol, 975 mg PO q 8, standing dose  Continue Oxycodone, 2 5 to 5 mg PO q 4 hours prn pain    5  GI:  Tolerating diet   Maintain 1800 mL daily fluid restriction   Continue stool softeners and prn suppository  Continue GI prophylaxis    6  Endo:   Glucose well-controlled with sliding scale coverage    7    Hematology:    Post-operative blood count acceptable; Trend prn    8     Disposition:      Awaiting rehab placement    VTE Pharmacologic Prophylaxis: Heparin  VTE Mechanical Prophylaxis: sequential compression device    Collaborative rounds completed with supervising physician  Plan of care discussed with bedside nurse    SIGNATURE: Jack Javier PA-C  DATE: January 20, 2022  TIME: 8:53 AM

## 2022-01-20 NOTE — CASE MANAGEMENT
Case Management Discharge Planning Note    Patient name Matthew Hinson  Location [facility] 427/[facility] 986-05 MRN [id number]  : 1955 Date 2022       Current Admission Date: 2021  Current Admission Diagnosis:S/P VSD repair   Patient Active Problem List    Diagnosis Date Noted    Anticoagulated on Coumadin 2022    Anticoagulation goal of INR 2 to 3 2022    Thrombocytopenia (HCC) 2022    Hypernatremia 2022    Hyperchloremia 2022    Hypocalcemia 2022    Central line complication     S/P VSD repair 2022    Leukocytosis 2022    Coagulopathy ([facility] ) 2022    Tylenol ingestion     Acute systolic congestive heart failure ([facility] ) 2021    Anemia 2021    Personal history of ECMO 2021    Cardiogenic shock ([facility] ) 2021    VSD (ventricular septal defect) 2021    JUDE (acute kidney injury) ([facility] ) 2021    Transaminitis 2021    Pipe smoker 2018    Hyperlipidemia, mixed 2017    Impaired fasting glucose 2017    Agoraphobia with panic attacks 2015    Obesity (BMI 30-39 9) 2015    Psychosis, atypical ([facility] ) 2015      LOS (days): 21  Geometric Mean LOS (GMLOS) (days):   Days to GMLOS:     OBJECTIVE:  Risk of Unplanned Readmission Score: 23         Current admission status: Inpatient   Preferred Pharmacy:   [id number], [address]  [address] [place] [address]  Phone: [phone] Fax: [phone]    Primary Care Provider: Jodi Martin MD    Primary Insurance: [address] [place] [address]  Secondary Insurance:     DISCHARGE DETAILS:                                                                                              Family notified[de-identified] Spoke with family re  snf choices  Additional Comments: referrals to [facility]   Denied by [facility]

## 2022-01-20 NOTE — PROGRESS NOTES
Cardiology Progress Note - Arnaldo Kemp 61 y o  male MRN: [id number]    Unit/Bed#: [facility] 427-01 Encounter: [id number]      Assessment & Plan:  Principal Problem:    S/P VSD repair  Active Problems:    Agoraphobia with panic attacks    Cardiogenic shock (HCC)    VSD (ventricular septal defect)    JUDE (acute kidney injury) ([address] )    Transaminitis    Tylenol ingestion    Acute systolic congestive heart failure (HCC)    Anemia    Personal history of ECMO    Coagulopathy (HCC)    Leukocytosis    Central line complication    Thrombocytopenia (HCC)    Hypernatremia    Hyperchloremia    Hypocalcemia    Anticoagulated on Coumadin    Anticoagulation goal of INR 2 to 3    # Late presenting anterior STEMI c/b anteroapical VSD s/p open patch repair   # Redo sternotomy, mediastinal exploration for retained swan avinash catheter  # HFrEF with EF 35-40% as per my assessment on most recent CORIE  # Deconditioning    BP: 90s-100s/60s  HR: 70-80s  UOP: 1 6 L UOP yesterday   Creatinine: 1 7->1 3->1 2 -> 1  Volume status improving  JVD mildly elevated, no crackles, 1+ LE edema  Plan:  - decrease bumex to 2 mg PO QD  - c/w hydralazine 25 mg QD  - c/w BB 12 5 mg BID today  - c/w aspirin and atorvastatin  - amiodarone for afib prevention  - encourage OOBTC and physical therapy  Patient will need JACQUELINE as per PT  Subjective:   Patient had an episode of hypotension yesterday requiring a dose of albumin  Objective:     Vitals: Blood pressure 96/62, pulse 73, temperature 97 6 °F (36 4 °C), temperature source Oral, resp  rate 18, height 5' 5 5" (1 664 m), weight 99 3 kg (218 lb 14 7 oz), SpO2 96 %  , Body mass index is 35 88 kg/m² ,   Orthostatic Blood Pressures      Most Recent Value   Blood Pressure 96/62 filed at 01/20/2022 0709   Patient Position - Orthostatic VS Lying filed at 01/20/2022 0709            Intake/Output Summary (Last 24 hours) at 1/20/2022 0859  Last data filed at 1/20/2022 0809  Gross per 24 hour   Intake 236 ml   Output 1600 ml   Net -1364 ml           Physical Exam:    GEN: Robbie Momin Lethargic  HEENT: anicteric, mucous membranes moist  NECK: No JVD, carotid bruits   HEART: regular rhythm, normal S1 and S2, no murmurs, clicks, gallops or rubs   CHEST: Sternotomy incision site healing well  LUNGS: clear to auscultation bilaterally; Crackles present  ABDOMEN: normal bowel sounds, soft, no tenderness, no distention  EXTREMITIES: peripheral pulses normal, no edema  NEURO: no focal findings    SKIN: normal without suspicious lesions on exposed skin      Current Facility-Administered Medications:     acetaminophen (TYLENOL) tablet 650 mg, 650 mg, Oral, Q6H PRN, Anabelle Art PA-C    acetaminophen (TYLENOL) tablet 975 mg, 975 mg, Oral, Q8H, Anabelle Art PA-C, 975 mg at 01/20/22 0013    amiodarone tablet 200 mg, 200 mg, Oral, Q8H [facility], Maribel Cifuentes PA-C, 200 mg at 01/20/22 0609    aspirin tablet 325 mg, 325 mg, Oral, Daily, Maribel Cifuentes PA-C, 325 mg at 01/19/22 3186    atorvastatin (LIPITOR) tablet 80 mg, 80 mg, Oral, After Joo Pacheco PA-C, 80 mg at 01/19/22 1752    bisacodyl (DULCOLAX) rectal suppository 10 mg, 10 mg, Rectal, Daily PRN, Anabelle Art PA-C    bumetanide (BUMEX) tablet 2 mg, 2 mg, Oral, BID, [place] [employer] NOA Salmeron    docusate sodium (COLACE) capsule 100 mg, 100 mg, Oral, BID PRN, Anabelle Art PA-C    insulin lispro (HumaLOG) 100 units/mL subcutaneous injection 1-6 Units, 1-6 Units, Subcutaneous, TID AC **AND** Fingerstick Glucose (POCT), , , TID AC, Maribel Cifuentes PA-C    insulin lispro (HumaLOG) 100 units/mL subcutaneous injection 1-6 Units, 1-6 Units, Subcutaneous, HS, Maribel Cifuentes PA-C, 2 Units at 01/16/22 2201    losartan (COZAAR) tablet 25 mg, 25 mg, Oral, Daily, Mirtha Clinton MD    metoprolol tartrate (LOPRESSOR) partial tablet 12 5 mg, 12 5 mg, Oral, Q12H [facility], [place] PA-C, 12 5 mg at 01/19/22 2126    ondansetron (ZOFRAN) injection 4 mg, 4 mg, Intravenous, Q6H PRN, Valentino Fordyce, PA-C    oxyCODONE (ROXICODONE) IR tablet 2 5 mg, 2 5 mg, Oral, Q4H PRN, Valentino Latricia, PA-C, 2 5 mg at 01/13/22 0525    oxyCODONE (ROXICODONE) IR tablet 5 mg, 5 mg, Oral, Q4H PRN, Valentino Fordyce, PA-C, 5 mg at 01/08/22 2148    pantoprazole (PROTONIX) EC tablet 40 mg, 40 mg, Oral, Early Morning, Valentino Fordyce, PA-C, 40 mg at 01/20/22 0609    polyethylene glycol (MIRALAX) packet 17 g, 17 g, Oral, Daily PRN, Anabelle Art PA-C    potassium chloride (K-DUR,KLOR-CON) CR tablet 40 mEq, 40 mEq, Oral, BID, Anabelle Art PA-C, 40 mEq at 01/19/22 1752    temazepam (RESTORIL) capsule 15 mg, 15 mg, Oral, HS PRN, Valentino Fordyce, PA-C, 15 mg at 01/13/22 2144    Labs & Results:    No results found for: CKTOTAL, CKMB, CKMBINDEX, TROPONINI    Lab Results   Component Value Date    GLUCOSE 149 (H) 01/07/2022    CALCIUM 6 9 (L) 01/20/2022    K 3 6 01/20/2022    CO2 26 01/20/2022     (H) 01/20/2022    BUN 33 (H) 01/20/2022    CREATININE 1 04 01/20/2022       Lab Results   Component Value Date    WBC 9 46 01/14/2022    HGB 10 5 (L) 01/14/2022    HCT 34 2 (L) 01/14/2022    MCV 99 (H) 01/14/2022     (L) 01/14/2022     Results from last 7 days   Lab Units 01/20/22  0445   INR  3 45*       No results found for: CHOL  Lab Results   Component Value Date    HDL 13 (L) 12/30/2021    HDL 38 (L) 09/13/2021     Lab Results   Component Value Date    LDLCALC 107 (H) 12/30/2021    LDLCALC 134 (H) 09/13/2021     Lab Results   Component Value Date    TRIG 152 (H) 12/30/2021    TRIG 137 09/13/2021       Lab Results   Component Value Date    ALT 56 01/12/2022    AST 45 01/12/2022         EKG personally reviewed by )Adri Bishop MD  No acute changes   TELE: No significant arrhythmias seen on telemetry review

## 2022-01-21 PROBLEM — R33.9 URINARY RETENTION: Status: ACTIVE | Noted: 2022-01-21

## 2022-01-21 LAB
GLUCOSE SERPL-MCNC: 112 MG/DL (ref 65–140)
GLUCOSE SERPL-MCNC: 141 MG/DL (ref 65–140)
GLUCOSE SERPL-MCNC: 154 MG/DL (ref 65–140)
GLUCOSE SERPL-MCNC: 90 MG/DL (ref 65–140)
INR PPP: 2.18 (ref 0.84–1.19)
PROTHROMBIN TIME: 23.2 SECONDS (ref 11.6–14.5)

## 2022-01-21 PROCEDURE — 99024 POSTOP FOLLOW-UP VISIT: CPT | Performed by: THORACIC SURGERY (CARDIOTHORACIC VASCULAR SURGERY)

## 2022-01-21 PROCEDURE — 82948 REAGENT STRIP/BLOOD GLUCOSE: CPT

## 2022-01-21 PROCEDURE — 99232 SBSQ HOSP IP/OBS MODERATE 35: CPT | Performed by: INTERNAL MEDICINE

## 2022-01-21 PROCEDURE — 85610 PROTHROMBIN TIME: CPT | Performed by: PHYSICIAN ASSISTANT

## 2022-01-21 PROCEDURE — 99254 IP/OBS CNSLTJ NEW/EST MOD 60: CPT | Performed by: PHYSICIAN ASSISTANT

## 2022-01-21 PROCEDURE — 97530 THERAPEUTIC ACTIVITIES: CPT

## 2022-01-21 RX ORDER — METOPROLOL SUCCINATE 25 MG/1
12.5 TABLET, EXTENDED RELEASE ORAL 2 TIMES DAILY
Status: DISCONTINUED | OUTPATIENT
Start: 2022-01-21 | End: 2022-01-23

## 2022-01-21 RX ORDER — LOSARTAN POTASSIUM 25 MG/1
12.5 TABLET ORAL DAILY
Status: DISCONTINUED | OUTPATIENT
Start: 2022-01-21 | End: 2022-01-23

## 2022-01-21 RX ADMIN — AMIODARONE HYDROCHLORIDE 200 MG: 200 TABLET ORAL at 14:05

## 2022-01-21 RX ADMIN — ATORVASTATIN CALCIUM 80 MG: 80 TABLET, FILM COATED ORAL at 17:49

## 2022-01-21 RX ADMIN — ASPIRIN 325 MG ORAL TABLET 325 MG: 325 PILL ORAL at 09:44

## 2022-01-21 RX ADMIN — POTASSIUM CHLORIDE 40 MEQ: 1500 TABLET, EXTENDED RELEASE ORAL at 09:43

## 2022-01-21 RX ADMIN — ACETAMINOPHEN 975 MG: 325 TABLET, FILM COATED ORAL at 17:48

## 2022-01-21 RX ADMIN — ACETAMINOPHEN 975 MG: 325 TABLET, FILM COATED ORAL at 09:44

## 2022-01-21 RX ADMIN — INSULIN LISPRO 1 UNITS: 100 INJECTION, SOLUTION INTRAVENOUS; SUBCUTANEOUS at 21:17

## 2022-01-21 RX ADMIN — PANTOPRAZOLE SODIUM 40 MG: 40 TABLET, DELAYED RELEASE ORAL at 05:50

## 2022-01-21 RX ADMIN — AMIODARONE HYDROCHLORIDE 200 MG: 200 TABLET ORAL at 21:17

## 2022-01-21 RX ADMIN — AMIODARONE HYDROCHLORIDE 200 MG: 200 TABLET ORAL at 05:50

## 2022-01-21 RX ADMIN — TAMSULOSIN HYDROCHLORIDE 0.4 MG: 0.4 CAPSULE ORAL at 17:49

## 2022-01-21 RX ADMIN — POTASSIUM CHLORIDE 40 MEQ: 1500 TABLET, EXTENDED RELEASE ORAL at 17:47

## 2022-01-21 RX ADMIN — BUMETANIDE 2 MG: 1 TABLET ORAL at 09:44

## 2022-01-21 RX ADMIN — LOSARTAN POTASSIUM 12.5 MG: 25 TABLET, FILM COATED ORAL at 09:44

## 2022-01-21 NOTE — PROGRESS NOTES
Cardiology Progress Note - Sejal Sandoval 61 y o  male MRN: [id number]    Unit/Bed#: [facility] 427-01 Encounter: [id number]      Assessment & Plan:  Principal Problem:    S/P VSD repair  Active Problems:    Agoraphobia with panic attacks    Cardiogenic shock (HCC)    VSD (ventricular septal defect)    JUDE (acute kidney injury) ([address] )    Transaminitis    Tylenol ingestion    Acute systolic congestive heart failure (HCC)    Anemia    Personal history of ECMO    Coagulopathy (HCC)    Leukocytosis    Central line complication    Thrombocytopenia (HCC)    Hypernatremia    Hyperchloremia    Hypocalcemia    Anticoagulated on Coumadin    Anticoagulation goal of INR 2 to 3    Urinary retention    # Late presenting anterior STEMI c/b anteroapical VSD s/p open patch repair   # Redo sternotomy, mediastinal exploration for retained swan avinash catheter  # HFrEF with EF 35-40% as per my assessment on most recent CORIE  # Deconditioning    BP: 90s-100s/60s  HR: 90s  UOP: 1 8 L UOP yesterday   Creatinine: No labs this am  Euvolemic    Plan:  - c/w bumex to 2 mg PO QD  - c/w losartan 12 5 mg QD  - Toprol XL 12 5 mg BID  - c/w aspirin and atorvastatin  - amiodarone for afib prevention  - encourage OOBTC and physical therapy  Patient will need JACQUELINE as per PT  Subjective:   Patient had an episode of hypotension yesterday requiring a dose of albumin  Objective:     Vitals: Blood pressure 111/69, pulse 91, temperature 97 9 °F (36 6 °C), temperature source Oral, resp  rate 17, height 5' 5 5" (1 664 m), weight 97 1 kg (214 lb 1 1 oz), SpO2 99 %  , Body mass index is 35 08 kg/m² ,   Orthostatic Blood Pressures      Most Recent Value   Blood Pressure 111/69 filed at 01/21/2022 0710   Patient Position - Orthostatic VS Lying filed at 01/21/2022 0710            Intake/Output Summary (Last 24 hours) at 1/21/2022 0996  Last data filed at 1/21/2022 0748  Gross per 24 hour   Intake 576 ml   Output 1825 ml   Net -1249 ml           Physical Exam:    GEN: Robbie Momin Lethargic  HEENT: anicteric, mucous membranes moist  NECK: No JVD, carotid bruits   HEART: regular rhythm, normal S1 and S2, no murmurs, clicks, gallops or rubs   CHEST: Sternotomy incision site healing well  LUNGS: clear to auscultation bilaterally; Crackles present  ABDOMEN: normal bowel sounds, soft, no tenderness, no distention  EXTREMITIES: peripheral pulses normal, no edema  NEURO: no focal findings    SKIN: normal without suspicious lesions on exposed skin      Current Facility-Administered Medications:     acetaminophen (TYLENOL) tablet 650 mg, 650 mg, Oral, Q6H PRN, Anabelle Art PA-C    acetaminophen (TYLENOL) tablet 975 mg, 975 mg, Oral, Q8H, Anabelle Art PA-C, 975 mg at 01/20/22 1750    amiodarone tablet 200 mg, 200 mg, Oral, Q8H [address], Barrett Kuhn PA-C, 200 mg at 01/21/22 0550    aspirin tablet 325 mg, 325 mg, Oral, Daily, Barrett Kuhn PA-C, 325 mg at 01/20/22 4833    atorvastatin (LIPITOR) tablet 80 mg, 80 mg, Oral, After Lauro NOA Suazo, 80 mg at 01/20/22 1750    bisacodyl (DULCOLAX) rectal suppository 10 mg, 10 mg, Rectal, Daily PRN, Anabelle Art PA-C    bumetanide (BUMEX) tablet 2 mg, 2 mg, Oral, Daily, Jad Hernandez MD, 2 mg at 01/20/22 0930    docusate sodium (COLACE) capsule 100 mg, 100 mg, Oral, BID PRN, Anabelle Art PA-C    insulin lispro (HumaLOG) 100 units/mL subcutaneous injection 1-6 Units, 1-6 Units, Subcutaneous, TID AC **AND** Fingerstick Glucose (POCT), , , TID AC, Barrett Kuhn PA-C    insulin lispro (HumaLOG) 100 units/mL subcutaneous injection 1-6 Units, 1-6 Units, Subcutaneous, HS, Barrett Kuhn PA-C, 1 Units at 01/20/22 2134    losartan (COZAAR) tablet 12 5 mg, 12 5 mg, Oral, Daily, Evelyn Salmeron PA-C    metoprolol tartrate (LOPRESSOR) partial tablet 12 5 mg, 12 5 mg, Oral, Q12H [address], Evelyn Senior PA-C, 12 5 mg at 01/19/22 2126    ondansetron (ZOFRAN) injection 4 mg, 4 mg, Intravenous, Q6H PRN, Maribel Cifuentes PA-C    oxyCODONE (ROXICODONE) IR tablet 2 5 mg, 2 5 mg, Oral, Q4H PRN, Maribel Cifuentes PA-C, 2 5 mg at 01/13/22 0525    oxyCODONE (ROXICODONE) IR tablet 5 mg, 5 mg, Oral, Q4H PRN, Maribel Cifuentes PA-C, 5 mg at 01/08/22 2148    pantoprazole (PROTONIX) EC tablet 40 mg, 40 mg, Oral, Early Morning, Maribel Cifuentes PA-C, 40 mg at 01/21/22 0550    polyethylene glycol (MIRALAX) packet 17 g, 17 g, Oral, Daily PRN, Anabelle Art PA-C    potassium chloride (K-DUR,KLOR-CON) CR tablet 40 mEq, 40 mEq, Oral, BID, Anabelle Art PA-C, 40 mEq at 01/20/22 1750    tamsulosin (FLOMAX) capsule 0 4 mg, 0 4 mg, Oral, Daily With Dinner, Valley Filter Nadeen, NOA, 0 4 mg at 01/20/22 1750    temazepam (RESTORIL) capsule 15 mg, 15 mg, Oral, HS PRN, Maribel Cifuentes PA-C, 15 mg at 01/13/22 2144    Labs & Results:    No results found for: CKTOTAL, CKMB, CKMBINDEX, TROPONINI    Lab Results   Component Value Date    GLUCOSE 149 (H) 01/07/2022    CALCIUM 6 9 (L) 01/20/2022    K 3 6 01/20/2022    CO2 26 01/20/2022     (H) 01/20/2022    BUN 33 (H) 01/20/2022    CREATININE 1 04 01/20/2022       Lab Results   Component Value Date    WBC 9 46 01/14/2022    HGB 10 5 (L) 01/14/2022    HCT 34 2 (L) 01/14/2022    MCV 99 (H) 01/14/2022     (L) 01/14/2022     Results from last 7 days   Lab Units 01/20/22  0445   INR  3 45*       No results found for: CHOL  Lab Results   Component Value Date    HDL 13 (L) 12/30/2021    HDL 38 (L) 09/13/2021     Lab Results   Component Value Date    LDLCALC 107 (H) 12/30/2021    LDLCALC 134 (H) 09/13/2021     Lab Results   Component Value Date    TRIG 152 (H) 12/30/2021    TRIG 137 09/13/2021       Lab Results   Component Value Date    ALT 56 01/12/2022    AST 45 01/12/2022         EKG personally reviewed by )Abdulaziz Mnazo MD  No acute changes   TELE: No significant arrhythmias seen on telemetry review

## 2022-01-21 NOTE — CASE MANAGEMENT
Case Management Discharge Planning Note    Patient name Louetta Aschoff  Location [facility] 877-35 MRN [id number]  : 1955 Date 2022       Current Admission Date: 2021  Current Admission Diagnosis:S/P VSD repair   Patient Active Problem List    Diagnosis Date Noted    Urinary retention 2022    Anticoagulated on Coumadin 2022    Anticoagulation goal of INR 2 to 3 2022    Thrombocytopenia (HCC) 2022    Hypernatremia 2022    Hyperchloremia 2022    Hypocalcemia 2022    Central line complication     S/P VSD repair 2022    Leukocytosis 2022    Coagulopathy ([facility] ) 2022    Tylenol ingestion     Acute systolic congestive heart failure ([facility] ) 2021    Anemia 2021    Personal history of ECMO 2021    Cardiogenic shock ([facility] ) 2021    VSD (ventricular septal defect) 2021    JUDE (acute kidney injury) ([facility] ) 2021    Transaminitis 2021    Pipe smoker 2018    Hyperlipidemia, mixed 2017    Impaired fasting glucose 2017    Agoraphobia with panic attacks 2015    Obesity (BMI 30-39 9) 2015    Psychosis, atypical ([facility] ) 2015      LOS (days): 22  Geometric Mean LOS (GMLOS) (days):   Days to GMLOS:     OBJECTIVE:  Risk of Unplanned Readmission Score: 24         Current admission status: Inpatient   Preferred Pharmacy:   [id number] [id number]  [address] [place] [address]  Phone: [phone] Fax: [phone]    Primary Care Provider: Eder Rodriguez MD    Primary Insurance: Bharath Sanches  Secondary Insurance:     DISCHARGE DETAILS:    Discharge planning discussed with[de-identified] Pt's sister, Referral status update provided  Freedom of Choice: Yes  Comments - Freedom of Choice: Sister requested additional referrals to [facility], [facility], and [facility] contacted family/caregiver?: Yes  Were Treatment Team discharge recommendations reviewed with patient/caregiver?: Yes  Did patient/caregiver verbalize understanding of patient care needs?: Yes  Were patient/caregiver advised of the risks associated with not following Treatment Team discharge recommendations?: Yes    Contacts  Patient Contacts: Gabriel Doran: Sister  Relationship to Patient[de-identified] Family  Contact Method: Phone  Phone Number: [phone]  Reason/Outcome: Referral,Discharge Planning    Other Referral/Resources/Interventions Provided:  Interventions: Short Term Rehab  Referral Comments: Per [facility] communication [facility] and [facility] unable to accept pt at this time  Additional referral's sent to [facility], [facility], and [facility] via [facility] as requested by patient/family  CM department will continue to follow

## 2022-01-21 NOTE — CONSULTS
[address] NOTE   Admission Date: 12/30/2021    Patient Identifiers: Ade Shetty (MRN: [id number])  Service Requesting Consultation: Jovi Louie MD  Service Providing Consultation:  Urology, Claudetta Harry, PA-C  Consults  Date of Service: 1/21/2022    Reason for Consultation:  Postop urinary retention    History of Present Illness:     Ade Shetty is a 61 y o  male with multiple medical problems presented December 30th with cardiogenic shock  He reports seeing a urologist previously but cannot remember who  He had a VSD repair requiring ECMO  Postoperative day 16  He failed a voiding trial and Chang catheter was replaced  He is on tamsulosin  Past Medical, Past Surgical History:   History reviewed  No pertinent past medical history :    Past Surgical History:   Procedure Laterality Date    EXTRACORPOREAL CIRCULATION N/A 12/31/2021    Procedure: SUPPORT EXTRACORPOREAL MEMBRANE OXYGENATION (ECMO);   Surgeon: Jovi Louie MD;  [facility];  Service: Cardiac Surgery    IR OTHER  12/31/2021    IR OTHER  1/7/2022    IL ECMO/ECLS INITIATION VENOUS N/A 1/4/2022    Procedure: VA ECMO DECANNULATION;  Surgeon: Jovi Louie MD;  [facility];  Service: Cardiac Surgery    IL EXPLOR POSTOP BLEED,INFEC,CLOT-CHST N/A 1/7/2022    Procedure: RE-EXPLORATION MEDIASTERNAL CAVITY FOR POST-OP BLEED (BRING BACK) Removal of PA catheter;  Surgeon: Jovi Louie MD;  [facility];  Service: Cardiac Surgery    IL REVSD N/A 1/4/2022    Procedure: REPAIR VENTRICULAR SEPTAL DEFECT (VSD) OPEN WITH BOVINE PERICARDIAL PATCH ;  Surgeon: Jovi Louie MD;  [facility];  Service: Cardiac Surgery   :    Medications, Allergies:     Current Facility-Administered Medications:     acetaminophen (TYLENOL) tablet 650 mg, 650 mg, Oral, Q6H PRN, Anabelle Art PA-C    acetaminophen (TYLENOL) tablet 975 mg, 975 mg, Oral, Q8H, Anabelle Art PA-C, 975 mg at 01/21/22 0944    amiodarone tablet 200 mg, 200 mg, Oral, Q8H [address], Francisca Tapia PA-C, 200 mg at 01/21/22 0550    aspirin tablet 325 mg, 325 mg, Oral, Daily, Francisca Tapia PA-C, 325 mg at 01/21/22 0944    atorvastatin (LIPITOR) tablet 80 mg, 80 mg, Oral, After Londell GiselaNOA, 80 mg at 01/20/22 1750    bisacodyl (DULCOLAX) rectal suppository 10 mg, 10 mg, Rectal, Daily PRN, Anabelle Art PA-C    bumetanide (BUMEX) tablet 2 mg, 2 mg, Oral, Daily, Russ Ruiz MD, 2 mg at 01/21/22 0944    docusate sodium (COLACE) capsule 100 mg, 100 mg, Oral, BID PRN, Anabelle Art PA-C    insulin lispro (HumaLOG) 100 units/mL subcutaneous injection 1-6 Units, 1-6 Units, Subcutaneous, TID AC **AND** Fingerstick Glucose (POCT), , , TID AC, Francisca Tpaia PA-C    insulin lispro (HumaLOG) 100 units/mL subcutaneous injection 1-6 Units, 1-6 Units, Subcutaneous, HS, Francisca Tapia PA-C, 1 Units at 01/20/22 2134    losartan (COZAAR) tablet 12 5 mg, 12 5 mg, Oral, Daily, Jennifer Senior PA-C, 12 5 mg at 01/21/22 0944    metoprolol succinate (TOPROL-XL) 24 hr tablet 12 5 mg, 12 5 mg, Oral, BID, Russ Ruiz MD    ondansetron [facility]) injection 4 mg, 4 mg, Intravenous, Q6H PRN, Francisca Tapai PA-C    oxyCODONE (ROXICODONE) IR tablet 2 5 mg, 2 5 mg, Oral, Q4H PRN, Francisca Tapia PA-C, 2 5 mg at 01/13/22 0525    oxyCODONE (ROXICODONE) IR tablet 5 mg, 5 mg, Oral, Q4H PRN, Franciscajus Burts, PA-C, 5 mg at 01/08/22 2148    pantoprazole (PROTONIX) EC tablet 40 mg, 40 mg, Oral, Early Morning, Franciscajus Tapia, PA-C, 40 mg at 01/21/22 0550    polyethylene glycol (MIRALAX) packet 17 g, 17 g, Oral, Daily PRN, Anabelle Art PA-C    potassium chloride (K-DUR,KLOR-CON) CR tablet 40 mEq, 40 mEq, Oral, BID, Anabelle Art PA-C, 40 mEq at 01/21/22 0943    tamsulosin (FLOMAX) capsule 0 4 mg, 0 4 mg, Oral, Daily With Dinner, Lacie Senior PA-C, 0 4 mg at 01/20/22 1750    temazepam (RESTORIL) capsule 15 mg, 15 mg, Oral, HS PRN, Whitley Clinton PA-C, 15 mg at 01/13/22 2144    Allergies: Allergies   Allergen Reactions    Mushroom Extract Complex - Food Allergy Facial Swelling    Peanut Oil - Food Allergy     Strawberry C [Ascorbate - Food Allergy]    :    Social and Family History:   Social History:   Social History     Tobacco Use    Smoking status: Current Every Day Smoker     Types: Pipe    Smokeless tobacco: Never Used    Tobacco comment: Secondhand smoke exposure   Substance Use Topics    Alcohol use: No    Drug use: No        Social History     Tobacco Use   Smoking Status Current Every Day Smoker    Types: Pipe   Smokeless Tobacco Never Used   Tobacco Comment    Secondhand smoke exposure       Family History:  Family History   Problem Relation Age of Onset    Other Mother         Claustrophobia    Drug abuse Family     Alcohol abuse Family    :     Review of Systems:     General: Fever, chills, or night sweats: negative  Cardiac: Negative for chest pain  Pulmonary: Negative for shortness of breath  Gastrointestinal: Abdominal pain negative  Nausea, vomiting, or diarrhea negative,  Genitourinary: See HPI above  Patient does not have hematuria  All other systems queried were negative  Physical Exam:   General: Patient is pleasant and in NAD  Awake and alert  BP 94/65 (BP Location: Left arm)   Pulse 90   Temp (!) 97 2 °F (36 2 °C) (Oral)   Resp 19   Ht 5' 5 5" (1 664 m)   Wt 97 1 kg (214 lb 1 1 oz)   SpO2 99%   BMI 35 08 kg/m²   HEENT:  Conjunctiva are clear  Constitutional:  pleasant and cooperative     no apparent distress  Cardiac: Peripheral edema: negative  Pulmonary: Non-labored breathing  Abdomen: Soft, non-tender, non-distended  No surgical scars  No masses, tenderness, hernias noted      Genitourinary: Negative CVA tenderness, negative suprapubic tenderness  Extremities:  Neurological:CNII-XII intact  No numbness or tingling  Essentially non focal neurologic exam  Psychiatric:mood affect and behavior normal    (Male): Penis circumcised, phallus normal, meatus patent  Testicles descended into scrotum bilaterally without masses nor tenderness  No inguinal hernias bilaterally  COHN: in place draining clear yellow urine  no clots and       Labs:     Lab Results   Component Value Date    HGB 10 5 (L) 01/14/2022    HCT 34 2 (L) 01/14/2022    WBC 9 46 01/14/2022     (L) 01/14/2022   ]    Lab Results   Component Value Date    K 3 6 01/20/2022     (H) 01/20/2022    CO2 26 01/20/2022    BUN 33 (H) 01/20/2022    CREATININE 1 04 01/20/2022    CALCIUM 6 9 (L) 01/20/2022    GLUCOSE 149 (H) 01/07/2022   ]    Imaging:   I personally reviewed the images and report of the following studies, and reviewed them with the patient:  No  imaging    ASSESSMENT:     1  Postoperative urinary retention  2  Cardiogenic shock  3  Postop day 16 VSD repair     PLAN:   -maintain Cohn catheter  -continue tamsulosin  -trial of voiding when stronger and more ambulatory  -outpatient urology follow-up      Thank you for allowing me to participate in this patients care  Please do not hesitate to call with any additional questions    Cameron Gifford PA-C

## 2022-01-21 NOTE — PLAN OF CARE
Problem: PHYSICAL THERAPY ADULT  Goal: Performs mobility at highest level of function for planned discharge setting  See evaluation for individualized goals  Description: Treatment/Interventions: Functional transfer training,LE strengthening/ROM,Elevations,Therapeutic exercise,Endurance training,Equipment eval/education,Bed mobility,Gait training,Spoke to nursing,OT,Cognitive reorientation  Equipment Recommended:  (r/o rw next visit)       See flowsheet documentation for full assessment, interventions and recommendations  Outcome: Progressing  Note: Prognosis: Fair  Problem List: Decreased strength,Decreased endurance,Impaired balance,Decreased mobility,Decreased coordination,Decreased cognition,Impaired judgement,Decreased safety awareness,Pain  Assessment: Pt seen for session for setup, bed mob, time spent EOB for tolerance, balance and seated therex, standing trials x 2, repositioning  Pt cooperative, continues to be limited by pain and dizziness  able to bear much more wt today in standing, and fully clear bed without curb step   was able to take small side step as well   self limiting but believe therapy can try OOB to chair next session as well as gait w/ RW based on progress  Barriers to Discharge: Inaccessible home environment,Decreased caregiver support        PT Discharge Recommendation: Post acute rehabilitation services          See flowsheet documentation for full assessment

## 2022-01-21 NOTE — PROGRESS NOTES
Progress Note - Cardiothoracic Surgery   Jose Daniel Garcia 61 y o  male MRN: [id number]  Unit/Bed#: [facility] 427-01 Encounter: [id number]    Cardiogenic shock  S/P VA ECMO; POD # 20  VSD/cardiogenic shock  S/P VSD repair w/ VA ECMO decannulation; POD # 16    24 Hour Events: Chang replaced due to urinary retention  No complaints this morning       Medications:   Scheduled Meds:  Current Facility-Administered Medications   Medication Dose Route Frequency Provider Last Rate    acetaminophen  650 mg Oral Q6H PRN Anabelle Art PA-C      acetaminophen  975 mg Oral Q8H Anabelle Art PA-C      amiodarone  200 mg Oral Q8H [address] Ruddy Ramos PA-C      aspirin  325 mg Oral Daily Ruddy Ramos PA-C      atorvastatin  80 mg Oral After [employer]ur[employer] ContainerNOA      bisacodyl  10 mg Rectal Daily PRN Anabelle Art PA-C      bumetanide  2 mg Oral Daily Mono Pope MD      docusate sodium  100 mg Oral BID PRN Anabelle Art PA-C      insulin lispro  1-6 Units Subcutaneous TID AC Ruddy Ramos PA-C      insulin lispro  1-6 Units Subcutaneous HS Ruddy Ramos PA-C      losartan  25 mg Oral Daily Mono Pope MD      metoprolol tartrate  12 5 mg Oral Q12H [address] [place], [place]      ondansetron  4 mg Intravenous Q6H PRN Ruddy Ramos PA-C      oxyCODONE  2 5 mg Oral Q4H PRN Ruddy Ramos PA-C      oxyCODONE  5 mg Oral Q4H PRN Ruddy Ramos PA-C      pantoprazole  40 mg Oral Early Morning Ruddy Ramos PA-C      polyethylene glycol  17 g Oral Daily PRN Anabelle Art PA-C      potassium chloride  40 mEq Oral BID Anabelle Art PA-C      tamsulosin  0 4 mg Oral Daily With [email or web address] NOA Senior      temazepam  15 mg Oral HS PRN Ruddy Ramos PA-C       Continuous Infusions:   PRN Meds:   acetaminophen    bisacodyl    docusate sodium    ondansetron    oxyCODONE    oxyCODONE    polyethylene glycol    temazepam    Vitals:   Vitals: 01/20/22 2135 01/21/22 0300 01/21/22 0553 01/21/22 0710   BP: 94/59 108/55  111/69   BP Location:    Left arm   Pulse: 85 90  91   Resp:    17   Temp:  98 4 °F (36 9 °C)  97 9 °F (36 6 °C)   TempSrc:    Oral   SpO2:  100%  99%   Weight:   97 1 kg (214 lb 1 1 oz)    Height:           Telemetry: NSR; Heart Rate: 90    Respiratory:   SpO2: SpO2: 99 %; Room Air    Intake/Output:     Intake/Output Summary (Last 24 hours) at 1/21/2022 0811  Last data filed at 1/21/2022 0748  Gross per 24 hour   Intake 576 ml   Output 1825 ml   Net -1249 ml      Weights:   Weight (last 2 days)     Date/Time Weight    01/21/22 0553 97 1 (214 07)    01/20/22 0600 99 3 (218 92)    01/19/22 0542 101 (222 89)        Results:       Results from last 7 days   Lab Units 01/20/22  0445 01/19/22  0443 01/18/22  0434   SODIUM mmol/L 140 137 140   POTASSIUM mmol/L 3 6 3 8 3 6   CHLORIDE mmol/L 110* 104 105   CO2 mmol/L 26 29 30   BUN mg/dL 33* 35* 39*   CREATININE mg/dL 1 04 1 27 1 34*   CALCIUM mg/dL 6 9* 8 0* 8 2*     Results from last 7 days   Lab Units 01/20/22  0445 01/19/22  0443 01/18/22  1050   INR  3 45* 2 80* 2 31*       Invasive Lines/Tubes:  Invasive Devices  Report    Peripherally Inserted Central Catheter Line            PICC Line 33/43/34 Right Basilic 14 days          Drain            Urethral Catheter Non-latex 18 Fr  <1 day                Physical Exam:    HEENT/NECK:  Normocephalic  Atraumatic  No jugular venous distention  Cardiac: Regular rate and rhythm  Pulmonary:  Breath sounds clear bilaterally  Abdomen:  Non-tender, Non-distended and Normal bowel sounds  Incisions: Sternum is stable  Incision is clean, dry, and intact  Extremities: Extremities warm/dry and 1+ edema B/L  Neuro: Alert and oriented X 3  Skin: Warm/Dry, without rashes or lesions      Assessment:  Principal Problem:    S/P VSD repair  Active Problems:    Agoraphobia with panic attacks    Cardiogenic shock (HCC)    VSD (ventricular septal defect)    JUDE (acute kidney injury) ([facility] [address] )    Transaminitis    Tylenol ingestion    Acute systolic congestive heart failure (HCC)    Anemia    Personal history of ECMO    Coagulopathy (HCC)    Leukocytosis    Central line complication    Thrombocytopenia (HCC)    Hypernatremia    Hyperchloremia    Hypocalcemia    Anticoagulated on Coumadin    Anticoagulation goal of INR 2 to 3       Cardiogenic shock  S/P VA ECMO; POD # 20  VSD/cardiogenic shock  S/P VSD repair w/ VA ECMO decannulation; POD # 16  Plan:     1  Cardiac:   NSR; HR/BP well-controlled  Continue metoprolol 12 5 mg bid  Losartan 12 5 mg daily  Continue ASA and Statin therapy  Epicardial pacing wires out  PICC line in place for access   Continue DVT prophylaxis    2  Pulmonary:   Good Room air oxygen saturation; Continue incentive spirometry/Coughing/Deep breathing exercises  Chest tubes have been discontinued    3  Renal:   Intake/Output net: -1249 mL/24 hours  Diuretic Regimen: Bumex 2 mg po daily  Post op Creatinine stable; Follow up labs prn    4  Neuro:  Neurologically intact; No active issues  Incisional pain well-controlled  Continue Tylenol, 975 mg PO q 8, standing dose  Continue Oxycodone, 2 5 to 5 mg PO q 4 hours prn pain    5  GI:  Tolerating diet   Maintain 1800 mL daily fluid restriction   Continue stool softeners and prn suppository  Continue GI prophylaxis    6  Endo:   Glucose well-controlled with sliding scale coverage    7    Hematology:    Post-operative blood count acceptable; Trend prn    8     Disposition:      Awaiting rehab placement    VTE Pharmacologic Prophylaxis: Fondaparinux (Arixtra)  VTE Mechanical Prophylaxis: sequential compression device    Collaborative rounds completed with supervising physician  Plan of care discussed with bedside nurse    SIGNATURE: Jensen Pitts PA-C  DATE: January 21, 2022  TIME: 8:11 AM

## 2022-01-21 NOTE — PHYSICAL THERAPY NOTE
Physical Therapy Treatment Note       01/21/22 1145   PT Last Visit   PT Visit Date 01/21/22   Note Type   Note Type Treatment   Pain Assessment   Pain Assessment Tool 0-10   Pain Score No Pain   Restrictions/Precautions   Weight Bearing Precautions Per Order No   Other Precautions Cardiac/sternal;Cognitive; Chair Alarm; Bed Alarm;Multiple lines;Telemetry; Fall Risk;Pain;O2   General   Chart Reviewed Yes   Family/Caregiver Present No   Cognition   Overall Cognitive Status Impaired   Arousal/Participation Responsive   Attention Attends with cues to redirect   Orientation Level Oriented X4   Memory Unable to assess   Following Commands Follows one step commands with increased time or repetition   Subjective   Subjective lethargic initially, then participative  follows most simple commands w/ increased time  c/o dizziness, pain, weakness, fatigue w/ mobility   Bed Mobility   Rolling R 3  Moderate assistance   Additional items Assist x 1  (to reposition)   Supine to Sit 3  Moderate assistance   Additional items Assist x 2   Sit to Supine 3  Moderate assistance   Additional items Assist x 2   Additional Comments sat EOB x 15-20 min, focus on tolerance  mildposterior LOB, maintains w/ CGA/min A   Transfers   Sit to Stand 3  Moderate assistance   Additional items Assist x 2   Stand to Sit 3  Moderate assistance   Additional items Assist x 2   Additional Comments standing trials x 2 at EOB, each x 10-15 sec w/ 5 min seated rest in between trials    Able to bear more wt B LEs, and sidestep x 1 during 2nd trial   time spent to reposition to comfort post session   Balance   Static Sitting Fair -   Dynamic Sitting Poor +   Static Standing Poor   Dynamic Standing Poor   Endurance Deficit   Endurance Deficit Yes   Endurance Deficit Description fatigue, weakness, pain   Activity Tolerance   Activity Tolerance Patient limited by fatigue;Patient limited by pain;Treatment limited secondary to medical complications (Comment)   Nurse Made Aware yes   Exercises   Balance training  seated APs, LAQs x 10 reps   Assessment   Prognosis Fair   Problem List Decreased strength;Decreased endurance; Impaired balance;Decreased mobility; Decreased coordination;Decreased cognition; Impaired judgement;Decreased safety awareness;Pain   Assessment Pt seen for session for setup, bed mob, time spent EOB for tolerance, balance and seated therex, standing trials x 2, repositioning  Pt cooperative, continues to be limited by pain and dizziness  able to bear much more wt today in standing, and fully clear bed without curb step   was able to take small side step as well   self limiting but believe therapy can try OOB to chair next session as well as gait w/ RW based on progress   Goals   Patient Goals to rest   STG Expiration Date 02/01/22   PT Treatment Day 6   Plan   Treatment/Interventions Functional transfer training;LE strengthening/ROM; Therapeutic exercise; Endurance training;Patient/family training;Equipment eval/education; Bed mobility;Gait training   Progress Progressing toward goals   PT Frequency 4-6x/wk   Recommendation   PT Discharge Recommendation Post acute rehabilitation services   AM-PAC Basic Mobility Inpatient   Turning in Bed Without Bedrails 2   Lying on Back to Sitting on Edge of Flat Bed 1   Moving Bed to Chair 1   Standing Up From Chair 1   Walk in Room 1   Climb 3-5 Stairs 1   Basic Mobility Inpatient Raw Score 7   Turning Head Towards Sound 4   Follow Simple Instructions 3   Low Function Basic Mobility Raw Score 14   Low Function Basic Mobility Standardized Score 22 01   Highest Level Of Mobility   JH-HLM Goal 2: Bed activities/Dependent transfer   JH-HLM Highest Level of Mobility 3: Sit at edge of bed   Diya Olvera PT, DPT CSRS

## 2022-01-22 LAB
BACTERIA UR CULT: ABNORMAL
GLUCOSE SERPL-MCNC: 130 MG/DL (ref 65–140)
GLUCOSE SERPL-MCNC: 91 MG/DL (ref 65–140)
GLUCOSE SERPL-MCNC: 96 MG/DL (ref 65–140)
GLUCOSE SERPL-MCNC: 96 MG/DL (ref 65–140)

## 2022-01-22 PROCEDURE — 99024 POSTOP FOLLOW-UP VISIT: CPT | Performed by: PHYSICIAN ASSISTANT

## 2022-01-22 PROCEDURE — 82948 REAGENT STRIP/BLOOD GLUCOSE: CPT

## 2022-01-22 PROCEDURE — 97110 THERAPEUTIC EXERCISES: CPT

## 2022-01-22 RX ADMIN — METOPROLOL SUCCINATE 12.5 MG: 25 TABLET, EXTENDED RELEASE ORAL at 08:37

## 2022-01-22 RX ADMIN — TAMSULOSIN HYDROCHLORIDE 0.4 MG: 0.4 CAPSULE ORAL at 17:13

## 2022-01-22 RX ADMIN — AMIODARONE HYDROCHLORIDE 200 MG: 200 TABLET ORAL at 13:15

## 2022-01-22 RX ADMIN — AMIODARONE HYDROCHLORIDE 200 MG: 200 TABLET ORAL at 21:00

## 2022-01-22 RX ADMIN — BUMETANIDE 2 MG: 1 TABLET ORAL at 08:37

## 2022-01-22 RX ADMIN — POTASSIUM CHLORIDE 40 MEQ: 1500 TABLET, EXTENDED RELEASE ORAL at 17:13

## 2022-01-22 RX ADMIN — ACETAMINOPHEN 975 MG: 325 TABLET, FILM COATED ORAL at 00:57

## 2022-01-22 RX ADMIN — LOSARTAN POTASSIUM 12.5 MG: 25 TABLET, FILM COATED ORAL at 08:38

## 2022-01-22 RX ADMIN — ACETAMINOPHEN 975 MG: 325 TABLET, FILM COATED ORAL at 17:13

## 2022-01-22 RX ADMIN — ACETAMINOPHEN 975 MG: 325 TABLET, FILM COATED ORAL at 08:38

## 2022-01-22 RX ADMIN — PANTOPRAZOLE SODIUM 40 MG: 40 TABLET, DELAYED RELEASE ORAL at 05:58

## 2022-01-22 RX ADMIN — AMIODARONE HYDROCHLORIDE 200 MG: 200 TABLET ORAL at 05:57

## 2022-01-22 RX ADMIN — ATORVASTATIN CALCIUM 80 MG: 80 TABLET, FILM COATED ORAL at 17:13

## 2022-01-22 RX ADMIN — ASPIRIN 325 MG ORAL TABLET 325 MG: 325 PILL ORAL at 08:37

## 2022-01-22 RX ADMIN — METOPROLOL SUCCINATE 12.5 MG: 25 TABLET, EXTENDED RELEASE ORAL at 20:51

## 2022-01-22 RX ADMIN — POTASSIUM CHLORIDE 40 MEQ: 1500 TABLET, EXTENDED RELEASE ORAL at 08:38

## 2022-01-22 NOTE — CASE MANAGEMENT
Case Management Discharge Planning Note    Patient name Litzy Suarez  Location [facility] 541-13 MRN [id number]  : 1955 Date 2022       Current Admission Date: 2021  Current Admission Diagnosis:S/P VSD repair   Patient Active Problem List    Diagnosis Date Noted    Urinary retention 2022    Anticoagulated on Coumadin 2022    Anticoagulation goal of INR 2 to 3 2022    Thrombocytopenia (HCC) 2022    Hypernatremia 2022    Hyperchloremia 2022    Hypocalcemia 2022    Central line complication 15/90/5120    S/P VSD repair 2022    Leukocytosis 2022    Coagulopathy ([address] ) 2022    Tylenol ingestion     Acute systolic congestive heart failure ([address] ) 2021    Anemia 2021    Personal history of ECMO 2021    Cardiogenic shock ([facility] ) 2021    VSD (ventricular septal defect) 2021    JUDE (acute kidney injury) ([facility] ) 2021    Transaminitis 2021    Pipe smoker 2018    Hyperlipidemia, mixed 2017    Impaired fasting glucose 2017    Agoraphobia with panic attacks 2015    Obesity (BMI 30-39 9) 2015    Psychosis, atypical ([facility] [address] ) 2015      LOS (days): 23  Geometric Mean LOS (GMLOS) (days):   Days to GMLOS:     OBJECTIVE:  Risk of Unplanned Readmission Score: 24         Current admission status: Inpatient   Preferred Pharmacy:   [id number] [address]  [address] [address]  Phone: [phone] Fax: [phone]    Primary Care Provider: Nabor Walker MD    Primary Insurance: [facility]  Secondary Insurance:     DISCHARGE DETAILS:    Other Referral/Resources/Interventions Provided:  Interventions: Short Term Rehab  Referral Comments: Per [facility] communication [facility] and [facility], unable to accept patient at this time due to bed availability   [facility] is currently reviewing patient for potential admission at this time  CM will continue to follow  CM provided update to [facility] on same      Treatment Team Recommendation: Short Term Rehab  Discharge Destination Plan[de-identified] Short Term Rehab

## 2022-01-22 NOTE — PROGRESS NOTES
Progress Note - Cardiothoracic Surgery   Briseida Meier 61 y o  male MRN: [id number]  Unit/Bed#: [facility] 427-01 Encounter: [id number]    Cardiogenic shock  S/P VA ECMO; POD # 21  VSD/cardiogenic shock  S/P VSD repair w/ VA ECMO decannulation; POD # 17    24 Hour Events: No events overnight    NO complaints this morning    Medications:   Scheduled Meds:  Current Facility-Administered Medications   Medication Dose Route Frequency Provider Last Rate    acetaminophen  650 mg Oral Q6H PRN Anabelle Art PA-C      acetaminophen  975 mg Oral Q8H Anabelle Art, NOA      amiodarone  200 mg Oral Q8H [address] Julieta Degree, NOA      aspirin  325 mg Oral Daily Julieta Degree, PA-EMILIANO      atorvastatin  80 mg Oral After [employer], PA-EMILIANO      bisacodyl  10 mg Rectal Daily PRN Anabelle Art PA-C      bumetanide  2 mg Oral Daily Stan Barber MD      docusate sodium  100 mg Oral BID PRN Anabelle Art PA-C      insulin lispro  1-6 Units Subcutaneous TID AC Julieta Degree, PA-EMILIANO      insulin lispro  1-6 Units Subcutaneous HS Julieta Degree, NOA      losartan  12 5 mg Oral Daily Harikasharron Feng Protem, [place]      metoprolol succinate  12 5 mg Oral BID Stan Barber MD      ondansetron  4 mg Intravenous Q6H PRN Julieta Degree, NOA      oxyCODONE  2 5 mg Oral Q4H PRN Julieta Degree, PA-EMILIANO      oxyCODONE  5 mg Oral Q4H PRN Julieta Degree, PA-C      pantoprazole  40 mg Oral Early Morning Julieta Degree, PA-C      polyethylene glycol  17 g Oral Daily PRN Anabelle Art PA-C      potassium chloride  40 mEq Oral BID Anabelle Art PA-C      tamsulosin  0 4 mg Oral Daily With [employer], PA-EMILIANO      temazepam  15 mg Oral HS PRN Julieta Degree, PA-C       Continuous Infusions:   PRN Meds:   acetaminophen    bisacodyl    docusate sodium    ondansetron    oxyCODONE    oxyCODONE    polyethylene glycol    temazepam    Vitals:   Vitals:    01/22/22 0255 01/22/22 0308 01/22/22 0636 01/22/22 0729   BP: 109/70 109/70  114/62   BP Location:  Left arm  Left arm   Pulse:  87  96   Resp:  16  18   Temp: 97 8 °F (36 6 °C) 97 8 °F (36 6 °C)  98 2 °F (36 8 °C)   TempSrc:  Oral  Oral   SpO2:  98%  95%   Weight:   97 3 kg (214 lb 8 1 oz)    Height:           Telemetry: NSR; Heart Rate: 93    Respiratory:   SpO2: SpO2: 95 %; Room Air    Intake/Output:     Intake/Output Summary (Last 24 hours) at 1/22/2022 0755  Last data filed at 1/22/2022 0601  Gross per 24 hour   Intake 236 ml   Output 1675 ml   Net -1439 ml      Weights:   Weight (last 2 days)     Date/Time Weight    01/22/22 0636 97 3 (214 51)    01/21/22 0553 97 1 (214 07)    01/20/22 0600 99 3 (218 92)            Results:       Results from last 7 days   Lab Units 01/20/22  0445 01/19/22  0443 01/18/22  0434   SODIUM mmol/L 140 137 140   POTASSIUM mmol/L 3 6 3 8 3 6   CHLORIDE mmol/L 110* 104 105   CO2 mmol/L 26 29 30   BUN mg/dL 33* 35* 39*   CREATININE mg/dL 1 04 1 27 1 34*   CALCIUM mg/dL 6 9* 8 0* 8 2*       Invasive Lines/Tubes:  Invasive Devices  Report    Peripherally Inserted Central Catheter Line            PICC Line 78/57/33 Right Basilic 15 days          Drain            Urethral Catheter Non-latex 18 Fr  1 day                Physical Exam:    HEENT/NECK:  Normocephalic  Atraumatic  No jugular venous distention  Cardiac: Regular rate and rhythm  Pulmonary:  Breath sounds clear bilaterally  Abdomen:  Non-tender, Non-distended and Normal bowel sounds  Incisions: Sternum is stable  Incision is clean, dry, and intact  and Saphenectomy incison is clean, dry, and intact  Extremities: Extremities warm/dry and 1+ edema B/L  Neuro: Alert and oriented X 3  Skin: Warm/Dry, without rashes or lesions      Assessment:  Principal Problem:    S/P VSD repair  Active Problems:    Agoraphobia with panic attacks    Cardiogenic shock (HCC)    VSD (ventricular septal defect)    JUDE (acute kidney injury) ([address] )    Transaminitis    Tylenol ingestion    Acute systolic congestive heart failure (HCC)    Anemia    Personal history of ECMO    Coagulopathy (HCC)    Leukocytosis    Central line complication    Thrombocytopenia (HCC)    Hypernatremia    Hyperchloremia    Hypocalcemia    Anticoagulated on Coumadin    Anticoagulation goal of INR 2 to 3    Urinary retention       Cardiogenic shock  S/P VA ECMO; POD # 21  VSD/cardiogenic shock  S/P VSD repair w/ VA ECMO decannulation; POD # 17    Plan:    1  Cardiac:   NSR; HR/BP well-controlled  Continue losartan 12 5 mg daily and Toprol xl 12 5 mg daily  Continue ASA and Statin therapy  Epicardial pacing wires out  Maintain central IV access today for access - PICC line   Continue DVT prophylaxis    2  Pulmonary:   Good Room air oxygen saturation; Continue incentive spirometry/Coughing/Deep breathing exercises  Chest tubes have been discontinued    3  Renal:   Intake/Output net: -1439 mL/24 hours  Diuretic Regimen: bumex dosing     4  Neuro:  Neurologically intact; No active issues  Incisional pain well-controlled  Continue Tylenol, 975 mg PO q 8, standing dose  Continue Oxycodone, 2 5 to 5 mg PO q 4 hours prn pain    5  GI:urology seen - [place] to remain with outpatient follow up, on flomax   Tolerating diet   Maintain 1800 mL daily fluid restriction   Continue stool softeners and prn suppository  Continue GI prophylaxis    6  Endo:   Glucose well-controlled with sliding scale coverage    7    Hematology:    Post-operative blood count acceptable; Trend prn    8     Disposition:      Awaiting rehab placement    VTE Pharmacologic Prophylaxis: once INR less than 2 due to being on coumadin previously and not needed any further will resume sq dvt prophylaxis  VTE Mechanical Prophylaxis: sequential compression device    Collaborative rounds completed with supervising physician  Plan of care discussed with bedside nurse    SIGNATURE: Berna Schwartz PA-C  DATE: January 22, 2022  TIME: 7:55 AM

## 2022-01-22 NOTE — PHYSICAL THERAPY NOTE
Physical Therapy Treatment Note    Patient's Name: Litzy Suarez  : 22 1434   PT Last Visit   PT Visit Date 22   Note Type   Note Type Treatment   Pain Assessment   Pain Assessment Tool 0-10   Pain Score No Pain   Restrictions/Precautions   Weight Bearing Precautions Per Order No   Other Precautions Cardiac/sternal;Multiple lines;Telemetry; Fall Risk   General   Chart Reviewed Yes   Family/Caregiver Present No   Cognition   Overall Cognitive Status Impaired   Arousal/Participation Alert; Cooperative   Attention Attends with cues to redirect   Orientation Level Oriented X4   Memory Decreased recall of precautions   Following Commands Follows one step commands without difficulty   Subjective   Subjective "I sat up on the EOB myself earlier today"   Bed Mobility   Supine to Sit 4  Minimal assistance   Additional items Assist x 1;HOB elevated; Bedrails; Increased time required;Verbal cues   Sit to Supine 3  Moderate assistance   Additional items Assist x 2; Increased time required;Verbal cues;LE management   Additional Comments Pt required [place] for assisting trunk during supine > sit transfer  Once EOB pt required varying levels of assist from close supervision to [place]  Transfers   Sit to Stand Unable to assess   Stand to Sit Unable to assess   Additional Comments pt declined standing trial this session despite therapist encouragement, reports he will try next session   Ambulation/Elevation   Gait pattern Not tested   Balance   Static Sitting Fair -   Dynamic Sitting Poor +   Endurance Deficit   Endurance Deficit Yes   Endurance Deficit Description weakness, fatigue, deconditioning   Activity Tolerance   Activity Tolerance Patient limited by fatigue   Exercises   Knee AROM Long Arc Quad Sitting;20 reps;AROM; Bilateral   Ankle Pumps Supine;Sitting;20 reps;AROM; Bilateral   Marching Sitting;10 reps;AROM; Bilateral   Assessment   Prognosis Fair   Problem List Decreased strength;Decreased endurance; Impaired balance;Decreased mobility; Decreased cognition;Decreased safety awareness;Pain   Assessment Pt seen for PT session today with a focus on bed mobility, sitting tolerance, and LE strengthening  Pt continues to make slow, but steady progress towards mobility goals  Pt was able to perform supine > sit transfer with less assistance this session, needing help just to assist with his trunk  Once sitting EOB, pt required varying levels of assist from close supervision to [place]  Pt was able to perform seated therex with good technique, taking frequent rest breaks 2* fatigue  Pt declined standing trial today 2* fatigue despite encouragement from therapist; reports he will try standing/ambulation next session  Pt's biggest limitation at this time is his activity tolerance and generalized deconditioning  Pt would benefit from continued acute skilled PT to address above deficits  Continuing to recommend rehab upon d/c  Goals   Patient Goals to rest   STG Expiration Date 02/01/22   PT Treatment Day 7   Plan   Treatment/Interventions Functional transfer training;LE strengthening/ROM; Therapeutic exercise; Endurance training;Equipment eval/education; Bed mobility;Gait training;Spoke to nursing   Progress Progressing toward goals   PT Frequency 4-6x/wk   Recommendation   PT Discharge Recommendation Post acute rehabilitation services   AM-PAC Basic Mobility Inpatient   Turning in Bed Without Bedrails 2   Lying on Back to Sitting on Edge of Flat Bed 2   Moving Bed to Chair 1   Standing Up From Chair 1   Walk in Room 1   Climb 3-5 Stairs 1   Basic Mobility Inpatient Raw Score 8   Turning Head Towards Sound 4   Follow Simple Instructions 3   Low Function Basic Mobility Raw Score 15   Low Function Basic Mobility Standardized Score 23 9   Highest Level Of Mobility   JH-HLM Goal 3: Sit at edge of bed   JH-HLM Highest Level of Mobility 3: Sit at edge of bed   JH-HLM Goal Achieved Yes   Education   Education Provided Home exercise program   Patient Demonstrates verbal understanding   End of Consult   Patient Position at End of Consult Supine; All needs within reach       Allen Spence, PT, DPT

## 2022-01-22 NOTE — PLAN OF CARE
Problem: PHYSICAL THERAPY ADULT  Goal: Performs mobility at highest level of function for planned discharge setting  See evaluation for individualized goals  Description: Treatment/Interventions: Functional transfer training,LE strengthening/ROM,Elevations,Therapeutic exercise,Endurance training,Equipment eval/education,Bed mobility,Gait training,Spoke to nursing,OT,Cognitive reorientation  Equipment Recommended:  (r/o rw next visit)       See flowsheet documentation for full assessment, interventions and recommendations  Outcome: Progressing  Note: Prognosis: Fair  Problem List: Decreased strength,Decreased endurance,Impaired balance,Decreased mobility,Decreased cognition,Decreased safety awareness,Pain  Assessment: Pt seen for PT session today with a focus on bed mobility, sitting tolerance, and LE strengthening  Pt continues to make slow, but steady progress towards mobility goals  Pt was able to perform supine > sit transfer with less assistance this session, needing help just to assist with his trunk  Once sitting EOB, pt required varying levels of assist from close supervision to [place]  Pt was able to perform seated therex with good technique, taking frequent rest breaks 2* fatigue  Pt declined standing trial today 2* fatigue despite encouragement from therapist; reports he will try standing/ambulation next session  Pt's biggest limitation at this time is his activity tolerance and generalized deconditioning  Pt would benefit from continued acute skilled PT to address above deficits  Continuing to recommend rehab upon d/c    Barriers to Discharge: Inaccessible home environment,Decreased caregiver support        PT Discharge Recommendation: Post acute rehabilitation services          See flowsheet documentation for full assessment

## 2022-01-22 NOTE — PLAN OF CARE
Problem: MOBILITY - ADULT  Goal: Maintain or return to baseline ADL function  Description: INTERVENTIONS:  -  Assess patient's ability to carry out ADLs; assess patient's baseline for ADL function and identify physical deficits which impact ability to perform ADLs (bathing, care of mouth/teeth, toileting, grooming, dressing, etc )  - Assess/evaluate cause of self-care deficits   - Assess range of motion  - Assess patient's mobility; develop plan if impaired  - Assess patient's need for assistive devices and provide as appropriate  - Encourage maximum independence but intervene and supervise when necessary  - Involve family in performance of ADLs  - Assess for home care needs following discharge   - Consider OT consult to assist with ADL evaluation and planning for discharge  - Provide patient education as appropriate  Outcome: Progressing  Goal: Maintains/Returns to pre admission functional level  Description: INTERVENTIONS:  - Perform BMAT or MOVE assessment daily    - Set and communicate daily mobility goal to care team and patient/family/caregiver  - Collaborate with rehabilitation services on mobility goals if consulted  - Perform Range of Motion 4 times a day  - Reposition patient every 2 hours    - Dangle patient times a day  - Stand patient times a day  - Ambulate patient times a day  - Out of bed to chair times a day   - Out of bed for meals times a day  - Out of bed for toileting  - Record patient progress and toleration of activity level   Outcome: Progressing     Problem: MOBILITY - ADULT  Goal: Maintain or return to baseline ADL function  Description: INTERVENTIONS:  -  Assess patient's ability to carry out ADLs; assess patient's baseline for ADL function and identify physical deficits which impact ability to perform ADLs (bathing, care of mouth/teeth, toileting, grooming, dressing, etc )  - Assess/evaluate cause of self-care deficits   - Assess range of motion  - Assess patient's mobility; develop plan if impaired  - Assess patient's need for assistive devices and provide as appropriate  - Encourage maximum independence but intervene and supervise when necessary  - Involve family in performance of ADLs  - Assess for home care needs following discharge   - Consider OT consult to assist with ADL evaluation and planning for discharge  - Provide patient education as appropriate  Outcome: Progressing  Goal: Maintains/Returns to pre admission functional level  Description: INTERVENTIONS:  - Perform BMAT or MOVE assessment daily    - Set and communicate daily mobility goal to care team and patient/family/caregiver  - Collaborate with rehabilitation services on mobility goals if consulted  - Perform Range of Motion 4 times a day  - Reposition patient every 2 hours    - Dangle patient times a day  - Stand patient times a day  - Ambulate patient times a day  - Out of bed to chair times a day   - Out of bed for meals times a day  - Out of bed for toileting  - Record patient progress and toleration of activity level   Outcome: Progressing

## 2022-01-23 LAB
ALBUMIN SERPL BCP-MCNC: 2.9 G/DL (ref 3.5–5)
ALP SERPL-CCNC: 101 U/L (ref 46–116)
ALT SERPL W P-5'-P-CCNC: 61 U/L (ref 12–78)
ANION GAP SERPL CALCULATED.3IONS-SCNC: 3 MMOL/L (ref 4–13)
ANION GAP SERPL CALCULATED.3IONS-SCNC: 7 MMOL/L (ref 4–13)
AST SERPL W P-5'-P-CCNC: 21 U/L (ref 5–45)
BASE EX.OXY STD BLDV CALC-SCNC: 58.1 % (ref 60–80)
BASE EXCESS BLDV CALC-SCNC: -0.9 MMOL/L
BASOPHILS # BLD AUTO: 0.02 THOUSANDS/ΜL (ref 0–0.1)
BASOPHILS NFR BLD AUTO: 0 % (ref 0–1)
BILIRUB SERPL-MCNC: 1.8 MG/DL (ref 0.2–1)
BUN SERPL-MCNC: 29 MG/DL (ref 5–25)
BUN SERPL-MCNC: 31 MG/DL (ref 5–25)
CA-I BLD-SCNC: 0.99 MMOL/L (ref 1.12–1.32)
CALCIUM ALBUM COR SERPL-MCNC: 7.9 MG/DL (ref 8.3–10.1)
CALCIUM SERPL-MCNC: 7 MG/DL (ref 8.3–10.1)
CALCIUM SERPL-MCNC: 8.2 MG/DL (ref 8.3–10.1)
CHLORIDE SERPL-SCNC: 109 MMOL/L (ref 100–108)
CHLORIDE SERPL-SCNC: 109 MMOL/L (ref 100–108)
CO2 SERPL-SCNC: 23 MMOL/L (ref 21–32)
CO2 SERPL-SCNC: 26 MMOL/L (ref 21–32)
CREAT SERPL-MCNC: 1.06 MG/DL (ref 0.6–1.3)
CREAT SERPL-MCNC: 1.15 MG/DL (ref 0.6–1.3)
EOSINOPHIL # BLD AUTO: 0.1 THOUSAND/ΜL (ref 0–0.61)
EOSINOPHIL NFR BLD AUTO: 2 % (ref 0–6)
ERYTHROCYTE [DISTWIDTH] IN BLOOD BY AUTOMATED COUNT: 17.3 % (ref 11.6–15.1)
GFR SERPL CREATININE-BSD FRML MDRD: 66 ML/MIN/1.73SQ M
GFR SERPL CREATININE-BSD FRML MDRD: 73 ML/MIN/1.73SQ M
GLUCOSE SERPL-MCNC: 105 MG/DL (ref 65–140)
GLUCOSE SERPL-MCNC: 113 MG/DL (ref 65–140)
GLUCOSE SERPL-MCNC: 129 MG/DL (ref 65–140)
GLUCOSE SERPL-MCNC: 135 MG/DL (ref 65–140)
GLUCOSE SERPL-MCNC: 89 MG/DL (ref 65–140)
GLUCOSE SERPL-MCNC: 94 MG/DL (ref 65–140)
HCO3 BLDV-SCNC: 24 MMOL/L (ref 24–30)
HCT VFR BLD AUTO: 28 % (ref 36.5–49.3)
HGB BLD-MCNC: 8.8 G/DL (ref 12–17)
IMM GRANULOCYTES # BLD AUTO: 0.07 THOUSAND/UL (ref 0–0.2)
IMM GRANULOCYTES NFR BLD AUTO: 1 % (ref 0–2)
LACTATE SERPL-SCNC: 1.1 MMOL/L (ref 0.5–2)
LYMPHOCYTES # BLD AUTO: 0.76 THOUSANDS/ΜL (ref 0.6–4.47)
LYMPHOCYTES NFR BLD AUTO: 15 % (ref 14–44)
MAGNESIUM SERPL-MCNC: 1.8 MG/DL (ref 1.6–2.6)
MCH RBC QN AUTO: 31.4 PG (ref 26.8–34.3)
MCHC RBC AUTO-ENTMCNC: 31.4 G/DL (ref 31.4–37.4)
MCV RBC AUTO: 100 FL (ref 82–98)
MONOCYTES # BLD AUTO: 0.37 THOUSAND/ΜL (ref 0.17–1.22)
MONOCYTES NFR BLD AUTO: 7 % (ref 4–12)
NEUTROPHILS # BLD AUTO: 3.9 THOUSANDS/ΜL (ref 1.85–7.62)
NEUTS SEG NFR BLD AUTO: 75 % (ref 43–75)
NRBC BLD AUTO-RTO: 0 /100 WBCS
O2 CT BLDV-SCNC: 7.8 ML/DL
PCO2 BLDV: 40.6 MM HG (ref 42–50)
PH BLDV: 7.39 [PH] (ref 7.3–7.4)
PHOSPHATE SERPL-MCNC: 2.1 MG/DL (ref 2.3–4.1)
PLATELET # BLD AUTO: 162 THOUSANDS/UL (ref 149–390)
PMV BLD AUTO: 11.2 FL (ref 8.9–12.7)
PO2 BLDV: 31.1 MM HG (ref 35–45)
POTASSIUM SERPL-SCNC: 3.6 MMOL/L (ref 3.5–5.3)
POTASSIUM SERPL-SCNC: 5 MMOL/L (ref 3.5–5.3)
PROCALCITONIN SERPL-MCNC: 0.16 NG/ML
PROT SERPL-MCNC: 5.5 G/DL (ref 6.4–8.2)
RBC # BLD AUTO: 2.8 MILLION/UL (ref 3.88–5.62)
SODIUM SERPL-SCNC: 138 MMOL/L (ref 136–145)
SODIUM SERPL-SCNC: 139 MMOL/L (ref 136–145)
WBC # BLD AUTO: 5.22 THOUSAND/UL (ref 4.31–10.16)

## 2022-01-23 PROCEDURE — 80048 BASIC METABOLIC PNL TOTAL CA: CPT | Performed by: NURSE PRACTITIONER

## 2022-01-23 PROCEDURE — 82805 BLOOD GASES W/O2 SATURATION: CPT | Performed by: NURSE PRACTITIONER

## 2022-01-23 PROCEDURE — 99024 POSTOP FOLLOW-UP VISIT: CPT | Performed by: THORACIC SURGERY (CARDIOTHORACIC VASCULAR SURGERY)

## 2022-01-23 PROCEDURE — 84100 ASSAY OF PHOSPHORUS: CPT | Performed by: NURSE PRACTITIONER

## 2022-01-23 PROCEDURE — 87040 BLOOD CULTURE FOR BACTERIA: CPT | Performed by: NURSE PRACTITIONER

## 2022-01-23 PROCEDURE — 83605 ASSAY OF LACTIC ACID: CPT | Performed by: NURSE PRACTITIONER

## 2022-01-23 PROCEDURE — 83735 ASSAY OF MAGNESIUM: CPT | Performed by: NURSE PRACTITIONER

## 2022-01-23 PROCEDURE — 80053 COMPREHEN METABOLIC PANEL: CPT | Performed by: NURSE PRACTITIONER

## 2022-01-23 PROCEDURE — 82948 REAGENT STRIP/BLOOD GLUCOSE: CPT

## 2022-01-23 PROCEDURE — 82533 TOTAL CORTISOL: CPT | Performed by: NURSE PRACTITIONER

## 2022-01-23 PROCEDURE — 84145 PROCALCITONIN (PCT): CPT | Performed by: NURSE PRACTITIONER

## 2022-01-23 PROCEDURE — 85025 COMPLETE CBC W/AUTO DIFF WBC: CPT | Performed by: NURSE PRACTITIONER

## 2022-01-23 PROCEDURE — 82330 ASSAY OF CALCIUM: CPT | Performed by: NURSE PRACTITIONER

## 2022-01-23 RX ORDER — ALBUMIN, HUMAN INJ 5% 5 %
12.5 SOLUTION INTRAVENOUS ONCE
Status: COMPLETED | OUTPATIENT
Start: 2022-01-23 | End: 2022-01-23

## 2022-01-23 RX ORDER — LOSARTAN POTASSIUM 25 MG/1
12.5 TABLET ORAL DAILY
Status: DISCONTINUED | OUTPATIENT
Start: 2022-01-23 | End: 2022-01-26

## 2022-01-23 RX ORDER — PHENYLEPHRINE HYDROCHLORIDE 10 MG/ML
INJECTION INTRAVENOUS
Status: DISPENSED
Start: 2022-01-23 | End: 2022-01-24

## 2022-01-23 RX ORDER — HEPARIN SODIUM 5000 [USP'U]/ML
5000 INJECTION, SOLUTION INTRAVENOUS; SUBCUTANEOUS EVERY 8 HOURS SCHEDULED
Status: DISCONTINUED | OUTPATIENT
Start: 2022-01-23 | End: 2022-01-31 | Stop reason: HOSPADM

## 2022-01-23 RX ORDER — SODIUM CHLORIDE, SODIUM GLUCONATE, SODIUM ACETATE, POTASSIUM CHLORIDE, MAGNESIUM CHLORIDE, SODIUM PHOSPHATE, DIBASIC, AND POTASSIUM PHOSPHATE .53; .5; .37; .037; .03; .012; .00082 G/100ML; G/100ML; G/100ML; G/100ML; G/100ML; G/100ML; G/100ML
1500 INJECTION, SOLUTION INTRAVENOUS ONCE
Status: COMPLETED | OUTPATIENT
Start: 2022-01-23 | End: 2022-01-24

## 2022-01-23 RX ORDER — POTASSIUM CHLORIDE 20 MEQ/1
40 TABLET, EXTENDED RELEASE ORAL DAILY
Status: DISCONTINUED | OUTPATIENT
Start: 2022-01-23 | End: 2022-01-23

## 2022-01-23 RX ORDER — METOPROLOL SUCCINATE 25 MG/1
12.5 TABLET, EXTENDED RELEASE ORAL 2 TIMES DAILY
Status: DISCONTINUED | OUTPATIENT
Start: 2022-01-23 | End: 2022-01-31 | Stop reason: HOSPADM

## 2022-01-23 RX ORDER — SODIUM CHLORIDE, SODIUM GLUCONATE, SODIUM ACETATE, POTASSIUM CHLORIDE, MAGNESIUM CHLORIDE, SODIUM PHOSPHATE, DIBASIC, AND POTASSIUM PHOSPHATE .53; .5; .37; .037; .03; .012; .00082 G/100ML; G/100ML; G/100ML; G/100ML; G/100ML; G/100ML; G/100ML
1000 INJECTION, SOLUTION INTRAVENOUS ONCE
Status: COMPLETED | OUTPATIENT
Start: 2022-01-23 | End: 2022-01-23

## 2022-01-23 RX ADMIN — AMIODARONE HYDROCHLORIDE 200 MG: 200 TABLET ORAL at 23:21

## 2022-01-23 RX ADMIN — SODIUM CHLORIDE, SODIUM GLUCONATE, SODIUM ACETATE, POTASSIUM CHLORIDE, MAGNESIUM CHLORIDE, SODIUM PHOSPHATE, DIBASIC, AND POTASSIUM PHOSPHATE 1500 ML: .53; .5; .37; .037; .03; .012; .00082 INJECTION, SOLUTION INTRAVENOUS at 23:49

## 2022-01-23 RX ADMIN — TAMSULOSIN HYDROCHLORIDE 0.4 MG: 0.4 CAPSULE ORAL at 17:15

## 2022-01-23 RX ADMIN — METOPROLOL SUCCINATE 12.5 MG: 25 TABLET, EXTENDED RELEASE ORAL at 08:45

## 2022-01-23 RX ADMIN — BUMETANIDE 2 MG: 1 TABLET ORAL at 08:45

## 2022-01-23 RX ADMIN — ALBUMIN (HUMAN) 12.5 G: 12.5 INJECTION, SOLUTION INTRAVENOUS at 20:18

## 2022-01-23 RX ADMIN — HEPARIN SODIUM 5000 UNITS: 5000 INJECTION INTRAVENOUS; SUBCUTANEOUS at 08:46

## 2022-01-23 RX ADMIN — CEFEPIME HYDROCHLORIDE 2000 MG: 2 INJECTION, POWDER, FOR SOLUTION INTRAVENOUS at 21:49

## 2022-01-23 RX ADMIN — PANTOPRAZOLE SODIUM 40 MG: 40 TABLET, DELAYED RELEASE ORAL at 05:35

## 2022-01-23 RX ADMIN — HEPARIN SODIUM 5000 UNITS: 5000 INJECTION INTRAVENOUS; SUBCUTANEOUS at 17:15

## 2022-01-23 RX ADMIN — ATORVASTATIN CALCIUM 80 MG: 80 TABLET, FILM COATED ORAL at 17:15

## 2022-01-23 RX ADMIN — ACETAMINOPHEN 975 MG: 325 TABLET, FILM COATED ORAL at 08:45

## 2022-01-23 RX ADMIN — ALBUMIN (HUMAN) 12.5 G: 12.5 INJECTION, SOLUTION INTRAVENOUS at 19:30

## 2022-01-23 RX ADMIN — SODIUM CHLORIDE, SODIUM GLUCONATE, SODIUM ACETATE, POTASSIUM CHLORIDE, MAGNESIUM CHLORIDE, SODIUM PHOSPHATE, DIBASIC, AND POTASSIUM PHOSPHATE 1000 ML: .53; .5; .37; .037; .03; .012; .00082 INJECTION, SOLUTION INTRAVENOUS at 21:08

## 2022-01-23 RX ADMIN — HEPARIN SODIUM 5000 UNITS: 5000 INJECTION INTRAVENOUS; SUBCUTANEOUS at 23:20

## 2022-01-23 RX ADMIN — AMIODARONE HYDROCHLORIDE 200 MG: 200 TABLET ORAL at 14:30

## 2022-01-23 RX ADMIN — AMIODARONE HYDROCHLORIDE 200 MG: 200 TABLET ORAL at 05:35

## 2022-01-23 RX ADMIN — LOSARTAN POTASSIUM 12.5 MG: 25 TABLET, FILM COATED ORAL at 08:44

## 2022-01-23 RX ADMIN — ACETAMINOPHEN 975 MG: 325 TABLET, FILM COATED ORAL at 00:13

## 2022-01-23 RX ADMIN — ASPIRIN 325 MG ORAL TABLET 325 MG: 325 PILL ORAL at 08:44

## 2022-01-23 RX ADMIN — ACETAMINOPHEN 975 MG: 325 TABLET, FILM COATED ORAL at 17:15

## 2022-01-23 RX ADMIN — CALCIUM GLUCONATE 3 G: 98 INJECTION, SOLUTION INTRAVENOUS at 21:34

## 2022-01-23 NOTE — PLAN OF CARE
Problem: MOBILITY - ADULT  Goal: Maintain or return to baseline ADL function  Description: INTERVENTIONS:  -  Assess patient's ability to carry out ADLs; assess patient's baseline for ADL function and identify physical deficits which impact ability to perform ADLs (bathing, care of mouth/teeth, toileting, grooming, dressing, etc )  - Assess/evaluate cause of self-care deficits   - Assess range of motion  - Assess patient's mobility; develop plan if impaired  - Assess patient's need for assistive devices and provide as appropriate  - Encourage maximum independence but intervene and supervise when necessary  - Involve family in performance of ADLs  - Assess for home care needs following discharge   - Consider OT consult to assist with ADL evaluation and planning for discharge  - Provide patient education as appropriate  Outcome: Progressing  Goal: Maintains/Returns to pre admission functional level  Description: INTERVENTIONS:  - Perform BMAT or MOVE assessment daily    - Set and communicate daily mobility goal to care team and patient/family/caregiver  - Collaborate with rehabilitation services on mobility goals if consulted  - Perform Range of Motion 4 times a day  - Reposition patient every 2 hours    - Dangle patient times a day  - Stand patient times a day  - Ambulate patient times a day  - Out of bed to chair times a day   - Out of bed for meals times a day  - Out of bed for toileting  - Record patient progress and toleration of activity level   Outcome: Progressing

## 2022-01-24 LAB
ANION GAP SERPL CALCULATED.3IONS-SCNC: 5 MMOL/L (ref 4–13)
BUN SERPL-MCNC: 26 MG/DL (ref 5–25)
CALCIUM SERPL-MCNC: 8.4 MG/DL (ref 8.3–10.1)
CHLORIDE SERPL-SCNC: 107 MMOL/L (ref 100–108)
CO2 SERPL-SCNC: 26 MMOL/L (ref 21–32)
CORTIS SERPL-MCNC: 13.8 UG/DL
CREAT SERPL-MCNC: 1.08 MG/DL (ref 0.6–1.3)
GFR SERPL CREATININE-BSD FRML MDRD: 72 ML/MIN/1.73SQ M
GLUCOSE SERPL-MCNC: 122 MG/DL (ref 65–140)
GLUCOSE SERPL-MCNC: 69 MG/DL (ref 65–140)
GLUCOSE SERPL-MCNC: 86 MG/DL (ref 65–140)
GLUCOSE SERPL-MCNC: 87 MG/DL (ref 65–140)
GLUCOSE SERPL-MCNC: 91 MG/DL (ref 65–140)
POTASSIUM SERPL-SCNC: 4.7 MMOL/L (ref 3.5–5.3)
PROCALCITONIN SERPL-MCNC: 0.12 NG/ML
SODIUM SERPL-SCNC: 138 MMOL/L (ref 136–145)

## 2022-01-24 PROCEDURE — 84145 PROCALCITONIN (PCT): CPT | Performed by: NURSE PRACTITIONER

## 2022-01-24 PROCEDURE — 97168 OT RE-EVAL EST PLAN CARE: CPT

## 2022-01-24 PROCEDURE — 99231 SBSQ HOSP IP/OBS SF/LOW 25: CPT | Performed by: INTERNAL MEDICINE

## 2022-01-24 PROCEDURE — 97530 THERAPEUTIC ACTIVITIES: CPT

## 2022-01-24 PROCEDURE — 97110 THERAPEUTIC EXERCISES: CPT

## 2022-01-24 PROCEDURE — 99024 POSTOP FOLLOW-UP VISIT: CPT | Performed by: THORACIC SURGERY (CARDIOTHORACIC VASCULAR SURGERY)

## 2022-01-24 PROCEDURE — 82948 REAGENT STRIP/BLOOD GLUCOSE: CPT

## 2022-01-24 PROCEDURE — 80048 BASIC METABOLIC PNL TOTAL CA: CPT | Performed by: NURSE PRACTITIONER

## 2022-01-24 RX ORDER — BUMETANIDE 1 MG/1
1 TABLET ORAL DAILY
Status: DISCONTINUED | OUTPATIENT
Start: 2022-01-25 | End: 2022-01-31 | Stop reason: HOSPADM

## 2022-01-24 RX ORDER — CEFAZOLIN SODIUM 2 G/50ML
2000 SOLUTION INTRAVENOUS EVERY 8 HOURS
Status: COMPLETED | OUTPATIENT
Start: 2022-01-24 | End: 2022-01-29

## 2022-01-24 RX ORDER — BUMETANIDE 1 MG/1
1 TABLET ORAL ONCE
Status: COMPLETED | OUTPATIENT
Start: 2022-01-24 | End: 2022-01-24

## 2022-01-24 RX ADMIN — AMIODARONE HYDROCHLORIDE 200 MG: 200 TABLET ORAL at 14:18

## 2022-01-24 RX ADMIN — CEFAZOLIN SODIUM 2000 MG: 2 SOLUTION INTRAVENOUS at 17:14

## 2022-01-24 RX ADMIN — CEFAZOLIN SODIUM 2000 MG: 2 SOLUTION INTRAVENOUS at 08:49

## 2022-01-24 RX ADMIN — AMIODARONE HYDROCHLORIDE 200 MG: 200 TABLET ORAL at 05:00

## 2022-01-24 RX ADMIN — BUMETANIDE 2 MG: 1 TABLET ORAL at 08:49

## 2022-01-24 RX ADMIN — ASPIRIN 325 MG ORAL TABLET 325 MG: 325 PILL ORAL at 08:50

## 2022-01-24 RX ADMIN — AMIODARONE HYDROCHLORIDE 200 MG: 200 TABLET ORAL at 21:55

## 2022-01-24 RX ADMIN — HEPARIN SODIUM 5000 UNITS: 5000 INJECTION INTRAVENOUS; SUBCUTANEOUS at 17:14

## 2022-01-24 RX ADMIN — PANTOPRAZOLE SODIUM 40 MG: 40 TABLET, DELAYED RELEASE ORAL at 05:01

## 2022-01-24 RX ADMIN — TAMSULOSIN HYDROCHLORIDE 0.4 MG: 0.4 CAPSULE ORAL at 17:13

## 2022-01-24 RX ADMIN — METOPROLOL SUCCINATE 12.5 MG: 25 TABLET, EXTENDED RELEASE ORAL at 08:56

## 2022-01-24 RX ADMIN — HEPARIN SODIUM 5000 UNITS: 5000 INJECTION INTRAVENOUS; SUBCUTANEOUS at 08:42

## 2022-01-24 RX ADMIN — BUMETANIDE 1 MG: 1 TABLET ORAL at 14:15

## 2022-01-24 RX ADMIN — LOSARTAN POTASSIUM 12.5 MG: 25 TABLET, FILM COATED ORAL at 08:50

## 2022-01-24 RX ADMIN — POTASSIUM PHOSPHATE, MONOBASIC AND POTASSIUM PHOSPHATE, DIBASIC 30 MMOL: 224; 236 INJECTION, SOLUTION, CONCENTRATE INTRAVENOUS at 01:26

## 2022-01-24 RX ADMIN — ATORVASTATIN CALCIUM 80 MG: 80 TABLET, FILM COATED ORAL at 17:13

## 2022-01-24 NOTE — SEPSIS NOTE
Sepsis Note   Matthew Hinson 61 y o  male MRN: [id number]  Unit/Bed#: [facility] 427-01 Encounter: [id number]       qSOFA     Row Name 01/23/22 2326 01/23/22 2309 01/23/22 2300 01/23/22 2149 01/23/22 2109    Altered mental status GCS < 15 -- -- -- -- --    Respiratory Rate > / =22 -- -- 0 -- --    Systolic BP < / =117 1 1 1 1 1    Q Sofa Score 1 1 1 1 1    Row Name 01/23/22 2045 01/23/22 2039 01/23/22 2030 01/23/22 19:06:02 01/23/22 1600    Altered mental status GCS < 15 -- -- -- -- 0    Respiratory Rate > / =22 -- -- -- 0 --    Systolic BP < / =263 1 1 1 1 --    Q Sofa Score 1 1 1 1 1    Row Name 01/23/22 15:06:04 01/23/22 1200 01/23/22 11:31:38 01/23/22 0842 01/23/22 0800    Altered mental status GCS < 15 -- 0 -- -- 0    Respiratory Rate > / =22 0 -- -- 0 --    Systolic BP < / =979 1 -- 1 0 --    Q Sofa Score 1 1 1 0 --    Row Name 01/23/22 0359 01/23/22 02:26:43 01/23/22 0000 01/22/22 23:03:11 01/22/22 2100    Altered mental status GCS < 15 0 -- 0 -- --    Respiratory Rate > / =22 -- 0 -- 0 --    Systolic BP < / =581 -- 1 -- 1 0    Q Sofa Score 1 1 1 1 --    Row Name 01/22/22 2051 01/22/22 1950 01/22/22 1600 01/22/22 15:26:09 01/22/22 1200    Altered mental status GCS < 15 -- 0 0 -- 0    Respiratory Rate > / =22 -- -- -- 0 --    Systolic BP < / =127 0 -- -- 0 --    Q Sofa Score -- -- 0 0 0    Row Name 01/22/22 1138 01/22/22 0800 01/22/22 0729 01/22/22 0402 01/22/22 0300    Altered mental status GCS < 15 -- 0 -- 0 --    Respiratory Rate > / =22 0 -- 0 -- 0    Systolic BP < / =228 0 -- 0 -- 0    Q Sofa Score 0 0 0 0 0    Row Name 01/22/22 02:55:16 01/22/22 0040             Altered mental status GCS < 15 -- 0       Respiratory Rate > / =22 -- --       Systolic BP < / =958 0 --       Q Sofa Score -- 0                  Initial Sepsis Screening     Row Name 01/23/22 2101 12/30/21 1100             Is the patient's history suggestive of a new or worsening infection?  Yes (Proceed)  -SP --  Believe the patient has new HF, cardiogenic shock  LIFESCAPE       Suspected source of infection urinary tract infection  -SP --       Are two or more of the following signs & symptoms of infection both present and new to the patient? Yes (Proceed)  -SP --       Indicate SIRS criteria Tachypnea > 20 resp per min; Hypothermia < 36C (96 8F)  -SP --       If the answer is yes to both questions, suspicion of sepsis is present -- --       If severe sepsis is present AND tissue hypoperfusion perists in the hour after fluid resuscitation or lactate > 4, the patient meets criteria for SEPTIC SHOCK -- --       Are any of the following organ dysfunction criteria present within 6 hours of suspected infection and SIRS criteria that are NOT considered to be chronic conditions?  Yes  -SP --       Organ dysfunction SBP < 90 mmHg;MAP < 65 mmHg  -SP --       Date of presentation of severe sepsis 01/23/22  -SP --       Time of presentation of severe sepsis 2104  -SP --       Tissue hypoperfusion persists in the hour after crystalloid fluid administration, evidenced, by either: -- --       Was hypotension present within one hour of the conclusion of crystalloid fluid administration? -- --       Date of presentation of septic shock -- --       Time of presentation of septic shock -- --             User Key  (r) = Recorded By, (t) = Taken By, (c) = Cosigned By    [id number] Name Provider Type    GUERLINE Lin [address] Bac[address] Vergara Nurse Practitioner    Jerrell Foss MD Physician                  Default Flowsheet Data (last 720 hours)     Sepsis Reassess     Row Name 01/23/22 5526                   Repeat Volume Status and Tissue Perfusion Assessment Performed    Repeat Volume Status and Tissue Perfusion Assessment Performed Yes  -SP                  Volume Status and Tissue Perfusion Post Fluid Resuscitation * Must Document All *    Vital Signs Reviewed (HR, RR, BP, T) Yes  -SP        Shock Index Reviewed Yes  -SP        Arterial Oxygen Saturation Reviewed (POx, SaO2 or SpO2) Yes (comment %)  98% on RA  -SP        Cardio Normal S1/S2; Regular rate and rhythm  -SP        Pulmonary Tachypnea;Normal effort;Clear to auscultation  -SP        Capillary Refill Brisk  -SP        Peripheral Pulses Radial;Dorsalis Pedis  -SP        Peripheral Pulse +2  -SP        Dorsalis Pedis +2  -SP        Skin Warm;Dry;Normal  -SP        Urine output assessed Adequate  -SP                  *OR*   Intensive Monitoring- Must Document One of the Following Four *:    Vital Signs Reviewed --        * Central Venous Pressure (CVP or RAP) --        * Central Venous Oxygen (SVO2, ScvO2 or Oxygen saturation via central catheter) --        * Bedside Cardiovascular US in IVC diameter and % collapse --        * Passive Leg Raise OR Crystalloid Challenge --              User Key  (r) = Recorded By, (t) = Taken By, (c) = Cosigned By    Initials Name Provider Type    Jimenez Solis Nurse Practitioner                Patient was initially only given 500mL IV albumin and 1000mL crystalloid, but upon further review of records and echocardiogram, patient has normal EF of 55%  He was given an additional 1L with improved BP and without respiratory distress  An additional 500mL was ordered for a total of 3L in volume for 30ml/kg   (30ml/kg x 97 3kg = 2919mL)

## 2022-01-24 NOTE — PLAN OF CARE
Problem: OCCUPATIONAL THERAPY ADULT  Goal: Performs self-care activities at highest level of function for planned discharge setting  See evaluation for individualized goals  Description: Treatment Interventions: ADL retraining,Functional transfer training,UE strengthening/ROM,Endurance training,Patient/family training,Equipment evaluation/education,Compensatory technique education,Continued evaluation,Energy conservation,Activityengagement,Cardiac education          See flowsheet documentation for full assessment, interventions and recommendations  Outcome: Progressing  Note: Limitation: Decreased ADL status,Decreased UE ROM,Decreased UE strength,Decreased Safe judgement during ADL,Decreased cognition,Decreased endurance,Decreased fine motor control,Decreased high-level ADLs  Prognosis: Poor,Fair  Assessment: Pt is 61 y o  male admitted to [facility]"" on 12/30/2021 w/ ventricular septal defect and cardiogenic shock  On 12/31/2021, pt received initiation of venoarterial extracorporeal life support  Since admission pt has had, "Cardiogenic shock due to anteroseptal MI complicated by VSD on VA ECMO 12/31/21 s/p VSD repair with large bovine pericardial patch and ECMO decannulation 1/4/2022, also with vasodilatory component post op; s/p multiple blood product transfusions " Pt's currently is active with, Agoraphobia with panic attacks, VSD (ventricular septal defect), Transaminitis, Tylenol ingestion, Acute systolic congestive heart failure ([address] ), Anemia, Personal history of ECMO, Coagulopathy ([address] ), Leukocytosis, central line complication, S/P VSD repair, Thrombocytopenia ([address] ), Hypernatremia, Hyperchloremia, and Hypocalcemia  Pt with active OT orders and activity orders  Seen 1/24/2022 for re-evaluation 2* goal expiration  Pt resides in a 2 level Home, with 2 POORNIMA, w sister and family  Pt was I w/  ADLs, IADLs, (+) drove   Pt is currently functioning at mod A 2x for bed mobility, max A x2-3 for transfers, mod A for UB ADLS, and max A for LB ADLs   Pt is limited 2* pain, endurance, activity tolerance, functional mobility, balance, functional standing tolerance, decreased I w/ ADLS/IADLS and strength  The Areas of Occupational Performance Areas to address w/ pt include: eating, grooming, bathing/shower, toilet hygiene, dressing, health maintenance and functional mobility  Based off of an OT evaulation, assessment, and performance functioning, pt is identified as a high complexity  OT recommendation for d/c includes post acute rehab  Pt would benefit from continued acute OT services for 3-5x/week to  w/in 10-14 days:  to address functional goals        OT Discharge Recommendation: Post acute rehabilitation services  OT - OK to Discharge: Yes (when medically stable)

## 2022-01-24 NOTE — SEPSIS NOTE
Sepsis Note   Geraldene Lesches 61 y o  male MRN: [id number]  Unit/Bed#: [facility] 427-01 Encounter: [id number]       qSOFA     Row Name 01/23/22 2045 01/23/22 2039 01/23/22 2030 01/23/22 19:06:02 01/23/22 1600    Altered mental status GCS < 15 -- -- -- -- 0    Respiratory Rate > / =22 -- -- -- 0 --    Systolic BP < / =343 1 1 1 1 --    Q Sofa Score 1 1 1 1 1    Row Name 01/23/22 15:06:04 01/23/22 1200 01/23/22 11:31:38 01/23/22 0842 01/23/22 0800    Altered mental status GCS < 15 -- 0 -- -- 0    Respiratory Rate > / =22 0 -- -- 0 --    Systolic BP < / =163 1 -- 1 0 --    Q Sofa Score 1 1 1 0 --    Row Name 01/23/22 0359 01/23/22 02:26:43 01/23/22 0000 01/22/22 23:03:11 01/22/22 2100    Altered mental status GCS < 15 0 -- 0 -- --    Respiratory Rate > / =22 -- 0 -- 0 --    Systolic BP < / =806 -- 1 -- 1 0    Q Sofa Score 1 1 1 1 --    Row Name 01/22/22 2051 01/22/22 1950 01/22/22 1600 01/22/22 15:26:09 01/22/22 1200    Altered mental status GCS < 15 -- 0 0 -- 0    Respiratory Rate > / =22 -- -- -- 0 --    Systolic BP < / =368 0 -- -- 0 --    Q Sofa Score -- -- 0 0 0    Row Name 01/22/22 1138 01/22/22 0800 01/22/22 0729 01/22/22 0402 01/22/22 0300    Altered mental status GCS < 15 -- 0 -- 0 --    Respiratory Rate > / =22 0 -- 0 -- 0    Systolic BP < / =743 0 -- 0 -- 0    Q Sofa Score 0 0 0 0 0    Row Name 01/22/22 02:55:16 01/22/22 0040 01/21/22 2240          Altered mental status GCS < 15 -- 0 --      Respiratory Rate > / =22 -- -- 0      Systolic BP < / =081 0 -- 0      Q Sofa Score -- 0 0                 Initial Sepsis Screening     Row Name 01/23/22 2101 12/30/21 1100             Is the patient's history suggestive of a new or worsening infection? Yes (Proceed)  -SP --  Believe the patient has new HF, cardiogenic shock  -JH       Suspected source of infection urinary tract infection  -SP --       Are two or more of the following signs & symptoms of infection both present and new to the patient?  Yes (Proceed)  -SP -- Indicate SIRS criteria Tachypnea > 20 resp per min; Hypothermia < 36C (96 8F)  -SP --       If the answer is yes to both questions, suspicion of sepsis is present -- --       If severe sepsis is present AND tissue hypoperfusion perists in the hour after fluid resuscitation or lactate > 4, the patient meets criteria for SEPTIC SHOCK -- --       Are any of the following organ dysfunction criteria present within 6 hours of suspected infection and SIRS criteria that are NOT considered to be chronic conditions? Yes  -SP --       Organ dysfunction SBP < 90 mmHg;MAP < 65 mmHg  -SP --       Date of presentation of severe sepsis 01/23/22  -SP --       Time of presentation of severe sepsis 2104  -SP --       Tissue hypoperfusion persists in the hour after crystalloid fluid administration, evidenced, by either: -- --       Was hypotension present within one hour of the conclusion of crystalloid fluid administration? -- --       Date of presentation of septic shock -- --       Time of presentation of septic shock -- --             User Key  (r) = Recorded By, (t) = Taken By, (c) = Cosigned By    [id number]  Name Provider Type    SP Carlos Borden, [facility]ition[facility]    Manuel Morel MD Physician                   The 30ml/kg fluid bolus would be harmful to the patient despite hypotension and/or significantly elevated lactate of ? 4 and/or presence of septic shock due to: Advanced Heart Failure/symptoms with minimal exertion  The patient will be administered 1L of crystalloid fluid + 500mL 5% albumin instead  Orders for this have been placed in Epic  The patient may receive additional colloid or crystalloid fluids thereafter based on clinical condition       BROOKE Garrido

## 2022-01-24 NOTE — PROGRESS NOTES
Progress Note - Cardiothoracic Surgery   Briseida Meier 61 y o  male MRN: [id number]  Unit/Bed#: [facility] 427-01 Encounter: [id number]  Cardiogenic shock  S/P VA ECMO; POD # 23  VSD/cardiogenic shock  S/P VSD repair w/ VA ECMO decannulation; POD # 19    24 Hour Events: Yesterday evening systolics in 86-55H, CC contacted given albumin and cystalloid (3L total), sepsis alert called, blood cultures taken, started on ancef for +UA, cx + Citrobacter koseri (1/20)  LA WNL, procalcitonin WNL  Blood cx pending  Feeling much better this AM  Denies dizziness, CP, OR SOB  Using Is  BM today       Medications:   Scheduled Meds:  Current Facility-Administered Medications   Medication Dose Route Frequency Provider Last Rate    acetaminophen  650 mg Oral Q6H PRN Anabelle Art PA-C      amiodarone  200 mg Oral Q8H [address] Julieta Degree, PA-EMILIANO      aspirin  325 mg Oral Daily Julieta Degree, PA-EMILIANO      atorvastatin  80 mg Oral After [employer], NOA      bisacodyl  10 mg Rectal Daily PRN Anabelle Art PA-C      bumetanide  2 mg Oral Daily Stan Barber MD      cefazolin  2,000 mg Intravenous Q8H Camillia Buerger, CRNP 2,000 mg (01/24/22 0849)    docusate sodium  100 mg Oral BID PRN Anabelle Art PA-C      heparin (porcine)  5,000 Units Subcutaneous [facility] [place], [place]      insulin lispro  1-6 Units Subcutaneous TID AC Julieta Degree, PA-C      insulin lispro  1-6 Units Subcutaneous HS Julieta Degree, PA-C      losartan  12 5 mg Oral Daily BROOKE Contreras      metoprolol succinate  12 5 mg Oral BID BROOKE Carey      ondansetron  4 mg Intravenous Q6H PRN Julieta Degree, PA-EMILIANO      oxyCODONE  2 5 mg Oral Q4H PRN Julieta Degree, PA-C      oxyCODONE  5 mg Oral Q4H PRN Julieta Degree, PA-C      pantoprazole  40 mg Oral Early Morning Julieta Degree, PA-C      polyethylene glycol  17 g Oral Daily PRN Anabelle Art PA-C      tamsulosin  0 4 mg Oral Daily With Matthew Mcclain PA-C      temazepam  15 mg Oral HS PRN Meme Jose PA-C       Continuous Infusions:   PRN Meds:   acetaminophen    bisacodyl    docusate sodium    ondansetron    oxyCODONE    oxyCODONE    polyethylene glycol    temazepam    Vitals:   Vitals:    01/24/22 0131 01/24/22 0250 01/24/22 0500 01/24/22 0710   BP: 114/68 92/60 101/64 100/62   BP Location:    Left arm   Pulse:  73 76 73   Resp:  15  16   Temp:  98 3 °F (36 8 °C)  98 3 °F (36 8 °C)   TempSrc:  Oral  Oral   SpO2:  99%  99%   Weight:       Height:           Telemetry: NSR w/ BBB; Heart Rate: 70    Respiratory:   SpO2: SpO2: 99 %; Room Air    Intake/Output:     Intake/Output Summary (Last 24 hours) at 1/24/2022 0906  Last data filed at 1/24/2022 0814  Gross per 24 hour   Intake 3678 43 ml   Output 2260 ml   Net 1418 43 ml   UOP 2 26L, net + 1 68L       Weights:   Weight (last 2 days)     Date/Time Weight    01/22/22 0636 97 3 (214 51)            Results:   Results from last 7 days   Lab Units 01/23/22  2019   WBC Thousand/uL 5 22   HEMOGLOBIN g/dL 8 8*   HEMATOCRIT % 28 0*   PLATELETS Thousands/uL 162     Results from last 7 days   Lab Units 01/24/22  0448 01/23/22  2019 01/23/22  0538   SODIUM mmol/L 138 139 138   POTASSIUM mmol/L 4 7 3 6 5 0   CHLORIDE mmol/L 107 109* 109*   CO2 mmol/L 26 23 26   BUN mg/dL 26* 29* 31*   CREATININE mg/dL 1 08 1 06 1 15   CALCIUM mg/dL 8 4 7 0* 8 2*     Results from last 7 days   Lab Units 01/21/22  1027 01/20/22  0445 01/19/22  0443   INR  2 18* 3 45* 2 80*     Glucose 86- 135 (prediabetes 5 9)    Invasive Lines/Tubes:  Invasive Devices  Report    Peripherally Inserted Central Catheter Line            PICC Line 68/59/08 Right Basilic 17 days          Drain            Urethral Catheter Non-latex 18 Fr  3 days              Physical Exam:    HEENT/NECK:  Normocephalic  Atraumatic  No jugular venous distention      Cardiac: Regular rate and rhythm and No murmurs/rubs/gallops  Pulmonary:  Breath sounds clear bilaterally and No rales/rhonchi/wheezes  Abdomen:  Non-tender, Non-distended and Normal bowel sounds  Incisions: Sternum is stable  Incision is clean, dry, and intact  Groin site healing well  Extremities: Extremities warm/dry and 2+ edema B/L  Neuro: Alert and oriented X 3 and No focal deficits  Skin: Warm/Dry, without rashes or lesions  Assessment:  Principal Problem:    S/P VSD repair  Active Problems:    Agoraphobia with panic attacks    Cardiogenic shock (HCC)    VSD (ventricular septal defect)    JUDE (acute kidney injury) ([facility] [address] )    Transaminitis    Tylenol ingestion    Acute systolic congestive heart failure (HCC)    Anemia    Personal history of ECMO    Coagulopathy (HCC)    Leukocytosis    Central line complication    Thrombocytopenia (HCC)    Hypernatremia    Hyperchloremia    Hypocalcemia    Anticoagulated on Coumadin    Anticoagulation goal of INR 2 to 3    Urinary retention       Cardiogenic shock  S/P VA ECMO; POD # 23  VSD/cardiogenic shock  S/P VSD repair w/ VA ECMO decannulation; POD # 19    Plan:    1  Cardiac:   NSR; HR/BP well-controlled  Continue losartan 12 5 mg daily and Toprol xl 12 5 mg BID  Continue ASA and Statin therapy  Epicardial pacing wires out  PICC line in place for access  Continue DVT prophylaxis    2  Pulmonary:   Good Room air oxygen saturation; Continue incentive spirometry/Coughing/Deep breathing exercises  Chest tubes have been discontinued    3  Renal: carolina in place per urology- f/u OP  Intake/Output net: + 1 68L L/24 hours  Diuretic Regimen: Bumex 2 mg po today, switch to 1 mg la  daily per cards  Post op Creatinine stable; Follow up labs prn    4  Neuro:  Neurologically intact; No active issues  Incisional pain well-controlled  Continue Tylenol, 975 mg PO q 8, standing dose  Continue Oxycodone, 2 5 to 5 mg PO q 4 hours prn pain    5   GI:  Tolerating diet   Maintain 1800 mL daily fluid restriction   Continue stool softeners and prn suppository  Continue GI prophylaxis    6  Endo:   Glucose well-controlled with sliding scale coverage    7    Hematology:    Post-operative blood count acceptable; Trend prn    8     Disposition:      Awaiting rehab placement    VTE Pharmacologic Prophylaxis: Heparin  VTE Mechanical Prophylaxis: sequential compression device    Collaborative rounds completed with supervising physician  Plan of care discussed with bedside nurse    SIGNATURE: Mirtha Fernandez PA-C  DATE: January 24, 2022  TIME: 9:06 AM

## 2022-01-24 NOTE — OCCUPATIONAL THERAPY NOTE
Occupational Therapy Re-Evaluation     Patient Name: Vivia Fothergill NVOLE'ANDREA Date: 1/24/2022  Problem List  Principal Problem:    S/P VSD repair  Active Problems:    Agoraphobia with panic attacks    Cardiogenic shock (HCC)    VSD (ventricular septal defect)    JUDE (acute kidney injury) ([address] )    Transaminitis    Tylenol ingestion    Acute systolic congestive heart failure (HCC)    Anemia    Personal history of ECMO    Coagulopathy (HCC)    Leukocytosis    Central line complication    Thrombocytopenia (HCC)    Hypernatremia    Hyperchloremia    Hypocalcemia    Anticoagulated on Coumadin    Anticoagulation goal of INR 2 to 3    Urinary retention    Past Medical History  History reviewed  No pertinent past medical history  Past Surgical History  Past Surgical History:   Procedure Laterality Date    EXTRACORPOREAL CIRCULATION N/A 12/31/2021    Procedure: SUPPORT EXTRACORPOREAL MEMBRANE OXYGENATION (ECMO);   Surgeon: Barbara Powell MD;  [facility];  Service: Cardiac Surgery    IR OTHER  12/31/2021    IR OTHER  1/7/2022    AR ECMO/ECLS INITIATION VENOUS N/A 1/4/2022    Procedure: VA ECMO DECANNULATION;  Surgeon: Barbara Powell MD;  [facility];  Service: Cardiac Surgery    AR EXPLOR POSTOP BLEED,INFEC,CLOT-CHST N/A 1/7/2022    Procedure: RE-EXPLORATION MEDIASTERNAL CAVITY FOR POST-OP BLEED (BRING BACK) Removal of PA catheter;  Surgeon: Barbara Powell MD;  [facility];  Service: Cardiac Surgery    AR REVSD N/A 1/4/2022    Procedure: REPAIR VENTRICULAR SEPTAL DEFECT (VSD) OPEN WITH BOVINE PERICARDIAL PATCH ;  Surgeon: Barbara Powell MD;  [facility];  Service: Cardiac Surgery         01/24/22 1138   OT Last Visit   OT Visit Date 01/24/22   Note Type   Note type Re-Evaluation   Restrictions/Precautions   Weight Bearing Precautions Per Order No   Other Precautions Cardiac/sternal;Multiple lines;Telemetry;O2;Fall Risk;Pain   Home Living   Type of Home House   Home Layout Two level;Stairs to enter with rails  (2 POORNIMA)   Bathroom Shower/Tub Walk-in shower   Bathroom Toilet Standard   Bathroom Accessibility Accessible   Additional Comments Pt reports living in a 2 story home w 2 POORNIMA  Prior Function   Level of [place] Independent with ADLs and functional mobility  (amb w/o AD)   Lives With Family  (sister and brother in law)   [address] Help From Family   ADL Assistance Independent   IADLs Independent   Vocational Retired   Lifestyle   Autonomy Pt reports being I with ADLS, IADLS and mobility without device PTA  (+)    Reciprocal Relationships Pt lives with his brother and sister in law   Service to Others Retired   [address] ([address] W[address]   Patient Behaviors/Mood Appropriate for situation; Cooperative   Ability to Express Feelings Able to express   Ability to Express Needs Able to express   Ability to Express Thoughts Able to express   ADL   Where Assessed Supine, bed   Eating Assistance 3  Moderate Assistance   Eating Deficit Setup; Increased time to complete;Bringing food to mouth assist   Grooming Assistance 3  Moderate Assistance   UB Bathing Assistance 3  Moderate Assistance   LB Bathing Assistance 2  Maximal Assistance   UB Dressing Assistance 3  Moderate Assistance   LB Dressing Assistance 2  Maximal [address]  2  Maximal Assistance   Functional Assistance Other (Comment)   Bed Mobility   Rolling R 3  Moderate assistance   Additional items Assist x 2   Rolling L 3  Moderate assistance   Additional items Assist x 2   Supine to Sit 3  Moderate assistance   Additional items Assist x 2   Transfers   Sit to Stand 2  Maximal assistance   Additional items Assist x 2   Stand to Sit 2  Maximal assistance   Additional items Assist x 2   Stand pivot 2  Maximal assistance   Additional items Assist x 2;Assist x 3   Sit pivot 2  Maximal assistance   Additional items Assist x 2; Assist x 3   Additional Comments Attempted one stand pivot  Pt w/ A x3 was able to sit pivot to drop arm recliner  Functional Mobility   Additional Comments unable to assess, did not take any steps   Balance   Static Sitting Fair -   Dynamic Sitting Fair -   Static Standing Poor   Dynamic Standing Poor   Activity Tolerance   Activity Tolerance Patient limited by fatigue;Patient limited by pain   Medical Staff Made Aware Seen with PT 2* medical complexity   Nurse Made Aware RN made aware   RUE Assessment   RUE Assessment X  (decreased strength and ROM)   LUE Assessment   LUE Assessment X  (decreased strength and ROM)   Hand Function   Gross Motor Coordination Functional   Fine Motor Coordination Impaired   Cognition   Overall Cognitive Status Impaired   Arousal/Participation Alert; Cooperative   Attention Attends with cues to redirect   Orientation Level Oriented X4   Memory Decreased recall of precautions   Following Commands Follows one step commands without difficulty   Comments Pt was cooperative and showed increased motivation  Pt requires increased processing time for safety and correct transfering techniques  Assessment   Limitation Decreased ADL status; Decreased UE ROM; Decreased UE strength;Decreased Safe judgement during ADL;Decreased cognition;Decreased endurance;Decreased fine motor control;Decreased high-level ADLs   Prognosis Poor; Fair   Assessment Pt is 61 y o  male admitted to [facility] on 12/30/2021 w/ ventricular septal defect and cardiogenic shock  On 12/31/2021, pt received initiation of venoarterial extracorporeal life support   Since admission pt has had, "Cardiogenic shock due to anteroseptal MI complicated by VSD on VA ECMO 12/31/21 s/p VSD repair with large bovine pericardial patch and ECMO decannulation 1/4/2022, also with vasodilatory component post op; s/p multiple blood product transfusions " Pt's currently is active with, Agoraphobia with panic attacks, VSD (ventricular septal defect), Transaminitis, Tylenol ingestion, Acute systolic congestive heart failure ([facility] [address] ), Anemia, Personal history of ECMO, Coagulopathy ([facility] [address] ), Leukocytosis, central line complication, S/P VSD repair, Thrombocytopenia ([facility] [address] ), Hypernatremia, Hyperchloremia, and Hypocalcemia  Pt with active OT orders and activity orders  Seen 2022 for re-evaluation 2* goal expiration  Pt resides in a 2 level Home, with 2 POORNIMA, w sister and family  Pt was I w/  ADLs, IADLs, (+) drove  Pt is currently functioning at mod A 2x for bed mobility, max A x2-3 for transfers, mod A for UB ADLS, and max A for LB ADLs   Pt is limited 2* pain, endurance, activity tolerance, functional mobility, balance, functional standing tolerance, decreased I w/ ADLS/IADLS and strength  The Areas of Occupational Performance Areas to address w/ pt include: eating, grooming, bathing/shower, toilet hygiene, dressing, health maintenance and functional mobility  Based off of an OT evaulation, assessment, and performance functioning, pt is identified as a high complexity  OT recommendation for d/c includes post acute rehab  Pt would benefit from continued acute OT services for 3-5x/week to  w/in 10-14 days:  to address functional goals  Goals   Patient Goals to get better   LTG Time Frame 10-14   Long Term Goal #1 see goals below   Plan   Treatment Interventions ADL retraining;Functional transfer training;UE strengthening/ROM; Endurance training;Patient/family training;Fine motor coordination activities; Compensatory technique education;Continued evaluation; Activityengagement; Energy conservation   Goal Expiration Date 22   OT Frequency 3-5x/wk   Recommendation   OT Discharge Recommendation Post acute rehabilitation services   OT - OK to Discharge Yes  (when medically stable)   AM-PAC Daily Activity Inpatient   Lower Body Dressing 2   Bathing 2   Toileting 2   Upper Body Dressing 2   Grooming 2   Eating 2   Daily Activity Raw Score 12   Daily Activity Standardized Score (Calc for Raw Score >=11) 30 6   AM-PAC Applied Cognition Inpatient   Following a Speech/Presentation 3   Understanding Ordinary Conversation 4   Taking Medications 3   Remembering Where Things Are Placed or Put Away 3   Remembering List of 4-5 Errands 3   Taking Care of Complicated Tasks 3   Applied Cognition Raw Score 19   Applied Cognition Standardized Score 39 77   Modified Mk Scale   Modified Mk Scale 4       Goals    Pt w/ mod I will complete daily grooming tasks w/ adaptive equipment as needed  Pt will increase standing tolerance to 3 mins w/ min A w/ adaptive equipment as needed  Pt will complete functional transfers w/ min A on and off all surfaces w/ equipment as needed  Pt will complete tolieting w/ min A while demonstrating safety techniques w/ DME  Pt will complete feeding tasks w/ min A using adaptive devices  Pt will demonstrate G safety awareness w/ min A during OT session  Pt will demonstrate LB ADLs w/ min A w/ adaptive equipment as needed  Pt will demonstrate UB ADLs w/ min A w/ adaptive equipment as needed  Pt will increase activity tolerance to G during a 30 min OT tx session  Pt will demonstrate bed mobility skills w/ min A  Pt will sit EOB w/ min A for trunk control for 5 mins  Pt will participate in a formal/functional cognitive assessment as needed to assist with safe discharge planning       STEFANIA Fuentes

## 2022-01-24 NOTE — PLAN OF CARE
Problem: MOBILITY - ADULT  Goal: Maintain or return to baseline ADL function  Description: INTERVENTIONS:  -  Assess patient's ability to carry out ADLs; assess patient's baseline for ADL function and identify physical deficits which impact ability to perform ADLs (bathing, care of mouth/teeth, toileting, grooming, dressing, etc )  - Assess/evaluate cause of self-care deficits   - Assess range of motion  - Assess patient's mobility; develop plan if impaired  - Assess patient's need for assistive devices and provide as appropriate  - Encourage maximum independence but intervene and supervise when necessary  - Involve family in performance of ADLs  - Assess for home care needs following discharge   - Consider OT consult to assist with ADL evaluation and planning for discharge  - Provide patient education as appropriate  Outcome: Progressing  Goal: Maintains/Returns to pre admission functional level  Description: INTERVENTIONS:  - Perform BMAT or MOVE assessment daily    - Set and communicate daily mobility goal to care team and patient/family/caregiver     - Collaborate with rehabilitation services on mobility goals if consulted  - Out of bed for toileting  - Record patient progress and toleration of activity level   Outcome: Progressing     Problem: Potential for Falls  Goal: Patient will remain free of falls  Description: INTERVENTIONS:  - Educate patient/family on patient safety including physical limitations  - Instruct patient to call for assistance with activity   - Consult OT/PT to assist with strengthening/mobility   - Keep Call bell within reach  - Keep bed low and locked with side rails adjusted as appropriate  - Keep care items and personal belongings within reach  - Initiate and maintain comfort rounds  - Make Fall Risk Sign visible to staff  - Apply yellow socks and bracelet for high fall risk patients  - Consider moving patient to room near nurses station  Outcome: Progressing     Problem: Prexisting or High Potential for Compromised Skin Integrity  Goal: Skin integrity is maintained or improved  Description: INTERVENTIONS:  - Identify patients at risk for skin breakdown  - Assess and monitor skin integrity  - Assess and monitor nutrition and hydration status  - Monitor labs   - Assess for incontinence   - Turn and reposition patient  - Assist with mobility/ambulation  - Relieve pressure over bony prominences  - Avoid friction and shearing  - Provide appropriate hygiene as needed including keeping skin clean and dry  - Evaluate need for skin moisturizer/barrier cream  - Collaborate with interdisciplinary team   - Patient/family teaching  - Consider wound care consult   Outcome: Progressing     Problem: PAIN - ADULT  Goal: Verbalizes/displays adequate comfort level or baseline comfort level  Description: Interventions:  - Encourage patient to monitor pain and request assistance  - Assess pain using appropriate pain scale  - Administer analgesics based on type and severity of pain and evaluate response  - Implement non-pharmacological measures as appropriate and evaluate response  - Consider cultural and social influences on pain and pain management  - Notify physician/advanced practitioner if interventions unsuccessful or patient reports new pain  Outcome: Progressing     Problem: INFECTION - ADULT  Goal: Absence or prevention of progression during hospitalization  Description: INTERVENTIONS:  - Assess and monitor for signs and symptoms of infection  - Monitor lab/diagnostic results  - Monitor all insertion sites, i e  indwelling lines, tubes, and drains  - Monitor endotracheal if appropriate and nasal secretions for changes in amount and color  - [place] appropriate cooling/warming therapies per order  - Administer medications as ordered  - Instruct and encourage patient and family to use good hand hygiene technique  - Identify and instruct in appropriate isolation precautions for identified infection/condition  Outcome: Progressing  Goal: Absence of fever/infection during neutropenic period  Description: INTERVENTIONS:  - Monitor WBC    Outcome: Progressing     Problem: SAFETY ADULT  Goal: Maintain or return to baseline ADL function  Description: INTERVENTIONS:  -  Assess patient's ability to carry out ADLs; assess patient's baseline for ADL function and identify physical deficits which impact ability to perform ADLs (bathing, care of mouth/teeth, toileting, grooming, dressing, etc )  - Assess/evaluate cause of self-care deficits   - Assess range of motion  - Assess patient's mobility; develop plan if impaired  - Assess patient's need for assistive devices and provide as appropriate  - Encourage maximum independence but intervene and supervise when necessary  - Involve family in performance of ADLs  - Assess for home care needs following discharge   - Consider OT consult to assist with ADL evaluation and planning for discharge  - Provide patient education as appropriate  Outcome: Progressing  Goal: Maintains/Returns to pre admission functional level  Description: INTERVENTIONS:  - Perform BMAT or MOVE assessment daily    - Set and communicate daily mobility goal to care team and patient/family/caregiver     - Collaborate with rehabilitation services on mobility goals if consulted  - Out of bed for toileting  - Record patient progress and toleration of activity level   Outcome: Progressing  Goal: Patient will remain free of falls  Description: INTERVENTIONS:  - Educate patient/family on patient safety including physical limitations  - Instruct patient to call for assistance with activity   - Consult OT/PT to assist with strengthening/mobility   - Keep Call bell within reach  - Keep bed low and locked with side rails adjusted as appropriate  - Keep care items and personal belongings within reach  - Initiate and maintain comfort rounds  - Make Fall Risk Sign visible to staff  - Apply yellow socks and bracelet for high fall risk patients  - Consider moving patient to room near nurses station  Outcome: Progressing     Problem: DISCHARGE PLANNING  Goal: Discharge to home or other facility with appropriate resources  Description: INTERVENTIONS:  - Identify barriers to discharge w/patient and caregiver  - Arrange for needed discharge resources and transportation as appropriate  - Identify discharge learning needs (meds, wound care, etc )  - Arrange for interpretive services to assist at discharge as needed  - Refer to Case Management Department for coordinating discharge planning if the patient needs post-hospital services based on physician/advanced practitioner order or complex needs related to functional status, cognitive ability, or social support system  Outcome: Progressing     Problem: Knowledge Deficit  Goal: Patient/family/caregiver demonstrates understanding of disease process, treatment plan, medications, and discharge instructions  Description: Complete learning assessment and assess knowledge base    Interventions:  - Provide teaching at level of understanding  - Provide teaching via preferred learning methods  Outcome: Progressing     Problem: CARDIOVASCULAR - ADULT  Goal: Maintains optimal cardiac output and hemodynamic stability  Description: INTERVENTIONS:  - Monitor I/O, vital signs and rhythm  - Monitor for S/S and trends of decreased cardiac output  - Administer and titrate ordered vasoactive medications to optimize hemodynamic stability  - Assess quality of pulses, skin color and temperature  - Assess for signs of decreased coronary artery perfusion  - Instruct patient to report change in severity of symptoms  Outcome: Progressing  Goal: Absence of cardiac dysrhythmias or at baseline rhythm  Description: INTERVENTIONS:  - Continuous cardiac monitoring, vital signs, obtain 12 lead EKG if ordered  - Administer antiarrhythmic and heart rate control medications as ordered  - Monitor electrolytes and administer replacement therapy as ordered  Outcome: Progressing     Problem: RESPIRATORY - ADULT  Goal: Achieves optimal ventilation and oxygenation  Description: INTERVENTIONS:  - Assess for changes in respiratory status  - Assess for changes in mentation and behavior  - Position to facilitate oxygenation and minimize respiratory effort  - Oxygen administered by appropriate delivery if ordered  - Initiate smoking cessation education as indicated  - Encourage broncho-pulmonary hygiene including cough, deep breathe, Incentive Spirometry  - Assess the need for suctioning and aspirate as needed  - Assess and instruct to report SOB or any respiratory difficulty  - Respiratory Therapy support as indicated  Outcome: Progressing     Problem: GASTROINTESTINAL - ADULT  Goal: Oral mucous membranes remain intact  Description: INTERVENTIONS  - Assess oral mucosa and hygiene practices  - Implement preventative oral hygiene regimen  - Implement oral medicated treatments as ordered  - Initiate Nutrition services referral as needed  Outcome: Progressing     Problem: GENITOURINARY - ADULT  Goal: Maintains or returns to baseline urinary function  Description: INTERVENTIONS:  - Assess urinary function  - Encourage oral fluids to ensure adequate hydration if ordered  - Administer IV fluids as ordered to ensure adequate hydration  - Administer ordered medications as needed  - Offer frequent toileting  - Follow urinary retention protocol if ordered  Outcome: Progressing  Goal: Urinary catheter remains patent  Description: INTERVENTIONS:  - Assess patency of urinary catheter  - If patient has a chronic [place], consider changing catheter if non-functioning  - Follow guidelines for intermittent irrigation of non-functioning urinary catheter  Outcome: Progressing     Problem: METABOLIC, FLUID AND ELECTROLYTES - ADULT  Goal: Electrolytes maintained within normal limits  Description: INTERVENTIONS:  - Monitor labs and assess patient for signs and symptoms of electrolyte imbalances  - Administer electrolyte replacement as ordered  - Monitor response to electrolyte replacements, including repeat lab results as appropriate  - Instruct patient on fluid and nutrition as appropriate  Outcome: Progressing  Goal: Fluid balance maintained  Description: INTERVENTIONS:  - Monitor labs   - Monitor I/O and WT  - Instruct patient on fluid and nutrition as appropriate  - Assess for signs & symptoms of volume excess or deficit  Outcome: Progressing  Goal: Glucose maintained within target range  Description: INTERVENTIONS:  - Monitor Blood Glucose as ordered  - Assess for signs and symptoms of hyperglycemia and hypoglycemia  - Administer ordered medications to maintain glucose within target range  - Assess nutritional intake and initiate nutrition service referral as needed  Outcome: Progressing     Problem: SKIN/TISSUE INTEGRITY - ADULT  Goal: Skin Integrity remains intact(Skin Breakdown Prevention)  Description: Assess:  -Inspect skin when repositioning, toileting, and assisting with ADLS  -Assess extremities for adequate circulation and sensation     Bed Management:  -Have minimal linens on bed & keep smooth, unwrinkled  -Change linens as needed when moist or perspiring      Toileting:  -Offer bedside commode    Activity:  -Mobilize patient  times a day  -Encourage activity and walks on unit  -Encourage or provide ROM exercises     Skin Care:  -Avoid use of baby powder, tape, friction and shearing, hot water or constrictive clothing  Outcome: Progressing  Goal: Incision(s), wounds(s) or drain site(s) healing without S/S of infection  Description: INTERVENTIONS  - Assess and document dressing, incision, wound bed, drain sites and surrounding tissue  - Provide patient and family education*  Outcome: Progressing  Goal: Pressure injury heals and does not worsen  Description: Interventions:  - Implement low air loss mattress or specialty surface (Criteria met)  - Apply silicone foam dressing  - Consider nutrition services referral as needed  Outcome: Progressing     Problem: HEMATOLOGIC - ADULT  Goal: Maintains hematologic stability  Description: INTERVENTIONS  - Assess for signs and symptoms of bleeding or hemorrhage  - Monitor labs  - Administer supportive blood products/factors as ordered and appropriate  Outcome: Progressing     Problem: Nutrition/Hydration-ADULT  Goal: Nutrient/Hydration intake appropriate for improving, restoring or maintaining nutritional needs  Description: Monitor and assess patient's nutrition/hydration status for malnutrition  Collaborate with interdisciplinary team and initiate plan and interventions as ordered  Monitor patient's weight and dietary intake as ordered or per policy  Utilize nutrition screening tool and intervene as necessary  Determine patient's food preferences and provide high-protein, high-caloric foods as appropriate       INTERVENTIONS:  - Monitor oral intake, urinary output, labs, and treatment plans  - Assess nutrition and hydration status and recommend course of action  - Evaluate amount of meals eaten  - Assist patient with eating if necessary   - Allow adequate time for meals  - Recommend/ encourage appropriate diets, oral nutritional supplements, and vitamin/mineral supplements  - Order, calculate, and assess calorie counts as needed  - Recommend, monitor, and adjust tube feedings and TPN/PPN based on assessed needs  - Assess need for intravenous fluids  - Provide specific nutrition/hydration education as appropriate  - Include patient/family/caregiver in decisions related to nutrition  Outcome: Progressing

## 2022-01-24 NOTE — WOUND OSTOMY CARE
Progress Note - Wound   Thirza Spatz 61 y o  male MRN: [id number]  Unit/Bed#: [facility] 427-01 Encounter: [id number]      Assessment:  Patient is seen for wound care assessment today   The patient is a 61year old male that is being followed for the sacral wound   Patient is incontinent of stool at the time of the assessment and angela care provided   Mod A of 2 for rolling in the bed for the assessment   Chang in place   Assessment Findings   1  Bilateral heels dry and intact - allevyn foams on for prevention   2  Sacral - hospital acquired evolving DTI   Wound bed is 75% yellow and 25% red / pink tissue   No noted erythema , foul drainage , induration of the area   Called for the P - 500 low air loss mattress   Skin care plans:  1-Calazime to sacrum, buttocks TID and PRN  2-Hydraguard to bilateral heel BID and PRN  3-Elevate heels to offload pressure  4-Ehob cushion when out of bed  5-Turn/repoisiton q2h or when medically stable for pressure re-distribution on skin  6-Moisturize skin daily with skin nourishing cream  7  Right thigh cleanse with soap and water then apply adaptic then a ABD and change daily   8  P - 500 low air loss mattress        Objective:    Vitals: Blood pressure 98/57, pulse 83, temperature 98 1 °F (36 7 °C), resp  rate 17, height 5' 5 5" (1 664 m), weight 97 3 kg (214 lb 8 1 oz), SpO2 99 %  ,Body mass index is 35 15 kg/m²  Wound 01/05/22 Pressure Injury Buttocks Medial;Mid; Inner (Active)   Wound Image   01/24/22 1004   Wound Description Beefy red;Fragile;Pink;Slough 01/24/22 1700   Pressure Injury Stage DTPI 01/24/22 1700   Angela-wound Assessment Clean;Dry; Intact 01/24/22 1700   Wound Length (cm) 3 cm 01/24/22 1004   Wound Width (cm) 2 5 cm 01/24/22 1004   Wound Depth (cm) 0 1 cm 01/17/22 1214   Wound Surface Area (cm^2) 7 5 cm^2 01/24/22 1004   Wound Volume (cm^3) 4 5 cm^3 01/17/22 1214   Calculated Wound Volume (cm^3) 4 5 cm^3 01/17/22 1214   Change in Wound Size % -411 36 01/17/22 1214   Wound Site Closure Open to air 01/24/22 1700   Drainage Amount Small 01/24/22 1700   Drainage Description Serosanguineous 01/24/22 1700   Non-staged Wound Description Partial thickness 01/21/22 1352   Treatments Cleansed;Irrigation with NSS 01/24/22 1700   Dressing Protective barrier 01/24/22 1700   Patient Tolerance Tolerated well 01/24/22 1700   Dressing Status Clean;Dry; Intact 01/21/22 0400       Wound care will follow weekly call or tiger text with questions     Maninder Coats RN BSN CWOCN

## 2022-01-24 NOTE — PLAN OF CARE
Problem: PHYSICAL THERAPY ADULT  Goal: Performs mobility at highest level of function for planned discharge setting  See evaluation for individualized goals  Description: Treatment/Interventions: Functional transfer training,LE strengthening/ROM,Elevations,Therapeutic exercise,Endurance training,Equipment eval/education,Bed mobility,Gait training,Spoke to nursing,OT,Cognitive reorientation  Equipment Recommended:  (r/o rw next visit)       See flowsheet documentation for full assessment, interventions and recommendations  Outcome: Progressing  Note: Prognosis: Fair  Problem List: Decreased strength,Decreased endurance,Impaired balance,Decreased mobility,Pain  Assessment: Pt seen for PT session today with a focus on functional transfers and endurance  Session was limited 2* pt's lethargy, reporting that he didn't sleep well  Pt required increased assistance for bed mobility and transfers this session  Attempted stand pivot transfer, but pt unable to weight shift appropriately in standing  Instead, performed sit pivot to drop arm recliner with Ax2-3 with VCs for anterior weight shifting as pt had a tendency to lean back during transfer  Anticipate that with improved sleep/rest pt will be able to ambulate a short distance next session  Pt would benefit from continued acute skilled PT to address above deficits  Continuing to recommend rehab upon d/c    Barriers to Discharge: Inaccessible home environment,Decreased caregiver support        PT Discharge Recommendation: Post acute rehabilitation services          See flowsheet documentation for full assessment

## 2022-01-24 NOTE — PROGRESS NOTES
Cardiology Progress Note - Ade Shetty 61 y o  male MRN: [id number]    Unit/Bed#: [facility] 427-01 Encounter: [id number]      Assessment & Plan:  Principal Problem:    S/P VSD repair  Active Problems:    Agoraphobia with panic attacks    Cardiogenic shock (HCC)    VSD (ventricular septal defect)    JUDE (acute kidney injury) ([facility] [address] )    Transaminitis    Tylenol ingestion    Acute systolic congestive heart failure (HCC)    Anemia    Personal history of ECMO    Coagulopathy (HCC)    Leukocytosis    Central line complication    Thrombocytopenia (HCC)    Hypernatremia    Hyperchloremia    Hypocalcemia    Anticoagulated on Coumadin    Anticoagulation goal of INR 2 to 3    Urinary retention    # Late presenting anterior STEMI c/b anteroapical VSD s/p open patch repair   # Redo sternotomy, mediastinal exploration for retained swan avinash catheter  # HFrEF with EF 35-40% as per my assessment on most recent CORIE  # Deconditioning   # UTI    Patient had an episode hypotension yesterday with dizziness  Patient was deemed to be septic at the time  Patient does not have fevers or white count  Procal and lactate are wnl  Patient does have a nitrite positive UA with positive cultures from 01/20  Patient received 2 5 L of crystalloid and 500 cc of albumin resuscitation  Blood pressure improved after resuscitation  Tele was unremarkable  Given the lack of systemic signs I do not think this was sepsis induced hypotension  Given the net negative fluid balance with hypotension past couple of days this was likely hypovolemia induced hypotension  BP: 90s-100s/60s  HR: 90s  UOP: 1 8 L UOP yesterday   Creatinine: No labs this am  Euvolemic today with mild tissue edema    Plan:  - decrease bumex to 1 mg PO QD  - c/w losartan 12 5 mg QD  - Toprol XL 12 5 mg BID  - c/w aspirin and atorvastatin  - amiodarone for afib prevention  - encourage OOBTC and physical therapy   Patient will need JACQUELINE as per PT   - Ancef for UTI    Subjective:   Patient had an episode of hypotension yesterday as described above  Objective:     Vitals: Blood pressure 100/62, pulse 73, temperature 98 3 °F (36 8 °C), temperature source Oral, resp  rate 16, height 5' 5 5" (1 664 m), weight 97 3 kg (214 lb 8 1 oz), SpO2 99 %  , Body mass index is 35 15 kg/m² ,   Orthostatic Blood Pressures      Most Recent Value   Blood Pressure 100/62 filed at 01/24/2022 0710   Patient Position - Orthostatic VS Lying filed at 01/24/2022 0710            Intake/Output Summary (Last 24 hours) at 1/24/2022 0908  Last data filed at 1/24/2022 4735  Gross per 24 hour   Intake 3678 43 ml   Output 2260 ml   Net 1418 43 ml           Physical Exam:    GEN: Robbie Momin Lethargic  HEENT: anicteric, mucous membranes moist  NECK: No JVD, carotid bruits   HEART: regular rhythm, normal S1 and S2, no murmurs, clicks, gallops or rubs   CHEST: Sternotomy incision site healing well  LUNGS: clear to auscultation bilaterally; Crackles present  ABDOMEN: normal bowel sounds, soft, no tenderness, no distention  EXTREMITIES: peripheral pulses normal, 1+ edema  NEURO: no focal findings    SKIN: normal without suspicious lesions on exposed skin      Current Facility-Administered Medications:     acetaminophen (TYLENOL) tablet 650 mg, 650 mg, Oral, Q6H PRN, Anabelle Art PA-C    amiodarone tablet 200 mg, 200 mg, Oral, Q8H [address], Ethan Post PA-C, 200 mg at 01/24/22 0500    aspirin tablet 325 mg, 325 mg, Oral, Daily, Ethan Post PA-C, 325 mg at 01/24/22 0850    atorvastatin (LIPITOR) tablet 80 mg, 80 mg, Oral, After Dinner, Ethan Post PA-C, 80 mg at 01/23/22 1715    bisacodyl (DULCOLAX) rectal suppository 10 mg, 10 mg, Rectal, Daily PRN, Anabelle Art PA-C    bumetanide (BUMEX) tablet 2 mg, 2 mg, Oral, Daily, Milly Moya MD, 2 mg at 01/24/22 0849    ceFAZolin (ANCEF) IVPB (premix in dextrose) 2,000 mg 50 mL, 2,000 mg, Intravenous, Q8H, BROOKE Roman, Last Rate: 100 mL/hr at 01/24/22 0849, 2,000 mg at 01/24/22 0849    docusate sodium (COLACE) capsule 100 mg, 100 mg, Oral, BID PRN, Anabelle Art PA-C    heparin (porcine) subcutaneous injection 5,000 Units, 5,000 Units, Subcutaneous, Q8H [address], Marii Diego Senior, PA-C, 5,000 Units at 01/24/22 0842    insulin lispro (HumaLOG) 100 units/mL subcutaneous injection 1-6 Units, 1-6 Units, Subcutaneous, TID AC **AND** Fingerstick Glucose (POCT), , , TID AC, Birtha Skiff, PA-C    insulin lispro (HumaLOG) 100 units/mL subcutaneous injection 1-6 Units, 1-6 Units, Subcutaneous, HS, Birtha Skiff, PA-EMILIANO, 1 Units at 01/21/22 2117    losartan (COZAAR) tablet 12 5 mg, 12 5 mg, Oral, Daily, BROOKE Contreras, 12 5 mg at 01/24/22 0850    metoprolol succinate (TOPROL-XL) 24 hr tablet 12 5 mg, 12 5 mg, Oral, BID, BROOKE Contreras, 12 5 mg at 01/24/22 0856    ondansetron (ZOFRAN) injection 4 mg, 4 mg, Intravenous, Q6H PRN, Birtha Skiff, PA-C    oxyCODONE (ROXICODONE) IR tablet 2 5 mg, 2 5 mg, Oral, Q4H PRN, Birtha Skiff, PA-C, 2 5 mg at 01/13/22 0525    oxyCODONE (ROXICODONE) IR tablet 5 mg, 5 mg, Oral, Q4H PRN, Birtha Skiff, PA-C, 5 mg at 01/08/22 2148    pantoprazole (PROTONIX) EC tablet 40 mg, 40 mg, Oral, Early Morning, Birtha Skiff, PA-C, 40 mg at 01/24/22 0501    polyethylene glycol (MIRALAX) packet 17 g, 17 g, Oral, Daily PRN, Anabelle Art PA-C    tamsulosin (FLOMAX) capsule 0 4 mg, 0 4 mg, Oral, Daily With Dinner, Marii Senior, PA-C, 0 4 mg at 01/23/22 1715    temazepam (RESTORIL) capsule 15 mg, 15 mg, Oral, HS PRN, Birtha Skiff, PA-C, 15 mg at 01/13/22 2144    Labs & Results:    No results found for: CKTOTAL, CKMB, CKMBINDEX, TROPONINI    Lab Results   Component Value Date    GLUCOSE 149 (H) 01/07/2022    CALCIUM 8 4 01/24/2022    K 4 7 01/24/2022    CO2 26 01/24/2022     01/24/2022    BUN 26 (H) 01/24/2022    CREATININE 1 08 01/24/2022       Lab Results   Component Value Date    WBC 5 22 01/23/2022    HGB 8 8 (L) 01/23/2022    HCT 28 0 (L) 01/23/2022     (H) 01/23/2022     01/23/2022     Results from last 7 days   Lab Units 01/21/22  1027   INR  2 18*       No results found for: CHOL  Lab Results   Component Value Date    HDL 13 (L) 12/30/2021    HDL 38 (L) 09/13/2021     Lab Results   Component Value Date    LDLCALC 107 (H) 12/30/2021    LDLCALC 134 (H) 09/13/2021     Lab Results   Component Value Date    TRIG 152 (H) 12/30/2021    TRIG 137 09/13/2021       Lab Results   Component Value Date    ALT 61 01/23/2022    AST 21 01/23/2022         EKG personally reviewed by )Alf Song MD  No acute changes   TELE: No significant arrhythmias seen on telemetry review

## 2022-01-24 NOTE — PHYSICAL THERAPY NOTE
Physical Therapy Treatment Note    Patient's Name: Sarah Casas  : 22 1128   PT Last Visit   PT Visit Date 22   Note Type   Note Type Treatment   Pain Assessment   Pain Assessment Tool 0-10   Pain Score No Pain   Hospital Pain Intervention(s) Repositioned; Ambulation/increased activity   Restrictions/Precautions   Weight Bearing Precautions Per Order No   Other Precautions Cardiac/sternal;Multiple lines;Telemetry; Fall Risk   General   Chart Reviewed Yes   Family/Caregiver Present No   Cognition   Arousal/Participation Cooperative   Attention Attends with cues to redirect   Orientation Level Oriented X4   Memory Decreased recall of precautions   Following Commands Follows one step commands without difficulty   Comments Pt lethargic this session, but agreeable to try PT   Bed Mobility   Supine to Sit 3  Moderate assistance   Additional items Assist x 2; Increased time required;Verbal cues   Additional Comments pt required close supervision/[place] at EOB   Transfers   Sit to Stand 2  Maximal assistance   Additional items Assist x 2; Increased time required;Verbal cues   Stand to Sit 2  Maximal assistance   Additional items Assist x 2; Increased time required;Verbal cues   Stand pivot 2  Maximal assistance   Additional items Assist x 2;Assist x 3; Increased time required;Verbal cues   Sit pivot 2  Maximal assistance   Additional items Assist x 2;Assist x 3; Increased time required;Verbal cues   Additional Comments Performed 1 STS transfer with HHAx2 and b/l knee blocking--> unable to transition to stand pivot 2* limited standing tolerance   Returned to seated position and performed sit pivot with Ax2-3 with 3 bumps frmo EOB to drop arm recliner   Ambulation/Elevation   Gait pattern Not tested  (2* pt report of lethargy/fatigue)   Balance   Static Sitting Fair   Dynamic Sitting Fair -   Static Standing Poor +   Dynamic Standing Poor   Endurance Deficit   Endurance Deficit Yes   Endurance Deficit Description weakness, fatigue, deconditioning   Activity Tolerance   Activity Tolerance Patient limited by fatigue   Medical Staff Made Aware Seen with OT 2* pt's medical complexity and multiple comorbidities  PT focus on functional transfers, endurance, and LE strengthening   Nurse Made Aware ok to see per RN   Exercises   Knee AROM Long Arc Quad Sitting;15 reps;AROM; Bilateral   Ankle Pumps Sitting;20 reps;AROM; Bilateral   Assessment   Prognosis Fair   Problem List Decreased strength;Decreased endurance; Impaired balance;Decreased mobility;Pain   Assessment Pt seen for PT session today with a focus on functional transfers and endurance  Session was limited 2* pt's lethargy, reporting that he didn't sleep well  Pt required increased assistance for bed mobility and transfers this session  Attempted stand pivot transfer, but pt unable to weight shift appropriately in standing  Instead, performed sit pivot to drop arm recliner with Ax2-3 with VCs for anterior weight shifting as pt had a tendency to lean back during transfer  Anticipate that with improved sleep/rest pt will be able to ambulate a short distance next session  Pt would benefit from continued acute skilled PT to address above deficits  Continuing to recommend rehab upon d/c  Goals   Patient Goals to get a good night's sleep   STG Expiration Date 02/01/22   PT Treatment Day 8   Plan   Treatment/Interventions Functional transfer training;LE strengthening/ROM; Elevations; Therapeutic exercise; Endurance training;Equipment eval/education;Gait training;Bed mobility;Spoke to nursing;Spoke to case management;OT   Progress Slow progress, decreased activity tolerance   PT Frequency 4-6x/wk   Recommendation   PT Discharge Recommendation Post acute rehabilitation services   AM-PAC Basic Mobility Inpatient   Turning in Bed Without Bedrails 2   Lying on Back to Sitting on Edge of Flat Bed 2   Moving Bed to Chair 1   Standing Up From Chair 1   Walk in Room 1 Climb 3-5 Stairs 1   Basic Mobility Inpatient Raw Score 8   Turning Head Towards Sound 4   Follow Simple Instructions 3   Low Function Basic Mobility Raw Score 15   Low Function Basic Mobility Standardized Score 23 9   Highest Level Of Mobility   -[facility] Goal 3: Sit at edge of bed   -[facility] Highest Level of Mobility 4: Move to chair/commode   -[facility] Goal Achieved Yes   Education   Education Provided Mobility training   Patient Demonstrates verbal understanding   End of Consult   Patient Position at End of Consult Bedside chair; All needs within reach       Alexis Gosselin, PT, DPT

## 2022-01-24 NOTE — UTILIZATION REVIEW
Continued Stay Review    Date: 01/24/2022                          Current Patient Class: Inpatient  Current Level of Care: Med/Surg    HPI:66 y o  male initially admitted on 12/30/2022   Cardiogenic shock  S/P VA ECMO; POD # 23  VSD/cardiogenic shock  S/P VSD repair w/ [facility] ECMO decannulation; POD # 19  Assessment/Plan:   Yesterday evening systolics in 47-02W, CC contacted given albumin and cystalloid (3L total), sepsis alert called, blood cultures taken, started on ancef for +UA, cx + Citrobacter koseri (1/20)  LA WNL, procalcitonin WNL  Blood cx pending  Continue losartan, Toprol xl, ASA & statin therapy  Diuretic regimen:  Bumex switch to 1 mg 1/25  Continue IV Abx  PT/OT    VTE Pharmacologic Prophylaxis: Heparin  VTE Mechanical Prophylaxis: sequential compression device    Vital Signs:   Date/Time Temp Pulse Resp BP MAP (mmHg) SpO2 O2 Device Patient Position - Orthostatic VS   01/24/22 11:35:13 -- -- -- 98/57 71 -- -- --   01/24/22 11:11:14 -- -- -- 97/57 70 -- -- --   01/24/22 10:52:16 98 °F (36 7 °C) -- 20 101/76 84 -- -- --   01/24/22 0853 -- -- -- 95/59 71 -- -- --   01/24/22 0710 98 3 °F (36 8 °C) 73 16 100/62 72 99 % None (Room air) Lying   01/24/22 0500 -- 76 -- 101/64 76 -- -- --   01/24/22 0400 -- -- -- -- -- -- None (Room air) --   01/24/22 0250 98 3 °F (36 8 °C) 73 15 92/60 68 99 % None (Room air) --   01/24/22 0131 -- -- -- 114/68 89 -- -- --   01/24/22 0036 -- 71 -- 96/59 70 -- -- --   01/24/22 0000 -- -- -- -- -- -- None (Room air) --     Date and Time Eye Opening Best Verbal Response Best Motor Response Rosetta Coma Scale Score   01/24/22 0800 4 5 6 15   01/24/22 0400 4 5 6 15   01/24/22 0000 4 5 6 15   01/23/22 2000 4 5 6 15       Pertinent Labs/Diagnostic Results:     Results from last 7 days   Lab Units 01/23/22  2019   WBC Thousand/uL 5 22   HEMOGLOBIN g/dL 8 8*   HEMATOCRIT % 28 0*   PLATELETS Thousands/uL 162   NEUTROS ABS Thousands/µL 3 90     Results from last 7 days   Lab Units 01/24/22  0448 01/23/22 2019 01/23/22  0538 01/20/22  0445 01/19/22  0443   SODIUM mmol/L 138 139 138 140 137   POTASSIUM mmol/L 4 7 3 6 5 0 3 6 3 8   CHLORIDE mmol/L 107 109* 109* 110* 104   CO2 mmol/L 26 23 26 26 29   ANION GAP mmol/L 5 7 3* 4 4   BUN mg/dL 26* 29* 31* 33* 35*   CREATININE mg/dL 1 08 1 06 1 15 1 04 1 27   EGFR ml/min/1 73sq m 72 73 66 75 59   CALCIUM mg/dL 8 4 7 0* 8 2* 6 9* 8 0*   CALCIUM, IONIZED mmol/L  --  0 99*  --   --   --    MAGNESIUM mg/dL  --  1 8  --   --   --    PHOSPHORUS mg/dL  --  2 1*  --   --   --      Results from last 7 days   Lab Units 01/23/22 2019   AST U/L 21   ALT U/L 61   ALK PHOS U/L 101   TOTAL PROTEIN g/dL 5 5*   ALBUMIN g/dL 2 9*   TOTAL BILIRUBIN mg/dL 1 80*     Results from last 7 days   Lab Units 01/24/22  1130 01/24/22  0633 01/23/22  2023 01/23/22  1608 01/23/22  1109 01/23/22  0619 01/22/22  2105 01/22/22  1650 01/22/22  1113 01/22/22  0556 01/21/22  2100 01/21/22  1551   POC GLUCOSE mg/dl 69 86 129 105 135 94 130 91 96 96 154* 141*     Results from last 7 days   Lab Units 01/24/22  0448 01/23/22 2019 01/23/22  0538 01/20/22  0445 01/19/22  0443 01/18/22  0434 01/17/22  1509   GLUCOSE RANDOM mg/dL 87 113 89 89 82 78 112     Results from last 7 days   Lab Units 01/23/22 2024   PH JANNET  7 390   PCO2 JANNET mm Hg 40 6*   PO2 JANNET mm Hg 31 1*   HCO3 JANNET mmol/L 24 0   BASE EXC JANNET mmol/L -0 9   O2 CONTENT JANNET ml/dL 7 8   O2 HGB, VENOUS % 58 1*     Results from last 7 days   Lab Units 01/21/22  1027 01/20/22  0445 01/19/22  0443   PROTIME seconds 23 2* 32 9* 28 1*   INR  2 18* 3 45* 2 80*     Results from last 7 days   Lab Units 01/24/22  0448 01/23/22  2020   PROCALCITONIN ng/ml 0 12 0 16     Results from last 7 days   Lab Units 01/23/22  2019   LACTIC ACID mmol/L 1 1       Results from last 7 days   Lab Units 01/20/22  1827   CLARITY UA  Cloudy   COLOR UA  Brown   SPEC GRAV UA  1 025   PH UA  6 5   GLUCOSE UA mg/dl 100 (1/10%)*   KETONES UA mg/dl Negative   BLOOD UA Large   PROTEIN UA mg/dl 100 (2+)*   NITRITE UA  Positive*   BILIRUBIN UA  Interference- unable to analyze*   UROBILINOGEN UA E U /dl 2 0*   LEUKOCYTES UA  Trace*   WBC UA /hpf Innumerable*   RBC UA /hpf Field obscured, unable to enumerate*   BACTERIA UA /hpf Field obscured, unable to enumerate*   EPITHELIAL CELLS WET PREP /hpf Field obscured, unable to enumerate*     Results from last 7 days   Lab Units 01/23/22  2259 01/23/22  2258 01/20/22  1827   BLOOD CULTURE  Received in Microbiology Lab  Culture in Progress  Received in Microbiology Lab  Culture in Progress  --    URINE CULTURE   --   --  >100,000 cfu/ml Citrobacter koseri*     Medications:   Scheduled Medications:  amiodarone, 200 mg, Oral, Q8H DENITA  aspirin, 325 mg, Oral, Daily  atorvastatin, 80 mg, Oral, After Dinner  [START ON 1/25/2022] bumetanide, 1 mg, Oral, Daily  cefazolin, 2,000 mg, Intravenous, Q8H  heparin (porcine), 5,000 Units, Subcutaneous, Q8H DENITA  insulin lispro, 1-6 Units, Subcutaneous, TID AC  insulin lispro, 1-6 Units, Subcutaneous, HS  losartan, 12 5 mg, Oral, Daily  metoprolol succinate, 12 5 mg, Oral, BID  pantoprazole, 40 mg, Oral, Early Morning  tamsulosin, 0 4 mg, Oral, Daily With Dinner      Continuous IV Infusions:     PRN Meds:  acetaminophen, 650 mg, Oral, Q6H PRN  bisacodyl, 10 mg, Rectal, Daily PRN  docusate sodium, 100 mg, Oral, BID PRN  ondansetron, 4 mg, Intravenous, Q6H PRN  oxyCODONE, 2 5 mg, Oral, Q4H PRN  oxyCODONE, 5 mg, Oral, Q4H PRN  polyethylene glycol, 17 g, Oral, Daily PRN  temazepam, 15 mg, Oral, HS PRN        Discharge Plan: D    Network Utilization Review Department  ATTENTION: Please call with any questions or concerns to [phone] and carefully listen to the prompts so that you are directed to the right person   All voicemails are confidential   [facility] all requests for admission clinical reviews, approved or denied determinations and any other requests to dedicated fax number below belonging to the [place] where the patient is receiving treatment   List of dedicated fax numbers for the Facilities:  [address] DENIALS (Administrative/Medical Necessity) [phone]   [address]  (Maternity/NICU/Pediatrics) [phone]   [address]  [address] [place] [address] [address] [place] [address] [address] [place], [place] [address] [phone]

## 2022-01-25 LAB
ANION GAP SERPL CALCULATED.3IONS-SCNC: 7 MMOL/L (ref 4–13)
BASOPHILS # BLD AUTO: 0.02 THOUSANDS/ΜL (ref 0–0.1)
BASOPHILS NFR BLD AUTO: 0 % (ref 0–1)
BUN SERPL-MCNC: 28 MG/DL (ref 5–25)
CALCIUM SERPL-MCNC: 8.3 MG/DL (ref 8.3–10.1)
CHLORIDE SERPL-SCNC: 103 MMOL/L (ref 100–108)
CO2 SERPL-SCNC: 27 MMOL/L (ref 21–32)
CREAT SERPL-MCNC: 1.15 MG/DL (ref 0.6–1.3)
EOSINOPHIL # BLD AUTO: 0.09 THOUSAND/ΜL (ref 0–0.61)
EOSINOPHIL NFR BLD AUTO: 2 % (ref 0–6)
ERYTHROCYTE [DISTWIDTH] IN BLOOD BY AUTOMATED COUNT: 17.8 % (ref 11.6–15.1)
GFR SERPL CREATININE-BSD FRML MDRD: 66 ML/MIN/1.73SQ M
GLUCOSE SERPL-MCNC: 112 MG/DL (ref 65–140)
GLUCOSE SERPL-MCNC: 113 MG/DL (ref 65–140)
GLUCOSE SERPL-MCNC: 125 MG/DL (ref 65–140)
GLUCOSE SERPL-MCNC: 96 MG/DL (ref 65–140)
GLUCOSE SERPL-MCNC: 96 MG/DL (ref 65–140)
HCT VFR BLD AUTO: 29.3 % (ref 36.5–49.3)
HGB BLD-MCNC: 9 G/DL (ref 12–17)
IMM GRANULOCYTES # BLD AUTO: 0.08 THOUSAND/UL (ref 0–0.2)
IMM GRANULOCYTES NFR BLD AUTO: 2 % (ref 0–2)
LYMPHOCYTES # BLD AUTO: 0.66 THOUSANDS/ΜL (ref 0.6–4.47)
LYMPHOCYTES NFR BLD AUTO: 12 % (ref 14–44)
MCH RBC QN AUTO: 30.7 PG (ref 26.8–34.3)
MCHC RBC AUTO-ENTMCNC: 30.7 G/DL (ref 31.4–37.4)
MCV RBC AUTO: 100 FL (ref 82–98)
MONOCYTES # BLD AUTO: 0.36 THOUSAND/ΜL (ref 0.17–1.22)
MONOCYTES NFR BLD AUTO: 7 % (ref 4–12)
NEUTROPHILS # BLD AUTO: 4.28 THOUSANDS/ΜL (ref 1.85–7.62)
NEUTS SEG NFR BLD AUTO: 77 % (ref 43–75)
NRBC BLD AUTO-RTO: 0 /100 WBCS
PLATELET # BLD AUTO: 142 THOUSANDS/UL (ref 149–390)
PMV BLD AUTO: 11.5 FL (ref 8.9–12.7)
POTASSIUM SERPL-SCNC: 3.7 MMOL/L (ref 3.5–5.3)
RBC # BLD AUTO: 2.93 MILLION/UL (ref 3.88–5.62)
SODIUM SERPL-SCNC: 137 MMOL/L (ref 136–145)
WBC # BLD AUTO: 5.49 THOUSAND/UL (ref 4.31–10.16)

## 2022-01-25 PROCEDURE — 82948 REAGENT STRIP/BLOOD GLUCOSE: CPT

## 2022-01-25 PROCEDURE — 99024 POSTOP FOLLOW-UP VISIT: CPT | Performed by: PHYSICIAN ASSISTANT

## 2022-01-25 PROCEDURE — 80048 BASIC METABOLIC PNL TOTAL CA: CPT | Performed by: PHYSICIAN ASSISTANT

## 2022-01-25 PROCEDURE — 99231 SBSQ HOSP IP/OBS SF/LOW 25: CPT | Performed by: INTERNAL MEDICINE

## 2022-01-25 PROCEDURE — 85025 COMPLETE CBC W/AUTO DIFF WBC: CPT | Performed by: PHYSICIAN ASSISTANT

## 2022-01-25 PROCEDURE — 97116 GAIT TRAINING THERAPY: CPT

## 2022-01-25 PROCEDURE — 97530 THERAPEUTIC ACTIVITIES: CPT

## 2022-01-25 RX ORDER — POTASSIUM CHLORIDE 20 MEQ/1
40 TABLET, EXTENDED RELEASE ORAL ONCE
Status: COMPLETED | OUTPATIENT
Start: 2022-01-25 | End: 2022-01-25

## 2022-01-25 RX ADMIN — CEFAZOLIN SODIUM 2000 MG: 2 SOLUTION INTRAVENOUS at 09:08

## 2022-01-25 RX ADMIN — ATORVASTATIN CALCIUM 80 MG: 80 TABLET, FILM COATED ORAL at 17:31

## 2022-01-25 RX ADMIN — HEPARIN SODIUM 5000 UNITS: 5000 INJECTION INTRAVENOUS; SUBCUTANEOUS at 16:23

## 2022-01-25 RX ADMIN — HEPARIN SODIUM 5000 UNITS: 5000 INJECTION INTRAVENOUS; SUBCUTANEOUS at 09:08

## 2022-01-25 RX ADMIN — AMIODARONE HYDROCHLORIDE 200 MG: 200 TABLET ORAL at 14:37

## 2022-01-25 RX ADMIN — ASPIRIN 325 MG ORAL TABLET 325 MG: 325 PILL ORAL at 09:08

## 2022-01-25 RX ADMIN — LOSARTAN POTASSIUM 12.5 MG: 25 TABLET, FILM COATED ORAL at 09:08

## 2022-01-25 RX ADMIN — PANTOPRAZOLE SODIUM 40 MG: 40 TABLET, DELAYED RELEASE ORAL at 05:55

## 2022-01-25 RX ADMIN — AMIODARONE HYDROCHLORIDE 200 MG: 200 TABLET ORAL at 05:55

## 2022-01-25 RX ADMIN — TAMSULOSIN HYDROCHLORIDE 0.4 MG: 0.4 CAPSULE ORAL at 16:23

## 2022-01-25 RX ADMIN — AMIODARONE HYDROCHLORIDE 200 MG: 200 TABLET ORAL at 22:02

## 2022-01-25 RX ADMIN — METOPROLOL SUCCINATE 12.5 MG: 25 TABLET, EXTENDED RELEASE ORAL at 09:08

## 2022-01-25 RX ADMIN — HEPARIN SODIUM 5000 UNITS: 5000 INJECTION INTRAVENOUS; SUBCUTANEOUS at 00:13

## 2022-01-25 RX ADMIN — POTASSIUM CHLORIDE 40 MEQ: 1500 TABLET, EXTENDED RELEASE ORAL at 09:09

## 2022-01-25 RX ADMIN — BUMETANIDE 1 MG: 1 TABLET ORAL at 09:11

## 2022-01-25 RX ADMIN — METOPROLOL SUCCINATE 12.5 MG: 25 TABLET, EXTENDED RELEASE ORAL at 22:03

## 2022-01-25 RX ADMIN — CEFAZOLIN SODIUM 2000 MG: 2 SOLUTION INTRAVENOUS at 00:13

## 2022-01-25 RX ADMIN — CEFAZOLIN SODIUM 2000 MG: 2 SOLUTION INTRAVENOUS at 16:23

## 2022-01-25 NOTE — RESTORATIVE TECHNICIAN NOTE
Restorative Technician Note      Patient Name: Vlaidmir Edwards     Restorative Tech Visit Date: 01/25/22  Note Type: Mobility  Patient Position Upon Consult: Bedside chair  Activity Performed: Transferred  Assistive Device: Other (Comment) (HHA x 2)  Patient Position at End of Consult: All needs within reach;  Supine

## 2022-01-25 NOTE — PLAN OF CARE
Problem: PHYSICAL THERAPY ADULT  Goal: Performs mobility at highest level of function for planned discharge setting  See evaluation for individualized goals  Description: Treatment/Interventions: Functional transfer training,LE strengthening/ROM,Elevations,Therapeutic exercise,Endurance training,Equipment eval/education,Bed mobility,Gait training,Spoke to nursing,OT,Cognitive reorientation  Equipment Recommended:  (r/o rw next visit)       See flowsheet documentation for full assessment, interventions and recommendations  Outcome: Progressing  Note: Prognosis: Fair  Problem List: Decreased strength,Decreased endurance,Impaired balance,Decreased mobility,Decreased coordination,Decreased cognition,Impaired judgement,Decreased safety awareness,Pain  Assessment: Pt seen for session for setup, bed mob, time spent EOB, transfers, gait w/ rest time, repositioning  Pt cooperative w/ encouragement  Note improved LE WBing today w/ increased cues for hand placement and safety w/ transfers  able to initiate gait today w/ increased cues for safety w/ RW   continue to recommend rehab at d/c  Barriers to Discharge: Inaccessible home environment,Decreased caregiver support        PT Discharge Recommendation: Post acute rehabilitation services          See flowsheet documentation for full assessment

## 2022-01-25 NOTE — PHYSICAL THERAPY NOTE
Physical Therapy Treatment Note       01/25/22 1030   PT Last Visit   PT Visit Date 01/25/22   Note Type   Note Type Treatment   Pain Assessment   Pain Assessment Tool 0-10   Pain Score 5   Pain Location/Orientation Location: Generalized   Patient's Stated Pain Goal No pain   Hospital Pain Intervention(s) Repositioned   Restrictions/Precautions   Weight Bearing Precautions Per Order No   Other Precautions Cardiac/sternal;Fall Risk;Pain;Multiple lines;Telemetry;Cognitive; Chair Alarm; Bed Alarm   General   Chart Reviewed Yes   Family/Caregiver Present No   Cognition   Overall Cognitive Status Impaired   Arousal/Participation Responsive   Attention Attends with cues to redirect   Orientation Level Oriented to person;Oriented to place;Oriented to situation   Memory Unable to assess   Following Commands Follows one step commands without difficulty   Subjective   Subjective states he feels better overall, limited by pain, weakness, dizziness   Bed Mobility   Rolling R 3  Moderate assistance   Additional items Assist x 1   Supine to Sit 3  Moderate assistance   Additional items Assist x 1   Additional Comments time spent for setup, then sat EOB x 10-15 min prior to first transfers  Transfers   Sit to Stand 3  Moderate assistance   Additional items Assist x 2   Stand to Sit 3  Moderate assistance   Additional items Assist x 2   Additional Comments transfers mod A of 2 from bed, but mod/max A of 2 when rising from lower chair   Ambulation/Elevation   Gait pattern   (slow, ataxia, forward flexion, short step length, B knee buc)   Gait Assistance 3  Moderate assist   Additional items Assist x 2   Assistive Device Rolling walker   Distance 2-3'x1 from bed to chair w/ 10 min seated rest, followed by 5-6'x1 w/ same assist and chair follow  time for repositoning       Balance   Static Sitting Fair   Dynamic Sitting Fair -   Static Standing Poor +   Dynamic Standing Poor   Ambulatory Poor   Endurance Deficit   Endurance Deficit Yes   Endurance Deficit Description weakness, fatigue, pain, cog   Activity Tolerance   Activity Tolerance Patient limited by fatigue;Patient limited by pain;Treatment limited secondary to medical complications (Comment)   Nurse Made Aware yes   Assessment   Prognosis Fair   Problem List Decreased strength;Decreased endurance; Impaired balance;Decreased mobility; Decreased coordination;Decreased cognition; Impaired judgement;Decreased safety awareness;Pain   Assessment Pt seen for session for setup, bed mob, time spent EOB, transfers, gait w/ rest time, repositioning  Pt cooperative w/ encouragement  Note improved LE WBing today w/ increased cues for hand placement and safety w/ transfers  able to initiate gait today w/ increased cues for safety w/ RW   continue to recommend rehab at d/c   Goals   Patient Goals to rest   STG Expiration Date 02/01/22   PT Treatment Day 9   Plan   Treatment/Interventions Functional transfer training;LE strengthening/ROM; Endurance training; Therapeutic exercise;Patient/family training;Equipment eval/education; Bed mobility;Gait training   Progress Progressing toward goals   PT Frequency 4-6x/wk   Recommendation   PT Discharge Recommendation Post acute rehabilitation services   AM-PAC Basic Mobility Inpatient   Turning in Bed Without Bedrails 3   Lying on Back to Sitting on Edge of Flat Bed 2   Moving Bed to Chair 1   Standing Up From Chair 1   Walk in Room 1   Climb 3-5 Stairs 1   Basic Mobility Inpatient Raw Score 9   Turning Head Towards Sound 4   Follow Simple Instructions 3   Low Function Basic Mobility Raw Score 16   Low Function Basic Mobility Standardized Score 25 72   Highest Level Of Mobility   JH-HLM Goal 3: Sit at edge of bed   JH-HLM Highest Level of Mobility 4: Move to chair/commode   JH-HLM Goal Achieved Yes   Ana Cristina Ryan PT, DPT CSRS

## 2022-01-25 NOTE — CASE MANAGEMENT
Case Management Discharge Planning Note    Patient name Sarah Casas  Location [facility] 574-60 MRN [id number]  : 1955 Date 2022       Current Admission Date: 2021  Current Admission Diagnosis:S/P VSD repair   Patient Active Problem List    Diagnosis Date Noted    Urinary retention 2022    Anticoagulated on Coumadin 2022    Anticoagulation goal of INR 2 to 3 2022    Thrombocytopenia (HCC) 2022    Hypernatremia 2022    Hyperchloremia 2022    Hypocalcemia 2022    Central line complication     S/P VSD repair 2022    Leukocytosis 2022    Coagulopathy ([address] ) 2022    Tylenol ingestion     Acute systolic congestive heart failure ([address] ) 2021    Anemia 2021    Personal history of ECMO 2021    Cardiogenic shock ([address] ) 2021    VSD (ventricular septal defect) 2021    JUDE (acute kidney injury) ([address] ) 2021    Transaminitis 2021    Pipe smoker 2018    Hyperlipidemia, mixed 2017    Impaired fasting glucose 2017    Agoraphobia with panic attacks 2015    Obesity (BMI 30-39 9) 2015    Psychosis, atypical ([address] ) 2015      LOS (days): 26  Geometric Mean LOS (GMLOS) (days):   Days to GMLOS:     OBJECTIVE:  Risk of Unplanned Readmission Score: 25         Current admission status: Inpatient   Preferred Pharmacy:   [id number], [id number]  [address] [place] [address]  Phone: [phone] Fax: [phone]    Primary Care Provider: Nito Oglesby MD    Primary Insurance: Purvi Kingsley  Secondary Insurance:     DISCHARGE DETAILS:    CM spoke with [place] (Sister) [phone] (M) and updated her on rehab responses  Per Elsy [place] she only wants a facility in [facility] [place] far, and [facility] is not a good area   CM went over list  Elsy Gimenez wanted [facility] and KARAN, ABEBA informed her that they do not accept pt insurance  [place] stated that she has the list of facilities and will review, CM will F/U later today  [place] called [facility] and gave permission to send referrals to places in [facility]  CM placed referrals  [facility] only facility that is going to continue to follow

## 2022-01-25 NOTE — PROGRESS NOTES
Cardiology Progress Note - Jose Daniel Garcia 61 y o  male MRN: [id number]    Unit/Bed#: [facility] 427-01 Encounter: [id number]      Assessment & Plan:  Principal Problem:    S/P VSD repair  Active Problems:    Agoraphobia with panic attacks    Cardiogenic shock (HCC)    VSD (ventricular septal defect)    JUDE (acute kidney injury) ([facility] )    Transaminitis    Tylenol ingestion    Acute systolic congestive heart failure (HCC)    Anemia    Personal history of ECMO    Coagulopathy (HCC)    Leukocytosis    Central line complication    Thrombocytopenia (HCC)    Hypernatremia    Hyperchloremia    Hypocalcemia    Anticoagulated on Coumadin    Anticoagulation goal of INR 2 to 3    Urinary retention    # Late presenting anterior STEMI c/b anteroapical VSD s/p open patch repair   # Redo sternotomy, mediastinal exploration for retained swan avinash catheter  # HFrEF with EF 35-40% as per my assessment on most recent CORIE  # Deconditioning   # UTI      BP: 90s-100s/60s  HR: 90s  UOP: 4 6 L UOP yesterday, net 3 2 L  Creatinine:m 1 5  No additional episodes of dizziness    Plan:  - decrease bumex to 1 mg PO QD  - c/w losartan 12 5 mg QD  - Toprol XL 12 5 mg BID  - c/w aspirin and atorvastatin  - amiodarone for afib prevention  - encourage OOBTC and physical therapy  Patient will need JACQUELINE as per PT   - Ancef for UTI    Subjective:   No acute events yesterday  Objective:     Vitals: Blood pressure 97/61, pulse 82, temperature 98 2 °F (36 8 °C), temperature source Oral, resp  rate 18, height 5' 5 5" (1 664 m), weight 97 3 kg (214 lb 8 1 oz), SpO2 99 %  , Body mass index is 35 15 kg/m² ,   Orthostatic Blood Pressures      Most Recent Value   Blood Pressure 97/61 filed at 01/25/2022 2551   Patient Position - Orthostatic VS Lying filed at 01/25/2022 0657            Intake/Output Summary (Last 24 hours) at 1/25/2022 0940  Last data filed at 1/25/2022 0910  Gross per 24 hour   Intake 1262 ml   Output 4900 ml   Net -3638 ml           Physical Exam:    GEN: Robbie Momin Lethargic  HEENT: anicteric, mucous membranes moist  NECK: No JVD, carotid bruits   HEART: regular rhythm, normal S1 and S2, no murmurs, clicks, gallops or rubs   CHEST: Sternotomy incision site healing well  LUNGS: clear to auscultation bilaterally; Crackles present  ABDOMEN: normal bowel sounds, soft, no tenderness, no distention  EXTREMITIES: peripheral pulses normal, 1+ edema  NEURO: no focal findings    SKIN: normal without suspicious lesions on exposed skin      Current Facility-Administered Medications:     acetaminophen (TYLENOL) tablet 650 mg, 650 mg, Oral, Q6H PRN, Anabelle Art PA-C    amiodarone tablet 200 mg, 200 mg, Oral, Q8H [facility], Mellisa Xiong PA-C, 200 mg at 01/25/22 0555    aspirin tablet 325 mg, 325 mg, Oral, Daily, Mellisa Xiong PA-C, 325 mg at 01/25/22 0908    atorvastatin (LIPITOR) tablet 80 mg, 80 mg, Oral, After Gerri Martínez PA-C, 80 mg at 01/24/22 1713    bisacodyl (DULCOLAX) rectal suppository 10 mg, 10 mg, Rectal, Daily PRN, Anabelle Art PA-C    bumetanide (BUMEX) tablet 1 mg, 1 mg, Oral, Daily, Arnel Aguirre MD, 1 mg at 01/25/22 0911    ceFAZolin (ANCEF) IVPB (premix in dextrose) 2,000 mg 50 mL, 2,000 mg, Intravenous, Q8H, BROOKE Roman, Last Rate: 100 mL/hr at 01/25/22 0908, 2,000 mg at 01/25/22 0908    docusate sodium (COLACE) capsule 100 mg, 100 mg, Oral, BID PRN, Anabelle Art PA-C    heparin (porcine) subcutaneous injection 5,000 Units, 5,000 Units, Subcutaneous, Q8H [facility], Lacy Senior PA-C, 5,000 Units at 01/25/22 0908    insulin lispro (HumaLOG) 100 units/mL subcutaneous injection 1-6 Units, 1-6 Units, Subcutaneous, TID AC **AND** Fingerstick Glucose (POCT), , , TID AC, Mellisa Xiong PA-C    insulin lispro (HumaLOG) 100 units/mL subcutaneous injection 1-6 Units, 1-6 Units, Subcutaneous, HS, Mellisa Xiong PA-C, 1 Units at 01/21/22 2117    losartan (COZAAR) tablet 12 5 mg, 12 5 mg, Oral, Daily, BROOKE Contreras, 12 5 mg at 01/25/22 0908    metoprolol succinate (TOPROL-XL) 24 hr tablet 12 5 mg, 12 5 mg, Oral, BID, BROOKE Contreras, 12 5 mg at 01/25/22 0908    ondansetron (ZOFRAN) injection 4 mg, 4 mg, Intravenous, Q6H PRN, Ginger Schroeder PA-C    oxyCODONE (ROXICODONE) IR tablet 2 5 mg, 2 5 mg, Oral, Q4H PRN, Ginger Schroeder PA-C, 2 5 mg at 01/13/22 0525    oxyCODONE (ROXICODONE) IR tablet 5 mg, 5 mg, Oral, Q4H PRN, Ginger Schroeder PA-C, 5 mg at 01/08/22 2148    pantoprazole (PROTONIX) EC tablet 40 mg, 40 mg, Oral, Early Morning, Ginger Schroeder PA-C, 40 mg at 01/25/22 0555    polyethylene glycol (MIRALAX) packet 17 g, 17 g, Oral, Daily PRN, Anabelle Art PA-C    tamsulosin (FLOMAX) capsule 0 4 mg, 0 4 mg, Oral, Daily With Dinner, Monica Senior PA-C, 0 4 mg at 01/24/22 1713    temazepam (RESTORIL) capsule 15 mg, 15 mg, Oral, HS PRN, Ginger Schroeder PA-C, 15 mg at 01/13/22 2144    Labs & Results:    No results found for: CKTOTAL, CKMB, CKMBINDEX, TROPONINI    Lab Results   Component Value Date    GLUCOSE 149 (H) 01/07/2022    CALCIUM 8 3 01/25/2022    K 3 7 01/25/2022    CO2 27 01/25/2022     01/25/2022    BUN 28 (H) 01/25/2022    CREATININE 1 15 01/25/2022       Lab Results   Component Value Date    WBC 5 49 01/25/2022    HGB 9 0 (L) 01/25/2022    HCT 29 3 (L) 01/25/2022     (H) 01/25/2022     (L) 01/25/2022     Results from last 7 days   Lab Units 01/21/22  1027   INR  2 18*       No results found for: CHOL  Lab Results   Component Value Date    HDL 13 (L) 12/30/2021    HDL 38 (L) 09/13/2021     Lab Results   Component Value Date    LDLCALC 107 (H) 12/30/2021    LDLCALC 134 (H) 09/13/2021     Lab Results   Component Value Date    TRIG 152 (H) 12/30/2021    TRIG 137 09/13/2021       Lab Results   Component Value Date    ALT 61 01/23/2022    AST 21 01/23/2022         EKG personally reviewed by )Shai Poon MD  No acute changes TELE: No significant arrhythmias seen on telemetry review

## 2022-01-25 NOTE — PLAN OF CARE
Problem: MOBILITY - ADULT  Goal: Maintain or return to baseline ADL function  Description: INTERVENTIONS:  -  Assess patient's ability to carry out ADLs; assess patient's baseline for ADL function and identify physical deficits which impact ability to perform ADLs (bathing, care of mouth/teeth, toileting, grooming, dressing, etc )  - Assess/evaluate cause of self-care deficits   - Assess range of motion  - Assess patient's mobility; develop plan if impaired  - Assess patient's need for assistive devices and provide as appropriate  - Encourage maximum independence but intervene and supervise when necessary  - Involve family in performance of ADLs  - Assess for home care needs following discharge   - Consider OT consult to assist with ADL evaluation and planning for discharge  - Provide patient education as appropriate  Outcome: Progressing  Goal: Maintains/Returns to pre admission functional level  Description: INTERVENTIONS:  - Perform BMAT or MOVE assessment daily    - Set and communicate daily mobility goal to care team and patient/family/caregiver     - Collaborate with rehabilitation services on mobility goals if consulted  - Record patient progress and toleration of activity level   Outcome: Progressing     Problem: Potential for Falls  Goal: Patient will remain free of falls  Description: INTERVENTIONS:  - Educate patient/family on patient safety including physical limitations  - Instruct patient to call for assistance with activity   - Consult OT/PT to assist with strengthening/mobility   - Keep Call bell within reach  - Keep bed low and locked with side rails adjusted as appropriate  - Keep care items and personal belongings within reach  - Initiate and maintain comfort rounds  - Make Fall Risk Sign visible to staff  - Apply yellow socks and bracelet for high fall risk patients  - Consider moving patient to room near nurses station  Outcome: Progressing     Problem: Prexisting or High Potential for Compromised Skin Integrity  Goal: Skin integrity is maintained or improved  Description: INTERVENTIONS:  - Identify patients at risk for skin breakdown  - Assess and monitor skin integrity  - Assess and monitor nutrition and hydration status  - Monitor labs   - Assess for incontinence   - Turn and reposition patient  - Assist with mobility/ambulation  - Relieve pressure over bony prominences  - Avoid friction and shearing  - Provide appropriate hygiene as needed including keeping skin clean and dry  - Evaluate need for skin moisturizer/barrier cream  - Collaborate with interdisciplinary team   - Patient/family teaching  - Consider wound care consult   Outcome: Progressing     Problem: PAIN - ADULT  Goal: Verbalizes/displays adequate comfort level or baseline comfort level  Description: Interventions:  - Encourage patient to monitor pain and request assistance  - Assess pain using appropriate pain scale  - Administer analgesics based on type and severity of pain and evaluate response  - Implement non-pharmacological measures as appropriate and evaluate response  - Consider cultural and social influences on pain and pain management  - Notify physician/advanced practitioner if interventions unsuccessful or patient reports new pain  Outcome: Progressing     Problem: INFECTION - ADULT  Goal: Absence or prevention of progression during hospitalization  Description: INTERVENTIONS:  - Assess and monitor for signs and symptoms of infection  - Monitor lab/diagnostic results  - Monitor all insertion sites, i e  indwelling lines, tubes, and drains  - Monitor endotracheal if appropriate and nasal secretions for changes in amount and color  - [place] appropriate cooling/warming therapies per order  - Administer medications as ordered  - Instruct and encourage patient and family to use good hand hygiene technique  - Identify and instruct in appropriate isolation precautions for identified infection/condition  Outcome: Progressing  Goal: Absence of fever/infection during neutropenic period  Description: INTERVENTIONS:  - Monitor WBC    Outcome: Progressing     Problem: SAFETY ADULT  Goal: Maintain or return to baseline ADL function  Description: INTERVENTIONS:  -  Assess patient's ability to carry out ADLs; assess patient's baseline for ADL function and identify physical deficits which impact ability to perform ADLs (bathing, care of mouth/teeth, toileting, grooming, dressing, etc )  - Assess/evaluate cause of self-care deficits   - Assess range of motion  - Assess patient's mobility; develop plan if impaired  - Assess patient's need for assistive devices and provide as appropriate  - Encourage maximum independence but intervene and supervise when necessary  - Involve family in performance of ADLs  - Assess for home care needs following discharge   - Consider OT consult to assist with ADL evaluation and planning for discharge  - Provide patient education as appropriate  Outcome: Progressing  Goal: Maintains/Returns to pre admission functional level  Description: INTERVENTIONS:  - Perform BMAT or MOVE assessment daily    - Set and communicate daily mobility goal to care team and patient/family/caregiver  - Collaborate with rehabilitation services on mobility goals if consulted  - Perform Range of Motion 0 times a day  - Reposition patient every 2 hours    - Dangle patient 0 times a day  - Stand patient 1 times a day  - Ambulate patient 3-4 times a day  - Out of bed to chair 1 times a day   - Out of bed for meals 1 times a day  - Out of bed for toileting  - Record patient progress and toleration of activity level   Outcome: Progressing  Goal: Patient will remain free of falls  Description: INTERVENTIONS:  - Educate patient/family on patient safety including physical limitations  - Instruct patient to call for assistance with activity   - Consult OT/PT to assist with strengthening/mobility   - Keep Call bell within reach  - Keep bed low and locked with side rails adjusted as appropriate  - Keep care items and personal belongings within reach  - Initiate and maintain comfort rounds  - Make Fall Risk Sign visible to staff  - Apply yellow socks and bracelet for high fall risk patients  - Consider moving patient to room near nurses station  Outcome: Progressing     Problem: DISCHARGE PLANNING  Goal: Discharge to home or other facility with appropriate resources  Description: INTERVENTIONS:  - Identify barriers to discharge w/patient and caregiver  - Arrange for needed discharge resources and transportation as appropriate  - Identify discharge learning needs (meds, wound care, etc )  - Arrange for interpretive services to assist at discharge as needed  - Refer to Case Management Department for coordinating discharge planning if the patient needs post-hospital services based on physician/advanced practitioner order or complex needs related to functional status, cognitive ability, or social support system  Outcome: Progressing     Problem: Knowledge Deficit  Goal: Patient/family/caregiver demonstrates understanding of disease process, treatment plan, medications, and discharge instructions  Description: Complete learning assessment and assess knowledge base    Interventions:  - Provide teaching at level of understanding  - Provide teaching via preferred learning methods  Outcome: Progressing     Problem: CARDIOVASCULAR - ADULT  Goal: Maintains optimal cardiac output and hemodynamic stability  Description: INTERVENTIONS:  - Monitor I/O, vital signs and rhythm  - Monitor for S/S and trends of decreased cardiac output  - Administer and titrate ordered vasoactive medications to optimize hemodynamic stability  - Assess quality of pulses, skin color and temperature  - Assess for signs of decreased coronary artery perfusion  - Instruct patient to report change in severity of symptoms  Outcome: Progressing  Goal: Absence of cardiac dysrhythmias or at baseline rhythm  Description: INTERVENTIONS:  - Continuous cardiac monitoring, vital signs, obtain 12 lead EKG if ordered  - Administer antiarrhythmic and heart rate control medications as ordered  - Monitor electrolytes and administer replacement therapy as ordered  Outcome: Progressing     Problem: RESPIRATORY - ADULT  Goal: Achieves optimal ventilation and oxygenation  Description: INTERVENTIONS:  - Assess for changes in respiratory status  - Assess for changes in mentation and behavior  - Position to facilitate oxygenation and minimize respiratory effort  - Oxygen administered by appropriate delivery if ordered  - Initiate smoking cessation education as indicated  - Encourage broncho-pulmonary hygiene including cough, deep breathe, Incentive Spirometry  - Assess the need for suctioning and aspirate as needed  - Assess and instruct to report SOB or any respiratory difficulty  - Respiratory Therapy support as indicated  Outcome: Progressing     Problem: GASTROINTESTINAL - ADULT  Goal: Oral mucous membranes remain intact  Description: INTERVENTIONS  - Assess oral mucosa and hygiene practices  - Implement preventative oral hygiene regimen  - Implement oral medicated treatments as ordered  - Initiate Nutrition services referral as needed  Outcome: Progressing     Problem: GENITOURINARY - ADULT  Goal: Maintains or returns to baseline urinary function  Description: INTERVENTIONS:  - Assess urinary function  - Encourage oral fluids to ensure adequate hydration if ordered  - Administer IV fluids as ordered to ensure adequate hydration  - Administer ordered medications as needed  - Offer frequent toileting  - Follow urinary retention protocol if ordered  Outcome: Progressing  Goal: Urinary catheter remains patent  Description: INTERVENTIONS:  - Assess patency of urinary catheter  - If patient has a chronic carolina, consider changing catheter if non-functioning  - Follow guidelines for intermittent irrigation of non-functioning urinary catheter  Outcome: Progressing     Problem: METABOLIC, FLUID AND ELECTROLYTES - ADULT  Goal: Electrolytes maintained within normal limits  Description: INTERVENTIONS:  - Monitor labs and assess patient for signs and symptoms of electrolyte imbalances  - Administer electrolyte replacement as ordered  - Monitor response to electrolyte replacements, including repeat lab results as appropriate  - Instruct patient on fluid and nutrition as appropriate  Outcome: Progressing  Goal: Fluid balance maintained  Description: INTERVENTIONS:  - Monitor labs   - Monitor I/O and WT  - Instruct patient on fluid and nutrition as appropriate  - Assess for signs & symptoms of volume excess or deficit  Outcome: Progressing  Goal: Glucose maintained within target range  Description: INTERVENTIONS:  - Monitor Blood Glucose as ordered  - Assess for signs and symptoms of hyperglycemia and hypoglycemia  - Administer ordered medications to maintain glucose within target range  - Assess nutritional intake and initiate nutrition service referral as needed  Outcome: Progressing     Problem: SKIN/TISSUE INTEGRITY - ADULT  Goal: Skin Integrity remains intact(Skin Breakdown Prevention)  Description: Assess:  -Assess extremities for adequate circulation and sensation     Bed Management:  -Have minimal linens on bed & keep smooth, unwrinkled  -Change linens as needed when moist or perspiring      Skin Care:  -Avoid use of baby powder, tape, friction and shearing, hot water or constrictive clothing    Outcome: Progressing  Goal: Incision(s), wounds(s) or drain site(s) healing without S/S of infection  Description: INTERVENTIONS  - Assess and document dressing, incision, wound bed, drain sites and surrounding tissue  - Provide patient and family education  - Perform skin care/dressing changes every day  Outcome: Progressing  Goal: Pressure injury heals and does not worsen  Description: Interventions:  - Implement low air loss mattress or specialty surface (Criteria met)  - Apply silicone foam dressing  - Instruct/assist with weight shifting every 2 hours when in chair   - Limit chair time to 3-5 hour intervals  - Use special pressure reducing interventions such as waffle cushion when in chair   - Apply fecal or urinary incontinence containment device   - Consider nutrition services referral as needed  Outcome: Progressing     Problem: HEMATOLOGIC - ADULT  Goal: Maintains hematologic stability  Description: INTERVENTIONS  - Assess for signs and symptoms of bleeding or hemorrhage  - Monitor labs  - Administer supportive blood products/factors as ordered and appropriate  Outcome: Progressing     Problem: Nutrition/Hydration-ADULT  Goal: Nutrient/Hydration intake appropriate for improving, restoring or maintaining nutritional needs  Description: Monitor and assess patient's nutrition/hydration status for malnutrition  Collaborate with interdisciplinary team and initiate plan and interventions as ordered  Monitor patient's weight and dietary intake as ordered or per policy  Utilize nutrition screening tool and intervene as necessary  Determine patient's food preferences and provide high-protein, high-caloric foods as appropriate       INTERVENTIONS:  - Monitor oral intake, urinary output, labs, and treatment plans  - Assess nutrition and hydration status and recommend course of action  - Evaluate amount of meals eaten  - Assist patient with eating if necessary   - Allow adequate time for meals  - Recommend/ encourage appropriate diets, oral nutritional supplements, and vitamin/mineral supplements  - Order, calculate, and assess calorie counts as needed  - Recommend, monitor, and adjust tube feedings and TPN/PPN based on assessed needs  - Assess need for intravenous fluids  - Provide specific nutrition/hydration education as appropriate  - Include patient/family/caregiver in decisions related to nutrition  Outcome: Progressing

## 2022-01-25 NOTE — ARC ADMISSION
Re-referral received for consideration of patient for inpatient acute rehab per family request  PM&R will reassess patient tomorrow, 1/26  CM has been updated and will follow-up as able

## 2022-01-25 NOTE — PROGRESS NOTES
Progress Note - Cardiothoracic Surgery   Jordan Oliveira 61 y o  male MRN: [id number]  Unit/Bed#: [facility] 427-01 Encounter: [id number]  Cardiogenic shock  S/P VA ECMO; POD # 24  VSD/cardiogenic shock  S/P VSD repair w/ VA ECMO decannulation; POD # 20    24 Hour Events: Feels much better this AM, no further dizziness  Denies CP or SOB  BM today  Using IS       Blood cx (1/23): NG for 24hrs      Medications:   Scheduled Meds:  Current Facility-Administered Medications   Medication Dose Route Frequency Provider Last Rate    acetaminophen  650 mg Oral Q6H PRN Anabelle Art PA-C      amiodarone  200 mg Oral Q8H [address] Marisela Carlson PA-C      aspirin  325 mg Oral Daily Marisela Carlson PA-C      atorvastatin  80 mg Oral After [employer]      bisacodyl  10 mg Rectal Daily PRN Anabelle Art PA-C      bumetanide  1 mg Oral Daily Florecita Sanchez MD      cefazolin  2,000 mg Intravenous Q8H BROOKE Hernandez 2,000 mg (01/25/22 0013)    docusate sodium  100 mg Oral BID PRN Anabelle Art PA-C      heparin (porcine)  5,000 Units Subcutaneous [address]      insulin lispro  1-6 Units Subcutaneous TID AC Marisela Carlson PA-C      insulin lispro  1-6 Units Subcutaneous HS Marisela Carlson PA-C      losartan  12 5 mg Oral Daily BROOKE Contreras      metoprolol succinate  12 5 mg Oral BID Ivan BROOKE Kumar      ondansetron  4 mg Intravenous Q6H PRN Marisela Carlson PA-C      oxyCODONE  2 5 mg Oral Q4H PRN Marisela Carlson PA-C      oxyCODONE  5 mg Oral Q4H PRN Marisela Carlson PA-C      pantoprazole  40 mg Oral Early Morning Marisela Carlson PA-C      polyethylene glycol  17 g Oral Daily PRN Anabelle Art PA-C      tamsulosin  0 4 mg Oral Daily With Triductor NOA Senior      temazepam  15 mg Oral HS PRN Marisela Carlson PA-C       Continuous Infusions:   PRN Meds:   acetaminophen    bisacodyl    docusate sodium   ondansetron    oxyCODONE    oxyCODONE    polyethylene glycol    temazepam    Vitals:   Vitals:    01/24/22 2300 01/25/22 0000 01/25/22 0207 01/25/22 0657   BP: 100/62  91/53 97/61   BP Location: Left arm  Left arm Left arm   Pulse: 82  81 82   Resp: 20 20 18   Temp: 98 2 °F (36 8 °C)  97 9 °F (36 6 °C) 98 2 °F (36 8 °C)   TempSrc: Oral  Oral Oral   SpO2: 98% 97% 98% 99%   Weight:       Height:           Telemetry: NSR w/ BBB; Heart Rate: 79    Respiratory:   SpO2: SpO2: 99 %; Room Air    Intake/Output:     Intake/Output Summary (Last 24 hours) at 1/25/2022 0728  Last data filed at 1/25/2022 9165  Gross per 24 hour   Intake 1378 ml   Output 4900 ml   Net -3522 ml   UOP 4 6L, net -3 2      Weights:   Weight (last 2 days)     None            Results:   Results from last 7 days   Lab Units 01/25/22  0438 01/23/22  2019   WBC Thousand/uL 5 49 5 22   HEMOGLOBIN g/dL 9 0* 8 8*   HEMATOCRIT % 29 3* 28 0*   PLATELETS Thousands/uL 142* 162     Results from last 7 days   Lab Units 01/25/22  0438 01/24/22 0448 01/23/22  2019   SODIUM mmol/L 137 138 139   POTASSIUM mmol/L 3 7 4 7 3 6   CHLORIDE mmol/L 103 107 109*   CO2 mmol/L 27 26 23   BUN mg/dL 28* 26* 29*   CREATININE mg/dL 1 15 1 08 1 06   CALCIUM mg/dL 8 3 8 4 7 0*     Results from last 7 days   Lab Units 01/21/22  1027 01/20/22  0445 01/19/22 0443   INR  2 18* 3 45* 2 80*     Glucose 96- 122 (prediabetes 5 9)    Invasive Lines/Tubes:  Invasive Devices  Report    Peripherally Inserted Central Catheter Line            PICC Line 29/00/85 Right Basilic 18 days          Drain            Urethral Catheter Non-latex 18 Fr  4 days              Physical Exam:    HEENT/NECK:  Normocephalic  Atraumatic  No jugular venous distention  Cardiac: Regular rate and rhythm and No murmurs/rubs/gallops  Pulmonary:  Breath sounds clear bilaterally and No rales/rhonchi/wheezes  Abdomen:  Non-tender, Non-distended and Normal bowel sounds  Incisions: Sternum is stable    Incision is clean, dry, and intact  Extremities: Extremities warm/dry and 2+ edema B/L  Neuro: Alert and oriented X 3 and No focal deficits  Skin: Warm/Dry, without rashes or lesions  Assessment:  Principal Problem:    S/P VSD repair  Active Problems:    Agoraphobia with panic attacks    Cardiogenic shock (HCC)    VSD (ventricular septal defect)    JUDE (acute kidney injury) ([address] )    Transaminitis    Tylenol ingestion    Acute systolic congestive heart failure (HCC)    Anemia    Personal history of ECMO    Coagulopathy (HCC)    Leukocytosis    Central line complication    Thrombocytopenia (HCC)    Hypernatremia    Hyperchloremia    Hypocalcemia    Anticoagulated on Coumadin    Anticoagulation goal of INR 2 to 3    Urinary retention       Cardiogenic shock  S/P VA ECMO; POD # 24  VSD/cardiogenic shock  S/P VSD repair w/ VA ECMO decannulation; POD # 20    Plan:    1  Cardiac:   NSR; HR/BP well-controlled  Continue losartan 12 5 mg daily and Toprol xl 12 5 mg BID  Continue ASA and Statin therapy  Epicardial pacing wires out  PICC line in place for access  Continue DVT prophylaxis    2  Pulmonary:   Good Room air oxygen saturation; Continue incentive spirometry/Coughing/Deep breathing exercises  Chest tubes have been discontinued    3  Renal: carolina in place per urology- f/u OP  Intake/Output net: -3 3 L L/24 hours  Diuretic Regimen: Decrease Bumex to 1 mg po  Post op Creatinine stable; Follow up labs prn    4  Neuro:  Neurologically intact; No active issues  Incisional pain well-controlled  Continue Tylenol, 975 mg PO q 8, standing dose  Continue Oxycodone, 2 5 to 5 mg PO q 4 hours prn pain    5  GI:  Tolerating diet   Maintain 1800 mL daily fluid restriction   Continue stool softeners and prn suppository  Continue GI prophylaxis    6  Endo:   Glucose well-controlled with sliding scale coverage    7    Hematology:    Post-operative blood count acceptable; Trend prn    8     Disposition:      Awaiting rehab placement    VTE Pharmacologic Prophylaxis: Heparin  VTE Mechanical Prophylaxis: sequential compression device    Collaborative rounds completed with supervising physician  Plan of care discussed with bedside nurse    SIGNATURE: Malachi Lopez PA-C  DATE: January 25, 2022  TIME: 7:28 AM

## 2022-01-25 NOTE — CASE MANAGEMENT
Case Management Discharge Planning Note    Patient name [facility]  Location [facility] 427/[facility] 339-52 MRN [id number]  : 1955 Date 2022       Current Admission Date: 2021  Current Admission Diagnosis:S/P VSD repair   Patient Active Problem List    Diagnosis Date Noted    Urinary retention 2022    Anticoagulated on Coumadin 2022    Anticoagulation goal of INR 2 to 3 2022    Thrombocytopenia (HCC) 2022    Hypernatremia 2022    Hyperchloremia 2022    Hypocalcemia 2022    Central line complication     S/P VSD repair 2022    Leukocytosis 2022    Coagulopathy ([facility] ) 2022    Tylenol ingestion     Acute systolic congestive heart failure ([facility] ) 2021    Anemia 2021    Personal history of ECMO 2021    Cardiogenic shock ([facility] ) 2021    VSD (ventricular septal defect) 2021    JUDE (acute kidney injury) ([facility] ) 2021    Transaminitis 2021    Pipe smoker 2018    Hyperlipidemia, mixed 2017    Impaired fasting glucose 2017    Agoraphobia with panic attacks 2015    Obesity (BMI 30-39 9) 2015    Psychosis, atypical ([address] ) 2015      LOS (days): 26  Geometric Mean LOS (GMLOS) (days):   Days to GMLOS:     OBJECTIVE:  Risk of Unplanned Readmission Score: 25         Current admission status: Inpatient   Preferred Pharmacy:   [id number] [address]  [address] [facility] [address]  Phone: [phone] Fax: [phone]    Primary Care Provider: Yrn Arce MD    Primary Insurance: Truong Delcid  Secondary Insurance:     DISCHARGE DETAILS:     Rose Habermann (Niece) [phone] (M) left a voicemail requesting a referral to be sent out again to [facility]  CM spoke with [place] and provided her ARC explanation as to why they did not accept   CM stated that she will send another referral with updated PT/OT notes and will keep her updated  Referral has been sent

## 2022-01-26 LAB
GLUCOSE SERPL-MCNC: 131 MG/DL (ref 65–140)
GLUCOSE SERPL-MCNC: 83 MG/DL (ref 65–140)
GLUCOSE SERPL-MCNC: 95 MG/DL (ref 65–140)
GLUCOSE SERPL-MCNC: 98 MG/DL (ref 65–140)

## 2022-01-26 PROCEDURE — 99231 SBSQ HOSP IP/OBS SF/LOW 25: CPT | Performed by: INTERNAL MEDICINE

## 2022-01-26 PROCEDURE — 97530 THERAPEUTIC ACTIVITIES: CPT

## 2022-01-26 PROCEDURE — 97116 GAIT TRAINING THERAPY: CPT

## 2022-01-26 PROCEDURE — 99024 POSTOP FOLLOW-UP VISIT: CPT | Performed by: THORACIC SURGERY (CARDIOTHORACIC VASCULAR SURGERY)

## 2022-01-26 PROCEDURE — 82948 REAGENT STRIP/BLOOD GLUCOSE: CPT

## 2022-01-26 RX ORDER — POTASSIUM CHLORIDE 20 MEQ/1
40 TABLET, EXTENDED RELEASE ORAL ONCE
Status: COMPLETED | OUTPATIENT
Start: 2022-01-26 | End: 2022-01-26

## 2022-01-26 RX ADMIN — AMIODARONE HYDROCHLORIDE 200 MG: 200 TABLET ORAL at 21:41

## 2022-01-26 RX ADMIN — PANTOPRAZOLE SODIUM 40 MG: 40 TABLET, DELAYED RELEASE ORAL at 05:27

## 2022-01-26 RX ADMIN — HEPARIN SODIUM 5000 UNITS: 5000 INJECTION INTRAVENOUS; SUBCUTANEOUS at 08:37

## 2022-01-26 RX ADMIN — TAMSULOSIN HYDROCHLORIDE 0.4 MG: 0.4 CAPSULE ORAL at 17:23

## 2022-01-26 RX ADMIN — POTASSIUM CHLORIDE 40 MEQ: 1500 TABLET, EXTENDED RELEASE ORAL at 06:44

## 2022-01-26 RX ADMIN — CEFAZOLIN SODIUM 2000 MG: 2 SOLUTION INTRAVENOUS at 08:35

## 2022-01-26 RX ADMIN — ASPIRIN 325 MG ORAL TABLET 325 MG: 325 PILL ORAL at 08:37

## 2022-01-26 RX ADMIN — BUMETANIDE 1 MG: 1 TABLET ORAL at 08:37

## 2022-01-26 RX ADMIN — CEFAZOLIN SODIUM 2000 MG: 2 SOLUTION INTRAVENOUS at 01:31

## 2022-01-26 RX ADMIN — METOPROLOL SUCCINATE 12.5 MG: 25 TABLET, EXTENDED RELEASE ORAL at 21:40

## 2022-01-26 RX ADMIN — HEPARIN SODIUM 5000 UNITS: 5000 INJECTION INTRAVENOUS; SUBCUTANEOUS at 17:23

## 2022-01-26 RX ADMIN — CEFAZOLIN SODIUM 2000 MG: 2 SOLUTION INTRAVENOUS at 17:23

## 2022-01-26 RX ADMIN — ATORVASTATIN CALCIUM 80 MG: 80 TABLET, FILM COATED ORAL at 17:23

## 2022-01-26 RX ADMIN — AMIODARONE HYDROCHLORIDE 200 MG: 200 TABLET ORAL at 05:27

## 2022-01-26 RX ADMIN — AMIODARONE HYDROCHLORIDE 200 MG: 200 TABLET ORAL at 14:38

## 2022-01-26 RX ADMIN — HEPARIN SODIUM 5000 UNITS: 5000 INJECTION INTRAVENOUS; SUBCUTANEOUS at 01:31

## 2022-01-26 RX ADMIN — METOPROLOL SUCCINATE 12.5 MG: 25 TABLET, EXTENDED RELEASE ORAL at 08:37

## 2022-01-26 NOTE — PLAN OF CARE
Problem: Potential for Falls  Goal: Patient will remain free of falls  Description: INTERVENTIONS:  - Educate patient/family on patient safety including physical limitations  - Instruct patient to call for assistance with activity   - Consult OT/PT to assist with strengthening/mobility   - Keep Call bell within reach  - Keep bed low and locked with side rails adjusted as appropriate  - Keep care items and personal belongings within reach  - Initiate and maintain comfort rounds  - Make Fall Risk Sign visible to staff  - Offer Toileting every 2 Hours, in advance of need  - Initiate/Maintain 2alarm  - Obtain necessary fall risk management equipment: 2  - Apply yellow socks and bracelet for high fall risk patients  - Consider moving patient to room near nurses station  Outcome: Progressing     Problem: Prexisting or High Potential for Compromised Skin Integrity  Goal: Skin integrity is maintained or improved  Description: INTERVENTIONS:  - Identify patients at risk for skin breakdown  - Assess and monitor skin integrity  - Assess and monitor nutrition and hydration status  - Monitor labs   - Assess for incontinence   - Turn and reposition patient  - Assist with mobility/ambulation  - Relieve pressure over bony prominences  - Avoid friction and shearing  - Provide appropriate hygiene as needed including keeping skin clean and dry  - Evaluate need for skin moisturizer/barrier cream  - Collaborate with interdisciplinary team   - Patient/family teaching  - Consider wound care consult   Outcome: Progressing

## 2022-01-26 NOTE — PROGRESS NOTES
Cardiology Progress Note - Rosario Morales 61 y o  male MRN: [id number]    Unit/Bed#: [facility] 427-01 Encounter: [id number]      Assessment & Plan:  Principal Problem:    S/P VSD repair  Active Problems:    Agoraphobia with panic attacks    Cardiogenic shock (HCC)    VSD (ventricular septal defect)    JUDE (acute kidney injury) ([address] )    Transaminitis    Tylenol ingestion    Acute systolic congestive heart failure (HCC)    Anemia    Personal history of ECMO    Coagulopathy (HCC)    Leukocytosis    Central line complication    Thrombocytopenia (HCC)    Hypernatremia    Hyperchloremia    Hypocalcemia    Anticoagulated on Coumadin    Anticoagulation goal of INR 2 to 3    Urinary retention    # Late presenting anterior STEMI c/b anteroapical VSD s/p open patch repair   # Redo sternotomy, mediastinal exploration for retained swan avinash catheter  # HFrEF with EF 35-40% as per my assessment on most recent CORIE  # Deconditioning   # UTI      BP: 90s-100s/60s  HR: 90s  UOP: 1 8 L  Creatinine: 1 5   No additional episodes of dizziness    Plan:  - c/w bumex to 1 mg PO QD  - hold losartan 12 5 mg QD  - Toprol XL 12 5 mg BID  - c/w aspirin and atorvastatin  - amiodarone for afib prevention  - encourage OOBTC and physical therapy  Patient will need JACQUELINE as per PT   - Ancef for UTI  Repeat cultures negative    Subjective:   No acute events yesterday  Objective:     Vitals: Blood pressure 98/58, pulse 81, temperature 97 6 °F (36 4 °C), temperature source Oral, resp  rate 17, height 5' 5 5" (1 664 m), weight 97 1 kg (214 lb), SpO2 96 %  , Body mass index is 35 07 kg/m² ,   Orthostatic Blood Pressures      Most Recent Value   Blood Pressure 98/58 filed at 01/26/2022 4745   Patient Position - Orthostatic VS Lying filed at 01/26/2022 0726            Intake/Output Summary (Last 24 hours) at 1/26/2022 0916  Last data filed at 1/26/2022 0531  Gross per 24 hour   Intake 415 ml   Output 1550 ml   Net -1135 ml           Physical Exam:    GEN: Ella Gorman Liliane Lethargic  HEENT: anicteric, mucous membranes moist  NECK: No JVD, carotid bruits   HEART: regular rhythm, normal S1 and S2, no murmurs, clicks, gallops or rubs   CHEST: Sternotomy incision site healing well  LUNGS: clear to auscultation bilaterally; Crackles present  ABDOMEN: normal bowel sounds, soft, no tenderness, no distention  EXTREMITIES: peripheral pulses normal, 1+ edema  NEURO: no focal findings    SKIN: normal without suspicious lesions on exposed skin      Current Facility-Administered Medications:     acetaminophen (TYLENOL) tablet 650 mg, 650 mg, Oral, Q6H PRN, Aanbelle Art PA-C    amiodarone tablet 200 mg, 200 mg, Oral, Q8H [address], Jennifer Bob PA-C, 200 mg at 01/26/22 5122    aspirin tablet 325 mg, 325 mg, Oral, Daily, Jennifer Bob PA-C, 325 mg at 01/26/22 9069    atorvastatin (LIPITOR) tablet 80 mg, 80 mg, Oral, After Dinner, Jennifer Bob PA-C, 80 mg at 01/25/22 1731    bisacodyl (DULCOLAX) rectal suppository 10 mg, 10 mg, Rectal, Daily PRN, Anabelle Art PA-C    bumetanide (BUMEX) tablet 1 mg, 1 mg, Oral, Daily, Mami Pennington MD, 1 mg at 01/26/22 0837    ceFAZolin (ANCEF) IVPB (premix in dextrose) 2,000 mg 50 mL, 2,000 mg, Intravenous, Q8H, BROOKE Roman, Last Rate: 100 mL/hr at 01/26/22 0835, 2,000 mg at 01/26/22 0835    docusate sodium (COLACE) capsule 100 mg, 100 mg, Oral, BID PRN, Anabelle Art PA-C    heparin (porcine) subcutaneous injection 5,000 Units, 5,000 Units, Subcutaneous, Q8H [address], 30 Juliet Senior PA-C, 5,000 Units at 01/26/22 0837    insulin lispro (HumaLOG) 100 units/mL subcutaneous injection 1-6 Units, 1-6 Units, Subcutaneous, TID AC **AND** Fingerstick Glucose (POCT), , , TID AC, Jennifer Bob PA-C    insulin lispro (HumaLOG) 100 units/mL subcutaneous injection 1-6 Units, 1-6 Units, Subcutaneous, HS, Jennifer Bob PA-C, 1 Units at 01/21/22 2117    metoprolol succinate (TOPROL-XL) 24 hr tablet 12 5 mg, 12 5 mg, Oral, BID, BROOKE Contreras, 12 5 mg at 22 0837    ondansetron (ZOFRAN) injection 4 mg, 4 mg, Intravenous, Q6H PRN, Ethan Post PA-C    oxyCODONE (ROXICODONE) IR tablet 2 5 mg, 2 5 mg, Oral, Q4H PRN, Ethan Post PA-C, 2 5 mg at 22 0525    oxyCODONE (ROXICODONE) IR tablet 5 mg, 5 mg, Oral, Q4H PRN, Ethan Post PA-C, 5 mg at 22 2148    pantoprazole (PROTONIX) EC tablet 40 mg, 40 mg, Oral, Early Morning, Ethan Post PA-C, 40 mg at 22 7146    polyethylene glycol (MIRALAX) packet 17 g, 17 g, Oral, Daily PRN, Anabelle Art PA-C    tamsulosin (FLOMAX) capsule 0 4 mg, 0 4 mg, Oral, Daily With Dinner, Army Josephine Senior PA-C, 0 4 mg at 22 1623    temazepam (RESTORIL) capsule 15 mg, 15 mg, Oral, HS PRN, Ethan Post PA-C, 15 mg at 22    Labs & Results:    No results found for: CKTOTAL, CKMB, CKMBINDEX, TROPONINI    Lab Results   Component Value Date    GLUCOSE 149 (H) 2022    CALCIUM 8 3 2022    K 3 7 2022    CO2 27 2022     2022    BUN 28 (H) 2022    CREATININE 1 15 2022       Lab Results   Component Value Date    WBC 5 49 2022    HGB 9 0 (L) 2022    HCT 29 3 (L) 2022     (H) 2022     (L) 2022     Results from last 7 days   Lab Units 22  1027   INR  2 18*       No results found for: CHOL  Lab Results   Component Value Date    HDL 13 (L) 2021    HDL 38 (L) 2021     Lab Results   Component Value Date    LDLCALC 107 (H) 2021    LDLCALC 134 (H) 2021     Lab Results   Component Value Date    TRIG 152 (H) 2021    TRIG 137 2021       Lab Results   Component Value Date    ALT 61 2022    AST 21 2022         EKG personally reviewed by )Luis Kathleen MD  No acute changes   TELE: No significant arrhythmias seen on telemetry review

## 2022-01-26 NOTE — PLAN OF CARE
Problem: PHYSICAL THERAPY ADULT  Goal: Performs mobility at highest level of function for planned discharge setting  See evaluation for individualized goals  Description: Treatment/Interventions: Functional transfer training,LE strengthening/ROM,Elevations,Therapeutic exercise,Endurance training,Equipment eval/education,Bed mobility,Gait training,Spoke to nursing,OT,Cognitive reorientation  Equipment Recommended:  (r/o rw next visit)       See flowsheet documentation for full assessment, interventions and recommendations  Outcome: Progressing  Note: Prognosis: Fair  Problem List: Decreased strength,Decreased endurance,Impaired balance,Decreased mobility,Decreased cognition,Decreased coordination,Decreased safety awareness  Assessment: Pt seen for PT session today with a focus on functional transfers, gait, and endurance  Pt continues to make slow, but steady progress towards mobility goals  At this time, pt is able to perform STS transfers and short distance ambulation (3-5ft at a time) with modAx2  Pt becomes SOB very quickly requiring frequent/prolonged rest breaks during activity  Pt's biggest limitations at this time are his limited endurance and activity tolerance- declining any therex after ambulation, but agreeable to performing next session  Pt would benefit from continued acute skilled PT to address above deficits  Continuing to recommend rehab upon d/c    Barriers to Discharge: Inaccessible home environment,Decreased caregiver support        PT Discharge Recommendation: Post acute rehabilitation services          See flowsheet documentation for full assessment

## 2022-01-26 NOTE — PROGRESS NOTES
Progress Note - Cardiothoracic Surgery   Geraldene Lesches 61 y o  male MRN: [id number]  Unit/Bed#: [facility] 427-01 Encounter: [id number]  Cardiogenic shock  S/P VA ECMO; POD # 25  VSD/cardiogenic shock  S/P VSD repair w/ VA ECMO decannulation; POD # 21    24 Hour Events: No overnight events  Denies CP or SOB  + BM  Using IS  [facility]-referrals sent to [facility]  Blood cx (1/23): NG for 24hrs  Urine cx (1/20): +Citrobacter koseri- on ancef      Medications:   Scheduled Meds:  Current Facility-Administered Medications   Medication Dose Route Frequency Provider Last Rate    acetaminophen  650 mg Oral Q6H PRN Anabelle Art PA-C      amiodarone  200 mg Oral Q8H [address] Jennifer HarrisburgNOA      aspirin  325 mg Oral Daily Jennifer NOA Pillai      atorvastatin  80 mg Oral After [employer]      bisacodyl  10 mg Rectal Daily PRN Anabelle Art PA-C      bumetanide  1 mg Oral Daily Gato Vences MD      cefazolin  2,000 mg Intravenous Q8H BROOKE Sweet 2,000 mg (01/26/22 0131)    docusate sodium  100 mg Oral BID PRN Anabelle Art PA-C      heparin (porcine)  5,000 Units Subcutaneous [address]      insulin lispro  1-6 Units Subcutaneous TID AC JenniferPIPE Patton-EMILIANO      insulin lispro  1-6 Units Subcutaneous HS Jennifer NOA Pillai      losartan  12 5 mg Oral Daily BROOKE Contreras      metoprolol succinate  12 5 mg Oral BID Mitchell BROOKE Tucker      ondansetron  4 mg Intravenous Q6H PRN Jennifer HarrisburgNOA      oxyCODONE  2 5 mg Oral Q4H PRN Jennifer [place], PA-EMILIANO      oxyCODONE  5 mg Oral Q4H PRN Jennifer HarrisburgNOA      pantoprazole  40 mg Oral Early Morning Jennifer HarrisburgNOA      polyethylene glycol  17 g Oral Daily PRN Anabelle Art PA-C      tamsulosin  0 4 mg Oral Daily With BullhornNOA davies      temazepam  15 mg Oral HS PRN Jennifer Pillai PA-C       Continuous Infusions:   PRN Meds:   acetaminophen    bisacodyl    docusate sodium    ondansetron    oxyCODONE    oxyCODONE    polyethylene glycol    temazepam    Vitals:   Vitals:    01/25/22 2202 01/25/22 2320 01/26/22 0338 01/26/22 0531   BP: 91/59 91/59 91/59    BP Location:       Pulse:  80     Resp:  16     Temp:  98 2 °F (36 8 °C) 98 1 °F (36 7 °C)    TempSrc:       SpO2:  96%     Weight:    97 1 kg (214 lb)   Height:           Telemetry: NSR w/ BBB; Heart Rate: 75    Respiratory:   SpO2: SpO2: 96 %; Room Air    Intake/Output:     Intake/Output Summary (Last 24 hours) at 1/26/2022 0603  Last data filed at 1/26/2022 0531  Gross per 24 hour   Intake 637 ml   Output 1800 ml   Net -1163 ml   UOP 1 8L, net -1 1L      Weights:   Weight (last 2 days)     Date/Time Weight    01/26/22 0531 97 1 (214)            Results:   Results from last 7 days   Lab Units 01/25/22  0438 01/23/22  2019   WBC Thousand/uL 5 49 5 22   HEMOGLOBIN g/dL 9 0* 8 8*   HEMATOCRIT % 29 3* 28 0*   PLATELETS Thousands/uL 142* 162     Results from last 7 days   Lab Units 01/25/22  0438 01/24/22  0448 01/23/22  2019   SODIUM mmol/L 137 138 139   POTASSIUM mmol/L 3 7 4 7 3 6   CHLORIDE mmol/L 103 107 109*   CO2 mmol/L 27 26 23   BUN mg/dL 28* 26* 29*   CREATININE mg/dL 1 15 1 08 1 06   CALCIUM mg/dL 8 3 8 4 7 0*     Results from last 7 days   Lab Units 01/21/22  1027 01/20/22 0445   INR  2 18* 3 45*     Glucose 96- 125 (prediabetes 5 9)    Invasive Lines/Tubes:  Invasive Devices  Report    Peripherally Inserted Central Catheter Line            PICC Line 65/32/14 Right Basilic 19 days          Drain            Urethral Catheter Non-latex 18 Fr  5 days              Physical Exam:    HEENT/NECK:  Normocephalic  Atraumatic  No jugular venous distention  Cardiac: Regular rate and rhythm and No murmurs/rubs/gallops  Pulmonary:  Breath sounds clear bilaterally and No rales/rhonchi/wheezes  Abdomen:  Non-tender, Non-distended and Normal bowel sounds  Incisions: Sternum is stable  Incision is clean, dry, and intact  Extremities: Extremities warm/dry and 2+ edema B/L  Neuro: Alert and oriented X 3 and No focal deficits  Skin: Warm/Dry, without rashes or lesions  Assessment:  Principal Problem:    S/P VSD repair  Active Problems:    Agoraphobia with panic attacks    Cardiogenic shock (HCC)    VSD (ventricular septal defect)    JUDE (acute kidney injury) ([address] )    Transaminitis    Tylenol ingestion    Acute systolic congestive heart failure (HCC)    Anemia    Personal history of ECMO    Coagulopathy (HCC)    Leukocytosis    Central line complication    Thrombocytopenia (HCC)    Hypernatremia    Hyperchloremia    Hypocalcemia    Anticoagulated on Coumadin    Anticoagulation goal of INR 2 to 3    Urinary retention       Cardiogenic shock  S/P VA ECMO; POD # 25  VSD/cardiogenic shock  S/P VSD repair w/ VA ECMO decannulation; POD # 21    Plan:    1  Cardiac:   NSR; HR/BP well-controlled  Continue Toprol xl 12 5 mg BID    Hold losartan 12 5 mg daily daily per cardiology  Continue ASA and Statin therapy  Epicardial pacing wires out  PICC line in place for access  Continue DVT prophylaxis    2  Pulmonary:   Good Room air oxygen saturation; Continue incentive spirometry/Coughing/Deep breathing exercises  Chest tubes have been discontinued    3  Renal: car[place]ina in place per urology- f/u OP  Intake/Output net: -1 1 L L/24 hours  Diuretic Regimen: Continue Bumex 1 mg po QD  Post op Creatinine stable; Follow up labs prn  Replete K>4    4  Neuro:  Neurologically intact; No active issues  Incisional pain well-controlled  Continue Tylenol, 975 mg PO q 8, standing dose  Continue Oxycodone, 2 5 to 5 mg PO q 4 hours prn pain    5  GI:  Tolerating diet   Maintain 1800 mL daily fluid restriction   Continue stool softeners and prn suppository  Continue GI prophylaxis    6  Endo:   Glucose well-controlled with sliding scale coverage    7    Hematology:    Post-operative blood count acceptable; Trend prn    8  Disposition:      Awaiting rehab placement: Re-referral for inpatient acute rehab per family request  PM&R will reassess     VTE Pharmacologic Prophylaxis: Heparin  VTE Mechanical Prophylaxis: sequential compression device    Collaborative rounds completed with supervising physician  Plan of care discussed with bedside nurse    SIGNATURE: Heather Kc PA-C  DATE: January 26, 2022  TIME: 6:03 AM

## 2022-01-26 NOTE — PHYSICAL THERAPY NOTE
Physical Therapy Treatment Note    Patient's Name: Geraldene Lesches  : 22 1148   PT Last Visit   PT Visit Date 22   Note Type   Note Type Treatment   Pain Assessment   Pain Assessment Tool 0-10   Pain Score No Pain   Restrictions/Precautions   Weight Bearing Precautions Per Order No   Other Precautions Cardiac/sternal;Multiple lines;Telemetry; Fall Risk;Cognitive; Chair Alarm; Bed Alarm   General   Chart Reviewed Yes   Family/Caregiver Present No   Cognition   Arousal/Participation Responsive; Cooperative   Attention Attends with cues to redirect   Orientation Level Oriented X4   Memory Decreased recall of precautions   Following Commands Follows one step commands with increased time or repetition   Comments pt is pleasant and cooperative, motivated to work with therapy today   Subjective   Subjective "Can you guys work with me earlier in the morning?"   Bed Mobility   Supine to Sit 3  Moderate assistance   Additional items Assist x 1;HOB elevated; Bedrails; Increased time required;Verbal cues;LE management   Transfers   Sit to Stand 3  Moderate assistance   Additional items Assist x 2; Increased time required;Verbal cues   Stand to Sit 3  Moderate assistance   Additional items Assist x 2; Increased time required;Verbal cues   Stand pivot 2  Maximal assistance   Additional items Assist x 2; Increased time required;Verbal cues   Additional Comments Transfers with RW  VCs for proper hand placement on RW    Ambulation/Elevation   Gait pattern Decreased foot clearance; Excessively slow; Foward flexed; Ataxia; Short stride   Gait Assistance 3  Moderate assist   Additional items Assist x 2;Verbal cues  (SBA of 3rd for lines/chair follow)   Assistive Device Rolling walker   Distance 3ft from bed to chair + an additional 5ft in hallway   Balance   Static Sitting Fair   Dynamic Sitting Fair -   Static Standing Poor +   Dynamic Standing Poor   Ambulatory Poor   Endurance Deficit   Endurance Deficit Yes Endurance Deficit Description weakness, fatigue, deconditioning   Activity Tolerance   Activity Tolerance Patient limited by fatigue   Nurse Made Aware ok to see per RN   Assessment   Prognosis Fair   Problem List Decreased strength;Decreased endurance; Impaired balance;Decreased mobility; Decreased cognition;Decreased coordination;Decreased safety awareness   Assessment Pt seen for PT session today with a focus on functional transfers, gait, and endurance  Pt continues to make slow, but steady progress towards mobility goals  At this time, pt is able to perform STS transfers and short distance ambulation (3-5ft at a time) with modAx2  Pt becomes SOB very quickly requiring frequent/prolonged rest breaks during activity  Pt's biggest limitations at this time are his limited endurance and activity tolerance- declining any therex after ambulation, but agreeable to performing next session  Pt would benefit from continued acute skilled PT to address above deficits  Continuing to recommend rehab upon d/c  Goals   Patient Goals to walk further down the tai next session   STG Expiration Date 02/01/22   PT Treatment Day 10   Plan   Treatment/Interventions Functional transfer training;Elevations;LE strengthening/ROM; Therapeutic exercise; Endurance training;Equipment eval/education; Bed mobility;Gait training;Spoke to nursing;Spoke to case management;OT   Progress Progressing toward goals   PT Frequency 4-6x/wk   Recommendation   PT Discharge Recommendation Post acute rehabilitation services   AM-PAC Basic Mobility Inpatient   Turning in Bed Without Bedrails 3   Lying on Back to Sitting on Edge of Flat Bed 2   Moving Bed to Chair 1   Standing Up From Chair 1   Walk in Room 1   Climb 3-5 Stairs 1   Basic Mobility Inpatient Raw Score 9   Turning Head Towards Sound 4   Follow Simple Instructions 3   Low Function Basic Mobility Raw Score 16   Low Function Basic Mobility Standardized Score 25 72   Highest Level Of Mobility -HLM Goal 3: Sit at edge of bed   JH-HLM Highest Level of Mobility 4: Move to chair/commode   -HLM Goal Achieved Yes   Education   Education Provided Mobility training;Assistive device   Patient Demonstrates verbal understanding   End of Consult   Patient Position at End of Consult Bedside chair; All needs within reach       [place] JUANJO Bragg, DPT     Ellen Bragg PT, DPT

## 2022-01-26 NOTE — PLAN OF CARE
Problem: OCCUPATIONAL THERAPY ADULT  Goal: Performs self-care activities at highest level of function for planned discharge setting  See evaluation for individualized goals  Description: Treatment Interventions: ADL retraining,Functional transfer training,UE strengthening/ROM,Endurance training,Patient/family training,Equipment evaluation/education,Compensatory technique education,Continued evaluation,Energy conservation,Activityengagement,Cardiac education          See flowsheet documentation for full assessment, interventions and recommendations  Outcome: Progressing  Note: Limitation: Decreased ADL status,Decreased UE ROM,Decreased UE strength,Decreased Safe judgement during ADL,Decreased cognition,Decreased endurance,Decreased fine motor control,Decreased high-level ADLs  Prognosis: Poor,Fair  Assessment: Patient participated in Skilled OT session this date with interventions consisting of ADL re training with the use of correct body mechanics, maintaining sternal precautions and  therapeutic activities to: increase activity tolerance   Patient agreeable to OT treatment session, upon arrival patient was found seated OOB to Chair  In comparison to previous session, patient with improvements in functional mobility and activity tolerance overall  Patient requiring verbal cues for safety, verbal cues for correct technique and frequent rest periods  Patient continues to be functioning below baseline level, occupational performance remains limited secondary to factors listed above and increased risk for falls and injury  From OT standpoint, recommendation at time of d/c would be Short Term Rehab  Patient to benefit from continued Occupational Therapy treatment while in the hospital to address deficits as defined above and maximize level of functional independence with ADLs and functional mobility  OTR to add functional mobility goal below        OT Discharge Recommendation: Post acute rehabilitation services  OT - OK to Discharge: Yes (When medically appropriate)

## 2022-01-26 NOTE — PLAN OF CARE
Problem: MOBILITY - ADULT  Goal: Maintain or return to baseline ADL function  Description: INTERVENTIONS:  -  Assess patient's ability to carry out ADLs; assess patient's baseline for ADL function and identify physical deficits which impact ability to perform ADLs (bathing, care of mouth/teeth, toileting, grooming, dressing, etc )  - Assess/evaluate cause of self-care deficits   - Assess range of motion  - Assess patient's mobility; develop plan if impaired  - Assess patient's need for assistive devices and provide as appropriate  - Encourage maximum independence but intervene and supervise when necessary  - Involve family in performance of ADLs  - Assess for home care needs following discharge   - Consider OT consult to assist with ADL evaluation and planning for discharge  - Provide patient education as appropriate  Outcome: Progressing  Goal: Maintains/Returns to pre admission functional level  Description: INTERVENTIONS:  - Perform BMAT or MOVE assessment daily    - Set and communicate daily mobility goal to care team and patient/family/caregiver     - Collaborate with rehabilitation services on mobility goals if consulted  - Out of bed for toileting  - Record patient progress and toleration of activity level   Outcome: Progressing     Problem: Potential for Falls  Goal: Patient will remain free of falls  Description: INTERVENTIONS:  - Educate patient/family on patient safety including physical limitations  - Instruct patient to call for assistance with activity   - Consult OT/PT to assist with strengthening/mobility   - Keep Call bell within reach  - Keep bed low and locked with side rails adjusted as appropriate  - Keep care items and personal belongings within reach  - Initiate and maintain comfort rounds  - Make Fall Risk Sign visible to staff  - Apply yellow socks and bracelet for high fall risk patients  - Consider moving patient to room near nurses station  Outcome: Progressing     Problem: Prexisting or High Potential for Compromised Skin Integrity  Goal: Skin integrity is maintained or improved  Description: INTERVENTIONS:  - Identify patients at risk for skin breakdown  - Assess and monitor skin integrity  - Assess and monitor nutrition and hydration status  - Monitor labs   - Assess for incontinence   - Turn and reposition patient  - Assist with mobility/ambulation  - Relieve pressure over bony prominences  - Avoid friction and shearing  - Provide appropriate hygiene as needed including keeping skin clean and dry  - Evaluate need for skin moisturizer/barrier cream  - Collaborate with interdisciplinary team   - Patient/family teaching  - Consider wound care consult   Outcome: Progressing     Problem: PAIN - ADULT  Goal: Verbalizes/displays adequate comfort level or baseline comfort level  Description: Interventions:  - Encourage patient to monitor pain and request assistance  - Assess pain using appropriate pain scale  - Administer analgesics based on type and severity of pain and evaluate response  - Implement non-pharmacological measures as appropriate and evaluate response  - Consider cultural and social influences on pain and pain management  - Notify physician/advanced practitioner if interventions unsuccessful or patient reports new pain  Outcome: Progressing     Problem: INFECTION - ADULT  Goal: Absence or prevention of progression during hospitalization  Description: INTERVENTIONS:  - Assess and monitor for signs and symptoms of infection  - Monitor lab/diagnostic results  - Monitor all insertion sites, i e  indwelling lines, tubes, and drains  - Monitor endotracheal if appropriate and nasal secretions for changes in amount and color  - [place] appropriate cooling/warming therapies per order  - Administer medications as ordered  - Instruct and encourage patient and family to use good hand hygiene technique  - Identify and instruct in appropriate isolation precautions for identified infection/condition  Outcome: Progressing  Goal: Absence of fever/infection during neutropenic period  Description: INTERVENTIONS:  - Monitor WBC    Outcome: Progressing     Problem: SAFETY ADULT  Goal: Maintain or return to baseline ADL function  Description: INTERVENTIONS:  -  Assess patient's ability to carry out ADLs; assess patient's baseline for ADL function and identify physical deficits which impact ability to perform ADLs (bathing, care of mouth/teeth, toileting, grooming, dressing, etc )  - Assess/evaluate cause of self-care deficits   - Assess range of motion  - Assess patient's mobility; develop plan if impaired  - Assess patient's need for assistive devices and provide as appropriate  - Encourage maximum independence but intervene and supervise when necessary  - Involve family in performance of ADLs  - Assess for home care needs following discharge   - Consider OT consult to assist with ADL evaluation and planning for discharge  - Provide patient education as appropriate  Outcome: Progressing  Goal: Maintains/Returns to pre admission functional level  Description: INTERVENTIONS:  - Perform BMAT or MOVE assessment daily    - Set and communicate daily mobility goal to care team and patient/family/caregiver     - Collaborate with rehabilitation services on mobility goals if consulted  - Out of bed for toileting  - Record patient progress and toleration of activity level   Outcome: Progressing  Goal: Patient will remain free of falls  Description: INTERVENTIONS:  - Educate patient/family on patient safety including physical limitations  - Instruct patient to call for assistance with activity   - Consult OT/PT to assist with strengthening/mobility   - Keep Call bell within reach  - Keep bed low and locked with side rails adjusted as appropriate  - Keep care items and personal belongings within reach  - Initiate and maintain comfort rounds  - Apply yellow socks and bracelet for high fall risk patients  - Consider moving patient to room near nurses station  Outcome: Progressing     Problem: DISCHARGE PLANNING  Goal: Discharge to home or other facility with appropriate resources  Description: INTERVENTIONS:  - Identify barriers to discharge w/patient and caregiver  - Arrange for needed discharge resources and transportation as appropriate  - Identify discharge learning needs (meds, wound care, etc )  - Arrange for interpretive services to assist at discharge as needed  - Refer to Case Management Department for coordinating discharge planning if the patient needs post-hospital services based on physician/advanced practitioner order or complex needs related to functional status, cognitive ability, or social support system  Outcome: Progressing     Problem: Knowledge Deficit  Goal: Patient/family/caregiver demonstrates understanding of disease process, treatment plan, medications, and discharge instructions  Description: Complete learning assessment and assess knowledge base    Interventions:  - Provide teaching at level of understanding  - Provide teaching via preferred learning methods  Outcome: Progressing     Problem: CARDIOVASCULAR - ADULT  Goal: Maintains optimal cardiac output and hemodynamic stability  Description: INTERVENTIONS:  - Monitor I/O, vital signs and rhythm  - Monitor for S/S and trends of decreased cardiac output  - Administer and titrate ordered vasoactive medications to optimize hemodynamic stability  - Assess quality of pulses, skin color and temperature  - Assess for signs of decreased coronary artery perfusion  - Instruct patient to report change in severity of symptoms  Outcome: Progressing  Goal: Absence of cardiac dysrhythmias or at baseline rhythm  Description: INTERVENTIONS:  - Continuous cardiac monitoring, vital signs, obtain 12 lead EKG if ordered  - Administer antiarrhythmic and heart rate control medications as ordered  - Monitor electrolytes and administer replacement therapy as ordered  Outcome: Progressing     Problem: CARDIOVASCULAR - ADULT  Goal: Maintains optimal cardiac output and hemodynamic stability  Description: INTERVENTIONS:  - Monitor I/O, vital signs and rhythm  - Monitor for S/S and trends of decreased cardiac output  - Administer and titrate ordered vasoactive medications to optimize hemodynamic stability  - Assess quality of pulses, skin color and temperature  - Assess for signs of decreased coronary artery perfusion  - Instruct patient to report change in severity of symptoms  Outcome: Progressing  Goal: Absence of cardiac dysrhythmias or at baseline rhythm  Description: INTERVENTIONS:  - Continuous cardiac monitoring, vital signs, obtain 12 lead EKG if ordered  - Administer antiarrhythmic and heart rate control medications as ordered  - Monitor electrolytes and administer replacement therapy as ordered  Outcome: Progressing     Problem: RESPIRATORY - ADULT  Goal: Achieves optimal ventilation and oxygenation  Description: INTERVENTIONS:  - Assess for changes in respiratory status  - Assess for changes in mentation and behavior  - Position to facilitate oxygenation and minimize respiratory effort  - Oxygen administered by appropriate delivery if ordered  - Initiate smoking cessation education as indicated  - Encourage broncho-pulmonary hygiene including cough, deep breathe, Incentive Spirometry  - Assess the need for suctioning and aspirate as needed  - Assess and instruct to report SOB or any respiratory difficulty  - Respiratory Therapy support as indicated  Outcome: Progressing     Problem: GASTROINTESTINAL - ADULT  Goal: Oral mucous membranes remain intact  Description: INTERVENTIONS  - Assess oral mucosa and hygiene practices  - Implement preventative oral hygiene regimen  - Implement oral medicated treatments as ordered  - Initiate Nutrition services referral as needed  Outcome: Progressing     Problem: GENITOURINARY - ADULT  Goal: Maintains or returns to baseline urinary function  Description: INTERVENTIONS:  - Assess urinary function  - Encourage oral fluids to ensure adequate hydration if ordered  - Administer IV fluids as ordered to ensure adequate hydration  - Administer ordered medications as needed  - Offer frequent toileting  - Follow urinary retention protocol if ordered  Outcome: Progressing  Goal: Urinary catheter remains patent  Description: INTERVENTIONS:  - Assess patency of urinary catheter  - If patient has a chronic [place], consider changing catheter if non-functioning  - Follow guidelines for intermittent irrigation of non-functioning urinary catheter  Outcome: Progressing     Problem: METABOLIC, FLUID AND ELECTROLYTES - ADULT  Goal: Electrolytes maintained within normal limits  Description: INTERVENTIONS:  - Monitor labs and assess patient for signs and symptoms of electrolyte imbalances  - Administer electrolyte replacement as ordered  - Monitor response to electrolyte replacements, including repeat lab results as appropriate  - Instruct patient on fluid and nutrition as appropriate  Outcome: Progressing  Goal: Fluid balance maintained  Description: INTERVENTIONS:  - Monitor labs   - Monitor I/O and WT  - Instruct patient on fluid and nutrition as appropriate  - Assess for signs & symptoms of volume excess or deficit  Outcome: Progressing  Goal: Glucose maintained within target range  Description: INTERVENTIONS:  - Monitor Blood Glucose as ordered  - Assess for signs and symptoms of hyperglycemia and hypoglycemia  - Administer ordered medications to maintain glucose within target range  - Assess nutritional intake and initiate nutrition service referral as needed  Outcome: Progressing     Problem: SKIN/TISSUE INTEGRITY - ADULT  Goal: Skin Integrity remains intact(Skin Breakdown Prevention)  Description: Assess:  -Assess extremities for adequate circulation and sensation     Bed Management:  -Have minimal linens on bed & keep smooth, unwrinkled  -Change linens as needed when moist or perspiring    Activity:    Skin Care:  -Avoid use of baby powder, tape, friction and shearing, hot water or  Outcome: Progressing  Goal: Incision(s), wounds(s) or drain site(s) healing without S/S of infection  Description: INTERVENTIONS  - Assess and document dressing, incision, wound bed, drain sites and surrounding tissue  - Provide patient and family education  Outcome: Progressing  Goal: Pressure injury heals and does not worsen  Description: Interventions:  - Implement low air loss mattress or specialty surface (Criteria met)  - Apply silicone foam dressing  - Consider nutrition services referral as needed  Outcome: Progressing     Problem: HEMATOLOGIC - ADULT  Goal: Maintains hematologic stability  Description: INTERVENTIONS  - Assess for signs and symptoms of bleeding or hemorrhage  - Monitor labs  - Administer supportive blood products/factors as ordered and appropriate  Outcome: Progressing     Problem: Nutrition/Hydration-ADULT  Goal: Nutrient/Hydration intake appropriate for improving, restoring or maintaining nutritional needs  Description: Monitor and assess patient's nutrition/hydration status for malnutrition  Collaborate with interdisciplinary team and initiate plan and interventions as ordered  Monitor patient's weight and dietary intake as ordered or per policy  Utilize nutrition screening tool and intervene as necessary  Determine patient's food preferences and provide high-protein, high-caloric foods as appropriate       INTERVENTIONS:  - Monitor oral intake, urinary output, labs, and treatment plans  - Assess nutrition and hydration status and recommend course of action  - Evaluate amount of meals eaten  - Assist patient with eating if necessary   - Allow adequate time for meals  - Recommend/ encourage appropriate diets, oral nutritional supplements, and vitamin/mineral supplements  - Order, calculate, and assess calorie counts as needed  - Recommend, monitor, and adjust tube feedings and TPN/PPN based on assessed needs  - Assess need for intravenous fluids  - Provide specific nutrition/hydration education as appropriate  - Include patient/family/caregiver in decisions related to nutrition  Outcome: Progressing

## 2022-01-26 NOTE — ARC ADMISSION
Patient re-evaluated by PM&R today, and he remains recommended for SNF/subacute rehab for longer, slower paced therapy needs  CM has been updated

## 2022-01-26 NOTE — OCCUPATIONAL THERAPY NOTE
Occupational Therapy Progress Note     Patient Name: Vladimir TURNER Date: 1/26/2022  Problem List  Principal Problem:    S/P VSD repair  Active Problems:    Agoraphobia with panic attacks    Cardiogenic shock (HCC)    VSD (ventricular septal defect)    JUDE (acute kidney injury) ([facility] [address] )    Transaminitis    Tylenol ingestion    Acute systolic congestive heart failure (HCC)    Anemia    Personal history of ECMO    Coagulopathy (HCC)    Leukocytosis    Central line complication    Thrombocytopenia (HCC)    Hypernatremia    Hyperchloremia    Hypocalcemia    Anticoagulated on Coumadin    Anticoagulation goal of INR 2 to 3    Urinary retention                01/26/22 1440   OT Last Visit   OT Visit Date 01/26/22   Note Type   Note Type Treatment   Restrictions/Precautions   Weight Bearing Precautions Per Order No   Other Precautions Cardiac/sternal;Multiple lines;Telemetry; Fall Risk;Pain;Cognitive   General   Response to Previous Treatment Patient with no complaints from previous session   Lifestyle   Autonomy Pt reports being I with ADLS, IADLS and mobility without device PTA  (+)    Reciprocal Relationships Pt lives with his brother and sister in law   Service to Others Retired   [place] [address] [facility]   Pain Assessment   Pain Assessment Tool 0-10   Pain Score No Pain   ADL   LB Dressing Assistance 2  Maximal Assistance   LB Dressing Deficit Setup;Supervision/safety; Don/doff R sock; Don/doff L sock   Toileting Assistance  2  Maximal Assistance   Toileting Deficit Setup; Increased time to complete;Supervison/safety; Perineal hygiene   Bed Mobility   Sit to Supine 2  Maximal assistance   Additional items Assist x 2; Increased time required;Verbal cues;LE management   Additional Comments After OT session pt in bed with all needs within reach  Transfers   Sit to Stand 3  Moderate assistance   Additional items Assist x 2; Increased time required;Verbal cues   Stand to Sit 3  Moderate assistance Additional items Assist x 2; Increased time required;Verbal cues   Functional Mobility   Functional Mobility 3  Moderate assistance   Additional Comments Pt took a few steps from bed to chair with  mod A x2  Additional items Rolling walker   Cognition   Overall Cognitive Status Impaired   Arousal/Participation Alert; Cooperative   Attention Attends with cues to redirect   Orientation Level Oriented X4   Memory Decreased recall of precautions   Following Commands Follows one step commands without difficulty   Comments Pt requires some VC for correct technique but is pleasant and cooperative  Activity Tolerance   Activity Tolerance Patient limited by fatigue   Medical Staff Made Aware RN confirmed okay to see pt   Assessment   Assessment Patient participated in Skilled OT session this date with interventions consisting of ADL re training with the use of correct body mechanics, maintaining sternal precautions and  therapeutic activities to: increase activity tolerance   Patient agreeable to OT treatment session, upon arrival patient was found seated OOB to Chair  In comparison to previous session, patient with improvements in functional mobility and activity tolerance overall  Patient requiring verbal cues for safety, verbal cues for correct technique and frequent rest periods  Patient continues to be functioning below baseline level, occupational performance remains limited secondary to factors listed above and increased risk for falls and injury  From OT standpoint, recommendation at time of d/c would be Short Term Rehab  Patient to benefit from continued Occupational Therapy treatment while in the hospital to address deficits as defined above and maximize level of functional independence with ADLs and functional mobility  OTR to add functional mobility goal below  Plan   Treatment Interventions ADL retraining;Functional transfer training;UE strengthening/ROM; Endurance training;Patient/family training;Equipment evaluation/education; Compensatory technique education;Continued evaluation;Cardiac education; Energy conservation; Activityengagement   Goal Expiration Date 02/07/22   OT Treatment Day 5   OT Frequency 3-5x/wk   Recommendation   OT Discharge Recommendation Post acute rehabilitation services   OT - OK to Discharge Yes  (When medically appropriate)   AM-PAC Daily Activity Inpatient   Lower Body Dressing 2   Bathing 2   Toileting 2   Upper Body Dressing 2   Grooming 3   Eating 3   Daily Activity Raw Score 14   Daily Activity Standardized Score (Calc for Raw Score >=11) 33 39   AM-PAC Applied Cognition Inpatient   Following a Speech/Presentation 3   Understanding Ordinary Conversation 4   Taking Medications 3   Remembering Where Things Are Placed or Put Away 3   Remembering List of 4-5 Errands 3   Taking Care of Complicated Tasks 3   Applied Cognition Raw Score 19   Applied Cognition Standardized Score 39 77   Modified [place] Scale   Modified [place] Scale 4       GOAL    1) Pt will improve functional mobility during ADL/IADL/leisure tasks to Min A using DME as needed w/ G balance/safety     Geoffry Ta, MOT, OTR/L

## 2022-01-26 NOTE — CASE MANAGEMENT
Case Management Discharge Planning Note    Patient name Geraldene Lesches  Location [facility] 589-31 MRN [id number]  : 1955 Date 2022       Current Admission Date: 2021  Current Admission Diagnosis:S/P VSD repair   Patient Active Problem List    Diagnosis Date Noted    Urinary retention 2022    Anticoagulated on Coumadin 2022    Anticoagulation goal of INR 2 to 3 2022    Thrombocytopenia (HCC) 2022    Hypernatremia 2022    Hyperchloremia 2022    Hypocalcemia 2022    Central line complication     S/P VSD repair 2022    Leukocytosis 2022    Coagulopathy ([facility] [address] ) 2022    Tylenol ingestion     Acute systolic congestive heart failure ([facility] [address] ) 2021    Anemia 2021    Personal history of ECMO 2021    Cardiogenic shock ([facility] [address] ) 2021    VSD (ventricular septal defect) 2021    JUDE (acute kidney injury) ([facility] [address] ) 2021    Transaminitis 2021    Pipe smoker 2018    Hyperlipidemia, mixed 2017    Impaired fasting glucose 2017    Agoraphobia with panic attacks 2015    Obesity (BMI 30-39 9) 2015    Psychosis, atypical ([facility] [address] ) 2015      LOS (days): 27  Geometric Mean LOS (GMLOS) (days):   Days to GMLOS:     OBJECTIVE:  Risk of Unplanned Readmission Score: 25         Current admission status: Inpatient   Preferred Pharmacy:   [id number] [address] [place] [address]  Phone: [phone] Fax: [phone]    Primary Care Provider: Mordechai Nyhan, MD    Primary Insurance: Carole Chawla  Secondary Insurance:     DISCHARGE DETAILS:    Discharge planning discussed with[de-identified] pt sister  Freedom of Choice: Yes  Comments - Freedom of Choice: Pt's sister, ARC Referral status update provided  CM contacted family/caregiver?: Yes  Were Treatment Team discharge recommendations reviewed with patient/caregiver?: Yes  Did patient/caregiver verbalize understanding of patient care needs?: Yes  Were patient/caregiver advised of the risks associated with not following Treatment Team discharge recommendations?: Yes    Contacts  Patient Contacts: Thuy Diaz: Sister  Relationship to Patient[de-identified] Family  Contact Method: Phone  Phone Number: [phone]  Reason/Outcome: Discharge Planning      Treatment Team Recommendation: Short Term Rehab  Discharge Destination Plan[de-identified] Short Term Rehab    Per Destinee Patrick pt has been recommended for slower pace therapy  CM informed Nicolas Barroso on [facility] denying once again  Nicolas Barroso gave CM permission to put in referrals in [facility] area, she does not want [facility] as it is too far  [facility] with Jajoe from admissions  There are no current beds avaiable  CM placed 3 additional referrals

## 2022-01-27 ENCOUNTER — IMMUNIZATIONS (INPATIENT)
Dept: FAMILY MEDICINE CLINIC | Facility: HOSPITAL | Age: 67
DRG: 167 | End: 2022-01-27
Payer: COMMERCIAL

## 2022-01-27 DIAGNOSIS — Z23 ENCOUNTER FOR IMMUNIZATION: Primary | ICD-10-CM

## 2022-01-27 LAB
ANION GAP SERPL CALCULATED.3IONS-SCNC: 4 MMOL/L (ref 4–13)
BASOPHILS # BLD AUTO: 0.02 THOUSANDS/ΜL (ref 0–0.1)
BASOPHILS NFR BLD AUTO: 0 % (ref 0–1)
BUN SERPL-MCNC: 31 MG/DL (ref 5–25)
CALCIUM SERPL-MCNC: 8.6 MG/DL (ref 8.3–10.1)
CHLORIDE SERPL-SCNC: 105 MMOL/L (ref 100–108)
CO2 SERPL-SCNC: 28 MMOL/L (ref 21–32)
CREAT SERPL-MCNC: 1.09 MG/DL (ref 0.6–1.3)
EOSINOPHIL # BLD AUTO: 0.09 THOUSAND/ΜL (ref 0–0.61)
EOSINOPHIL NFR BLD AUTO: 2 % (ref 0–6)
ERYTHROCYTE [DISTWIDTH] IN BLOOD BY AUTOMATED COUNT: 18.5 % (ref 11.6–15.1)
GFR SERPL CREATININE-BSD FRML MDRD: 71 ML/MIN/1.73SQ M
GLUCOSE SERPL-MCNC: 158 MG/DL (ref 65–140)
GLUCOSE SERPL-MCNC: 159 MG/DL (ref 65–140)
GLUCOSE SERPL-MCNC: 88 MG/DL (ref 65–140)
GLUCOSE SERPL-MCNC: 94 MG/DL (ref 65–140)
GLUCOSE SERPL-MCNC: 95 MG/DL (ref 65–140)
HCT VFR BLD AUTO: 30 % (ref 36.5–49.3)
HGB BLD-MCNC: 9.2 G/DL (ref 12–17)
IMM GRANULOCYTES # BLD AUTO: 0.11 THOUSAND/UL (ref 0–0.2)
IMM GRANULOCYTES NFR BLD AUTO: 2 % (ref 0–2)
LYMPHOCYTES # BLD AUTO: 0.82 THOUSANDS/ΜL (ref 0.6–4.47)
LYMPHOCYTES NFR BLD AUTO: 15 % (ref 14–44)
MCH RBC QN AUTO: 31.2 PG (ref 26.8–34.3)
MCHC RBC AUTO-ENTMCNC: 30.7 G/DL (ref 31.4–37.4)
MCV RBC AUTO: 102 FL (ref 82–98)
MONOCYTES # BLD AUTO: 0.46 THOUSAND/ΜL (ref 0.17–1.22)
MONOCYTES NFR BLD AUTO: 8 % (ref 4–12)
NEUTROPHILS # BLD AUTO: 4.06 THOUSANDS/ΜL (ref 1.85–7.62)
NEUTS SEG NFR BLD AUTO: 73 % (ref 43–75)
NRBC BLD AUTO-RTO: 0 /100 WBCS
PLATELET # BLD AUTO: 104 THOUSANDS/UL (ref 149–390)
PMV BLD AUTO: 11.4 FL (ref 8.9–12.7)
POTASSIUM SERPL-SCNC: 4.3 MMOL/L (ref 3.5–5.3)
RBC # BLD AUTO: 2.95 MILLION/UL (ref 3.88–5.62)
SODIUM SERPL-SCNC: 137 MMOL/L (ref 136–145)
WBC # BLD AUTO: 5.56 THOUSAND/UL (ref 4.31–10.16)

## 2022-01-27 PROCEDURE — 97530 THERAPEUTIC ACTIVITIES: CPT

## 2022-01-27 PROCEDURE — 82948 REAGENT STRIP/BLOOD GLUCOSE: CPT

## 2022-01-27 PROCEDURE — 99231 SBSQ HOSP IP/OBS SF/LOW 25: CPT | Performed by: INTERNAL MEDICINE

## 2022-01-27 PROCEDURE — 97110 THERAPEUTIC EXERCISES: CPT

## 2022-01-27 PROCEDURE — 99024 POSTOP FOLLOW-UP VISIT: CPT | Performed by: THORACIC SURGERY (CARDIOTHORACIC VASCULAR SURGERY)

## 2022-01-27 PROCEDURE — 0001A COVID-19 PFIZER VACC 0.3 ML: CPT

## 2022-01-27 PROCEDURE — 85025 COMPLETE CBC W/AUTO DIFF WBC: CPT | Performed by: PHYSICIAN ASSISTANT

## 2022-01-27 PROCEDURE — XW023S6 INTRODUCTION OF COVID-19 VACCINE DOSE 1 INTO MUSCLE, PERCUTANEOUS APPROACH, NEW TECHNOLOGY GROUP 6: ICD-10-PCS | Performed by: THORACIC SURGERY (CARDIOTHORACIC VASCULAR SURGERY)

## 2022-01-27 PROCEDURE — 91300 COVID-19 PFIZER VACC 0.3 ML: CPT

## 2022-01-27 PROCEDURE — 80048 BASIC METABOLIC PNL TOTAL CA: CPT | Performed by: PHYSICIAN ASSISTANT

## 2022-01-27 RX ORDER — AMIODARONE HYDROCHLORIDE 200 MG/1
200 TABLET ORAL
Status: DISCONTINUED | OUTPATIENT
Start: 2022-01-27 | End: 2022-01-31 | Stop reason: HOSPADM

## 2022-01-27 RX ADMIN — ASPIRIN 325 MG ORAL TABLET 325 MG: 325 PILL ORAL at 08:49

## 2022-01-27 RX ADMIN — CEFAZOLIN SODIUM 2000 MG: 2 SOLUTION INTRAVENOUS at 16:58

## 2022-01-27 RX ADMIN — HEPARIN SODIUM 5000 UNITS: 5000 INJECTION INTRAVENOUS; SUBCUTANEOUS at 01:12

## 2022-01-27 RX ADMIN — CEFAZOLIN SODIUM 2000 MG: 2 SOLUTION INTRAVENOUS at 08:48

## 2022-01-27 RX ADMIN — CEFAZOLIN SODIUM 2000 MG: 2 SOLUTION INTRAVENOUS at 01:12

## 2022-01-27 RX ADMIN — ATORVASTATIN CALCIUM 80 MG: 80 TABLET, FILM COATED ORAL at 18:36

## 2022-01-27 RX ADMIN — INSULIN LISPRO 1 UNITS: 100 INJECTION, SOLUTION INTRAVENOUS; SUBCUTANEOUS at 21:57

## 2022-01-27 RX ADMIN — HEPARIN SODIUM 5000 UNITS: 5000 INJECTION INTRAVENOUS; SUBCUTANEOUS at 16:57

## 2022-01-27 RX ADMIN — HEPARIN SODIUM 5000 UNITS: 5000 INJECTION INTRAVENOUS; SUBCUTANEOUS at 08:48

## 2022-01-27 RX ADMIN — METOPROLOL SUCCINATE 12.5 MG: 25 TABLET, EXTENDED RELEASE ORAL at 08:47

## 2022-01-27 RX ADMIN — AMIODARONE HYDROCHLORIDE 200 MG: 200 TABLET ORAL at 08:45

## 2022-01-27 RX ADMIN — TAMSULOSIN HYDROCHLORIDE 0.4 MG: 0.4 CAPSULE ORAL at 16:57

## 2022-01-27 RX ADMIN — PANTOPRAZOLE SODIUM 40 MG: 40 TABLET, DELAYED RELEASE ORAL at 05:10

## 2022-01-27 RX ADMIN — AMIODARONE HYDROCHLORIDE 200 MG: 200 TABLET ORAL at 05:10

## 2022-01-27 RX ADMIN — INSULIN LISPRO 1 UNITS: 100 INJECTION, SOLUTION INTRAVENOUS; SUBCUTANEOUS at 11:54

## 2022-01-27 RX ADMIN — BUMETANIDE 1 MG: 1 TABLET ORAL at 08:45

## 2022-01-27 RX ADMIN — METOPROLOL SUCCINATE 12.5 MG: 25 TABLET, EXTENDED RELEASE ORAL at 21:57

## 2022-01-27 NOTE — PLAN OF CARE
Problem: PHYSICAL THERAPY ADULT  Goal: Performs mobility at highest level of function for planned discharge setting  See evaluation for individualized goals  Description: Treatment/Interventions: Functional transfer training,LE strengthening/ROM,Elevations,Therapeutic exercise,Endurance training,Equipment eval/education,Bed mobility,Gait training,Spoke to nursing,OT,Cognitive reorientation  Equipment Recommended:  (r/o rw next visit)       See flowsheet documentation for full assessment, interventions and recommendations  Outcome: Progressing  Note: Prognosis: Fair  Problem List: Decreased strength,Decreased endurance,Impaired balance,Decreased mobility,Decreased cognition (dizziness)  Assessment: PT session limited today 2* pt's dizziness  Pt began session moving very well, was able to take a few short steps from EOB to chair with RW with less assistance from previous session  However, attempted 2 more ambulation trials during which pt reported dizziness and became diaphoretic upon standing  Pt was assisted back to chair with BP of 102/67; RN made aware  Further therex and gait deferred at this time 2* above  Pt would benefit from continued acute skilled PT to address above deficits  Recommend seeing pt prior to him having his BP medications next session in order to avoid these episodes of dizziness  Barriers to Discharge: Inaccessible home environment,Decreased caregiver support        PT Discharge Recommendation: Post acute rehabilitation services          See flowsheet documentation for full assessment

## 2022-01-27 NOTE — OCCUPATIONAL THERAPY NOTE
Occupational Therapy Progress Note     Patient Name: Louetta Aschoff  ITTPM'A Date: 1/27/2022  Problem List  Principal Problem:    S/P VSD repair  Active Problems:    Agoraphobia with panic attacks    Cardiogenic shock (HCC)    VSD (ventricular septal defect)    JUDE (acute kidney injury) ([facility] [address] )    Transaminitis    Tylenol ingestion    Acute systolic congestive heart failure (HCC)    Anemia    Personal history of ECMO    Coagulopathy (HCC)    Leukocytosis    Central line complication    Thrombocytopenia (HCC)    Hypernatremia    Hyperchloremia    Hypocalcemia    Anticoagulated on Coumadin    Anticoagulation goal of INR 2 to 3    Urinary retention            01/27/22 1355   OT Last Visit   OT Visit Date 01/27/22   Note Type   Note Type Treatment   Restrictions/Precautions   Weight Bearing Precautions Per Order No   Other Precautions Cardiac/sternal;Multiple lines;Telemetry; Fall Risk   General   Response to Previous Treatment Patient with no complaints from previous session   Lifestyle   Autonomy Pt reports being I with ADLS, IADLS and mobility without device PTA  (+)    Reciprocal Relationships Pt lives with his brother and sister in law   Service to Others Retired   [facility]   Pain Assessment   Pain Assessment Tool 0-10   Pain Score No Pain   ADL   Where Assessed Chair   LB Dressing Assistance 2  Maximal Assistance   LB Dressing Deficit Setup;Supervision/safety; Increased time to complete; Don/doff R sock; Don/doff L sock   Transfers   Sit to Stand 3  Moderate assistance   Additional items Assist x 2; Increased time required;Verbal cues   Stand to Sit 3  Moderate assistance   Additional items Assist x 2; Increased time required;Verbal cues   Functional Mobility   Functional Mobility 3  Moderate assistance   Additional Comments Pt took a few steps from chair to bed with mod A x2 and VC required for safety and correct technique      Additional items Rolling walker   Cognition   Overall Cognitive Status Impaired   Arousal/Participation Responsive;Arousable   Attention Attends with cues to redirect   Orientation Level Oriented X4   Memory Decreased recall of precautions   Following Commands Follows one step commands with increased time or repetition   Comments Pt requires VC for correct technique but is overall cooperative  Activity Tolerance   Activity Tolerance Patient limited by fatigue   Medical Staff Made Aware RN confirmed okay to see pt   Assessment   Assessment Patient participated in Skilled OT session this date with interventions consisting of ADL re training with the use of correct body mechanics, maintaining sternal precautions and  therapeutic activities to: increase activity tolerance   Patient agreeable to OT treatment session, upon arrival patient was found seated OOB to Chair  Patient requiring verbal cues for correct technique and frequent rest periods  Patient continues to be functioning below baseline level, occupational performance remains limited secondary to factors listed above and increased risk for falls and injury  From OT standpoint, recommendation at time of d/c would be Short Term Rehab  Patient to benefit from continued Occupational Therapy treatment while in the hospital to address deficits as defined above and maximize level of functional independence with ADLs and functional mobility  Plan   Treatment Interventions ADL retraining;Functional transfer training; Endurance training;Cardiac education;Continued evaluation   Goal Expiration Date 02/07/22   OT Treatment Day 6   OT Frequency 3-5x/wk   Recommendation   OT Discharge Recommendation Post acute rehabilitation services   OT - OK to Discharge Yes  (When medically appropriate)   AM-PAC Daily Activity Inpatient   Lower Body Dressing 2   Bathing 2   Toileting 2   Upper Body Dressing 2   Grooming 3   Eating 3   Daily Activity Raw Score 14   Daily Activity Standardized Score (Calc for Raw Score >=11) 33 39 AM-PAC Applied Cognition Inpatient   Following a Speech/Presentation 3   Understanding Ordinary Conversation 4   Taking Medications 3   Remembering Where Things Are Placed or Put Away 3   Remembering List of 4-5 Errands 3   Taking Care of Complicated Tasks 3   Applied Cognition Raw Score 19   Applied Cognition Standardized Score 39 77   Modified [place] Scale   Modified [place] Scale 4       Shamika Luna, EUSEBIO, OTR/L

## 2022-01-27 NOTE — PLAN OF CARE
Problem: MOBILITY - ADULT  Goal: Maintain or return to baseline ADL function  Description: INTERVENTIONS:  -  Assess patient's ability to carry out ADLs; assess patient's baseline for ADL function and identify physical deficits which impact ability to perform ADLs (bathing, care of mouth/teeth, toileting, grooming, dressing, etc )  - Assess/evaluate cause of self-care deficits   - Assess range of motion  - Assess patient's mobility; develop plan if impaired  - Assess patient's need for assistive devices and provide as appropriate  - Encourage maximum independence but intervene and supervise when necessary  - Involve family in performance of ADLs  - Assess for home care needs following discharge   - Consider OT consult to assist with ADL evaluation and planning for discharge  - Provide patient education as appropriate  Outcome: Progressing  Goal: Maintains/Returns to pre admission functional level  Description: INTERVENTIONS:  - Perform BMAT or MOVE assessment daily    - Set and communicate daily mobility goal to care team and patient/family/caregiver  - Collaborate with rehabilitation services on mobility goals if consulted  - Perform Range of Motion 2 times a day  - Reposition patient every 2 hours    - Dangle patient 2 times a day  - Stand patient 2 times a day  - Ambulate patient 2 times a day  - Out of bed to chair 2 times a day   - Out of bed for meals 2 times a day  - Out of bed for toileting  - Record patient progress and toleration of activity level   Outcome: Progressing

## 2022-01-27 NOTE — PLAN OF CARE
Problem: OCCUPATIONAL THERAPY ADULT  Goal: Performs self-care activities at highest level of function for planned discharge setting  See evaluation for individualized goals  Description: Treatment Interventions: ADL retraining,Functional transfer training,UE strengthening/ROM,Endurance training,Patient/family training,Equipment evaluation/education,Compensatory technique education,Continued evaluation,Energy conservation,Activityengagement,Cardiac education          See flowsheet documentation for full assessment, interventions and recommendations  Outcome: Progressing  Note: Limitation: Decreased ADL status,Decreased UE ROM,Decreased UE strength,Decreased Safe judgement during ADL,Decreased cognition,Decreased endurance,Decreased fine motor control,Decreased high-level ADLs  Prognosis: Poor,Fair  Assessment: Patient participated in Skilled OT session this date with interventions consisting of ADL re training with the use of correct body mechanics, maintaining sternal precautions and  therapeutic activities to: increase activity tolerance   Patient agreeable to OT treatment session, upon arrival patient was found seated OOB to Chair  Patient requiring verbal cues for correct technique and frequent rest periods  Patient continues to be functioning below baseline level, occupational performance remains limited secondary to factors listed above and increased risk for falls and injury  From OT standpoint, recommendation at time of d/c would be Short Term Rehab  Patient to benefit from continued Occupational Therapy treatment while in the hospital to address deficits as defined above and maximize level of functional independence with ADLs and functional mobility        OT Discharge Recommendation: Post acute rehabilitation services  OT - OK to Discharge: Yes (When medically appropriate)

## 2022-01-27 NOTE — PHYSICAL THERAPY NOTE
Physical Therapy Treatment Note    Patient's Name: More Loving  : 22 0931   PT Last Visit   PT Visit Date 22   Note Type   Note Type Treatment   Pain Assessment   Pain Assessment Tool 0-10   Pain Score No Pain   Restrictions/Precautions   Weight Bearing Precautions Per Order No   Other Precautions Cardiac/sternal;Multiple lines;Telemetry; Fall Risk;Cognitive   General   Chart Reviewed Yes   Family/Caregiver Present No   Cognition   Overall Cognitive Status Impaired   Arousal/Participation Alert; Cooperative   Attention Attends with cues to redirect   Orientation Level Oriented X4   Memory Decreased recall of precautions   Following Commands Follows one step commands with increased time or repetition   Subjective   Subjective "I'm dizzy, I think you guys should work with me before I get my meds next time"   Bed Mobility   Supine to Sit 3  Moderate assistance   Additional items Assist x 1;HOB elevated; Bedrails; Increased time required;Verbal cues;LE management   Transfers   Sit to Stand 3  Moderate assistance   Additional items Assist x 2;Impulsive;Verbal cues   Stand to Sit 3  Moderate assistance   Additional items Assist x 2;Impulsive;Verbal cues   Stand pivot 3  Moderate assistance   Additional items Assist x 2;Verbal cues   Additional Comments With initial STS transfer from EOB, pt very impulsive/quick to move--> able to transfer from EOB to recliner taking a few small steps with RW   Ambulation/Elevation   Gait pattern Decreased foot clearance; Inconsistent doug; Excessively slow; Improper Weight shift; Ataxia   Gait Assistance 3  Moderate assist   Additional items Assist x 2;Verbal cues   Assistive Device Rolling walker   Distance ~3 steps from EOB to chair  Attempted to more ambulation trials, but upon standing both times pt reporting dizziness, became sweaty  Assisted back to chair with BP of 102/67- RN aware   Elevated pt's legs and deferred further therex/mobility at this time   Balance   Static Sitting Fair   Dynamic Sitting Poor +   Static Standing Poor +   Dynamic Standing Poor   Ambulatory Poor   Endurance Deficit   Endurance Deficit Yes   Endurance Deficit Description weakness, fatigue, deconditioning   Activity Tolerance   Activity Tolerance Patient limited by fatigue; Other (Comment)  (dizziness)   Nurse Made Aware updated RN on pt's symptoms with standing/ambulation   Exercises   Knee AROM Long Arc Quad Sitting;15 reps;AROM; Bilateral   Ankle Pumps Sitting;25 reps;AROM; Bilateral   Assessment   Prognosis Fair   Problem List Decreased strength;Decreased endurance; Impaired balance;Decreased mobility; Decreased cognition  (dizziness)   Assessment PT session limited today 2* pt's dizziness  Pt began session moving very well, was able to take a few short steps from EOB to chair with RW with less assistance from previous session  However, attempted 2 more ambulation trials during which pt reported dizziness and became diaphoretic upon standing  Pt was assisted back to chair with BP of 102/67; RN made aware  Further therex and gait deferred at this time 2* above  Pt would benefit from continued acute skilled PT to address above deficits  Recommend seeing pt prior to him having his BP medications next session in order to avoid these episodes of dizziness  Goals   Patient Goals to feel less dizzy   STG Expiration Date 02/01/22   PT Treatment Day 11   Plan   Treatment/Interventions Functional transfer training;LE strengthening/ROM; Elevations; Therapeutic exercise; Endurance training;Equipment eval/education; Bed mobility;Gait training;Spoke to nursing;Spoke to case management;OT   Progress Slow progress, decreased activity tolerance   PT Frequency 4-6x/wk   Recommendation   PT Discharge Recommendation Post acute rehabilitation services   AM-PAC Basic Mobility Inpatient   Turning in Bed Without Bedrails 3   Lying on Back to Sitting on Edge of Flat Bed 2   Moving Bed to Chair 1   Standing Up From Chair 1   Walk in Room 1   Climb 3-5 Stairs 1   Basic Mobility Inpatient Raw Score 9   Turning Head Towards Sound 4   Follow Simple Instructions 3   Low Function Basic Mobility Raw Score 16   Low Function Basic Mobility Standardized Score 25 72   Highest Level Of Mobility   -[facility] Goal 3: Sit at edge of bed   -[facility] Highest Level of Mobility 4: Move to chair/commode   -[facility] Goal Achieved Yes   End of Consult   Patient Position at End of Consult Bedside chair; All needs within reach       Marsland Heads, PT, DPT

## 2022-01-27 NOTE — PROGRESS NOTES
Progress Note - Cardiothoracic Surgery   Gissel Donnelly 61 y o  male MRN: [id number]  Unit/Bed#: [facility] 427-01 Encounter: [id number]  Cardiogenic shock  S/P [facility] ECMO; POD # 26  VSD/cardiogenic shock  S/P VSD repair w/ [facility] ECMO decannulation; POD # 22    24 Hour Events: No overnight events  Denies CP or SOB  + BM  Using IS  Re-evaluated by PM&R yesterday, remains recommended for SNF/subacute rehab  Ambulated with walker 15 - 20ft       Blood cx (1/23): NG for 48hrs  Urine cx (1/20): +Citrobacter koseri- on ancef day 4/5      Medications:   Scheduled Meds:  Current Facility-Administered Medications   Medication Dose Route Frequency Provider Last Rate    acetaminophen  650 mg Oral Q6H PRN Anabelle Art PA-C      amiodarone  200 mg Oral Q8H [address] Francisca Clubs, NOA      aspirin  325 mg Oral Daily Francisca ClubsNOA      atorvastatin  80 mg Oral After [employer], NOA      bisacodyl  10 mg Rectal Daily PRN Anabelle Art PA-C      bumetanide  1 mg Oral Daily Russ Ruiz MD      cefazolin  2,000 mg Intravenous Q8H BROOKE Carty 2,000 mg (01/27/22 0112)    docusate sodium  100 mg Oral BID PRN Anabelle Art PA-C      heparin (porcine)  5,000 Units Subcutaneous [address]      insulin lispro  1-6 Units Subcutaneous TID AC Francisca Clubs, PA-EMILIANO      insulin lispro  1-6 Units Subcutaneous HS Francisca Clubs, PA-EMILIANO      metoprolol succinate  12 5 mg Oral BID BROOKE Piper      ondansetron  4 mg Intravenous Q6H PRN Francisca ClubsNOA      oxyCODONE  2 5 mg Oral Q4H PRN Francisca Clubs, PA-EMILIANO      oxyCODONE  5 mg Oral Q4H PRN Francisca Clubs, PA-EMILIANO      pantoprazole  40 mg Oral Early Morning Francisca ClubsNOA      polyethylene glycol  17 g Oral Daily PRN Anabelle Art PA-C      tamsulosin  0 4 mg Oral Daily With [employer] NOA Senior      temazepam  15 mg Oral HS PRN Francisca Tapia PA-C       Continuous Infusions:   PRN Meds:   acetaminophen    bisacodyl    docusate sodium    ondansetron    oxyCODONE    oxyCODONE    polyethylene glycol    temazepam    Vitals:   Vitals:    01/26/22 2140 01/26/22 2300 01/26/22 2325 01/27/22 0240   BP: 92/53 98/54 92/54 94/56   BP Location:  Left arm  Left arm   Pulse:  73     Resp:  18  20   Temp:  98 °F (36 7 °C) 98 °F (36 7 °C) 97 5 °F (36 4 °C)   TempSrc:  Oral  Oral   SpO2:  99%  98%   Weight:       Height:           Telemetry: NSR w/ BBB; Heart Rate: 71    Respiratory:   SpO2: SpO2: 98 %; Room Air    Intake/Output:     Intake/Output Summary (Last 24 hours) at 1/27/2022 0552  Last data filed at 1/26/2022 2200  Gross per 24 hour   Intake 300 ml   Output 1475 ml   Net -1175 ml   UOP 1 4L, net -1 1L      Weights:   Weight (last 2 days)     Date/Time Weight    01/26/22 0531 97 1 (214)            Results:   Results from last 7 days   Lab Units 01/25/22  0438 01/23/22 2019   WBC Thousand/uL 5 49 5 22   HEMOGLOBIN g/dL 9 0* 8 8*   HEMATOCRIT % 29 3* 28 0*   PLATELETS Thousands/uL 142* 162     Results from last 7 days   Lab Units 01/25/22  0438 01/24/22  0448 01/23/22 2019   SODIUM mmol/L 137 138 139   POTASSIUM mmol/L 3 7 4 7 3 6   CHLORIDE mmol/L 103 107 109*   CO2 mmol/L 27 26 23   BUN mg/dL 28* 26* 29*   CREATININE mg/dL 1 15 1 08 1 06   CALCIUM mg/dL 8 3 8 4 7 0*     Results from last 7 days   Lab Units 01/21/22  1027   INR  2 18*     Glucose 83- 131 (prediabetes 5 9)    Invasive Lines/Tubes:  Invasive Devices  Report    Peripherally Inserted Central Catheter Line            PICC Line 20/16/69 Right Basilic 20 days          Drain            Urethral Catheter Non-latex 18 Fr  6 days              Physical Exam:    HEENT/NECK:  Normocephalic  Atraumatic  No jugular venous distention      Cardiac: Regular rate and rhythm and No murmurs/rubs/gallops  Pulmonary:  Breath sounds clear bilaterally and No rales/rhonchi/wheezes  Abdomen:  Non-tender, Non-distended and Normal bowel sounds  Incisions: Sternum is stable  Incision is clean, dry, and intact  Extremities: Extremities warm/dry and 2+ edema B/L  Neuro: Alert and oriented X 3 and No focal deficits  Skin: Warm/Dry, without rashes or lesions  Assessment:  Principal Problem:    S/P VSD repair  Active Problems:    Agoraphobia with panic attacks    Cardiogenic shock (HCC)    VSD (ventricular septal defect)    JUDE (acute kidney injury) ([address] )    Transaminitis    Tylenol ingestion    Acute systolic congestive heart failure (HCC)    Anemia    Personal history of ECMO    Coagulopathy (HCC)    Leukocytosis    Central line complication    Thrombocytopenia (HCC)    Hypernatremia    Hyperchloremia    Hypocalcemia    Anticoagulated on Coumadin    Anticoagulation goal of INR 2 to 3    Urinary retention       Cardiogenic shock  S/P VA ECMO; POD # 26  VSD/cardiogenic shock  S/P VSD repair w/ VA ECMO decannulation; POD # 22    Plan:    1  Cardiac:   NSR; HR/BP well-controlled  Continue Toprol xl 12 5 mg BID    Continue to hold losartan 12 5 mg daily  Continue ASA and Statin therapy  Epicardial pacing wires out  PICC line in place for access  Continue DVT prophylaxis  Decrease amiodarone to daily    2  Pulmonary:   Good Room air oxygen saturation; Continue incentive spirometry/Coughing/Deep breathing exercises  Chest tubes have been discontinued    3  Renal: carolina in place per urology- f/u OP  Intake/Output net: -1 1 L L/24 hours  Diuretic Regimen: Continue Bumex 1 mg po QD  Post op Creatinine stable; Follow up labs prn  Replete K>4   Complete antibiotic course for +Urine cx   Continue flomax     4  Neuro:  Neurologically intact; No active issues  Incisional pain well-controlled  Continue Tylenol, 975 mg PO q 8, standing dose  Continue Oxycodone, 2 5 to 5 mg PO q 4 hours prn pain    5  GI:  Tolerating diet   Maintain 1800 mL daily fluid restriction   Continue stool softeners and prn suppository  Continue GI prophylaxis    6   Endo:   Glucose well-controlled with sliding scale coverage    7    Hematology:    Post-operative blood count acceptable; Trend prn    8     Disposition:      Awaiting rehab placement- will discuss coordinating vaccinations    VTE Pharmacologic Prophylaxis: Heparin  VTE Mechanical Prophylaxis: sequential compression device    Collaborative rounds completed with supervising physician  Plan of care discussed with bedside nurse    SIGNATURE: Rudy Lucia PA-C  DATE: January 27, 2022  TIME: 5:52 AM

## 2022-01-27 NOTE — PROGRESS NOTES
Cardiology Progress Note - Thirza Spatz 61 y o  male MRN: [id number]    Unit/Bed#: [facility] 427-01 Encounter: [id number]      Assessment & Plan:  Principal Problem:    S/P VSD repair  Active Problems:    Agoraphobia with panic attacks    Cardiogenic shock (HCC)    VSD (ventricular septal defect)    JUDE (acute kidney injury) ([address] )    Transaminitis    Tylenol ingestion    Acute systolic congestive heart failure (HCC)    Anemia    Personal history of ECMO    Coagulopathy (HCC)    Leukocytosis    Central line complication    Thrombocytopenia (HCC)    Hypernatremia    Hyperchloremia    Hypocalcemia    Anticoagulated on Coumadin    Anticoagulation goal of INR 2 to 3    Urinary retention    # Late presenting anterior STEMI c/b anteroapical VSD s/p open patch repair   # Redo sternotomy, mediastinal exploration for retained swan avinash catheter  # HFrEF with EF 35-40% as per my assessment on most recent CORIE  # Deconditioning   # UTI    BP: 90s-100s/60s  HR: 90s  UOP: 1 8 L  Creatinine: 1 5   No additional episodes of dizziness    Plan:  - c/w bumex to 1 mg PO QD  - Toprol XL 12 5 mg BID  - c/w aspirin and atorvastatin  - amiodarone for afib prevention  - encourage OOBTC and physical therapy  Patient will need JACQUELINE as per PT  Subjective:   No acute events yesterday  Objective:     Vitals: Blood pressure 95/57, pulse 72, temperature 97 7 °F (36 5 °C), temperature source Oral, resp  rate 20, height 5' 5 5" (1 664 m), weight 97 1 kg (214 lb), SpO2 97 %  , Body mass index is 35 07 kg/m² ,   Orthostatic Blood Pressures      Most Recent Value   Blood Pressure 95/57 filed at 01/27/2022 9070   Patient Position - Orthostatic VS Lying filed at 01/27/2022 2824            Intake/Output Summary (Last 24 hours) at 1/27/2022 1009  Last data filed at 1/27/2022 0737  Gross per 24 hour   Intake 236 ml   Output 1675 ml   Net -1439 ml           Physical Exam:    GEN: Robbie Momin Lethargic  HEENT: anicteric, mucous membranes moist  NECK: No JVD, carotid bruits   HEART: regular rhythm, normal S1 and S2, no murmurs, clicks, gallops or rubs   CHEST: Sternotomy incision site healing well  LUNGS: clear to auscultation bilaterally; Crackles present  ABDOMEN: normal bowel sounds, soft, no tenderness, no distention  EXTREMITIES: peripheral pulses normal, 1+ edema  NEURO: no focal findings    SKIN: normal without suspicious lesions on exposed skin      Current Facility-Administered Medications:     acetaminophen (TYLENOL) tablet 650 mg, 650 mg, Oral, Q6H PRN, Anabelle Art PA-C    amiodarone tablet 200 mg, 200 mg, Oral, Daily With Breakfast, Rosaline Barger PA-C, 200 mg at 01/27/22 0845    aspirin tablet 325 mg, 325 mg, Oral, Daily, Marisela Carlson PA-C, 325 mg at 01/27/22 0849    atorvastatin (LIPITOR) tablet 80 mg, 80 mg, Oral, After Nida Joaquin PA-C, 80 mg at 01/26/22 1723    bisacodyl (DULCOLAX) rectal suppository 10 mg, 10 mg, Rectal, Daily PRN, Anabelle Art PA-C    bumetanide (BUMEX) tablet 1 mg, 1 mg, Oral, Daily, Florecita Sanchez MD, 1 mg at 01/27/22 0845    ceFAZolin (ANCEF) IVPB (premix in dextrose) 2,000 mg 50 mL, 2,000 mg, Intravenous, Q8H, BROOKE Roman, Last Rate: 100 mL/hr at 01/27/22 0848, 2,000 mg at 01/27/22 0848    docusate sodium (COLACE) capsule 100 mg, 100 mg, Oral, BID PRN, Anabelle Art PA-C    heparin (porcine) subcutaneous injection 5,000 Units, 5,000 Units, Subcutaneous, Q8H [address], Eurajan Senior PA-C, 5,000 Units at 01/27/22 0848    insulin lispro (HumaLOG) 100 units/mL subcutaneous injection 1-6 Units, 1-6 Units, Subcutaneous, TID AC **AND** Fingerstick Glucose (POCT), , , TID AC, Marisela Carlson PA-C    insulin lispro (HumaLOG) 100 units/mL subcutaneous injection 1-6 Units, 1-6 Units, Subcutaneous, HS, Marisela Carlson PA-C, 1 Units at 01/21/22 2110    metoprolol succinate (TOPROL-XL) 24 hr tablet 12 5 mg, 12 5 mg, Oral, BID, BROOKE Contreras, 12 5 mg at 01/27/22 0835   ondansetron (ZOFRAN) injection 4 mg, 4 mg, Intravenous, Q6H PRN, Jennifer Bob PA-C    oxyCODONE (ROXICODONE) IR tablet 2 5 mg, 2 5 mg, Oral, Q4H PRN, Jennifer Bob PA-C, 2 5 mg at 01/13/22 0525    oxyCODONE (ROXICODONE) IR tablet 5 mg, 5 mg, Oral, Q4H PRN, Jennifer Bob PA-C, 5 mg at 01/08/22 2148    pantoprazole (PROTONIX) EC tablet 40 mg, 40 mg, Oral, Early Morning, Jennifer Bob PA-C, 40 mg at 01/27/22 0510    polyethylene glycol (MIRALAX) packet 17 g, 17 g, Oral, Daily PRN, Anabelle Art PA-C    tamsulosin (FLOMAX) capsule 0 4 mg, 0 4 mg, Oral, Daily With Dinner, [address] NOA Senior, 0 4 mg at 01/26/22 1723    temazepam (RESTORIL) capsule 15 mg, 15 mg, Oral, HS PRN, Jennifer Bob PA-C, 15 mg at 01/13/22 2144    Labs & Results:    No results found for: CKTOTAL, CKMB, CKMBINDEX, TROPONINI    Lab Results   Component Value Date    GLUCOSE 149 (H) 01/07/2022    CALCIUM 8 6 01/27/2022    K 4 3 01/27/2022    CO2 28 01/27/2022     01/27/2022    BUN 31 (H) 01/27/2022    CREATININE 1 09 01/27/2022       Lab Results   Component Value Date    WBC 5 49 01/25/2022    HGB 9 0 (L) 01/25/2022    HCT 29 3 (L) 01/25/2022     (H) 01/25/2022     (L) 01/25/2022     Results from last 7 days   Lab Units 01/21/22  1027   INR  2 18*       No results found for: CHOL  Lab Results   Component Value Date    HDL 13 (L) 12/30/2021    HDL 38 (L) 09/13/2021     Lab Results   Component Value Date    LDLCALC 107 (H) 12/30/2021    LDLCALC 134 (H) 09/13/2021     Lab Results   Component Value Date    TRIG 152 (H) 12/30/2021    TRIG 137 09/13/2021       Lab Results   Component Value Date    ALT 61 01/23/2022    AST 21 01/23/2022         EKG personally reviewed by )Jake Mccann MD  No acute changes   TELE: No significant arrhythmias seen on telemetry review

## 2022-01-28 LAB
GLUCOSE SERPL-MCNC: 103 MG/DL (ref 65–140)
GLUCOSE SERPL-MCNC: 109 MG/DL (ref 65–140)
GLUCOSE SERPL-MCNC: 142 MG/DL (ref 65–140)
GLUCOSE SERPL-MCNC: 92 MG/DL (ref 65–140)

## 2022-01-28 PROCEDURE — 97110 THERAPEUTIC EXERCISES: CPT

## 2022-01-28 PROCEDURE — 82948 REAGENT STRIP/BLOOD GLUCOSE: CPT

## 2022-01-28 PROCEDURE — 97116 GAIT TRAINING THERAPY: CPT

## 2022-01-28 PROCEDURE — 99024 POSTOP FOLLOW-UP VISIT: CPT | Performed by: THORACIC SURGERY (CARDIOTHORACIC VASCULAR SURGERY)

## 2022-01-28 PROCEDURE — 99231 SBSQ HOSP IP/OBS SF/LOW 25: CPT | Performed by: INTERNAL MEDICINE

## 2022-01-28 PROCEDURE — 97530 THERAPEUTIC ACTIVITIES: CPT

## 2022-01-28 RX ORDER — BUMETANIDE 1 MG/1
1 TABLET ORAL ONCE
Status: COMPLETED | OUTPATIENT
Start: 2022-01-28 | End: 2022-01-28

## 2022-01-28 RX ADMIN — METOPROLOL SUCCINATE 12.5 MG: 25 TABLET, EXTENDED RELEASE ORAL at 09:30

## 2022-01-28 RX ADMIN — CEFAZOLIN SODIUM 2000 MG: 2 SOLUTION INTRAVENOUS at 17:50

## 2022-01-28 RX ADMIN — ASPIRIN 325 MG ORAL TABLET 325 MG: 325 PILL ORAL at 09:31

## 2022-01-28 RX ADMIN — CEFAZOLIN SODIUM 2000 MG: 2 SOLUTION INTRAVENOUS at 00:16

## 2022-01-28 RX ADMIN — PANTOPRAZOLE SODIUM 40 MG: 40 TABLET, DELAYED RELEASE ORAL at 06:01

## 2022-01-28 RX ADMIN — TAMSULOSIN HYDROCHLORIDE 0.4 MG: 0.4 CAPSULE ORAL at 17:49

## 2022-01-28 RX ADMIN — ATORVASTATIN CALCIUM 80 MG: 80 TABLET, FILM COATED ORAL at 17:50

## 2022-01-28 RX ADMIN — AMIODARONE HYDROCHLORIDE 200 MG: 200 TABLET ORAL at 09:32

## 2022-01-28 RX ADMIN — POLYETHYLENE GLYCOL 3350 17 G: 17 POWDER, FOR SOLUTION ORAL at 09:32

## 2022-01-28 RX ADMIN — BUMETANIDE 1 MG: 1 TABLET ORAL at 14:39

## 2022-01-28 RX ADMIN — HEPARIN SODIUM 5000 UNITS: 5000 INJECTION INTRAVENOUS; SUBCUTANEOUS at 17:57

## 2022-01-28 RX ADMIN — BUMETANIDE 1 MG: 1 TABLET ORAL at 09:24

## 2022-01-28 RX ADMIN — METOPROLOL SUCCINATE 12.5 MG: 25 TABLET, EXTENDED RELEASE ORAL at 22:00

## 2022-01-28 RX ADMIN — HEPARIN SODIUM 5000 UNITS: 5000 INJECTION INTRAVENOUS; SUBCUTANEOUS at 00:16

## 2022-01-28 RX ADMIN — HEPARIN SODIUM 5000 UNITS: 5000 INJECTION INTRAVENOUS; SUBCUTANEOUS at 09:42

## 2022-01-28 RX ADMIN — CEFAZOLIN SODIUM 2000 MG: 2 SOLUTION INTRAVENOUS at 09:32

## 2022-01-28 NOTE — PLAN OF CARE
Problem: MOBILITY - ADULT  Goal: Maintain or return to baseline ADL function  Description: INTERVENTIONS:  -  Assess patient's ability to carry out ADLs; assess patient's baseline for ADL function and identify physical deficits which impact ability to perform ADLs (bathing, care of mouth/teeth, toileting, grooming, dressing, etc )  - Assess/evaluate cause of self-care deficits   - Assess range of motion  - Assess patient's mobility; develop plan if impaired  - Assess patient's need for assistive devices and provide as appropriate  - Encourage maximum independence but intervene and supervise when necessary  - Involve family in performance of ADLs  - Assess for home care needs following discharge   - Consider OT consult to assist with ADL evaluation and planning for discharge  - Provide patient education as appropriate  Outcome: Progressing  Goal: Maintains/Returns to pre admission functional level  Description: INTERVENTIONS:  - Perform BMAT or MOVE assessment daily    - Set and communicate daily mobility goal to care team and patient/family/caregiver  - Collaborate with rehabilitation services on mobility goals if consulted  - Perform Range of Motion 4 times a day  - Reposition patient every 4 hours    - Dangle patient 4 times a day  - Stand patient 4 times a day  - Ambulate patient 4 times a day  - Out of bed to chair 4 times a day   - Out of bed for meals 3 times a day  - Out of bed for toileting  - Record patient progress and toleration of activity level   Outcome: Progressing

## 2022-01-28 NOTE — UTILIZATION REVIEW
Continued Stay Review    Date: 01/28/2022                          Current Patient Class: Inpatient  Current Level of Care: Med/Surg    HPI:66 y o  male initially admitted on 12/30/2021    POD # 27Cardiogenic shock  S/P VA ECMO;   POD # 23 VSD/cardiogenic shock  S/P VSD repair w/ VA ECMO decannulation;   Assessment/Plan: Received first dose of covid vaccine yesterday  IS 10x/hour while awake  Urine cx (1/20): +Citrobacter koseri- on ancef day 5/5  Continue toprol xl, ASA, statin & amiodarone  Hold losatran  Diuretic regimen Bumex  Continue flomax  Maintain fld restriction 1800ml      VTE Pharmacologic Prophylaxis: Heparin  VTE Mechanical Prophylaxis: sequential compression device    Vital Signs: /65   Pulse 79   Temp 97 5 °F (36 4 °C)   Resp 14   Ht 5' 5 5" (1 664 m)   Wt 92 9 kg (204 lb 12 9 oz)   SpO2 100%   BMI 33 56 kg/m²     Pertinent Labs/Diagnostic Results:     Results from last 7 days   Lab Units 01/27/22  0626 01/25/22 0438 01/23/22 2019   WBC Thousand/uL 5 56 5 49 5 22   HEMOGLOBIN g/dL 9 2* 9 0* 8 8*   HEMATOCRIT % 30 0* 29 3* 28 0*   PLATELETS Thousands/uL 104* 142* 162   NEUTROS ABS Thousands/µL 4 06 4 28 3 90     Results from last 7 days   Lab Units 01/27/22  0626 01/25/22 0438 01/24/22 0448 01/23/22 2019 01/23/22  0538   SODIUM mmol/L 137 137 138 139 138   POTASSIUM mmol/L 4 3 3 7 4 7 3 6 5 0   CHLORIDE mmol/L 105 103 107 109* 109*   CO2 mmol/L 28 27 26 23 26   ANION GAP mmol/L 4 7 5 7 3*   BUN mg/dL 31* 28* 26* 29* 31*   CREATININE mg/dL 1 09 1 15 1 08 1 06 1 15   EGFR ml/min/1 73sq m 71 66 72 73 66   CALCIUM mg/dL 8 6 8 3 8 4 7 0* 8 2*   CALCIUM, IONIZED mmol/L  --   --   --  0 99*  --    MAGNESIUM mg/dL  --   --   --  1 8  --    PHOSPHORUS mg/dL  --   --   --  2 1*  --      Results from last 7 days   Lab Units 01/23/22 2019   AST U/L 21   ALT U/L 61   ALK PHOS U/L 101   TOTAL PROTEIN g/dL 5 5*   ALBUMIN g/dL 2 9*   TOTAL BILIRUBIN mg/dL 1 80*     Results from last 7 days Lab Units 01/28/22  1057 01/28/22  0557 01/27/22  2049 01/27/22  1623 01/27/22  1040 01/27/22  0629 01/26/22  2119 01/26/22  1556 01/26/22  1038 01/26/22  0601 01/25/22  2152 01/25/22  1559   POC GLUCOSE mg/dl 109 92 158* 88 159* 94 131 98 83 95 125 112     Results from last 7 days   Lab Units 01/27/22  0626 01/25/22  0438 01/24/22  0448 01/23/22  2019 01/23/22  0538   GLUCOSE RANDOM mg/dL 95 96 87 113 89     Results from last 7 days   Lab Units 01/23/22 2024   PH JANNET  7 390   PCO2 JANNET mm Hg 40 6*   PO2 JANNET mm Hg 31 1*   HCO3 JANNET mmol/L 24 0   BASE EXC JANNET mmol/L -0 9   O2 CONTENT JANNET ml/dL 7 8   O2 HGB, VENOUS % 58 1*     Results from last 7 days   Lab Units 01/24/22 0448 01/23/22 2020   PROCALCITONIN ng/ml 0 12 0 16     Results from last 7 days   Lab Units 01/23/22  2019   LACTIC ACID mmol/L 1 1     Results from last 7 days   Lab Units 01/23/22  2259 01/23/22 2258   BLOOD CULTURE  No Growth After 4 Days  No Growth After 4 Days       Medications:   Scheduled Medications:  amiodarone, 200 mg, Oral, Daily With Breakfast  aspirin, 325 mg, Oral, Daily  atorvastatin, 80 mg, Oral, After Dinner  bumetanide, 1 mg, Oral, Daily  cefazolin, 2,000 mg, Intravenous, Q8H  heparin (porcine), 5,000 Units, Subcutaneous, Q8H DENTIA  insulin lispro, 1-6 Units, Subcutaneous, TID AC  insulin lispro, 1-6 Units, Subcutaneous, HS  metoprolol succinate, 12 5 mg, Oral, BID  pantoprazole, 40 mg, Oral, Early Morning  tamsulosin, 0 4 mg, Oral, Daily With Dinner      Continuous IV Infusions:     PRN Meds:  acetaminophen, 650 mg, Oral, Q6H PRN  bisacodyl, 10 mg, Rectal, Daily PRN  docusate sodium, 100 mg, Oral, BID PRN  ondansetron, 4 mg, Intravenous, Q6H PRN  oxyCODONE, 2 5 mg, Oral, Q4H PRN  oxyCODONE, 5 mg, Oral, Q4H PRN  polyethylene glycol, 17 g, Oral, Daily PRN  temazepam, 15 mg, Oral, HS PRN        Discharge Plan: D    Network Utilization Review Department  ATTENTION: Please call with any questions or concerns to [phone] and carefully listen to the prompts so that you are directed to the right person  All voicemails are confidential   Casi Bee all requests for admission clinical reviews, approved or denied determinations and any other requests to dedicated fax number below belonging to the campus where the patient is receiving treatment   List of dedicated fax numbers for the Facilities:  [address] DENIALS (Administrative/Medical Necessity) [phone]   [address] (Maternity/NICU/Pediatrics) [phone]   [address]  [address] Medical [place] [address] [address] [place] [address] [address] [facility] [place] [address] [phone]

## 2022-01-28 NOTE — CASE MANAGEMENT
Case Management Discharge Planning Note    Patient name Geraldene Lesches  Location [facility] 627-19 MRN [id number]  : 1955 Date 2022       Current Admission Date: 2021  Current Admission Diagnosis:S/P VSD repair   Patient Active Problem List    Diagnosis Date Noted    Urinary retention 2022    Anticoagulated on Coumadin 2022    Anticoagulation goal of INR 2 to 3 2022    Thrombocytopenia (HCC) 2022    Hypernatremia 2022    Hyperchloremia 2022    Hypocalcemia 2022    Central line complication     S/P VSD repair 2022    Leukocytosis 2022    Coagulopathy ([address] ) 2022    Tylenol ingestion     Acute systolic congestive heart failure ([address] ) 2021    Anemia 2021    Personal history of ECMO 2021    Cardiogenic shock ([address] ) 2021    VSD (ventricular septal defect) 2021    JUDE (acute kidney injury) ([address] ) 2021    Transaminitis 2021    Pipe smoker 2018    Hyperlipidemia, mixed 2017    Impaired fasting glucose 2017    Agoraphobia with panic attacks 2015    Obesity (BMI 30-39 9) 2015    Psychosis, atypical ([address] ) 2015      LOS (days): 29  Geometric Mean LOS (GMLOS) (days):   Days to GMLOS:     OBJECTIVE:  Risk of Unplanned Readmission Score: 24         Current admission status: Inpatient   Preferred Pharmacy:   [id number] [address] [facility]  [address] [place] [address]  Phone: [phone] Fax: [phone]    Primary Care Provider: Mordechai Nyhan, MD    Primary Insurance: [address] [place] [address]  Secondary Insurance:     DISCHARGE DETAILS:    Contacts  Patient Contacts: Jennifer Gray (Niece called requesting update on DCP   Update provided as requested )  Relationship to Patient[de-identified] Family  Contact Method: Phone  Phone Number: [phone]  Reason/Outcome: Discharge Planning,Emergency Contact (Listed as additional EC person )    Other Referral/Resources/Interventions Provided:  Interventions: Short Term Rehab  Referral Comments: Per [facility] communication [facility]: [facility] will follow regarding bed availability for today  Per chart review, Pt recieved 1st dose COVID vaccine [employer]) on 1/27  CM provided update on same to all potential facilities via [facility] in order to follow on changes in bed availability  CM will continue to follow        Treatment Team Recommendation: Short Term Rehab  Discharge Destination Plan[de-identified] Short Term Rehab

## 2022-01-28 NOTE — PROGRESS NOTES
Cardiology Progress Note - Rosario Morales 61 y o  male MRN: [id number]    Unit/Bed#: [facility] 427-01 Encounter: [id number]      Assessment & Plan:  Principal Problem:    S/P VSD repair  Active Problems:    Agoraphobia with panic attacks    Cardiogenic shock (HCC)    VSD (ventricular septal defect)    JUDE (acute kidney injury) ([address] )    Transaminitis    Tylenol ingestion    Acute systolic congestive heart failure (HCC)    Anemia    Personal history of ECMO    Coagulopathy (HCC)    Leukocytosis    Central line complication    Thrombocytopenia (HCC)    Hypernatremia    Hyperchloremia    Hypocalcemia    Anticoagulated on Coumadin    Anticoagulation goal of INR 2 to 3    Urinary retention    # Late presenting anterior STEMI c/b anteroapical VSD s/p open patch repair   # Redo sternotomy, mediastinal exploration for retained swan avinash catheter  # HFrEF with EF 35-40% as per my assessment on most recent CORIE  # Deconditioning   # UTI    BP: 90s-100s/60s  HR: 90s  No additional episodes of dizziness    Plan:  - c/w bumex to 1 mg PO QD  - Toprol XL 12 5 mg BID  - c/w aspirin and atorvastatin  - amiodarone for afib prevention  - encourage OOBTC and physical therapy  Patient will need JACQUELINE as per PT  Subjective:   No acute events yesterday  Objective:     Vitals: Blood pressure 93/59, pulse 72, temperature 98 1 °F (36 7 °C), resp  rate 18, height 5' 5 5" (1 664 m), weight 92 9 kg (204 lb 12 9 oz), SpO2 97 %  , Body mass index is 33 56 kg/m² ,   Orthostatic Blood Pressures      Most Recent Value   Blood Pressure 93/59 filed at 01/28/2022 0255   Patient Position - Orthostatic VS Lying filed at 01/27/2022 1500            Intake/Output Summary (Last 24 hours) at 1/28/2022 8998  Last data filed at 1/28/2022 4208  Gross per 24 hour   Intake 390 ml   Output 1475 ml   Net -1085 ml           Physical Exam:    GEN: Robbie Liliane Lethargic  HEENT: anicteric, mucous membranes moist  NECK: No JVD, carotid bruits   HEART: regular rhythm, normal S1 and S2, no murmurs, clicks, gallops or rubs   CHEST: Sternotomy incision site healing well  LUNGS: clear to auscultation bilaterally; Crackles present  ABDOMEN: normal bowel sounds, soft, no tenderness, no distention  EXTREMITIES: peripheral pulses normal, 1+ edema  NEURO: no focal findings    SKIN: normal without suspicious lesions on exposed skin      Current Facility-Administered Medications:     acetaminophen (TYLENOL) tablet 650 mg, 650 mg, Oral, Q6H PRN, Anabelle Art PA-C    amiodarone tablet 200 mg, 200 mg, Oral, Daily With Breakfast, Fredericka Apgar, PA-C, 200 mg at 22 0845    aspirin tablet 325 mg, 325 mg, Oral, Daily, Tan Pond PA-C, 325 mg at 22 0849    atorvastatin (LIPITOR) tablet 80 mg, 80 mg, Oral, After Ligia Au PA-C, 80 mg at 22 1836    bisacodyl (DULCOLAX) rectal suppository 10 mg, 10 mg, Rectal, Daily PRN, Anabelle Art PA-C    bumetanide (BUMEX) tablet 1 mg, 1 mg, Oral, Daily, Edison Ku MD, 1 mg at 22 0845    ceFAZolin (ANCEF) IVPB (premix in dextrose) 2,000 mg 50 mL, 2,000 mg, Intravenous, Q8H, BROOKE HORTA, Stopped at 22 0046    docusate sodium (COLACE) capsule 100 mg, 100 mg, Oral, BID PRN, Anabelle Art PA-C    heparin (porcine) subcutaneous injection 5,000 Units, 5,000 Units, Subcutaneous, Q8H [address], Monica Senior PA-C, 5,000 Units at 22 0016    insulin lispro (HumaLOG) 100 units/mL subcutaneous injection 1-6 Units, 1-6 Units, Subcutaneous, TID AC, 1 Units at 22 1154 **AND** Fingerstick Glucose (POCT), , , TID AC, Tan Pond PA-C    insulin lispro (HumaLOG) 100 units/mL subcutaneous injection 1-6 Units, 1-6 Units, Subcutaneous, HS, Tan Pond PA-C, 1 Units at 22    metoprolol succinate (TOPROL-XL) 24 hr tablet 12 5 mg, 12 5 mg, Oral, BID, BROOKE Contreras, 12 5 mg at 22    ondansetron (ZOFRAN) injection 4 mg, 4 mg, Intravenous, Q6H PRN, Abundio Combs PA-C    oxyCODONE (ROXICODONE) IR tablet 2 5 mg, 2 5 mg, Oral, Q4H PRN, Abundio Combs PA-C, 2 5 mg at 01/13/22 0525    oxyCODONE (ROXICODONE) IR tablet 5 mg, 5 mg, Oral, Q4H PRN, Abundio Combs PA-C, 5 mg at 01/08/22 2148    pantoprazole (PROTONIX) EC tablet 40 mg, 40 mg, Oral, Early Morning, Abundio Combs PA-C, 40 mg at 01/28/22 0601    polyethylene glycol (MIRALAX) packet 17 g, 17 g, Oral, Daily PRN, Anabelle Art PA-C    tamsulosin (FLOMAX) capsule 0 4 mg, 0 4 mg, Oral, Daily With Dinner, Jyothi Senior PA-C, 0 4 mg at 01/27/22 1657    temazepam (RESTORIL) capsule 15 mg, 15 mg, Oral, HS PRN, Abundio Combs PA-C, 15 mg at 01/13/22 2144    Labs & Results:    No results found for: CKTOTAL, CKMB, CKMBINDEX, TROPONINI    Lab Results   Component Value Date    GLUCOSE 149 (H) 01/07/2022    CALCIUM 8 6 01/27/2022    K 4 3 01/27/2022    CO2 28 01/27/2022     01/27/2022    BUN 31 (H) 01/27/2022    CREATININE 1 09 01/27/2022       Lab Results   Component Value Date    WBC 5 56 01/27/2022    HGB 9 2 (L) 01/27/2022    HCT 30 0 (L) 01/27/2022     (H) 01/27/2022     (L) 01/27/2022     Results from last 7 days   Lab Units 01/21/22  1027   INR  2 18*       No results found for: CHOL  Lab Results   Component Value Date    HDL 13 (L) 12/30/2021    HDL 38 (L) 09/13/2021     Lab Results   Component Value Date    LDLCALC 107 (H) 12/30/2021    LDLCALC 134 (H) 09/13/2021     Lab Results   Component Value Date    TRIG 152 (H) 12/30/2021    TRIG 137 09/13/2021       Lab Results   Component Value Date    ALT 61 01/23/2022    AST 21 01/23/2022         EKG personally reviewed by )Marilee Jin MD  No acute changes   TELE: No significant arrhythmias seen on telemetry review

## 2022-01-28 NOTE — PHYSICAL THERAPY NOTE
Physical Therapy Treatment Note    Patient's Name: Ade Shetty  : 22 0851   PT Last Visit   PT Visit Date 22   Note Type   Note Type Treatment   Pain Assessment   Pain Assessment Tool 0-10   Pain Score No Pain   Restrictions/Precautions   Weight Bearing Precautions Per Order No   Other Precautions Cardiac/sternal;Multiple lines;Telemetry; Fall Risk   General   Chart Reviewed Yes   Family/Caregiver Present No   Cognition   Overall Cognitive Status Impaired   Arousal/Participation Alert; Cooperative   Attention Attends with cues to redirect   Orientation Level Oriented X4   Memory Decreased recall of precautions   Following Commands Follows one step commands without difficulty   Bed Mobility   Supine to Sit 3  Moderate assistance   Additional items Assist x 1;HOB elevated; Bedrails; Increased time required;Verbal cues;LE management   Transfers   Sit to Stand 3  Moderate assistance   Additional items Assist x 2; Increased time required;Verbal cues   Stand to Sit 3  Moderate assistance   Additional items Assist x 2; Increased time required;Verbal cues   Stand pivot 3  Moderate assistance   Additional items Assist x 2; Increased time required;Verbal cues   Additional Comments Pt performed 5 STS transfers throughout session, eventually requiring maxAx2 towards final 2 transfers  Pt reported dizziness on 4th STS trial, returned to chair with BP of 112/70; denied dizziness with all other transfers  Transfers with RW  Ambulation/Elevation   Gait pattern Decreased foot clearance; Improper Weight shift; Excessively slow;Genu recurvatum;Narrow MC   Gait Assistance 3  Moderate assist   Additional items Assist x 2;Verbal cues; Tactile cues  (SBA of 3rd for lines/chair follow)   Assistive Device Rolling walker   Distance 3 steps from bed to chair + an additional 5ft in hallway   Balance   Static Sitting Fair   Dynamic Sitting Fair -   Static Standing Poor +   Dynamic Standing Poor   Ambulatory Poor   Endurance Deficit   Endurance Deficit Yes   Endurance Deficit Description weakness, fatigue deconditioning   Activity Tolerance   Activity Tolerance Patient limited by fatigue   Medical Staff Made Aware PA aware of patient's dizziness/BP   Nurse Made Aware ok to see per RN   Exercises   Glute Sets   (reviewed for HEP)   Knee AROM Long Arc Quad Sitting;10 reps;AROM; Bilateral  (w/ 2s iso hold in extension)   Ankle Pumps 20 reps;AROM; Bilateral  (heel raises)   Assessment   Prognosis Fair   Problem List Decreased strength;Decreased endurance; Impaired balance;Decreased mobility; Decreased coordination;Decreased cognition   Assessment Pt seen for PT session today with a focus on functional transfers, LE strengthening, gait, and endurance  Pt seen before BP meds were given in attempt to limit dizziness  Pt performed multiple STS transfers this session, reporting only one episode of dizziness after which he was returned to the chair with a BP of 112/70- pt agreeable to continue therapy after seated rest and resolution of symptoms  Pt required modAx2 for STS transfers initially, but with fatigue required maxAx2 with notable b/l knee flexion and difficulty coming into full knee extension  Pt also with hyperextension of his back in standing, likely due to weak glutes/hamstrings  Pt was able to ambulate 2 short bouts, but remains limited to ~5ft before fatiguing  Reviewed seated therex with pt which he was able to demonstrate with good technique  Pt would benefit from continued acute skilled PT to address above deficits  Continuing to recommend rehab upon d/c  Goals   Patient Goals to walk further   [facility] Expiration Date 02/01/22   PT Treatment Day 12   Plan   Treatment/Interventions Functional transfer training;LE strengthening/ROM; Therapeutic exercise; Endurance training;Equipment eval/education; Bed mobility;Gait training;Spoke to nursing   Progress Progressing toward goals   PT Frequency 4-6x/wk   Recommendation   PT Discharge Recommendation Post acute rehabilitation services   AM-PAC Basic Mobility Inpatient   Turning in Bed Without Bedrails 3   Lying on Back to Sitting on Edge of Flat Bed 2   Moving Bed to Chair 1   Standing Up From Chair 1   Walk in Room 1   Climb 3-5 Stairs 1   Basic Mobility Inpatient Raw Score 9   Turning Head Towards Sound 4   Follow Simple Instructions 3   Low Function Basic Mobility Raw Score 16   Low Function Basic Mobility Standardized Score 25 72   Highest Level Of Mobility   JH-HLM Goal 3: Sit at edge of bed   JH-HLM Highest Level of Mobility 4: Move to chair/commode   JH-HLM Goal Achieved Yes   Education   Education Provided Mobility training;Home exercise program   Patient Demonstrates verbal understanding   End of Consult   Patient Position at End of Consult Bedside chair; All needs within reach       Jovita Bolanos, PT, DPT

## 2022-01-28 NOTE — PROGRESS NOTES
Progress Note - Cardiothoracic Surgery   Emeli Manzano 61 y o  male MRN: [id number]  Unit/Bed#: [facility] 427-01 Encounter: [id number]  Cardiogenic shock  S/P VA ECMO; POD # 27  VSD/cardiogenic shock  S/P VSD repair w/ VA ECMO decannulation; POD # 23    24 Hour Events: No overnight events  Denies CP or SOB  + BM  Using IS  Received first dose of covid vaccine yesterday  Blood cx (1/23): NG for 72hrs  Urine cx (1/20): +Citrobacter koseri- on ancef day 5/5      Medications:   Scheduled Meds:  Current Facility-Administered Medications   Medication Dose Route Frequency Provider Last Rate    acetaminophen  650 mg Oral Q6H PRN Anabelle Art PA-C      amiodarone  200 mg Oral Daily With Breakfast Jaja Preciado PA-C      aspirin  325 mg Oral Daily Pillo Vinson PA-C      atorvastatin  80 mg Oral After [place] NOA Nesbitt      bisacodyl  10 mg Rectal Daily PRN Anabelle Art PA-C      bumetanide  1 mg Oral Daily Macarenaamish Stafford MD      cefazolin  2,000 mg Intravenous 240 [facility], CRNP Stopped (01/28/22 0046)    docusate sodium  100 mg Oral BID PRN Anabelle Art PA-C      heparin (porcine)  5,000 Units Subcutaneous [place], [place]      insulin lispro  1-6 Units Subcutaneous TID AC Pillo Vinson PA-C      insulin lispro  1-6 Units Subcutaneous HS Pillo Vinson PA-C      metoprolol succinate  12 5 mg Oral BID BROOKE Dennis      ondansetron  4 mg Intravenous Q6H PRN Pillo Vinson PA-C      oxyCODONE  2 5 mg Oral Q4H PRN Pillo Vinson PA-C      oxyCODONE  5 mg Oral Q4H PRN Pillo Vinson PA-C      pantoprazole  40 mg Oral Early Morning Pillo Vinson PA-C      polyethylene glycol  17 g Oral Daily PRN Anabelle Art PA-C      tamsulosin  0 4 mg Oral Daily With [address] [employer] LuNOA davies      temazepam  15 mg Oral HS PRN Pillo Vinson PA-C       Continuous Infusions:   PRN Meds:   acetaminophen    bisacodyl    docusate sodium    ondansetron    oxyCODONE    oxyCODONE    polyethylene glycol    temazepam    Vitals:   Vitals:    01/27/22 2000 01/27/22 2157 01/27/22 2300 01/28/22 0255   BP:  100/62 103/63 93/59   BP Location:       Pulse:  73 71 72   Resp:       Temp:   98 2 °F (36 8 °C) 98 1 °F (36 7 °C)   TempSrc:       SpO2: 98%  98% 97%   Weight:       Height:           Telemetry: NSR w/ BBB; Heart Rate: 74    Respiratory:   SpO2: SpO2: 97 %; Room Air    Intake/Output:     Intake/Output Summary (Last 24 hours) at 1/28/2022 0550  Last data filed at 1/28/2022 0046  Gross per 24 hour   Intake 508 ml   Output 1550 ml   Net -1042 ml   UOP 1 2L, net -700L      Weights:   Weight (last 2 days)     Date/Time Weight    01/26/22 0531 97 1 (214)            Results:   Results from last 7 days   Lab Units 01/27/22  0626 01/25/22  0438 01/23/22  2019   WBC Thousand/uL 5 56 5 49 5 22   HEMOGLOBIN g/dL 9 2* 9 0* 8 8*   HEMATOCRIT % 30 0* 29 3* 28 0*   PLATELETS Thousands/uL 104* 142* 162     Results from last 7 days   Lab Units 01/27/22  0626 01/25/22  0438 01/24/22  0448   SODIUM mmol/L 137 137 138   POTASSIUM mmol/L 4 3 3 7 4 7   CHLORIDE mmol/L 105 103 107   CO2 mmol/L 28 27 26   BUN mg/dL 31* 28* 26*   CREATININE mg/dL 1 09 1 15 1 08   CALCIUM mg/dL 8 6 8 3 8 4     Results from last 7 days   Lab Units 01/21/22  1027   INR  2 18*     Glucose 88- 159 (prediabetes 5 9)    Invasive Lines/Tubes:  Invasive Devices  Report    Peripherally Inserted Central Catheter Line            PICC Line 45/20/81 Right Basilic 21 days          Drain            Urethral Catheter Non-latex 18 Fr  7 days              Physical Exam:    HEENT/NECK:  Normocephalic  Atraumatic  No jugular venous distention      Cardiac: Regular rate and rhythm and No murmurs/rubs/gallops  Pulmonary:  Breath sounds clear bilaterally and No rales/rhonchi/wheezes  Abdomen:  Non-tender, Non-distended and Normal bowel sounds  Incisions: Sternum is stable  Incision is clean, dry, and intact  Extremities: Extremities warm/dry and 2+ edema B/L  Neuro: Alert and oriented X 3 and No focal deficits  Skin: Warm/Dry, without rashes or lesions  Assessment:  Principal Problem:    S/P VSD repair  Active Problems:    Agoraphobia with panic attacks    Cardiogenic shock (HCC)    VSD (ventricular septal defect)    JUDE (acute kidney injury) ([address] )    Transaminitis    Tylenol ingestion    Acute systolic congestive heart failure (HCC)    Anemia    Personal history of ECMO    Coagulopathy (HCC)    Leukocytosis    Central line complication    Thrombocytopenia (HCC)    Hypernatremia    Hyperchloremia    Hypocalcemia    Anticoagulated on Coumadin    Anticoagulation goal of INR 2 to 3    Urinary retention       Cardiogenic shock  S/P VA ECMO; POD # 27  VSD/cardiogenic shock  S/P VSD repair w/ VA ECMO decannulation; POD # 23    Plan:    1  Cardiac:   NSR; HR/BP well-controlled  Continue Toprol xl 12 5 mg BID    Continue to hold losartan 12 5 mg daily  Continue ASA and Statin therapy  Epicardial pacing wires out  PICC line in place for access  Continue DVT prophylaxis  amiodarone daily    2  Pulmonary:   Good Room air oxygen saturation; Continue incentive spirometry/Coughing/Deep breathing exercises  Chest tubes have been discontinued    3  Renal: [place] in place per urology- f/u OP  Intake/Output net: -700 mL L/24 hours  Diuretic Regimen: Continue Bumex 1 mg po QD  Post op Creatinine stable; Follow up labs prn  Replete K>4   Complete antibiotic course for +Urine cx   Continue flomax     4  Neuro:  Neurologically intact; No active issues  Incisional pain well-controlled  Continue Tylenol, 975 mg PO q 8, standing dose  Continue Oxycodone, 2 5 to 5 mg PO q 4 hours prn pain    5  GI:  Tolerating diet   Maintain 1800 mL daily fluid restriction   Continue stool softeners and prn suppository  Continue GI prophylaxis    6   Endo:   Glucose well-controlled with sliding scale coverage    7 Hematology:    Post-operative blood count acceptable; Trend prn    8     Disposition:      Awaiting rehab placement- 1st shot of vaccine completed    VTE Pharmacologic Prophylaxis: Heparin  VTE Mechanical Prophylaxis: sequential compression device    Collaborative rounds completed with supervising physician  Plan of care discussed with bedside nurse    SIGNATURE: Louann Degroot PA-C  DATE: January 28, 2022  TIME: 5:50 AM

## 2022-01-28 NOTE — PLAN OF CARE
Problem: PHYSICAL THERAPY ADULT  Goal: Performs mobility at highest level of function for planned discharge setting  See evaluation for individualized goals  Description: Treatment/Interventions: Functional transfer training,LE strengthening/ROM,Elevations,Therapeutic exercise,Endurance training,Equipment eval/education,Bed mobility,Gait training,Spoke to nursing,OT,Cognitive reorientation  Equipment Recommended:  (r/o rw next visit)       See flowsheet documentation for full assessment, interventions and recommendations  Outcome: Progressing  Note: Prognosis: Fair  Problem List: Decreased strength,Decreased endurance,Impaired balance,Decreased mobility,Decreased coordination,Decreased cognition  Assessment: Pt seen for PT session today with a focus on functional transfers, LE strengthening, gait, and endurance  Pt seen before BP meds were given in attempt to limit dizziness  Pt performed multiple STS transfers this session, reporting only one episode of dizziness after which he was returned to the chair with a BP of 112/70- pt agreeable to continue therapy after seated rest and resolution of symptoms  Pt required modAx2 for STS transfers initially, but with fatigue required maxAx2 with notable b/l knee flexion and difficulty coming into full knee extension  Pt also with hyperextension of his back in standing, likely due to weak glutes/hamstrings  Pt was able to ambulate 2 short bouts, but remains limited to ~5ft before fatiguing  Reviewed seated therex with pt which he was able to demonstrate with good technique  Pt would benefit from continued acute skilled PT to address above deficits  Continuing to recommend rehab upon d/c    Barriers to Discharge: Inaccessible home environment,Decreased caregiver support        PT Discharge Recommendation: Post acute rehabilitation services          See flowsheet documentation for full assessment

## 2022-01-29 LAB
BACTERIA BLD CULT: NORMAL
BACTERIA BLD CULT: NORMAL
BASOPHILS # BLD AUTO: 0.02 THOUSANDS/ΜL (ref 0–0.1)
BASOPHILS NFR BLD AUTO: 0 % (ref 0–1)
EOSINOPHIL # BLD AUTO: 0.1 THOUSAND/ΜL (ref 0–0.61)
EOSINOPHIL NFR BLD AUTO: 2 % (ref 0–6)
ERYTHROCYTE [DISTWIDTH] IN BLOOD BY AUTOMATED COUNT: 19.2 % (ref 11.6–15.1)
GLUCOSE SERPL-MCNC: 101 MG/DL (ref 65–140)
GLUCOSE SERPL-MCNC: 107 MG/DL (ref 65–140)
GLUCOSE SERPL-MCNC: 132 MG/DL (ref 65–140)
GLUCOSE SERPL-MCNC: 140 MG/DL (ref 65–140)
HCT VFR BLD AUTO: 29.3 % (ref 36.5–49.3)
HGB BLD-MCNC: 8.9 G/DL (ref 12–17)
IMM GRANULOCYTES # BLD AUTO: 0.08 THOUSAND/UL (ref 0–0.2)
IMM GRANULOCYTES NFR BLD AUTO: 2 % (ref 0–2)
LYMPHOCYTES # BLD AUTO: 0.73 THOUSANDS/ΜL (ref 0.6–4.47)
LYMPHOCYTES NFR BLD AUTO: 15 % (ref 14–44)
MCH RBC QN AUTO: 31.1 PG (ref 26.8–34.3)
MCHC RBC AUTO-ENTMCNC: 30.4 G/DL (ref 31.4–37.4)
MCV RBC AUTO: 102 FL (ref 82–98)
MONOCYTES # BLD AUTO: 0.48 THOUSAND/ΜL (ref 0.17–1.22)
MONOCYTES NFR BLD AUTO: 10 % (ref 4–12)
NEUTROPHILS # BLD AUTO: 3.54 THOUSANDS/ΜL (ref 1.85–7.62)
NEUTS SEG NFR BLD AUTO: 71 % (ref 43–75)
NRBC BLD AUTO-RTO: 0 /100 WBCS
PLATELET # BLD AUTO: 94 THOUSANDS/UL (ref 149–390)
PMV BLD AUTO: 12.2 FL (ref 8.9–12.7)
RBC # BLD AUTO: 2.86 MILLION/UL (ref 3.88–5.62)
WBC # BLD AUTO: 4.95 THOUSAND/UL (ref 4.31–10.16)

## 2022-01-29 PROCEDURE — 85025 COMPLETE CBC W/AUTO DIFF WBC: CPT | Performed by: THORACIC SURGERY (CARDIOTHORACIC VASCULAR SURGERY)

## 2022-01-29 PROCEDURE — 99233 SBSQ HOSP IP/OBS HIGH 50: CPT | Performed by: INTERNAL MEDICINE

## 2022-01-29 PROCEDURE — 99024 POSTOP FOLLOW-UP VISIT: CPT | Performed by: PHYSICIAN ASSISTANT

## 2022-01-29 PROCEDURE — 82948 REAGENT STRIP/BLOOD GLUCOSE: CPT

## 2022-01-29 RX ADMIN — CEFAZOLIN SODIUM 2000 MG: 2 SOLUTION INTRAVENOUS at 00:32

## 2022-01-29 RX ADMIN — TAMSULOSIN HYDROCHLORIDE 0.4 MG: 0.4 CAPSULE ORAL at 17:38

## 2022-01-29 RX ADMIN — METOPROLOL SUCCINATE 12.5 MG: 25 TABLET, EXTENDED RELEASE ORAL at 09:01

## 2022-01-29 RX ADMIN — ASPIRIN 325 MG ORAL TABLET 325 MG: 325 PILL ORAL at 09:01

## 2022-01-29 RX ADMIN — AMIODARONE HYDROCHLORIDE 200 MG: 200 TABLET ORAL at 09:01

## 2022-01-29 RX ADMIN — HEPARIN SODIUM 5000 UNITS: 5000 INJECTION INTRAVENOUS; SUBCUTANEOUS at 23:19

## 2022-01-29 RX ADMIN — ATORVASTATIN CALCIUM 80 MG: 80 TABLET, FILM COATED ORAL at 17:38

## 2022-01-29 RX ADMIN — HEPARIN SODIUM 5000 UNITS: 5000 INJECTION INTRAVENOUS; SUBCUTANEOUS at 09:01

## 2022-01-29 RX ADMIN — METOPROLOL SUCCINATE 12.5 MG: 25 TABLET, EXTENDED RELEASE ORAL at 21:19

## 2022-01-29 RX ADMIN — BUMETANIDE 1 MG: 1 TABLET ORAL at 09:01

## 2022-01-29 RX ADMIN — HEPARIN SODIUM 5000 UNITS: 5000 INJECTION INTRAVENOUS; SUBCUTANEOUS at 17:38

## 2022-01-29 RX ADMIN — PANTOPRAZOLE SODIUM 40 MG: 40 TABLET, DELAYED RELEASE ORAL at 05:36

## 2022-01-29 RX ADMIN — HEPARIN SODIUM 5000 UNITS: 5000 INJECTION INTRAVENOUS; SUBCUTANEOUS at 00:34

## 2022-01-29 NOTE — PROGRESS NOTES
Heart Failure/ Pulmonary Hypertension Progress Note - Carissa Parada 61 y o  male MRN: [id number]    Unit/Bed#: [facility] 427-01 Encounter: [id number]      Assessment:    Principal Problem:    S/P VSD repair  Active Problems:    Agoraphobia with panic attacks    Cardiogenic shock (HCC)    VSD (ventricular septal defect)    JUDE (acute kidney injury) ([address] )    Transaminitis    Tylenol ingestion    Acute systolic congestive heart failure (HCC)    Anemia    Personal history of ECMO    Coagulopathy (HCC)    Leukocytosis    Central line complication    Thrombocytopenia (HCC)    Hypernatremia    Hyperchloremia    Hypocalcemia    Anticoagulated on Coumadin    Anticoagulation goal of INR 2 to 3    Urinary retention    Objective:   Ins/outs: 590/0110: -2200  w/ bumex 1 mg PO  Weight: 195 lbs - standing     Assessment/Plan:     Cardiogenic shock due to anteroseptal MI complicated by VSD on VA ECMO 12/31/21 s/p VSD repair with large bovine pericardial patch and ECMO decannulation 1/4/2022, also with vasodilatory component post op; s/p multiple blood product transfusions     1/16/22:  VBG 56% - milrinone 0 125  Jhon CI 2 2     11/15/22:  VBG 50% - on milrinone 0 125mcg/kg/min  Hb 10 5  Jhon CI 1 8     1/14/22: 75%  1/11/22  SVO2 53 5%- on milrinone     1/10/22  SVO2 4 am: 56 2- on milrinone  SVO2 1 PM: 43 2%- off milrinone  CO/CI by Jhon: 3 8/ 1 7 L/min     RA 11-13  PA 23/18/20  ALDEN 0 4  CVP:PCWP 0 7     1/5/22:  Drips:  Milrinone 0 5  Levo 3  Epi 5  Vaso 0 04  Hemodynamic monitoring  Sv02 58%  Jhon CI 2 08  Jhon CO 4 3  MAP 69  RA 11  PA 28/20/24  ALDEN 0 7  CVP:PCWP 0 55  SVR 1079     Studies personally reviewed by myself:     Echo 1/13/22  LVEF 50%  Normal RV size and systolic function     Echo 1/10/22 - on milrnone  LVEF 40%  Normal RV size and systolic function     Echocardiogram 12/30/21  LVEF: 40-45%  LVIDd: 4 5cm  RV: normal size, moderately reduced systolic function  MR: none seen  PASP: 65mmHg + RAP  RVOT: shortened PAAT  Other: muscular VSD with large left to right shunt; dyskinetic septum     EKG 1/3/2022: sinus rhythm, IRBBB, anteroseptal q waves  EKG 12/30/21: sinus tachycardia, RBBB, anteroseptal ST segment elevation     Neurohormonal Blockade: -   --Beta-Blocker: metoprolol tartrate 12 5 mg BID  --ACEi, ARB or ARNi:    (or SVR reduction) hydralazine 10 mg Q8 - switched to losartan 12 5mg daily- on hold  --Aldosterone Receptor Blocker:  --Diuretic: bumex 1mg/hr - transitioned to bumex 2mg IV TID - now on bumex 1 mg daily     Sudden Cardiac Death Risk Reduction:  --ICD: LVEF > 35%     Electrical Resynchronization:  -- QRSd 98msec        Advanced Therapies (If appropriate): --We will continue to monitor  Current smoker  Small LV and VSD precluding LVAD     # Coronary artery disease, undefined anatomy  Rx: asa 325 mg  daily, atorvastatin 80 mg     # Acute kidney injury on CKD  Baseline 1 05-1 16, peaked at 4 this admission, down to 1 56 1/3, peaked 1 78 post op likely cardiorenal, improving, 1 27 1/9/22; 1 04 1/20/22  Acute hypoxic respiratory failure, resolved  Transaminitis, resolving  Anemia  # UTI     Plan:  Bumex 1 mg daily  Has dependent edema  If MAPs stay in the 70's can retry low dose losartan  Continue low dose metoprolol succinate  On antibiotics for UTI  PT/OT  Discharge planning to rehab          Review of Systems   Constitutional: Negative for activity change, appetite change, fatigue and unexpected weight change  HENT: Negative for congestion and nosebleeds  Eyes: Negative  Respiratory: Negative for cough, chest tightness and shortness of breath  Cardiovascular: Positive for leg swelling  Negative for chest pain and palpitations  Gastrointestinal: Negative for abdominal distention  Endocrine: Negative  Genitourinary: Negative  Musculoskeletal: Negative  Skin: Negative  Neurological: Negative for dizziness, syncope and weakness  Hematological: Negative      Psychiatric/Behavioral: Negative  [employer] (day, reason): Chang catheter (day, reason):    Vitals: Blood pressure 105/66, pulse 81, temperature 97 5 °F (36 4 °C), resp  rate 17, height 5' 5 5" (1 664 m), weight 88 9 kg (195 lb 15 8 oz), SpO2 99 %  , Body mass index is 32 12 kg/m² , I/O last 3 completed shifts: In: 640 [P O :540; IV Piggyback:100]  Out: 2090 [Urine:3425]  I/O this shift:  In: 120 [P O :120]  Out: -   Wt Readings from Last 3 Encounters:   01/29/22 88 9 kg (195 lb 15 8 oz)   10/07/21 93 1 kg (205 lb 3 2 oz)   03/18/21 93 4 kg (206 lb)       Intake/Output Summary (Last 24 hours) at 1/29/2022 1009  Last data filed at 1/29/2022 0853  Gross per 24 hour   Intake 592 ml   Output 2725 ml   Net -2133 ml     I/O last 3 completed shifts: In: 0 [P O :540; IV Piggyback:100]  Out: 9674 [Urine:3425]    No significant arrhythmias seen on telemetry review         Physical Exam:  Vitals:    01/29/22 0228 01/29/22 0600 01/29/22 0746 01/29/22 0859   BP: 112/70  109/69 105/66   BP Location:       Pulse: 79  87 81   Resp: 15  17    Temp: 98 1 °F (36 7 °C)  97 5 °F (36 4 °C)    TempSrc:       SpO2: 98%  99% 99%   Weight:  88 9 kg (195 lb 15 8 oz)     Height:           GEN: Anjali [place] appears well, alert and oriented x 3, pleasant and cooperative   HEENT: pupils equal, round, and reactive to light; extraocular muscles intact  NECK: supple, no carotid bruits   HEART: regular rhythm, normal S1 and S2, no murmurs, clicks, gallops or rubs, JVP is    LUNGS: clear to auscultation bilaterally; no wheezes, rales, or rhonchi   ABDOMEN: normal bowel sounds, soft, no tenderness, no distention  EXTREMITIES: peripheral pulses normal; no clubbing, cyanosis, or edema  NEURO: no focal findings   SKIN: normal without suspicious lesions on exposed skin      Current Facility-Administered Medications:     acetaminophen (TYLENOL) tablet 650 mg, 650 mg, Oral, Q6H PRN, Anabelle Art PA-C    amiodarone tablet 200 mg, 200 mg, Oral, Daily With Breakfast, Sophie Levi PA-C, 200 mg at 01/29/22 0901    aspirin tablet 325 mg, 325 mg, Oral, Daily, Abundio Combs PA-C, 325 mg at 01/29/22 0901    atorvastatin (LIPITOR) tablet 80 mg, 80 mg, Oral, After [place] NOA Miller, 80 mg at 01/28/22 1750    bisacodyl (DULCOLAX) rectal suppository 10 mg, 10 mg, Rectal, Daily PRN, Anabelle Art PA-C    bumetanide (BUMEX) tablet 1 mg, 1 mg, Oral, Daily, Sergio Coombs MD, 1 mg at 01/29/22 0901    docusate sodium (COLACE) capsule 100 mg, 100 mg, Oral, BID PRN, Anabelle Art PA-C    heparin (porcine) subcutaneous injection 5,000 Units, 5,000 Units, Subcutaneous, Q8H [address], [place] NOA Cox, 5,000 Units at 01/29/22 0901    insulin lispro (HumaLOG) 100 units/mL subcutaneous injection 1-6 Units, 1-6 Units, Subcutaneous, TID AC, 1 Units at 01/27/22 1154 **AND** Fingerstick Glucose (POCT), , , TID AC, Abundio Combs PA-C    insulin lispro (HumaLOG) 100 units/mL subcutaneous injection 1-6 Units, 1-6 Units, Subcutaneous, HS, Abundio Combs PA-C, 1 Units at 01/27/22 2157    metoprolol succinate (TOPROL-XL) 24 hr tablet 12 5 mg, 12 5 mg, Oral, BID, BROOKE Contreras, 12 5 mg at 01/29/22 0901    ondansetron (ZOFRAN) injection 4 mg, 4 mg, Intravenous, Q6H PRN, Abundio Combs PA-C    oxyCODONE (ROXICODONE) IR tablet 2 5 mg, 2 5 mg, Oral, Q4H PRN, Abundio Combs PA-C, 2 5 mg at 01/13/22 0525    oxyCODONE (ROXICODONE) IR tablet 5 mg, 5 mg, Oral, Q4H PRN, Abundio Combs PA-C, 5 mg at 01/08/22 2148    pantoprazole (PROTONIX) EC tablet 40 mg, 40 mg, Oral, Early Morning, Abundio Combs PA-C, 40 mg at 01/29/22 0536    polyethylene glycol (MIRALAX) packet 17 g, 17 g, Oral, Daily PRN, Anabelle Art PA-C, 17 g at 01/28/22 0932    tamsulosin (FLOMAX) capsule 0 4 mg, 0 4 mg, Oral, Daily With Dinner, Jyothi Senior PA-C, 0 4 mg at 01/28/22 1749    temazepam (RESTORIL) capsule 15 mg, 15 mg, Oral, HS PRN, [facility], PA-C, 15 mg at 01/13/22 2144      Labs & Results:        Results from last 7 days   Lab Units 01/29/22  0500 01/27/22 0626 01/25/22 0438   WBC Thousand/uL 4 95 5 56 5 49   HEMOGLOBIN g/dL 8 9* 9 2* 9 0*   HEMATOCRIT % 29 3* 30 0* 29 3*   PLATELETS Thousands/uL 94* 104* 142*         Results from last 7 days   Lab Units 01/27/22 0626 01/25/22 0438 01/24/22 0448 01/23/22 2019 01/23/22 2019   POTASSIUM mmol/L 4 3 3 7 4 7   < > 3 6   CHLORIDE mmol/L 105 103 107   < > 109*   CO2 mmol/L 28 27 26   < > 23   BUN mg/dL 31* 28* 26*   < > 29*   CREATININE mg/dL 1 09 1 15 1 08   < > 1 06   CALCIUM mg/dL 8 6 8 3 8 4   < > 7 0*   ALK PHOS U/L  --   --   --   --  101   ALT U/L  --   --   --   --  61   AST U/L  --   --   --   --  21    < > = values in this interval not displayed  Counseling / Coordination of Care  Total floor / unit time spent today 25 minutes  Greater than 50% of total time was spent with the patient and / or family counseling and / or coordination of care  A description of the counseling / coordination of care: 15  Thank you for the opportunity to participate in the care of this patient    [address] [facility] PULMONARY HYPERTENSION  MEDICAL DIRECTOR OF [place]

## 2022-01-29 NOTE — PROGRESS NOTES
Progress Note - Cardiothoracic Surgery   Tavo Senate 61 y o  male MRN: [id number]  Unit/Bed#: [facility] 427-01 Encounter: [id number]  Cardiogenic shock  S/P VA ECMO; POD # 28  VSD/cardiogenic shock  S/P VSD repair w/ VA ECMO decannulation; POD # 24    24 Hour Events: No overnight events  Denies CP or SOB  + BM  Using IS  Received first dose of covid vaccine 1/27 (next in 3 weeks)      Blood cx (1/23): NG for 4 days  Urine cx (1/20): +Citrobacter koseri- ancef course completed 1/28      Medications:   Scheduled Meds:  Current Facility-Administered Medications   Medication Dose Route Frequency Provider Last Rate    acetaminophen  650 mg Oral Q6H PRN Anabelle Art PA-C      amiodarone  200 mg Oral Daily With Breakfast Jaja Preciado PA-C      aspirin  325 mg Oral Daily Jenny NOA Beltrán      atorvastatin  80 mg Oral After [place] NOA Nesbitt      bisacodyl  10 mg Rectal Daily PRN Anabelle Art PA-C      bumetanide  1 mg Oral Daily Bassam Lora MD      docusate sodium  100 mg Oral BID PRN Anabelle Art PA-C      heparin (porcine)  5,000 Units Subcutaneous [facility] Amber Zamorano      insulin lispro  1-6 Units Subcutaneous TID [facility] PIPE Lanza-C      insulin lispro  1-6 Units Subcutaneous HS JennyPIPE Espinoza-C      metoprolol succinate  12 5 mg Oral BID BROOKE Henriquez      ondansetron  4 mg Intravenous Q6H PRN Jenny Gavel, PA-EMILIANO      oxyCODONE  2 5 mg Oral Q4H PRN Jenny Gavel, PA-EMILIANO      oxyCODONE  5 mg Oral Q4H PRN Jenny PIPE Beltrán-EMILIANO      pantoprazole  40 mg Oral Early Morning Jenny Jerel, PA-C      polyethylene glycol  17 g Oral Daily PRN Anabelle Art PA-C      tamsulosin  0 4 mg Oral Daily With [address] LugianoNOA      temazepam  15 mg Oral HS PRN Jenny NOA Beltrán       Continuous Infusions:   PRN Meds:   acetaminophen    bisacodyl    docusate sodium   ondansetron    oxyCODONE    oxyCODONE    polyethylene glycol    temazepam    Vitals:   Vitals:    01/28/22 2000 01/28/22 2159 01/28/22 2200 01/29/22 0228   BP:  94/62 94/62 112/70   BP Location:       Pulse:    79   Resp:    15   Temp:  97 6 °F (36 4 °C)  98 1 °F (36 7 °C)   TempSrc:       SpO2: 98%   98%   Weight:       Height:           Telemetry: NSR w/ BBB; Heart Rate: 79    Respiratory:   SpO2: SpO2: 98 %; Room Air    Intake/Output:     Intake/Output Summary (Last 24 hours) at 1/29/2022 0547  Last data filed at 1/29/2022 0542  Gross per 24 hour   Intake 590 ml   Output 3000 ml   Net -2410 ml   UOP 2 7L, net -2 1L      Weights:   Weight (last 2 days)     Date/Time Weight    01/28/22 0806 92 9 (204 81)    01/28/22 0600 97 (213 85)            Results:   Results from last 7 days   Lab Units 01/27/22  0626 01/25/22  0438 01/23/22 2019   WBC Thousand/uL 5 56 5 49 5 22   HEMOGLOBIN g/dL 9 2* 9 0* 8 8*   HEMATOCRIT % 30 0* 29 3* 28 0*   PLATELETS Thousands/uL 104* 142* 162     Results from last 7 days   Lab Units 01/27/22  0626 01/25/22  0438 01/24/22  0448   SODIUM mmol/L 137 137 138   POTASSIUM mmol/L 4 3 3 7 4 7   CHLORIDE mmol/L 105 103 107   CO2 mmol/L 28 27 26   BUN mg/dL 31* 28* 26*   CREATININE mg/dL 1 09 1 15 1 08   CALCIUM mg/dL 8 6 8 3 8 4         Glucose 92- 1542 (prediabetes 5 9)    Invasive Lines/Tubes:  Invasive Devices  Report    Peripherally Inserted Central Catheter Line            PICC Line 46/07/66 Right Basilic 22 days          Drain            Urethral Catheter Non-latex 18 Fr  8 days              Physical Exam:    HEENT/NECK:  Normocephalic  Atraumatic  No jugular venous distention  Cardiac: Regular rate and rhythm and No murmurs/rubs/gallops  Pulmonary:  Breath sounds clear bilaterally and No rales/rhonchi/wheezes  Abdomen:  Non-tender, Non-distended and Normal bowel sounds  Incisions: Sternum is stable  Incision is clean, dry, and intact     Extremities: Extremities warm/dry and 2+ edema B/L  Neuro: Alert and oriented X 3 and No focal deficits  Skin: Warm/Dry, without rashes or lesions  Assessment:  Principal Problem:    S/P VSD repair  Active Problems:    Agoraphobia with panic attacks    Cardiogenic shock (HCC)    VSD (ventricular septal defect)    JUDE (acute kidney injury) ([facility] [address] )    Transaminitis    Tylenol ingestion    Acute systolic congestive heart failure (HCC)    Anemia    Personal history of ECMO    Coagulopathy (HCC)    Leukocytosis    Central line complication    Thrombocytopenia (HCC)    Hypernatremia    Hyperchloremia    Hypocalcemia    Anticoagulated on Coumadin    Anticoagulation goal of INR 2 to 3    Urinary retention       Cardiogenic shock  S/P VA ECMO; POD # 28  VSD/cardiogenic shock  S/P VSD repair w/ VA ECMO decannulation; POD # 24    Plan:    1  Cardiac:   NSR; HR/BP well-controlled  Continue Toprol xl 12 5 mg BID    Continue to hold losartan 12 5 mg daily  Continue ASA and Statin therapy  Epicardial pacing wires out  PICC line in place for access  Continue DVT prophylaxis  amiodarone daily    2  Pulmonary:   Good Room air oxygen saturation; Continue incentive spirometry/Coughing/Deep breathing exercises  Chest tubes have been discontinued    3  Renal: carolina in place per urology- f/u OP  Intake/Output net: -2 1 L L/24 hours  Diuretic Regimen: Continue Bumex 1 mg po QD  Post op Creatinine stable; Follow up labs prn  Replete K>4   Complete antibiotic course for +Urine cx   Continue flomax     4  Neuro:  Neurologically intact; No active issues  Incisional pain well-controlled  Continue Tylenol, 975 mg PO q 8, standing dose  Continue Oxycodone, 2 5 to 5 mg PO q 4 hours prn pain    5  GI:  Tolerating diet   Maintain 1800 mL daily fluid restriction   Continue stool softeners and prn suppository  Continue GI prophylaxis    6  Endo:   Glucose well-controlled with sliding scale coverage    7    Hematology:    Post-operative blood count acceptable; Trend prn    8     Disposition: Awaiting rehab placement- 1st shot of vaccine completed    VTE Pharmacologic Prophylaxis: Heparin  VTE Mechanical Prophylaxis: sequential compression device    Collaborative rounds completed with supervising physician  Plan of care discussed with bedside nurse    SIGNATURE: Amber Feliz PA-C  DATE: January 29, 2022  TIME: 5:47 AM

## 2022-01-29 NOTE — CASE MANAGEMENT
Case Management Discharge Planning Note    Patient name Anju De La Cruz  Location [facility] 427/[facility] 016-46 MRN [id number]  : 1955 Date 2022       Current Admission Date: 2021  Current Admission Diagnosis:S/P VSD repair   Patient Active Problem List    Diagnosis Date Noted    Urinary retention 2022    Anticoagulated on Coumadin 2022    Anticoagulation goal of INR 2 to 3 2022    Thrombocytopenia (HCC) 2022    Hypernatremia 2022    Hyperchloremia 2022    Hypocalcemia 2022    Central line complication     S/P VSD repair 2022    Leukocytosis 2022    Coagulopathy ([address] ) 2022    Tylenol ingestion     Acute systolic congestive heart failure ([facility] [address] ) 2021    Anemia 2021    Personal history of ECMO 2021    Cardiogenic shock ([facility] [address] ) 2021    VSD (ventricular septal defect) 2021    JUDE (acute kidney injury) ([facility] [address] ) 2021    Transaminitis 2021    Pipe smoker 2018    Hyperlipidemia, mixed 2017    Impaired fasting glucose 2017    Agoraphobia with panic attacks 2015    Obesity (BMI 30-39 9) 2015    Psychosis, atypical ([facility] [address] ) 2015      LOS (days): 30  Geometric Mean LOS (GMLOS) (days):   Days to GMLOS:     OBJECTIVE:  Risk of Unplanned Readmission Score: 24         Current admission status: Inpatient   Preferred Pharmacy:   [id number] [address]  [address] [place] [address]  Phone: [phone] Fax: [phone]    Primary Care Provider: John Mondragon MD    Primary Insurance: [address] [place] [address]  Secondary Insurance:     DISCHARGE DETAILS:    Other Referral/Resources/Interventions Provided:  Interventions: Short Term Rehab  Referral Comments: Per [facility] communication, [facility] has no yellow bed's avaliable as of 22 through the weekend   CM was advised  patient will require yellow bed through 3/3  CM followed with additional facilities regarding bed availability, awaiting a response back  CM will continue to follow for accepting facility

## 2022-01-30 LAB
BASOPHILS # BLD AUTO: 0.02 THOUSANDS/ΜL (ref 0–0.1)
BASOPHILS NFR BLD AUTO: 0 % (ref 0–1)
EOSINOPHIL # BLD AUTO: 0.11 THOUSAND/ΜL (ref 0–0.61)
EOSINOPHIL NFR BLD AUTO: 2 % (ref 0–6)
ERYTHROCYTE [DISTWIDTH] IN BLOOD BY AUTOMATED COUNT: 19.5 % (ref 11.6–15.1)
GLUCOSE SERPL-MCNC: 104 MG/DL (ref 65–140)
GLUCOSE SERPL-MCNC: 115 MG/DL (ref 65–140)
GLUCOSE SERPL-MCNC: 176 MG/DL (ref 65–140)
GLUCOSE SERPL-MCNC: 87 MG/DL (ref 65–140)
HCT VFR BLD AUTO: 30 % (ref 36.5–49.3)
HGB BLD-MCNC: 9.2 G/DL (ref 12–17)
IMM GRANULOCYTES # BLD AUTO: 0.06 THOUSAND/UL (ref 0–0.2)
IMM GRANULOCYTES NFR BLD AUTO: 1 % (ref 0–2)
LYMPHOCYTES # BLD AUTO: 0.81 THOUSANDS/ΜL (ref 0.6–4.47)
LYMPHOCYTES NFR BLD AUTO: 16 % (ref 14–44)
MCH RBC QN AUTO: 31.4 PG (ref 26.8–34.3)
MCHC RBC AUTO-ENTMCNC: 30.7 G/DL (ref 31.4–37.4)
MCV RBC AUTO: 102 FL (ref 82–98)
MONOCYTES # BLD AUTO: 0.53 THOUSAND/ΜL (ref 0.17–1.22)
MONOCYTES NFR BLD AUTO: 11 % (ref 4–12)
NEUTROPHILS # BLD AUTO: 3.5 THOUSANDS/ΜL (ref 1.85–7.62)
NEUTS SEG NFR BLD AUTO: 70 % (ref 43–75)
NRBC BLD AUTO-RTO: 0 /100 WBCS
PLATELET # BLD AUTO: 83 THOUSANDS/UL (ref 149–390)
PMV BLD AUTO: 12.1 FL (ref 8.9–12.7)
RBC # BLD AUTO: 2.93 MILLION/UL (ref 3.88–5.62)
WBC # BLD AUTO: 5.03 THOUSAND/UL (ref 4.31–10.16)

## 2022-01-30 PROCEDURE — 82948 REAGENT STRIP/BLOOD GLUCOSE: CPT

## 2022-01-30 PROCEDURE — 85025 COMPLETE CBC W/AUTO DIFF WBC: CPT | Performed by: PHYSICIAN ASSISTANT

## 2022-01-30 PROCEDURE — 99024 POSTOP FOLLOW-UP VISIT: CPT | Performed by: PHYSICIAN ASSISTANT

## 2022-01-30 RX ADMIN — METOPROLOL SUCCINATE 12.5 MG: 25 TABLET, EXTENDED RELEASE ORAL at 22:17

## 2022-01-30 RX ADMIN — AMIODARONE HYDROCHLORIDE 200 MG: 200 TABLET ORAL at 09:52

## 2022-01-30 RX ADMIN — PANTOPRAZOLE SODIUM 40 MG: 40 TABLET, DELAYED RELEASE ORAL at 05:05

## 2022-01-30 RX ADMIN — HEPARIN SODIUM 5000 UNITS: 5000 INJECTION INTRAVENOUS; SUBCUTANEOUS at 09:52

## 2022-01-30 RX ADMIN — ASPIRIN 325 MG ORAL TABLET 325 MG: 325 PILL ORAL at 09:52

## 2022-01-30 RX ADMIN — INSULIN LISPRO 1 UNITS: 100 INJECTION, SOLUTION INTRAVENOUS; SUBCUTANEOUS at 22:18

## 2022-01-30 RX ADMIN — ATORVASTATIN CALCIUM 80 MG: 80 TABLET, FILM COATED ORAL at 18:23

## 2022-01-30 RX ADMIN — HEPARIN SODIUM 5000 UNITS: 5000 INJECTION INTRAVENOUS; SUBCUTANEOUS at 18:23

## 2022-01-30 RX ADMIN — TAMSULOSIN HYDROCHLORIDE 0.4 MG: 0.4 CAPSULE ORAL at 18:23

## 2022-01-30 NOTE — PROGRESS NOTES
Progress Note - Cardiothoracic Surgery   Deette Pro 61 y o  male MRN: [id number]  Unit/Bed#: [facility] 427-01 Encounter: [id number]  Cardiogenic shock  S/P VA ECMO; POD # 28  VSD/cardiogenic shock  S/P VSD repair w/ VA ECMO decannulation; POD # 24    24 Hour Events: No overnight events  Denies CP or SOB  + BM  Using IS  Did not work with PT yesterday, walked with nurses in room  Received first dose of covid vaccine 1/27 (next in 3 weeks)  Blood cx (1/23): NG for 4 days  Urine cx (1/20): +Citrobacter koseri- ancef course completed 1/28      Medications:   Scheduled Meds:  Current Facility-Administered Medications   Medication Dose Route Frequency Provider Last Rate    acetaminophen  650 mg Oral Q6H PRN Anabelle Art PA-C      amiodarone  200 mg Oral Daily With Breakfast Jaja Preciado PA-C      aspirin  325 mg Oral Daily John RollerNOA      atorvastatin  80 mg Oral After [place] InstrumentsNOA      bisacodyl  10 mg Rectal Daily PRN Anabelle Art PA-C      bumetanide  1 mg Oral Daily Des Drummond MD      docusate sodium  100 mg Oral BID PRN Anabelle Art PA-C      heparin (porcine)  5,000 Units Subcutaneous [facility] [place]      insulin lispro  1-6 Units Subcutaneous TID [facility] John RollNOA aguilar      insulin lispro  1-6 Units Subcutaneous HS John RollNOA aguilar      metoprolol succinate  12 5 mg Oral BID BROOKE Howadr      ondansetron  4 mg Intravenous Q6H PRN John RollerNOA      oxyCODONE  2 5 mg Oral Q4H PRN John RollerNOA      oxyCODONE  5 mg Oral Q4H PRN John Roller, PA-EMILIANO      pantoprazole  40 mg Oral Early Morning John Roller, PA-EMILIANO      polyethylene glycol  17 g Oral Daily PRN Anabelle Art PA-C      tamsulosin  0 4 mg Oral Daily With [facility] Lu[facility]A davies      temazepam  15 mg Oral HS PRN John RollNOA aguilar       Continuous Infusions:   PRN Meds:   acetaminophen    bisacodyl    docusate sodium    ondansetron    oxyCODONE    oxyCODONE    polyethylene glycol    temazepam    Vitals:   Vitals:    01/29/22 1923 01/29/22 2119 01/29/22 2250 01/30/22 0325   BP: 107/66 107/65 108/67 108/68   BP Location:       Pulse: 77 77 76 77   Resp:       Temp: 97 6 °F (36 4 °C)   97 7 °F (36 5 °C)   TempSrc:       SpO2: 99%  97% 97%   Weight:       Height:           Telemetry: NSR w/ BBB; Heart Rate: 71    Respiratory:   SpO2: SpO2: 97 %; Room Air    Intake/Output:     Intake/Output Summary (Last 24 hours) at 1/30/2022 0553  Last data filed at 1/29/2022 1731  Gross per 24 hour   Intake 600 ml   Output 625 ml   Net -25 ml   no UOP/PO intake recorded, remains net neg  Weights:   Weight (last 2 days)     Date/Time Weight    01/29/22 0600 88 9 (195 99)    01/28/22 0806 92 9 (204 81)    01/28/22 0600 97 (213 85)            Results:   Results from last 7 days   Lab Units 01/29/22  0500 01/27/22  0626 01/25/22  0438   WBC Thousand/uL 4 95 5 56 5 49   HEMOGLOBIN g/dL 8 9* 9 2* 9 0*   HEMATOCRIT % 29 3* 30 0* 29 3*   PLATELETS Thousands/uL 94* 104* 142*     Results from last 7 days   Lab Units 01/27/22  0626 01/25/22  0438 01/24/22  0448   SODIUM mmol/L 137 137 138   POTASSIUM mmol/L 4 3 3 7 4 7   CHLORIDE mmol/L 105 103 107   CO2 mmol/L 28 27 26   BUN mg/dL 31* 28* 26*   CREATININE mg/dL 1 09 1 15 1 08   CALCIUM mg/dL 8 6 8 3 8 4         Glucose 101- 140 (prediabetes 5 9)    Invasive Lines/Tubes:  Invasive Devices  Report    Peripherally Inserted Central Catheter Line            PICC Line 28/86/98 Right Basilic 23 days          Drain            Urethral Catheter Non-latex 18 Fr  9 days                Physical Exam:    HEENT/NECK:  Normocephalic  Atraumatic  No jugular venous distention      Cardiac: Regular rate and rhythm and No murmurs/rubs/gallops  Pulmonary:  Breath sounds clear bilaterally and No rales/rhonchi/wheezes  Abdomen:  Non-tender, Non-distended and Normal bowel sounds  Incisions: Sternum is stable  Incision is clean, dry, and intact  Extremities: Extremities warm/dry and 2+ edema B/L  Neuro: Alert and oriented X 3 and No focal deficits  Skin: Warm/Dry, without rashes or lesions  Assessment:  Principal Problem:    S/P VSD repair  Active Problems:    Agoraphobia with panic attacks    Cardiogenic shock (HCC)    VSD (ventricular septal defect)    JUDE (acute kidney injury) ([address] )    Transaminitis    Tylenol ingestion    Acute systolic congestive heart failure (HCC)    Anemia    Personal history of ECMO    Coagulopathy (HCC)    Leukocytosis    Central line complication    Thrombocytopenia (HCC)    Hypernatremia    Hyperchloremia    Hypocalcemia    Anticoagulated on Coumadin    Anticoagulation goal of INR 2 to 3    Urinary retention       Cardiogenic shock  S/P VA ECMO; POD # 28  VSD/cardiogenic shock  S/P VSD repair w/ VA ECMO decannulation; POD # 24    Plan:    1  Cardiac:   NSR; HR/BP well-controlled  Continue Toprol xl 12 5 mg BID    Continue to hold losartan 12 5 mg daily to allow room for bumex  Continue ASA and Statin therapy  Epicardial pacing wires out  PICC line in place for access  Continue DVT prophylaxis  amiodarone daily    2  Pulmonary:   Good Room air oxygen saturation; Continue incentive spirometry/Coughing/Deep breathing exercises  Chest tubes have been discontinued    3  Renal: carolina in place per urology- f/u OP  Intake/Output net: -neg L L/24 hours (inaccurate)  Diuretic Regimen: Continue Bumex 1 mg po QD  Post op Creatinine stable; Follow up labs prn  Replete K>4   Completed antibiotic course for +Urine cx   Continue flomax     4  Neuro:  Neurologically intact; No active issues  Incisional pain well-controlled  Continue Tylenol, 975 mg PO q 8, standing dose  Continue Oxycodone, 2 5 to 5 mg PO q 4 hours prn pain    5   GI:  Tolerating diet   Maintain 1800 mL daily fluid restriction   Continue stool softeners and prn suppository  Continue GI prophylaxis    6  Endo:   Glucose well-controlled with sliding scale coverage    7    Hematology:    Post-operative blood count acceptable; Trend prn    8     Disposition:      Awaiting rehab placement- 1st shot of vaccine completed    VTE Pharmacologic Prophylaxis: Heparin  VTE Mechanical Prophylaxis: sequential compression device    Collaborative rounds completed with supervising physician  Plan of care discussed with bedside nurse    SIGNATURE: Mare Gordillo PA-C  DATE: January 30, 2022  TIME: 5:53 AM

## 2022-01-31 VITALS
DIASTOLIC BLOOD PRESSURE: 70 MMHG | OXYGEN SATURATION: 95 % | SYSTOLIC BLOOD PRESSURE: 101 MMHG | TEMPERATURE: 98.3 F | HEIGHT: 66 IN | HEART RATE: 90 BPM | RESPIRATION RATE: 18 BRPM | BODY MASS INDEX: 31.5 KG/M2 | WEIGHT: 195.99 LBS

## 2022-01-31 LAB
GLUCOSE SERPL-MCNC: 115 MG/DL (ref 65–140)
GLUCOSE SERPL-MCNC: 120 MG/DL (ref 65–140)
GLUCOSE SERPL-MCNC: 128 MG/DL (ref 65–140)

## 2022-01-31 PROCEDURE — 82948 REAGENT STRIP/BLOOD GLUCOSE: CPT

## 2022-01-31 PROCEDURE — 99024 POSTOP FOLLOW-UP VISIT: CPT | Performed by: THORACIC SURGERY (CARDIOTHORACIC VASCULAR SURGERY)

## 2022-01-31 PROCEDURE — 99024 POSTOP FOLLOW-UP VISIT: CPT | Performed by: PHYSICIAN ASSISTANT

## 2022-01-31 PROCEDURE — 99231 SBSQ HOSP IP/OBS SF/LOW 25: CPT | Performed by: INTERNAL MEDICINE

## 2022-01-31 PROCEDURE — 97530 THERAPEUTIC ACTIVITIES: CPT

## 2022-01-31 PROCEDURE — 97535 SELF CARE MNGMENT TRAINING: CPT

## 2022-01-31 RX ORDER — TAMSULOSIN HYDROCHLORIDE 0.4 MG/1
0.4 CAPSULE ORAL
Qty: 30 CAPSULE | Refills: 2 | Status: SHIPPED | OUTPATIENT
Start: 2022-01-31 | End: 2022-03-23 | Stop reason: SDUPTHER

## 2022-01-31 RX ORDER — OXYCODONE HYDROCHLORIDE 5 MG/1
5 TABLET ORAL EVERY 6 HOURS PRN
Qty: 28 TABLET | Refills: 0 | Status: CANCELLED | OUTPATIENT
Start: 2022-01-31 | End: 2022-02-07

## 2022-01-31 RX ORDER — ATORVASTATIN CALCIUM 80 MG/1
80 TABLET, FILM COATED ORAL
Qty: 30 TABLET | Refills: 2 | Status: SHIPPED | OUTPATIENT
Start: 2022-01-31 | End: 2022-03-23 | Stop reason: SDUPTHER

## 2022-01-31 RX ORDER — BUMETANIDE 1 MG/1
1 TABLET ORAL DAILY
Qty: 30 TABLET | Refills: 2 | Status: SHIPPED | OUTPATIENT
Start: 2022-02-01 | End: 2022-03-09

## 2022-01-31 RX ORDER — ALPRAZOLAM 0.25 MG/1
0.5 TABLET ORAL ONCE AS NEEDED
Status: COMPLETED | OUTPATIENT
Start: 2022-01-31 | End: 2022-01-31

## 2022-01-31 RX ORDER — OMEPRAZOLE 20 MG/1
20 CAPSULE, DELAYED RELEASE ORAL DAILY
Qty: 30 CAPSULE | Refills: 0 | Status: SHIPPED | OUTPATIENT
Start: 2022-01-31 | End: 2022-03-23 | Stop reason: SDUPTHER

## 2022-01-31 RX ORDER — METOPROLOL SUCCINATE 25 MG/1
12.5 TABLET, EXTENDED RELEASE ORAL 2 TIMES DAILY
Qty: 30 TABLET | Refills: 2 | Status: SHIPPED | OUTPATIENT
Start: 2022-01-31 | End: 2022-03-23 | Stop reason: SDUPTHER

## 2022-01-31 RX ORDER — ASPIRIN 325 MG
325 TABLET ORAL DAILY
Qty: 30 TABLET | Refills: 2 | Status: SHIPPED | OUTPATIENT
Start: 2022-02-01

## 2022-01-31 RX ADMIN — HEPARIN SODIUM 5000 UNITS: 5000 INJECTION INTRAVENOUS; SUBCUTANEOUS at 00:02

## 2022-01-31 RX ADMIN — HEPARIN SODIUM 5000 UNITS: 5000 INJECTION INTRAVENOUS; SUBCUTANEOUS at 08:57

## 2022-01-31 RX ADMIN — PANTOPRAZOLE SODIUM 40 MG: 40 TABLET, DELAYED RELEASE ORAL at 06:05

## 2022-01-31 RX ADMIN — ALPRAZOLAM 0.5 MG: 0.25 TABLET ORAL at 16:32

## 2022-01-31 RX ADMIN — AMIODARONE HYDROCHLORIDE 200 MG: 200 TABLET ORAL at 08:58

## 2022-01-31 RX ADMIN — BUMETANIDE 1 MG: 1 TABLET ORAL at 08:57

## 2022-01-31 RX ADMIN — ASPIRIN 325 MG ORAL TABLET 325 MG: 325 PILL ORAL at 08:58

## 2022-01-31 RX ADMIN — HEPARIN SODIUM 5000 UNITS: 5000 INJECTION INTRAVENOUS; SUBCUTANEOUS at 16:32

## 2022-01-31 RX ADMIN — METOPROLOL SUCCINATE 12.5 MG: 25 TABLET, EXTENDED RELEASE ORAL at 08:57

## 2022-01-31 RX ADMIN — TAMSULOSIN HYDROCHLORIDE 0.4 MG: 0.4 CAPSULE ORAL at 16:32

## 2022-01-31 RX ADMIN — ACETAMINOPHEN 650 MG: 325 TABLET, FILM COATED ORAL at 06:05

## 2022-01-31 NOTE — DISCHARGE INSTRUCTIONS
Sternal Precautions   AMBULATORY CARE:   Sternal precautions  are used to help protect your sternum (breastbone) after open chest surgery  Wires are placed during surgery to hold the sternum together as it heals  Sternal precautions help prevent the wires from cutting through the sternum  The precautions also help prevent the sternum from coming apart from an injury, and prevent pain and bleeding  You may need to use the precautions for up to 12 weeks after surgery  Your surgeon will give you specific instructions based on the type of surgery you had  It is important to follow the instructions carefully  An injury to the healing sternum can be life-threatening  General sternal precautions:  Start slowly and do more as you get stronger  Pain medicine might make it harder for you to know when to slow down or be careful  Stop immediately if you hear a crunch or pop in your sternum  · Protect your sternum  Hug a pillow to your chest or cross your arms over your chest when you laugh, sneeze, or cough  · Be careful when you get into or out of a chair or bed  Hug a pillow or cross your arms when you stand or sit  Do not twist as you move  Use only your legs to sit and stand  You may need to use a raised toilet seat if you have trouble standing up without using your arms  Your healthcare provider may teach you to use your elbow for support as you move from lying to sitting  · Ask when you may take a bath or shower  You may need to use a bath chair if you have trouble getting into or out of the tub  Do not use a grab bar  · Do not lift or carry anything heavier than 5 pounds  For example, a gallon of milk weighs 8 pounds  · Keep your arms down as much as possible  Do not put your arms out to the side, behind you, or over your head  Do not let anyone pull your arms to help you move or dress  Do not reach for items  · Do not push or pull anything  Examples include a car door or a vacuum   · Do not drive while you are healing  Your surgeon will tell you when it is safe for you to start driving again  Call 911 for any of the following:   · You have sudden pain in your sternum and hear a crunch or pop  · You have bleeding that does not stop even after you apply pressure for 5 minutes  Seek care immediately if:   · You hear crunching or grinding in your sternum  · You have signs of an infection, such as a fever, red or warm skin, or pus in the surgery wound  Contact your healthcare provider if:   · You continue to feel pain, even after you take your pain medicine  · You have new or worsening pain, or any pain with movement  · You have questions or concerns about your condition or care  Care for your surgery wound:  Always wash your hands before you care for your wound  Wash your wound as directed  Do not rub the wound as you wash or dry the area  Pat the area dry with a clean towel  Change the bandages as directed and when they get wet or dirty  Do not smoke:  Nicotine can damage blood vessels and make it more difficult to heal  Do not use e-cigarettes or smokeless tobacco in place of cigarettes or to help you quit  They still contain nicotine  Ask your healthcare provider for information if you currently smoke and need help quitting  Follow up with your doctor as directed:  Write down your questions so you remember to ask them during your visits  © Copyright [employer] 2021 Information is for End User's use only and may not be sold, redistributed or otherwise used for commercial purposes  All illustrations and images included in CareNotes® are the copyrighted property of A D A M , Inc  or Pedro Vergara  The above information is an  only  It is not intended as medical advice for individual conditions or treatments  Talk to your doctor, nurse or pharmacist before following any medical regimen to see if it is safe and effective for you

## 2022-01-31 NOTE — PHYSICAL THERAPY NOTE
PHYSICAL THERAPY NOTE          Patient Name: Matthew Hinson  XJLSA'T Date: 1/31/2022 01/31/22 1031   PT Last Visit   PT Visit Date 01/31/22   Note Type   Note Type Treatment   Pain Assessment   Pain Assessment Tool 0-10   Pain Score No Pain   General   Chart Reviewed Yes   Additional Pertinent History cleared for Tx session by michelle   Response to Previous Treatment Patient with no complaints from previous session  Cognition   Overall Cognitive Status Impaired   Arousal/Participation Alert; Cooperative   Attention Attends with cues to redirect   Orientation Level Oriented to person;Oriented to place;Oriented to situation   Memory Decreased short term memory;Decreased recall of recent events;Decreased recall of precautions   Following Commands Follows one step commands with increased time or repetition   Subjective   Subjective Alert; in the chair; pleasant and cooperative; agreeable to mobilize   Transfers   Sit to Stand 3  Moderate assistance  (3 trials)   Additional items Assist x 1;Assist x 2; Increased time required;Verbal cues   Stand to Sit 3  Moderate assistance  (3 trials)   Additional items Assist x 1;Assist x 2; Increased time required;Verbal cues   Ambulation/Elevation   Gait pattern Excessively slow; Short stride; Inconsistent doug  (sway back and inconsistent LE control)   Gait Assistance 3  Moderate assist   Additional items Assist x 1;Assist x 2;Verbal cues; Tactile cues  (chair follow)   Assistive Device Rolling walker   Distance 8 ft and 10 ft w/ extended seated rest period in between   Balance   Static Sitting Fair   Dynamic Sitting Fair -   Static Standing Poor +   Dynamic Standing Poor   Ambulatory Poor   Activity Tolerance   Activity Tolerance Patient limited by fatigue   Nurse Made Aware spoke to [facility], RN   Exercises   Hip Abduction Sitting;15 reps;AAROM; Bilateral  (2 sets)   Knee AROM Long Arc Samantha Ladorita reps;AROM; Bilateral  (2 sets)   Ankle Pumps Sitting;15 reps;AROM; Bilateral  (2 sets)   Marching Standing;10 reps;AAROM; Bilateral   Assessment   Prognosis Good   Problem List Decreased strength;Decreased endurance; Impaired balance;Decreased mobility;Obesity   Assessment Pt demonstrated overall improvement in functional endurance, balance and mobility skills progressing to amb trials w/ rw; mod (A)x 1-2 is still required for transfers and amb mainly due to LE weakness and inconsistent body mechanics, posture and technique  Overall, cont to recommend rehab upon D/C when medically cleared; will follow  Barriers to Discharge Inaccessible home environment;Decreased caregiver support   Goals   Patient Goals to get better   STG Expiration Date 02/01/22   PT Treatment Day 13   Plan   Treatment/Interventions Functional transfer training;LE strengthening/ROM; Therapeutic exercise; Endurance training;Cognitive reorientation; Bed mobility;Gait training;Spoke to nursing;Spoke to case management   Progress Progressing toward goals   PT Frequency 4-6x/wk   Recommendation   PT Discharge Recommendation Post acute rehabilitation services   Equipment Recommended [address] [facility] Recommended Wheeled walker   AM-PAC Basic Mobility Inpatient   Turning in Bed Without Bedrails 3   Lying on Back to Sitting on Edge of Flat Bed 2   Moving Bed to Chair 2   Standing Up From Chair 2   Walk in Room 2   Climb 3-5 Stairs 1   Basic Mobility Inpatient Raw Score 12   Basic Mobility Standardized Score 32 23   Highest Level Of Mobility   JH-HLM Goal 4: Move to chair/commode   JH-HLM Highest Level of Mobility 6: Walk 10 steps or more   JH-HLM Goal Achieved Yes   Education   Education Provided Mobility training;Assistive device   Patient Demonstrates verbal understanding;Reinforcement needed   End of Consult   Patient Position at End of Consult Bedside chair; All needs within reach       [facility], PT

## 2022-01-31 NOTE — PROGRESS NOTES
Cardiology Progress Note - Louetta Aschoff 61 y o  male MRN: [id number]    Unit/Bed#: [facility] 427-01 Encounter: [id number]      Assessment & Plan:  Principal Problem:    S/P VSD repair  Active Problems:    Agoraphobia with panic attacks    Cardiogenic shock (HCC)    VSD (ventricular septal defect)    JUDE (acute kidney injury) ([address] )    Transaminitis    Tylenol ingestion    Acute systolic congestive heart failure (HCC)    Anemia    Personal history of ECMO    Coagulopathy (HCC)    Leukocytosis    Central line complication    Thrombocytopenia (HCC)    Hypernatremia    Hyperchloremia    Hypocalcemia    Anticoagulated on Coumadin    Anticoagulation goal of INR 2 to 3    Urinary retention    # Late presenting anterior STEMI c/b anteroapical VSD s/p open patch repair   # Redo sternotomy, mediastinal exploration for retained swan avinash catheter  # HFrEF with EF 35-40% as per my assessment on most recent CORIE  # Deconditioning   # UTI    BP: 90s-100s/60s  HR: 90s  No additional episodes of dizziness    Plan:  - c/w bumex to 1 mg PO QD  - Toprol XL 12 5 mg BID  - c/w aspirin and atorvastatin  - amiodarone for afib prevention  - encourage OOBTC and physical therapy  Patient will need JACQUELINE as per PT  Subjective:   Patient feels week and tired after the covid vaccine  Objective:     Vitals: Blood pressure 116/72, pulse 93, temperature 98 1 °F (36 7 °C), resp  rate 18, height 5' 5 5" (1 664 m), weight 88 9 kg (195 lb 15 8 oz), SpO2 94 %  , Body mass index is 32 12 kg/m² ,   Orthostatic Blood Pressures      Most Recent Value   Blood Pressure 116/72 filed at 01/31/2022 4024   Patient Position - Orthostatic VS Lying filed at 01/31/2022 0300            Intake/Output Summary (Last 24 hours) at 1/31/2022 0858  Last data filed at 1/31/2022 0600  Gross per 24 hour   Intake 360 ml   Output 1200 ml   Net -840 ml           Physical Exam:    GEN: Robbie Momin Lethargic  HEENT: anicteric, mucous membranes moist  NECK: No JVD, carotid bruits HEART: regular rhythm, normal S1 and S2, no murmurs, clicks, gallops or rubs   CHEST: Sternotomy incision site healing well  LUNGS: clear to auscultation bilaterally; Crackles present  ABDOMEN: normal bowel sounds, soft, no tenderness, no distention  EXTREMITIES: peripheral pulses normal, 1+ edema  NEURO: no focal findings    SKIN: normal without suspicious lesions on exposed skin      Current Facility-Administered Medications:     acetaminophen (TYLENOL) tablet 650 mg, 650 mg, Oral, Q6H PRN, Anabelle Art PA-C, 650 mg at 01/31/22 0605    amiodarone tablet 200 mg, 200 mg, Oral, Daily With Breakfast, Holger Hernandez PA-C, 200 mg at 01/30/22 0148    aspirin tablet 325 mg, 325 mg, Oral, Daily, Hira Hassan PA-C, 325 mg at 01/30/22 1590    atorvastatin (LIPITOR) tablet 80 mg, 80 mg, Oral, After [place], NOA, 80 mg at 01/30/22 1823    bisacodyl (DULCOLAX) rectal suppository 10 mg, 10 mg, Rectal, Daily PRN, Anabelle Art PA-C    bumetanide (BUMEX) tablet 1 mg, 1 mg, Oral, Daily, Nina Mabry MD, 1 mg at 01/29/22 0901    docusate sodium (COLACE) capsule 100 mg, 100 mg, Oral, BID PRN, Anabelle Art PA-C    heparin (porcine) subcutaneous injection 5,000 Units, 5,000 Units, Subcutaneous, Q8H [address], Louisa Katherine Salmeron PA-C, 5,000 Units at 01/31/22 0002    insulin lispro (HumaLOG) 100 units/mL subcutaneous injection 1-6 Units, 1-6 Units, Subcutaneous, TID AC, 1 Units at 01/27/22 1154 **AND** Fingerstick Glucose (POCT), , , TID AC, Hira Hassan PA-C    insulin lispro (HumaLOG) 100 units/mL subcutaneous injection 1-6 Units, 1-6 Units, Subcutaneous, HS, Hira Hassan PA-C, 1 Units at 01/30/22 2218    metoprolol succinate (TOPROL-XL) 24 hr tablet 12 5 mg, 12 5 mg, Oral, BID, BROOKE Contreras, 12 5 mg at 01/30/22 2217    ondansetron (ZOFRAN) injection 4 mg, 4 mg, Intravenous, Q6H PRN, Hira Hassan PA-C    oxyCODONE (ROXICODONE) IR tablet 2 5 mg, 2 5 mg, Oral, Q4H PRN, PIPE Sánchez-EMILIANO, 2 5 mg at 01/13/22 0525    oxyCODONE (ROXICODONE) IR tablet 5 mg, 5 mg, Oral, Q4H PRN, Taras Laurent PA-C, 5 mg at 01/08/22 2148    pantoprazole (PROTONIX) EC tablet 40 mg, 40 mg, Oral, Early Morning, Taras Laurent PA-C, 40 mg at 01/31/22 0605    polyethylene glycol (MIRALAX) packet 17 g, 17 g, Oral, Daily PRN, Anabelle NOA Art, 17 g at 01/28/22 0932    tamsulosin (FLOMAX) capsule 0 4 mg, 0 4 mg, Oral, Daily With Dinner, Yadira Pat PA-C, 0 4 mg at 01/30/22 1823    temazepam (RESTORIL) capsule 15 mg, 15 mg, Oral, HS PRN, Taras Laurent PA-C, 15 mg at 01/13/22 2144    Labs & Results:    No results found for: CKTOTAL, CKMB, CKMBINDEX, TROPONINI    Lab Results   Component Value Date    GLUCOSE 149 (H) 01/07/2022    CALCIUM 8 6 01/27/2022    K 4 3 01/27/2022    CO2 28 01/27/2022     01/27/2022    BUN 31 (H) 01/27/2022    CREATININE 1 09 01/27/2022       Lab Results   Component Value Date    WBC 5 03 01/30/2022    HGB 9 2 (L) 01/30/2022    HCT 30 0 (L) 01/30/2022     (H) 01/30/2022    PLT 83 (L) 01/30/2022           No results found for: CHOL  Lab Results   Component Value Date    HDL 13 (L) 12/30/2021    HDL 38 (L) 09/13/2021     Lab Results   Component Value Date    LDLCALC 107 (H) 12/30/2021    LDLCALC 134 (H) 09/13/2021     Lab Results   Component Value Date    TRIG 152 (H) 12/30/2021    TRIG 137 09/13/2021       Lab Results   Component Value Date    ALT 61 01/23/2022    AST 21 01/23/2022         EKG personally reviewed by )Kerri Blum MD  No acute changes   TELE: No significant arrhythmias seen on telemetry review

## 2022-01-31 NOTE — PROGRESS NOTES
Progress Note - Cardiothoracic Surgery   Geraldene Lesches 61 y o  male MRN: [id number]  Unit/Bed#: [facility] 427-01 Encounter: [id number]     Cardiogenic shock  S/P [facility] ECMO; POD # 29  VSD/cardiogenic shock  S/P VSD repair w/ [facility] ECMO decannulation; POD # 25    24 Hour Events: No overnight events  Denies CP or SOB       Received first dose of covid vaccine 1/27     Medications:   Scheduled Meds:  Current Facility-Administered Medications   Medication Dose Route Frequency Provider Last Rate    acetaminophen  650 mg Oral Q6H PRN Anabelle Art PA-C      amiodarone  200 mg Oral Daily With Breakfast Jaja Preciado PA-C      aspirin  325 mg Oral Daily [facility], NOA      atorvastatin  80 mg Oral After [employer], NOA      bisacodyl  10 mg Rectal Daily PRN Anabelle Art PA-C      bumetanide  1 mg Oral Daily Gato Vences MD      docusate sodium  100 mg Oral BID PRN Anabelle Art PA-C      heparin (porcine)  5,000 Units Subcutaneous [place], [place]      insulin lispro  1-6 Units Subcutaneous TID AC [facility], ONA      insulin lispro  1-6 Units Subcutaneous HS [facility], NOA      metoprolol succinate  12 5 mg Oral BID BROOKE Krause      ondansetron  4 mg Intravenous Q6H PRN [facility], NOA      oxyCODONE  2 5 mg Oral Q4H PRN [facility], NOA      oxyCODONE  5 mg Oral Q4H PRN [facility], NOA      pantoprazole  40 mg Oral Early Morning [facility]      polyethylene glycol  17 g Oral Daily PRN Anabelle Art PA-C      tamsulosin  0 4 mg Oral Daily With [employer] [place], NOA      temazepam  15 mg Oral HS PRN [facility], NOA       Continuous Infusions:   PRN Meds:   acetaminophen    bisacodyl    docusate sodium    ondansetron    oxyCODONE    oxyCODONE    polyethylene glycol    temazepam    Vitals:   Vitals:    01/30/22 1530 01/30/22 1900 01/30/22 2212 01/31/22 0300   BP: 101/65 103/67 113/72 118/74   BP Location:  Right arm Right arm Left arm   Pulse: 83 83 85 101   Resp: 20 16 16 18   Temp: 98 °F (36 7 °C) 97 5 °F (36 4 °C) 97 5 °F (36 4 °C) 97 5 °F (36 4 °C)   TempSrc:  Oral Oral Oral   SpO2: 98% 97% 99% 95%   Weight:       Height:           Telemetry: NSR w/ BBB; Heart Rate: 71    Respiratory:   SpO2: SpO2: 95 %; Room Air    Intake/Output:     Intake/Output Summary (Last 24 hours) at 1/31/2022 0753  Last data filed at 1/31/2022 0600  Gross per 24 hour   Intake 720 ml   Output 1200 ml   Net -480 ml         Weights:   Weight (last 2 days)     Date/Time Weight    01/29/22 0600 88 9 (195 99)            Results:   Results from last 7 days   Lab Units 01/30/22  0612 01/29/22  0500 01/27/22  0626   WBC Thousand/uL 5 03 4 95 5 56   HEMOGLOBIN g/dL 9 2* 8 9* 9 2*   HEMATOCRIT % 30 0* 29 3* 30 0*   PLATELETS Thousands/uL 83* 94* 104*     Results from last 7 days   Lab Units 01/27/22  0626 01/25/22  0438   SODIUM mmol/L 137 137   POTASSIUM mmol/L 4 3 3 7   CHLORIDE mmol/L 105 103   CO2 mmol/L 28 27   BUN mg/dL 31* 28*   CREATININE mg/dL 1 09 1 15   CALCIUM mg/dL 8 6 8 3         Glucose 101- 140 (prediabetes 5 9)    Invasive Lines/Tubes:  Invasive Devices  Report    Peripherally Inserted Central Catheter Line            PICC Line 11/02/08 Right Basilic 24 days          Drain            Urethral Catheter Non-latex 18 Fr  10 days              Physical Exam:    HEENT/NECK:  Normocephalic  Atraumatic  1+ jugular venous distention  Cardiac: Regular rate and rhythm and No murmurs/rubs/gallops  Pulmonary:  Breath sounds diminished in the bases bilaterally   Abdomen:  Non-tender and Non-distended  Incisions: Sternum is stable  Incision is clean, dry, and intact  Extremities: Extremities warm/dry  Neuro: Alert and oriented X 3  Skin: Warm/Dry, without rashes or lesions        Assessment:  Principal Problem:    S/P VSD repair  Active Problems:    Agoraphobia with panic attacks    Cardiogenic shock (HCC)    VSD (ventricular septal defect)    JUDE (acute kidney injury) ([facility] [address] )    Transaminitis    Tylenol ingestion    Acute systolic congestive heart failure (HCC)    Anemia    Personal history of ECMO    Coagulopathy (HCC)    Leukocytosis    Central line complication    Thrombocytopenia (HCC)    Hypernatremia    Hyperchloremia    Hypocalcemia    Anticoagulated on Coumadin    Anticoagulation goal of INR 2 to 3    Urinary retention       Cardiogenic shock  S/P VA ECMO; POD # 29  VSD/cardiogenic shock  S/P VSD repair w/ [facility] ECMO decannulation; POD # 25    Plan:    1  Cardiac:   NSR; HR/BP well-controlled  Continue Toprol xl 12 5 mg BID      Continue ASA and Statin therapy  Epicardial pacing wires out  PICC line in place for access  Continue DVT prophylaxis  amiodarone daily    2  Pulmonary:   Good Room air oxygen saturation; Continue incentive spirometry/Coughing/Deep breathing exercises  Chest tubes have been discontinued    3  Renal: carolina in place per urology- f/u OP  Intake/Output net: -neg L L/24 hours (inaccurate)  Diuretic Regimen: Continue Bumex 1 mg po QD  Post op Creatinine stable; Follow up labs prn  Replete K>4   Completed antibiotic course for +Urine cx   Continue flomax     4  Neuro:  Neurologically intact; No active issues  Incisional pain well-controlled  Continue Tylenol, 975 mg PO q 8, standing dose  Continue Oxycodone, 2 5 to 5 mg PO q 4 hours prn pain    5  GI:  Tolerating diet   Maintain 1800 mL daily fluid restriction   Continue stool softeners and prn suppository  Continue GI prophylaxis    6  Endo:   Glucose well-controlled with sliding scale coverage    7    Hematology:    Post-operative blood count acceptable; Trend prn    8     Disposition:      Awaiting rehab placement- 1st shot of vaccine completed    VTE Pharmacologic Prophylaxis: Heparin  VTE Mechanical Prophylaxis: sequential compression device    Collaborative rounds completed with supervising physician  Plan of care discussed with bedside nurse    SIGNATURE: Ting Hogan PA-C  DATE: January 31, 2022  TIME: 7:53 AM

## 2022-01-31 NOTE — PLAN OF CARE
Problem: PHYSICAL THERAPY ADULT  Goal: Performs mobility at highest level of function for planned discharge setting  See evaluation for individualized goals  Description: Treatment/Interventions: Functional transfer training,LE strengthening/ROM,Elevations,Therapeutic exercise,Endurance training,Equipment eval/education,Bed mobility,Gait training,Spoke to nursing,OT,Cognitive reorientation  Equipment Recommended:  (r/o rw next visit)       See flowsheet documentation for full assessment, interventions and recommendations  Outcome: Progressing  Note: Prognosis: Good  Problem List: Decreased strength,Decreased endurance,Impaired balance,Decreased mobility,Obesity  Assessment: Pt demonstrated overall improvement in functional endurance, balance and mobility skills progressing to amb trials w/ rw; mod (A)x 1-2 is still required for transfers and amb mainly due to LE weakness and inconsistent body mechanics, posture and technique  Overall, cont to recommend rehab upon D/C when medically cleared; will follow  Barriers to Discharge: Inaccessible home environment,Decreased caregiver support        PT Discharge Recommendation: Post acute rehabilitation services          See flowsheet documentation for full assessment

## 2022-01-31 NOTE — PLAN OF CARE
Problem: MOBILITY - ADULT  Goal: Maintain or return to baseline ADL function  Description: INTERVENTIONS:  -  Assess patient's ability to carry out ADLs; assess patient's baseline for ADL function and identify physical deficits which impact ability to perform ADLs (bathing, care of mouth/teeth, toileting, grooming, dressing, etc )  - Assess/evaluate cause of self-care deficits   - Assess range of motion  - Assess patient's mobility; develop plan if impaired  - Assess patient's need for assistive devices and provide as appropriate  - Encourage maximum independence but intervene and supervise when necessary  - Involve family in performance of ADLs  - Assess for home care needs following discharge   - Consider OT consult to assist with ADL evaluation and planning for discharge  - Provide patient education as appropriate  Outcome: Progressing  Goal: Maintains/Returns to pre admission functional level  Description: INTERVENTIONS:  - Perform BMAT or MOVE assessment daily    - Set and communicate daily mobility goal to care team and patient/family/caregiver  - Collaborate with rehabilitation services on mobility goals if consulted  - Reposition patient every 2 hours    - Ambulate patient 4 times a day  - Out of bed to chair 3 times a day   - Out of bed for meals 3 times a day  - Out of bed for toileting  - Record patient progress and toleration of activity level   Outcome: Progressing     Problem: Potential for Falls  Goal: Patient will remain free of falls  Description: INTERVENTIONS:  - Educate patient/family on patient safety including physical limitations  - Instruct patient to call for assistance with activity   - Consult OT/PT to assist with strengthening/mobility   - Keep Call bell within reach  - Keep bed low and locked with side rails adjusted as appropriate  - Keep care items and personal belongings within reach  - Initiate and maintain comfort rounds  - Make Fall Risk Sign visible to staff  - Offer Toileting every 3 Hours, in advance of need  - Initiate/Maintain chair alarm  - Obtain necessary fall risk management equipment: walker  - Apply yellow socks and bracelet for high fall risk patients  - Consider moving patient to room near nurses station  Outcome: Progressing     Problem: Prexisting or High Potential for Compromised Skin Integrity  Goal: Skin integrity is maintained or improved  Description: INTERVENTIONS:  - Identify patients at risk for skin breakdown  - Assess and monitor skin integrity  - Assess and monitor nutrition and hydration status  - Monitor labs   - Assess for incontinence   - Turn and reposition patient  - Assist with mobility/ambulation  - Relieve pressure over bony prominences  - Avoid friction and shearing  - Provide appropriate hygiene as needed including keeping skin clean and dry  - Evaluate need for skin moisturizer/barrier cream  - Collaborate with interdisciplinary team   - Patient/family teaching  - Consider wound care consult   Outcome: Progressing     Problem: PAIN - ADULT  Goal: Verbalizes/displays adequate comfort level or baseline comfort level  Description: Interventions:  - Encourage patient to monitor pain and request assistance  - Assess pain using appropriate pain scale  - Administer analgesics based on type and severity of pain and evaluate response  - Implement non-pharmacological measures as appropriate and evaluate response  - Consider cultural and social influences on pain and pain management  - Notify physician/advanced practitioner if interventions unsuccessful or patient reports new pain  Outcome: Progressing     Problem: INFECTION - ADULT  Goal: Absence or prevention of progression during hospitalization  Description: INTERVENTIONS:  - Assess and monitor for signs and symptoms of infection  - Monitor lab/diagnostic results  - Monitor all insertion sites, i e  indwelling lines, tubes, and drains  - Monitor endotracheal if appropriate and nasal secretions for changes in amount and color  - [place] appropriate cooling/warming therapies per order  - Administer medications as ordered  - Instruct and encourage patient and family to use good hand hygiene technique  - Identify and instruct in appropriate isolation precautions for identified infection/condition  Outcome: Progressing  Goal: Absence of fever/infection during neutropenic period  Description: INTERVENTIONS:  - Monitor WBC    Outcome: Progressing     Problem: SAFETY ADULT  Goal: Maintain or return to baseline ADL function  Description: INTERVENTIONS:  -  Assess patient's ability to carry out ADLs; assess patient's baseline for ADL function and identify physical deficits which impact ability to perform ADLs (bathing, care of mouth/teeth, toileting, grooming, dressing, etc )  - Assess/evaluate cause of self-care deficits   - Assess range of motion  - Assess patient's mobility; develop plan if impaired  - Assess patient's need for assistive devices and provide as appropriate  - Encourage maximum independence but intervene and supervise when necessary  - Involve family in performance of ADLs  - Assess for home care needs following discharge   - Consider OT consult to assist with ADL evaluation and planning for discharge  - Provide patient education as appropriate  Outcome: Progressing  Goal: Maintains/Returns to pre admission functional level  Description: INTERVENTIONS:  - Perform BMAT or MOVE assessment daily    - Set and communicate daily mobility goal to care team and patient/family/caregiver  - Collaborate with rehabilitation services on mobility goals if consulted  - Reposition patient every 2 hours    - Ambulate patient 4 times a day  - Out of bed to chair 3 times a day   - Out of bed for meals 3 times a day  - Out of bed for toileting  - Record patient progress and toleration of activity level   Outcome: Progressing  Goal: Patient will remain free of falls  Description: INTERVENTIONS:  - Educate patient/family on patient safety including physical limitations  - Instruct patient to call for assistance with activity   - Consult OT/PT to assist with strengthening/mobility   - Keep Call bell within reach  - Keep bed low and locked with side rails adjusted as appropriate  - Keep care items and personal belongings within reach  - Initiate and maintain comfort rounds  - Make Fall Risk Sign visible to staff  - Offer Toileting every 3 Hours, in advance of need  - Initiate/Maintain chair alarm  - Obtain necessary fall risk management equipment: walker  - Apply yellow socks and bracelet for high fall risk patients  - Consider moving patient to room near nurses station  Outcome: Progressing     Problem: DISCHARGE PLANNING  Goal: Discharge to home or other facility with appropriate resources  Description: INTERVENTIONS:  - Identify barriers to discharge w/patient and caregiver  - Arrange for needed discharge resources and transportation as appropriate  - Identify discharge learning needs (meds, wound care, etc )  - Arrange for interpretive services to assist at discharge as needed  - Refer to Case Management Department for coordinating discharge planning if the patient needs post-hospital services based on physician/advanced practitioner order or complex needs related to functional status, cognitive ability, or social support system  Outcome: Progressing     Problem: Knowledge Deficit  Goal: Patient/family/caregiver demonstrates understanding of disease process, treatment plan, medications, and discharge instructions  Description: Complete learning assessment and assess knowledge base    Interventions:  - Provide teaching at level of understanding  - Provide teaching via preferred learning methods  Outcome: Progressing     Problem: CARDIOVASCULAR - ADULT  Goal: Maintains optimal cardiac output and hemodynamic stability  Description: INTERVENTIONS:  - Monitor I/O, vital signs and rhythm  - Monitor for S/S and trends of decreased cardiac output  - Administer and titrate ordered vasoactive medications to optimize hemodynamic stability  - Assess quality of pulses, skin color and temperature  - Assess for signs of decreased coronary artery perfusion  - Instruct patient to report change in severity of symptoms  Outcome: Progressing  Goal: Absence of cardiac dysrhythmias or at baseline rhythm  Description: INTERVENTIONS:  - Continuous cardiac monitoring, vital signs, obtain 12 lead EKG if ordered  - Administer antiarrhythmic and heart rate control medications as ordered  - Monitor electrolytes and administer replacement therapy as ordered  Outcome: Progressing     Problem: RESPIRATORY - ADULT  Goal: Achieves optimal ventilation and oxygenation  Description: INTERVENTIONS:  - Assess for changes in respiratory status  - Assess for changes in mentation and behavior  - Position to facilitate oxygenation and minimize respiratory effort  - Oxygen administered by appropriate delivery if ordered  - Initiate smoking cessation education as indicated  - Encourage broncho-pulmonary hygiene including cough, deep breathe, Incentive Spirometry  - Assess the need for suctioning and aspirate as needed  - Assess and instruct to report SOB or any respiratory difficulty  - Respiratory Therapy support as indicated  Outcome: Progressing     Problem: GASTROINTESTINAL - ADULT  Goal: Oral mucous membranes remain intact  Description: INTERVENTIONS  - Assess oral mucosa and hygiene practices  - Implement preventative oral hygiene regimen  - Implement oral medicated treatments as ordered  - Initiate Nutrition services referral as needed  Outcome: Progressing     Problem: GENITOURINARY - ADULT  Goal: Maintains or returns to baseline urinary function  Description: INTERVENTIONS:  - Assess urinary function  - Encourage oral fluids to ensure adequate hydration if ordered  - Administer IV fluids as ordered to ensure adequate hydration  - Administer ordered medications as needed  - Offer frequent toileting  - Follow urinary retention protocol if ordered  Outcome: Progressing  Goal: Urinary catheter remains patent  Description: INTERVENTIONS:  - Assess patency of urinary catheter  - If patient has a chronic [place], consider changing catheter if non-functioning  - Follow guidelines for intermittent irrigation of non-functioning urinary catheter  Outcome: Progressing     Problem: METABOLIC, FLUID AND ELECTROLYTES - ADULT  Goal: Electrolytes maintained within normal limits  Description: INTERVENTIONS:  - Monitor labs and assess patient for signs and symptoms of electrolyte imbalances  - Administer electrolyte replacement as ordered  - Monitor response to electrolyte replacements, including repeat lab results as appropriate  - Instruct patient on fluid and nutrition as appropriate  Outcome: Progressing  Goal: Fluid balance maintained  Description: INTERVENTIONS:  - Monitor labs   - Monitor I/O and WT  - Instruct patient on fluid and nutrition as appropriate  - Assess for signs & symptoms of volume excess or deficit  Outcome: Progressing  Goal: Glucose maintained within target range  Description: INTERVENTIONS:  - Monitor Blood Glucose as ordered  - Assess for signs and symptoms of hyperglycemia and hypoglycemia  - Administer ordered medications to maintain glucose within target range  - Assess nutritional intake and initiate nutrition service referral as needed  Outcome: Progressing     Problem: HEMATOLOGIC - ADULT  Goal: Maintains hematologic stability  Description: INTERVENTIONS  - Assess for signs and symptoms of bleeding or hemorrhage  - Monitor labs  - Administer supportive blood products/factors as ordered and appropriate  Outcome: Progressing     Problem: Nutrition/Hydration-ADULT  Goal: Nutrient/Hydration intake appropriate for improving, restoring or maintaining nutritional needs  Description: Monitor and assess patient's nutrition/hydration status for malnutrition   Collaborate with interdisciplinary team and initiate plan and interventions as ordered  Monitor patient's weight and dietary intake as ordered or per policy  Utilize nutrition screening tool and intervene as necessary  Determine patient's food preferences and provide high-protein, high-caloric foods as appropriate       INTERVENTIONS:  - Monitor oral intake, urinary output, labs, and treatment plans  - Assess nutrition and hydration status and recommend course of action  - Evaluate amount of meals eaten  - Assist patient with eating if necessary   - Allow adequate time for meals  - Recommend/ encourage appropriate diets, oral nutritional supplements, and vitamin/mineral supplements  - Order, calculate, and assess calorie counts as needed  - Recommend, monitor, and adjust tube feedings and TPN/PPN based on assessed needs  - Assess need for intravenous fluids  - Provide specific nutrition/hydration education as appropriate  - Include patient/family/caregiver in decisions related to nutrition  Outcome: Progressing

## 2022-01-31 NOTE — CASE MANAGEMENT
Case Management Discharge Planning Note    Patient name Jose Daniel Garcia  Location [facility] 962-62 MRN [id number]  : 1955 Date 2022       Current Admission Date: 2021  Current Admission Diagnosis:S/P VSD repair   Patient Active Problem List    Diagnosis Date Noted    Urinary retention 2022    Anticoagulated on Coumadin 2022    Anticoagulation goal of INR 2 to 3 2022    Thrombocytopenia (HCC) 2022    Hypernatremia 2022    Hyperchloremia 2022    Hypocalcemia 2022    Central line complication     S/P VSD repair 2022    Leukocytosis 2022    Coagulopathy ([facility] ) 2022    Tylenol ingestion     Acute systolic congestive heart failure ([facility] ) 2021    Anemia 2021    Personal history of ECMO 2021    Cardiogenic shock ([facility] ) 2021    VSD (ventricular septal defect) 2021    JUDE (acute kidney injury) ([facility] ) 2021    Transaminitis 2021    Pipe smoker 2018    Hyperlipidemia, mixed 2017    Impaired fasting glucose 2017    Agoraphobia with panic attacks 2015    Obesity (BMI 30-39 9) 2015    Psychosis, atypical ([facility] ) 2015      LOS (days): 32  Geometric Mean LOS (GMLOS) (days):   Days to GMLOS:     OBJECTIVE:  Risk of Unplanned Readmission Score: 21         Current admission status: Inpatient   [facility]erred Pharmacy:   [id number] [address] [place] [address]  Phone: [phone] Fax: [phone]    Primary Care Provider: Kenji Singh MD    Primary Insurance: [address] [place] [address]  Secondary Insurance:     DISCHARGE DETAILS:                                          Other Referral/Resources/Interventions Provided:  Referral Comments: [facility] bed available today, new PT/OT notes, sent for authorization, pt aware      Would you like to participate in our [address] service program?  : No - Declined          Transport at Discharge : [address] Given (Date)::  (NA)        Additional Comments: Accepted at [address] today, awaiting [address]  Name, [address] : [facility] [address] [facility]  Receiving Facility/Agency Phone Number: [phone]  Facility/Agency Fax Number: [phone]

## 2022-01-31 NOTE — CASE MANAGEMENT
Case Management Discharge Planning Note    Patient name [place] Hard  Location [facility] 705-82 MRN [id number]  : 1955 Date 2022       Current Admission Date: 2021  Current Admission Diagnosis:S/P VSD repair   Patient Active Problem List    Diagnosis Date Noted    Urinary retention 2022    Anticoagulated on Coumadin 2022    Anticoagulation goal of INR 2 to 3 2022    Thrombocytopenia (HCC) 2022    Hypernatremia 2022    Hyperchloremia 2022    Hypocalcemia 2022    Central line complication     S/P VSD repair 2022    Leukocytosis 2022    Coagulopathy ([address] ) 2022    Tylenol ingestion     Acute systolic congestive heart failure ([facility] ) 2021    Anemia 2021    Personal history of ECMO 2021    Cardiogenic shock ([facility] ) 2021    VSD (ventricular septal defect) 2021    JUDE (acute kidney injury) ([facility] ) 2021    Transaminitis 2021    Pipe smoker 2018    Hyperlipidemia, mixed 2017    Impaired fasting glucose 2017    Agoraphobia with panic attacks 2015    Obesity (BMI 30-39 9) 2015    Psychosis, atypical ([facility] ) 2015      LOS (days): 32  Geometric Mean LOS (GMLOS) (days):   Days to GMLOS:     OBJECTIVE:  Risk of Unplanned Readmission Score: 21         Current admission status: Inpatient   [facility]erred Pharmacy[facility]   [id number], [address]  [address] [place] [address]  Phone: [phone] Fax: [phone]    Primary Care Provider: Penelope Walters MD    Primary Insurance: [address] [place] [address]  Secondary Insurance:     DISCHARGE DETAILS:                                                                   Transported by (Company and Unit #):  Brie w/c Frankie Mor of Transport (Date): 22  ETA of Transport (Time): 1700        Accompanied by: Alone           Family notified[de-identified] Spoke with niece/POA Kaylyn Doran to inform of 1700 transfer  She is alerting her family members  Niece concerned about pt's agoraphobia, will inform CTS  Additional Comments: Micki received Brie victoria w/alee murray to transport at 1700 today, pt  aware

## 2022-01-31 NOTE — OCCUPATIONAL THERAPY NOTE
Occupational Therapy Progress Note     Patient Name: Sharon Goodson  JANCW'T Date: 1/31/2022  Problem List  Principal Problem:    S/P VSD repair  Active Problems:    Agoraphobia with panic attacks    Cardiogenic shock (HCC)    VSD (ventricular septal defect)    JUDE (acute kidney injury) ([address] )    Transaminitis    Tylenol ingestion    Acute systolic congestive heart failure (HCC)    Anemia    Personal history of ECMO    Coagulopathy (HCC)    Leukocytosis    Central line complication    Thrombocytopenia (HCC)    Hypernatremia    Hyperchloremia    Hypocalcemia    Anticoagulated on Coumadin    Anticoagulation goal of INR 2 to 3    Urinary retention        01/31/22 0957   OT Last Visit   OT Visit Date 01/31/22   Note Type   Note Type Treatment   Restrictions/Precautions   Weight Bearing Precautions Per Order No   Other Precautions Telemetry; Fall Risk;Cardiac/sternal   General   Response to Previous Treatment Patient with no complaints from previous session   Lifestyle   Autonomy Pt reports being I with ADLS, IADLS and mobility without device PTA  (+)    Reciprocal Relationships Pt lives with his brother and sister in law   Service to Others Retired   [employer] Enjoys realxing   Pain Assessment   Pain Assessment Tool 0-10   Pain Score No Pain   ADL   Where Assessed Chair   Grooming Assistance 3  Moderate Assistance   Grooming Deficit Setup;Supervision/safety; Increased time to complete   Grooming Comments A to use wash cloth to clean hair   UB Bathing Assistance 3  Moderate Assistance   UB Bathing Deficit Setup; Increased time to complete   UB Bathing Comments A for cleaning back   LB Bathing Assistance 2  Maximal Assistance   LB Bathing Deficit Setup; Increased time to complete; Buttocks;Right upper leg;Left upper leg;Right lower leg including foot; Left lower leg including foot   UB Dressing Assistance 3  Moderate Assistance   UB Dressing Deficit Thread RUE; Thread LUE;Pull around back   LB Dressing Assistance 2  Maximal Assistance   LB Dressing Deficit Don/doff R sock; Don/doff L sock   Toileting Assistance  1  Total Assistance   Toileting Comments Pt was cleaned while standing in front of chair  Functional Standing Tolerance   Time ~ 3min   Activity Static standing   Comments Pt stood 2x for toileting hygeine for ~ 3mins  Bed Mobility   Supine to Sit 3  Moderate assistance   Additional items Assist x 1;HOB elevated   Sit to Supine 3  Moderate assistance   Additional items Assist x 1   Additional Comments After OT session, pt was left in chair with all needs w/in reach  Transfers   Sit to Stand 3  Moderate assistance   Additional items Assist x 1   Stand to Sit 3  Moderate assistance   Additional items Assist x 1   Additional Comments Pt performed STS transfer 2x w/ min Ax1  Functional Mobility   Functional Mobility 4  Minimal assistance   Additional Comments Pt was able to ambulate from bed to chair  Additional items Rolling walker   Cognition   Overall Cognitive Status Impaired   Arousal/Participation Responsive; Alert;Arousable; Cooperative   Attention Attends with cues to redirect   Orientation Level Oriented X4   Memory Decreased recall of precautions   Following Commands Follows one step commands with increased time or repetition   Comments Pt requires increased processing time and vc for correct techniques and safety  Pt was pleasant and cooperative t/o OT session  Activity Tolerance   Activity Tolerance Patient limited by fatigue;Patient limited by pain   Medical Staff Made Aware RN confirmed ok to see pt   Assessment   Assessment Pt is a 61 y o  male admitted to [facility] on 12/30/2021 w/ VSD repair  Pt was agreeable to OT session  Pt is making gains towards functional goals  OT session was focused on functional transfers, functional mobility, endurance, and ADLs  Pt was total A for toileting  Pt was able to demonstrate LB dressing and bathing w/ max A   Pt was able to demonstrate grooming, UB bathing, UB dressing, functional transfers, and bed mobility w/ mod A  Additionally, pt was able to demonstrate functional mobility w/ min A  Pt is limited 2* pain, endurance, activity tolerance, functional mobility, functional standing tolerance, decreased I w/ ADLS/IADLS and strength  The following Occupational Performance Areas to address include: eating, grooming, bathing/shower, toilet hygiene, dressing, health maintenance and functional mobility  Pt will continue to benefit from skilled OT services 3-5x a wk to address goals  OT recommendations for d/c include post acute rehab  Pt was left w/ all necessary items within reach at the end of OT session  Plan   Treatment Interventions ADL retraining;Functional transfer training;UE strengthening/ROM; Endurance training;Patient/family training; Compensatory technique education;Continued evaluation; Energy conservation; Activityengagement   Goal Expiration Date 02/07/22   OT Treatment Day 7   OT Frequency 3-5x/wk   Recommendation   OT Discharge Recommendation Post acute rehabilitation services   OT - OK to Discharge Yes  (when medically appropriate)   AM-PAC Daily Activity Inpatient   Lower Body Dressing 2   Bathing 2   Toileting 2   Upper Body Dressing 2   Grooming 3   Eating 3   Daily Activity Raw Score 14   Daily Activity Standardized Score (Calc for Raw Score >=11) 33 39   AM-PAC Applied Cognition Inpatient   Following a Speech/Presentation 3   Understanding Ordinary Conversation 4   Taking Medications 3   Remembering Where Things Are Placed or Put Away 3   Remembering List of 4-5 Errands 3   Taking Care of Complicated Tasks 3   Applied Cognition Raw Score 19   Applied Cognition Standardized Score 39 77   Modified Vilas Scale   Modified Vilas Scale 4        STEFANIA Jacques

## 2022-01-31 NOTE — DISCHARGE SUMMARY
Discharge Summary - Cardiothoracic Surgery   Thirza Spatz 61 y o  male MRN: [id number]  Unit/Bed#: [facility] 427-01 Encounter: [id number]    Admission Date: 12/30/2021     Discharge Date: 01/31/22    Admitting Diagnosis: Cardiogenic shock ([address] ) [R57 0]  Liver failure ([facility] [address] ) [K72 90]  Weakness [R53 1]  ST elevation [R94 31]  Acute kidney injury ([facility] [address] ) [N17 9]  Acute respiratory failure with hypoxia (HCC) [J96 01]  Generalized weakness [R53 1]    Primary Discharge Diagnosis:   VSD  S/P VSD repair;    Secondary Discharge Diagnosis:   agoraphobia    Attending: COLE Looney  Consulting Physician(s):   Cardiology  Medical/Critical Care  Heart failure    Procedures Performed:   Procedure(s):  RE-EXPLORATION MEDIASTERNAL CAVITY FOR POST-OP BLEED (BRING BACK) Removal of PA catheter     Hospital Course:       12/30: 61y o  year old male who presents with worsening shortness of breath and weakness  On presentation the patient was severely hypoxic requiring a 100% nonrebreather  Echo identified VSD  Cardiac surgery called for [facility] ECMO evaluation  Patient unable to make medical decisions  POA declined ECMO therapy    12/31: Overnight primacor weaned to 0 25 for hypotension and levo/vaso initiated   Levo and vaso weaned to off by this AM   Started on bumex 1mg/hr for low UOP  AST/ALT improved  Cr improved 3 29  Will undergo [facility] ECMO    1/1: Started on epi for RV support, wean to off today  CI/bp variable w/ VSD  Gtts: milrinone 0 5, levo 4, epi 2, vaso 0 04, heparin  Wean milrinone to 0 25  ST 110s, start esmolol gtt w/ goal HR <100  Given 750cc albumin for high UOP/low flows  Awakens & moves all extremities but only moves LUE purposefully, resedated this AM  TF running, hold at midnight in case goes to OR tomorrow  LFTs stable & slightly down except for t bili up to ~2, INR ~2  Cr 3 01 & stable, net (-) ~1 6L/24hrs  WBC up to 28 from 14, afebrile   EKG w/ stable ST elevations in V2 & V6  RUL mottling from brachial a line w/ dopplerable pulse, remove a line & reinsert new one  Tentative OR tomorrow though more likely this week when more optimized    ½: Weaned off epi  Milrinone weaned to 0 25  Started on esmolol, now at 25, HR now in 80s  RUE improved but occlusion arterial thrombus at site, left axillary a line inserted Gtts: Precedex, esmolol 25, heparin, insulin, milrinone 0 25, levo 6, vaso 0 05  WBC 28 70 from 31 86, afebrile  Plt 102 from 1 38  Cr 2 32 from 2 64, autodiuresing well  INR 1 48, given Vitamin K yesterday & today  Hb 7 7 from 8 4, give 1 PRBC  Plan for OR Tuesday    1/3: Yesterday, received 1u PRBC, Milrinone weaned to 0 13 and sedation transitioned from propofol to precedex  More bradycardic overnight, precedex lowered and fentanyl added with improvement  No other acute overnight events  Net neg  475  Cr 1 7  Wean levo to of, keep milrinone  Increase TF  CORIE today  OR la for VSD repair     1/4: VSD repair and VA ECMO Decannulation  Given 6 PRBC, 4 Plt, 2 Cryo, 2 FFP intraoperatively   Transferred to ICU supported with epi and levo at 6, milrinone at 0 5  No significant postoperative bleeding  Wean towards extubation  Right CFA access removed  EKG    1/5: Maintained on milrinone at 0 5, epi at 6, levo at 5, and vaso at 0 04  Remains hypoxic  Sinus tachycardia in 100s  Net + 7 9L  Na 153  Cr 1 78  Incomplete RBBB  Advance NGT  Wean epi and levo  Diuresis PRN  Possible extubation today  PM: Weaned off epi, remains on milrinone  Vent settings weaned down but not on vent wean yet  Net even for the shift, possible Lasix to get net (-) this evening    1/6: Fredy, Vaso, Epi and Fentanyl weaned off  Remains on Milrinone 0 5mcg  Given Lasix x 4 doses overnight for diuresis  Vent FIO2 40%, continue vent wean and extubate today  +500ml/24 hrs  Start Lasix gtt to get net negative  WBC downtrending 22, Na 151, Cr 2 1  TF at goal  TLC out this morning; replace central line  SVO2 55%  Maintain s   D/C EPW, maintain CTs  Pm; extubated on milrinone and diuresing very well   PICC placed    1/7: Weaned off all pressors and extubated  EPW removed  Started on bumex drip for UOP and stopped overnight due to copious production  Feels well  Milrinone 0 5 Remains ST in low 100s  On 6 L NC, net neg  3 4L  Cr 1 8  decrease milrinone to 0 38, d/c swan  Start lasix 40 mg BID, D/c carolina  D/C CT  Start lopressor 12 5  Hanny Larson was unable to be removed at bedside  Take to OR for Mediastinal reexploration, removal of retained pulmonary artery catheter with cardiopulmonary bypass  Transferred to ICU hemodynamically supported on Epi 3, Milrinone 0 13 and Norepi 10mcg  Wean to extubate  Not bleeding    1/8: Extubated  Albumin overnight for low urine output  Continues on Milrinone 0 13mcg (weaned from 0 5mcg yesterday AM)  40mg IV Lasix daily  Creat 2 15  Continue Primacor today     1/9: Primacor, 0 13 mcg/kg/min  SVO2 49%  Maintain arterial line  Hold lasix this morning secondary to hypernatremia/contraction alkalosis   Hypokalemia  K 2 9, replete with 100meQ in total, repeat BMP @ 1200  Hypernatremia; Continue D5W, increase rate from 75 to 100mL/hr  Creat down to 1 83  Maintain Primacor at 0 13  D/C chest tubes and pacing wires  Transfer from ICU to telemetry     1/10: Remains on primacor 0 13, SvO2 56% this AM  No acute overnight events  Intermittent Lasix dosing for negative fluid balance  Wean milrinone to off  Transfer from ICU to telemetry  F/U TTE today with EF 40%  Milrinone restarted at 0 13mcg due to decreased SVO2 43 2 down from 56 2 and cool and lethargic on exam  Start afterload reduction with hydralazine 10mg q8    1/11: Restarted on milrinone per HF for SVO2 drop, hydralazine also added  SVO2 53 3, maintain milrinone at 0 13  Per surgeon stop Imdur (new this AM) & start Lopressor 12 5mg & Lisinopril 5mg   Seen by nutrition who recommend starting appetite stimulant, eating well this AM  Increase Lasix to 40mg IV TID   TTE w/ possible left pleural effusion, CXR today  4 BM x24 hours, hold bowel regimen  , continue free water flushes  INR 1 22, dose Coumadin 2 5mg tonight  PM: CXR revealing small left pleural effusion  BP stable, decreased kyleigh to 120    1/12: No events  On kyleigh 10, wean to off  Cool on exam & lethargic, increase Milrinone to 0 25 per HF  Weaned to RA  Still w/ multiple BMs, bowel regimen being held  Condom cath for accurate I/Os, transition to Lasix 80mg IV BID per HD  Cr 1 39 from 1 52  INR 1 21, dose coumadin 2 5mg tonight    1/13: Milrinone w/ desire to be increased by HF, no orders placed, remains on 0 13 this AM, HF increased to 0 38 this AM  Unable to add afterload reduction yesterday due to intermittent kyleigh requirements  Cr normalized  PM: SVO2 75, decrease milrinone to 0 25, TTE for tomorrow 1 hour s/p milrinone wean downed  Transitioned to Bumex 3mg IV TID    1/14: Weaned milrinone to 0 13  Hypernatremia resolved  INR 1 12, dose Coumadin 5mg tonight  PM: cardiology transitioned to bumex gtt    1/15: Remains on Bumex gtt at 1, more accurate I/Os w/ Purewick between thighs, net (-) 2133 64, Cr 2 25 from 1 72, continue today  Remains on milrinone 0 13, SVO2 50 4, add Hydralazine 10mg PO TID for afterload reduction & maintain gtt    1/16: Straight cath x1 QHS, carolina reinserted this AM for repeat retention >1L, hold Flomax w/ borderline pressures & need for afterload reduction (ok w/ HF)  On Bumex gtt at 1, net (-) 2615 66cc/24hrs, Cr 2 27 from 2 43, continue today  Remains on milrinone 0 13, continue today  INR 2 40, hold Coumadin tonight    1/17: Overdiuresis overnight, bumex held and given 500 ml of albumin back with improved BP  D/Cd Milrinone  Bumex 2 mg IV TID  BMP at 3 pm   Metoprolol 12 5 mg bid  Cr improving 1 86  INR 3 0 hold coumadin tonight  PM: Cr 1 77  Tolerating BB     1/18: Continue Bumex 2 mg IV q8h for overall edema  Cr 1 34    Coumadin 3 mg tonight, INR 2 31     1/19: Change diuretics to bumex 2 mg bid   Cr 1 27 ACEi added per HF  D/C carolina  No coumadin needed per Dr Lata Velazco  PM: Symptomatic hypotension with BP 85/50, given 250ml Albumin with improvement in BP    1/20: Bumex to 2 mg daily  May need carolina reinserted for retention  PM: carolina reinserted for retention, urine cloudy, UA sent     1/21: Urology consult, urinary retention carolina  Decrease losartan to 12 5 mg daily  Awaiting rehab     1/22: Hemodynamically stable  Ready for rehab    1/23: Doing well, oob into a chair  Hemodynamically stable     1/24: Yesterday evening systolics in 25-71E, CC contacted given albumin and crystalloid (3L total), sepsis alert called, blood cultures taken, started on ancef for +UA, cx + Citrobacter koseri (1/20)  LA WNL, procalcitonin WNL  Blood cx pending  Feeling much better this AM  Remains SR  /62  Net + 1 86L, 2 + edema  Cr 1 08  Continue bumex at 2 mg to remove additional fluid given, will transition to 1 mg la  Awaiting rehab placement     1/25: Blood cx negative for 24hrs  Hr/BP well controlled  Net neg 3 L, continues with 2+ edema  Decrease bumex to 1 mg PO  Rehab pending    1/26: Blood cx remain with no growth  Hold losartan  Net neg 1 1 L  Referrals resent to [facility], PMR to re-evaluate     1/27: Blood cx neg for 48hrs, day 4/5 ancef  Continue to hold losartan  Decrease amiodarone to daily  Net neg  1 1L  PMR with continued recommendations for SNF/subacute rehab    1/28: Given 1st dose of covid vaccination  Last dose of antibiotics today, blood cx with no growth for 72 hours  Plts 104  Net neg  700, continues with 2+ LE edema     1/29: Awaiting rehab placement  Working well with PT  Net neg  2 1    1/30: Awaiting rehab, HR/BP well controlled  Plts and hgb trending down     1/31: No events  Hemodynamics stable  Accepted to [facility]   Fit for discharge         Condition at Discharge:   good     Discharge Physical Exam:    Please see the documented physical exam from this morning's progress note for details  Discharge Data:  Results from last 7 days   Lab Units 01/30/22  0612 01/29/22  0500 01/27/22  0626   WBC Thousand/uL 5 03 4 95 5 56   HEMOGLOBIN g/dL 9 2* 8 9* 9 2*   HEMATOCRIT % 30 0* 29 3* 30 0*   PLATELETS Thousands/uL 83* 94* 104*     Results from last 7 days   Lab Units 01/27/22  0626 01/25/22  0438   POTASSIUM mmol/L 4 3 3 7   CHLORIDE mmol/L 105 103   CO2 mmol/L 28 27   BUN mg/dL 31* 28*   CREATININE mg/dL 1 09 1 15   CALCIUM mg/dL 8 6 8 3           Discharge instructions/Information to patient and family:   See after visit summary for information provided to patient and family  Louetta Aschoff was educated on restrictions regarding driving and lifting, and techniques of proper incisional care  They were specifically counselled on signs and symptoms of an incisional infection, and advised to contact our service immediately should they develop fevers, sweats, chill, redness or drainage at the site of any incisions  Provisions for Follow-Up Care:  See after visit summary for information related to follow-up care and any pertinent home health orders  Disposition:  Extended care facility [facility] 2029    Planned Readmission:   No    Discharge Medications:  See after visit summary for reconciled discharge medications provided to patient and family  Louetta Aschoff was provided contact information and scheduled a follow up appointment with COLE Billings  Additionally, follow up appointments have been scheduled for their primary care physician and primary cardiologist   Contact information was provided  Louetta Aschoff was counseled on the importance of avoiding tobacco products  As with all patients whom have undergone open heart surgery, tobacco cessation medication was contraindicated at the time of discharge       ACE/ARB was Contraindicated secondary to hypotension    Beta Blocker was Prescribed at discharge    The patient was informed that following their postoperative surgical evaluation, they will be referred to outpatient cardiac rehabilitation  They were counseled that this program is run by specialists who will help them safely strengthen their heart and prevent more heart disease  Cardiac rehabilitation will include exercise, relaxation, stress management, and heart-healthy nutrition  Caregivers will also check to make sure their medication regimen is working  During this admission, the patient was questioned on their use of tobacco, alcohol, and illicit/non-prescription drug use in the  previous 24 months  During this time frame they admit to using tobacco  As such they have been counseled on the importance of cessation and abstinence  I spent 35 minutes discharging the patient  This time was spent on the day of discharge  I had direct contact with the patient on the day of discharge  Additional documentation is required if more than 30 minutes were spent on discharge       SIGNATURE: Misbah Stringer PA-C  DATE: January 31, 2022  TIME: 1:26 PM

## 2022-01-31 NOTE — PLAN OF CARE
Problem: OCCUPATIONAL THERAPY ADULT  Goal: Performs self-care activities at highest level of function for planned discharge setting  See evaluation for individualized goals  Description: Treatment Interventions: ADL retraining,Functional transfer training,UE strengthening/ROM,Endurance training,Patient/family training,Equipment evaluation/education,Compensatory technique education,Continued evaluation,Energy conservation,Activityengagement,Cardiac education          See flowsheet documentation for full assessment, interventions and recommendations  Outcome: Progressing  Note: Limitation: Decreased ADL status,Decreased UE ROM,Decreased UE strength,Decreased Safe judgement during ADL,Decreased cognition,Decreased endurance,Decreased fine motor control,Decreased high-level ADLs  Prognosis: Poor,Fair  Assessment: Pt is a 61 y o  male admitted to [facility] on [facility]/30/2021 w/ VSD repair  Pt was agreeable to OT session  Pt is making gains towards functional goals  OT session was focused on functional transfers, functional mobility, endurance, and ADLs  Pt was total A for toileting  Pt was able to demonstrate LB dressing and bathing w/ max A  Pt was able to demonstrate grooming, UB bathing, UB dressing, functional transfers, and bed mobility w/ mod A  Additionally, pt was able to demonstrate functional mobility w/ min A  Pt is limited 2* pain, endurance, activity tolerance, functional mobility, functional standing tolerance, decreased I w/ ADLS/IADLS and strength  The following Occupational Performance Areas to address include: eating, grooming, bathing/shower, toilet hygiene, dressing, health maintenance and functional mobility  Pt will continue to benefit from skilled OT services 3-5x a wk to address goals  OT recommendations for d/c include post acute rehab  Pt was left w/ all necessary items within reach at the end of OT session         OT Discharge Recommendation: Post acute rehabilitation services  OT - OK to Discharge: Yes (when medically appropriate)

## 2022-02-01 ENCOUNTER — TRANSITIONAL CARE MANAGEMENT (OUTPATIENT)
Dept: FAMILY MEDICINE CLINIC | Facility: CLINIC | Age: 67
End: 2022-02-01

## 2022-02-01 NOTE — UTILIZATION REVIEW
Notification of Discharge   This is a Notification of Discharge from our facility [address]  Please be advised that this patient has been discharge from our facility  Below you will find the admission and discharge date and time including the patients disposition  UTILIZATION REVIEW CONTACT:  Charles Cordero  Utilization   Network Utilization Review Department  Phone: [phone] x carefully listen to the prompts  All voicemails are confidential   Email: [email or web address]     PHYSICIAN ADVISORY SERVICES:  FOR [id number] REVIEW - MEDICAL NECESSITY DENIAL  Phone: [phone]  Fax: [phone]  Email: [email or web address]     PRESENTATION DATE: 12/30/2021  9:40 AM  OBERVATION ADMISSION DATE:   INPATIENT ADMISSION DATE: 12/30/21 12:05 PM   DISCHARGE DATE: 1/31/2022  4:53 PM  DISPOSITION: Non [facility] SNF/TCU/SNU Non [facility] SNF/TCU/SNU      IMPORTANT INFORMATION:  Send all requests for admission clinical reviews, approved or denied determinations and any other requests to dedicated fax number below belonging to the campus where the patient is receiving treatment   List of dedicated fax numbers:  [address] DENIALS (Administrative/Medical Necessity) [phone]   [address]  (Maternity/NICU/Pediatrics) [phone]   Lara Handing [phone]   [facility] [phone]   Ayala Cantor [phone]   [address] [phone]   [facility]  [phone]   [address] [facility]  [address] [facility] [address] [facility] [phone]

## 2022-02-22 ENCOUNTER — TELEPHONE (OUTPATIENT)
Dept: UROLOGY | Facility: AMBULATORY SURGERY CENTER | Age: 67
End: 2022-02-22

## 2022-02-22 NOTE — TELEPHONE ENCOUNTER
Attempted to call Libra Wong but phone just keeps ringing and unable to leave a voicemail or able to apply extension  Will attempt at a later time

## 2022-02-22 NOTE — TELEPHONE ENCOUNTER
Reason for appointment/Complaint/Diagnosis : Marlena from [facility] requesting appointment for failed voiding trial    Hospital follow up urinary retention  Patient is going to be discharged on 2/25/22      Insurance: [employer]     History of Cancer?unknown                     If yes, what kind? N/a  Previous urologist? no                Records requested/where? Epic   Outside testing/where?no  Location Preference for office visit?  [place]

## 2022-02-23 NOTE — TELEPHONE ENCOUNTER
Uriah Fish at [facility] called stating she would like to set up appointment for patient before he goes home on 02/25/22  She  stated patient failed voiding trial and he is taking tamsulosin   Please call her at this number to schedule patient  She will be available all day today on that number

## 2022-02-23 NOTE — TELEPHONE ENCOUNTER
Spoke to [place] from [facility] and appointment has been scheduled at the [place] office  Location confirmed

## 2022-02-23 NOTE — TELEPHONE ENCOUNTER
Spoke to  that transferred me to [place] but the phone just keeps ringing and unable to leave a voicemail  Will attempt at a later time again to schedule  Patient will need a new patient appointment  Unsure if void trial was performed at the hospital or at the nursing home

## 2022-02-28 DIAGNOSIS — F40.01 AGORAPHOBIA WITH PANIC ATTACKS: ICD-10-CM

## 2022-03-01 ENCOUNTER — TRANSITIONAL CARE MANAGEMENT (OUTPATIENT)
Dept: FAMILY MEDICINE CLINIC | Facility: CLINIC | Age: 67
End: 2022-03-01

## 2022-03-01 ENCOUNTER — OFFICE VISIT (OUTPATIENT)
Dept: FAMILY MEDICINE CLINIC | Facility: CLINIC | Age: 67
End: 2022-03-01
Payer: COMMERCIAL

## 2022-03-01 VITALS
HEART RATE: 92 BPM | TEMPERATURE: 98 F | DIASTOLIC BLOOD PRESSURE: 62 MMHG | SYSTOLIC BLOOD PRESSURE: 122 MMHG | OXYGEN SATURATION: 99 % | RESPIRATION RATE: 16 BRPM

## 2022-03-01 DIAGNOSIS — R33.9 URINARY RETENTION: ICD-10-CM

## 2022-03-01 DIAGNOSIS — Z87.74 S/P VSD REPAIR: ICD-10-CM

## 2022-03-01 DIAGNOSIS — I50.21 ACUTE SYSTOLIC CONGESTIVE HEART FAILURE (HCC): ICD-10-CM

## 2022-03-01 DIAGNOSIS — Z09 HOSPITAL DISCHARGE FOLLOW-UP: Primary | ICD-10-CM

## 2022-03-01 PROBLEM — Z87.891 FORMER SMOKER: Status: ACTIVE | Noted: 2018-07-06

## 2022-03-01 PROCEDURE — 99495 TRANSJ CARE MGMT MOD F2F 14D: CPT | Performed by: FAMILY MEDICINE

## 2022-03-01 PROCEDURE — 1111F DSCHRG MED/CURRENT MED MERGE: CPT | Performed by: FAMILY MEDICINE

## 2022-03-01 RX ORDER — ALPRAZOLAM 0.5 MG/1
TABLET ORAL
Qty: 30 TABLET | Refills: 0 | Status: SHIPPED | OUTPATIENT
Start: 2022-03-01 | End: 2022-05-11

## 2022-03-01 NOTE — ASSESSMENT & PLAN NOTE
Wt Readings from Last 3 Encounters:   01/29/22 88 9 kg (195 lb 15 8 oz)   10/07/21 93 1 kg (205 lb 3 2 oz)   03/18/21 93 4 kg (206 lb)       Continue stain, beta blocker, bumex  FU cardiology   Give referral

## 2022-03-01 NOTE — PROGRESS NOTES
Chief Complaint   Patient presents with    Transition of Care Management     Discharged 02/26/22 from [address]  Health Maintenance   Topic Date Due    DTaP,Tdap,and Td Vaccines (1 - Tdap) Never done    Colorectal Cancer Screening  Never done    COVID-19 Vaccine (2 - Pfizer 3-dose series) 02/17/2022    BMI: Followup Plan  03/18/2022    Depression Screening  10/07/2022    Annual Physical  10/07/2022    BMI: Adult  01/29/2023    Pneumococcal Vaccine: Pediatrics (0 to 5 Years) and At-Risk Patients (6 to 59 Years) (2 of 2 - PPSV23) 09/06/2024    HIV Screening  Completed    Hepatitis C Screening  Completed    Influenza Vaccine  Completed    HIB Vaccine  Aged Out    Hepatitis B Vaccine  Aged Out    IPV Vaccine  Aged Out    Hepatitis A Vaccine  Aged Out    Meningococcal ACWY Vaccine  Aged Out    HPV Vaccine  Aged Out           Assessment/Plan:     S/P VSD repair  FU thoracic surgeon  Give referral      Acute systolic congestive heart failure (HCC)  Wt Readings from Last 3 Encounters:   01/29/22 88 9 kg (195 lb 15 8 oz)   10/07/21 93 1 kg (205 lb 3 2 oz)   03/18/21 93 4 kg (206 lb)       Continue stain, beta blocker, bumex  FU cardiology  Give referral          Urinary retention  Continue flomax  FU urology  Diagnoses and all orders for this visit:    Hospital discharge follow-up  Comments:  Reviewed hopsital records  S/P VSD repair  -     Ambulatory Referral to Thoracic Surgery; Future    Acute systolic congestive heart failure ([facility] [address] )  -     Ambulatory Referral to Cardiology; Future  -     Ambulatory Referral to Thoracic Surgery; Future    Urinary retention         Subjective:     Patient ID: Jacy Moore is a 61 y o  male  HPI    Pt is here with his brother-in-law  Was in hospital 12/30-1/31/2022 for cardiogenic shock  Pt had acute respiratory failure, liver failure, JUDE  Echo showed VSD  Got VSD repair on 1/4/2022  Had re-exploration mediasternal cavity for post-op bleed  Pt had complicated hospitalization  See hospital report  Discharged to rehab  Discharged home on 2/26/2022  He is on ASA, statin, bumex and metoprolol  Feels ok today  Urinary retention---Pt has carolina cath  FU urology  He is on flomax 0 4mg QD  Hyperlipidemia---He is on atorvastatin 80mg qhs       IFG---Tried to eat healthy       Agoraphobia with panic attacks---He feels anxious if he gets out of house or go to unfamiliar places  Family hx of agoraphobia  He uses xanax 0 5mg 1-2 tabs as needed, does not use it everyday  30 tabs last for 5-6 months  Pt saw psychiatrist/therapist before but not any more  Feels ok now  Denies depression       No more smoking  Quit on 12/30/2021  NO alcohol or drugs       Does not drive  Lives with his sister and brother in law        Review of Systems   Constitutional: Negative for appetite change, chills and fever  HENT: Negative for congestion, ear pain, sinus pain and sore throat  Eyes: Negative for discharge and itching  Respiratory: Negative for apnea, cough, chest tightness, shortness of breath and wheezing  Cardiovascular: Negative for chest pain, palpitations and leg swelling  Gastrointestinal: Negative for abdominal pain, anal bleeding, constipation, diarrhea, nausea and vomiting  Endocrine: Negative for cold intolerance, heat intolerance and polyuria  Genitourinary: Negative for difficulty urinating and dysuria  Musculoskeletal: Negative for arthralgias, back pain and myalgias  Skin: Negative for rash  Neurological: Negative for dizziness and headaches  Psychiatric/Behavioral: Negative for agitation  Objective:     Physical Exam  Constitutional:       Appearance: He is well-developed  HENT:      Head: Normocephalic and atraumatic  Eyes:      General:         Right eye: No discharge  Left eye: No discharge        Conjunctiva/sclera: Conjunctivae normal    Cardiovascular:      Rate and Rhythm: Normal rate and regular rhythm  Heart sounds: Normal heart sounds  No murmur heard  No friction rub  No gallop  Pulmonary:      Effort: Pulmonary effort is normal  No respiratory distress  Breath sounds: Normal breath sounds  No wheezing or rales  Abdominal:      General: Bowel sounds are normal  There is no distension  Palpations: Abdomen is soft  Tenderness: There is no abdominal tenderness  There is no guarding  Musculoskeletal:      Cervical back: Normal range of motion and neck supple  No tenderness  Right lower leg: No edema  Left lower leg: No edema  Comments: On wheelchair   Lymphadenopathy:      Cervical: No cervical adenopathy  Neurological:      Mental Status: He is alert  Vitals:    03/01/22 1423   BP: 122/62   BP Location: Left arm   Patient Position: Sitting   Cuff Size: Large   Pulse: 92   Resp: 16   Temp: 98 °F (36 7 °C)   TempSrc: Tympanic   SpO2: 99%       Transitional Care Management Review:  Briseida Meier is a 61 y o  male here for TCM follow up  During the TCM phone call patient stated:    TCM Call (since 1/29/2022)     Date and time call was made  2/2/2022 10:34 AM    Hospital care reviewed  Records reviewed        Patient was hospitialized at  Other (comment)        Comment  [facility]    Date of Admission  01/31/22    Date of discharge  02/26/22    Diagnosis  VSD  S/P VSD repair    Disposition  Home    Current Symptoms  None      TCM Call (since 1/29/2022)     Post hospital issues  None    Should patient be enrolled in anticoag monitoring? No    Scheduled for follow up?   Yes    Patients specialists  Urologist    Did you obtain your prescribed medications  Yes    Do you need help managing your prescriptions or medications  Yes    Why type of assitance do you need  Assisted by Brother in law    Is transportation to your appointment needed  Yes    Specify why  Provided by brother in law    I have advised the patient to call PCP with any new or worsening symptoms  Elodia Kendell, [address] members    Support System  Family    The type of support provided  Emotional; Financial; Physical    Do you have social support  Yes, as much as I need    Are you recieving any outpatient services  No    Are you recieving home care services  Yes    Types of home care services  Nurse visit    Are you using any community resources  No    Current waiver services  No    Have you fallen in the last 12 months  No    Interperter language line needed  No    Counseling  Patient;  Family    Counseling topics  instructions for management          Sindy Alonso MD

## 2022-03-02 ENCOUNTER — TELEPHONE (OUTPATIENT)
Dept: LAB | Facility: HOSPITAL | Age: 67
End: 2022-03-02

## 2022-03-03 ENCOUNTER — TELEPHONE (OUTPATIENT)
Dept: FAMILY MEDICINE CLINIC | Facility: CLINIC | Age: 67
End: 2022-03-03

## 2022-03-03 NOTE — TELEPHONE ENCOUNTER
Patients brotherNick ([phone]) dropped off an application form for a parking disability placard  Form placed in Dr Sherice Coronel request call to notify when completed  Will  forms in office

## 2022-03-04 ENCOUNTER — TELEPHONE (OUTPATIENT)
Dept: FAMILY MEDICINE CLINIC | Facility: CLINIC | Age: 67
End: 2022-03-04

## 2022-03-04 NOTE — TELEPHONE ENCOUNTER
Form received from [facility] breezy BETANCUR via [employer] on 3/4/22  Request provider sign,date & return to sender  Form placed in provider's Bin

## 2022-03-08 NOTE — PROGRESS NOTES
Heart Failure Outpatient Progress Note - Louetta Aschoff 61 y o  male MRN: [id number]    @ Encounter: [id number]      Assessment/Plan:    Patient Active Problem List    Diagnosis Date Noted    Urinary retention 01/21/2022    Anticoagulated on Coumadin 01/16/2022    Anticoagulation goal of INR 2 to 3 01/16/2022    Thrombocytopenia (HCC) 01/11/2022    Hypernatremia 01/11/2022    Hyperchloremia 01/11/2022    Hypocalcemia 01/11/2022    Central line complication 44/47/0997    S/P VSD repair 01/07/2022    Leukocytosis 01/02/2022    Coagulopathy ([facility] [address] ) 01/01/2022    Tylenol ingestion 31/65/6887    Acute systolic congestive heart failure ([address] ) 12/31/2021    Anemia 12/31/2021    Personal history of ECMO 12/31/2021    Cardiogenic shock ([address] ) 12/30/2021    VSD (ventricular septal defect) 12/30/2021    JUDE (acute kidney injury) ([facility] [address] ) 12/30/2021    Transaminitis 12/30/2021    Former smoker 07/06/2018    Hyperlipidemia, mixed 05/23/2017    Impaired fasting glucose 05/23/2017    Agoraphobia with panic attacks 11/06/2015    Obesity (BMI 30-39 9) 11/06/2015    Psychosis, atypical ([address] ) 11/06/2015     Assessment/Plan:     Late presentation anteroseptal MI complicated by VSD, cardiogenic shock requiring [facility] ECMO 12/31/21  S/P VSD repair with large bovine pericardial patch and ECMO decannulation 1/4/2022, also with vasodilatory component post op; s/p multiple blood product transfusions  Required mediastinal re-exploration with removal of retained PA catheter on 1/7/22  Examines warm, perfused, well compensated  Continue current medications, no changes  Follow up CTS team  Start cardiac rehab once cleared by them  Needs follow up labs  Weight at discharge 195 lbs, today, 195 lbs        1/16/22:  VBG 56% - milrinone 0 125  Jhon CI 2 2     11/15/22:  VBG 50% - on milrinone 0 125mcg/kg/min  Hb 10 5  Jhon CI 1 8     1/14/22: 75%  1/11/22  SVO2 53 5%- on milrinone     1/10/22  SVO2 4 am: 56 2- on milrinone  SVO2 1 PM: 43 2%- off milrinone  CO/CI by Jhon: 3 8/ 1 7 L/min     RA 11-13  PA 23/18/20  ALDEN 0 4  CVP:PCWP 0 7     1/5/22:  Drips:  Milrinone 0 5  Levo 3  Epi 5  Vaso 0 04  Hemodynamic monitoring  Sv02 58%  Jhon CI 2 08  Jhon CO 4 3  MAP 69  RA 11  PA 28/20/24  ALDEN 0 7  CVP:PCWP 0 55  SVR 1079       Echo 1/13/22  LVEF 50%  Normal RV size and systolic function     Echo 1/10/22 - on milrnone  LVEF 40%  Normal RV size and systolic function     Echocardiogram 12/30/21  LVEF: 40-45%  LVIDd: 4 5cm  RV: normal size, moderately reduced systolic function  MR: none seen  PASP: 65mmHg + RAP  RVOT: shortened PAAT  Other: muscular VSD with large left to right shunt; dyskinetic septum     EKG 1/3/2022: sinus rhythm, IRBBB, anteroseptal q waves  EKG 12/30/21: sinus tachycardia, RBBB, anteroseptal ST segment elevation     Neurohormonal Blockade: -   --Beta-Blocker: metoprolol succinate  12 5 mg BID  --ACEi, ARB or ARNi:    (or SVR reduction)   --Aldosterone Receptor Blocker:  --Diuretic: bumex 1mg daily     Sudden Cardiac Death Risk Reduction:  --ICD: LVEF > 35%     Electrical Resynchronization:  -- QRSd 98msec        Advanced Therapies (If appropriate): --We will continue to monitor  Current smoker  Small LV and VSD precluding LVAD     Coronary artery disease, undefined anatomy  Rx: asa 325 mg  daily, atorvastatin 80 mg, Metoprolol 12 5 mg BID     Acute kidney injury on CKD  Baseline 1 05-1 16, peaked at 4 during recent admission  Stable at 1 09 as of 1/27  Needs repeat labs  Acute hypoxic respiratory failure, resolved  Transaminitis, resolving  Anemia  UTI  Urinary retention  Still has carolina  Follows with urology  HPI:  61year old male with PMH as described above, presents for post hospital follow up  Had prolonged hospitalization from 12/20/21 through 1/31/22 with late presentation anteroseptal MI and cardiogenic shock requiring VSD repair   Repeat sternotomy for mediastinal re-exploration with removal of retained PA catheter on 22  Sent to rehab upon discharge  3/9/22  Presents for hospital follow up with his son in law  Just released from rehab facility about a week ago  Has PT and VNA coming to the home  Doing well with no complaints  His weight is stable  He is increasing his activity level at home  Good appetite and sleep habits  Still needs to follow up with CT surgery  No past medical history on file  12 point ROS negative other than that stated in HPI    Allergies   Allergen Reactions    Mushroom Extract Complex - Food Allergy Facial Swelling    Peanut Oil - Food Allergy     Strawberry C [Ascorbate - Food Allergy]            Current Outpatient Medications:     ALPRAZolam (XANAX) 0 5 mg tablet, Take 1-2 tablets 30-60 minutes prior to leaving home prn, Disp: 30 tablet, Rfl: 0    aspirin 325 mg tablet, Take 1 tablet (325 mg total) by mouth daily, Disp: 30 tablet, Rfl: 2    atorvastatin (LIPITOR) 80 mg tablet, Take 1 tablet (80 mg total) by mouth daily after dinner, Disp: 30 tablet, Rfl: 2    bumetanide (BUMEX) 1 mg tablet, Take 1 tablet (1 mg total) by mouth daily, Disp: 30 tablet, Rfl: 2    metoprolol succinate (TOPROL-XL) 25 mg 24 hr tablet, Take 0 5 tablets (12 5 mg total) by mouth 2 (two) times a day, Disp: 30 tablet, Rfl: 2    omeprazole (PriLOSEC) 20 mg delayed release capsule, Take 1 capsule (20 mg total) by mouth daily, Disp: 30 capsule, Rfl: 0    tamsulosin (FLOMAX) 0 4 mg, Take 1 capsule (0 4 mg total) by mouth daily with dinner, Disp: 30 capsule, Rfl: 2    Social History     Socioeconomic History    Marital status: Single     Spouse name: Not on file    Number of children: Not on file    Years of education: Not on file    Highest education level: Not on file   Occupational History    Not on file   Tobacco Use    Smoking status: Former Smoker     Types: Pipe     Quit date: 2021     Years since quittin 1    Smokeless tobacco: Never Used    Tobacco comment: Secondhand smoke exposure   Substance and Sexual Activity    Alcohol use: No    Drug use: No    Sexual activity: Not on file   Other Topics Concern    Not on file   Social History Narrative    Always uses seatbelt    No caffeine use     Social Determinants of Health     Financial Resource Strain: Not on file   Food Insecurity: Not on file   Transportation Needs: Not on file   Physical Activity: Not on file   Stress: Not on file   Social Connections: Not on file   Intimate Partner Violence: Not on file   Housing Stability: Not on file       Family History   Problem Relation Age of Onset    Other Mother         Claustrophobia    Drug abuse Family     Alcohol abuse Family        Physical Exam:    Vitals:/68 (BP Location: Right arm, Patient Position: Sitting, Cuff Size: Adult)   Pulse 90   Resp 16   Ht 5' 5 5" (1 664 m)   Wt 88 5 kg (195 lb)   SpO2 96%   BMI 31 96 kg/m²     Wt Readings from Last 3 Encounters:   01/29/22 88 9 kg (195 lb 15 8 oz)   10/07/21 93 1 kg (205 lb 3 2 oz)   03/18/21 93 4 kg (206 lb)         GEN: Sarah Casas appears well, alert and oriented x 3, pleasant and cooperative   HEENT: pupils equal, round, and reactive to light; extraocular muscles intact  NECK: supple, no carotid bruits   HEART: regular rhythm, normal S1 and S2, no murmurs, clicks, gallops or rubs, JVP is flat   LUNGS: clear to auscultation bilaterally; no wheezes, rales, or rhonchi   ABDOMEN: normal bowel sounds, soft, no tenderness, no distention  EXTREMITIES: peripheral pulses normal; no clubbing, cyanosis, or edema  NEURO: no focal findings   SKIN: mid sternal incision well healed       Labs & Results:    Chemistry        Component Value Date/Time    K 4 3 01/27/2022 0626     01/27/2022 0626    CO2 28 01/27/2022 0626    CO2 31 01/07/2022 1639    BUN 31 (H) 01/27/2022 0626    CREATININE 1 09 01/27/2022 0626        Component Value Date/Time    CALCIUM 8 6 01/27/2022 0626    ALKPHOS 101 01/23/2022 2019    AST 21 01/23/2022 2019    ALT 61 01/23/2022 2019        Lab Results   Component Value Date    WBC 5 03 01/30/2022    HGB 9 2 (L) 01/30/2022    HCT 30 0 (L) 01/30/2022     (H) 01/30/2022    PLT 83 (L) 01/30/2022     Lab Results   Component Value Date    NTBNP 43,122 (H) 12/30/2021        EKG personally reviewed by BROOKE Smith  No results found for this visit on 03/09/22  Counseling / Coordination of Care  Total floor / unit time spent today 40 minutes  Greater than 50% of total time was spent with the patient and / or family counseling and / or coordination of care  A description of the counseling / coordination of care: 20  Thank you for the opportunity to participate in the care of this patient      Samuel Franc Bartholome Cockayne

## 2022-03-09 ENCOUNTER — TELEPHONE (OUTPATIENT)
Dept: CARDIAC SURGERY | Facility: CLINIC | Age: 67
End: 2022-03-09

## 2022-03-09 ENCOUNTER — OFFICE VISIT (OUTPATIENT)
Dept: CARDIOLOGY CLINIC | Facility: CLINIC | Age: 67
End: 2022-03-09
Payer: COMMERCIAL

## 2022-03-09 VITALS
SYSTOLIC BLOOD PRESSURE: 118 MMHG | OXYGEN SATURATION: 96 % | RESPIRATION RATE: 16 BRPM | HEART RATE: 90 BPM | WEIGHT: 195 LBS | BODY MASS INDEX: 31.34 KG/M2 | DIASTOLIC BLOOD PRESSURE: 68 MMHG | HEIGHT: 66 IN

## 2022-03-09 DIAGNOSIS — I25.10 CORONARY ARTERY DISEASE WITHOUT ANGINA PECTORIS, UNSPECIFIED VESSEL OR LESION TYPE, UNSPECIFIED WHETHER NATIVE OR TRANSPLANTED HEART: Primary | ICD-10-CM

## 2022-03-09 DIAGNOSIS — I50.21 ACUTE SYSTOLIC CONGESTIVE HEART FAILURE (HCC): ICD-10-CM

## 2022-03-09 DIAGNOSIS — Z87.74 S/P VSD REPAIR: ICD-10-CM

## 2022-03-09 PROCEDURE — 99215 OFFICE O/P EST HI 40 MIN: CPT | Performed by: NURSE PRACTITIONER

## 2022-03-09 RX ORDER — BUMETANIDE 1 MG/1
1 TABLET ORAL DAILY
Qty: 30 TABLET | Refills: 2 | Status: SHIPPED | OUTPATIENT
Start: 2022-03-09 | End: 2022-03-23 | Stop reason: SDUPTHER

## 2022-03-09 NOTE — PATIENT INSTRUCTIONS
Weight yourself daily  If you gain 3 lbs in one day or 5 lbs in one week, please call the office at [phone] and ask for a nurse or the heart failure nurse  Keep your sodium intake to <2 grams, (2000 mg) per day, and fluids <2 Liters (2000 ml) per day  This is around 6-7, 8 oz glasses of fluid per day    Get lab work to check kidney function and blood counts CBC  Continue current medications  Follow up with cardiothoracic surgery  Start cardiac rehab when cleared by them  For weight gain or fluid retention you may take an extra dose of Bumex

## 2022-03-11 ENCOUNTER — OFFICE VISIT (OUTPATIENT)
Dept: CARDIAC SURGERY | Facility: CLINIC | Age: 67
End: 2022-03-11

## 2022-03-11 ENCOUNTER — HOSPITAL ENCOUNTER (OUTPATIENT)
Dept: RADIOLOGY | Facility: HOSPITAL | Age: 67
Discharge: HOME | End: 2022-03-11
Payer: COMMERCIAL

## 2022-03-11 VITALS
RESPIRATION RATE: 18 BRPM | OXYGEN SATURATION: 97 % | BODY MASS INDEX: 32.09 KG/M2 | DIASTOLIC BLOOD PRESSURE: 60 MMHG | HEIGHT: 66 IN | SYSTOLIC BLOOD PRESSURE: 82 MMHG | WEIGHT: 199.7 LBS | HEART RATE: 87 BPM | TEMPERATURE: 96.5 F

## 2022-03-11 DIAGNOSIS — Z48.89 ENCOUNTER FOR POSTOPERATIVE CARE: ICD-10-CM

## 2022-03-11 DIAGNOSIS — I25.10 CORONARY ARTERY DISEASE WITHOUT ANGINA PECTORIS, UNSPECIFIED VESSEL OR LESION TYPE, UNSPECIFIED WHETHER NATIVE OR TRANSPLANTED HEART: ICD-10-CM

## 2022-03-11 DIAGNOSIS — Z87.74 S/P VSD REPAIR: Primary | ICD-10-CM

## 2022-03-11 DIAGNOSIS — I50.21 ACUTE SYSTOLIC CONGESTIVE HEART FAILURE (HCC): ICD-10-CM

## 2022-03-11 DIAGNOSIS — Z87.74 S/P VSD REPAIR: ICD-10-CM

## 2022-03-11 DIAGNOSIS — Z92.81 PERSONAL HISTORY OF ECMO: ICD-10-CM

## 2022-03-11 PROCEDURE — 71046 X-RAY EXAM CHEST 2 VIEWS: CPT

## 2022-03-11 PROCEDURE — 99024 POSTOP FOLLOW-UP VISIT: CPT | Performed by: THORACIC SURGERY (CARDIOTHORACIC VASCULAR SURGERY)

## 2022-03-11 NOTE — PROGRESS NOTES
POST OP FOLLOW UP VISIT    Procedure:   12/31/21  Initiation of venoarterial Extracorporeal Life Support (ECMO)    1/4/22  1  Repair of post-infarction ventricular septal defect  2  Venoarterial ECMO decannulation  1/7/22  Mediastinal reexploration, removal of retained pulmonary artery catheter with cardiopulmonary bypass  History: More Loving is a 61y o  year old male who presents to our office today for routine follow up care from [place] ECMO initiation followed by VSD repair/ ECMO decannulation and subsequent mediastinal re-exploration to remove retained PA catheter  Patient initially presented to the hospital on 12/30/21 in cariogenic shock due to late presentation anteroseptal MI  VA ECMO initiated on 12/31/21  He returned to the OR on 1/4/22 for VSD repair and ECMO decannulation  He returned mediastinal re-exploration on 1/7/22 for surgical removal of PA catheter  His hospital course was prolonged due to hs critical illness but responded well to intense medically therapy  He was stable for discharged from the hospital on 1/31/22 to acute rehab  Patient has since been discharged form rehab and has been back home for 2 weeks  He has home [facility] and PT  Patient accompanied by his brother-in-law today who provides most of the health history  Patient is doing well at home  He is ambulating with assistance of a walker  He denies chest pain, SOB, SIMPSON, lightheadedness, palpitations, weight gain, edema, or fever  He is eating well, no GI issues  He still as a carolina catheter in place (Uro f/u in April)  B-I-L concerned about BP being "too low" however, patient asymptomatic         Vital Signs:   Vitals:    03/11/22 0920 03/11/22 0924   BP: (!) 86/60 (!) 82/60   BP Location: Right arm Left arm   Patient Position: Sitting Sitting   Cuff Size: Standard    Pulse: 87    Resp: 18    Temp: (!) 96 5 °F (35 8 °C)    TempSrc: Temporal    SpO2: 97%    Weight: 90 6 kg (199 lb 11 2 oz)    Height: 5' 5 5" (1 664 m) Home Medications:   Prior to Admission medications    Medication Sig Start Date End Date Taking? Authorizing Provider   ALPRAZolam Shemar Pandya) 0 5 mg tablet Take 1-2 tablets 30-60 minutes prior to leaving home prn 3/1/22  Yes Dylon Emanuel MD   aspirin 325 mg tablet Take 1 tablet (325 mg total) by mouth daily 2/1/22  Yes Kyelr Arredondo PA-C   atorvastatin (LIPITOR) 80 mg tablet Take 1 tablet (80 mg total) by mouth daily after dinner 1/31/22  Yes Kyler Arredondo PA-C   bumetanide (BUMEX) 1 mg tablet Take 1 tablet (1 mg total) by mouth daily May take an extra dose as needed for fluid retention or a 3lbs weight gain in 24 hours  3/9/22  Yes BROOKE Contreras   metoprolol succinate (TOPROL-XL) 25 mg 24 hr tablet Take 0 5 tablets (12 5 mg total) by mouth 2 (two) times a day  Patient taking differently: Take 12 5 mg by mouth 2 (two) times a day Takes half a tab in the am and half at night  Only once a day if bp is low   1/31/22  Yes Kyler Arredondo PA-C   omeprazole (PriLOSEC) 20 mg delayed release capsule Take 1 capsule (20 mg total) by mouth daily 1/31/22 3/11/22 Yes Kyler Arredondo PA-C   tamsulosin [facility]) 0 4 mg Take 1 capsule (0 4 mg total) by mouth daily with dinner 1/31/22  Yes Kyler Arredondo PA-C       Physical Exam:  General: Alert, oriented, seated in wheelchair, no acute distress  HEENT/NECK:  PERRLA  No jugular venous distention  Cardiac:Regular rate and rhythm, no murmurs rubs or gallops  Pulmonary:Breath sounds clear bilaterally  Abdomen:  Non-tender, Non-distended  Positive bowel sounds  Upper extremities: 2+ radial pulses; brisk capillary refill  Lower extremities: Extremities warm/dry  PT/DP pules 2+ bilaterally  Trace edema B/L  Incisions: Sternum is stable  Incision is clean, dry, and intact  Musculoskeletal: MAEE, seated in wheelchair  Neuro: Alert and oriented X 3  Sensation is grossly intact  No focal deficits  Skin: Warm/Dry, without rashes or lesions      Lab Results:     Lab Results   Component Value Date    HGBA1C 5 9 (H) 12/31/2021       Assessment:    VSD  / cardiogenic shock  S/P ECMO therapy  and VSD repair    Vivia Fothergill is 10 weeks post-op, making good progress in their recovery  Incisions are well-healed and the sternum is stable  Weight and VS are stable  Plan:   Medications reviewed with patient, associated questions answered and no changes made  Referred back to Cardiology for BP management  Benefits of participating in cardiac rehab have been discussed and they are cleared to begin the outpatient  program      They have a lifting restriction of no more than 25 lbs until  3/29/22 which is 12 weeks from their surgical date  Echo and CXR- will call will results  No further follow up in our office is needed; call with questions or concerns  They should maintain routine follow-up with Cardiology HF clinic, Urology  and Primary Care for ongoing medical care  Patient verbalizes understanding of recommendations and all questions were answered to their satisfaction        BROOKE Carlos  3/11/22  930AM

## 2022-03-23 ENCOUNTER — TELEPHONE (OUTPATIENT)
Dept: FAMILY MEDICINE CLINIC | Facility: CLINIC | Age: 67
End: 2022-03-23

## 2022-03-23 DIAGNOSIS — Z87.74 S/P VSD REPAIR: ICD-10-CM

## 2022-03-23 RX ORDER — BUMETANIDE 1 MG/1
1 TABLET ORAL DAILY
Qty: 30 TABLET | Refills: 2 | Status: CANCELLED | OUTPATIENT
Start: 2022-03-23

## 2022-03-23 NOTE — TELEPHONE ENCOUNTER
Patient's brother in law, Nick ([phone]) called to ask if patient should still be taking bumetanide (BUMEX) 1mg tab  Requested refill today as med was due  Asked to confirm if patient needs to continue

## 2022-03-24 RX ORDER — ATORVASTATIN CALCIUM 80 MG/1
80 TABLET, FILM COATED ORAL
Qty: 90 TABLET | Refills: 3 | Status: SHIPPED | OUTPATIENT
Start: 2022-03-24 | End: 2022-04-20 | Stop reason: SDUPTHER

## 2022-03-24 RX ORDER — OMEPRAZOLE 20 MG/1
20 CAPSULE, DELAYED RELEASE ORAL DAILY
Qty: 30 CAPSULE | Refills: 5 | Status: SHIPPED | OUTPATIENT
Start: 2022-03-24 | End: 2022-06-27 | Stop reason: SDUPTHER

## 2022-03-24 RX ORDER — METOPROLOL SUCCINATE 25 MG/1
12.5 TABLET, EXTENDED RELEASE ORAL 2 TIMES DAILY
Qty: 90 TABLET | Refills: 3 | Status: SHIPPED | OUTPATIENT
Start: 2022-03-24 | End: 2022-06-27 | Stop reason: SDUPTHER

## 2022-03-24 RX ORDER — TAMSULOSIN HYDROCHLORIDE 0.4 MG/1
0.4 CAPSULE ORAL
Qty: 90 CAPSULE | Refills: 3 | Status: SHIPPED | OUTPATIENT
Start: 2022-03-24 | End: 2022-06-27 | Stop reason: SDUPTHER

## 2022-03-25 ENCOUNTER — NURSE TRIAGE (OUTPATIENT)
Dept: OTHER | Facility: OTHER | Age: 67
End: 2022-03-25

## 2022-03-25 ENCOUNTER — HOSPITAL ENCOUNTER (EMERGENCY)
Facility: HOSPITAL | Age: 67
Discharge: HOME | End: 2022-03-26
Attending: EMERGENCY MEDICINE | Admitting: EMERGENCY MEDICINE
Payer: COMMERCIAL

## 2022-03-25 DIAGNOSIS — Z46.6 INDWELLING CATHETER REPLACED: ICD-10-CM

## 2022-03-25 DIAGNOSIS — N39.0 URINARY TRACT INFECTION: Primary | ICD-10-CM

## 2022-03-25 LAB
BACTERIA UR QL AUTO: ABNORMAL /HPF
BILIRUB UR QL STRIP: NEGATIVE
CLARITY UR: ABNORMAL
COLOR UR: ABNORMAL
GLUCOSE UR STRIP-MCNC: NEGATIVE MG/DL
HGB UR QL STRIP.AUTO: ABNORMAL
HYALINE CASTS #/AREA URNS LPF: ABNORMAL /LPF
KETONES UR STRIP-MCNC: NEGATIVE MG/DL
LEUKOCYTE ESTERASE UR QL STRIP: ABNORMAL
MUCOUS THREADS UR QL AUTO: ABNORMAL
NITRITE UR QL STRIP: NEGATIVE
NON-SQ EPI CELLS URNS QL MICRO: ABNORMAL /HPF
PH UR STRIP.AUTO: 6.5 [PH] (ref 4.5–8)
PROT UR STRIP-MCNC: ABNORMAL MG/DL
RBC #/AREA URNS AUTO: ABNORMAL /HPF
SP GR UR STRIP.AUTO: 1.02 (ref 1–1.03)
UROBILINOGEN UR QL STRIP.AUTO: 1 E.U./DL
WBC #/AREA URNS AUTO: ABNORMAL /HPF
WBC CLUMPS # UR AUTO: PRESENT /UL

## 2022-03-25 PROCEDURE — 87086 URINE CULTURE/COLONY COUNT: CPT

## 2022-03-25 PROCEDURE — 87077 CULTURE AEROBIC IDENTIFY: CPT

## 2022-03-25 PROCEDURE — 87040 BLOOD CULTURE FOR BACTERIA: CPT | Performed by: EMERGENCY MEDICINE

## 2022-03-25 PROCEDURE — 87186 SC STD MICRODIL/AGAR DIL: CPT

## 2022-03-25 PROCEDURE — 81001 URINALYSIS AUTO W/SCOPE: CPT

## 2022-03-25 PROCEDURE — 99284 EMERGENCY DEPT VISIT MOD MDM: CPT | Performed by: EMERGENCY MEDICINE

## 2022-03-25 PROCEDURE — 99283 EMERGENCY DEPT VISIT LOW MDM: CPT

## 2022-03-25 PROCEDURE — 36415 COLL VENOUS BLD VENIPUNCTURE: CPT | Performed by: EMERGENCY MEDICINE

## 2022-03-26 VITALS
WEIGHT: 199 LBS | RESPIRATION RATE: 20 BRPM | DIASTOLIC BLOOD PRESSURE: 60 MMHG | OXYGEN SATURATION: 97 % | BODY MASS INDEX: 32.61 KG/M2 | HEART RATE: 98 BPM | TEMPERATURE: 100 F | SYSTOLIC BLOOD PRESSURE: 110 MMHG

## 2022-03-26 RX ORDER — SULFAMETHOXAZOLE AND TRIMETHOPRIM 800; 160 MG/1; MG/1
1 TABLET ORAL ONCE
Status: COMPLETED | OUTPATIENT
Start: 2022-03-26 | End: 2022-03-26

## 2022-03-26 RX ORDER — SULFAMETHOXAZOLE AND TRIMETHOPRIM 800; 160 MG/1; MG/1
1 TABLET ORAL 2 TIMES DAILY
Qty: 20 TABLET | Refills: 0 | Status: SHIPPED | OUTPATIENT
Start: 2022-03-26 | End: 2022-04-05

## 2022-03-26 RX ADMIN — SULFAMETHOXAZOLE AND TRIMETHOPRIM 1 TABLET: 800; 160 TABLET ORAL at 00:10

## 2022-03-26 NOTE — ED NOTES
Pt's carolina changed to leg bag and d/c home in wheelchair with family      Brito OMA Miller  03/26/22 1080

## 2022-03-26 NOTE — TELEPHONE ENCOUNTER
----- Message from Itzel Thurman sent at 3/25/2022  8:16 PM EDT -----  "My uncle had open heart surgery in January of this year and he has had the same catheter for 2 months I feel that he might have an infection and he has a temp of 100 and I took it 5 minutes ago"

## 2022-03-26 NOTE — TELEPHONE ENCOUNTER
Patient started with a temp of 100 0 today  Caregiver notes blood around the cath insertion site as well as blood tinged urine in the carolina cath bag  No other symptoms noted  Caregiver very concerned due to patients recent cardiac history  Care advice given

## 2022-03-26 NOTE — ED PROVIDER NOTES
History  Chief Complaint   Patient presents with    Fever - 9 weeks to 76 years     Pt had heart surgery in Dec 2021 at [facility]  Pt was admitted for a month then sent to rehab  Pt has been home for about 3 weeks and has had a urinary catheter in place since his admission  Pt family states catheter has never been changed and today he had a fever  Pt also noted to have blood around the catheter as well  68-year-old male past medical history significant for recent hospitalization in December 2021 due to heart disease  Patient had a single bypass as well as VSD repair during that admission  He had a urinary catheter placed prior to discharge  He was discharged to rehab in January  He has had the same urinary catheter in place since sometime in early January  He has not had replaced at all  He came in today with his caretaker because he was found to have a temperature of a 100° F orally  They were concerned that the patient might develop sepsis again as he has had this before  The patient himself is not complaining of any pain in the abdomen or at the urethral meatus  They did note that there was some blood coming of the urethral meatus at some time  Urine has remained yellow and they do say that there is an odor to it  He denies any chest pain or shortness of breath  Prior to Admission Medications   Prescriptions Last Dose Informant Patient Reported? Taking? ALPRAZolam (XANAX) 0 5 mg tablet  Family Member No No   Sig: Take 1-2 tablets 30-60 minutes prior to leaving home prn   aspirin 325 mg tablet  Family Member No No   Sig: Take 1 tablet (325 mg total) by mouth daily   atorvastatin (LIPITOR) 80 mg tablet   No No   Sig: Take 1 tablet (80 mg total) by mouth daily after dinner   bumetanide (BUMEX) 1 mg tablet   No No   Sig: Take 1 tablet (1 mg total) by mouth daily May take an extra dose as needed for fluid retention or a 3lbs weight gain in 24 hours     metoprolol succinate (TOPROL-XL) 25 mg 24 hr tablet   No No   Sig: Take 0 5 tablets (12 5 mg total) by mouth 2 (two) times a day   omeprazole (PriLOSEC) 20 mg delayed release capsule   No No   Sig: Take 1 capsule (20 mg total) by mouth daily   tamsulosin (FLOMAX) 0 4 mg   No No   Sig: Take 1 capsule (0 4 mg total) by mouth daily with dinner      Facility-Administered Medications: None       History reviewed  No pertinent past medical history  Past Surgical History:   Procedure Laterality Date    EXTRACORPOREAL CIRCULATION N/A 2021    Procedure: SUPPORT EXTRACORPOREAL MEMBRANE OXYGENATION (ECMO); Surgeon: Akiko Nixon MD;  [facility];  Service: Cardiac Surgery    IR OTHER  2021    IR OTHER  2022    LA ECMO/ECLS INITIATION VENOUS N/A 2022    Procedure: VA ECMO DECANNULATION;  Surgeon: Akiko Nixon MD;  [facility];  Service: Cardiac Surgery    LA EXPLOR POSTOP BLEED,INFEC,CLOT-CHST N/A 2022    Procedure: RE-EXPLORATION MEDIASTERNAL CAVITY FOR POST-OP BLEED (BRING BACK) Removal of PA catheter;  Surgeon: Akiko Nixon MD;  [facility];  Service: Cardiac Surgery    LA REVSD N/A 2022    Procedure: REPAIR VENTRICULAR SEPTAL DEFECT (VSD) OPEN WITH BOVINE PERICARDIAL PATCH ;  Surgeon: Akiko Nixon MD;  [facility];  Service: Cardiac Surgery       Family History   Problem Relation Age of Onset    Other Mother         Claustrophobia    Drug abuse Family     Alcohol abuse Family      I have reviewed and agree with the history as documented      E-Cigarette/Vaping    E-Cigarette Use Never User      E-Cigarette/Vaping Substances    Nicotine No     THC No     CBD No     Flavoring No      Social History     Tobacco Use    Smoking status: Former Smoker     Types: Pipe     Quit date: 2021     Years since quittin 2    Smokeless tobacco: Never Used    Tobacco comment: Secondhand smoke exposure   Vaping Use    Vaping Use: Never used   Substance Use Topics    Alcohol use: No    Drug use: No        Review of Systems   Constitutional: Negative for chills and fever  HENT: Negative for hearing loss  Eyes: Negative for visual disturbance  Respiratory: Negative for shortness of breath  Cardiovascular: Negative for chest pain  Gastrointestinal: Negative for abdominal pain, constipation, diarrhea, nausea and vomiting  Genitourinary: Negative for difficulty urinating  Musculoskeletal: Negative for myalgias  Skin: Negative for rash  Neurological: Negative for dizziness  Psychiatric/Behavioral: Negative for agitation  All other systems reviewed and are negative  Physical Exam  ED Triage Vitals [03/25/22 2118]   Temperature Pulse Respirations Blood Pressure SpO2   100 °F (37 8 °C) 98 20 93/68 97 %      Temp Source Heart Rate Source Patient Position - Orthostatic VS BP Location FiO2 (%)   Temporal Monitor Sitting Left arm --      Pain Score       No Pain             Orthostatic Vital Signs  Vitals:    03/25/22 2118 03/26/22 0006   BP: 93/68 110/60   Pulse: 98    Patient Position - Orthostatic VS: Sitting        Physical Exam  Vitals and nursing note reviewed  Constitutional:       General: He is not in acute distress  Appearance: Normal appearance  He is not ill-appearing  HENT:      Head: Normocephalic and atraumatic  Right Ear: External ear normal       Left Ear: External ear normal       Nose: Nose normal       Mouth/Throat:      Mouth: Mucous membranes are moist       Pharynx: Oropharynx is clear  No oropharyngeal exudate  Eyes:      Extraocular Movements: Extraocular movements intact  Conjunctiva/sclera: Conjunctivae normal       Pupils: Pupils are equal, round, and reactive to light  Cardiovascular:      Rate and Rhythm: Normal rate and regular rhythm  Pulses: Normal pulses  Heart sounds: Normal heart sounds  No murmur heard  No gallop      Pulmonary:      Effort: Pulmonary effort is normal  Breath sounds: Normal breath sounds  Chest:      Comments: Surgical incisions clean and dry  Abdominal:      General: Abdomen is flat  Bowel sounds are normal  There is no distension  Palpations: Abdomen is soft  Tenderness: There is no abdominal tenderness  Genitourinary:     Comments: The Chang catheter in place  The meatus with some irritation however no blood  Chang catheter has yellow urine in bag  Musculoskeletal:         General: No swelling  Normal range of motion  Cervical back: Normal range of motion  No rigidity  Skin:     General: Skin is warm and dry  Capillary Refill: Capillary refill takes less than 2 seconds  Neurological:      General: No focal deficit present  Mental Status: He is alert and oriented to person, place, and time  Mental status is at baseline  Psychiatric:         Mood and Affect: Mood normal          Behavior: Behavior normal          ED Medications  Medications   sulfamethoxazole-trimethoprim (BACTRIM DS) 800-160 mg per tablet 1 tablet (1 tablet Oral Given 3/26/22 0010)       Diagnostic Studies  Results Reviewed     Procedure Component Value Units Date/Time    Blood culture [[id number]] Collected: 03/25/22 2341    Lab Status: In process Specimen: Blood from Hand, Left Updated: 03/25/22 2351    Blood culture [[id number]] Collected: 03/25/22 2335    Lab Status: In process Specimen: Blood from Hand, Right Updated: 03/25/22 2351    Urine Microscopic [[id number]]  (Abnormal) Collected: 03/25/22 2243    Lab Status: Final result Specimen: Urine, Indwelling Chang Catheter Updated: 03/25/22 2329     RBC, UA 20-30 /hpf      WBC, UA Innumerable /hpf      Epithelial Cells None Seen /hpf      Bacteria, UA Innumerable /hpf      MUCUS THREADS Occasional     Hyaline Casts, UA 10-25 /lpf      WBC Clumps PRESENT    Urine culture [[id number]] Collected: 03/25/22 2243    Lab Status:  In process Specimen: Urine, Indwelling Chang Catheter Updated: 03/25/22 2329    Urine Macroscopic, POC [[id number]]  (Abnormal) Collected: 03/25/22 2243    Lab Status: Final result Specimen: Urine Updated: 03/25/22 2254     Color, UA Brown     Clarity, UA Cloudy     pH, UA 6 5     Leukocytes, UA Trace     Nitrite, UA Negative     Protein,  (2+) mg/dl      Glucose, UA Negative mg/dl      Ketones, UA Negative mg/dl      Urobilinogen, UA 1 0 E U /dl      Bilirubin, UA Negative     Blood, UA Moderate     Specific [place], UA 1 020    Narrative:      CLINITEK RESULT                 No orders to display         Procedures  Procedures      ED Course                                       MDM  Number of Diagnoses or Management Options  Indwelling catheter replaced  Urinary tract infection  Diagnosis management comments: 74-year-old male presenting to ED today for possible urinary tract infection  His catheter has been in place for roughly 3 months  Will exchange the catheter here in the ED  Will also send a urinary sample from new Chang catheter  Will also do blood cultures  Plan to send patient home with antibiotics pending urine results  I discussed with patient that we are placing him on Bactrim twice a day  I also discussed with the patient and caretaker at the bedside at some we will call him or they can see the results if the blood cultures come back positive that they should return to the ED  I did discuss with them that they should also return to the ED if there is any concern for the patient's health including chills, weakness, altered mental status        Disposition  Final diagnoses:   Indwelling catheter replaced   Urinary tract infection     Time reflects when diagnosis was documented in both MDM as applicable and the Disposition within this note     Time User Action Codes Description Comment    3/26/2022 12:07 AM Samantha Benitez Add [Z46 6] Indwelling catheter replaced     3/26/2022 12:07 AM Kyaw Yap Add [N39 0] Urinary tract infection     3/26/2022 12:08 AM Samantha Benitez Modify [Z46 6] Indwelling catheter replaced     3/26/2022 12:08 AM Svetlana Bennett Modify [N39 0] Urinary tract infection       ED Disposition     ED Disposition Condition Date/Time Comment    Discharge Stable Sat Mar 26, 2022 12:07 AM Dameon Agudelo discharge to home/self care  Follow-up Information     Follow up With Specialties Details Why Contact Info    Yessi Manning MD Family Medicine Schedule an appointment as soon as possible for a visit in 2 days for follow up [address] [facility]  [phone]            Discharge Medication List as of 3/26/2022 12:09 AM      START taking these medications    Details   sulfamethoxazole-trimethoprim (BACTRIM DS) 800-160 mg per tablet Take 1 tablet by mouth 2 (two) times a day for 10 days smx-tmp DS (BACTRIM) 800-160 mg tabs (1tab q12 D10), Starting Sat 3/26/2022, Until Tue 4/5/2022, Normal         CONTINUE these medications which have NOT CHANGED    Details   ALPRAZolam (XANAX) 0 5 mg tablet Take 1-2 tablets 30-60 minutes prior to leaving home prn, Normal      aspirin 325 mg tablet Take 1 tablet (325 mg total) by mouth daily, Starting Tue 2/1/2022, Print      atorvastatin (LIPITOR) 80 mg tablet Take 1 tablet (80 mg total) by mouth daily after dinner, Starting Thu 3/24/2022, Normal      bumetanide (BUMEX) 1 mg tablet Take 1 tablet (1 mg total) by mouth daily May take an extra dose as needed for fluid retention or a 3lbs weight gain in 24 hours  , Starting Wed 3/23/2022, Normal      metoprolol succinate (TOPROL-XL) 25 mg 24 hr tablet Take 0 5 tablets (12 5 mg total) by mouth 2 (two) times a day, Starting Thu 3/24/2022, Normal      omeprazole (PriLOSEC) 20 mg delayed release capsule Take 1 capsule (20 mg total) by mouth daily, Starting Thu 3/24/2022, Until Sat 4/23/2022, Normal      tamsulosin (FLOMAX) 0 4 mg Take 1 capsule (0 4 mg total) by mouth daily with dinner, Starting Thu 3/24/2022, Normal           No discharge procedures on file      PDMP Review       Value Time User    PDMP Reviewed  Yes 3/1/2022 12:16 PM Roseanna Wood MD           ED Provider  Attending physically available and evaluated Robbie Baez  ERICA managed the patient along with the ED Attending      Electronically Signed by         Candi Kenney MD  03/26/22 3615

## 2022-03-26 NOTE — TELEPHONE ENCOUNTER
Reason for Disposition   [1] Fever > 100 0 F (37 8 C) AND [2] indwelling urinary catheter (e g , [place], [place])    Answer Assessment - Initial Assessment Questions  1  TEMPERATURE: "What is the most recent temperature?"  "How was it measured?"       100 0  2  ONSET: "When did the fever start?"       today  3  SYMPTOMS: "Do you have any other symptoms besides the fever?"  (e g , colds, headache, sore throat, earache, cough, rash, diarrhea, vomiting, abdominal pain)      Blood around his cath, blood tinged urine in [place] bag  4  CAUSE: If there are no symptoms, ask: "What do you think is causing the fever?"       unsure  5  CONTACTS: "Does anyone else in the family have an infection?"      no  6  TREATMENT: "What have you done so far to treat this fever?" (e g , medications)      nothing  7  IMMUNOCOMPROMISE: "Do you have of the following: diabetes, HIV positive, splenectomy, cancer chemotherapy, chronic steroid treatment, transplant patient, etc "        8  PREGNANCY: "Is there any chance you are pregnant?" "When was your last menstrual period?"      n/a  9  TRAVEL: "Have you traveled out of the country in the last month?" (e g , travel history, exposures)      no    Protocols used:  FEVER-ADULT-AH

## 2022-03-26 NOTE — ED ATTENDING ATTESTATION
3/25/2022  I saw and evaluated the patient  I have discussed the patient with the resident physician and agree with the resident's findings, assessment and plan as documented in the resident physician's note, unless otherwise documented below  All available laboratory and imaging studies were reviewed by myself  I was present for key portions of any procedure(s) performed by the resident and I was immediately available to provide assistance  I agree with the current assessment done in the Emergency Department  I have conducted an independent evaluation of this patient  Emergency Department Note- Brandi Coronado 61 y o  male MRN: [id number]    Unit/Bed#: ED 16 Encounter: [id number]    Chief Complaint   Patient presents with    Fever - 9 weeks to 74 years     Pt had heart surgery in Dec 2021 at [facility]  Pt was admitted for a month then sent to rehab  Pt has been home for about 3 weeks and has had a urinary catheter in place since his admission  Pt family states catheter has never been changed and today he had a fever  Pt also noted to have blood around the catheter as well  Brandi Coronado is a 61 y o  male with recent hospitalization in December of 2021 for cardiogenic shock requiring VSD repair and ECMO, presenting with fever  Patient has just returned home from rehab  He has had urinary catheter in place with surgery and subsequently had urinary retention with failed voiding trial   Chang catheter does not appear to have been replaced since January  Today, patient's family noticed that patient is running a fever, 100  They are presenting for further evaluation  Patient denies any pain  He denies abdominal pain or flank pain  There is no pain at the urethral meatus  He did have some blood at the urethral meatus occasionally  Patient denies chest pain  He has no shortness of breath        REVIEW OF SYSTEMS    Constitutional:  Fever today  Visual/Eyes: no changes in vision  HENT: no rhinorrhea, no sore throat  Cardiac: no chest pain  Respiratory: no shortness of breath, no cough  GI: no abdominal pain, nausea, vomiting, or diarrhea  :  Indwelling Chang catheter due field voiding trial in January  Heme/Onc: no easy bruising  Endocrine: no diabetes  Neuro: no focal weakness or numbness, no headaches    Ten systems reviewed and negative unless otherwise noted in HPI and above    PAST MEDICAL HISTORY  History reviewed  No pertinent past medical history  SURGICAL HISTORY  Past Surgical History:   Procedure Laterality Date    EXTRACORPOREAL CIRCULATION N/A 12/31/2021    Procedure: SUPPORT EXTRACORPOREAL MEMBRANE OXYGENATION (ECMO); Surgeon: Alexandra Wang MD;  [facility];  Service: Cardiac Surgery    IR OTHER  12/31/2021    IR OTHER  1/7/2022    HI ECMO/ECLS INITIATION VENOUS N/A 1/4/2022    Procedure: VA ECMO DECANNULATION;  Surgeon: Alexandra Wang MD;  [facility];  Service: Cardiac Surgery    HI EXPLOR POSTOP BLEED,INFEC,CLOT-CHST N/A 1/7/2022    Procedure: RE-EXPLORATION MEDIASTERNAL CAVITY FOR POST-OP BLEED (BRING BACK) Removal of PA catheter;  Surgeon: Alexandra Wang MD;  [facility];  Service: Cardiac Surgery    HI REVSD N/A 1/4/2022    Procedure: REPAIR VENTRICULAR SEPTAL DEFECT (VSD) OPEN WITH BOVINE PERICARDIAL PATCH ;  Surgeon: Alexandra Wang MD;  [facility];  Service: Cardiac Surgery       FAMILY HISTORY  Family History   Problem Relation Age of Onset    Other Mother         Claustrophobia    Drug abuse Family     Alcohol abuse Family         CURRENT MEDICATIONS  No current facility-administered medications on file prior to encounter       Current Outpatient Medications on File Prior to Encounter   Medication Sig    ALPRAZolam (XANAX) 0 5 mg tablet Take 1-2 tablets 30-60 minutes prior to leaving home prn    aspirin 325 mg tablet Take 1 tablet (325 mg total) by mouth daily    atorvastatin (LIPITOR) 80 mg tablet Take 1 tablet (80 mg total) by mouth daily after dinner    bumetanide (BUMEX) 1 mg tablet Take 1 tablet (1 mg total) by mouth daily May take an extra dose as needed for fluid retention or a 3lbs weight gain in 24 hours      metoprolol succinate (TOPROL-XL) 25 mg 24 hr tablet Take 0 5 tablets (12 5 mg total) by mouth 2 (two) times a day    omeprazole (PriLOSEC) 20 mg delayed release capsule Take 1 capsule (20 mg total) by mouth daily    tamsulosin (FLOMAX) 0 4 mg Take 1 capsule (0 4 mg total) by mouth daily with dinner       ALLERGIES  Allergies   Allergen Reactions    Mushroom Extract Complex - Food Allergy Facial Swelling    Peanut Oil - Food Allergy     Shellfish-Derived Products - Food Allergy Other (See Comments)     Runs in the family    Strawberry C [Ascorbate - Food Allergy]        SOCIAL HISTORY  Social History     Socioeconomic History    Marital status: Single     Spouse name: None    Number of children: None    Years of education: None    Highest education level: None   Occupational History    None   Tobacco Use    Smoking status: Former Smoker     Types: Pipe     Quit date: 2021     Years since quittin 2    Smokeless tobacco: Never Used    Tobacco comment: Secondhand smoke exposure   Vaping Use    Vaping Use: Never used   Substance and Sexual Activity    Alcohol use: No    Drug use: No    Sexual activity: None   Other Topics Concern    None   Social History Narrative    Always uses seatbelt    No caffeine use     Social Determinants of Health     Financial Resource Strain: Not on file   Food Insecurity: Not on file   Transportation Needs: Not on file   Physical Activity: Not on file   Stress: Not on file   Social Connections: Not on file   Intimate Partner Violence: Not on file   Housing Stability: Not on file       PHYSICAL EXAM  BP 93/68 (BP Location: Left arm)   Pulse 98   Temp 100 °F (37 8 °C) (Temporal)   Resp 20   Wt 90 3 kg (199 lb)   SpO2 97%   BMI 32 61 kg/m²   Vital signs and nursing notes reviewed    CONSTITUTIONAL: male appearing stated age resting in bed, in no acute distress  HEENT: atraumatic, normocephalic  Sclera anicteric, conjunctiva are not injected  Moist oral mucosa  CARDIOVASCULAR/CHEST: RRR, no M/R/G  2+ radial pulses  Sternotomy is healing appropriately, clean, intact, dry incision  PULMONARY: Breathing comfortably on RA  Breath sounds are equal and clear to auscultation  ABDOMEN: non-distended  BS present, normoactive  Non-tender including in suprapubic region  No rebound or guarding  Chang catheter is in place with yellow urine in the Chang  MSK: moves all extremities, no deformities, no peripheral edema, no calf asymmetry  NEURO: Awake, alert, and oriented x 3  Face symmetric  Moves all extremities spontaneously  No focal neurologic deficits  SKIN: Warm, appears well-perfused  MENTAL STATUS: Normal affect        DIAGNOSTIC STUDIES  Results Reviewed     Procedure Component Value Units Date/Time    Blood culture [[id number]] Collected: 03/25/22 2341    Lab Status: Final result Specimen: Blood from Hand, Left Updated: 03/31/22 0801     Blood Culture No Growth After 5 Days  Blood culture [[id number]] Collected: 03/25/22 2335    Lab Status: Final result Specimen: Blood from Hand, Right Updated: 03/31/22 0801     Blood Culture No Growth After 5 Days      Urine culture [[id number]]  (Abnormal)  (Susceptibility) Collected: 03/25/22 2243    Lab Status: Final result Specimen: Urine, Indwelling Chang Catheter Updated: 03/28/22 0909     Urine Culture >100,000 cfu/ml Klebsiella pneumoniae    Susceptibility     Klebsiella pneumoniae (1)     Antibiotic Interpretation Microscan   Method Status    ZID Performed  Yes  RHIANNON Final    Amoxicillin + Clavulanate Susceptible <=8/4 ug/ml RHIANNON Final    Ampicillin ($$) Resistant >16 00 ug/ml RHIANNON Final    Ampicillin + Sulbactam ($) Susceptible <=4/2 ug/ml RHIANNON Final    Aztreonam ($$$)  Susceptible <=4 ug/ml RHIANNON Final    Cefazolin ($) Susceptible <=2 00 ug/ml RHIANNON Final    Ciprofloxacin ($)  Susceptible <=0 25 ug/ml RHIANNON Final    Gentamicin ($$) Susceptible <=2 ug/ml RHIANNON Final    Levofloxacin ($) Susceptible <=0 50 ug/ml RHIANNON Final    Nitrofurantoin Susceptible <=32 ug/ml RHIANNON Final    Tetracycline Susceptible <=4 ug/ml RHIANNON Final    Tobramycin ($) Susceptible <=2 ug/ml RHIANNON Final    Trimethoprim + Sulfamethoxazole ($$$) Susceptible <=0 5/9 5 ug/ml RHIANNON Final                   Urine Microscopic [[id number]]  (Abnormal) Collected: 03/25/22 2243    Lab Status: Final result Specimen: Urine, Indwelling Chang Catheter Updated: 03/25/22 2329     RBC, UA 20-30 /hpf      WBC, UA Innumerable /hpf      Epithelial Cells None Seen /hpf      Bacteria, UA Innumerable /hpf      MUCUS THREADS Occasional     Hyaline Casts, UA 10-25 /lpf      WBC Clumps PRESENT    Urine Macroscopic, POC [[id number]]  (Abnormal) Collected: 03/25/22 2243    Lab Status: Final result Specimen: Urine Updated: 03/25/22 2254     Color, UA Brown     Clarity, UA Cloudy     pH, UA 6 5     Leukocytes, UA Trace     Nitrite, UA Negative     Protein,  (2+) mg/dl      Glucose, UA Negative mg/dl      Ketones, UA Negative mg/dl      Urobilinogen, UA 1 0 E U /dl      Bilirubin, UA Negative     Blood, UA Moderate     Specific Cyril, UA 1 020    Narrative:      CLINITEK RESULT          No orders to display       PROCEDURES  Procedures            ED COURSE  Medications   sulfamethoxazole-trimethoprim (BACTRIM DS) 800-160 mg per tablet 1 tablet (1 tablet Oral Given 3/26/22 0010)       17-year-old male with complex past medical history presenting with fever  Vital signs reviewed, temp 100° F, initial blood pressure is soft, however, patient is not tachycardic or hypoxic    Differential diagnosis includes urinary tract infection in setting of indwelling Chang catheter, bacteremia, versus another etiology of symptoms such as viral illness, pneumonia, GI source of infection, skin/soft tissue infection, etc   Given the patient has previously failed voiding trial, Chang catheter replaced  UA obtained, blood cultures sent  UA is concerning for urinary tract infection  First dose of Bactrim administered in the emergency department  Patient's blood pressures spontaneously 110/60  Patient is deemed safe to discharge to home with strict return precautions and plan for follow-up  CLINICAL IMPRESSION  Final diagnoses:   Indwelling catheter replaced   Urinary tract infection       Discharge Medication List as of 3/26/2022 12:09 AM      START taking these medications    Details   sulfamethoxazole-trimethoprim (BACTRIM DS) 800-160 mg per tablet Take 1 tablet by mouth 2 (two) times a day for 10 days smx-tmp DS (BACTRIM) 800-160 mg tabs (1tab q12 D10), Starting Sat 3/26/2022, Until Tue 4/5/2022, Normal         CONTINUE these medications which have NOT CHANGED    Details   ALPRAZolam (XANAX) 0 5 mg tablet Take 1-2 tablets 30-60 minutes prior to leaving home prn, Normal      aspirin 325 mg tablet Take 1 tablet (325 mg total) by mouth daily, Starting Tue 2/1/2022, Print      atorvastatin (LIPITOR) 80 mg tablet Take 1 tablet (80 mg total) by mouth daily after dinner, Starting Thu 3/24/2022, Normal      bumetanide (BUMEX) 1 mg tablet Take 1 tablet (1 mg total) by mouth daily May take an extra dose as needed for fluid retention or a 3lbs weight gain in 24 hours  , Starting Wed 3/23/2022, Normal      metoprolol succinate (TOPROL-XL) 25 mg 24 hr tablet Take 0 5 tablets (12 5 mg total) by mouth 2 (two) times a day, Starting Thu 3/24/2022, Normal      omeprazole (PriLOSEC) 20 mg delayed release capsule Take 1 capsule (20 mg total) by mouth daily, Starting Thu 3/24/2022, Until Sat 4/23/2022, Normal      tamsulosin (FLOMAX) 0 4 mg Take 1 capsule (0 4 mg total) by mouth daily with dinner, Starting Thu 3/24/2022, Normal

## 2022-03-26 NOTE — DISCHARGE INSTRUCTIONS
You will continue Bactrim Twice a day for 10 days  You will get a call about the blood cultures  Return to the ED if you develop any chills, weakness, concerning symptoms

## 2022-03-28 ENCOUNTER — APPOINTMENT (OUTPATIENT)
Dept: LAB | Facility: HOSPITAL | Age: 67
End: 2022-03-28
Payer: COMMERCIAL

## 2022-03-28 ENCOUNTER — TELEPHONE (OUTPATIENT)
Dept: FAMILY MEDICINE CLINIC | Facility: CLINIC | Age: 67
End: 2022-03-28

## 2022-03-28 DIAGNOSIS — Z00.00 ANNUAL PHYSICAL EXAM: ICD-10-CM

## 2022-03-28 DIAGNOSIS — Z87.74 S/P VSD REPAIR: ICD-10-CM

## 2022-03-28 DIAGNOSIS — F40.01 AGORAPHOBIA WITH PANIC ATTACKS: ICD-10-CM

## 2022-03-28 DIAGNOSIS — E78.2 HYPERLIPIDEMIA, MIXED: ICD-10-CM

## 2022-03-28 DIAGNOSIS — R73.01 IMPAIRED FASTING GLUCOSE: ICD-10-CM

## 2022-03-28 LAB
ALBUMIN SERPL BCP-MCNC: 2.8 G/DL (ref 3.5–5)
ALP SERPL-CCNC: 116 U/L (ref 46–116)
ALT SERPL W P-5'-P-CCNC: 46 U/L (ref 12–78)
ANION GAP SERPL CALCULATED.3IONS-SCNC: 4 MMOL/L (ref 4–13)
AST SERPL W P-5'-P-CCNC: 19 U/L (ref 5–45)
BACTERIA UR CULT: ABNORMAL
BASOPHILS # BLD AUTO: 0.06 THOUSANDS/ΜL (ref 0–0.1)
BASOPHILS NFR BLD AUTO: 1 % (ref 0–1)
BILIRUB SERPL-MCNC: 0.53 MG/DL (ref 0.2–1)
BUN SERPL-MCNC: 27 MG/DL (ref 5–25)
CALCIUM ALBUM COR SERPL-MCNC: 9.9 MG/DL (ref 8.3–10.1)
CALCIUM SERPL-MCNC: 8.9 MG/DL (ref 8.3–10.1)
CHLORIDE SERPL-SCNC: 108 MMOL/L (ref 100–108)
CHOLEST SERPL-MCNC: 88 MG/DL
CO2 SERPL-SCNC: 25 MMOL/L (ref 21–32)
CREAT SERPL-MCNC: 1.35 MG/DL (ref 0.6–1.3)
EOSINOPHIL # BLD AUTO: 0.26 THOUSAND/ΜL (ref 0–0.61)
EOSINOPHIL NFR BLD AUTO: 4 % (ref 0–6)
ERYTHROCYTE [DISTWIDTH] IN BLOOD BY AUTOMATED COUNT: 17.3 % (ref 11.6–15.1)
EST. AVERAGE GLUCOSE BLD GHB EST-MCNC: 114 MG/DL
GFR SERPL CREATININE-BSD FRML MDRD: 55 ML/MIN/1.73SQ M
GLUCOSE SERPL-MCNC: 96 MG/DL (ref 65–140)
HBA1C MFR BLD: 5.6 %
HCT VFR BLD AUTO: 33.2 % (ref 36.5–49.3)
HDLC SERPL-MCNC: 36 MG/DL
HGB BLD-MCNC: 10.8 G/DL (ref 12–17)
IMM GRANULOCYTES # BLD AUTO: 0.04 THOUSAND/UL (ref 0–0.2)
IMM GRANULOCYTES NFR BLD AUTO: 1 % (ref 0–2)
LDLC SERPL CALC-MCNC: 34 MG/DL (ref 0–100)
LYMPHOCYTES # BLD AUTO: 1.31 THOUSANDS/ΜL (ref 0.6–4.47)
LYMPHOCYTES NFR BLD AUTO: 19 % (ref 14–44)
MCH RBC QN AUTO: 30.3 PG (ref 26.8–34.3)
MCHC RBC AUTO-ENTMCNC: 32.5 G/DL (ref 31.4–37.4)
MCV RBC AUTO: 93 FL (ref 82–98)
MONOCYTES # BLD AUTO: 0.61 THOUSAND/ΜL (ref 0.17–1.22)
MONOCYTES NFR BLD AUTO: 9 % (ref 4–12)
NEUTROPHILS # BLD AUTO: 4.66 THOUSANDS/ΜL (ref 1.85–7.62)
NEUTS SEG NFR BLD AUTO: 66 % (ref 43–75)
NONHDLC SERPL-MCNC: 52 MG/DL
NRBC BLD AUTO-RTO: 0 /100 WBCS
PLATELET # BLD AUTO: 222 THOUSANDS/UL (ref 149–390)
PMV BLD AUTO: 11.2 FL (ref 8.9–12.7)
POTASSIUM SERPL-SCNC: 4.3 MMOL/L (ref 3.5–5.3)
PROT SERPL-MCNC: 7.2 G/DL (ref 6.4–8.2)
RBC # BLD AUTO: 3.57 MILLION/UL (ref 3.88–5.62)
SODIUM SERPL-SCNC: 137 MMOL/L (ref 136–145)
TRIGL SERPL-MCNC: 92 MG/DL
TSH SERPL DL<=0.05 MIU/L-ACNC: 2.36 UIU/ML (ref 0.36–3.74)
WBC # BLD AUTO: 6.94 THOUSAND/UL (ref 4.31–10.16)

## 2022-03-28 PROCEDURE — 80061 LIPID PANEL: CPT

## 2022-03-28 PROCEDURE — 36415 COLL VENOUS BLD VENIPUNCTURE: CPT | Performed by: NURSE PRACTITIONER

## 2022-03-28 PROCEDURE — 84443 ASSAY THYROID STIM HORMONE: CPT

## 2022-03-28 PROCEDURE — 83036 HEMOGLOBIN GLYCOSYLATED A1C: CPT

## 2022-03-28 PROCEDURE — 85025 COMPLETE CBC W/AUTO DIFF WBC: CPT | Performed by: NURSE PRACTITIONER

## 2022-03-28 PROCEDURE — 80053 COMPREHEN METABOLIC PANEL: CPT | Performed by: NURSE PRACTITIONER

## 2022-03-28 NOTE — TELEPHONE ENCOUNTER
Voicemail:    Pt's niece called and stated that they have received the test results from the ER  Pt has a lot of bacteria in his, urine  They are questioning if he is on the right medication, and if maybe another needs to be added  Please have a look and advise  It was mentioned that someone could call Nick Doran since he is on the communication consent   His number is [phone]

## 2022-03-28 NOTE — TELEPHONE ENCOUNTER
I reviewed urine culture  There is bacteria growth and sensitive to bactrim  Please ask pt to finish antibiotics

## 2022-03-28 NOTE — TELEPHONE ENCOUNTER
How is pt doing? Urine culture showed growth and sensitive to bactrim  Please ask pt to finish antibiotics

## 2022-03-31 LAB
BACTERIA BLD CULT: NORMAL
BACTERIA BLD CULT: NORMAL

## 2022-04-05 ENCOUNTER — OFFICE VISIT (OUTPATIENT)
Dept: UROLOGY | Facility: AMBULATORY SURGERY CENTER | Age: 67
End: 2022-04-05
Payer: COMMERCIAL

## 2022-04-05 VITALS
HEIGHT: 66 IN | HEART RATE: 111 BPM | BODY MASS INDEX: 31.98 KG/M2 | WEIGHT: 199 LBS | SYSTOLIC BLOOD PRESSURE: 118 MMHG | DIASTOLIC BLOOD PRESSURE: 80 MMHG

## 2022-04-05 DIAGNOSIS — N40.1 BENIGN PROSTATIC HYPERPLASIA WITH URINARY RETENTION: Primary | ICD-10-CM

## 2022-04-05 DIAGNOSIS — R33.8 BENIGN PROSTATIC HYPERPLASIA WITH URINARY RETENTION: Primary | ICD-10-CM

## 2022-04-05 PROCEDURE — 99244 OFF/OP CNSLTJ NEW/EST MOD 40: CPT | Performed by: NURSE PRACTITIONER

## 2022-04-05 NOTE — PROGRESS NOTES
4/5/2022    Manasa Sinha  1955  [id number]      Assessment  -BPH with urinary retention    Discussion/Plan  Ethlyn Cranker is a 61 y o  male being managed by our office    1  BPH with urinary retention- we discussed that urinary retention likely secondary to underlying BPH, deconditioned state, and decreased mobility  Fortunately, he has gotten progressively stronger since discharge from rehab facility  His indwelling Carolina catheter remains in place draining clear, yellow, urine  Plan to arrange void trial   This will be scheduled within the next 1 week with nursing staff  He will remain on tamsulosin 0 4 mg HS  We will perform prostate cancer screening with PSA and JACOB once he passes void trial       Follow-up with nurses in the next 1 week for void trial   If patient passes void trial, he will return to the office in 3 months with PSA, JACOB, and PVR assessment  If patient unable to void and/or has >350 mL on PVR, he will require reinsertion of carolina catheter  Patient and brother-in-law were advised to call sooner with any issues     -All questions answered, patient and brother-in-law agrees with plan      History of Present Illness  61 y o  male with a history of BPH presents today for follow up  He is accompanied today by his brother-in-law as primary historian  Patient resides with his sister and brother-in-law  Patient initially seen in consultation by our service in January 2022  He is a prior history of cardiogenic shock in December 2021 and underwent VSD repair requiring ECMO  Patient had failed void trial in the hospital and was discharged to rehab facility with indwelling Carolina catheter in place  Per brother-in-law, he states that catheter was not exchanged since initial insertion in December 2021  He had developed 2 urinary tract infections and presented to the emergency department on 03/25/2022 with low-grade fever and brown colored urine  Carolina catheter was exchanged at that time    Patient has had been on tamsulosin 0 4 mg HS  He reports occasional episodes of nocturia prior to hospitalization, but denies any prior urologic history or surgical intervention  No prior PSAs available to review  He denies any strong family history of prostate malignancy  Review of Systems  Review of Systems   Constitutional: Negative  HENT: Negative  Respiratory: Negative  Cardiovascular: Negative  Gastrointestinal: Negative  Genitourinary: Positive for difficulty urinating (carolina catheter)  Negative for decreased urine volume, dysuria, flank pain, frequency, hematuria and urgency  Musculoskeletal: Negative  Skin: Negative  Neurological: Negative  Psychiatric/Behavioral: Negative  Past Medical History  No past medical history on file  Past Social History  Past Surgical History:   Procedure Laterality Date    EXTRACORPOREAL CIRCULATION N/A 12/31/2021    Procedure: SUPPORT EXTRACORPOREAL MEMBRANE OXYGENATION (ECMO);   Surgeon: Nik Castellon MD;  [facility];  Service: Cardiac Surgery    IR OTHER  12/31/2021    IR OTHER  1/7/2022    WI ECMO/ECLS INITIATION VENOUS N/A 1/4/2022    Procedure: VA ECMO DECANNULATION;  Surgeon: Nik Castellon MD;  [facility];  Service: Cardiac Surgery    WI EXPLOR POSTOP BLEED,INFEC,CLOT-CHST N/A 1/7/2022    Procedure: RE-EXPLORATION MEDIASTERNAL CAVITY FOR POST-OP BLEED (BRING BACK) Removal of PA catheter;  Surgeon: Nik Castellon MD;  [facility];  Service: Cardiac Surgery    WI REVSD N/A 1/4/2022    Procedure: REPAIR VENTRICULAR SEPTAL DEFECT (VSD) OPEN WITH BOVINE PERICARDIAL PATCH ;  Surgeon: Nik Castellon MD;  [facility];  Service: Cardiac Surgery       Past Family History  Family History   Problem Relation Age of Onset    Other Mother         Claustrophobia    Drug abuse Family     Alcohol abuse Family        Past Social history  Social History     Socioeconomic History    Marital status: Single     Spouse name: Not on file    Number of children: Not on file    Years of education: Not on file    Highest education level: Not on file   Occupational History    Not on file   Tobacco Use    Smoking status: Former Smoker     Types: Pipe     Quit date: 2021     Years since quittin 2    Smokeless tobacco: Never Used    Tobacco comment: Secondhand smoke exposure   Vaping Use    Vaping Use: Never used   Substance and Sexual Activity    Alcohol use: No    Drug use: No    Sexual activity: Not on file   Other Topics Concern    Not on file   Social History Narrative    Always uses seatbelt    No caffeine use     Social Determinants of Health     Financial Resource Strain: Not on file   Food Insecurity: Not on file   Transportation Needs: Not on file   Physical Activity: Not on file   Stress: Not on file   Social Connections: Not on file   Intimate Partner Violence: Not on file   Housing Stability: Not on file       Current Medications  Current Outpatient Medications   Medication Sig Dispense Refill    ALPRAZolam (XANAX) 0 5 mg tablet Take 1-2 tablets 30-60 minutes prior to leaving home prn 30 tablet 0    aspirin 325 mg tablet Take 1 tablet (325 mg total) by mouth daily 30 tablet 2    atorvastatin (LIPITOR) 80 mg tablet Take 1 tablet (80 mg total) by mouth daily after dinner 90 tablet 3    bumetanide (BUMEX) 1 mg tablet Take 1 tablet (1 mg total) by mouth daily May take an extra dose as needed for fluid retention or a 3lbs weight gain in 24 hours   30 tablet 2    metoprolol succinate (TOPROL-XL) 25 mg 24 hr tablet Take 0 5 tablets (12 5 mg total) by mouth 2 (two) times a day 90 tablet 3    omeprazole (PriLOSEC) 20 mg delayed release capsule Take 1 capsule (20 mg total) by mouth daily 30 capsule 5    sulfamethoxazole-trimethoprim (BACTRIM DS) 800-160 mg per tablet Take 1 tablet by mouth 2 (two) times a day for 10 days smx-tmp DS (BACTRIM) 800-160 mg tabs (1tab q12 D10) 20 tablet 0    tamsulosin (FLOMAX) 0 4 mg Take 1 capsule (0 4 mg total) by mouth daily with dinner 90 capsule 3     No current facility-administered medications for this visit  Allergies  Allergies   Allergen Reactions    Mushroom Extract Complex - Food Allergy Facial Swelling    Peanut Oil - Food Allergy     Shellfish-Derived Products - Food Allergy Other (See Comments)     Runs in the family    Strawberry C [Ascorbate - Food Allergy]        Past Medical History, Social History, Family History, medications and allergies were reviewed  Vitals  Vitals:    04/05/22 1248   BP: 118/80   Pulse: (!) 111   Weight: 90 3 kg (199 lb)   Height: 5' 5 5" (1 664 m)       Physical Exam  Physical Exam  Constitutional:       Appearance: Normal appearance  He is well-developed  HENT:      Head: Normocephalic  Eyes:      Pupils: Pupils are equal, round, and reactive to light  Pulmonary:      Effort: Pulmonary effort is normal    Abdominal:      Palpations: Abdomen is soft  Genitourinary:     Comments: Indwelling Chang catheter in place draining clear, yellow, urine  Musculoskeletal:         General: Normal range of motion  Cervical back: Normal range of motion  Comments: Ambulates with walker   Skin:     General: Skin is warm and dry  Neurological:      General: No focal deficit present  Mental Status: He is alert and oriented to person, place, and time  Psychiatric:         Mood and Affect: Mood normal          Behavior: Behavior normal          Thought Content:  Thought content normal          Judgment: Judgment normal       Comments: Forgetful           Results    I have personally reviewed all pertinent lab results and reviewed with patient  No results found for: PSA  Lab Results   Component Value Date    GLUCOSE 149 (H) 01/07/2022    CALCIUM 8 9 03/28/2022    K 4 3 03/28/2022    CO2 25 03/28/2022     03/28/2022    BUN 27 (H) 03/28/2022    CREATININE 1 35 (H) 03/28/2022 Lab Results   Component Value Date    WBC 6 94 03/28/2022    HGB 10 8 (L) 03/28/2022    HCT 33 2 (L) 03/28/2022    MCV 93 03/28/2022     03/28/2022     No results found for this or any previous visit (from the past 1 hour(s))

## 2022-04-07 ENCOUNTER — HOSPITAL ENCOUNTER (OUTPATIENT)
Dept: NON INVASIVE DIAGNOSTICS | Facility: HOSPITAL | Age: 67
Discharge: HOME | End: 2022-04-07
Payer: COMMERCIAL

## 2022-04-07 VITALS
HEART RATE: 111 BPM | HEIGHT: 65 IN | SYSTOLIC BLOOD PRESSURE: 118 MMHG | DIASTOLIC BLOOD PRESSURE: 80 MMHG | WEIGHT: 199 LBS | BODY MASS INDEX: 33.15 KG/M2

## 2022-04-07 DIAGNOSIS — Z87.74 S/P VSD REPAIR: ICD-10-CM

## 2022-04-07 DIAGNOSIS — Z92.81 PERSONAL HISTORY OF ECMO: ICD-10-CM

## 2022-04-07 DIAGNOSIS — Z48.89 ENCOUNTER FOR POSTOPERATIVE CARE: ICD-10-CM

## 2022-04-07 LAB
AORTIC ROOT: 2.7 CM
APICAL FOUR CHAMBER EJECTION FRACTION: 51 %
E WAVE DECELERATION TIME: 184 MS
FRACTIONAL SHORTENING: 28 % (ref 28–44)
INTERVENTRICULAR SEPTUM IN DIASTOLE (PARASTERNAL SHORT AXIS VIEW): 0.8 CM
INTERVENTRICULAR SEPTUM: 0.8 CM (ref 0.54–1.01)
LAAS-AP2: 22.1 CM2
LAAS-AP4: 27.5 CM2
LEFT ATRIUM SIZE: 3.8 CM
LEFT INTERNAL DIMENSION IN SYSTOLE: 3.8 CM (ref 3.13–4.74)
LEFT VENTRICULAR INTERNAL DIMENSION IN DIASTOLE: 5.3 CM (ref 5.16–7.68)
LEFT VENTRICULAR POSTERIOR WALL IN END DIASTOLE: 1.2 CM (ref 0.53–1)
LEFT VENTRICULAR STROKE VOLUME: 75 ML
LVSV (TEICH): 75 ML
MV E'TISSUE VEL-SEP: 7 CM/S
MV PEAK A VEL: 0.62 M/S
MV PEAK E VEL: 61 CM/S
MV STENOSIS PRESSURE HALF TIME: 53 MS
MV VALVE AREA P 1/2 METHOD: 4.15 CM2
RIGHT ATRIAL 2D VOLUME: 43 ML
RIGHT ATRIUM AREA SYSTOLE A4C: 17 CM2
RIGHT VENTRICLE ID DIMENSION: 2.8 CM
SL CV LEFT ATRIUM LENGTH A2C: 6.2 CM
SL CV LV EF: 50
SL CV PED ECHO LEFT VENTRICLE DIASTOLIC VOLUME (MOD BIPLANE) 2D: 137 ML
SL CV PED ECHO LEFT VENTRICLE SYSTOLIC VOLUME (MOD BIPLANE) 2D: 62 ML
TR MAX PG: 19 MMHG
TR PEAK VELOCITY: 2.2 M/S
TRICUSPID VALVE PEAK REGURGITATION VELOCITY: 2.2 M/S
Z-SCORE OF INTERVENTRICULAR SEPTUM IN END DIASTOLE: 0.2
Z-SCORE OF LEFT VENTRICULAR DIMENSION IN END DIASTOLE: -1.72
Z-SCORE OF LEFT VENTRICULAR DIMENSION IN END SYSTOLE: -0.11
Z-SCORE OF LEFT VENTRICULAR POSTERIOR WALL IN END DIASTOLE: 3.63

## 2022-04-07 PROCEDURE — 93306 TTE W/DOPPLER COMPLETE: CPT

## 2022-04-07 PROCEDURE — 93306 TTE W/DOPPLER COMPLETE: CPT | Performed by: INTERNAL MEDICINE

## 2022-04-07 RX ADMIN — PERFLUTREN 1.2 ML/MIN: 6.52 INJECTION, SUSPENSION INTRAVENOUS at 10:40

## 2022-04-11 ENCOUNTER — TELEPHONE (OUTPATIENT)
Dept: CARDIAC SURGERY | Facility: CLINIC | Age: 67
End: 2022-04-11

## 2022-04-11 ENCOUNTER — PROCEDURE VISIT (OUTPATIENT)
Dept: UROLOGY | Facility: AMBULATORY SURGERY CENTER | Age: 67
End: 2022-04-11
Payer: COMMERCIAL

## 2022-04-11 VITALS
SYSTOLIC BLOOD PRESSURE: 116 MMHG | BODY MASS INDEX: 31.66 KG/M2 | HEIGHT: 66 IN | DIASTOLIC BLOOD PRESSURE: 70 MMHG | WEIGHT: 197 LBS | HEART RATE: 95 BPM

## 2022-04-11 DIAGNOSIS — N40.1 BENIGN PROSTATIC HYPERPLASIA WITH URINARY RETENTION: ICD-10-CM

## 2022-04-11 DIAGNOSIS — Z12.5 SCREENING FOR PROSTATE CANCER: Primary | ICD-10-CM

## 2022-04-11 DIAGNOSIS — R33.8 BENIGN PROSTATIC HYPERPLASIA WITH URINARY RETENTION: ICD-10-CM

## 2022-04-11 LAB — POST-VOID RESIDUAL VOLUME, ML POC: 13 ML

## 2022-04-11 PROCEDURE — 51798 US URINE CAPACITY MEASURE: CPT

## 2022-04-11 PROCEDURE — 99211 OFF/OP EST MAY X REQ PHY/QHP: CPT

## 2022-04-11 NOTE — TELEPHONE ENCOUNTER
Called patient to reports results of echo  Spoke to his bro-in-law/care taker  Reviewed results  Answered questions

## 2022-04-11 NOTE — PROGRESS NOTES
4/11/2022    Matias Akhtar  1955  [id number]    Diagnosis  Chief Complaint     Benign Prostatic Hypertrophy; Urinary Retention; Chang removal          Patient presents for void trial managed by our office    Plan  Follow up in 3 months with PSA, JACOB, and PVR  Continue taking the tamsulosin  Knows to call the office in the meantime with concerns    Procedure Chang removal/voiding trial    Chang catheter removed after deflation of an intact balloon  Patient tolerated well  Encouraged patient to hydrate well and return this afternoon for post void residual   he knows he may return early if uncomfortable and unable to urinate  Patient agrees to this plan  Patient returned this afternoon  Patient states able to void  Patient voided while in office  Bladder ultrasound performed and PVR measured 13ml      Recent Results (from the past 4 hour(s))   POCT Measure PVR    Collection Time: 04/11/22  2:27 PM   Result Value Ref Range    POST-VOID RESIDUAL VOLUME, ML POC 13 mL           Vitals:    04/11/22 0833   BP: 116/70   BP Location: Left arm   Patient Position: Sitting   Cuff Size: Adult   Pulse: 95   Weight: 89 4 kg (197 lb)   Height: 5' 6" (1 676 m)           Liliya Phillips RN

## 2022-04-12 ENCOUNTER — TELEMEDICINE (OUTPATIENT)
Dept: FAMILY MEDICINE CLINIC | Facility: CLINIC | Age: 67
End: 2022-04-12
Payer: COMMERCIAL

## 2022-04-12 VITALS
BODY MASS INDEX: 31.66 KG/M2 | DIASTOLIC BLOOD PRESSURE: 70 MMHG | WEIGHT: 197 LBS | HEART RATE: 95 BPM | TEMPERATURE: 96.8 F | SYSTOLIC BLOOD PRESSURE: 116 MMHG | HEIGHT: 66 IN

## 2022-04-12 DIAGNOSIS — R33.8 BENIGN PROSTATIC HYPERPLASIA WITH URINARY RETENTION: Primary | ICD-10-CM

## 2022-04-12 DIAGNOSIS — N40.1 BENIGN PROSTATIC HYPERPLASIA WITH URINARY RETENTION: Primary | ICD-10-CM

## 2022-04-12 DIAGNOSIS — E78.2 HYPERLIPIDEMIA, MIXED: ICD-10-CM

## 2022-04-12 DIAGNOSIS — F40.01 AGORAPHOBIA WITH PANIC ATTACKS: ICD-10-CM

## 2022-04-12 DIAGNOSIS — N17.9 AKI (ACUTE KIDNEY INJURY) (HCC): ICD-10-CM

## 2022-04-12 DIAGNOSIS — D64.9 ANEMIA, UNSPECIFIED TYPE: ICD-10-CM

## 2022-04-12 DIAGNOSIS — R73.01 IMPAIRED FASTING GLUCOSE: ICD-10-CM

## 2022-04-12 DIAGNOSIS — Z87.74 S/P VSD REPAIR: ICD-10-CM

## 2022-04-12 PROBLEM — E87.8 HYPERCHLOREMIA: Status: RESOLVED | Noted: 2022-01-11 | Resolved: 2022-04-12

## 2022-04-12 PROBLEM — D69.6 THROMBOCYTOPENIA (HCC): Status: RESOLVED | Noted: 2022-01-11 | Resolved: 2022-04-12

## 2022-04-12 PROBLEM — Z79.01 ANTICOAGULATED ON COUMADIN: Status: RESOLVED | Noted: 2022-01-16 | Resolved: 2022-04-12

## 2022-04-12 PROBLEM — E87.0 HYPERNATREMIA: Status: RESOLVED | Noted: 2022-01-11 | Resolved: 2022-04-12

## 2022-04-12 PROBLEM — R74.01 TRANSAMINITIS: Status: RESOLVED | Noted: 2021-12-30 | Resolved: 2022-04-12

## 2022-04-12 PROBLEM — T82.9XXA CENTRAL LINE COMPLICATION: Status: RESOLVED | Noted: 2022-01-07 | Resolved: 2022-04-12

## 2022-04-12 PROBLEM — Z51.81 ANTICOAGULATION GOAL OF INR 2 TO 3: Status: RESOLVED | Noted: 2022-01-16 | Resolved: 2022-04-12

## 2022-04-12 PROBLEM — D72.829 LEUKOCYTOSIS: Status: RESOLVED | Noted: 2022-01-02 | Resolved: 2022-04-12

## 2022-04-12 PROBLEM — Z79.01 ANTICOAGULATION GOAL OF INR 2 TO 3: Status: RESOLVED | Noted: 2022-01-16 | Resolved: 2022-04-12

## 2022-04-12 PROBLEM — T39.1X1A TYLENOL INGESTION: Status: RESOLVED | Noted: 2021-12-31 | Resolved: 2022-04-12

## 2022-04-12 PROBLEM — E83.51 HYPOCALCEMIA: Status: RESOLVED | Noted: 2022-01-11 | Resolved: 2022-04-12

## 2022-04-12 PROCEDURE — 99214 OFFICE O/P EST MOD 30 MIN: CPT | Performed by: FAMILY MEDICINE

## 2022-04-16 ENCOUNTER — IMMUNIZATIONS (OUTPATIENT)
Dept: FAMILY MEDICINE CLINIC | Facility: HOSPITAL | Age: 67
End: 2022-04-16

## 2022-04-16 PROCEDURE — 91305 COVID-19 PFIZER VACC TRIS-SUCROSE GRAY CAP 0.3 ML: CPT

## 2022-04-16 PROCEDURE — 0054A COVID-19 PFIZER VACC TRIS-SUCROSE GRAY CAP 0.3 ML: CPT

## 2022-04-17 NOTE — PROGRESS NOTES
Heart Failure Outpatient Progress Note - Fabiano Noel 61 y o  male MRN: [id number]    @ Encounter: [id number]      Assessment/Plan:    Patient Active Problem List    Diagnosis Date Noted    Benign prostatic hyperplasia with urinary retention 04/12/2022    Encounter for postoperative care 03/11/2022    Urinary retention 01/21/2022    S/P VSD repair 01/07/2022    Coagulopathy ([facility] ) 43/28/1951    Acute systolic congestive heart failure ([address] ) 12/31/2021    Anemia 12/31/2021    Personal history of ECMO 12/31/2021    Cardiogenic shock ([address] ) 12/30/2021    VSD (ventricular septal defect) 12/30/2021    JUDE (acute kidney injury) ([facility] ) 12/30/2021    Former smoker 07/06/2018    Hyperlipidemia, mixed 05/23/2017    Impaired fasting glucose 05/23/2017    Agoraphobia with panic attacks 11/06/2015    Obesity (BMI 30-39 9) 11/06/2015    Psychosis, atypical ([address] ) 11/06/2015       # Chronic HFmrEF, Stage C  S/P Cardiogenic shock due to anteroseptal MI complicated by VSD on VA ECMO 12/31/21 s/p VSD repair with large bovine pericardial patch and ECMO decannulation 1/4/2022, also with vasodilatory component post op; s/p multiple blood product transfusions     Weight: 195 lbs, 208 lbs    Studies personally reviewed by myself:  Echo 4/7/22  LVEF: 50%  LVEDd: 5 3 cm  IVS: surgically repaired VSD, no residual shunt  RV: normal    Echo 1/13/22  LVEF 50%  Normal RV size and systolic function     Echo 1/10/22 - on milrnone  LVEF 40%  Normal RV size and systolic function     Echocardiogram 12/30/21  LVEF: 40-45%  LVIDd: 4 5cm  RV: normal size, moderately reduced systolic function  MR: none seen  PASP: 65mmHg + RAP  RVOT: shortened PAAT  Other: muscular VSD with large left to right shunt; dyskinetic septum     EKG 1/3/2022: sinus rhythm, IRBBB, anteroseptal q waves  EKG 12/30/21: sinus tachycardia, RBBB, anteroseptal ST segment elevation     Neurohormonal Blockade: -   --Beta-Blocker: metoprolol tartrate 12 5 mg BID  --ACEi, ARB or ARNi:    (or SVR reduction)   --Aldosterone Receptor Blocker:  --Diuretic: Bumex 1 mg daily     Sudden Cardiac Death Risk Reduction:  --ICD: LVEF > 35%     Electrical Resynchronization:  -- QRSd 98msec     Advanced Therapies (If appropriate): --We will continue to monitor  Current smoker  Small LV and VSD precluding LVAD     # Coronary artery disease, undefined anatomy  Rx: asa 325 mg  daily, atorvastatin 80 mg    # dyslipidemia- atorvastatin 80 mg   3/28/22: LDL 34, HDL 36  9/13/21: , HDL 38     # CKD, baseline 1 09 on 1/27, Cr 1 35 on 3/28    # S/p Acute hypoxic respiratory failure, resolved  # tobacco use     Plan:  Start Jardiance 10 mg and see if we can stop Bumex  Decrease atorvastatin to 40 mg daily, lipids at goal  Quit tobacco  Continue asa, statin for CAD  Prescribed sl nitro    HPI:     60 yo male presents for follow up after prolonged hospitalization from 12/20/21 through 1/31/22 with later presentation anteroseptal MI presenting in cardiogenic shock requiring VSD repair with large bovine patch and hemodynamic support with VA ECMO  He required repeat sternotomy for mediastinal re-exploration with removal of retained PA catheter on 1/7/22  He was maintained on inotropic support for quite a while post operatively  Of note, he also required transfusion of multiple blood products  Interval History:  He has since seen out service and CT surgery and follow up  Walking with assistance of walker  Was in ED 3/25 with ED, possibly related to [place] catheter in place since January  Saw Urology 4/5 for BPH with urinary retention    Echo 4/7/22  LVEF: 50%  LVEDd: 5 3 cm  IVS: surgically repaired VSD, no residual shunt  RV: normal    Wt up 13 lbs- eating better    Review of Systems   Constitutional: Negative for activity change, appetite change, fatigue and unexpected weight change (wt up 13 lbs)  HENT: Negative for congestion and nosebleeds  Eyes: Negative      Respiratory: Negative for cough, chest tightness and shortness of breath  Cardiovascular: Negative for chest pain, palpitations and leg swelling  Gastrointestinal: Negative for abdominal distention  Endocrine: Negative  Genitourinary: Negative  Musculoskeletal: Negative  Skin: Negative  Neurological: Negative for dizziness, syncope and weakness  Hematological: Negative  Psychiatric/Behavioral: Negative  Past Medical History:   Diagnosis Date    Central line complication 9/7/7217    Tylenol ingestion 12/31/2021         Allergies   Allergen Reactions    Mushroom Extract Complex - Food Allergy Facial Swelling    Peanut Oil - Food Allergy     Shellfish-Derived Products - Food Allergy Other (See Comments)     Runs in the family    Strawberry C [Ascorbate - Food Allergy]        Current Outpatient Medications:     ALPRAZolam (XANAX) 0 5 mg tablet, Take 1-2 tablets 30-60 minutes prior to leaving home prn, Disp: 30 tablet, Rfl: 0    aspirin 325 mg tablet, Take 1 tablet (325 mg total) by mouth daily, Disp: 30 tablet, Rfl: 2    atorvastatin (LIPITOR) 80 mg tablet, Take 1 tablet (80 mg total) by mouth daily after dinner, Disp: 90 tablet, Rfl: 3    bumetanide (BUMEX) 1 mg tablet, Take 1 tablet (1 mg total) by mouth daily May take an extra dose as needed for fluid retention or a 3lbs weight gain in 24 hours  , Disp: 30 tablet, Rfl: 2    metoprolol succinate (TOPROL-XL) 25 mg 24 hr tablet, Take 0 5 tablets (12 5 mg total) by mouth 2 (two) times a day, Disp: 90 tablet, Rfl: 3    omeprazole (PriLOSEC) 20 mg delayed release capsule, Take 1 capsule (20 mg total) by mouth daily, Disp: 30 capsule, Rfl: 5    tamsulosin (FLOMAX) 0 4 mg, Take 1 capsule (0 4 mg total) by mouth daily with dinner, Disp: 90 capsule, Rfl: 3    Social History     Socioeconomic History    Marital status: Single     Spouse name: Not on file    Number of children: Not on file    Years of education: Not on file    Highest education level: Not on file   Occupational History    Not on file   Tobacco Use    Smoking status: Former Smoker     Types: Pipe     Quit date: 2021     Years since quittin 2    Smokeless tobacco: Never Used    Tobacco comment: Secondhand smoke exposure   Vaping Use    Vaping Use: Never used   Substance and Sexual Activity    Alcohol use: No    Drug use: No    Sexual activity: Not on file   Other Topics Concern    Not on file   Social History Narrative    Always uses seatbelt    No caffeine use     Social Determinants of Health     Financial Resource Strain: Not on file   Food Insecurity: Not on file   Transportation Needs: Not on file   Physical Activity: Not on file   Stress: Not on file   Social Connections: Not on file   Intimate Partner Violence: Not on file   Housing Stability: Not on file       Family History   Problem Relation Age of Onset    Other Mother         Claustrophobia    Drug abuse Family     Alcohol abuse Family        Physical Exam:    Vitals:     Physical Exam    Labs & Results:    Lab Results   Component Value Date    SODIUM 137 2022    K 4 3 2022     2022    CO2 25 2022    BUN 27 (H) 2022    CREATININE 1 35 (H) 2022    GLUC 96 2022    CALCIUM 8 9 2022     Lab Results   Component Value Date    WBC 6 94 2022    HGB 10 8 (L) 2022    HCT 33 2 (L) 2022    MCV 93 2022     2022     Lab Results   Component Value Date    NTBNP 43,122 (H) 2021      Lab Results   Component Value Date    CHOLESTEROL 88 2022    CHOLESTEROL 150 2021    CHOLESTEROL 199 2021     Lab Results   Component Value Date    HDL 36 (L) 2022    HDL 13 (L) 2021    HDL 38 (L) 2021     Lab Results   Component Value Date    TRIG 92 2022    TRIG 152 (H) 2021    TRIG 137 2021     Lab Results   Component Value Date    NONHDLC 52 2022    [place] 137 2021    [place] 161 2021 EKG personally reviewed by Manuel Munoz DO  Counseling / Coordination of Care  Time spent today 25 minutes  Greater than 50% of total time was spent with the patient and / or family counseling and / or coordination of care  We went over current diagnosis, most recent studies and any changes in treatment  Thank you for the opportunity to participate in the care of this patient      [facility] PULMONARY [facility]  MEDICAL DIRECTOR OF [facility] Chapis Jiang

## 2022-04-20 ENCOUNTER — OFFICE VISIT (OUTPATIENT)
Dept: CARDIOLOGY CLINIC | Facility: CLINIC | Age: 67
End: 2022-04-20
Payer: COMMERCIAL

## 2022-04-20 VITALS
SYSTOLIC BLOOD PRESSURE: 98 MMHG | DIASTOLIC BLOOD PRESSURE: 52 MMHG | HEIGHT: 66 IN | HEART RATE: 69 BPM | BODY MASS INDEX: 33.52 KG/M2 | OXYGEN SATURATION: 98 % | WEIGHT: 208.6 LBS

## 2022-04-20 DIAGNOSIS — E78.2 HYPERLIPIDEMIA, MIXED: ICD-10-CM

## 2022-04-20 DIAGNOSIS — Q21.0 VSD (VENTRICULAR SEPTAL DEFECT): Primary | ICD-10-CM

## 2022-04-20 DIAGNOSIS — I25.5 ISCHEMIC CARDIOMYOPATHY: ICD-10-CM

## 2022-04-20 DIAGNOSIS — Z87.74 S/P VSD REPAIR: ICD-10-CM

## 2022-04-20 DIAGNOSIS — Z87.891 FORMER SMOKER: ICD-10-CM

## 2022-04-20 PROCEDURE — 99214 OFFICE O/P EST MOD 30 MIN: CPT | Performed by: INTERNAL MEDICINE

## 2022-04-20 RX ORDER — DIPHENOXYLATE HYDROCHLORIDE AND ATROPINE SULFATE 2.5; .025 MG/1; MG/1
1 TABLET ORAL DAILY
COMMUNITY

## 2022-04-20 RX ORDER — ATORVASTATIN CALCIUM 40 MG/1
40 TABLET, FILM COATED ORAL
Qty: 30 TABLET | Refills: 5 | Status: SHIPPED | OUTPATIENT
Start: 2022-04-20 | End: 2022-06-27 | Stop reason: SDUPTHER

## 2022-05-10 DIAGNOSIS — F40.01 AGORAPHOBIA WITH PANIC ATTACKS: ICD-10-CM

## 2022-05-11 RX ORDER — ALPRAZOLAM 0.5 MG/1
TABLET ORAL
Qty: 30 TABLET | Refills: 0 | Status: SHIPPED | OUTPATIENT
Start: 2022-05-11 | End: 2022-06-27 | Stop reason: SDUPTHER

## 2022-06-27 DIAGNOSIS — Z87.74 S/P VSD REPAIR: ICD-10-CM

## 2022-06-27 DIAGNOSIS — F40.01 AGORAPHOBIA WITH PANIC ATTACKS: ICD-10-CM

## 2022-06-27 DIAGNOSIS — I25.5 ISCHEMIC CARDIOMYOPATHY: ICD-10-CM

## 2022-06-27 DIAGNOSIS — E78.2 HYPERLIPIDEMIA, MIXED: ICD-10-CM

## 2022-06-27 RX ORDER — METOPROLOL SUCCINATE 25 MG/1
12.5 TABLET, EXTENDED RELEASE ORAL 2 TIMES DAILY
Qty: 90 TABLET | Refills: 0 | Status: SHIPPED | OUTPATIENT
Start: 2022-06-27

## 2022-06-27 RX ORDER — ALPRAZOLAM 0.5 MG/1
TABLET ORAL
Qty: 30 TABLET | Refills: 0 | Status: SHIPPED | OUTPATIENT
Start: 2022-06-27

## 2022-06-27 RX ORDER — TAMSULOSIN HYDROCHLORIDE 0.4 MG/1
0.4 CAPSULE ORAL
Qty: 90 CAPSULE | Refills: 0 | Status: SHIPPED | OUTPATIENT
Start: 2022-06-27

## 2022-06-27 RX ORDER — BUMETANIDE 1 MG/1
1 TABLET ORAL DAILY
Qty: 30 TABLET | Refills: 0 | Status: SHIPPED | OUTPATIENT
Start: 2022-06-27 | End: 2022-08-01 | Stop reason: SDUPTHER

## 2022-06-27 RX ORDER — OMEPRAZOLE 20 MG/1
20 CAPSULE, DELAYED RELEASE ORAL DAILY
Qty: 30 CAPSULE | Refills: 0 | Status: SHIPPED | OUTPATIENT
Start: 2022-06-27 | End: 2022-07-27

## 2022-06-28 ENCOUNTER — TELEPHONE (OUTPATIENT)
Dept: FAMILY MEDICINE CLINIC | Facility: CLINIC | Age: 67
End: 2022-06-28

## 2022-06-28 RX ORDER — ATORVASTATIN CALCIUM 40 MG/1
40 TABLET, FILM COATED ORAL
Qty: 30 TABLET | Refills: 5 | Status: SHIPPED | OUTPATIENT
Start: 2022-06-28

## 2022-06-28 NOTE — TELEPHONE ENCOUNTER
Patient's sister Baldomero Covert phoned to confirm that patient should be taking aspirin 325 mg daily  Please call her at [phone] to confirm

## 2022-07-11 ENCOUNTER — OFFICE VISIT (OUTPATIENT)
Dept: INTERNAL MEDICINE CLINIC | Facility: CLINIC | Age: 67
End: 2022-07-11
Payer: COMMERCIAL

## 2022-07-11 ENCOUNTER — APPOINTMENT (OUTPATIENT)
Dept: LAB | Facility: HOSPITAL | Age: 67
End: 2022-07-11
Payer: COMMERCIAL

## 2022-07-11 ENCOUNTER — TELEPHONE (OUTPATIENT)
Dept: UROLOGY | Facility: AMBULATORY SURGERY CENTER | Age: 67
End: 2022-07-11

## 2022-07-11 VITALS
OXYGEN SATURATION: 97 % | SYSTOLIC BLOOD PRESSURE: 108 MMHG | DIASTOLIC BLOOD PRESSURE: 70 MMHG | WEIGHT: 211 LBS | TEMPERATURE: 97.9 F | BODY MASS INDEX: 33.91 KG/M2 | HEART RATE: 91 BPM | HEIGHT: 66 IN

## 2022-07-11 DIAGNOSIS — R73.01 IMPAIRED FASTING GLUCOSE: ICD-10-CM

## 2022-07-11 DIAGNOSIS — F40.01 AGORAPHOBIA WITH PANIC ATTACKS: ICD-10-CM

## 2022-07-11 DIAGNOSIS — K21.9 GASTROESOPHAGEAL REFLUX DISEASE WITHOUT ESOPHAGITIS: ICD-10-CM

## 2022-07-11 DIAGNOSIS — Z12.5 SCREENING FOR PROSTATE CANCER: ICD-10-CM

## 2022-07-11 DIAGNOSIS — N40.1 BENIGN PROSTATIC HYPERPLASIA WITH URINARY RETENTION: ICD-10-CM

## 2022-07-11 DIAGNOSIS — R33.8 BENIGN PROSTATIC HYPERPLASIA WITH URINARY RETENTION: ICD-10-CM

## 2022-07-11 DIAGNOSIS — Z12.11 SCREENING FOR COLON CANCER: ICD-10-CM

## 2022-07-11 DIAGNOSIS — I50.22 CHRONIC SYSTOLIC CONGESTIVE HEART FAILURE (HCC): Primary | ICD-10-CM

## 2022-07-11 DIAGNOSIS — E78.2 MIXED HYPERLIPIDEMIA: ICD-10-CM

## 2022-07-11 LAB — PSA SERPL-MCNC: 3.5 NG/ML (ref 0–4)

## 2022-07-11 PROCEDURE — 36415 COLL VENOUS BLD VENIPUNCTURE: CPT

## 2022-07-11 PROCEDURE — G0103 PSA SCREENING: HCPCS

## 2022-07-11 PROCEDURE — 99204 OFFICE O/P NEW MOD 45 MIN: CPT | Performed by: INTERNAL MEDICINE

## 2022-07-11 NOTE — TELEPHONE ENCOUNTER
Spoke with brother Nick and reminded him about pt's appt on Thursday (7/14/22) with Phyllis Zambrano and advised that we are requesting PSA for that visit  Nick stated he will get pt in tomorrow for labs

## 2022-07-11 NOTE — PROGRESS NOTES
Assessment/Plan:    BMI Counseling: Body mass index is 34 06 kg/m²  The BMI is above normal  Nutrition recommendations include decreasing portion sizes, encouraging healthy choices of fruits and vegetables and decreasing fast food intake  Exercise recommendations include moderate physical activity 150 minutes/week  Rationale for BMI follow-up plan is due to patient being overweight or obese  Depression Screening and Follow-up Plan: Patient was screened for depression during today's encounter  They screened negative with a PHQ-2 score of 1             1  Chronic systolic congestive heart failure ([facility] )    2  Screening for colon cancer  -     Ambulatory referral for colonoscopy; Future    3  Benign prostatic hyperplasia with urinary retention    4  Impaired fasting glucose    5  Mixed hyperlipidemia    6  Gastroesophageal reflux disease without esophagitis    7  Agoraphobia with panic attacks         Subjective:      Patient ID: Rosario Morales is a 61 y o  male  Follow-up on multiple medical problems to ensure the stable on current medications      The following portions of the patient's history were reviewed and updated as appropriate: He  has a past medical history of Central line complication (1/2/1125) and Tylenol ingestion (12/31/2021)    He   Patient Active Problem List    Diagnosis Date Noted    Chronic systolic congestive heart failure ([facility] ) 07/11/2022    Screening for colon cancer 07/11/2022    Benign prostatic hyperplasia with urinary retention 04/12/2022    Encounter for postoperative care 03/11/2022    Urinary retention 01/21/2022    S/P VSD repair 01/07/2022    Coagulopathy ([facility] ) 83/30/1020    Acute systolic congestive heart failure ([facility] ) 12/31/2021    Anemia 12/31/2021    Personal history of ECMO 12/31/2021    Cardiogenic shock ([facility] ) 12/30/2021    VSD (ventricular septal defect) 12/30/2021    JUDE (acute kidney injury) ([place] [address] ) 12/30/2021    Former smoker 07/06/2018    Mixed hyperlipidemia 05/23/2017    Impaired fasting glucose 05/23/2017    Agoraphobia with panic attacks 11/06/2015    Obesity (BMI 30-39 9) 11/06/2015    Psychosis, atypical ([address] ) 11/06/2015     He  has a past surgical history that includes Extracorporeal membrane oxygenation (N/A, 12/31/2021); pr ecmo/ecls initiation venous (N/A, 01/04/2022); pr revsd (N/A, 01/04/2022); IR other (12/31/2021); pr explor postop bleed,infec,clot-chst (N/A, 01/07/2022); IR other (01/07/2022); and Cardiac surgery (N/A)  His family history includes Alcohol abuse in his family; Drug abuse in his family; Other in his mother  He  reports that he quit smoking about 6 months ago  His smoking use included pipe  He has never used smokeless tobacco  He reports that he does not drink alcohol and does not use drugs  Current Outpatient Medications   Medication Sig Dispense Refill    ALPRAZolam (XANAX) 0 5 mg tablet TAKE 1 TO 2 TABLETS 30-60 MINUTES PRIOR TO LEAVING HOME AS NEEDED 30 tablet 0    aspirin 325 mg tablet Take 1 tablet (325 mg total) by mouth daily 30 tablet 2    atorvastatin (LIPITOR) 40 mg tablet Take 1 tablet (40 mg total) by mouth daily after dinner 30 tablet 5    bumetanide (BUMEX) 1 mg tablet Take 1 tablet (1 mg total) by mouth daily 30 tablet 0    metoprolol succinate (TOPROL-XL) 25 mg 24 hr tablet Take 0 5 tablets (12 5 mg total) by mouth 2 (two) times a day 90 tablet 0    multivitamin (THERAGRAN) TABS Take 1 tablet by mouth daily      omeprazole (PriLOSEC) 20 mg delayed release capsule Take 1 capsule (20 mg total) by mouth daily 30 capsule 0    tamsulosin (FLOMAX) 0 4 mg Take 1 capsule (0 4 mg total) by mouth daily with dinner 90 capsule 0     No current facility-administered medications for this visit       Current Outpatient Medications on File Prior to Visit   Medication Sig    ALPRAZolam (XANAX) 0 5 mg tablet TAKE 1 TO 2 TABLETS 30-60 MINUTES PRIOR TO LEAVING HOME AS NEEDED    aspirin 325 mg tablet Take 1 tablet (325 mg total) by mouth daily    atorvastatin (LIPITOR) 40 mg tablet Take 1 tablet (40 mg total) by mouth daily after dinner    bumetanide (BUMEX) 1 mg tablet Take 1 tablet (1 mg total) by mouth daily    metoprolol succinate (TOPROL-XL) 25 mg 24 hr tablet Take 0 5 tablets (12 5 mg total) by mouth 2 (two) times a day    multivitamin (THERAGRAN) TABS Take 1 tablet by mouth daily    omeprazole (PriLOSEC) 20 mg delayed release capsule Take 1 capsule (20 mg total) by mouth daily    tamsulosin (FLOMAX) 0 4 mg Take 1 capsule (0 4 mg total) by mouth daily with dinner     No current facility-administered medications on file prior to visit  He is allergic to mushroom extract complex - food allergy, peanut oil - food allergy, shellfish-derived products - food allergy, and strawberry c [ascorbate - food allergy]       Review of Systems   Constitutional: Negative for chills and fever  HENT: Negative for congestion, ear pain and sore throat  Eyes: Negative for pain  Respiratory: Negative for cough and shortness of breath  Cardiovascular: Negative for chest pain and leg swelling  Gastrointestinal: Negative for abdominal pain, nausea and vomiting  Endocrine: Negative for polyuria  Genitourinary: Negative for difficulty urinating, frequency and urgency  Musculoskeletal: Negative for arthralgias and back pain  Skin: Negative for rash  Neurological: Negative for weakness and headaches  Psychiatric/Behavioral: Negative for sleep disturbance  The patient is not nervous/anxious  Objective:      /70 (BP Location: Right arm, Patient Position: Sitting, Cuff Size: Standard)   Pulse 91   Temp 97 9 °F (36 6 °C) (Axillary)   Ht 5' 6" (1 676 m)   Wt 95 7 kg (211 lb)   SpO2 97%   BMI 34 06 kg/m²     No results found for this or any previous visit (from the past 1344 hour(s))  Physical Exam  Constitutional:       Appearance: Normal appearance  HENT:      Head: Normocephalic        Right Ear: Tympanic membrane, ear canal and external ear normal       Left Ear: Tympanic membrane, ear canal and external ear normal       Nose: Nose normal  No congestion  Mouth/Throat:      Mouth: Mucous membranes are moist       Pharynx: Oropharynx is clear  No oropharyngeal exudate or posterior oropharyngeal erythema  Eyes:      Extraocular Movements: Extraocular movements intact  Conjunctiva/sclera: Conjunctivae normal       Pupils: Pupils are equal, round, and reactive to light  Cardiovascular:      Rate and Rhythm: Normal rate and regular rhythm  Heart sounds: Normal heart sounds  No murmur heard  Pulmonary:      Effort: Pulmonary effort is normal       Breath sounds: Normal breath sounds  No wheezing or rales  Abdominal:      General: Bowel sounds are normal  There is no distension  Palpations: Abdomen is soft  Tenderness: There is no abdominal tenderness  Musculoskeletal:         General: Normal range of motion  Cervical back: Normal range of motion and neck supple  Right lower leg: No edema  Left lower leg: No edema  Lymphadenopathy:      Cervical: No cervical adenopathy  Skin:     General: Skin is warm  Neurological:      General: No focal deficit present  Mental Status: He is alert and oriented to person, place, and time

## 2022-07-14 ENCOUNTER — OFFICE VISIT (OUTPATIENT)
Dept: UROLOGY | Facility: AMBULATORY SURGERY CENTER | Age: 67
End: 2022-07-14
Payer: COMMERCIAL

## 2022-07-14 VITALS
BODY MASS INDEX: 34.54 KG/M2 | OXYGEN SATURATION: 97 % | DIASTOLIC BLOOD PRESSURE: 68 MMHG | SYSTOLIC BLOOD PRESSURE: 106 MMHG | WEIGHT: 214 LBS | HEART RATE: 93 BPM

## 2022-07-14 DIAGNOSIS — Z12.5 SCREENING FOR PROSTATE CANCER: ICD-10-CM

## 2022-07-14 DIAGNOSIS — N40.1 BENIGN PROSTATIC HYPERPLASIA WITH URINARY RETENTION: Primary | ICD-10-CM

## 2022-07-14 DIAGNOSIS — R33.8 BENIGN PROSTATIC HYPERPLASIA WITH URINARY RETENTION: Primary | ICD-10-CM

## 2022-07-14 LAB — POST-VOID RESIDUAL VOLUME, ML POC: 18 ML

## 2022-07-14 PROCEDURE — 99213 OFFICE O/P EST LOW 20 MIN: CPT | Performed by: NURSE PRACTITIONER

## 2022-07-14 PROCEDURE — 51798 US URINE CAPACITY MEASURE: CPT | Performed by: NURSE PRACTITIONER

## 2022-07-14 NOTE — PROGRESS NOTES
7/14/2022    Gissel Donnelly  1955  [id number]      Assessment  -BPH with lower urinary tract symptoms    Discussion/Plan  Kayleen Mora is a 61 y o  male being managed by our office    1  BPH with lower urinary tract symptoms- PVR in the office today is 18 mL  Patient has no urinary complaints at this time  He will remain on tamsulosin 0 4 mg HS  This is managed by his PCP  Encouraged patient to continue timed and double voiding  No abnormal findings noted on JACOB today  Recent PSA is 3 5  We will continue annual prostate cancer screening  Follow-up in 1 year with PSA, JACOB, and PVR assessment  He was advised to call sooner with any issues     -All questions answered, patient and brother-in-law agrees with plan      History of Present Illness  61 y o  male with a history of BPH presents today for follow up  He is accompanied today by his brother-in-law  Patient last seen in the office in April 2022  He underwent void trial at last office visit as he previously required an indwelling Chang catheter for acute urinary retention  He remains on tamsulosin 0 4 mg HS  Patient has had no urinary complaints  He denies any gross hematuria or dysuria and feels he is emptying his bladder to completion  No changes to his overall health  Review of Systems  Review of Systems   Constitutional: Negative  HENT: Negative  Respiratory: Negative  Cardiovascular: Negative  Gastrointestinal: Negative  Genitourinary: Negative for decreased urine volume, difficulty urinating, dysuria, flank pain, frequency, hematuria and urgency  Musculoskeletal: Negative  Skin: Negative  Neurological: Negative  Psychiatric/Behavioral: Negative        AUA SYMPTOM SCORE    Flowsheet Row Most Recent Value   AUA SYMPTOM SCORE    How often have you had a sensation of not emptying your bladder completely after you finished urinating? 0 (P)     How often have you had to urinate again less than two hours after you finished urinating? 0 (P)     How often have you found you stopped and started again several times when you urinate? 0 (P)     How often have you found it difficult to postpone urination? 0 (P)     How often have you had a weak urinary stream? 0 (P)     How often have you had to push or strain to begin urination? 0 (P)     How many times did you most typically get up to urinate from the time you went to bed at night until the time you got up in the morning? 0 (P)     Quality of Life: If you were to spend the rest of your life with your urinary condition just the way it is now, how would you feel about that? 1 (P)     AUA SYMPTOM SCORE 0 (P)           Past Medical History  Past Medical History:   Diagnosis Date    Central line complication 8/4/6838    Tylenol ingestion 12/31/2021       Past Social History  Past Surgical History:   Procedure Laterality Date    CARDIAC SURGERY N/A     EXTRACORPOREAL CIRCULATION N/A 12/31/2021    Procedure: SUPPORT EXTRACORPOREAL MEMBRANE OXYGENATION (ECMO);   Surgeon: Santy Gonzalez MD;  [facility];  Service: Cardiac Surgery    IR OTHER  12/31/2021    IR OTHER  01/07/2022    MS ECMO/ECLS INITIATION VENOUS N/A 01/04/2022    Procedure: VA ECMO DECANNULATION;  Surgeon: Santy Gonzalez MD;  [facility];  Service: Cardiac Surgery    MS EXPLOR POSTOP BLEED,INFEC,CLOT-CHST N/A 01/07/2022    Procedure: RE-EXPLORATION MEDIASTERNAL CAVITY FOR POST-OP BLEED (BRING BACK) Removal of PA catheter;  Surgeon: Santy Gonzalez MD;  [facility];  Service: Cardiac Surgery    MS REVSD N/A 01/04/2022    Procedure: REPAIR VENTRICULAR SEPTAL DEFECT (VSD) OPEN WITH BOVINE PERICARDIAL PATCH ;  Surgeon: Santy Gonzalez MD;  [facility];  Service: Cardiac Surgery       Past Family History  Family History   Problem Relation Age of Onset    Other Mother         Claustrophobia    Drug abuse Family     Alcohol abuse Family        Past Social history  Social History     Socioeconomic History    Marital status: Single     Spouse name: Not on file    Number of children: Not on file    Years of education: Not on file    Highest education level: Not on file   Occupational History    Not on file   Tobacco Use    Smoking status: Former Smoker     Types: Pipe     Quit date: 2021     Years since quittin 5    Smokeless tobacco: Never Used    Tobacco comment: Secondhand smoke exposure   Vaping Use    Vaping Use: Never used   Substance and Sexual Activity    Alcohol use: No    Drug use: No    Sexual activity: Not on file   Other Topics Concern    Not on file   Social History Narrative    Always uses seatbelt    No caffeine use     Social Determinants of Health     Financial Resource Strain: Not on file   Food Insecurity: Not on file   Transportation Needs: Not on file   Physical Activity: Not on file   Stress: Not on file   Social Connections: Not on file   Intimate Partner Violence: Not on file   Housing Stability: Not on file       Current Medications  Current Outpatient Medications   Medication Sig Dispense Refill    ALPRAZolam (XANAX) 0 5 mg tablet TAKE 1 TO 2 TABLETS 30-60 MINUTES PRIOR TO LEAVING HOME AS NEEDED 30 tablet 0    aspirin 325 mg tablet Take 1 tablet (325 mg total) by mouth daily 30 tablet 2    atorvastatin (LIPITOR) 40 mg tablet Take 1 tablet (40 mg total) by mouth daily after dinner 30 tablet 5    bumetanide (BUMEX) 1 mg tablet Take 1 tablet (1 mg total) by mouth daily 30 tablet 0    metoprolol succinate (TOPROL-XL) 25 mg 24 hr tablet Take 0 5 tablets (12 5 mg total) by mouth 2 (two) times a day 90 tablet 0    multivitamin (THERAGRAN) TABS Take 1 tablet by mouth daily      omeprazole (PriLOSEC) 20 mg delayed release capsule Take 1 capsule (20 mg total) by mouth daily 30 capsule 0    tamsulosin (FLOMAX) 0 4 mg Take 1 capsule (0 4 mg total) by mouth daily with dinner 90 capsule 0     No current facility-administered medications for this visit  Allergies  Allergies   Allergen Reactions    Mushroom Extract Complex - Food Allergy Facial Swelling    Peanut Oil - Food Allergy     Shellfish-Derived Products - Food Allergy Other (See Comments)     Runs in the family    Strawberry C [Ascorbate - Food Allergy]        Past Medical History, Social History, Family History, medications and allergies were reviewed  Vitals  There were no vitals filed for this visit  Physical Exam  Physical Exam  Constitutional:       Appearance: Normal appearance  He is well-developed  HENT:      Head: Normocephalic  Eyes:      Pupils: Pupils are equal, round, and reactive to light  Pulmonary:      Effort: Pulmonary effort is normal    Abdominal:      Palpations: Abdomen is soft  Genitourinary:     Prostate: Normal       Rectum: Normal       Comments: Prostate 45 g, smooth, nontender, no nodules  Musculoskeletal:         General: Normal range of motion  Cervical back: Normal range of motion  Skin:     General: Skin is warm and dry  Neurological:      General: No focal deficit present  Mental Status: He is alert and oriented to person, place, and time  Psychiatric:         Mood and Affect: Mood normal          Behavior: Behavior normal          Thought Content:  Thought content normal          Judgment: Judgment normal          Results    I have personally reviewed all pertinent lab results and reviewed with patient  Lab Results   Component Value Date    PSA 3 5 07/11/2022     Lab Results   Component Value Date    GLUCOSE 149 (H) 01/07/2022    CALCIUM 8 9 03/28/2022    K 4 3 03/28/2022    CO2 25 03/28/2022     03/28/2022    BUN 27 (H) 03/28/2022    CREATININE 1 35 (H) 03/28/2022     Lab Results   Component Value Date    WBC 6 94 03/28/2022    HGB 10 8 (L) 03/28/2022    HCT 33 2 (L) 03/28/2022    MCV 93 03/28/2022     03/28/2022     No results found for this or any previous visit (from the past 1 hour(s))

## 2022-07-26 ENCOUNTER — TELEPHONE (OUTPATIENT)
Dept: FAMILY MEDICINE CLINIC | Facility: CLINIC | Age: 67
End: 2022-07-26

## 2022-08-01 DIAGNOSIS — Z87.74 S/P VSD REPAIR: ICD-10-CM

## 2022-08-01 RX ORDER — BUMETANIDE 1 MG/1
1 TABLET ORAL DAILY
Qty: 30 TABLET | Refills: 0 | Status: SHIPPED | OUTPATIENT
Start: 2022-08-01 | End: 2022-08-29 | Stop reason: SDUPTHER

## 2022-08-01 NOTE — TELEPHONE ENCOUNTER
08/01/22 2:36 PM     Thank you for your request  Current  PCP office messaged to update PCP field  This message will now be completed      Thank you  Manuel Yu

## 2022-08-15 DIAGNOSIS — Z87.74 S/P VSD REPAIR: ICD-10-CM

## 2022-08-15 RX ORDER — OMEPRAZOLE 20 MG/1
CAPSULE, DELAYED RELEASE ORAL
Qty: 30 CAPSULE | Refills: 0 | Status: SHIPPED | OUTPATIENT
Start: 2022-08-15 | End: 2022-09-07 | Stop reason: SDUPTHER

## 2022-08-29 DIAGNOSIS — Z87.74 S/P VSD REPAIR: ICD-10-CM

## 2022-08-29 RX ORDER — BUMETANIDE 1 MG/1
1 TABLET ORAL DAILY
Qty: 30 TABLET | Refills: 3 | Status: SHIPPED | OUTPATIENT
Start: 2022-08-29 | End: 2022-09-01 | Stop reason: SDUPTHER

## 2022-08-29 NOTE — TELEPHONE ENCOUNTER
Refill Request (Wants 90 day supply)     Bumetanide 1 mg tab    Pharmacy: [facility] [address] [place] [address]     LA: 7/11/22  NA: 10/17/22

## 2022-09-01 DIAGNOSIS — Z87.74 S/P VSD REPAIR: ICD-10-CM

## 2022-09-01 RX ORDER — BUMETANIDE 1 MG/1
1 TABLET ORAL DAILY
Qty: 90 TABLET | Refills: 0 | Status: SHIPPED | OUTPATIENT
Start: 2022-09-01 | End: 2022-09-07 | Stop reason: SDUPTHER

## 2022-09-07 DIAGNOSIS — Z87.74 S/P VSD REPAIR: ICD-10-CM

## 2022-09-07 DIAGNOSIS — I25.5 ISCHEMIC CARDIOMYOPATHY: ICD-10-CM

## 2022-09-07 DIAGNOSIS — E78.2 HYPERLIPIDEMIA, MIXED: ICD-10-CM

## 2022-09-07 RX ORDER — ATORVASTATIN CALCIUM 40 MG/1
40 TABLET, FILM COATED ORAL
Qty: 90 TABLET | Refills: 1 | Status: SHIPPED | OUTPATIENT
Start: 2022-09-07

## 2022-09-07 RX ORDER — OMEPRAZOLE 20 MG/1
20 CAPSULE, DELAYED RELEASE ORAL DAILY
Qty: 90 CAPSULE | Refills: 1 | Status: SHIPPED | OUTPATIENT
Start: 2022-09-07

## 2022-09-07 RX ORDER — BUMETANIDE 1 MG/1
1 TABLET ORAL DAILY
Qty: 90 TABLET | Refills: 1 | Status: SHIPPED | OUTPATIENT
Start: 2022-09-07 | End: 2022-12-06

## 2022-09-07 RX ORDER — METOPROLOL SUCCINATE 25 MG/1
12.5 TABLET, EXTENDED RELEASE ORAL 2 TIMES DAILY
Qty: 90 TABLET | Refills: 1 | Status: SHIPPED | OUTPATIENT
Start: 2022-09-07

## 2022-09-07 RX ORDER — TAMSULOSIN HYDROCHLORIDE 0.4 MG/1
0.4 CAPSULE ORAL
Qty: 90 CAPSULE | Refills: 1 | Status: SHIPPED | OUTPATIENT
Start: 2022-09-07

## 2022-10-11 PROBLEM — Z12.11 SCREENING FOR COLON CANCER: Status: RESOLVED | Noted: 2022-07-11 | Resolved: 2022-10-11

## 2022-10-17 ENCOUNTER — OFFICE VISIT (OUTPATIENT)
Dept: INTERNAL MEDICINE CLINIC | Facility: CLINIC | Age: 67
End: 2022-10-17
Payer: COMMERCIAL

## 2022-10-17 VITALS
WEIGHT: 220 LBS | TEMPERATURE: 97.5 F | OXYGEN SATURATION: 99 % | HEART RATE: 67 BPM | HEIGHT: 66 IN | SYSTOLIC BLOOD PRESSURE: 122 MMHG | DIASTOLIC BLOOD PRESSURE: 82 MMHG | BODY MASS INDEX: 35.36 KG/M2

## 2022-10-17 DIAGNOSIS — R33.8 BENIGN PROSTATIC HYPERPLASIA WITH URINARY RETENTION: ICD-10-CM

## 2022-10-17 DIAGNOSIS — I25.5 ISCHEMIC CARDIOMYOPATHY: ICD-10-CM

## 2022-10-17 DIAGNOSIS — F40.01 AGORAPHOBIA WITH PANIC ATTACKS: ICD-10-CM

## 2022-10-17 DIAGNOSIS — K21.9 GASTROESOPHAGEAL REFLUX DISEASE WITHOUT ESOPHAGITIS: ICD-10-CM

## 2022-10-17 DIAGNOSIS — E78.2 MIXED HYPERLIPIDEMIA: Primary | ICD-10-CM

## 2022-10-17 DIAGNOSIS — Z23 NEEDS FLU SHOT: ICD-10-CM

## 2022-10-17 DIAGNOSIS — N40.1 BENIGN PROSTATIC HYPERPLASIA WITH URINARY RETENTION: ICD-10-CM

## 2022-10-17 DIAGNOSIS — Z53.20 COLON CANCER SCREENING DECLINED: ICD-10-CM

## 2022-10-17 DIAGNOSIS — Z00.00 ANNUAL PHYSICAL EXAM: ICD-10-CM

## 2022-10-17 DIAGNOSIS — R73.01 IMPAIRED FASTING GLUCOSE: ICD-10-CM

## 2022-10-17 DIAGNOSIS — Z87.74 S/P VSD REPAIR: ICD-10-CM

## 2022-10-17 DIAGNOSIS — E78.2 HYPERLIPIDEMIA, MIXED: ICD-10-CM

## 2022-10-17 DIAGNOSIS — I50.22 CHRONIC SYSTOLIC CONGESTIVE HEART FAILURE (HCC): ICD-10-CM

## 2022-10-17 PROCEDURE — 99397 PER PM REEVAL EST PAT 65+ YR: CPT | Performed by: INTERNAL MEDICINE

## 2022-10-17 PROCEDURE — 99214 OFFICE O/P EST MOD 30 MIN: CPT | Performed by: INTERNAL MEDICINE

## 2022-10-17 PROCEDURE — 90662 IIV NO PRSV INCREASED AG IM: CPT

## 2022-10-17 PROCEDURE — 90471 IMMUNIZATION ADMIN: CPT

## 2022-10-17 NOTE — PROGRESS NOTES
ADULT ANNUAL [address] [facility] INTERNAL MEDICINE [place] [address]    NAME: Harika Carter  AGE: 74 y o  SEX: male  : 1955     DATE: 10/17/2022     Assessment and Plan:     Problem List Items Addressed This Visit        Digestive    Gastroesophageal reflux disease without esophagitis       Endocrine    Impaired fasting glucose    Relevant Orders    CBC and differential    Hemoglobin A1C       Cardiovascular and Mediastinum    Chronic systolic congestive heart failure (HCC)    Ischemic cardiomyopathy       Genitourinary    Benign prostatic hyperplasia with urinary retention       Other    Agoraphobia with panic attacks    Hyperlipidemia, mixed - Primary    S/P VSD repair    Colon cancer screening declined      Other Visit Diagnoses     Annual physical exam              Immunizations and preventive care screenings were discussed with patient today  Appropriate education was printed on patient's after visit summary  Discussed risks and benefits of prostate cancer screening  We discussed the controversial history of PSA screening for prostate cancer in the [place] as well as the risk of over detection and over treatment of prostate cancer by way of PSA screening  The patient understands that PSA blood testing is an imperfect way to screen for prostate cancer and that elevated PSA levels in the blood may also be caused by infection, inflammation, prostatic trauma or manipulation, urological procedures, or by benign prostatic enlargement  The role of the digital rectal examination in prostate cancer screening was also discussed and I discussed with him that there is large interobserver variability in the findings of digital rectal examination  Counseling:  Exercise: the importance of regular exercise/physical activity was discussed  Recommend exercise 3-5 times per week for at least 30 minutes  Return in about 3 months (around 2023)  Chief Complaint:     Chief Complaint   Patient presents with   • 3 month follow-up      Chronic systolic congestive heart failure ([address] )   • hm due      Colonoscopy - pt is not interested and declined cologuard also    • Flu Vaccine   • blood work orders       History of Present Illness:     Adult Annual Physical   Patient here for a comprehensive physical exam  The patient reports no problems  Diet and Physical Activity  Diet/Nutrition: heart healthy (low sodium) diet  Exercise: moderate cardiovascular exercise  Depression Screening  PHQ-2/9 Depression Screening         General Health  Sleep: sleeps well  Hearing: normal - bilateral   Vision: no vision problems  Dental: no dental visits for >1 year   Health  Symptoms include: none     Review of Systems:     Review of Systems   Constitutional: Negative for chills and fever  HENT: Negative for congestion, ear pain and sore throat  Eyes: Negative for pain  Respiratory: Negative for cough and shortness of breath  Cardiovascular: Negative for chest pain and leg swelling  Gastrointestinal: Negative for abdominal pain, nausea and vomiting  Endocrine: Negative for polyuria  Genitourinary: Negative for difficulty urinating, frequency and urgency  Musculoskeletal: Negative for arthralgias and back pain  Skin: Negative for rash  Neurological: Negative for weakness and headaches  Psychiatric/Behavioral: Negative for sleep disturbance  The patient is not nervous/anxious  Past Medical History:     Past Medical History:   Diagnosis Date   • Central line complication 5/7/1506   • Tylenol ingestion 12/31/2021      Past Surgical History:     Past Surgical History:   Procedure Laterality Date   • CARDIAC SURGERY N/A    • EXTRACORPOREAL CIRCULATION N/A 12/31/2021    Procedure: SUPPORT EXTRACORPOREAL MEMBRANE OXYGENATION (ECMO);   Surgeon: Tahir Ocasio MD;  [facility];  Service: Cardiac Surgery   • IR OTHER 2021   • IR OTHER  2022   • WA ECMO/ECLS INITIATION VENOUS N/A 2022    Procedure: VA ECMO DECANNULATION;  Surgeon: Angela Grigsby MD;  [facility];  Service: Cardiac Surgery   • WA EXPLOR POSTOP BLEED,INFEC,CLOT-CHST N/A 2022    Procedure: RE-EXPLORATION MEDIASTERNAL CAVITY FOR POST-OP BLEED (BRING BACK) Removal of PA catheter;  Surgeon: Angela Grigsby MD;  [facility];  Service: Cardiac Surgery   • WA REVSD N/A 2022    Procedure: REPAIR VENTRICULAR SEPTAL DEFECT (VSD) OPEN WITH BOVINE PERICARDIAL PATCH ;  Surgeon: Angela Grigsby MD;  [facility];  Service: Cardiac Surgery      Family History:     Family History   Problem Relation Age of Onset   • Other Mother         Claustrophobia   • Drug abuse Family    • Alcohol abuse Family       Social History:     Social History     Socioeconomic History   • Marital status: Single     Spouse name: None   • Number of children: None   • Years of education: None   • Highest education level: None   Occupational History   • None   Tobacco Use   • Smoking status: Former Smoker     Types: Pipe     Quit date: 2021     Years since quittin 7   • Smokeless tobacco: Never Used   • Tobacco comment: Secondhand smoke exposure   Vaping Use   • Vaping Use: Never used   Substance and Sexual Activity   • Alcohol use: No   • Drug use: No   • Sexual activity: None   Other Topics Concern   • None   Social History Narrative    Always uses seatbelt    No caffeine use     Social Determinants of Health     Financial Resource Strain: Not on file   Food Insecurity: Not on file   Transportation Needs: Not on file   Physical Activity: Not on file   Stress: Not on file   Social Connections: Not on file   Intimate Partner Violence: Not on file   Housing Stability: Not on file      Current Medications:     Current Outpatient Medications   Medication Sig Dispense Refill   • ALPRAZolam (XANAX) 0 5 mg tablet TAKE 1 TO 2 TABLETS 30-60 MINUTES PRIOR TO LEAVING HOME AS NEEDED 30 tablet 0   • aspirin 325 mg tablet Take 1 tablet (325 mg total) by mouth daily 30 tablet 2   • atorvastatin (LIPITOR) 40 mg tablet Take 1 tablet (40 mg total) by mouth daily after dinner 90 tablet 1   • bumetanide (BUMEX) 1 mg tablet Take 1 tablet (1 mg total) by mouth daily 90 tablet 1   • metoprolol succinate (TOPROL-XL) 25 mg 24 hr tablet Take 0 5 tablets (12 5 mg total) by mouth 2 (two) times a day 90 tablet 1   • multivitamin (THERAGRAN) TABS Take 1 tablet by mouth daily     • omeprazole (PriLOSEC) 20 mg delayed release capsule Take 1 capsule (20 mg total) by mouth daily 90 capsule 1   • tamsulosin (FLOMAX) 0 4 mg Take 1 capsule (0 4 mg total) by mouth daily with dinner 90 capsule 1     No current facility-administered medications for this visit  Allergies: Allergies   Allergen Reactions   • Mushroom Extract Complex - Food Allergy Facial Swelling   • Peanut Oil - Food Allergy    • Shellfish-Derived Products - Food Allergy Other (See Comments)     Runs in the family   • Strawberry C [Ascorbate - Food Allergy]       Physical Exam:     /82 (BP Location: Left arm, Patient Position: Sitting, Cuff Size: Adult)   Pulse 67   Temp 97 5 °F (36 4 °C) (Temporal)   Ht 5' 6" (1 676 m)   Wt 99 8 kg (220 lb)   SpO2 99%   BMI 35 51 kg/m²     Physical Exam  Vitals and nursing note reviewed  Constitutional:       Appearance: He is well-developed  HENT:      Head: Normocephalic and atraumatic  Eyes:      Conjunctiva/sclera: Conjunctivae normal    Cardiovascular:      Rate and Rhythm: Normal rate and regular rhythm  Heart sounds: No murmur heard  Pulmonary:      Effort: Pulmonary effort is normal  No respiratory distress  Breath sounds: Normal breath sounds  Abdominal:      Palpations: Abdomen is soft  Tenderness: There is no abdominal tenderness  Musculoskeletal:      Cervical back: Neck supple     Skin:     General: Skin is warm and dry  Neurological:      Mental Status: He is alert            Hector Lawton MD  [facility]

## 2022-10-17 NOTE — PROGRESS NOTES
Assessment/Plan:             1  Mixed hyperlipidemia  -     Comprehensive metabolic panel; Future  -     Lipid Panel with Direct LDL reflex; Future  -     TSH, 3rd generation; Future    2  Impaired fasting glucose  -     CBC and differential; Future  -     Hemoglobin A1C; Future    3  Ischemic cardiomyopathy    4  Hyperlipidemia, mixed    5  Annual physical exam    6  Chronic systolic congestive heart failure ([address] )    7  S/P VSD repair    8  Benign prostatic hyperplasia with urinary retention    9  Gastroesophageal reflux disease without esophagitis    10  Agoraphobia with panic attacks    11  Colon cancer screening declined         Subjective:      Patient ID: Mirna Agudelo is a 74 y o  male  Follow-up on multiple medical problems to ensure the stable on current medication, also physical      The following portions of the patient's history were reviewed and updated as appropriate: He  has a past medical history of Central line complication (9/7/0878) and Tylenol ingestion (12/31/2021)    He   Patient Active Problem List    Diagnosis Date Noted   • Ischemic cardiomyopathy 10/17/2022   • Gastroesophageal reflux disease without esophagitis 10/17/2022   • Colon cancer screening declined 10/17/2022   • Chronic systolic congestive heart failure ([address] ) 07/11/2022   • Benign prostatic hyperplasia with urinary retention 04/12/2022   • Encounter for postoperative care 03/11/2022   • Urinary retention 01/21/2022   • S/P VSD repair 01/07/2022   • Coagulopathy ([facility] ) 19/14/3135   • Acute systolic congestive heart failure ([facility] ) 12/31/2021   • Anemia 12/31/2021   • Personal history of ECMO 12/31/2021   • Cardiogenic shock ([address] ) 12/30/2021   • VSD (ventricular septal defect) 12/30/2021   • JUDE (acute kidney injury) ([facility] [address] ) 12/30/2021   • Former smoker 07/06/2018   • Hyperlipidemia, mixed 05/23/2017   • Impaired fasting glucose 05/23/2017   • Agoraphobia with panic attacks 11/06/2015   • Obesity (BMI 30-39 9) 11/06/2015   • Psychosis, atypical ([facility] [address] ) 11/06/2015     He  has a past surgical history that includes Extracorporeal membrane oxygenation (N/A, 12/31/2021); pr ecmo/ecls initiation venous (N/A, 01/04/2022); pr revsd (N/A, 01/04/2022); IR other (12/31/2021); pr explor postop bleed,infec,clot-chst (N/A, 01/07/2022); IR other (01/07/2022); and Cardiac surgery (N/A)  His family history includes Alcohol abuse in his family; Drug abuse in his family; Other in his mother  He  reports that he quit smoking about 9 months ago  His smoking use included pipe  He has never used smokeless tobacco  He reports that he does not drink alcohol and does not use drugs  Current Outpatient Medications   Medication Sig Dispense Refill   • ALPRAZolam (XANAX) 0 5 mg tablet TAKE 1 TO 2 TABLETS 30-60 MINUTES PRIOR TO LEAVING HOME AS NEEDED 30 tablet 0   • aspirin 325 mg tablet Take 1 tablet (325 mg total) by mouth daily 30 tablet 2   • atorvastatin (LIPITOR) 40 mg tablet Take 1 tablet (40 mg total) by mouth daily after dinner 90 tablet 1   • bumetanide (BUMEX) 1 mg tablet Take 1 tablet (1 mg total) by mouth daily 90 tablet 1   • metoprolol succinate (TOPROL-XL) 25 mg 24 hr tablet Take 0 5 tablets (12 5 mg total) by mouth 2 (two) times a day 90 tablet 1   • multivitamin (THERAGRAN) TABS Take 1 tablet by mouth daily     • omeprazole (PriLOSEC) 20 mg delayed release capsule Take 1 capsule (20 mg total) by mouth daily 90 capsule 1   • tamsulosin (FLOMAX) 0 4 mg Take 1 capsule (0 4 mg total) by mouth daily with dinner 90 capsule 1     No current facility-administered medications for this visit       Current Outpatient Medications on File Prior to Visit   Medication Sig   • ALPRAZolam (XANAX) 0 5 mg tablet TAKE 1 TO 2 TABLETS 30-60 MINUTES PRIOR TO LEAVING HOME AS NEEDED   • aspirin 325 mg tablet Take 1 tablet (325 mg total) by mouth daily   • atorvastatin (LIPITOR) 40 mg tablet Take 1 tablet (40 mg total) by mouth daily after dinner   • bumetanide (BUMEX) 1 mg tablet Take 1 tablet (1 mg total) by mouth daily   • metoprolol succinate (TOPROL-XL) 25 mg 24 hr tablet Take 0 5 tablets (12 5 mg total) by mouth 2 (two) times a day   • multivitamin (THERAGRAN) TABS Take 1 tablet by mouth daily   • omeprazole (PriLOSEC) 20 mg delayed release capsule Take 1 capsule (20 mg total) by mouth daily   • tamsulosin (FLOMAX) 0 4 mg Take 1 capsule (0 4 mg total) by mouth daily with dinner     No current facility-administered medications on file prior to visit  He is allergic to mushroom extract complex - food allergy, peanut oil - food allergy, shellfish-derived products - food allergy, and strawberry c [ascorbate - food allergy]       Review of Systems   Constitutional: Negative for chills and fever  HENT: Negative for congestion, ear pain and sore throat  Eyes: Negative for pain  Respiratory: Negative for cough and shortness of breath  Cardiovascular: Negative for chest pain and leg swelling  Gastrointestinal: Negative for abdominal pain, nausea and vomiting  Endocrine: Negative for polyuria  Genitourinary: Negative for difficulty urinating, frequency and urgency  Musculoskeletal: Negative for arthralgias and back pain  Skin: Negative for rash  Neurological: Negative for weakness and headaches  Psychiatric/Behavioral: Negative for sleep disturbance  The patient is not nervous/anxious  Objective:      /82 (BP Location: Left arm, Patient Position: Sitting, Cuff Size: Adult)   Pulse 67   Temp 97 5 °F (36 4 °C) (Temporal)   Ht 5' 6" (1 676 m)   Wt 99 8 kg (220 lb)   SpO2 99%   BMI 35 51 kg/m²     No results found for this or any previous visit (from the past 1344 hour(s))  Physical Exam  Constitutional:       Appearance: Normal appearance  HENT:      Head: Normocephalic        Right Ear: Tympanic membrane, ear canal and external ear normal       Left Ear: Tympanic membrane, ear canal and external ear normal       Nose: Nose normal  No congestion  Mouth/Throat:      Mouth: Mucous membranes are moist       Pharynx: Oropharynx is clear  No oropharyngeal exudate or posterior oropharyngeal erythema  Eyes:      Extraocular Movements: Extraocular movements intact  Conjunctiva/sclera: Conjunctivae normal       Pupils: Pupils are equal, round, and reactive to light  Cardiovascular:      Rate and Rhythm: Normal rate and regular rhythm  Heart sounds: Normal heart sounds  No murmur heard  Pulmonary:      Effort: Pulmonary effort is normal       Breath sounds: Normal breath sounds  No wheezing or rales  Abdominal:      General: Bowel sounds are normal  There is no distension  Palpations: Abdomen is soft  Tenderness: There is no abdominal tenderness  Musculoskeletal:         General: Normal range of motion  Cervical back: Normal range of motion and neck supple  Right lower leg: No edema  Left lower leg: No edema  Lymphadenopathy:      Cervical: No cervical adenopathy  Skin:     General: Skin is warm  Neurological:      General: No focal deficit present  Mental Status: He is alert and oriented to person, place, and time

## 2022-10-17 NOTE — PATIENT INSTRUCTIONS

## 2022-11-15 NOTE — PROGRESS NOTES
Heart Failure Outpatient Progress Note - Kimberly Dee 74 y o  male MRN: [id number]    @ Encounter: [id number]      Assessment/Plan:    Patient Active Problem List    Diagnosis Date Noted   • Ischemic cardiomyopathy 10/17/2022   • Gastroesophageal reflux disease without esophagitis 10/17/2022   • Colon cancer screening declined 10/17/2022   • Chronic systolic congestive heart failure ([address] ) 07/11/2022   • Benign prostatic hyperplasia with urinary retention 04/12/2022   • Encounter for postoperative care 03/11/2022   • Urinary retention 01/21/2022   • S/P VSD repair 01/07/2022   • Coagulopathy ([address] ) 43/19/9469   • Acute systolic congestive heart failure ([address] ) 12/31/2021   • Anemia 12/31/2021   • Personal history of ECMO 12/31/2021   • Cardiogenic shock ([address] ) 12/30/2021   • VSD (ventricular septal defect) 12/30/2021   • JUDE (acute kidney injury) ([facility] [address] ) 12/30/2021   • Former smoker 07/06/2018   • Hyperlipidemia, mixed 05/23/2017   • Impaired fasting glucose 05/23/2017   • Agoraphobia with panic attacks 11/06/2015   • Obesity (BMI 30-39 9) 11/06/2015   • Psychosis, atypical ([facility] [address] ) 11/06/2015       # Chronic HFmrEF, Stage C  S/P Cardiogenic shock due to anteroseptal MI complicated by VSD on VA ECMO 12/31/21 s/p VSD repair with large bovine pericardial patch and ECMO decannulation 1/4/2022, also with vasodilatory component post op; s/p multiple blood product transfusions     Weight: 195 lbs, 208 lbs, 227 lbs    Studies personally reviewed by myself:  Echo 4/7/22  LVEF: 50%  LVEDd: 5 3 cm  IVS: surgically repaired VSD, no residual shunt  RV: normal    Echo 1/13/22  LVEF 50%  Normal RV size and systolic function     Echo 1/10/22 - on milrnone  LVEF 40%  Normal RV size and systolic function     Echocardiogram 12/30/21  LVEF: 40-45%  LVIDd: 4 5cm  RV: normal size, moderately reduced systolic function  MR: none seen  PASP: 65mmHg + RAP  RVOT: shortened PAAT  Other: muscular VSD with large left to right shunt; dyskinetic septum     EKG 1/3/2022: sinus rhythm, IRBBB, anteroseptal q waves  EKG 12/30/21: sinus tachycardia, RBBB, anteroseptal ST segment elevation     Neurohormonal Blockade: -   --Beta-Blocker: metoprolol tartrate 12 5 mg BID  --ACEi, ARB or ARNi:    (or SVR reduction)   --Aldosterone Receptor Blocker:  --SGLT2i:   --Diuretic: Bumex 1 mg daily     Sudden Cardiac Death Risk Reduction:  --ICD: LVEF > 35%     Electrical Resynchronization:  -- QRSd 98msec     Advanced Therapies (If appropriate): --We will continue to monitor  Current smoker  Small LV and VSD precluding LVAD     # Coronary artery disease, undefined anatomy  Rx: asa 325 mg  daily, atorvastatin 80 mg  Angina: none    # dyslipidemia- atorvastatin 80 mg   3/28/22: LDL 34, HDL 36  9/13/21: , HDL 38     # CKD, baseline 1 09 on 1/27, Cr 1 35 on 3/28    # S/p Acute hypoxic respiratory failure, resolved  # tobacco use     Plan:  Decrease atorvastatin to 40 mg daily, lipids at goal  Quit tobacco  Continue asa, statin for CAD  Needs to do more activity    HPI:     75 yo male presents for follow up after prolonged hospitalization from 12/20/21 through 1/31/22 with later presentation anteroseptal MI presenting in cardiogenic shock requiring VSD repair with large bovine patch and hemodynamic support with VA ECMO  He required repeat sternotomy for mediastinal re-exploration with removal of retained PA catheter on 1/7/22  He was maintained on inotropic support for quite a while post operatively  Of note, he also required transfusion of multiple blood products  Interval History:  Started Jardiance 10 mg and see if we can stop Bumex, stayed on 1 mg daily  Not taking any more    Review of Systems   Constitutional: Negative for activity change, appetite change, fatigue and unexpected weight change (wt up 20 lbs)  HENT: Negative for congestion and nosebleeds  Eyes: Negative  Respiratory: Negative for cough, chest tightness and shortness of breath  Cardiovascular: Negative for chest pain, palpitations and leg swelling  Gastrointestinal: Negative for abdominal distention  Endocrine: Negative  Genitourinary: Negative  Musculoskeletal: Negative  Skin: Negative  Neurological: Negative for dizziness, syncope and weakness  Hematological: Negative  Psychiatric/Behavioral: Negative  Past Medical History:   Diagnosis Date   • Central line complication 0/2/1685   • Tylenol ingestion 12/31/2021         Allergies   Allergen Reactions   • Mushroom Extract Complex - Food Allergy Facial Swelling   • Peanut Oil - Food Allergy    • Shellfish-Derived Products - Food Allergy Other (See Comments)     Runs in the family   • Strawberry C [Ascorbate - Food Allergy]            Current Outpatient Medications:   •  ALPRAZolam (XANAX) 0 5 mg tablet, TAKE 1 TO 2 TABLETS 30-60 MINUTES PRIOR TO LEAVING HOME AS NEEDED, Disp: 30 tablet, Rfl: 0  •  aspirin 325 mg tablet, Take 1 tablet (325 mg total) by mouth daily, Disp: 30 tablet, Rfl: 2  •  atorvastatin (LIPITOR) 40 mg tablet, Take 1 tablet (40 mg total) by mouth daily after dinner, Disp: 90 tablet, Rfl: 1  •  bumetanide (BUMEX) 1 mg tablet, Take 1 tablet (1 mg total) by mouth daily, Disp: 90 tablet, Rfl: 1  •  metoprolol succinate (TOPROL-XL) 25 mg 24 hr tablet, Take 0 5 tablets (12 5 mg total) by mouth 2 (two) times a day, Disp: 90 tablet, Rfl: 1  •  multivitamin (THERAGRAN) TABS, Take 1 tablet by mouth daily, Disp: , Rfl:   •  omeprazole (PriLOSEC) 20 mg delayed release capsule, Take 1 capsule (20 mg total) by mouth daily, Disp: 90 capsule, Rfl: 1  •  tamsulosin (FLOMAX) 0 4 mg, Take 1 capsule (0 4 mg total) by mouth daily with dinner, Disp: 90 capsule, Rfl: 1    Social History     Socioeconomic History   • Marital status: Single     Spouse name: Not on file   • Number of children: Not on file   • Years of education: Not on file   • Highest education level: Not on file   Occupational History   • Not on file Tobacco Use   • Smoking status: Former Smoker     Types: Pipe     Quit date: 2021     Years since quittin 8   • Smokeless tobacco: Never Used   • Tobacco comment: Secondhand smoke exposure   Vaping Use   • Vaping Use: Never used   Substance and Sexual Activity   • Alcohol use: No   • Drug use: No   • Sexual activity: Not on file   Other Topics Concern   • Not on file   Social History Narrative    Always uses seatbelt    No caffeine use     Social Determinants of Health     Financial Resource Strain: Not on file   Food Insecurity: Not on file   Transportation Needs: Not on file   Physical Activity: Not on file   Stress: Not on file   Social Connections: Not on file   Intimate Partner Violence: Not on file   Housing Stability: Not on file       Family History   Problem Relation Age of Onset   • Other Mother         Claustrophobia   • Drug abuse Family    • Alcohol abuse Family        Physical Exam:    Vitals:     Physical Exam    Labs & Results:    Lab Results   Component Value Date    SODIUM 137 2022    K 4 3 2022     2022    CO2 25 2022    BUN 27 (H) 2022    CREATININE 1 35 (H) 2022    GLUC 96 2022    CALCIUM 8 9 2022     Lab Results   Component Value Date    WBC 6 94 2022    HGB 10 8 (L) 2022    HCT 33 2 (L) 2022    MCV 93 2022     2022     Lab Results   Component Value Date    NTBNP 43,122 (H) 2021      Lab Results   Component Value Date    CHOLESTEROL 88 2022    CHOLESTEROL 150 2021    CHOLESTEROL 199 2021     Lab Results   Component Value Date    HDL 36 (L) 2022    HDL 13 (L) 2021    HDL 38 (L) 2021     Lab Results   Component Value Date    TRIG 92 2022    TRIG 152 (H) 2021    TRIG 137 2021     Lab Results   Component Value Date    NONHDLC 52 2022    [place] 137 2021    [place] 161 2021       EKG personally reviewed by Umair Garcia  Counseling / Coordination of Care  Time spent today 25 minutes  Greater than 50% of total time was spent with the patient and / or family counseling and / or coordination of care  We went over current diagnosis, most recent studies and any changes in treatment  Thank you for the opportunity to participate in the care of this patient      [facility] PULMONARY HYPERTENSION  MEDICAL DIRECTOR OF [place]

## 2022-11-21 ENCOUNTER — OFFICE VISIT (OUTPATIENT)
Dept: CARDIOLOGY CLINIC | Facility: CLINIC | Age: 67
End: 2022-11-21

## 2022-11-21 VITALS
BODY MASS INDEX: 36.48 KG/M2 | HEART RATE: 88 BPM | SYSTOLIC BLOOD PRESSURE: 92 MMHG | OXYGEN SATURATION: 97 % | WEIGHT: 227 LBS | HEIGHT: 66 IN | DIASTOLIC BLOOD PRESSURE: 62 MMHG

## 2022-11-21 DIAGNOSIS — I50.22 CHRONIC SYSTOLIC CONGESTIVE HEART FAILURE (HCC): ICD-10-CM

## 2022-11-21 DIAGNOSIS — I25.5 ISCHEMIC CARDIOMYOPATHY: ICD-10-CM

## 2022-11-21 DIAGNOSIS — E78.2 HYPERLIPIDEMIA, MIXED: Primary | ICD-10-CM

## 2022-11-21 DIAGNOSIS — I25.10 CORONARY ARTERY DISEASE INVOLVING NATIVE CORONARY ARTERY OF NATIVE HEART WITHOUT ANGINA PECTORIS: ICD-10-CM

## 2022-12-15 ENCOUNTER — TELEPHONE (OUTPATIENT)
Dept: INTERNAL MEDICINE CLINIC | Facility: CLINIC | Age: 67
End: 2022-12-15

## 2022-12-15 NOTE — TELEPHONE ENCOUNTER
Forwarded from [address] [place] [address] [address] [facility] Internal Medicine [facility]  Jul 11, 2022  2:38 PM  Closed  Ambulatory referral for colonoscopy [[id number]]  Diagnosis: Screening for colon cancer     Patient declined screening colonoscopy   Please see referral notes for more information         Summary    Auto: [id number] COLONOSCOPY REFUSAL NOTIFICATION

## 2023-01-12 ENCOUNTER — APPOINTMENT (OUTPATIENT)
Dept: LAB | Facility: CLINIC | Age: 68
End: 2023-01-12

## 2023-01-12 DIAGNOSIS — R73.01 IMPAIRED FASTING GLUCOSE: ICD-10-CM

## 2023-01-12 DIAGNOSIS — D64.9 ANEMIA, UNSPECIFIED TYPE: ICD-10-CM

## 2023-01-12 DIAGNOSIS — Z87.74 S/P VSD REPAIR: ICD-10-CM

## 2023-01-12 DIAGNOSIS — E78.2 HYPERLIPIDEMIA, MIXED: ICD-10-CM

## 2023-01-12 DIAGNOSIS — E78.2 MIXED HYPERLIPIDEMIA: ICD-10-CM

## 2023-01-12 DIAGNOSIS — Z12.5 SCREENING FOR PROSTATE CANCER: ICD-10-CM

## 2023-01-12 LAB
ALBUMIN SERPL BCP-MCNC: 3.9 G/DL (ref 3.5–5)
ALP SERPL-CCNC: 120 U/L (ref 46–116)
ALT SERPL W P-5'-P-CCNC: 49 U/L (ref 12–78)
ANION GAP SERPL CALCULATED.3IONS-SCNC: 5 MMOL/L (ref 4–13)
AST SERPL W P-5'-P-CCNC: 17 U/L (ref 5–45)
BASOPHILS # BLD AUTO: 0.04 THOUSANDS/ÂΜL (ref 0–0.1)
BASOPHILS NFR BLD AUTO: 1 % (ref 0–1)
BILIRUB SERPL-MCNC: 0.67 MG/DL (ref 0.2–1)
BUN SERPL-MCNC: 22 MG/DL (ref 5–25)
CALCIUM SERPL-MCNC: 9.2 MG/DL (ref 8.3–10.1)
CHLORIDE SERPL-SCNC: 112 MMOL/L (ref 96–108)
CHOLEST SERPL-MCNC: 110 MG/DL
CO2 SERPL-SCNC: 25 MMOL/L (ref 21–32)
CREAT SERPL-MCNC: 1.28 MG/DL (ref 0.6–1.3)
EOSINOPHIL # BLD AUTO: 0.17 THOUSAND/ÂΜL (ref 0–0.61)
EOSINOPHIL NFR BLD AUTO: 3 % (ref 0–6)
ERYTHROCYTE [DISTWIDTH] IN BLOOD BY AUTOMATED COUNT: 13.5 % (ref 11.6–15.1)
EST. AVERAGE GLUCOSE BLD GHB EST-MCNC: 123 MG/DL
GFR SERPL CREATININE-BSD FRML MDRD: 58 ML/MIN/1.73SQ M
GLUCOSE P FAST SERPL-MCNC: 109 MG/DL (ref 65–99)
HBA1C MFR BLD: 5.9 %
HCT VFR BLD AUTO: 41.1 % (ref 36.5–49.3)
HDLC SERPL-MCNC: 40 MG/DL
HGB BLD-MCNC: 13.2 G/DL (ref 12–17)
IMM GRANULOCYTES # BLD AUTO: 0.01 THOUSAND/UL (ref 0–0.2)
IMM GRANULOCYTES NFR BLD AUTO: 0 % (ref 0–2)
LDLC SERPL CALC-MCNC: 50 MG/DL (ref 0–100)
LYMPHOCYTES # BLD AUTO: 1.19 THOUSANDS/ÂΜL (ref 0.6–4.47)
LYMPHOCYTES NFR BLD AUTO: 24 % (ref 14–44)
MCH RBC QN AUTO: 31.1 PG (ref 26.8–34.3)
MCHC RBC AUTO-ENTMCNC: 32.1 G/DL (ref 31.4–37.4)
MCV RBC AUTO: 97 FL (ref 82–98)
MONOCYTES # BLD AUTO: 0.37 THOUSAND/ÂΜL (ref 0.17–1.22)
MONOCYTES NFR BLD AUTO: 7 % (ref 4–12)
NEUTROPHILS # BLD AUTO: 3.25 THOUSANDS/ÂΜL (ref 1.85–7.62)
NEUTS SEG NFR BLD AUTO: 65 % (ref 43–75)
NRBC BLD AUTO-RTO: 0 /100 WBCS
PLATELET # BLD AUTO: 209 THOUSANDS/UL (ref 149–390)
PMV BLD AUTO: 11.1 FL (ref 8.9–12.7)
POTASSIUM SERPL-SCNC: 3.8 MMOL/L (ref 3.5–5.3)
PROT SERPL-MCNC: 7.5 G/DL (ref 6.4–8.4)
PSA SERPL-MCNC: 3.1 NG/ML (ref 0–4)
RBC # BLD AUTO: 4.24 MILLION/UL (ref 3.88–5.62)
SODIUM SERPL-SCNC: 142 MMOL/L (ref 135–147)
TRIGL SERPL-MCNC: 101 MG/DL
TSH SERPL DL<=0.05 MIU/L-ACNC: 1.84 UIU/ML (ref 0.45–4.5)
WBC # BLD AUTO: 5.03 THOUSAND/UL (ref 4.31–10.16)

## 2023-01-16 ENCOUNTER — OFFICE VISIT (OUTPATIENT)
Dept: INTERNAL MEDICINE CLINIC | Facility: CLINIC | Age: 68
End: 2023-01-16

## 2023-01-16 VITALS
HEART RATE: 76 BPM | WEIGHT: 232 LBS | OXYGEN SATURATION: 98 % | BODY MASS INDEX: 37.28 KG/M2 | SYSTOLIC BLOOD PRESSURE: 122 MMHG | HEIGHT: 66 IN | DIASTOLIC BLOOD PRESSURE: 78 MMHG | TEMPERATURE: 98 F

## 2023-01-16 DIAGNOSIS — E78.2 MIXED HYPERLIPIDEMIA: Primary | ICD-10-CM

## 2023-01-16 DIAGNOSIS — N40.1 BENIGN PROSTATIC HYPERPLASIA WITH URINARY RETENTION: ICD-10-CM

## 2023-01-16 DIAGNOSIS — Z12.11 SCREENING FOR COLON CANCER: ICD-10-CM

## 2023-01-16 DIAGNOSIS — K21.9 GASTROESOPHAGEAL REFLUX DISEASE WITHOUT ESOPHAGITIS: ICD-10-CM

## 2023-01-16 DIAGNOSIS — R33.8 BENIGN PROSTATIC HYPERPLASIA WITH URINARY RETENTION: ICD-10-CM

## 2023-01-16 DIAGNOSIS — F40.01 AGORAPHOBIA WITH PANIC ATTACKS: ICD-10-CM

## 2023-01-16 DIAGNOSIS — I50.22 CHRONIC SYSTOLIC CONGESTIVE HEART FAILURE (HCC): ICD-10-CM

## 2023-01-16 DIAGNOSIS — I25.5 ISCHEMIC CARDIOMYOPATHY: ICD-10-CM

## 2023-01-16 DIAGNOSIS — R73.01 IMPAIRED FASTING GLUCOSE: ICD-10-CM

## 2023-01-16 DIAGNOSIS — Z87.74 S/P VSD REPAIR: ICD-10-CM

## 2023-01-16 NOTE — PROGRESS NOTES
Assessment/Plan:    BMI Counseling: Body mass index is 37 45 kg/m²  The BMI is above normal  Nutrition recommendations include decreasing portion sizes, encouraging healthy choices of fruits and vegetables and decreasing fast food intake  Exercise recommendations include moderate physical activity 150 minutes/week  Rationale for BMI follow-up plan is due to patient being overweight or obese  Depression Screening and Follow-up Plan: Patient was screened for depression during today's encounter  They screened negative with a PHQ-2 score of 1             1  Mixed hyperlipidemia  -     Ambulatory Referral to Nutrition Services; Future    2  Impaired fasting glucose  -     Ambulatory Referral to Nutrition Services; Future    3  Ischemic cardiomyopathy    4  S/P VSD repair    5  Chronic systolic congestive heart failure ([facility] [address] )    6  Benign prostatic hyperplasia with urinary retention    7  Agoraphobia with panic attacks    8  Gastroesophageal reflux disease without esophagitis    9  Screening for colon cancer  -     Ambulatory referral for colonoscopy; Future    10  Body mass index 37 0-37 9, adult  -     Ambulatory Referral to Nutrition Services; Future         Subjective:      Patient ID: Fabian Patel is a 74 y o  male  Follow-up on blood test done on 1/12/2023 test discussed with him      The following portions of the patient's history were reviewed and updated as appropriate: He  has a past medical history of Central line complication (9/0/4175) and Tylenol ingestion (12/31/2021)    He   Patient Active Problem List    Diagnosis Date Noted   • Ischemic cardiomyopathy 10/17/2022   • Gastroesophageal reflux disease without esophagitis 10/17/2022   • Colon cancer screening declined 10/17/2022   • Chronic systolic congestive heart failure ([facility] [address] ) 07/11/2022   • Benign prostatic hyperplasia with urinary retention 04/12/2022   • Encounter for postoperative care 03/11/2022   • Urinary retention 01/21/2022   • S/P VSD repair 01/07/2022   • Coagulopathy ([place] [address] ) 54/55/4360   • Acute systolic congestive heart failure ([place] [address] ) 12/31/2021   • Anemia 12/31/2021   • Personal history of ECMO 12/31/2021   • Cardiogenic shock ([place] [address] ) 12/30/2021   • VSD (ventricular septal defect) 12/30/2021   • JUDE (acute kidney injury) ([place] [address] ) 12/30/2021   • Former smoker 07/06/2018   • Mixed hyperlipidemia 05/23/2017   • Impaired fasting glucose 05/23/2017   • Agoraphobia with panic attacks 11/06/2015   • Obesity (BMI 30-39 9) 11/06/2015   • Psychosis, atypical ([place] [address] ) 11/06/2015     He  has a past surgical history that includes Extracorporeal membrane oxygenation (N/A, 12/31/2021); pr ecmo/ecls initiation veno-venous (N/A, 01/04/2022); pr clsr 1 ventricular septal defect w/wo patch (N/A, 01/04/2022); IR other (12/31/2021); pr expl po hemrrg thrombosis/infctj ch (N/A, 01/07/2022); IR other (01/07/2022); and Cardiac surgery (N/A)  His family history includes Alcohol abuse in his family; Drug abuse in his family; Other in his mother  He  reports that he quit smoking about 12 months ago  His smoking use included pipe  He has never used smokeless tobacco  He reports that he does not drink alcohol and does not use drugs    Current Outpatient Medications   Medication Sig Dispense Refill   • ALPRAZolam (XANAX) 0 5 mg tablet TAKE 1 TO 2 TABLETS 30-60 MINUTES PRIOR TO LEAVING HOME AS NEEDED 30 tablet 0   • aspirin 325 mg tablet Take 1 tablet (325 mg total) by mouth daily 30 tablet 2   • atorvastatin (LIPITOR) 40 mg tablet Take 1 tablet (40 mg total) by mouth daily after dinner 90 tablet 1   • bumetanide (BUMEX) 1 mg tablet Take 1 tablet (1 mg total) by mouth daily 90 tablet 1   • metoprolol succinate (TOPROL-XL) 25 mg 24 hr tablet Take 0 5 tablets (12 5 mg total) by mouth 2 (two) times a day 90 tablet 1   • multivitamin (THERAGRAN) TABS Take 1 tablet by mouth daily     • omeprazole (PriLOSEC) 20 mg delayed release capsule Take 1 capsule (20 mg total) by mouth daily 90 capsule 1   • tamsulosin (FLOMAX) 0 4 mg Take 1 capsule (0 4 mg total) by mouth daily with dinner 90 capsule 1     No current facility-administered medications for this visit  Current Outpatient Medications on File Prior to Visit   Medication Sig   • ALPRAZolam (XANAX) 0 5 mg tablet TAKE 1 TO 2 TABLETS 30-60 MINUTES PRIOR TO LEAVING HOME AS NEEDED   • aspirin 325 mg tablet Take 1 tablet (325 mg total) by mouth daily   • atorvastatin (LIPITOR) 40 mg tablet Take 1 tablet (40 mg total) by mouth daily after dinner   • bumetanide (BUMEX) 1 mg tablet Take 1 tablet (1 mg total) by mouth daily   • metoprolol succinate (TOPROL-XL) 25 mg 24 hr tablet Take 0 5 tablets (12 5 mg total) by mouth 2 (two) times a day   • multivitamin (THERAGRAN) TABS Take 1 tablet by mouth daily   • omeprazole (PriLOSEC) 20 mg delayed release capsule Take 1 capsule (20 mg total) by mouth daily   • tamsulosin (FLOMAX) 0 4 mg Take 1 capsule (0 4 mg total) by mouth daily with dinner     No current facility-administered medications on file prior to visit  He is allergic to mushroom extract complex - food allergy, peanut oil - food allergy, shellfish-derived products - food allergy, and strawberry c [ascorbate - food allergy]       Review of Systems   Constitutional: Negative for chills and fever  HENT: Negative for congestion, ear pain and sore throat  Eyes: Negative for pain  Respiratory: Negative for cough and shortness of breath  Cardiovascular: Negative for chest pain and leg swelling  Gastrointestinal: Negative for abdominal pain, nausea and vomiting  Endocrine: Negative for polyuria  Genitourinary: Negative for difficulty urinating, frequency and urgency  Musculoskeletal: Negative for arthralgias and back pain  Skin: Negative for rash  Neurological: Negative for weakness and headaches  Psychiatric/Behavioral: Negative for sleep disturbance  The patient is not nervous/anxious            Objective:      /78 (BP Location: Left arm, Patient Position: Sitting, Cuff Size: Large)   Pulse 76   Temp 98 °F (36 7 °C) (Temporal)   Ht 5' 6" (1 676 m)   Wt 105 kg (232 lb)   SpO2 98%   BMI 37 45 kg/m²     Recent Results (from the past 1344 hour(s))   Comprehensive metabolic panel    Collection Time: 01/12/23  7:29 AM   Result Value Ref Range    Sodium 142 135 - 147 mmol/L    Potassium 3 8 3 5 - 5 3 mmol/L    Chloride 112 (H) 96 - 108 mmol/L    CO2 25 21 - 32 mmol/L    ANION GAP 5 4 - 13 mmol/L    BUN 22 5 - 25 mg/dL    Creatinine 1 28 0 60 - 1 30 mg/dL    Glucose, Fasting 109 (H) 65 - 99 mg/dL    Calcium 9 2 8 3 - 10 1 mg/dL    AST 17 5 - 45 U/L    ALT 49 12 - 78 U/L    Alkaline Phosphatase 120 (H) 46 - 116 U/L    Total Protein 7 5 6 4 - 8 4 g/dL    Albumin 3 9 3 5 - 5 0 g/dL    Total Bilirubin 0 67 0 20 - 1 00 mg/dL    eGFR 58 ml/min/1 73sq m   Hemoglobin A1C    Collection Time: 01/12/23  7:29 AM   Result Value Ref Range    Hemoglobin A1C 5 9 (H) Normal 3 8-5 6%; PreDiabetic 5 7-6 4%;  Diabetic >=6 5%; Glycemic control for adults with diabetes <7 0% %     mg/dl   PSA, Total Screen    Collection Time: 01/12/23  7:29 AM   Result Value Ref Range    PSA 3 1 0 0 - 4 0 ng/mL   CBC and differential    Collection Time: 01/12/23  7:29 AM   Result Value Ref Range    WBC 5 03 4 31 - 10 16 Thousand/uL    RBC 4 24 3 88 - 5 62 Million/uL    Hemoglobin 13 2 12 0 - 17 0 g/dL    Hematocrit 41 1 36 5 - 49 3 %    MCV 97 82 - 98 fL    MCH 31 1 26 8 - 34 3 pg    MCHC 32 1 31 4 - 37 4 g/dL    RDW 13 5 11 6 - 15 1 %    MPV 11 1 8 9 - 12 7 fL    Platelets 490 533 - 494 Thousands/uL    nRBC 0 /100 WBCs    Neutrophils Relative 65 43 - 75 %    Immat GRANS % 0 0 - 2 %    Lymphocytes Relative 24 14 - 44 %    Monocytes Relative 7 4 - 12 %    Eosinophils Relative 3 0 - 6 %    Basophils Relative 1 0 - 1 %    Neutrophils Absolute 3 25 1 85 - 7 62 Thousands/µL    Immature Grans Absolute 0 01 0 00 - 0 20 Thousand/uL    Lymphocytes Absolute 1 19 0 60 - 4 47 Thousands/µL    Monocytes Absolute 0 37 0 17 - 1 22 Thousand/µL    Eosinophils Absolute 0 17 0 00 - 0 61 Thousand/µL    Basophils Absolute 0 04 0 00 - 0 10 Thousands/µL   Lipid Panel with Direct LDL reflex    Collection Time: 01/12/23  7:29 AM   Result Value Ref Range    Cholesterol 110 See Comment mg/dL    Triglycerides 101 See Comment mg/dL    HDL, Direct 40 >=40 mg/dL    LDL Calculated 50 0 - 100 mg/dL   TSH, 3rd generation    Collection Time: 01/12/23  7:29 AM   Result Value Ref Range    TSH 3RD GENERATON 1 840 0 450 - 4 500 uIU/mL        Physical Exam  Constitutional:       Appearance: Normal appearance  HENT:      Head: Normocephalic  Right Ear: Tympanic membrane, ear canal and external ear normal       Left Ear: Tympanic membrane, ear canal and external ear normal       Nose: Nose normal  No congestion  Mouth/Throat:      Mouth: Mucous membranes are moist       Pharynx: Oropharynx is clear  No oropharyngeal exudate or posterior oropharyngeal erythema  Eyes:      Extraocular Movements: Extraocular movements intact  Conjunctiva/sclera: Conjunctivae normal       Pupils: Pupils are equal, round, and reactive to light  Cardiovascular:      Rate and Rhythm: Normal rate and regular rhythm  Heart sounds: Normal heart sounds  No murmur heard  Pulmonary:      Effort: Pulmonary effort is normal       Breath sounds: Normal breath sounds  No wheezing or rales  Abdominal:      General: Bowel sounds are normal  There is no distension  Palpations: Abdomen is soft  Tenderness: There is no abdominal tenderness  Musculoskeletal:         General: Normal range of motion  Cervical back: Normal range of motion and neck supple  Right lower leg: No edema  Left lower leg: No edema  Lymphadenopathy:      Cervical: No cervical adenopathy  Skin:     General: Skin is warm  Neurological:      General: No focal deficit present        Mental Status: He is alert and oriented to person, place, and time

## 2023-01-28 ENCOUNTER — PATIENT MESSAGE (OUTPATIENT)
Dept: INTERNAL MEDICINE CLINIC | Facility: CLINIC | Age: 68
End: 2023-01-28

## 2023-01-28 DIAGNOSIS — F40.01 AGORAPHOBIA WITH PANIC ATTACKS: ICD-10-CM

## 2023-01-30 RX ORDER — ALPRAZOLAM 0.5 MG/1
TABLET ORAL
Qty: 30 TABLET | Refills: 0 | Status: SHIPPED | OUTPATIENT
Start: 2023-01-30

## 2023-03-06 NOTE — PROGRESS NOTES
Progress Note - Cardiothoracic Surgery   [facility] [address] 61 y o  male MRN: [id number]  Unit/Bed#: [facility] 427-01 Encounter: [id number]  Cardiogenic shock  S/P VA ECMO; POD # 22  VSD/cardiogenic shock   S/P VSD repair w/ VA ECMO decannulation; POD # 18    24 Hour Events: no events or complaints  Working with PT   Appetite is good  + bm yesterday     Medications:   Scheduled Meds:  Current Facility-Administered Medications   Medication Dose Route Frequency Provider Last Rate    acetaminophen  650 mg Oral Q6H PRN Anabelle Art PA-C      acetaminophen  975 mg Oral Q8H Anabelle Art PA-C      amiodarone  200 mg Oral Q8H [address] Patricio Ag PA-C      aspirin  325 mg Oral Daily Patricio Ag PA-C      atorvastatin  80 mg Oral After [employer]      bisacodyl  10 mg Rectal Daily PRN Anabelle Art PA-C      bumetanide  2 mg Oral Daily Jayde Hughes MD      docusate sodium  100 mg Oral BID PRN Anabelle Art PA-C      insulin lispro  1-6 Units Subcutaneous TID AC Patricio Ag PA-C      insulin lispro  1-6 Units Subcutaneous HS Patricio Ag PA-C      losartan  12 5 mg Oral Daily BROOKE Contreras      metoprolol succinate  12 5 mg Oral BID BROOKE Kitchen      ondansetron  4 mg Intravenous Q6H PRN Patricio Ag PA-C      oxyCODONE  2 5 mg Oral Q4H PRN Patricio Ag PA-C      oxyCODONE  5 mg Oral Q4H PRN Patricio Ag PA-C      pantoprazole  40 mg Oral Early Morning Patricio Ag PA-C      polyethylene glycol  17 g Oral Daily PRN Anabelle Art PA-C      potassium chloride  40 mEq Oral Daily BROOKE Contreras      tamsulosin  0 4 mg Oral Daily With [address] [employer] NOA Senior      temazepam  15 mg Oral HS PRN Patricio Ag PA-C       Continuous Infusions:   PRN Meds:   acetaminophen    bisacodyl    docusate sodium    ondansetron    oxyCODONE    oxyCODONE    polyethylene glycol    temazepam    Vitals:   Vitals: previous_has_had_a_previous_peel 01/22/22 2051 01/22/22 2100 01/22/22 2303 01/23/22 0226   BP: 112/72 110/72 94/58 92/59   BP Location:       Pulse: 81   74   Resp:   18 16   Temp:   98 2 °F (36 8 °C) (!) 97 4 °F (36 3 °C)   TempSrc:    Oral   SpO2:   99%    Weight:       Height:           Telemetry: NSR; Heart Rate: 75    Respiratory:   SpO2: SpO2: 99 %; Room Air    Intake/Output:     Intake/Output Summary (Last 24 hours) at 1/23/2022 0809  Last data filed at 1/23/2022 0256  Gross per 24 hour   Intake 820 ml   Output 2050 ml   Net -1230 ml      Weights:   Weight (last 2 days)     Date/Time Weight    01/22/22 0636 97 3 (214 51)    01/21/22 0553 97 1 (214 07)            Results:       Results from last 7 days   Lab Units 01/23/22  0538 01/20/22  0445 01/19/22  0443   SODIUM mmol/L 138 140 137   POTASSIUM mmol/L 5 0 3 6 3 8   CHLORIDE mmol/L 109* 110* 104   CO2 mmol/L 26 26 29   BUN mg/dL 31* 33* 35*   CREATININE mg/dL 1 15 1 04 1 27   CALCIUM mg/dL 8 2* 6 9* 8 0*     Results from last 7 days   Lab Units 01/21/22  1027 01/20/22  0445 01/19/22  0443   INR  2 18* 3 45* 2 80*       Invasive Lines/Tubes:  Invasive Devices  Report    Peripherally Inserted Central Catheter Line            PICC Line 03/75/30 Right Basilic 16 days          Drain            Urethral Catheter Non-latex 18 Fr  2 days                Physical Exam:    HEENT/NECK:  Normocephalic  Atraumatic  No jugular venous distention  Cardiac: Regular rate and rhythm  Pulmonary:  Breath sounds clear bilaterally  Abdomen:  Non-tender, Non-distended and Normal bowel sounds  Incisions: Sternum is stable  Incision is clean, dry, and intact  Extremities: Extremities warm/dry and 1+ edema B/L  Neuro: Alert and oriented X 3  Skin: Warm/Dry, without rashes or lesions      Assessment:  Principal Problem:    S/P VSD repair  Active Problems:    Agoraphobia with panic attacks    Cardiogenic shock (HCC)    VSD (ventricular septal defect)    JUDE (acute kidney injury) (HCC)    Transaminitis When Was Your Last Peel?: 01/19/2023 Tylenol ingestion    Acute systolic congestive heart failure (HCC)    Anemia    Personal history of ECMO    Coagulopathy (HCC)    Leukocytosis    Central line complication    Thrombocytopenia (HCC)    Hypernatremia    Hyperchloremia    Hypocalcemia    Anticoagulated on Coumadin    Anticoagulation goal of INR 2 to 3    Urinary retention       Cardiogenic shock  S/P VA ECMO; POD # 22  VSD/cardiogenic shock  S/P VSD repair w/ VA ECMO decannulation; POD # 18    Plan:    1  Cardiac:   NSR; HR/BP well-controlled  Continue losartan 12 5 mg daily and Toprol xl 12 5 mg daily  Continue ASA and Statin therapy  Epicardial pacing wires out  PICC line in place for access  Continue DVT prophylaxis    2  Pulmonary:   Good Room air oxygen saturation; Continue incentive spirometry/Coughing/Deep breathing exercises  Chest tubes have been discontinued    3  Renal: car[place] in place per urology  Intake/Output net: -1230 mL/24 hours  Diuretic Regimen: Bumex 2 mg po daily  Post op Creatinine stable; Follow up labs prn    4  Neuro:  Neurologically intact; No active issues  Incisional pain well-controlled  Continue Tylenol, 975 mg PO q 8, standing dose  Continue Oxycodone, 2 5 to 5 mg PO q 4 hours prn pain    5  GI:  Tolerating diet   Maintain 1800 mL daily fluid restriction   Continue stool softeners and prn suppository  Continue GI prophylaxis    6  Endo:   Glucose well-controlled with sliding scale coverage    7    Hematology:    Post-operative blood count acceptable; Trend prn    8     Disposition:      Awaiting rehab placement    VTE Pharmacologic Prophylaxis: Sequential compression device (Venodyne) resume sqheparin  VTE Mechanical Prophylaxis: sequential compression device    Collaborative rounds completed with supervising physician  Plan of care discussed with bedside nurse    SIGNATURE: Jack Javier PA-C  DATE: January 23, 2022  TIME: 8:09 AM

## 2023-03-22 DIAGNOSIS — E78.2 HYPERLIPIDEMIA, MIXED: ICD-10-CM

## 2023-03-22 DIAGNOSIS — I25.5 ISCHEMIC CARDIOMYOPATHY: ICD-10-CM

## 2023-03-22 DIAGNOSIS — F40.01 AGORAPHOBIA WITH PANIC ATTACKS: ICD-10-CM

## 2023-03-22 DIAGNOSIS — Z87.74 S/P VSD REPAIR: ICD-10-CM

## 2023-03-22 RX ORDER — ALPRAZOLAM 0.5 MG/1
TABLET ORAL
Qty: 30 TABLET | Refills: 0 | Status: SHIPPED | OUTPATIENT
Start: 2023-03-22

## 2023-03-22 RX ORDER — OMEPRAZOLE 20 MG/1
20 CAPSULE, DELAYED RELEASE ORAL DAILY
Qty: 90 CAPSULE | Refills: 0 | Status: SHIPPED | OUTPATIENT
Start: 2023-03-22

## 2023-03-22 RX ORDER — METOPROLOL SUCCINATE 25 MG/1
12.5 TABLET, EXTENDED RELEASE ORAL 2 TIMES DAILY
Qty: 90 TABLET | Refills: 0 | Status: SHIPPED | OUTPATIENT
Start: 2023-03-22

## 2023-03-22 RX ORDER — BUMETANIDE 1 MG/1
1 TABLET ORAL DAILY
Qty: 90 TABLET | Refills: 0 | Status: SHIPPED | OUTPATIENT
Start: 2023-03-22 | End: 2023-06-20

## 2023-03-22 RX ORDER — ATORVASTATIN CALCIUM 40 MG/1
40 TABLET, FILM COATED ORAL
Qty: 90 TABLET | Refills: 0 | Status: SHIPPED | OUTPATIENT
Start: 2023-03-22

## 2023-03-22 RX ORDER — TAMSULOSIN HYDROCHLORIDE 0.4 MG/1
0.4 CAPSULE ORAL
Qty: 90 CAPSULE | Refills: 0 | Status: SHIPPED | OUTPATIENT
Start: 2023-03-22

## 2023-06-14 DIAGNOSIS — Z87.74 S/P VSD REPAIR: ICD-10-CM

## 2023-06-14 RX ORDER — METOPROLOL SUCCINATE 25 MG/1
12.5 TABLET, EXTENDED RELEASE ORAL 2 TIMES DAILY
Qty: 90 TABLET | Refills: 0 | Status: SHIPPED | OUTPATIENT
Start: 2023-06-14

## 2023-06-14 RX ORDER — TAMSULOSIN HYDROCHLORIDE 0.4 MG/1
0.4 CAPSULE ORAL
Qty: 90 CAPSULE | Refills: 0 | Status: SHIPPED | OUTPATIENT
Start: 2023-06-14

## 2023-06-14 NOTE — TELEPHONE ENCOUNTER
Refill Request (90 day supply)     Tamsulosin   Metoprolol     Pharmacy: [facility]     [place]: 4/17/23  NA: 8/14/23

## 2023-06-28 ENCOUNTER — VBI (OUTPATIENT)
Dept: ADMINISTRATIVE | Facility: OTHER | Age: 68
End: 2023-06-28

## 2023-07-10 DIAGNOSIS — F40.01 AGORAPHOBIA WITH PANIC ATTACKS: ICD-10-CM

## 2023-07-10 DIAGNOSIS — I25.5 ISCHEMIC CARDIOMYOPATHY: ICD-10-CM

## 2023-07-10 DIAGNOSIS — E78.2 HYPERLIPIDEMIA, MIXED: ICD-10-CM

## 2023-07-10 DIAGNOSIS — Z87.74 S/P VSD REPAIR: ICD-10-CM

## 2023-07-10 RX ORDER — METOPROLOL SUCCINATE 25 MG/1
12.5 TABLET, EXTENDED RELEASE ORAL 2 TIMES DAILY
Qty: 90 TABLET | Refills: 0 | Status: SHIPPED | OUTPATIENT
Start: 2023-07-10

## 2023-07-10 RX ORDER — TAMSULOSIN HYDROCHLORIDE 0.4 MG/1
0.4 CAPSULE ORAL
Qty: 90 CAPSULE | Refills: 0 | Status: SHIPPED | OUTPATIENT
Start: 2023-07-10

## 2023-07-10 RX ORDER — BUMETANIDE 1 MG/1
1 TABLET ORAL DAILY
Qty: 90 TABLET | Refills: 0 | Status: SHIPPED | OUTPATIENT
Start: 2023-07-10 | End: 2023-07-17 | Stop reason: SDUPTHER

## 2023-07-10 RX ORDER — ALPRAZOLAM 0.5 MG/1
TABLET ORAL
Qty: 30 TABLET | Refills: 0 | Status: SHIPPED | OUTPATIENT
Start: 2023-07-10 | End: 2023-07-20 | Stop reason: SDUPTHER

## 2023-07-10 RX ORDER — OMEPRAZOLE 20 MG/1
20 CAPSULE, DELAYED RELEASE ORAL DAILY
Qty: 90 CAPSULE | Refills: 0 | Status: SHIPPED | OUTPATIENT
Start: 2023-07-10 | End: 2023-07-17 | Stop reason: SDUPTHER

## 2023-07-10 RX ORDER — ATORVASTATIN CALCIUM 40 MG/1
40 TABLET, FILM COATED ORAL
Qty: 90 TABLET | Refills: 1 | Status: SHIPPED | OUTPATIENT
Start: 2023-07-10

## 2023-07-17 DIAGNOSIS — Z87.74 S/P VSD REPAIR: ICD-10-CM

## 2023-07-17 RX ORDER — OMEPRAZOLE 20 MG/1
20 CAPSULE, DELAYED RELEASE ORAL DAILY
Qty: 90 CAPSULE | Refills: 0 | Status: SHIPPED | OUTPATIENT
Start: 2023-07-17

## 2023-07-17 RX ORDER — BUMETANIDE 1 MG/1
1 TABLET ORAL DAILY
Qty: 90 TABLET | Refills: 0 | Status: SHIPPED | OUTPATIENT
Start: 2023-07-17 | End: 2023-10-15

## 2023-07-17 NOTE — TELEPHONE ENCOUNTER
Refill Request (requesting 90 days plus refills)     Omeprazole   Bumetanide     Pharmacy: [address]     LA: 4/17/23  NA: 8/14/23

## 2023-07-20 DIAGNOSIS — F40.01 AGORAPHOBIA WITH PANIC ATTACKS: ICD-10-CM

## 2023-07-20 RX ORDER — ALPRAZOLAM 0.5 MG/1
TABLET ORAL
Qty: 30 TABLET | Refills: 0 | Status: SHIPPED | OUTPATIENT
Start: 2023-07-20

## 2023-07-27 NOTE — PROGRESS NOTES
Heart Failure Outpatient Progress Note - Chapis George 74 y.o. male MRN: [id number]    @ Encounter: [id number]      Assessment/Plan:    Patient Active Problem List    Diagnosis Date Noted   • Ischemic cardiomyopathy 10/17/2022   • Gastroesophageal reflux disease without esophagitis 10/17/2022   • Colon cancer screening declined 10/17/2022   • Chronic systolic congestive heart failure ([address]) 07/11/2022   • Benign prostatic hyperplasia with urinary retention 04/12/2022   • Encounter for postoperative care 03/11/2022   • Urinary retention 01/21/2022   • S/P VSD repair 01/07/2022   • Coagulopathy ([address]) 95/19/2069   • Acute systolic congestive heart failure ([address]) 12/31/2021   • Anemia 12/31/2021   • Personal history of ECMO 12/31/2021   • Cardiogenic shock ([address]) 12/30/2021   • VSD (ventricular septal defect) 12/30/2021   • JUDE (acute kidney injury) ([address]) 12/30/2021   • Former smoker 07/06/2018   • Mixed hyperlipidemia 05/23/2017   • Impaired fasting glucose 05/23/2017   • Agoraphobia with panic attacks 11/06/2015   • Obesity (BMI 30-39.9) 11/06/2015   • Psychosis, atypical ([address]) 11/06/2015       # Chronic HFmrEF, Stage C  S/P Cardiogenic shock due to anteroseptal MI complicated by VSD on [facility] ECMO 12/31/21 s/p VSD repair with large bovine pericardial patch and ECMO decannulation 1/4/2022, also with vasodilatory component post op; s/p multiple blood product transfusions     Weight: 195 lbs, 208 lbs, 227 lbs, 234 lbs today    Studies personally reviewed by myself:  Echo 4/7/22  LVEF: 50%  LVEDd: 5.3 cm  IVS: surgically repaired VSD, no residual shunt  RV: normal    Echo 1/13/22  LVEF 50%  Normal RV size and systolic function     Echo 1/10/22 - on milrnone  LVEF 40%  Normal RV size and systolic function     Echocardiogram 12/30/21  LVEF: 40-45%  LVIDd: 4.5cm  RV: normal size, moderately reduced systolic function  MR: none seen  PASP: 65mmHg + RAP  RVOT: shortened PAAT  Other: muscular VSD with large left to right shunt; dyskinetic septum     EKG 1/3/2022: sinus rhythm, IRBBB, anteroseptal q waves  EKG 12/30/21: sinus tachycardia, RBBB, anteroseptal ST segment elevation     Neurohormonal Blockade: -   --Beta-Blocker: metoprolol tartrate 12.5 mg BID  --ACEi, ARB or ARNi:    (or SVR reduction)   --Aldosterone Receptor Blocker:  --SGLT2i:   --Diuretic: Bumex 1 mg daily     Sudden Cardiac Death Risk Reduction:  --ICD: LVEF > 35%     Electrical Resynchronization:  -- QRSd 98msec     Advanced Therapies (If appropriate): --We will continue to monitor  Current smoker. Small LV and VSD precluding LVAD     # Coronary artery disease, undefined anatomy  Rx: asa 325 mg  daily, atorvastatin 80 mg  Angina: none    # dyslipidemia- atorvastatin 80 mg   3/28/22: LDL 34, HDL 36  9/13/21: , HDL 38     # CKD, baseline 1.09 on 1/27, Cr 1.35 on 3/28    # S/p Acute hypoxic respiratory failure, resolved  # tobacco use     Plan:  Lipids at goal on atorvastatin 40 mg  Quit tobacco  Continue asa, statin for CAD  Needs to do more activity- is walking 3-4 days a week  No night time eating    HPI:     75 yo male presents for follow up after prolonged hospitalization from 12/20/21 through 1/31/22 with later presentation anteroseptal MI presenting in cardiogenic shock requiring VSD repair with large bovine patch and hemodynamic support with [facility] ECMO. He required repeat sternotomy for mediastinal re-exploration with removal of retained PA catheter on 1/7/22. He was maintained on inotropic support for quite a while post operatively. Of note, he also required transfusion of multiple blood products. Interval History:  Decreased atorvastatin to 40 mg   Walking 3-4 times a week  Wt 234 lbs    Review of Systems   Constitutional: Negative for activity change, appetite change, fatigue and unexpected weight change (wt up 20 lbs). HENT: Negative for congestion and nosebleeds. Eyes: Negative.     Respiratory: Negative for cough, chest tightness and shortness of breath. Cardiovascular: Negative for chest pain, palpitations and leg swelling. Gastrointestinal: Negative for abdominal distention. Endocrine: Negative. Genitourinary: Negative. Musculoskeletal: Negative. Skin: Negative. Neurological: Negative for dizziness, syncope and weakness. Hematological: Negative. Psychiatric/Behavioral: Negative. Past Medical History:   Diagnosis Date   • Central line complication 4/6/6904   • Tylenol ingestion 12/31/2021         Allergies   Allergen Reactions   • Mushroom Extract Complex - Food Allergy Facial Swelling   • Peanut Oil - Food Allergy    • Shellfish-Derived Products - Food Allergy Other (See Comments)     Runs in the family   • Strawberry C [Ascorbate - Food Allergy]      .     Current Outpatient Medications:   •  ALPRAZolam (XANAX) 0.5 mg tablet, TAKE 1 TO 2 TABLETS 30-60 MINUTES PRIOR TO LEAVING HOME AS NEEDED, Disp: 30 tablet, Rfl: 0  •  aspirin 325 mg tablet, Take 1 tablet (325 mg total) by mouth daily, Disp: 30 tablet, Rfl: 2  •  atorvastatin (LIPITOR) 40 mg tablet, Take 1 tablet (40 mg total) by mouth daily after dinner, Disp: 90 tablet, Rfl: 1  •  bumetanide (BUMEX) 1 mg tablet, Take 1 tablet (1 mg total) by mouth daily, Disp: 90 tablet, Rfl: 0  •  metoprolol succinate (TOPROL-XL) 25 mg 24 hr tablet, Take 0.5 tablets (12.5 mg total) by mouth 2 (two) times a day, Disp: 90 tablet, Rfl: 0  •  multivitamin (THERAGRAN) TABS, Take 1 tablet by mouth daily, Disp: , Rfl:   •  omeprazole (PriLOSEC) 20 mg delayed release capsule, Take 1 capsule (20 mg total) by mouth daily, Disp: 90 capsule, Rfl: 0  •  tamsulosin (FLOMAX) 0.4 mg, Take 1 capsule (0.4 mg total) by mouth daily with dinner, Disp: 90 capsule, Rfl: 0    Social History     Socioeconomic History   • Marital status: Single     Spouse name: Not on file   • Number of children: Not on file   • Years of education: Not on file   • Highest education level: Not on file   Occupational History   • Not on file   Tobacco Use   • Smoking status: Former     Types: Pipe     Quit date: 2021     Years since quittin.5   • Smokeless tobacco: Never   • Tobacco comments:     Secondhand smoke exposure   Vaping Use   • Vaping Use: Never used   Substance and Sexual Activity   • Alcohol use: No   • Drug use: No   • Sexual activity: Not on file   Other Topics Concern   • Not on file   Social History Narrative    Always uses seatbelt    No caffeine use     Social Determinants of Health     Financial Resource Strain: Not on file   Food Insecurity: Not on file   Transportation Needs: Not on file   Physical Activity: Not on file   Stress: Not on file   Social Connections: Not on file   Intimate Partner Violence: Not on file   Housing Stability: Not on file       Family History   Problem Relation Age of Onset   • Other Mother         Claustrophobia   • Drug abuse Family    • Alcohol abuse Family        Physical Exam:    Vitals:     Physical Exam    Labs & Results:    Lab Results   Component Value Date    SODIUM 142 2023    K 3.8 2023     (H) 2023    CO2 25 2023    BUN 22 2023    CREATININE 1.28 2023    GLUC 96 2022    CALCIUM 9.2 2023     Lab Results   Component Value Date    WBC 5.03 2023    HGB 13.2 2023    HCT 41.1 2023    MCV 97 2023     2023     Lab Results   Component Value Date    NTBNP 43,122 (H) 2021      Lab Results   Component Value Date    CHOLESTEROL 110 2023    CHOLESTEROL 88 2022    CHOLESTEROL 150 2021     Lab Results   Component Value Date    HDL 40 2023    HDL 36 (L) 2022    HDL 13 (L) 2021     Lab Results   Component Value Date    TRIG 101 2023    TRIG 92 2022    TRIG 152 (H) 2021     Lab Results   Component Value Date    NONHDLC 52 2022    [address] 137 2021    [address] 161 2021       EKG personally reviewed by Monika Diaz Counseling / Coordination of Care  Time spent today 25 minutes. Greater than 50% of total time was spent with the patient and / or family counseling and / or coordination of care. We went over current diagnosis, most recent studies and any changes in treatment. Thank you for the opportunity to participate in the care of this patient.     Chyna Whitfield P[facility]  MEDICAL DIRECTOR OF [address] [place] [address]

## 2023-07-31 ENCOUNTER — OFFICE VISIT (OUTPATIENT)
Dept: CARDIOLOGY CLINIC | Facility: CLINIC | Age: 68
End: 2023-07-31
Payer: COMMERCIAL

## 2023-07-31 VITALS
WEIGHT: 234 LBS | OXYGEN SATURATION: 98 % | HEART RATE: 90 BPM | BODY MASS INDEX: 37.61 KG/M2 | HEIGHT: 66 IN | DIASTOLIC BLOOD PRESSURE: 70 MMHG | SYSTOLIC BLOOD PRESSURE: 102 MMHG

## 2023-07-31 DIAGNOSIS — E78.2 MIXED HYPERLIPIDEMIA: ICD-10-CM

## 2023-07-31 DIAGNOSIS — I25.5 ISCHEMIC CARDIOMYOPATHY: ICD-10-CM

## 2023-07-31 DIAGNOSIS — I50.22 CHRONIC SYSTOLIC CONGESTIVE HEART FAILURE (HCC): Primary | ICD-10-CM

## 2023-07-31 PROCEDURE — 99214 OFFICE O/P EST MOD 30 MIN: CPT | Performed by: INTERNAL MEDICINE

## 2023-07-31 NOTE — PATIENT INSTRUCTIONS
I think you can lose weight, keep doing your activity    Do not eat after dinner, don't eat again until the next morning- this gives you a 12 hour break    Do not eat until you are stuffed    Do not drink sugary drinks

## 2023-08-10 ENCOUNTER — APPOINTMENT (OUTPATIENT)
Dept: LAB | Facility: CLINIC | Age: 68
End: 2023-08-10
Payer: COMMERCIAL

## 2023-08-10 DIAGNOSIS — R73.01 IMPAIRED FASTING GLUCOSE: ICD-10-CM

## 2023-08-10 DIAGNOSIS — E78.2 MIXED HYPERLIPIDEMIA: ICD-10-CM

## 2023-08-10 LAB
ALBUMIN SERPL BCP-MCNC: 4 G/DL (ref 3.5–5)
ALP SERPL-CCNC: 121 U/L (ref 46–116)
ALT SERPL W P-5'-P-CCNC: 59 U/L (ref 12–78)
ANION GAP SERPL CALCULATED.3IONS-SCNC: 3 MMOL/L
AST SERPL W P-5'-P-CCNC: 20 U/L (ref 5–45)
BASOPHILS # BLD AUTO: 0.05 THOUSANDS/ÂΜL (ref 0–0.1)
BASOPHILS NFR BLD AUTO: 1 % (ref 0–1)
BILIRUB SERPL-MCNC: 0.66 MG/DL (ref 0.2–1)
BUN SERPL-MCNC: 26 MG/DL (ref 5–25)
CALCIUM SERPL-MCNC: 9.1 MG/DL (ref 8.3–10.1)
CHLORIDE SERPL-SCNC: 113 MMOL/L (ref 96–108)
CHOLEST SERPL-MCNC: 89 MG/DL
CO2 SERPL-SCNC: 27 MMOL/L (ref 21–32)
CREAT SERPL-MCNC: 1.5 MG/DL (ref 0.6–1.3)
EOSINOPHIL # BLD AUTO: 0.19 THOUSAND/ÂΜL (ref 0–0.61)
EOSINOPHIL NFR BLD AUTO: 3 % (ref 0–6)
ERYTHROCYTE [DISTWIDTH] IN BLOOD BY AUTOMATED COUNT: 14 % (ref 11.6–15.1)
GFR SERPL CREATININE-BSD FRML MDRD: 48 ML/MIN/1.73SQ M
GLUCOSE P FAST SERPL-MCNC: 103 MG/DL (ref 65–99)
HCT VFR BLD AUTO: 41.7 % (ref 36.5–49.3)
HDLC SERPL-MCNC: 38 MG/DL
HGB BLD-MCNC: 13.2 G/DL (ref 12–17)
IMM GRANULOCYTES # BLD AUTO: 0.01 THOUSAND/UL (ref 0–0.2)
IMM GRANULOCYTES NFR BLD AUTO: 0 % (ref 0–2)
LDLC SERPL CALC-MCNC: 36 MG/DL (ref 0–100)
LYMPHOCYTES # BLD AUTO: 1.04 THOUSANDS/ÂΜL (ref 0.6–4.47)
LYMPHOCYTES NFR BLD AUTO: 18 % (ref 14–44)
MCH RBC QN AUTO: 31 PG (ref 26.8–34.3)
MCHC RBC AUTO-ENTMCNC: 31.7 G/DL (ref 31.4–37.4)
MCV RBC AUTO: 98 FL (ref 82–98)
MONOCYTES # BLD AUTO: 0.45 THOUSAND/ÂΜL (ref 0.17–1.22)
MONOCYTES NFR BLD AUTO: 8 % (ref 4–12)
NEUTROPHILS # BLD AUTO: 4 THOUSANDS/ÂΜL (ref 1.85–7.62)
NEUTS SEG NFR BLD AUTO: 70 % (ref 43–75)
NRBC BLD AUTO-RTO: 0 /100 WBCS
PLATELET # BLD AUTO: 201 THOUSANDS/UL (ref 149–390)
PMV BLD AUTO: 11.5 FL (ref 8.9–12.7)
POTASSIUM SERPL-SCNC: 4 MMOL/L (ref 3.5–5.3)
PROT SERPL-MCNC: 7.7 G/DL (ref 6.4–8.4)
RBC # BLD AUTO: 4.26 MILLION/UL (ref 3.88–5.62)
SODIUM SERPL-SCNC: 143 MMOL/L (ref 135–147)
TRIGL SERPL-MCNC: 74 MG/DL
TSH SERPL DL<=0.05 MIU/L-ACNC: 1.97 UIU/ML (ref 0.45–4.5)
WBC # BLD AUTO: 5.74 THOUSAND/UL (ref 4.31–10.16)

## 2023-08-10 PROCEDURE — 80053 COMPREHEN METABOLIC PANEL: CPT

## 2023-08-10 PROCEDURE — 85025 COMPLETE CBC W/AUTO DIFF WBC: CPT

## 2023-08-10 PROCEDURE — 36415 COLL VENOUS BLD VENIPUNCTURE: CPT

## 2023-08-10 PROCEDURE — 80061 LIPID PANEL: CPT

## 2023-08-10 PROCEDURE — 84443 ASSAY THYROID STIM HORMONE: CPT

## 2023-08-10 PROCEDURE — 83036 HEMOGLOBIN GLYCOSYLATED A1C: CPT

## 2023-08-11 LAB
EST. AVERAGE GLUCOSE BLD GHB EST-MCNC: 134 MG/DL
HBA1C MFR BLD: 6.3 %

## 2023-08-14 ENCOUNTER — OFFICE VISIT (OUTPATIENT)
Dept: INTERNAL MEDICINE CLINIC | Facility: CLINIC | Age: 68
End: 2023-08-14
Payer: COMMERCIAL

## 2023-08-14 VITALS
HEART RATE: 68 BPM | WEIGHT: 236.4 LBS | BODY MASS INDEX: 37.99 KG/M2 | TEMPERATURE: 97.7 F | OXYGEN SATURATION: 99 % | DIASTOLIC BLOOD PRESSURE: 78 MMHG | SYSTOLIC BLOOD PRESSURE: 130 MMHG | HEIGHT: 66 IN

## 2023-08-14 DIAGNOSIS — R33.8 BENIGN PROSTATIC HYPERPLASIA WITH URINARY RETENTION: ICD-10-CM

## 2023-08-14 DIAGNOSIS — N40.1 BENIGN PROSTATIC HYPERPLASIA WITH URINARY RETENTION: ICD-10-CM

## 2023-08-14 DIAGNOSIS — R73.01 IMPAIRED FASTING GLUCOSE: Primary | ICD-10-CM

## 2023-08-14 DIAGNOSIS — I50.22 CHRONIC SYSTOLIC CONGESTIVE HEART FAILURE (HCC): ICD-10-CM

## 2023-08-14 DIAGNOSIS — K21.9 GASTROESOPHAGEAL REFLUX DISEASE WITHOUT ESOPHAGITIS: ICD-10-CM

## 2023-08-14 DIAGNOSIS — Z53.20 COLON CANCER SCREENING DECLINED: ICD-10-CM

## 2023-08-14 DIAGNOSIS — F40.01 AGORAPHOBIA WITH PANIC ATTACKS: ICD-10-CM

## 2023-08-14 DIAGNOSIS — E78.2 MIXED HYPERLIPIDEMIA: ICD-10-CM

## 2023-08-14 PROCEDURE — 99214 OFFICE O/P EST MOD 30 MIN: CPT | Performed by: INTERNAL MEDICINE

## 2023-08-14 NOTE — PROGRESS NOTES
Assessment/Plan:      Depression Screening and Follow-up Plan: Patient was screened for depression during today's encounter. They screened negative with a PHQ-2 score of 2.            1. Impaired fasting glucose  -     Basic metabolic panel; Future    2. Agoraphobia with panic attacks    3. Mixed hyperlipidemia    4. Benign prostatic hyperplasia with urinary retention    5. Chronic systolic congestive heart failure (HCC)  -     Basic metabolic panel; Future    6. Gastroesophageal reflux disease without esophagitis    7. Body mass index 38.0-38.9, adult    8. Colon cancer screening declined           Subjective:      Patient ID: Sejal Gunn is a 74 y.o. male. Follow-up on blood test done on 8/10/2023 test discussed with him      The following portions of the patient's history were reviewed and updated as appropriate: He  has a past medical history of Allergic, Anemia, Anxiety, Central line complication (35/09/1590), Chronic kidney disease (12/30/21), Coronary artery disease (12/30/21), Myocardial infarction ([address]) (12/30/21), Obesity, Tylenol ingestion (12/31/2021), and Urinary tract infection (1/23/22).   He   Patient Active Problem List    Diagnosis Date Noted   • Ischemic cardiomyopathy 10/17/2022   • Gastroesophageal reflux disease without esophagitis 10/17/2022   • Colon cancer screening declined 10/17/2022   • Chronic systolic congestive heart failure ([address]) 07/11/2022   • Benign prostatic hyperplasia with urinary retention 04/12/2022   • Encounter for postoperative care 03/11/2022   • Urinary retention 01/21/2022   • S/P VSD repair 01/07/2022   • Coagulopathy ([address]) 65/67/7507   • Acute systolic congestive heart failure ([address]) 12/31/2021   • Anemia 12/31/2021   • Personal history of ECMO 12/31/2021   • Cardiogenic shock ([address]) 12/30/2021   • VSD (ventricular septal defect) 12/30/2021   • JUDE (acute kidney injury) ([address]) 12/30/2021   • Former smoker 07/06/2018   • Mixed hyperlipidemia 05/23/2017   • Impaired fasting glucose 05/23/2017   • Agoraphobia with panic attacks 11/06/2015   • Body mass index 38.0-38.9, adult 11/06/2015   • Psychosis, atypical ([address]) 11/06/2015     He  has a past surgical history that includes Extracorporeal membrane oxygenation (N/A, 12/31/2021); pr ecmo/ecls initiation veno-venous (N/A, 01/04/2022); pr clsr 1 ventricular septal defect w/wo patch (N/A, 01/04/2022); IR other (12/31/2021); pr expl po hemrrg thrombosis/infctj ch (N/A, 01/07/2022); IR other (01/07/2022); Cardiac surgery (N/A); and Coronary artery bypass graft. His family history includes Alcohol abuse in his family and father; Anxiety disorder in his mother; Breast cancer in his sister; Drug abuse in his family; Heart disease in his mother; Mental illness in his mother; Other in his mother. He  reports that he quit smoking about 19 months ago. His smoking use included pipe. He has never used smokeless tobacco. He reports that he does not drink alcohol and does not use drugs. Current Outpatient Medications   Medication Sig Dispense Refill   • ALPRAZolam (XANAX) 0.5 mg tablet TAKE 1 TO 2 TABLETS 30-60 MINUTES PRIOR TO LEAVING HOME AS NEEDED 30 tablet 0   • aspirin 325 mg tablet Take 1 tablet (325 mg total) by mouth daily 30 tablet 2   • atorvastatin (LIPITOR) 40 mg tablet Take 1 tablet (40 mg total) by mouth daily after dinner 90 tablet 1   • bumetanide (BUMEX) 1 mg tablet Take 1 tablet (1 mg total) by mouth daily 90 tablet 0   • metoprolol succinate (TOPROL-XL) 25 mg 24 hr tablet Take 0.5 tablets (12.5 mg total) by mouth 2 (two) times a day 90 tablet 0   • multivitamin (THERAGRAN) TABS Take 1 tablet by mouth daily     • omeprazole (PriLOSEC) 20 mg delayed release capsule Take 1 capsule (20 mg total) by mouth daily 90 capsule 0   • tamsulosin (FLOMAX) 0.4 mg Take 1 capsule (0.4 mg total) by mouth daily with dinner 90 capsule 0     No current facility-administered medications for this visit.      Current Outpatient Medications on File Prior to Visit   Medication Sig   • ALPRAZolam (XANAX) 0.5 mg tablet TAKE 1 TO 2 TABLETS 30-60 MINUTES PRIOR TO LEAVING HOME AS NEEDED   • aspirin 325 mg tablet Take 1 tablet (325 mg total) by mouth daily   • atorvastatin (LIPITOR) 40 mg tablet Take 1 tablet (40 mg total) by mouth daily after dinner   • bumetanide (BUMEX) 1 mg tablet Take 1 tablet (1 mg total) by mouth daily   • metoprolol succinate (TOPROL-XL) 25 mg 24 hr tablet Take 0.5 tablets (12.5 mg total) by mouth 2 (two) times a day   • multivitamin (THERAGRAN) TABS Take 1 tablet by mouth daily   • omeprazole (PriLOSEC) 20 mg delayed release capsule Take 1 capsule (20 mg total) by mouth daily   • tamsulosin (FLOMAX) 0.4 mg Take 1 capsule (0.4 mg total) by mouth daily with dinner     No current facility-administered medications on file prior to visit. He is allergic to mushroom extract complex - food allergy, peanut oil - food allergy, shellfish-derived products - food allergy, and strawberry c [ascorbate - food allergy]. .    Review of Systems   Constitutional: Negative for chills and fever. HENT: Negative for congestion, ear pain and sore throat. Eyes: Negative for pain. Respiratory: Negative for cough and shortness of breath. Cardiovascular: Negative for chest pain and leg swelling. Gastrointestinal: Negative for abdominal pain, nausea and vomiting. Endocrine: Negative for polyuria. Genitourinary: Negative for difficulty urinating, frequency and urgency. Musculoskeletal: Negative for arthralgias and back pain. Skin: Negative for rash. Neurological: Negative for weakness and headaches. Psychiatric/Behavioral: Negative for sleep disturbance. The patient is not nervous/anxious.           Objective:      /78 (BP Location: Left arm, Patient Position: Sitting, Cuff Size: Standard)   Pulse 68   Temp 97.7 °F (36.5 °C) (Temporal)   Ht 5' 6" (1.676 m)   Wt 107 kg (236 lb 6.4 oz)   SpO2 99%   BMI 38.16 kg/m²     Recent Results (from the past 1344 hour(s))   CBC and differential    Collection Time: 08/10/23  6:47 AM   Result Value Ref Range    WBC 5.74 4.31 - 10.16 Thousand/uL    RBC 4.26 3.88 - 5.62 Million/uL    Hemoglobin 13.2 12.0 - 17.0 g/dL    Hematocrit 41.7 36.5 - 49.3 %    MCV 98 82 - 98 fL    MCH 31.0 26.8 - 34.3 pg    MCHC 31.7 31.4 - 37.4 g/dL    RDW 14.0 11.6 - 15.1 %    MPV 11.5 8.9 - 12.7 fL    Platelets 368 531 - 418 Thousands/uL    nRBC 0 /100 WBCs    Neutrophils Relative 70 43 - 75 %    Immat GRANS % 0 0 - 2 %    Lymphocytes Relative 18 14 - 44 %    Monocytes Relative 8 4 - 12 %    Eosinophils Relative 3 0 - 6 %    Basophils Relative 1 0 - 1 %    Neutrophils Absolute 4.00 1.85 - 7.62 Thousands/µL    Immature Grans Absolute 0.01 0.00 - 0.20 Thousand/uL    Lymphocytes Absolute 1.04 0.60 - 4.47 Thousands/µL    Monocytes Absolute 0.45 0.17 - 1.22 Thousand/µL    Eosinophils Absolute 0.19 0.00 - 0.61 Thousand/µL    Basophils Absolute 0.05 0.00 - 0.10 Thousands/µL   Comprehensive metabolic panel    Collection Time: 08/10/23  6:47 AM   Result Value Ref Range    Sodium 143 135 - 147 mmol/L    Potassium 4.0 3.5 - 5.3 mmol/L    Chloride 113 (H) 96 - 108 mmol/L    CO2 27 21 - 32 mmol/L    ANION GAP 3 mmol/L    BUN 26 (H) 5 - 25 mg/dL    Creatinine 1.50 (H) 0.60 - 1.30 mg/dL    Glucose, Fasting 103 (H) 65 - 99 mg/dL    Calcium 9.1 8.3 - 10.1 mg/dL    AST 20 5 - 45 U/L    ALT 59 12 - 78 U/L    Alkaline Phosphatase 121 (H) 46 - 116 U/L    Total Protein 7.7 6.4 - 8.4 g/dL    Albumin 4.0 3.5 - 5.0 g/dL    Total Bilirubin 0.66 0.20 - 1.00 mg/dL    eGFR 48 ml/min/1.73sq m   Hemoglobin A1C    Collection Time: 08/10/23  6:47 AM   Result Value Ref Range    Hemoglobin A1C 6.3 (H) Normal 4.0-5.6%; PreDiabetic 5.7-6.4%;  Diabetic >=6.5%; Glycemic control for adults with diabetes <7.0% %     mg/dl   Lipid Panel with Direct LDL reflex    Collection Time: 08/10/23  6:47 AM   Result Value Ref Range    Cholesterol 89 See Comment mg/dL Triglycerides 74 See Comment mg/dL    HDL, Direct 38 (L) >=40 mg/dL    LDL Calculated 36 0 - 100 mg/dL   TSH, 3rd generation    Collection Time: 08/10/23  6:47 AM   Result Value Ref Range    TSH 3RD GENERATON 1.974 0.450 - 4.500 uIU/mL        Physical Exam  Constitutional:       Appearance: Normal appearance. HENT:      Head: Normocephalic. Right Ear: Tympanic membrane, ear canal and external ear normal.      Left Ear: Tympanic membrane, ear canal and external ear normal.      Nose: Nose normal. No congestion. Mouth/Throat:      Mouth: Mucous membranes are moist.      Pharynx: Oropharynx is clear. No oropharyngeal exudate or posterior oropharyngeal erythema. Eyes:      Extraocular Movements: Extraocular movements intact. Conjunctiva/sclera: Conjunctivae normal.   Cardiovascular:      Rate and Rhythm: Normal rate and regular rhythm. Heart sounds: Normal heart sounds. No murmur heard. Pulmonary:      Effort: Pulmonary effort is normal.      Breath sounds: Normal breath sounds. No wheezing or rales. Abdominal:      General: Bowel sounds are normal. There is no distension. Palpations: Abdomen is soft. Tenderness: There is no abdominal tenderness. Musculoskeletal:         General: Normal range of motion. Cervical back: Normal range of motion and neck supple. Right lower leg: No edema. Left lower leg: No edema. Lymphadenopathy:      Cervical: No cervical adenopathy. Skin:     General: Skin is warm. Neurological:      General: No focal deficit present. Mental Status: He is alert and oriented to person, place, and time.

## 2023-09-01 ENCOUNTER — APPOINTMENT (OUTPATIENT)
Dept: LAB | Facility: CLINIC | Age: 68
End: 2023-09-01
Payer: COMMERCIAL

## 2023-09-01 DIAGNOSIS — R73.01 IMPAIRED FASTING GLUCOSE: ICD-10-CM

## 2023-09-01 DIAGNOSIS — I50.22 CHRONIC SYSTOLIC CONGESTIVE HEART FAILURE (HCC): ICD-10-CM

## 2023-09-01 DIAGNOSIS — N18.31 STAGE 3A CHRONIC KIDNEY DISEASE (HCC): Primary | ICD-10-CM

## 2023-09-01 LAB
ANION GAP SERPL CALCULATED.3IONS-SCNC: 5 MMOL/L
BUN SERPL-MCNC: 26 MG/DL (ref 5–25)
CALCIUM SERPL-MCNC: 9.1 MG/DL (ref 8.4–10.2)
CHLORIDE SERPL-SCNC: 105 MMOL/L (ref 96–108)
CO2 SERPL-SCNC: 31 MMOL/L (ref 21–32)
CREAT SERPL-MCNC: 1.43 MG/DL (ref 0.6–1.3)
GFR SERPL CREATININE-BSD FRML MDRD: 50 ML/MIN/1.73SQ M
GLUCOSE P FAST SERPL-MCNC: 92 MG/DL (ref 65–99)
POTASSIUM SERPL-SCNC: 4.3 MMOL/L (ref 3.5–5.3)
SODIUM SERPL-SCNC: 141 MMOL/L (ref 135–147)

## 2023-09-01 PROCEDURE — 36415 COLL VENOUS BLD VENIPUNCTURE: CPT

## 2023-09-01 PROCEDURE — 80048 BASIC METABOLIC PNL TOTAL CA: CPT

## 2023-09-06 ENCOUNTER — TELEPHONE (OUTPATIENT)
Dept: NEPHROLOGY | Facility: CLINIC | Age: 68
End: 2023-09-06

## 2023-09-06 NOTE — TELEPHONE ENCOUNTER
New Patient Intake Form   Patient Details   Masood Butcher     1955     [id number]     Insurance Information   Name of [address]   Does the patient need an insurance referral? No   If patient has [employer], please ask if they will be using their [employer]. Appointment Information   Who is calling to schedule? If not patient, what is callers name? Pt's brother in law, Nick Doran    Referring Provider Celso Lundberg MD   Reason for Appt (Diagnosis) N18.31 (ICD-10-CM) - Stage 3a chronic kidney disease ([address])   Does Patient have labs/urine done at [facility]? If not, where do they go? List the date of last lab / urine  *Please try to get labs 2 years back if not at  Yes   Has patient been hospitalized recently? If yes, list name and location of hospital they were in No   Has patient been seen by a Nephrologist before? If yes, list name, location and phone number No   Has the patient had renal imaging done? If so, list the most recent date and type of imagineg No   Does patient have a history of Kidney Stones? No   Appointment Details   Is there a referral on file?  Yes    Appointment Date 12/14/23   Location [facility]    Miscellaneous   Added to wait list

## 2023-10-08 DIAGNOSIS — E63.9 NUTRITION DISORDER: ICD-10-CM

## 2023-10-08 DIAGNOSIS — I25.5 ISCHEMIC CARDIOMYOPATHY: ICD-10-CM

## 2023-10-08 DIAGNOSIS — F40.01 AGORAPHOBIA WITH PANIC ATTACKS: ICD-10-CM

## 2023-10-08 DIAGNOSIS — E78.2 HYPERLIPIDEMIA, MIXED: ICD-10-CM

## 2023-10-08 DIAGNOSIS — Z87.74 S/P VSD REPAIR: ICD-10-CM

## 2023-10-09 RX ORDER — METOPROLOL SUCCINATE 25 MG/1
12.5 TABLET, EXTENDED RELEASE ORAL 2 TIMES DAILY
Qty: 90 TABLET | Refills: 0 | Status: SHIPPED | OUTPATIENT
Start: 2023-10-09

## 2023-10-09 RX ORDER — OMEPRAZOLE 20 MG/1
20 CAPSULE, DELAYED RELEASE ORAL DAILY
Qty: 90 CAPSULE | Refills: 0 | Status: SHIPPED | OUTPATIENT
Start: 2023-10-09

## 2023-10-09 RX ORDER — DIPHENOXYLATE HYDROCHLORIDE AND ATROPINE SULFATE 2.5; .025 MG/1; MG/1
1 TABLET ORAL DAILY
Qty: 100 TABLET | Refills: 1 | Status: SHIPPED | OUTPATIENT
Start: 2023-10-09

## 2023-10-09 RX ORDER — BUMETANIDE 1 MG/1
1 TABLET ORAL DAILY
Qty: 90 TABLET | Refills: 0 | Status: SHIPPED | OUTPATIENT
Start: 2023-10-09 | End: 2024-01-07

## 2023-10-09 RX ORDER — ALPRAZOLAM 0.5 MG/1
TABLET ORAL
Qty: 30 TABLET | Refills: 0 | Status: SHIPPED | OUTPATIENT
Start: 2023-10-09

## 2023-10-09 RX ORDER — TAMSULOSIN HYDROCHLORIDE 0.4 MG/1
0.4 CAPSULE ORAL
Qty: 90 CAPSULE | Refills: 0 | Status: SHIPPED | OUTPATIENT
Start: 2023-10-09

## 2023-10-09 RX ORDER — ATORVASTATIN CALCIUM 40 MG/1
40 TABLET, FILM COATED ORAL
Qty: 90 TABLET | Refills: 0 | Status: SHIPPED | OUTPATIENT
Start: 2023-10-09

## 2023-11-06 ENCOUNTER — OFFICE VISIT (OUTPATIENT)
Dept: INTERNAL MEDICINE CLINIC | Facility: CLINIC | Age: 68
End: 2023-11-06
Payer: COMMERCIAL

## 2023-11-06 VITALS
TEMPERATURE: 97.8 F | OXYGEN SATURATION: 98 % | WEIGHT: 226 LBS | HEART RATE: 76 BPM | HEIGHT: 66 IN | DIASTOLIC BLOOD PRESSURE: 80 MMHG | SYSTOLIC BLOOD PRESSURE: 122 MMHG | BODY MASS INDEX: 36.32 KG/M2

## 2023-11-06 DIAGNOSIS — I50.22 CHRONIC SYSTOLIC CONGESTIVE HEART FAILURE (HCC): Primary | ICD-10-CM

## 2023-11-06 DIAGNOSIS — N40.1 BENIGN PROSTATIC HYPERPLASIA WITH URINARY RETENTION: ICD-10-CM

## 2023-11-06 DIAGNOSIS — E53.8 VITAMIN B12 DEFICIENCY: ICD-10-CM

## 2023-11-06 DIAGNOSIS — I25.5 ISCHEMIC CARDIOMYOPATHY: ICD-10-CM

## 2023-11-06 DIAGNOSIS — Z12.11 SCREENING FOR COLON CANCER: ICD-10-CM

## 2023-11-06 DIAGNOSIS — R33.8 BENIGN PROSTATIC HYPERPLASIA WITH URINARY RETENTION: ICD-10-CM

## 2023-11-06 DIAGNOSIS — Z00.00 ANNUAL PHYSICAL EXAM: ICD-10-CM

## 2023-11-06 DIAGNOSIS — E78.2 MIXED HYPERLIPIDEMIA: ICD-10-CM

## 2023-11-06 DIAGNOSIS — E55.9 VITAMIN D DEFICIENCY: ICD-10-CM

## 2023-11-06 DIAGNOSIS — R73.01 IMPAIRED FASTING GLUCOSE: ICD-10-CM

## 2023-11-06 DIAGNOSIS — Z23 ENCOUNTER FOR IMMUNIZATION: ICD-10-CM

## 2023-11-06 DIAGNOSIS — H61.22 CERUMEN DEBRIS ON TYMPANIC MEMBRANE OF LEFT EAR: ICD-10-CM

## 2023-11-06 PROCEDURE — 99214 OFFICE O/P EST MOD 30 MIN: CPT | Performed by: INTERNAL MEDICINE

## 2023-11-06 PROCEDURE — 99397 PER PM REEVAL EST PAT 65+ YR: CPT | Performed by: INTERNAL MEDICINE

## 2023-11-06 PROCEDURE — 90662 IIV NO PRSV INCREASED AG IM: CPT

## 2023-11-06 PROCEDURE — 90471 IMMUNIZATION ADMIN: CPT

## 2023-11-06 NOTE — PROGRESS NOTES
ADULT ANNUAL [address] [facility] [place] [facility]    NAME: Josephine Beyer  AGE: 79 y.o. SEX: male  : 1955     DATE: 2023     Assessment and Plan:     Problem List Items Addressed This Visit          Endocrine    Impaired fasting glucose    Relevant Orders    CBC and differential    Hemoglobin A1C       Cardiovascular and Mediastinum    Chronic systolic congestive heart failure ([address]) - Primary    Relevant Orders    CBC and differential    Ischemic cardiomyopathy       Genitourinary    Benign prostatic hyperplasia with urinary retention       Other    Mixed hyperlipidemia    Relevant Orders    Comprehensive metabolic panel    Lipid Panel with Direct LDL reflex    TSH, 3rd generation     Other Visit Diagnoses       Screening for colon cancer        Relevant Orders    Ambulatory Referral to Gastroenterology    Encounter for immunization        Relevant Orders    influenza vaccine, high-dose, PF 0.7 mL (FLUZONE HIGH-DOSE) (Completed)    Annual physical exam        Vitamin B12 deficiency        Relevant Orders    Vitamin B12    Vitamin D deficiency        Relevant Orders    Vitamin D 25 hydroxy    Cerumen debris on tympanic membrane of left ear        Relevant Medications    carbamide peroxide (DEBROX) 6.5 % otic solution            Immunizations and preventive care screenings were discussed with patient today. Appropriate education was printed on patient's after visit summary. Discussed risks and benefits of prostate cancer screening. We discussed the controversial history of PSA screening for prostate cancer in the [facility] as well as the risk of over detection and over treatment of prostate cancer by way of PSA screening.   The patient understands that PSA blood testing is an imperfect way to screen for prostate cancer and that elevated PSA levels in the blood may also be caused by infection, inflammation, prostatic trauma or manipulation, urological procedures, or by benign prostatic enlargement. The role of the digital rectal examination in prostate cancer screening was also discussed and I discussed with him that there is large interobserver variability in the findings of digital rectal examination. Counseling:  Exercise: the importance of regular exercise/physical activity was discussed. Recommend exercise 3-5 times per week for at least 30 minutes. Depression Screening and Follow-up Plan: Patient was screened for depression during today's encounter. They screened negative with a PHQ-2 score of 0. No follow-ups on file. Chief Complaint:     Chief Complaint   Patient presents with    Follow-up    Flu Vaccine    Physical Exam      History of Present Illness:     Adult Annual Physical   Patient here for a comprehensive physical exam. The patient reports no problems. Diet and Physical Activity  Diet/Nutrition: heart healthy (low sodium) diet. Exercise: moderate cardiovascular exercise. Depression Screening  PHQ-2/9 Depression Screening    Little interest or pleasure in doing things: 0 - not at all  Feeling down, depressed, or hopeless: 0 - not at all  PHQ-2 Score: 0  PHQ-2 Interpretation: Negative depression screen       General Health  Sleep: sleeps well. Hearing: normal - right. Vision: no vision problems. Dental: regular dental visits.  Health  Symptoms include: none    Advanced Care Planning  Do you have an advanced directive? no  Do you have a durable medical power of ? yes     Review of Systems:     Review of Systems   Constitutional:  Negative for chills and fever. HENT:  Negative for congestion, ear pain and sore throat. Eyes:  Negative for pain. Respiratory:  Negative for cough and shortness of breath. Cardiovascular:  Negative for chest pain and leg swelling. Gastrointestinal:  Negative for abdominal pain, nausea and vomiting. Endocrine: Negative for polyuria. Genitourinary:  Negative for difficulty urinating, frequency and urgency. Musculoskeletal:  Negative for arthralgias and back pain. Skin:  Negative for rash. Neurological:  Negative for weakness and headaches. Psychiatric/Behavioral:  Negative for sleep disturbance. The patient is not nervous/anxious. Past Medical History:     Past Medical History:   Diagnosis Date    Allergic     Anemia     Anxiety     Central line complication 38/35/1608    Chronic kidney disease 12/30/21    Kidney Failure upon hospital admission, Tylenol overdose    Coronary artery disease 12/30/21    Myocardial infarction ([address]) 12/30/21    VSD Repair,Ecmo, Heart Attack,Bovine Repair    Obesity     Tylenol ingestion 12/31/2021    Urinary tract infection 1/23/22      Past Surgical History:     Past Surgical History:   Procedure Laterality Date    CARDIAC SURGERY N/A     CORONARY ARTERY BYPASS GRAFT      VSD    EXTRACORPOREAL CIRCULATION N/A 12/31/2021    Procedure: SUPPORT EXTRACORPOREAL MEMBRANE OXYGENATION (ECMO);   Surgeon: Ольга Franco MD;  Location: [facility];  Service: Cardiac Surgery    IR OTHER  12/31/2021    IR OTHER  01/07/2022    MN CLSR 1 VENTRICULAR SEPTAL DEFECT W/WO PATCH N/A 01/04/2022    Procedure: REPAIR VENTRICULAR SEPTAL DEFECT (VSD) OPEN WITH BOVINE PERICARDIAL PATCH.;  Surgeon: Ольга Franco MD;  [facility];  Service: Cardiac Surgery    MN ECMO/ECLS INITIATION VENO-VENOUS N/A 01/04/2022    Procedure: VA ECMO DECANNULATION;  Surgeon: Ольга Franco MD;  [facility];  Service: Cardiac Surgery    MN EXPL PO HEMRRG THROMBOSIS/INFCTJ [address] [address] N/A 01/07/2022    Procedure: RE-EXPLORATION MEDIASTERNAL CAVITY FOR POST-OP BLEED (BRING BACK) Removal of PA catheter;  Surgeon: Ольга Franco MD;  Location: [facility];  Service: Cardiac Surgery      Family History:     Family History   Problem Relation Age of Onset    Other Mother         Claustrophobia    Mental illness Mother         Agoraphobia    Heart disease Mother         Enlarged heart. .. of heart disease    Anxiety disorder Mother         Agorpahobia    Drug abuse Family     Alcohol abuse Family     Alcohol abuse Father     Breast cancer Sister       Social History:     Social History     Socioeconomic History    Marital status: Single     Spouse name: None    Number of children: None    Years of education: None    Highest education level: None   Occupational History    None   Tobacco Use    Smoking status: Former     Types: Pipe     Quit date: 2021     Years since quittin.8    Smokeless tobacco: Never    Tobacco comments:     Secondhand smoke exposure   Vaping Use    Vaping Use: Never used   Substance and Sexual Activity    Alcohol use: No    Drug use: No    Sexual activity: Not Currently     Partners: Female     Birth control/protection: Abstinence, None   Other Topics Concern    None   Social History Narrative    Always uses seatbelt    No caffeine use     Social Determinants of Health     Financial Resource Strain: Not on file   Food Insecurity: Not on file   Transportation Needs: Not on file   Physical Activity: Not on file   Stress: Not on file   Social Connections: Not on file   Intimate Partner Violence: Not on file   Housing Stability: Not on file      Current Medications:     Current Outpatient Medications   Medication Sig Dispense Refill    ALPRAZolam (XANAX) 0.5 mg tablet TAKE 1 TO 2 TABLETS 30-60 MINUTES PRIOR TO LEAVING HOME AS NEEDED 30 tablet 0    aspirin 325 mg tablet Take 1 tablet (325 mg total) by mouth daily 30 tablet 2    atorvastatin (LIPITOR) 40 mg tablet Take 1 tablet (40 mg total) by mouth daily after dinner 90 tablet 0    bumetanide (BUMEX) 1 mg tablet Take 1 tablet (1 mg total) by mouth daily 90 tablet 0    carbamide peroxide (DEBROX) 6.5 % otic solution Administer 5 drops into the left ear 2 (two) times a day 15 mL 0    metoprolol succinate (TOPROL-XL) 25 mg 24 hr tablet Take 0.5 tablets (12.5 mg total) by mouth 2 (two) times a day 90 tablet 0    multivitamin (THERAGRAN) TABS Take 1 tablet by mouth daily 100 tablet 1    omeprazole (PriLOSEC) 20 mg delayed release capsule Take 1 capsule (20 mg total) by mouth daily 90 capsule 0    tamsulosin (FLOMAX) 0.4 mg Take 1 capsule (0.4 mg total) by mouth daily with dinner 90 capsule 0     No current facility-administered medications for this visit. Allergies: Allergies   Allergen Reactions    Mushroom Extract Complex - Food Allergy Facial Swelling    Peanut Oil - Food Allergy     Shellfish-Derived Products - Food Allergy Other (See Comments)     Runs in the family    Strawberry C [Ascorbate - Food Allergy]       Physical Exam:     /80 (BP Location: Left arm, Patient Position: Sitting, Cuff Size: Standard)   Pulse 76   Temp 97.8 °F (36.6 °C) (Temporal)   Ht 5' 6" (1.676 m)   Wt 103 kg (226 lb)   SpO2 98%   BMI 36.48 kg/m²     Physical Exam  Vitals and nursing note reviewed. Constitutional:       General: He is not in acute distress. Appearance: He is well-developed. HENT:      Head: Normocephalic and atraumatic. Eyes:      Conjunctiva/sclera: Conjunctivae normal.   Cardiovascular:      Rate and Rhythm: Normal rate and regular rhythm. Heart sounds: No murmur heard. Pulmonary:      Effort: Pulmonary effort is normal. No respiratory distress. Breath sounds: Normal breath sounds. Abdominal:      Palpations: Abdomen is soft. Tenderness: There is no abdominal tenderness. Musculoskeletal:         General: No swelling. Cervical back: Neck supple. Skin:     General: Skin is warm and dry. Capillary Refill: Capillary refill takes less than 2 seconds. Neurological:      Mental Status: He is alert.    Psychiatric:         Mood and Affect: Mood normal.          Luis E Montalvo MD  [facility]

## 2023-11-06 NOTE — PROGRESS NOTES
Assessment/Plan:      Depression Screening and Follow-up Plan: Patient was screened for depression during today's encounter. They screened negative with a PHQ-2 score of 0.            1. Chronic systolic congestive heart failure (HCC)  -     CBC and differential; Future    2. Screening for colon cancer  -     Ambulatory Referral to Gastroenterology; Future    3. Encounter for immunization  -     influenza vaccine, high-dose, PF 0.7 mL (FLUZONE HIGH-DOSE)    4. Impaired fasting glucose  -     CBC and differential; Future  -     Hemoglobin A1C; Future    5. Ischemic cardiomyopathy    6. Benign prostatic hyperplasia with urinary retention    7. Mixed hyperlipidemia  -     Comprehensive metabolic panel; Future  -     Lipid Panel with Direct LDL reflex; Future  -     TSH, 3rd generation; Future    8. Annual physical exam    9. Vitamin B12 deficiency  -     Vitamin B12; Future    10. Vitamin D deficiency  -     Vitamin D 25 hydroxy; Future    11. Cerumen debris on tympanic membrane of left ear  -     carbamide peroxide (DEBROX) 6.5 % otic solution; Administer 5 drops into the left ear 2 (two) times a day           Subjective:      Patient ID: Jahaira Lee is a 79 y.o. male. Follow-up on multiple medical problems to ensure they are stable on current medication, also physical        The following portions of the patient's history were reviewed and updated as appropriate: He  has a past medical history of Allergic, Anemia, Anxiety, Central line complication (94/19/4190), Chronic kidney disease (12/30/21), Coronary artery disease (12/30/21), Myocardial infarction ([address]) (12/30/21), Obesity, Tylenol ingestion (12/31/2021), and Urinary tract infection (1/23/22).   He   Patient Active Problem List    Diagnosis Date Noted    Ischemic cardiomyopathy 10/17/2022    Gastroesophageal reflux disease without esophagitis 10/17/2022    Colon cancer screening declined 10/17/2022    Chronic systolic congestive heart failure ([address]) 07/11/2022    Benign prostatic hyperplasia with urinary retention 04/12/2022    Encounter for postoperative care 03/11/2022    Urinary retention 01/21/2022    S/P VSD repair 01/07/2022    Coagulopathy ([address]) 08/43/8105    Acute systolic congestive heart failure ([address]) 12/31/2021    Anemia 12/31/2021    Personal history of ECMO 12/31/2021    Cardiogenic shock ([address]) 12/30/2021    VSD (ventricular septal defect) 12/30/2021    JUDE (acute kidney injury) ([address]) 12/30/2021    Former smoker 07/06/2018    Mixed hyperlipidemia 05/23/2017    Impaired fasting glucose 05/23/2017    Agoraphobia with panic attacks 11/06/2015    Body mass index 38.0-38.9, adult 11/06/2015    Psychosis, atypical ([address]) 11/06/2015     He  has a past surgical history that includes Extracorporeal membrane oxygenation (N/A, 12/31/2021); pr ecmo/ecls initiation veno-venous (N/A, 01/04/2022); pr clsr 1 ventricular septal defect w/wo patch (N/A, 01/04/2022); IR other (12/31/2021); pr expl po hemrrg thrombosis/infctj ch (N/A, 01/07/2022); IR other (01/07/2022); Cardiac surgery (N/A); and Coronary artery bypass graft. His family history includes Alcohol abuse in his family and father; Anxiety disorder in his mother; Breast cancer in his sister; Drug abuse in his family; Heart disease in his mother; Mental illness in his mother; Other in his mother. He  reports that he quit smoking about 22 months ago. His smoking use included pipe. He has never used smokeless tobacco. He reports that he does not drink alcohol and does not use drugs.   Current Outpatient Medications   Medication Sig Dispense Refill    ALPRAZolam (XANAX) 0.5 mg tablet TAKE 1 TO 2 TABLETS 30-60 MINUTES PRIOR TO LEAVING HOME AS NEEDED 30 tablet 0    aspirin 325 mg tablet Take 1 tablet (325 mg total) by mouth daily 30 tablet 2    atorvastatin (LIPITOR) 40 mg tablet Take 1 tablet (40 mg total) by mouth daily after dinner 90 tablet 0    bumetanide (BUMEX) 1 mg tablet Take 1 tablet (1 mg total) by mouth daily 90 tablet 0    carbamide peroxide (DEBROX) 6.5 % otic solution Administer 5 drops into the left ear 2 (two) times a day 15 mL 0    metoprolol succinate (TOPROL-XL) 25 mg 24 hr tablet Take 0.5 tablets (12.5 mg total) by mouth 2 (two) times a day 90 tablet 0    multivitamin (THERAGRAN) TABS Take 1 tablet by mouth daily 100 tablet 1    omeprazole (PriLOSEC) 20 mg delayed release capsule Take 1 capsule (20 mg total) by mouth daily 90 capsule 0    tamsulosin (FLOMAX) 0.4 mg Take 1 capsule (0.4 mg total) by mouth daily with dinner 90 capsule 0     No current facility-administered medications for this visit. Current Outpatient Medications on File Prior to Visit   Medication Sig    ALPRAZolam (XANAX) 0.5 mg tablet TAKE 1 TO 2 TABLETS 30-60 MINUTES PRIOR TO LEAVING HOME AS NEEDED    aspirin 325 mg tablet Take 1 tablet (325 mg total) by mouth daily    atorvastatin (LIPITOR) 40 mg tablet Take 1 tablet (40 mg total) by mouth daily after dinner    bumetanide (BUMEX) 1 mg tablet Take 1 tablet (1 mg total) by mouth daily    metoprolol succinate (TOPROL-XL) 25 mg 24 hr tablet Take 0.5 tablets (12.5 mg total) by mouth 2 (two) times a day    multivitamin (THERAGRAN) TABS Take 1 tablet by mouth daily    omeprazole (PriLOSEC) 20 mg delayed release capsule Take 1 capsule (20 mg total) by mouth daily    tamsulosin (FLOMAX) 0.4 mg Take 1 capsule (0.4 mg total) by mouth daily with dinner     No current facility-administered medications on file prior to visit. He is allergic to mushroom extract complex - food allergy, peanut oil - food allergy, shellfish-derived products - food allergy, and strawberry c [ascorbate - food allergy]. .    Review of Systems   Constitutional:  Negative for chills and fever. HENT:  Negative for congestion, ear pain and sore throat. Eyes:  Negative for pain. Respiratory:  Negative for cough and shortness of breath. Cardiovascular:  Negative for chest pain and leg swelling. Gastrointestinal:  Negative for abdominal pain, nausea and vomiting. Endocrine: Negative for polyuria. Genitourinary:  Negative for difficulty urinating, frequency and urgency. Musculoskeletal:  Negative for arthralgias and back pain. Skin:  Negative for rash. Neurological:  Negative for weakness and headaches. Psychiatric/Behavioral:  Negative for sleep disturbance. The patient is not nervous/anxious. Objective:      /80 (BP Location: Left arm, Patient Position: Sitting, Cuff Size: Standard)   Pulse 76   Temp 97.8 °F (36.6 °C) (Temporal)   Ht 5' 6" (1.676 m)   Wt 103 kg (226 lb)   SpO2 98%   BMI 36.48 kg/m²     No results found for this or any previous visit (from the past 1344 hour(s)). Physical Exam  Constitutional:       Appearance: Normal appearance. HENT:      Head: Normocephalic. Right Ear: Tympanic membrane, ear canal and external ear normal.      Left Ear: Ear canal and external ear normal.      Ears:      Comments: Left side wax+     Nose: Nose normal. No congestion. Mouth/Throat:      Mouth: Mucous membranes are moist.      Pharynx: Oropharynx is clear. No oropharyngeal exudate or posterior oropharyngeal erythema. Eyes:      Extraocular Movements: Extraocular movements intact. Conjunctiva/sclera: Conjunctivae normal.      Pupils: Pupils are equal, round, and reactive to light. Cardiovascular:      Rate and Rhythm: Normal rate and regular rhythm. Heart sounds: Normal heart sounds. No murmur heard. Pulmonary:      Effort: Pulmonary effort is normal.      Breath sounds: Normal breath sounds. No wheezing or rales. Abdominal:      General: Bowel sounds are normal. There is no distension. Palpations: Abdomen is soft. Tenderness: There is no abdominal tenderness. Musculoskeletal:         General: Normal range of motion. Cervical back: Normal range of motion and neck supple. Right lower leg: No edema.       Left lower leg: No edema.   Lymphadenopathy:      Cervical: No cervical adenopathy. Skin:     General: Skin is warm. Neurological:      General: No focal deficit present. Mental Status: He is alert and oriented to person, place, and time.

## 2023-11-06 NOTE — PATIENT INSTRUCTIONS
Wellness Visit for Adults   AMBULATORY CARE:   A wellness visit  is when you see your healthcare provider to get screened for health problems. Your healthcare provider will also give you advice on how to stay healthy. Write down your questions so you remember to ask them. Ask your healthcare provider how often you should have a wellness visit. What happens at a wellness visit:  Your healthcare provider will ask about your health, and your family history of health problems. This includes high blood pressure, heart disease, and cancer. He or she will ask if you have symptoms that concern you, if you smoke, and about your mood. You may also be asked about your intake of medicines, supplements, food, and alcohol. Any of the following may be done: Your weight  will be checked. Your height may also be checked so your body mass index (BMI) can be calculated. Your BMI shows if you are at a healthy weight. Your blood pressure  and heart rate will be checked. Your temperature may also be checked. Blood and urine tests  may be done. Blood tests may be done to check your cholesterol levels. Abnormal cholesterol levels increase your risk for heart disease and stroke. You may also need a blood or urine test to check for diabetes if you are at increased risk. Urine tests may be done to look for signs of an infection or kidney disease. A physical exam  includes checking your heartbeat and lungs with a stethoscope. Your healthcare provider may also check your skin to look for sun damage. Screening tests  may be recommended. A screening test is done to check for diseases that may not cause symptoms. The screening tests you may need depend on your age, gender, family history, and lifestyle habits. For example, colorectal screening may be recommended if you are 48years old or older. Screening tests you need if you are a woman:   A Pap smear  is used to screen for cervical cancer.  Pap smears are usually done every 3 to 5 years depending on your age. You may need them more often if you have had abnormal Pap smear test results in the past. Ask your healthcare provider how often you should have a Pap smear. A mammogram  is an x-ray of your breasts to screen for breast cancer. Experts recommend mammograms every 2 years starting at age 48 years. You may need a mammogram at age 52 years or younger if you have an increased risk for breast cancer. Talk to your healthcare provider about when you should start having mammograms and how often you need them. Vaccines you may need:   Get an influenza vaccine  every year. The influenza vaccine protects you from the flu. Several types of viruses cause the flu. The viruses change over time, so new vaccines are made each year. Get a tetanus-diphtheria (Td) booster vaccine  every 10 years. This vaccine protects you against tetanus and diphtheria. Tetanus is a severe infection that may cause painful muscle spasms and lockjaw. Diphtheria is a severe bacterial infection that causes a thick covering in the back of your mouth and throat. Get a human papillomavirus (HPV) vaccine  if you are female and aged 23 to 32 or male 23 to 24 and never received it. This vaccine protects you from HPV infection. HPV is the most common infection spread by sexual contact. HPV may also cause vaginal, penile, and anal cancers. Get a pneumococcal vaccine  if you are aged 72 years or older. The pneumococcal vaccine is an injection given to protect you from pneumococcal disease. Pneumococcal disease is an infection caused by pneumococcal bacteria. The infection may cause pneumonia, meningitis, or an ear infection. Get a shingles vaccine  if you are 60 or older, even if you have had shingles before. The shingles vaccine is an injection to protect you from the varicella-zoster virus. This is the same virus that causes chickenpox.  Shingles is a painful rash that develops in people who had chickenpox or have been exposed to the virus. How to eat healthy:  My Plate is a model for planning healthy meals. It shows the types and amounts of foods that should go on your plate. Fruits and vegetables make up about half of your plate, and grains and protein make up the other half. A serving of dairy is included on the side of your plate. The amount of calories and serving sizes you need depends on your age, gender, weight, and height. Examples of healthy foods are listed below:  Eat a variety of vegetables  such as dark green, red, and orange vegetables. You can also include canned vegetables low in sodium (salt) and frozen vegetables without added butter or sauces. Eat a variety of fresh fruits , canned fruit in 100% juice, frozen fruit, and dried fruit. Include whole grains. At least half of the grains you eat should be whole grains. Examples include whole-wheat bread, wheat pasta, brown rice, and whole-grain cereals such as oatmeal.    Eat a variety of protein foods such as seafood (fish and shellfish), lean meat, and poultry without skin (turkey and chicken). Examples of lean meats include pork leg, shoulder, or tenderloin, and beef round, sirloin, tenderloin, and extra lean ground beef. Other protein foods include eggs and egg substitutes, beans, peas, soy products, nuts, and seeds. Choose low-fat dairy products such as skim or 1% milk or low-fat yogurt, cheese, and cottage cheese. Limit unhealthy fats  such as butter, hard margarine, and shortening. Exercise:  Exercise at least 30 minutes per day on most days of the week. Some examples of exercise include walking, biking, dancing, and swimming. You can also fit in more physical activity by taking the stairs instead of the elevator or parking farther away from stores. Include muscle strengthening activities 2 days each week. Regular exercise provides many health benefits.  It helps you manage your weight, and decreases your risk for type 2 diabetes, heart disease, stroke, and high blood pressure. Exercise can also help improve your mood. Ask your healthcare provider about the best exercise plan for you. General health and safety guidelines:   Do not smoke. Nicotine and other chemicals in cigarettes and cigars can cause lung damage. Ask your healthcare provider for information if you currently smoke and need help to quit. E-cigarettes or smokeless tobacco still contain nicotine. Talk to your healthcare provider before you use these products. Limit alcohol. A drink of alcohol is 12 ounces of beer, 5 ounces of wine, or 1½ ounces of liquor. Lose weight, if needed. Being overweight increases your risk of certain health conditions. These include heart disease, high blood pressure, type 2 diabetes, and certain types of cancer. Protect your skin. Do not sunbathe or use tanning beds. Use sunscreen with a SPF 15 or higher. Apply sunscreen at least 15 minutes before you go outside. Reapply sunscreen every 2 hours. Wear protective clothing, hats, and sunglasses when you are outside. Drive safely. Always wear your seatbelt. Make sure everyone in your car wears a seatbelt. A seatbelt can save your life if you are in an accident. Do not use your cell phone when you are driving. This could distract you and cause an accident. Pull over if you need to make a call or send a text message. Practice safe sex. Use latex condoms if are sexually active and have more than one partner. Your healthcare provider may recommend screening tests for sexually transmitted infections (STIs). Wear helmets, lifejackets, and protective gear. Always wear a helmet when you ride a bike or motorcycle, go skiing, or play sports that could cause a head injury. Wear protective equipment when you play sports. Wear a lifejacket when you are on a boat or doing water sports.     © Copyright [employer] 2023 Information is for End User's use only and may not be sold, redistributed or otherwise used for commercial purposes. The above information is an  only. It is not intended as medical advice for individual conditions or treatments. Talk to your doctor, nurse or pharmacist before following any medical regimen to see if it is safe and effective for you.

## 2023-11-07 DIAGNOSIS — F40.01 AGORAPHOBIA WITH PANIC ATTACKS: ICD-10-CM

## 2023-11-08 RX ORDER — ALPRAZOLAM 0.5 MG/1
TABLET ORAL
Qty: 30 TABLET | Refills: 0 | Status: SHIPPED | OUTPATIENT
Start: 2023-11-08

## 2023-11-18 NOTE — ASSESSMENT & PLAN NOTE
· Tylenol level in ED 21  · Toxicology consulted- feel this is unintentional as patient was taking Q6 hours to medicate for what we now believe was an MI    Patient received NAC 21hr protocol  · Trend Q8 CMP, daily INR  · Appreciate recommendations The patient is a 10y Female complaining of

## 2023-12-12 ENCOUNTER — TELEPHONE (OUTPATIENT)
Dept: NEPHROLOGY | Facility: CLINIC | Age: 68
End: 2023-12-12

## 2023-12-12 DIAGNOSIS — F40.01 AGORAPHOBIA WITH PANIC ATTACKS: ICD-10-CM

## 2023-12-12 DIAGNOSIS — I25.5 ISCHEMIC CARDIOMYOPATHY: ICD-10-CM

## 2023-12-12 DIAGNOSIS — H61.22 CERUMEN DEBRIS ON TYMPANIC MEMBRANE OF LEFT EAR: ICD-10-CM

## 2023-12-12 DIAGNOSIS — E78.2 HYPERLIPIDEMIA, MIXED: ICD-10-CM

## 2023-12-12 DIAGNOSIS — Z87.74 S/P VSD REPAIR: ICD-10-CM

## 2023-12-12 NOTE — TELEPHONE ENCOUNTER
Pt's sister called to rescheduled pt's 12/14/23 consult for pt. She said pt is going away for the holidays and it will have to be for after. Pt rescheduled to see Dr. Sergei Willett in [facility] on 2/27/24.

## 2023-12-13 RX ORDER — TAMSULOSIN HYDROCHLORIDE 0.4 MG/1
0.4 CAPSULE ORAL
Qty: 90 CAPSULE | Refills: 0 | Status: SHIPPED | OUTPATIENT
Start: 2023-12-13

## 2023-12-13 RX ORDER — ALPRAZOLAM 0.5 MG/1
TABLET ORAL
Qty: 30 TABLET | Refills: 0 | Status: SHIPPED | OUTPATIENT
Start: 2023-12-13

## 2023-12-13 RX ORDER — METOPROLOL SUCCINATE 25 MG/1
12.5 TABLET, EXTENDED RELEASE ORAL 2 TIMES DAILY
Qty: 90 TABLET | Refills: 0 | Status: SHIPPED | OUTPATIENT
Start: 2023-12-13

## 2023-12-13 RX ORDER — ALPRAZOLAM 0.5 MG/1
TABLET ORAL
Start: 2023-12-13

## 2023-12-13 RX ORDER — ATORVASTATIN CALCIUM 40 MG/1
40 TABLET, FILM COATED ORAL
Qty: 90 TABLET | Refills: 0 | Status: SHIPPED | OUTPATIENT
Start: 2023-12-13

## 2023-12-13 RX ORDER — BUMETANIDE 1 MG/1
1 TABLET ORAL DAILY
Qty: 90 TABLET | Refills: 0 | Status: SHIPPED | OUTPATIENT
Start: 2023-12-13 | End: 2024-03-12

## 2023-12-13 RX ORDER — OMEPRAZOLE 20 MG/1
20 CAPSULE, DELAYED RELEASE ORAL DAILY
Qty: 90 CAPSULE | Refills: 0 | Status: SHIPPED | OUTPATIENT
Start: 2023-12-13

## 2024-01-04 ENCOUNTER — APPOINTMENT (OUTPATIENT)
Dept: LAB | Facility: CLINIC | Age: 69
End: 2024-01-04
Payer: COMMERCIAL

## 2024-01-04 DIAGNOSIS — E78.2 MIXED HYPERLIPIDEMIA: ICD-10-CM

## 2024-01-04 DIAGNOSIS — I50.22 CHRONIC SYSTOLIC CONGESTIVE HEART FAILURE (HCC): ICD-10-CM

## 2024-01-04 DIAGNOSIS — E55.9 VITAMIN D DEFICIENCY: ICD-10-CM

## 2024-01-04 DIAGNOSIS — R73.01 IMPAIRED FASTING GLUCOSE: ICD-10-CM

## 2024-01-04 DIAGNOSIS — E53.8 VITAMIN B12 DEFICIENCY: ICD-10-CM

## 2024-01-04 LAB
25(OH)D3 SERPL-MCNC: 40.8 NG/ML (ref 30–100)
ALBUMIN SERPL BCP-MCNC: 4.3 G/DL (ref 3.5–5)
ALP SERPL-CCNC: 101 U/L (ref 34–104)
ALT SERPL W P-5'-P-CCNC: 23 U/L (ref 7–52)
ANION GAP SERPL CALCULATED.3IONS-SCNC: 7 MMOL/L
AST SERPL W P-5'-P-CCNC: 18 U/L (ref 13–39)
BASOPHILS # BLD AUTO: 0.05 THOUSANDS/ÂΜL (ref 0–0.1)
BASOPHILS NFR BLD AUTO: 1 % (ref 0–1)
BILIRUB SERPL-MCNC: 0.58 MG/DL (ref 0.2–1)
BUN SERPL-MCNC: 29 MG/DL (ref 5–25)
CALCIUM SERPL-MCNC: 9.2 MG/DL (ref 8.4–10.2)
CHLORIDE SERPL-SCNC: 109 MMOL/L (ref 96–108)
CHOLEST SERPL-MCNC: 105 MG/DL
CO2 SERPL-SCNC: 25 MMOL/L (ref 21–32)
CREAT SERPL-MCNC: 1.41 MG/DL (ref 0.6–1.3)
EOSINOPHIL # BLD AUTO: 0.21 THOUSAND/ÂΜL (ref 0–0.61)
EOSINOPHIL NFR BLD AUTO: 3 % (ref 0–6)
ERYTHROCYTE [DISTWIDTH] IN BLOOD BY AUTOMATED COUNT: 13.7 % (ref 11.6–15.1)
EST. AVERAGE GLUCOSE BLD GHB EST-MCNC: 128 MG/DL
GFR SERPL CREATININE-BSD FRML MDRD: 51 ML/MIN/1.73SQ M
GLUCOSE P FAST SERPL-MCNC: 96 MG/DL (ref 65–99)
HBA1C MFR BLD: 6.1 %
HCT VFR BLD AUTO: 42.4 % (ref 36.5–49.3)
HDLC SERPL-MCNC: 38 MG/DL
HGB BLD-MCNC: 13.6 G/DL (ref 12–17)
IMM GRANULOCYTES # BLD AUTO: 0.02 THOUSAND/UL (ref 0–0.2)
IMM GRANULOCYTES NFR BLD AUTO: 0 % (ref 0–2)
LDLC SERPL CALC-MCNC: 46 MG/DL (ref 0–100)
LYMPHOCYTES # BLD AUTO: 1.3 THOUSANDS/ÂΜL (ref 0.6–4.47)
LYMPHOCYTES NFR BLD AUTO: 21 % (ref 14–44)
MCH RBC QN AUTO: 31.3 PG (ref 26.8–34.3)
MCHC RBC AUTO-ENTMCNC: 32.1 G/DL (ref 31.4–37.4)
MCV RBC AUTO: 98 FL (ref 82–98)
MONOCYTES # BLD AUTO: 0.5 THOUSAND/ÂΜL (ref 0.17–1.22)
MONOCYTES NFR BLD AUTO: 8 % (ref 4–12)
NEUTROPHILS # BLD AUTO: 4.04 THOUSANDS/ÂΜL (ref 1.85–7.62)
NEUTS SEG NFR BLD AUTO: 67 % (ref 43–75)
NRBC BLD AUTO-RTO: 0 /100 WBCS
PLATELET # BLD AUTO: 187 THOUSANDS/UL (ref 149–390)
PMV BLD AUTO: 11.4 FL (ref 8.9–12.7)
POTASSIUM SERPL-SCNC: 4 MMOL/L (ref 3.5–5.3)
PROT SERPL-MCNC: 7.3 G/DL (ref 6.4–8.4)
RBC # BLD AUTO: 4.35 MILLION/UL (ref 3.88–5.62)
SODIUM SERPL-SCNC: 141 MMOL/L (ref 135–147)
TRIGL SERPL-MCNC: 103 MG/DL
TSH SERPL DL<=0.05 MIU/L-ACNC: 1.75 UIU/ML (ref 0.45–4.5)
VIT B12 SERPL-MCNC: 698 PG/ML (ref 180–914)
WBC # BLD AUTO: 6.12 THOUSAND/UL (ref 4.31–10.16)

## 2024-01-04 PROCEDURE — 80053 COMPREHEN METABOLIC PANEL: CPT

## 2024-01-04 PROCEDURE — 82607 VITAMIN B-12: CPT

## 2024-01-04 PROCEDURE — 83036 HEMOGLOBIN GLYCOSYLATED A1C: CPT

## 2024-01-04 PROCEDURE — 84443 ASSAY THYROID STIM HORMONE: CPT

## 2024-01-04 PROCEDURE — 82306 VITAMIN D 25 HYDROXY: CPT

## 2024-01-04 PROCEDURE — 85025 COMPLETE CBC W/AUTO DIFF WBC: CPT

## 2024-01-04 PROCEDURE — 80061 LIPID PANEL: CPT

## 2024-01-04 PROCEDURE — 36415 COLL VENOUS BLD VENIPUNCTURE: CPT

## 2024-01-07 NOTE — PROGRESS NOTES
Heart Failure Outpatient Progress Note - Robbie Momin 68 y.o. male MRN: [id number]    @ Encounter: [id number]      Assessment/Plan:    Patient Active Problem List    Diagnosis Date Noted    Ischemic cardiomyopathy 10/17/2022    Gastroesophageal reflux disease without esophagitis 10/17/2022    Colon cancer screening declined 10/17/2022    Chronic systolic congestive heart failure (HCC) 07/11/2022    Benign prostatic hyperplasia with urinary retention 04/12/2022    Encounter for postoperative care 03/11/2022    Urinary retention 01/21/2022    S/P VSD repair 01/07/2022    Coagulopathy (HCC) 01/01/2022    Acute systolic congestive heart failure (HCC) 12/31/2021    Anemia 12/31/2021    Personal history of ECMO 12/31/2021    Cardiogenic shock (HCC) 12/30/2021    VSD (ventricular septal defect) 12/30/2021    JUDE (acute kidney injury) ([facility]) 12/30/2021    Former smoker 07/06/2018    Mixed hyperlipidemia 05/23/2017    Impaired fasting glucose 05/23/2017    Agoraphobia with panic attacks 11/06/2015    Body mass index 38.0-38.9, adult 11/06/2015    Psychosis, atypical ([facility]) 11/06/2015       # Chronic HFmrEF, Stage C  S/P Cardiogenic shock due to anteroseptal MI complicated by VSD on [facility] ECMO 12/31/21 s/p VSD repair with large bovine pericardial patch and ECMO decannulation 1/4/2022, also with vasodilatory component post op; s/p multiple blood product transfusions     Weight: 195 lbs, 208 lbs, 227 lbs, 234 lbs --> 226 lbs today    Studies personally reviewed by myself:  Echo 4/7/22  LVEF: 50%  LVEDd: 5.3 cm  IVS: surgically repaired VSD, no residual shunt  RV: normal    Echo 1/13/22  LVEF 50%  Normal RV size and systolic function     Echo 1/10/22 - on milrnone  LVEF 40%  Normal RV size and systolic function     Echocardiogram 12/30/21  LVEF: 40-45%  LVIDd: 4.5cm  RV: normal size, moderately reduced systolic function  MR: none seen  PASP: 65mmHg + RAP  RVOT: shortened PAAT  Other: muscular VSD with large left to right shunt;  dyskinetic septum     EKG 1/3/2022: sinus rhythm, IRBBB, anteroseptal q waves  EKG 12/30/21: sinus tachycardia, RBBB, anteroseptal ST segment elevation     Neurohormonal Blockade: -   --Beta-Blocker: metoprolol tartrate 25 mg BID  --ACEi, ARB or ARNi:    (or SVR reduction)   --Aldosterone Receptor Blocker:  --SGLT2i:   --Diuretic: Bumex 1 mg daily     Sudden Cardiac Death Risk Reduction:  --ICD: LVEF > 35%     Electrical Resynchronization:  -- QRSd 98msec     Advanced Therapies (If appropriate):  --We will continue to monitor  Current smoker. Small LV and VSD precluding LVAD     # Coronary artery disease, undefined anatomy  Rx: asa 325 mg  daily, atorvastatin 80 mg  Angina: none    # dyslipidemia- atorvastatin 80 mg   1/4/24: LDL 46, HDL 38  8/10/23: LDL 36, HDL 38     # CKD,1.4 on 1/4/23    # S/p Acute hypoxic respiratory failure, resolved  # tobacco use     Plan:  Increase metoprolol to 25 mg BID  Lipids at goal on atorvastatin 40 mg  Quit tobacco  Continue asa, statin for CAD  Needs to do more activity- is walking 3-4 days a week  No night time eating    HPI:     69 yo male presents for follow up after prolonged hospitalization from 12/20/21 through 1/31/22 with later presentation anteroseptal MI presenting in cardiogenic shock requiring VSD repair with large bovine patch and hemodynamic support with [facility] ECMO. He required repeat sternotomy for mediastinal re-exploration with removal of retained PA catheter on 1/7/22. He was maintained on inotropic support for quite a while post operatively. Of note, he also required transfusion of multiple blood products.       Interval History:  Weight is down  Did not take his meds yet  Review of Systems   Constitutional:  Negative for activity change, appetite change, fatigue and unexpected weight change.   HENT:  Negative for congestion and nosebleeds.    Eyes: Negative.    Respiratory:  Negative for cough, chest tightness and shortness of breath.    Cardiovascular:  Negative  for chest pain, palpitations and leg swelling.   Gastrointestinal:  Negative for abdominal distention.   Endocrine: Negative.    Genitourinary: Negative.    Musculoskeletal: Negative.    Skin: Negative.    Neurological:  Negative for dizziness, syncope and weakness.   Hematological: Negative.    Psychiatric/Behavioral: Negative.         Past Medical History:   Diagnosis Date    Allergic     Anemia     Anxiety     Central line complication 01/07/2022    Chronic kidney disease 12/30/21    Kidney Failure upon hospital admission, Tylenol overdose    Coronary artery disease 12/30/21    Myocardial infarction (HCC) 12/30/21    VSD Repair,Ecmo, Heart Attack,Bovine Repair    Obesity     Tylenol ingestion 12/31/2021    Urinary tract infection 1/23/22         Allergies   Allergen Reactions    Mushroom Extract Complex - Food Allergy Facial Swelling    Peanut Oil - Food Allergy     Shellfish-Derived Products - Food Allergy Other (See Comments)     Runs in the family    Strawberry C [Ascorbate - Food Allergy]      .    Current Outpatient Medications:     ALPRAZolam (XANAX) 0.5 mg tablet, As needed, Disp: , Rfl:     ALPRAZolam (XANAX) 0.5 mg tablet, TAKE 1 TO 2 TABLETS 30-60 MINUTES PRIOR TO LEAVING HOME AS NEEDED, Disp: 30 tablet, Rfl: 0    aspirin 325 mg tablet, Take 1 tablet (325 mg total) by mouth daily, Disp: 30 tablet, Rfl: 2    atorvastatin (LIPITOR) 40 mg tablet, Take 1 tablet (40 mg total) by mouth daily after dinner, Disp: 90 tablet, Rfl: 0    bumetanide (BUMEX) 1 mg tablet, Take 1 tablet (1 mg total) by mouth daily, Disp: 90 tablet, Rfl: 0    carbamide peroxide (DEBROX) 6.5 % otic solution, Administer 5 drops into the left ear 2 (two) times a day, Disp: 15 mL, Rfl: 0    metoprolol succinate (TOPROL-XL) 25 mg 24 hr tablet, Take 0.5 tablets (12.5 mg total) by mouth 2 (two) times a day, Disp: 90 tablet, Rfl: 0    multivitamin (THERAGRAN) TABS, Take 1 tablet by mouth daily, Disp: 100 tablet, Rfl: 1    omeprazole (PriLOSEC)  20 mg delayed release capsule, Take 1 capsule (20 mg total) by mouth daily, Disp: 90 capsule, Rfl: 0    tamsulosin (FLOMAX) 0.4 mg, Take 1 capsule (0.4 mg total) by mouth daily with dinner, Disp: 90 capsule, Rfl: 0    Social History     Socioeconomic History    Marital status: Single     Spouse name: Not on file    Number of children: Not on file    Years of education: Not on file    Highest education level: Not on file   Occupational History    Not on file   Tobacco Use    Smoking status: Former     Types: Pipe     Quit date: 2021     Years since quittin.0    Smokeless tobacco: Never    Tobacco comments:     Secondhand smoke exposure   Vaping Use    Vaping status: Never Used   Substance and Sexual Activity    Alcohol use: No    Drug use: No    Sexual activity: Not Currently     Partners: Female     Birth control/protection: Abstinence, None   Other Topics Concern    Not on file   Social History Narrative    Always uses seatbelt    No caffeine use     Social Determinants of Health     Financial Resource Strain: Not on file   Food Insecurity: Not on file   Transportation Needs: Not on file   Physical Activity: Not on file   Stress: Not on file   Social Connections: Not on file   Intimate Partner Violence: Not on file   Housing Stability: Not on file       Family History   Problem Relation Age of Onset    Other Mother         Claustrophobia    Mental illness Mother         Agoraphobia    Heart disease Mother         Enlarged heart... of heart disease    Anxiety disorder Mother         Agorpahobia    Drug abuse Family     Alcohol abuse Family     Alcohol abuse Father     Breast cancer Sister        Physical Exam:    Vitals:     Physical Exam    Labs & Results:    Lab Results   Component Value Date    SODIUM 141 2024    K 4.0 2024     (H) 2024    CO2 25 2024    BUN 29 (H) 2024    CREATININE 1.41 (H) 2024    GLUC 96 2022    CALCIUM 9.2 2024     Lab  Results   Component Value Date    WBC 6.12 01/04/2024    HGB 13.6 01/04/2024    HCT 42.4 01/04/2024    MCV 98 01/04/2024     01/04/2024     Lab Results   Component Value Date    NTBNP 43,122 (H) 12/30/2021      Lab Results   Component Value Date    CHOLESTEROL 105 01/04/2024    CHOLESTEROL 89 08/10/2023    CHOLESTEROL 110 01/12/2023     Lab Results   Component Value Date    HDL 38 (L) 01/04/2024    HDL 38 (L) 08/10/2023    HDL 40 01/12/2023     Lab Results   Component Value Date    TRIG 103 01/04/2024    TRIG 74 08/10/2023    TRIG 101 01/12/2023     Lab Results   Component Value Date    NONHDLC 52 03/28/2022    NONHDLC 137 12/30/2021    NONHDLC 161 09/13/2021       EKG personally reviewed by Jonas Fernandez.     Counseling / Coordination of Care  Time spent today 25 minutes.  Greater than 50% of total time was spent with the patient and / or family counseling and / or coordination of care. We went over current diagnosis, most recent studies and any changes in treatment.    Thank you for the opportunity to participate in the care of this patient.    JONAS FERNANDEZ D.O.  DIRECTOR OF HEART FAILURE/ PULMONARY HYPERTENSION  MEDICAL DIRECTOR OF LVAD PROGRAM  [facility]

## 2024-01-08 ENCOUNTER — OFFICE VISIT (OUTPATIENT)
Dept: INTERNAL MEDICINE CLINIC | Facility: CLINIC | Age: 69
End: 2024-01-08
Payer: COMMERCIAL

## 2024-01-08 ENCOUNTER — OFFICE VISIT (OUTPATIENT)
Dept: CARDIOLOGY CLINIC | Facility: CLINIC | Age: 69
End: 2024-01-08
Payer: COMMERCIAL

## 2024-01-08 VITALS
WEIGHT: 227 LBS | OXYGEN SATURATION: 99 % | SYSTOLIC BLOOD PRESSURE: 134 MMHG | HEART RATE: 88 BPM | HEIGHT: 66 IN | BODY MASS INDEX: 36.48 KG/M2 | DIASTOLIC BLOOD PRESSURE: 82 MMHG | TEMPERATURE: 97.3 F

## 2024-01-08 VITALS
BODY MASS INDEX: 36.26 KG/M2 | WEIGHT: 225.6 LBS | OXYGEN SATURATION: 98 % | SYSTOLIC BLOOD PRESSURE: 105 MMHG | HEIGHT: 66 IN | HEART RATE: 109 BPM | DIASTOLIC BLOOD PRESSURE: 70 MMHG

## 2024-01-08 DIAGNOSIS — K21.9 GASTROESOPHAGEAL REFLUX DISEASE WITHOUT ESOPHAGITIS: ICD-10-CM

## 2024-01-08 DIAGNOSIS — Z87.74 S/P VSD REPAIR: ICD-10-CM

## 2024-01-08 DIAGNOSIS — R33.8 BENIGN PROSTATIC HYPERPLASIA WITH URINARY RETENTION: ICD-10-CM

## 2024-01-08 DIAGNOSIS — N40.1 BENIGN PROSTATIC HYPERPLASIA WITH URINARY RETENTION: ICD-10-CM

## 2024-01-08 DIAGNOSIS — Q21.0 VSD (VENTRICULAR SEPTAL DEFECT): ICD-10-CM

## 2024-01-08 DIAGNOSIS — E78.2 HYPERLIPIDEMIA, MIXED: Primary | ICD-10-CM

## 2024-01-08 DIAGNOSIS — I50.22 CHRONIC SYSTOLIC CONGESTIVE HEART FAILURE (HCC): ICD-10-CM

## 2024-01-08 DIAGNOSIS — I25.10 CORONARY ARTERY DISEASE INVOLVING NATIVE CORONARY ARTERY OF NATIVE HEART WITHOUT ANGINA PECTORIS: ICD-10-CM

## 2024-01-08 DIAGNOSIS — F40.01 AGORAPHOBIA WITH PANIC ATTACKS: ICD-10-CM

## 2024-01-08 DIAGNOSIS — I25.5 ISCHEMIC CARDIOMYOPATHY: ICD-10-CM

## 2024-01-08 DIAGNOSIS — R73.01 IMPAIRED FASTING GLUCOSE: ICD-10-CM

## 2024-01-08 DIAGNOSIS — I50.22 CHRONIC SYSTOLIC CONGESTIVE HEART FAILURE (HCC): Primary | ICD-10-CM

## 2024-01-08 PROCEDURE — 99214 OFFICE O/P EST MOD 30 MIN: CPT | Performed by: INTERNAL MEDICINE

## 2024-01-08 RX ORDER — METOPROLOL SUCCINATE 25 MG/1
25 TABLET, EXTENDED RELEASE ORAL 2 TIMES DAILY
Qty: 180 TABLET | Refills: 2 | Status: SHIPPED | OUTPATIENT
Start: 2024-01-08

## 2024-01-08 NOTE — PROGRESS NOTES
Assessment/Plan:             1. Hyperlipidemia, mixed  Comments:  Stable, continue atorvastatin  Orders:  -     Comprehensive metabolic panel; Future  -     Lipid Panel with Direct LDL reflex; Future  -     TSH, 3rd generation; Future    2. Ischemic cardiomyopathy    3. S/P VSD repair    4. Gastroesophageal reflux disease without esophagitis    5. Benign prostatic hyperplasia with urinary retention    6. Impaired fasting glucose  Comments:  improved  Orders:  -     CBC and differential; Future  -     Hemoglobin A1C; Future    7. Chronic systolic congestive heart failure (HCC)  Comments:  stable, continue same medication    8. Agoraphobia with panic attacks           Subjective:      Patient ID: Robbie Momin is a 68 y.o. male.    Follow-up on blood test done on 1/4/2024 test discussed with him        The following portions of the patient's history were reviewed and updated as appropriate: He  has a past medical history of Allergic, Anemia, Anxiety, Central line complication (01/07/2022), Chronic kidney disease (12/30/21), Coronary artery disease (12/30/21), Myocardial infarction (HCC) (12/30/21), Obesity, Tylenol ingestion (12/31/2021), and Urinary tract infection (1/23/22).  He   Patient Active Problem List    Diagnosis Date Noted    Ischemic cardiomyopathy 10/17/2022    Gastroesophageal reflux disease without esophagitis 10/17/2022    Colon cancer screening declined 10/17/2022    Chronic systolic congestive heart failure (HCC) 07/11/2022    Benign prostatic hyperplasia with urinary retention 04/12/2022    Encounter for postoperative care 03/11/2022    Urinary retention 01/21/2022    S/P VSD repair 01/07/2022    Coagulopathy (HCC) 01/01/2022    Acute systolic congestive heart failure (HCC) 12/31/2021    Anemia 12/31/2021    Personal history of ECMO 12/31/2021    Cardiogenic shock (HCC) 12/30/2021    VSD (ventricular septal defect) 12/30/2021    JUDE (acute kidney injury) (HCC) 12/30/2021    Former smoker 07/06/2018     Hyperlipidemia, mixed 05/23/2017    Impaired fasting glucose 05/23/2017    Agoraphobia with panic attacks 11/06/2015    Body mass index 38.0-38.9, adult 11/06/2015    Psychosis, atypical (HCC) 11/06/2015     He  has a past surgical history that includes Extracorporeal membrane oxygenation (N/A, 12/31/2021); pr ecmo/ecls initiation veno-venous (N/A, 01/04/2022); pr clsr 1 ventricular septal defect w/wo patch (N/A, 01/04/2022); IR other (12/31/2021); pr expl po hemrrg thrombosis/infctj ch (N/A, 01/07/2022); IR other (01/07/2022); Cardiac surgery (N/A); and Coronary artery bypass graft.  His family history includes Alcohol abuse in his family and father; Anxiety disorder in his mother; Breast cancer in his sister; Drug abuse in his family; Heart disease in his mother; Mental illness in his mother; Other in his mother.  He  reports that he quit smoking about 2 years ago. His smoking use included pipe. He has never used smokeless tobacco. He reports that he does not drink alcohol and does not use drugs.  Current Outpatient Medications   Medication Sig Dispense Refill    ALPRAZolam (XANAX) 0.5 mg tablet As needed      ALPRAZolam (XANAX) 0.5 mg tablet TAKE 1 TO 2 TABLETS 30-60 MINUTES PRIOR TO LEAVING HOME AS NEEDED 30 tablet 0    aspirin 325 mg tablet Take 1 tablet (325 mg total) by mouth daily 30 tablet 2    atorvastatin (LIPITOR) 40 mg tablet Take 1 tablet (40 mg total) by mouth daily after dinner 90 tablet 0    bumetanide (BUMEX) 1 mg tablet Take 1 tablet (1 mg total) by mouth daily 90 tablet 0    carbamide peroxide (DEBROX) 6.5 % otic solution Administer 5 drops into the left ear 2 (two) times a day 15 mL 0    metoprolol succinate (TOPROL-XL) 25 mg 24 hr tablet Take 1 tablet (25 mg total) by mouth 2 (two) times a day 180 tablet 2    multivitamin (THERAGRAN) TABS Take 1 tablet by mouth daily 100 tablet 1    omeprazole (PriLOSEC) 20 mg delayed release capsule Take 1 capsule (20 mg total) by mouth daily 90 capsule 0     tamsulosin (FLOMAX) 0.4 mg Take 1 capsule (0.4 mg total) by mouth daily with dinner 90 capsule 0     No current facility-administered medications for this visit.     Current Outpatient Medications on File Prior to Visit   Medication Sig    ALPRAZolam (XANAX) 0.5 mg tablet As needed    ALPRAZolam (XANAX) 0.5 mg tablet TAKE 1 TO 2 TABLETS 30-60 MINUTES PRIOR TO LEAVING HOME AS NEEDED    aspirin 325 mg tablet Take 1 tablet (325 mg total) by mouth daily    atorvastatin (LIPITOR) 40 mg tablet Take 1 tablet (40 mg total) by mouth daily after dinner    bumetanide (BUMEX) 1 mg tablet Take 1 tablet (1 mg total) by mouth daily    carbamide peroxide (DEBROX) 6.5 % otic solution Administer 5 drops into the left ear 2 (two) times a day    multivitamin (THERAGRAN) TABS Take 1 tablet by mouth daily    omeprazole (PriLOSEC) 20 mg delayed release capsule Take 1 capsule (20 mg total) by mouth daily    tamsulosin (FLOMAX) 0.4 mg Take 1 capsule (0.4 mg total) by mouth daily with dinner    [DISCONTINUED] metoprolol succinate (TOPROL-XL) 25 mg 24 hr tablet Take 0.5 tablets (12.5 mg total) by mouth 2 (two) times a day     No current facility-administered medications on file prior to visit.     He is allergic to mushroom extract complex - food allergy, peanut oil - food allergy, shellfish-derived products - food allergy, and strawberry c [ascorbate - food allergy]..    Review of Systems   Constitutional:  Negative for chills and fever.   HENT:  Negative for congestion, ear pain and sore throat.    Eyes:  Negative for pain.   Respiratory:  Negative for cough and shortness of breath.    Cardiovascular:  Negative for chest pain and leg swelling.   Gastrointestinal:  Negative for abdominal pain, nausea and vomiting.   Endocrine: Negative for polyuria.   Genitourinary:  Negative for difficulty urinating, frequency and urgency.   Musculoskeletal:  Negative for arthralgias and back pain.   Skin:  Negative for rash.   Neurological:  Negative  "for weakness and headaches.   Psychiatric/Behavioral:  Negative for sleep disturbance. The patient is not nervous/anxious.          Objective:      /82 (BP Location: Left arm, Patient Position: Sitting, Cuff Size: Large)   Pulse 88   Temp (!) 97.3 °F (36.3 °C) (Temporal)   Ht 5' 6\" (1.676 m)   Wt 103 kg (227 lb)   SpO2 99%   BMI 36.64 kg/m²     Recent Results (from the past 1344 hour(s))   CBC and differential    Collection Time: 01/04/24  6:42 AM   Result Value Ref Range    WBC 6.12 4.31 - 10.16 Thousand/uL    RBC 4.35 3.88 - 5.62 Million/uL    Hemoglobin 13.6 12.0 - 17.0 g/dL    Hematocrit 42.4 36.5 - 49.3 %    MCV 98 82 - 98 fL    MCH 31.3 26.8 - 34.3 pg    MCHC 32.1 31.4 - 37.4 g/dL    RDW 13.7 11.6 - 15.1 %    MPV 11.4 8.9 - 12.7 fL    Platelets 187 149 - 390 Thousands/uL    nRBC 0 /100 WBCs    Neutrophils Relative 67 43 - 75 %    Immat GRANS % 0 0 - 2 %    Lymphocytes Relative 21 14 - 44 %    Monocytes Relative 8 4 - 12 %    Eosinophils Relative 3 0 - 6 %    Basophils Relative 1 0 - 1 %    Neutrophils Absolute 4.04 1.85 - 7.62 Thousands/µL    Immature Grans Absolute 0.02 0.00 - 0.20 Thousand/uL    Lymphocytes Absolute 1.30 0.60 - 4.47 Thousands/µL    Monocytes Absolute 0.50 0.17 - 1.22 Thousand/µL    Eosinophils Absolute 0.21 0.00 - 0.61 Thousand/µL    Basophils Absolute 0.05 0.00 - 0.10 Thousands/µL   Comprehensive metabolic panel    Collection Time: 01/04/24  6:42 AM   Result Value Ref Range    Sodium 141 135 - 147 mmol/L    Potassium 4.0 3.5 - 5.3 mmol/L    Chloride 109 (H) 96 - 108 mmol/L    CO2 25 21 - 32 mmol/L    ANION GAP 7 mmol/L    BUN 29 (H) 5 - 25 mg/dL    Creatinine 1.41 (H) 0.60 - 1.30 mg/dL    Glucose, Fasting 96 65 - 99 mg/dL    Calcium 9.2 8.4 - 10.2 mg/dL    AST 18 13 - 39 U/L    ALT 23 7 - 52 U/L    Alkaline Phosphatase 101 34 - 104 U/L    Total Protein 7.3 6.4 - 8.4 g/dL    Albumin 4.3 3.5 - 5.0 g/dL    Total Bilirubin 0.58 0.20 - 1.00 mg/dL    eGFR 51 ml/min/1.73sq m   Lipid " Panel with Direct LDL reflex    Collection Time: 01/04/24  6:42 AM   Result Value Ref Range    Cholesterol 105 See Comment mg/dL    Triglycerides 103 See Comment mg/dL    HDL, Direct 38 (L) >=40 mg/dL    LDL Calculated 46 0 - 100 mg/dL   TSH, 3rd generation    Collection Time: 01/04/24  6:42 AM   Result Value Ref Range    TSH 3RD GENERATON 1.752 0.450 - 4.500 uIU/mL   Hemoglobin A1C    Collection Time: 01/04/24  6:42 AM   Result Value Ref Range    Hemoglobin A1C 6.1 (H) Normal 4.0-5.6%; PreDiabetic 5.7-6.4%; Diabetic >=6.5%; Glycemic control for adults with diabetes <7.0% %     mg/dl   Vitamin B12    Collection Time: 01/04/24  6:42 AM   Result Value Ref Range    Vitamin B-12 698 180 - 914 pg/mL   Vitamin D 25 hydroxy    Collection Time: 01/04/24  6:42 AM   Result Value Ref Range    Vit D, 25-Hydroxy 40.8 30.0 - 100.0 ng/mL        Physical Exam  Constitutional:       Appearance: Normal appearance.   HENT:      Head: Normocephalic.      Right Ear: Tympanic membrane, ear canal and external ear normal.      Left Ear: Tympanic membrane, ear canal and external ear normal.      Nose: Nose normal. No congestion.      Mouth/Throat:      Mouth: Mucous membranes are moist.      Pharynx: Oropharynx is clear. No oropharyngeal exudate or posterior oropharyngeal erythema.   Eyes:      Extraocular Movements: Extraocular movements intact.      Conjunctiva/sclera: Conjunctivae normal.   Cardiovascular:      Rate and Rhythm: Normal rate and regular rhythm.      Heart sounds: Normal heart sounds. No murmur heard.  Pulmonary:      Effort: Pulmonary effort is normal.      Breath sounds: Normal breath sounds. No wheezing or rales.   Abdominal:      General: There is no distension.      Palpations: Abdomen is soft.      Tenderness: There is no abdominal tenderness.   Musculoskeletal:         General: Normal range of motion.      Cervical back: Normal range of motion and neck supple.      Right lower leg: No edema.      Left lower  leg: No edema.   Lymphadenopathy:      Cervical: No cervical adenopathy.   Skin:     General: Skin is warm.   Neurological:      General: No focal deficit present.      Mental Status: He is alert and oriented to person, place, and time.

## 2024-01-24 ENCOUNTER — HOSPITAL ENCOUNTER (EMERGENCY)
Facility: HOSPITAL | Age: 69
Discharge: HOME | End: 2024-01-24
Attending: EMERGENCY MEDICINE
Payer: COMMERCIAL

## 2024-01-24 VITALS
OXYGEN SATURATION: 96 % | HEART RATE: 82 BPM | DIASTOLIC BLOOD PRESSURE: 76 MMHG | RESPIRATION RATE: 20 BRPM | SYSTOLIC BLOOD PRESSURE: 115 MMHG | TEMPERATURE: 97.4 F

## 2024-01-24 DIAGNOSIS — L98.9 SKIN LESION: ICD-10-CM

## 2024-01-24 DIAGNOSIS — L02.91 ABSCESS: Primary | ICD-10-CM

## 2024-01-24 PROCEDURE — 99284 EMERGENCY DEPT VISIT MOD MDM: CPT | Performed by: EMERGENCY MEDICINE

## 2024-01-24 PROCEDURE — 99282 EMERGENCY DEPT VISIT SF MDM: CPT

## 2024-01-24 RX ORDER — DOXYCYCLINE HYCLATE 100 MG/1
100 CAPSULE ORAL 2 TIMES DAILY
Qty: 14 CAPSULE | Refills: 0 | Status: SHIPPED | OUTPATIENT
Start: 2024-01-24 | End: 2024-01-29 | Stop reason: SDUPTHER

## 2024-01-24 RX ORDER — DOXYCYCLINE HYCLATE 100 MG/1
100 CAPSULE ORAL ONCE
Status: COMPLETED | OUTPATIENT
Start: 2024-01-24 | End: 2024-01-24

## 2024-01-24 RX ADMIN — DOXYCYCLINE 100 MG: 100 CAPSULE ORAL at 20:17

## 2024-01-25 ENCOUNTER — TELEPHONE (OUTPATIENT)
Age: 69
End: 2024-01-25

## 2024-01-25 NOTE — TELEPHONE ENCOUNTER
Rec'd call from patients niece stating that patient was seen in ED and was discharged with instructions to be seen by derm ASAP for biopsy.    Referral entered is standard.    AVS states ASAP for biopsy.    Dilemma: Patient is special needs AND Agoraphobic. They had enough trouble getting him to hospital to be seen. But she states that he will go to [facility] on [address] in [place] (because it's not too far from his house). I told Mackenzie to call [facility], tell them what patient needed. (I'm not sure if that's where our residents have clinic or if its the other [facility] in [place].)    I told her that if she had difficulty getting him scheduled with derm for biopsy to call me back and I would reach out to residents to see what/if anything we can do.    Mackenzie verbalized understanding.

## 2024-01-25 NOTE — DISCHARGE INSTRUCTIONS
Take antiboitics as prescribed for skin infection.    Follow up with dermatology and your PCP within the next few days.     If you develop new or worsening symptoms, please return to the Emergency Department for further evaluation.

## 2024-01-25 NOTE — ED ATTENDING ATTESTATION
"I, Clyde Grimaldo MD, saw and evaluated the patient. I have discussed the patient with the resident and agree with the resident's findings, Plan of Care, and MDM as documented in the resident's note, except where noted. All available labs and Radiology studies were reviewed.  I was present for key portions of any procedure(s) performed by the resident and I was immediately available to provide assistance.    At this point I agree with the current assessment done in the Emergency Department.  I have conducted an independent evaluation of this patient a history and physical is as follows:    67 yo morbidly obese male with a history of CAD s/p CABG, CKD, anxiety, anemia, CHF, hyperlipidemia, and BPH presents to the ED complaining of a \"cyst\" on the back of his neck. The patient says his niece noticed the lesion while cutting his hair a few days ago. No pain in the area. He thinks that he may have been bitten by an insect because \"those little spiders are everywhere\". No drainage from the area. He denies fevers/chills. No other specific complaints.    ROS: per resident physician note    Gen: NAD, AA&Ox3  HEENT: PERRL, EOMI  Neck: supple  CV: RRR  Lungs: CTA B/L  Abdomen: soft, NT/ND  Ext: no swelling or deformity  Neuro: 5/5 strength all extremities, sensation grossly intact  Skin: (+) macerated, indurated, erythematous area to posterior neck with scant purulent drainage, no fluctuance    ED Course  The patient is very well appearing with stable vital signs. (+) Raised, indurated, erythematous lesion to posterior neck. (+) Scant purulent drainage but no fluctuance. Cellulitis vs abscess vs malignancy? Plan for wound care and a course of oral antibiotics. The patient was instructed to follow up with both his PCP and Dermatology for wound check and possible biopsy next week. He is agreeable to this plan. Strict return precautions provided.      Critical Care Time  Procedures   "

## 2024-01-25 NOTE — TELEPHONE ENCOUNTER
Received call from patients brother in law asking to kendra appt.    Patient has routine referral from ER visit for an Abscess.    Brother in law stated that he does not want to drive far. He asked to go to [facility].    [facility] was checked for a new patient appt    Dr Moreno and Dr Meier are fully booked.    Another location and cancellation was offered.     He stated he will call somewhere else instead

## 2024-01-26 ENCOUNTER — TELEPHONE (OUTPATIENT)
Dept: INTERNAL MEDICINE CLINIC | Facility: CLINIC | Age: 69
End: 2024-01-26

## 2024-01-26 NOTE — TELEPHONE ENCOUNTER
Caregiver called needing to know instructions for covering a cyst that was removed. Patient said he is confused and doesn't know how to keep it covered, if patient can shower, if gauze is okay to use.

## 2024-01-26 NOTE — ED PROVIDER NOTES
History  Chief Complaint   Patient presents with    Abscess     Pt reports he was getting a haircut and his daughter noticed a cyst on the back of his neck. Pt denies pain. Reports he has noticed it for 3-4 days      HPI    Patient is a 69 y/o M with PMH CKD, MI presenting for evaluation of posterior neck skin lesion. Pt states he's had a lesion noticed by his daughter on his neck for the past few days. Painless. Notes having an abscess on his toe a few weeks ago that drained spontaneously and resolved, thinks this might be similar and is asking if it is draining pus. Denies any trauma to area, occasionally scratching at it. Denies rash or lesions anywhere else. No personal hx of skin ca. Denies fever, chills, n/v, cp, sob.     Prior to Admission Medications   Prescriptions Last Dose Informant Patient Reported? Taking?   ALPRAZolam (XANAX) 0.5 mg tablet  Self No No   Sig: As needed   ALPRAZolam (XANAX) 0.5 mg tablet  Self No No   Sig: TAKE 1 TO 2 TABLETS 30-60 MINUTES PRIOR TO LEAVING HOME AS NEEDED   aspirin 325 mg tablet  Self No No   Sig: Take 1 tablet (325 mg total) by mouth daily   atorvastatin (LIPITOR) 40 mg tablet  Self No No   Sig: Take 1 tablet (40 mg total) by mouth daily after dinner   bumetanide (BUMEX) 1 mg tablet  Self No No   Sig: Take 1 tablet (1 mg total) by mouth daily   carbamide peroxide (DEBROX) 6.5 % otic solution  Self No No   Sig: Administer 5 drops into the left ear 2 (two) times a day   metoprolol succinate (TOPROL-XL) 25 mg 24 hr tablet  Self No No   Sig: Take 1 tablet (25 mg total) by mouth 2 (two) times a day   multivitamin (THERAGRAN) TABS  Self No No   Sig: Take 1 tablet by mouth daily   omeprazole (PriLOSEC) 20 mg delayed release capsule  Self No No   Sig: Take 1 capsule (20 mg total) by mouth daily   tamsulosin (FLOMAX) 0.4 mg  Self No No   Sig: Take 1 capsule (0.4 mg total) by mouth daily with dinner      Facility-Administered Medications: None       Past Medical History:    Diagnosis Date    Allergic     Anemia     Anxiety     Central line complication 2022    Chronic kidney disease 21    Kidney Failure upon hospital admission, Tylenol overdose    Coronary artery disease 21    Myocardial infarction (HCC) 21    VSD Repair,Ecmo, Heart Attack,Bovine Repair    Obesity     Tylenol ingestion 2021    Urinary tract infection 22       Past Surgical History:   Procedure Laterality Date    CARDIAC SURGERY N/A     CORONARY ARTERY BYPASS GRAFT      VSD    EXTRACORPOREAL CIRCULATION N/A 2021    Procedure: SUPPORT EXTRACORPOREAL MEMBRANE OXYGENATION (ECMO);  Surgeon: HILDA Ochoa MD;  Location: [facility];  Service: Cardiac Surgery    IR OTHER  2021    IR OTHER  2022    [place] CLSR 1 VENTRICULAR SEPTAL DEFECT W/WO PATCH N/A 2022    Procedure: REPAIR VENTRICULAR SEPTAL DEFECT (VSD) OPEN WITH BOVINE PERICARDIAL PATCH.;  Surgeon: HILDA Ochoa MD;  [facility];  Service: Cardiac Surgery    [place] ECMO/ECLS INITIATION VENO-VENOUS N/A 2022    Procedure: VA ECMO DECANNULATION;  Surgeon: HILDA Ochoa MD;  [facility];  Service: Cardiac Surgery    [place] EXPL PO HEMRRG THROMBOSIS/INFCTJ CH N/A 2022    Procedure: RE-EXPLORATION MEDIASTERNAL CAVITY FOR POST-OP BLEED (BRING BACK) Removal of PA catheter;  Surgeon: HILDA Ochoa MD;  [facility];  Service: Cardiac Surgery       Family History   Problem Relation Age of Onset    Other Mother         Claustrophobia    Mental illness Mother         Agoraphobia    Heart disease Mother         Enlarged heart... of heart disease    Anxiety disorder Mother         Agorpahobia    Drug abuse Family     Alcohol abuse Family     Alcohol abuse Father     Breast cancer Sister      I have reviewed and agree with the history as documented.    E-Cigarette/Vaping    E-Cigarette Use Never User      E-Cigarette/Vaping Substances    Nicotine No     THC No      CBD No     Flavoring No      Social History     Tobacco Use    Smoking status: Former     Types: Pipe    Smokeless tobacco: Never    Tobacco comments:     Secondhand smoke exposure   Vaping Use    Vaping status: Never Used   Substance Use Topics    Alcohol use: No    Drug use: No        Review of Systems   Constitutional:  Negative for chills and fever.   HENT:  Negative for ear pain and sore throat.    Eyes:  Negative for pain and visual disturbance.   Respiratory:  Negative for cough and shortness of breath.    Cardiovascular:  Negative for chest pain and palpitations.   Gastrointestinal:  Negative for abdominal pain and vomiting.   Genitourinary:  Negative for dysuria and hematuria.   Musculoskeletal:  Negative for arthralgias and back pain.   Skin:  Negative for color change and rash.   Neurological:  Negative for seizures and syncope.   All other systems reviewed and are negative.      Physical Exam  ED Triage Vitals [01/24/24 1851]   Temperature Pulse Respirations Blood Pressure SpO2   (!) 97.4 °F (36.3 °C) 82 20 115/76 96 %      Temp Source Heart Rate Source Patient Position - Orthostatic VS BP Location FiO2 (%)   Tympanic Monitor Lying Left arm --      Pain Score       --             Orthostatic Vital Signs  Vitals:    01/24/24 1851   BP: 115/76   Pulse: 82   Patient Position - Orthostatic VS: Lying       Physical Exam  Vitals and nursing note reviewed.   Constitutional:       General: He is not in acute distress.     Appearance: He is well-developed. He is not toxic-appearing.   HENT:      Head: Normocephalic and atraumatic.      Right Ear: External ear normal.      Left Ear: External ear normal.      Nose: Nose normal.      Mouth/Throat:      Pharynx: Oropharynx is clear. No oropharyngeal exudate or posterior oropharyngeal erythema.   Eyes:      Extraocular Movements: Extraocular movements intact.      Conjunctiva/sclera: Conjunctivae normal.      Pupils: Pupils are equal, round, and reactive to light.    Cardiovascular:      Rate and Rhythm: Normal rate and regular rhythm.      Pulses: Normal pulses.      Heart sounds: Normal heart sounds. No murmur heard.     No friction rub. No gallop.   Pulmonary:      Effort: Pulmonary effort is normal. No respiratory distress.      Breath sounds: Normal breath sounds. No wheezing, rhonchi or rales.   Abdominal:      General: Abdomen is flat.      Palpations: Abdomen is soft.      Tenderness: There is no abdominal tenderness. There is no guarding or rebound.   Musculoskeletal:         General: Normal range of motion.      Cervical back: Normal range of motion. No rigidity.      Right lower leg: No edema.      Left lower leg: No edema.   Skin:     General: Skin is warm and dry.      Capillary Refill: Capillary refill takes less than 2 seconds.      Comments: See media for posterior neck skin lesion. The lesion has some purulent discharge, no significant warmth or erythema. No fluctuance noted   Neurological:      General: No focal deficit present.      Mental Status: He is alert.   Psychiatric:         Mood and Affect: Mood normal.         ED Medications  Medications   doxycycline hyclate (VIBRAMYCIN) capsule 100 mg (100 mg Oral Given 1/24/24 2017)       Diagnostic Studies  Results Reviewed       None                   No orders to display         Procedures  Procedures      ED Course                                       Medical Decision Making  69 y/o M presenting with posterior neck skin lesion. VSS. Lesion appears infected, does not look like a typical abscess as the superficial skin is elevated, abraded, no fluctuance. Lesion overall concerning for possible squamous cell skin cancer vs other skin malignancy. Discussed this concern with the patient who states he understands and knows the urgency to follow up with dermatology. Will start on doxycyline to cover possible superimposed infection as there is purulence present. Pt comfortable with this plan, all questions answered  to best of ability and pt discharged.     Risk  Prescription drug management.          Disposition  Final diagnoses:   Abscess   Skin lesion     Time reflects when diagnosis was documented in both MDM as applicable and the Disposition within this note       Time User Action Codes Description Comment    1/24/2024  8:14 PM Jovany Arevalo [L02.91] Abscess     1/24/2024  8:14 PM Jovany Arevalo [L98.9] Skin lesion           ED Disposition       ED Disposition   Discharge    Condition   Stable    Date/Time   Wed Jan 24, 2024  8:14 PM    Comment   Robbie Momin discharge to home/self care.                   Follow-up Information       Follow up With Specialties Details Why Contact Info Additional Information    Luis E Montalvo MD Internal Medicine Schedule an appointment as soon as possible for a visit in 2 days For wound re-check [address] [place] [id number]  [phone]       [facility] Dermatology Schedule an appointment as soon as possible for a visit   [address] [phone] [facility], [address] [facility], [address], [phone]            Discharge Medication List as of 1/24/2024  8:16 PM        CONTINUE these medications which have NOT CHANGED    Details   !! ALPRAZolam (XANAX) 0.5 mg tablet As needed, No Print      !! ALPRAZolam (XANAX) 0.5 mg tablet TAKE 1 TO 2 TABLETS 30-60 MINUTES PRIOR TO LEAVING HOME AS NEEDED, Normal      aspirin 325 mg tablet Take 1 tablet (325 mg total) by mouth daily, Starting Tue 2/1/2022, Print      atorvastatin (LIPITOR) 40 mg tablet Take 1 tablet (40 mg total) by mouth daily after dinner, Starting Wed 12/13/2023, Normal      bumetanide (BUMEX) 1 mg tablet Take 1 tablet (1 mg total) by mouth daily, Starting Wed 12/13/2023, Until Tue 3/12/2024, Normal      carbamide peroxide (DEBROX) 6.5 % otic solution Administer 5 drops into the left ear 2  (two) times a day, Starting Wed 12/13/2023, Normal      metoprolol succinate (TOPROL-XL) 25 mg 24 hr tablet Take 1 tablet (25 mg total) by mouth 2 (two) times a day, Starting Mon 1/8/2024, Normal      multivitamin (THERAGRAN) TABS Take 1 tablet by mouth daily, Starting Mon 10/9/2023, Normal      omeprazole (PriLOSEC) 20 mg delayed release capsule Take 1 capsule (20 mg total) by mouth daily, Starting Wed 12/13/2023, Normal      tamsulosin (FLOMAX) 0.4 mg Take 1 capsule (0.4 mg total) by mouth daily with dinner, Starting Wed 12/13/2023, Normal       !! - Potential duplicate medications found. Please discuss with provider.            PDMP Review         Value Time User    PDMP Reviewed  Yes 1/8/2024 10:51 AM Luis E Montalvo MD             ED Provider  Attending physically available and evaluated Robbie Momin. I managed the patient along with the ED Attending.    Electronically Signed by           Jovany Arevalo MD  01/26/24 1491

## 2024-01-29 ENCOUNTER — OFFICE VISIT (OUTPATIENT)
Dept: INTERNAL MEDICINE CLINIC | Facility: CLINIC | Age: 69
End: 2024-01-29
Payer: COMMERCIAL

## 2024-01-29 VITALS
HEART RATE: 75 BPM | TEMPERATURE: 98.2 F | DIASTOLIC BLOOD PRESSURE: 72 MMHG | BODY MASS INDEX: 37.45 KG/M2 | WEIGHT: 233 LBS | OXYGEN SATURATION: 97 % | HEIGHT: 66 IN | SYSTOLIC BLOOD PRESSURE: 122 MMHG

## 2024-01-29 DIAGNOSIS — Z12.11 SCREENING FOR COLON CANCER: ICD-10-CM

## 2024-01-29 DIAGNOSIS — Z87.74 S/P VSD REPAIR: ICD-10-CM

## 2024-01-29 DIAGNOSIS — L02.91 ABSCESS: Primary | ICD-10-CM

## 2024-01-29 PROCEDURE — 99213 OFFICE O/P EST LOW 20 MIN: CPT | Performed by: INTERNAL MEDICINE

## 2024-01-29 RX ORDER — METOPROLOL SUCCINATE 25 MG/1
25 TABLET, EXTENDED RELEASE ORAL 2 TIMES DAILY
Qty: 180 TABLET | Refills: 2 | Status: SHIPPED | OUTPATIENT
Start: 2024-01-29

## 2024-01-29 RX ORDER — DOXYCYCLINE HYCLATE 100 MG/1
100 CAPSULE ORAL 2 TIMES DAILY
Qty: 14 CAPSULE | Refills: 0 | Status: SHIPPED | OUTPATIENT
Start: 2024-01-29 | End: 2024-01-31 | Stop reason: SDUPTHER

## 2024-01-29 NOTE — PROGRESS NOTES
Assessment/Plan:             1. Abscess  Comments:  back of neck  Orders:  -     doxycycline hyclate (VIBRAMYCIN) 100 mg capsule; Take 1 capsule (100 mg total) by mouth 2 (two) times a day for 7 days    2. Screening for colon cancer  -     Ambulatory Referral to Gastroenterology; Future    3. S/P VSD repair  -     metoprolol succinate (TOPROL-XL) 25 mg 24 hr tablet; Take 1 tablet (25 mg total) by mouth 2 (two) times a day           Subjective:      Patient ID: Robbie Momin is a 68 y.o. male.    Follow-up on wound check on the back of the neck, minimal pain, no fever no chills        The following portions of the patient's history were reviewed and updated as appropriate: He  has a past medical history of Allergic, Anemia, Anxiety, Central line complication (01/07/2022), Chronic kidney disease (12/30/21), Coronary artery disease (12/30/21), Myocardial infarction (HCC) (12/30/21), Obesity, Tylenol ingestion (12/31/2021), and Urinary tract infection (1/23/22).  He   Patient Active Problem List    Diagnosis Date Noted    Ischemic cardiomyopathy 10/17/2022    Gastroesophageal reflux disease without esophagitis 10/17/2022    Colon cancer screening declined 10/17/2022    Chronic systolic congestive heart failure (HCC) 07/11/2022    Benign prostatic hyperplasia with urinary retention 04/12/2022    Encounter for postoperative care 03/11/2022    Urinary retention 01/21/2022    S/P VSD repair 01/07/2022    Coagulopathy (HCC) 01/01/2022    Acute systolic congestive heart failure (HCC) 12/31/2021    Anemia 12/31/2021    Personal history of ECMO 12/31/2021    Cardiogenic shock (HCC) 12/30/2021    VSD (ventricular septal defect) 12/30/2021    JUDE (acute kidney injury) ([facility]) 12/30/2021    Former smoker 07/06/2018    Hyperlipidemia, mixed 05/23/2017    Impaired fasting glucose 05/23/2017    Agoraphobia with panic attacks 11/06/2015    Body mass index 38.0-38.9, adult 11/06/2015    Psychosis, atypical (HCC) 11/06/2015     He  has a  past surgical history that includes Extracorporeal membrane oxygenation (N/A, 12/31/2021); pr ecmo/ecls initiation veno-venous (N/A, 01/04/2022); pr clsr 1 ventricular septal defect w/wo patch (N/A, 01/04/2022); IR other (12/31/2021); pr expl po hemrrg thrombosis/infctj ch (N/A, 01/07/2022); IR other (01/07/2022); Cardiac surgery (N/A); and Coronary artery bypass graft.  His family history includes Alcohol abuse in his family and father; Anxiety disorder in his mother; Breast cancer in his sister; Drug abuse in his family; Heart disease in his mother; Mental illness in his mother; Other in his mother.  He  reports that he has quit smoking. His smoking use included pipe. He has never used smokeless tobacco. He reports that he does not drink alcohol and does not use drugs.  Current Outpatient Medications   Medication Sig Dispense Refill    ALPRAZolam (XANAX) 0.5 mg tablet As needed      ALPRAZolam (XANAX) 0.5 mg tablet TAKE 1 TO 2 TABLETS 30-60 MINUTES PRIOR TO LEAVING HOME AS NEEDED 30 tablet 0    aspirin 325 mg tablet Take 1 tablet (325 mg total) by mouth daily 30 tablet 2    atorvastatin (LIPITOR) 40 mg tablet Take 1 tablet (40 mg total) by mouth daily after dinner 90 tablet 0    bumetanide (BUMEX) 1 mg tablet Take 1 tablet (1 mg total) by mouth daily 90 tablet 0    carbamide peroxide (DEBROX) 6.5 % otic solution Administer 5 drops into the left ear 2 (two) times a day 15 mL 0    doxycycline hyclate (VIBRAMYCIN) 100 mg capsule Take 1 capsule (100 mg total) by mouth 2 (two) times a day for 7 days 14 capsule 0    metoprolol succinate (TOPROL-XL) 25 mg 24 hr tablet Take 1 tablet (25 mg total) by mouth 2 (two) times a day 180 tablet 2    multivitamin (THERAGRAN) TABS Take 1 tablet by mouth daily 100 tablet 1    omeprazole (PriLOSEC) 20 mg delayed release capsule Take 1 capsule (20 mg total) by mouth daily 90 capsule 0    tamsulosin (FLOMAX) 0.4 mg Take 1 capsule (0.4 mg total) by mouth daily with dinner 90 capsule 0      No current facility-administered medications for this visit.     Current Outpatient Medications on File Prior to Visit   Medication Sig    ALPRAZolam (XANAX) 0.5 mg tablet As needed    ALPRAZolam (XANAX) 0.5 mg tablet TAKE 1 TO 2 TABLETS 30-60 MINUTES PRIOR TO LEAVING HOME AS NEEDED    aspirin 325 mg tablet Take 1 tablet (325 mg total) by mouth daily    atorvastatin (LIPITOR) 40 mg tablet Take 1 tablet (40 mg total) by mouth daily after dinner    bumetanide (BUMEX) 1 mg tablet Take 1 tablet (1 mg total) by mouth daily    carbamide peroxide (DEBROX) 6.5 % otic solution Administer 5 drops into the left ear 2 (two) times a day    multivitamin (THERAGRAN) TABS Take 1 tablet by mouth daily    omeprazole (PriLOSEC) 20 mg delayed release capsule Take 1 capsule (20 mg total) by mouth daily    tamsulosin (FLOMAX) 0.4 mg Take 1 capsule (0.4 mg total) by mouth daily with dinner    [DISCONTINUED] doxycycline hyclate (VIBRAMYCIN) 100 mg capsule Take 1 capsule (100 mg total) by mouth 2 (two) times a day for 7 days    [DISCONTINUED] metoprolol succinate (TOPROL-XL) 25 mg 24 hr tablet Take 1 tablet (25 mg total) by mouth 2 (two) times a day     No current facility-administered medications on file prior to visit.     He is allergic to mushroom extract complex - food allergy, peanut oil - food allergy, shellfish-derived products - food allergy, and strawberry c [ascorbate - food allergy]..    Review of Systems   Constitutional:  Negative for chills and fever.   HENT:  Negative for congestion, ear pain and sore throat.    Eyes:  Negative for pain.   Respiratory:  Negative for cough and shortness of breath.    Cardiovascular:  Negative for chest pain and leg swelling.   Gastrointestinal:  Negative for abdominal pain, nausea and vomiting.   Endocrine: Negative for polyuria.   Genitourinary:  Negative for difficulty urinating, frequency and urgency.   Musculoskeletal:  Negative for arthralgias and back pain.   Skin:  Negative for  "rash.   Neurological:  Negative for weakness and headaches.   Psychiatric/Behavioral:  Negative for sleep disturbance. The patient is not nervous/anxious.          Objective:      /72 (BP Location: Left arm, Patient Position: Sitting, Cuff Size: Large)   Pulse 75   Temp 98.2 °F (36.8 °C) (Temporal)   Ht 5' 6\" (1.676 m)   Wt 106 kg (233 lb)   SpO2 97%   BMI 37.61 kg/m²     Recent Results (from the past 1344 hour(s))   CBC and differential    Collection Time: 01/04/24  6:42 AM   Result Value Ref Range    WBC 6.12 4.31 - 10.16 Thousand/uL    RBC 4.35 3.88 - 5.62 Million/uL    Hemoglobin 13.6 12.0 - 17.0 g/dL    Hematocrit 42.4 36.5 - 49.3 %    MCV 98 82 - 98 fL    MCH 31.3 26.8 - 34.3 pg    MCHC 32.1 31.4 - 37.4 g/dL    RDW 13.7 11.6 - 15.1 %    MPV 11.4 8.9 - 12.7 fL    Platelets 187 149 - 390 Thousands/uL    nRBC 0 /100 WBCs    Neutrophils Relative 67 43 - 75 %    Immat GRANS % 0 0 - 2 %    Lymphocytes Relative 21 14 - 44 %    Monocytes Relative 8 4 - 12 %    Eosinophils Relative 3 0 - 6 %    Basophils Relative 1 0 - 1 %    Neutrophils Absolute 4.04 1.85 - 7.62 Thousands/µL    Immature Grans Absolute 0.02 0.00 - 0.20 Thousand/uL    Lymphocytes Absolute 1.30 0.60 - 4.47 Thousands/µL    Monocytes Absolute 0.50 0.17 - 1.22 Thousand/µL    Eosinophils Absolute 0.21 0.00 - 0.61 Thousand/µL    Basophils Absolute 0.05 0.00 - 0.10 Thousands/µL   Comprehensive metabolic panel    Collection Time: 01/04/24  6:42 AM   Result Value Ref Range    Sodium 141 135 - 147 mmol/L    Potassium 4.0 3.5 - 5.3 mmol/L    Chloride 109 (H) 96 - 108 mmol/L    CO2 25 21 - 32 mmol/L    ANION GAP 7 mmol/L    BUN 29 (H) 5 - 25 mg/dL    Creatinine 1.41 (H) 0.60 - 1.30 mg/dL    Glucose, Fasting 96 65 - 99 mg/dL    Calcium 9.2 8.4 - 10.2 mg/dL    AST 18 13 - 39 U/L    ALT 23 7 - 52 U/L    Alkaline Phosphatase 101 34 - 104 U/L    Total Protein 7.3 6.4 - 8.4 g/dL    Albumin 4.3 3.5 - 5.0 g/dL    Total Bilirubin 0.58 0.20 - 1.00 mg/dL    eGFR " 51 ml/min/1.73sq m   Lipid Panel with Direct LDL reflex    Collection Time: 01/04/24  6:42 AM   Result Value Ref Range    Cholesterol 105 See Comment mg/dL    Triglycerides 103 See Comment mg/dL    HDL, Direct 38 (L) >=40 mg/dL    LDL Calculated 46 0 - 100 mg/dL   TSH, 3rd generation    Collection Time: 01/04/24  6:42 AM   Result Value Ref Range    TSH 3RD GENERATON 1.752 0.450 - 4.500 uIU/mL   Hemoglobin A1C    Collection Time: 01/04/24  6:42 AM   Result Value Ref Range    Hemoglobin A1C 6.1 (H) Normal 4.0-5.6%; PreDiabetic 5.7-6.4%; Diabetic >=6.5%; Glycemic control for adults with diabetes <7.0% %     mg/dl   Vitamin B12    Collection Time: 01/04/24  6:42 AM   Result Value Ref Range    Vitamin B-12 698 180 - 914 pg/mL   Vitamin D 25 hydroxy    Collection Time: 01/04/24  6:42 AM   Result Value Ref Range    Vit D, 25-Hydroxy 40.8 30.0 - 100.0 ng/mL        Physical Exam  Constitutional:       Appearance: Normal appearance.   HENT:      Head: Normocephalic.      Right Ear: External ear normal.      Left Ear: External ear normal.      Nose: Nose normal. No congestion.      Mouth/Throat:      Pharynx: Oropharynx is clear. No oropharyngeal exudate or posterior oropharyngeal erythema.   Eyes:      Extraocular Movements: Extraocular movements intact.      Conjunctiva/sclera: Conjunctivae normal.   Cardiovascular:      Rate and Rhythm: Normal rate and regular rhythm.      Heart sounds: Normal heart sounds. No murmur heard.  Pulmonary:      Effort: Pulmonary effort is normal.      Breath sounds: Normal breath sounds. No wheezing or rales.   Abdominal:      General: Abdomen is flat. There is no distension.      Palpations: Abdomen is soft.      Tenderness: There is no abdominal tenderness.   Musculoskeletal:         General: Normal range of motion.      Cervical back: Normal range of motion and neck supple.      Right lower leg: No edema.      Left lower leg: No edema.   Lymphadenopathy:      Cervical: No cervical  adenopathy.   Skin:     General: Skin is warm.      Comments: Back of the neck papular lesion, 1 drop of yellow pus came out, minimal tenderness, approximately 1 cm in size   Neurological:      General: No focal deficit present.      Mental Status: He is alert and oriented to person, place, and time.

## 2024-01-31 ENCOUNTER — CONSULT (OUTPATIENT)
Dept: MULTI SPECIALTY CLINIC | Facility: CLINIC | Age: 69
End: 2024-01-31

## 2024-01-31 DIAGNOSIS — L98.9 SKIN LESION: ICD-10-CM

## 2024-01-31 DIAGNOSIS — L02.91 ABSCESS: ICD-10-CM

## 2024-01-31 DIAGNOSIS — L02.91 ABSCESS: Primary | ICD-10-CM

## 2024-01-31 DIAGNOSIS — Z12.11 SCREENING FOR COLON CANCER: Primary | ICD-10-CM

## 2024-01-31 PROCEDURE — 87205 SMEAR GRAM STAIN: CPT | Performed by: STUDENT IN AN ORGANIZED HEALTH CARE EDUCATION/TRAINING PROGRAM

## 2024-01-31 PROCEDURE — 87070 CULTURE OTHR SPECIMN AEROBIC: CPT | Performed by: STUDENT IN AN ORGANIZED HEALTH CARE EDUCATION/TRAINING PROGRAM

## 2024-01-31 PROCEDURE — 87077 CULTURE AEROBIC IDENTIFY: CPT | Performed by: STUDENT IN AN ORGANIZED HEALTH CARE EDUCATION/TRAINING PROGRAM

## 2024-01-31 PROCEDURE — 87186 SC STD MICRODIL/AGAR DIL: CPT | Performed by: STUDENT IN AN ORGANIZED HEALTH CARE EDUCATION/TRAINING PROGRAM

## 2024-01-31 RX ORDER — DOXYCYCLINE HYCLATE 100 MG/1
100 CAPSULE ORAL 2 TIMES DAILY
Qty: 28 CAPSULE | Refills: 0 | Status: SHIPPED | OUTPATIENT
Start: 2024-01-31 | End: 2024-02-14

## 2024-01-31 NOTE — PROGRESS NOTES
"[facility] Dermatology Clinic Note     Patient Name: Robbie Momin  Encounter Date: 1/31/2024     Have you been cared for by a [facility] Dermatologist in the last 3 years and, if so, which description applies to you?    NO.   I am considered a \"new\" patient and must complete all patient intake questions. I am MALE/not capable of bearing children.    REVIEW OF SYSTEMS:  Have you recently had or currently have any of the following? Recent fever or chills? No  Any non-healing wound? No   PAST MEDICAL HISTORY:  Have you personally ever had or currently have any of the following?  If \"YES,\" then please provide more detail. Skin cancer (such as Melanoma, Basal Cell Carcinoma, Squamous Cell Carcinoma?  No  Tuberculosis, HIV/AIDS, Hepatitis B or C: No  Radiation Treatment No   HISTORY OF IMMUNOSUPPRESSION:   Do you have a history of any of the following:  Systemic Immunosuppression such as Diabetes, Biologic or Immunotherapy, Chemotherapy, Organ Transplantation, Bone Marrow Transplantation?  No     Answering \"YES\" requires the addition of the dotphrase \"IMMUNOSUPPRESSED\" as the first diagnosis of the patient's visit.   FAMILY HISTORY:  Any \"first degree relatives\" (parent, brother, sister, or child) with the following?    Skin Cancer, Pancreatic or Other Cancer? No   PATIENT EXPERIENCE:    Do you want the Dermatologist to perform a COMPLETE skin exam today including a clinical examination under the \"bra and underwear\" areas?  NO  If necessary, do we have your permission to call and leave a detailed message on your Preferred Phone number that includes your specific medical information?  Yes      Allergies   Allergen Reactions    Mushroom Extract Complex - Food Allergy Facial Swelling    Peanut Oil - Food Allergy     Shellfish-Derived Products - Food Allergy Other (See Comments)     Runs in the family    Strawberry C [Ascorbate - Food Allergy]       Current Outpatient Medications:     ALPRAZolam (XANAX) 0.5 mg tablet, As " needed, Disp: , Rfl:     ALPRAZolam (XANAX) 0.5 mg tablet, TAKE 1 TO 2 TABLETS 30-60 MINUTES PRIOR TO LEAVING HOME AS NEEDED, Disp: 30 tablet, Rfl: 0    aspirin 325 mg tablet, Take 1 tablet (325 mg total) by mouth daily, Disp: 30 tablet, Rfl: 2    atorvastatin (LIPITOR) 40 mg tablet, Take 1 tablet (40 mg total) by mouth daily after dinner, Disp: 90 tablet, Rfl: 0    bumetanide (BUMEX) 1 mg tablet, Take 1 tablet (1 mg total) by mouth daily, Disp: 90 tablet, Rfl: 0    carbamide peroxide (DEBROX) 6.5 % otic solution, Administer 5 drops into the left ear 2 (two) times a day, Disp: 15 mL, Rfl: 0    doxycycline hyclate (VIBRAMYCIN) 100 mg capsule, Take 1 capsule (100 mg total) by mouth 2 (two) times a day for 7 days, Disp: 14 capsule, Rfl: 0    metoprolol succinate (TOPROL-XL) 25 mg 24 hr tablet, Take 1 tablet (25 mg total) by mouth 2 (two) times a day, Disp: 180 tablet, Rfl: 2    multivitamin (THERAGRAN) TABS, Take 1 tablet by mouth daily, Disp: 100 tablet, Rfl: 1    omeprazole (PriLOSEC) 20 mg delayed release capsule, Take 1 capsule (20 mg total) by mouth daily, Disp: 90 capsule, Rfl: 0    tamsulosin (FLOMAX) 0.4 mg, Take 1 capsule (0.4 mg total) by mouth daily with dinner, Disp: 90 capsule, Rfl: 0        Whom besides the patient is providing clinical information about today's encounter?   Other:  brother in law, care taker    Physical Exam and Assessment/Plan by Diagnosis:      ABSCESS OF POSTERIOR NECK  Physical Exam:  Anatomic Location Affected:  posterior neck  Morphological Description:  2.5 cm erythematous eroded pink subcutaneous nodule  Pertinent Positives:  Pertinent Negatives:    Additional History of Present Condition:  Patient went to ED for tender posterior neck lesion approximately 2 by 2 cm when he was started on doxycycline. Lesion was noted 2 weeks ago.     Assessment and Plan:  Based on a thorough discussion of this condition and the management approach to it (including a comprehensive discussion of  the known risks, side effects and potential benefits of treatment), the patient (family) agrees to implement the following specific plan:  Continue doxycycline 100 mg twice daily for total of one month  Wound culture taken today  Kenalog-10 administered today  Follow up in 1 month

## 2024-01-31 NOTE — PATIENT INSTRUCTIONS
ABSCESS OF POSTERIOR NECK  Physical Exam:  Anatomic Location Affected:  posterior neck  Morphological Description:  2.5 cm erythematous eroded pink subcutaneous nodule  Pertinent Positives:  Pertinent Negatives:    Additional History of Present Condition:  Patient went to ED for tender posterior neck lesion approximately 2 by 2 cm when he was started on doxycycline. Lesion was noted 2 weeks ago.     Assessment and Plan:  Based on a thorough discussion of this condition and the management approach to it (including a comprehensive discussion of the known risks, side effects and potential benefits of treatment), the patient (family) agrees to implement the following specific plan:  Continue doxycycline 100 mg twice daily for total of one month  Wound culture taken today  Kenalog-10 administered today  Follow up in 1 month

## 2024-02-02 ENCOUNTER — TELEPHONE (OUTPATIENT)
Dept: INTERNAL MEDICINE CLINIC | Facility: CLINIC | Age: 69
End: 2024-02-02

## 2024-02-02 DIAGNOSIS — L08.9 SOFT TISSUE INFECTION: Primary | ICD-10-CM

## 2024-02-02 RX ORDER — CEPHALEXIN 500 MG/1
500 CAPSULE ORAL EVERY 12 HOURS SCHEDULED
Qty: 14 CAPSULE | Refills: 0 | Status: SHIPPED | OUTPATIENT
Start: 2024-02-02 | End: 2024-02-09

## 2024-02-02 NOTE — TELEPHONE ENCOUNTER
PA has been faxed to the plan as a paper copy. Please contact the plan directly if you haven't received a determination in a typical timeframe.    You will be notified of the determination via fax.  r multivitamins

## 2024-02-03 LAB
BACTERIA WND AEROBE CULT: ABNORMAL
BACTERIA WND AEROBE CULT: ABNORMAL
GRAM STN SPEC: ABNORMAL
GRAM STN SPEC: ABNORMAL

## 2024-02-05 DIAGNOSIS — E63.9 NUTRITION DISORDER: ICD-10-CM

## 2024-02-05 RX ORDER — DIPHENOXYLATE HYDROCHLORIDE AND ATROPINE SULFATE 2.5; .025 MG/1; MG/1
1 TABLET ORAL DAILY
Qty: 100 TABLET | Refills: 1 | Status: SHIPPED | OUTPATIENT
Start: 2024-02-05

## 2024-02-14 ENCOUNTER — OFFICE VISIT (OUTPATIENT)
Dept: MULTI SPECIALTY CLINIC | Facility: CLINIC | Age: 69
End: 2024-02-14

## 2024-02-14 VITALS — BODY MASS INDEX: 37.45 KG/M2 | HEIGHT: 66 IN | TEMPERATURE: 98 F | WEIGHT: 233 LBS

## 2024-02-14 DIAGNOSIS — L02.91 ABSCESS: Primary | ICD-10-CM

## 2024-02-14 RX ORDER — TRIAMCINOLONE ACETONIDE 40 MG/ML
40 INJECTION, SUSPENSION INTRA-ARTICULAR; INTRAMUSCULAR ONCE
Status: COMPLETED | OUTPATIENT
Start: 2024-02-14 | End: 2024-02-14

## 2024-02-14 RX ORDER — CLINDAMYCIN PHOSPHATE 10 UG/ML
LOTION TOPICAL 2 TIMES DAILY
Qty: 60 ML | Refills: 3 | Status: SHIPPED | OUTPATIENT
Start: 2024-02-14

## 2024-02-14 RX ADMIN — TRIAMCINOLONE ACETONIDE 40 MG: 40 INJECTION, SUSPENSION INTRA-ARTICULAR; INTRAMUSCULAR at 13:05

## 2024-02-14 NOTE — PROGRESS NOTES
"[facility] Dermatology Clinic Note     Patient Name: Robbie Momin  Encounter Date: 2/14/2024     Have you been cared for by a [facility] Dermatologist in the last 3 years and, if so, which description applies to you?    Yes.  I have been here within the last 3 years, and my medical history has NOT changed since that time.  I am MALE/not capable of bearing children.    REVIEW OF SYSTEMS:  Have you recently had or currently have any of the following? No changes in my recent health.   PAST MEDICAL HISTORY:  Have you personally ever had or currently have any of the following?  If \"YES,\" then please provide more detail. No changes in my medical history.   HISTORY OF IMMUNOSUPPRESSION: Do you have a history of any of the following:  Systemic Immunosuppression such as Diabetes, Biologic or Immunotherapy, Chemotherapy, Organ Transplantation, Bone Marrow Transplantation?  No     Answering \"YES\" requires the addition of the dotphrase \"IMMUNOSUPPRESSED\" as the first diagnosis of the patient's visit.   FAMILY HISTORY:  Any \"first degree relatives\" (parent, brother, sister, or child) with the following?    No changes in my family's known health.   PATIENT EXPERIENCE:    Do you want the Dermatologist to perform a COMPLETE skin exam today including a clinical examination under the \"bra and underwear\" areas?  NO  If necessary, do we have your permission to call and leave a detailed message on your Preferred Phone number that includes your specific medical information?  Yes      Allergies   Allergen Reactions    Mushroom Extract Complex - Food Allergy Facial Swelling    Peanut Oil - Food Allergy     Shellfish-Derived Products - Food Allergy Other (See Comments)     Runs in the family    Strawberry C [Ascorbate - Food Allergy]       Current Outpatient Medications:     ALPRAZolam (XANAX) 0.5 mg tablet, As needed, Disp: , Rfl:     ALPRAZolam (XANAX) 0.5 mg tablet, TAKE 1 TO 2 TABLETS 30-60 MINUTES PRIOR TO LEAVING HOME AS NEEDED, " Disp: 30 tablet, Rfl: 0    aspirin 325 mg tablet, Take 1 tablet (325 mg total) by mouth daily, Disp: 30 tablet, Rfl: 2    atorvastatin (LIPITOR) 40 mg tablet, Take 1 tablet (40 mg total) by mouth daily after dinner, Disp: 90 tablet, Rfl: 0    bumetanide (BUMEX) 1 mg tablet, Take 1 tablet (1 mg total) by mouth daily, Disp: 90 tablet, Rfl: 0    carbamide peroxide (DEBROX) 6.5 % otic solution, Administer 5 drops into the left ear 2 (two) times a day, Disp: 15 mL, Rfl: 0    doxycycline hyclate (VIBRAMYCIN) 100 mg capsule, Take 1 capsule (100 mg total) by mouth 2 (two) times a day for 14 days Total of 30 days, Disp: 28 capsule, Rfl: 0    metoprolol succinate (TOPROL-XL) 25 mg 24 hr tablet, Take 1 tablet (25 mg total) by mouth 2 (two) times a day, Disp: 180 tablet, Rfl: 2    multivitamin (THERAGRAN) TABS, Take 1 tablet by mouth daily, Disp: 100 tablet, Rfl: 1    omeprazole (PriLOSEC) 20 mg delayed release capsule, Take 1 capsule (20 mg total) by mouth daily, Disp: 90 capsule, Rfl: 0    tamsulosin (FLOMAX) 0.4 mg, Take 1 capsule (0.4 mg total) by mouth daily with dinner, Disp: 90 capsule, Rfl: 0          Whom besides the patient is providing clinical information about today's encounter?   Caretaker provided history (patient is institutionalized) brother in- Law    Physical Exam and Assessment/Plan by Diagnosis:      ABSCESS OF POSTERIOR NECK     Physical Exam:  Anatomic Location Affected:  Posterior neck   Morphological Description:  slightly erythematous nodule with central punctum  Pertinent Positives:  Pertinent Negatives:    Additional History of Present Condition:  Keflex 500 mg twice a day for 7 days course was completed.      Result Note  Wound Culture 1+ Growth of Proteus mirabilis Abnormal       Growth in Broth culture only   Mixed Skin Yamileth            GRAM STAIN RESULT  Abnormal   2+ Polys      Rare Gram positive cocci in pairs              Susceptibility     Proteus mirabilis     RHIANNON     Ampicillin ($$) <=8.00  ug/ml Susceptible     Aztreonam ($$$) <=4 ug/ml Susceptible     Cefazolin ($) <=2.00 ug/ml Susceptible     Ciprofloxacin ($) <=0.25 ug/ml Susceptible     Gentamicin ($$) <=2 ug/ml Susceptible     Levofloxacin ($) <=0.50 ug/ml Susceptible     Tetracycline >8 ug/ml Resistant     Tobramycin ($) <=2 ug/ml Susceptible     Trimethoprim + Sulfamethoxazole ($$$) <=0.5/9.5 u... Susceptible     ZID Performed Yes                   Specimen Collected: 01/31/24  9:18 AM Last Resulted: 02/03/24 11:08 AM               Assessment and Plan:  Based on a thorough discussion of this condition and the management approach to it (including a comprehensive discussion of the known risks, side effects and potential benefits of treatment), the patient (family) agrees to implement the following specific plan:  Start clindamycin lotion twice daily to affected area and apply benzoyl peroxide 4% wash in the shower daily  Panoxyl is 4%, found over the counter in the cleanser aisle   Kenalog-40 0.22 injected today  Discussed excision to prevent further reaccumulation    PROCEDURE:  INTRALESIONAL STEROID INJECTION (KENALOG INJECTION)    Purpose: Triamcinolone is a synthetic glucocorticoid corticosteroid that has marked anti-inflammatory action. It is prepared in sterile aqueous suspension suitable for injecting directly into a lesion on or immediately below the skin to treat a dermal inflammatory process.     Indications: It is indicated for alopecia areata; inflammatory acne cysts; discoid lupus erythematosus; keloids and hypertrophic scars; inflammatory lesions of granuloma annulare, lichen planus, lichen simplex chronicus (neurodermatitis), psoriatic plaques, and other localized inflammatory skin conditions.     Potential Side Effects: I understand that triamcinolone injection can potentially cause early and/or delayed adverse effects such as:    Pain    Impaired wound healing    Increased hair growth    Bleeding    White or brown marks     Steroid acne    Infection    Telangiectasia    Skin thinning    Cutaneous and subcutaneous lipoatrophy (most common) appearing as skin indentations or dimples around the injection sites a few weeks after treatment     PROCEDURE NOTE:  After verbal and written consent were obtained, the to-be-treated area was wiped and cleaned with rubbing alcohol 70%.      Then, a total of 0.2 mL of Kenalog CONCENTRATION:  40 mg/mL   (Lot# [id number]; Expiration 5/2024, NDC#: 8731-8293-79) was injected intralesionally into a total of 1 lesion/s on the following anatomic areas:  posterior neck using a 1-mL syringe and a 30-gauge needle.      There was less than 1 mL of blood loss and little to no discomfort.  The area was bandaged with a Band-aid.  The patient tolerated the procedure well and remained in the office for observation.  With no signs of an adverse reaction, the patient was eventually discharged from clinic.

## 2024-02-27 ENCOUNTER — CONSULT (OUTPATIENT)
Dept: NEPHROLOGY | Facility: CLINIC | Age: 69
End: 2024-02-27
Payer: COMMERCIAL

## 2024-02-27 VITALS
HEART RATE: 65 BPM | SYSTOLIC BLOOD PRESSURE: 102 MMHG | BODY MASS INDEX: 37.12 KG/M2 | DIASTOLIC BLOOD PRESSURE: 79 MMHG | WEIGHT: 231 LBS | HEIGHT: 66 IN

## 2024-02-27 DIAGNOSIS — N18.31 STAGE 3A CHRONIC KIDNEY DISEASE (HCC): Primary | ICD-10-CM

## 2024-02-27 DIAGNOSIS — I50.22 CHRONIC SYSTOLIC CONGESTIVE HEART FAILURE (HCC): ICD-10-CM

## 2024-02-27 DIAGNOSIS — E66.01 CLASS 2 SEVERE OBESITY DUE TO EXCESS CALORIES WITH SERIOUS COMORBIDITY AND BODY MASS INDEX (BMI) OF 37.0 TO 37.9 IN ADULT: ICD-10-CM

## 2024-02-27 PROCEDURE — 99244 OFF/OP CNSLTJ NEW/EST MOD 40: CPT | Performed by: INTERNAL MEDICINE

## 2024-02-27 NOTE — PROGRESS NOTES
NEPHROLOGY OUTPATIENT CONSULTATION   Robbie Momin 68 y.o. male MRN: [id number]  Date: 02/27/24  Reason for consultation: Chronic kidney disease    ASSESSMENT and PLAN:  68-year-old male was seen in nephrology office today for evaluation and management of chronic kidney disease.    # Chronic Kidney Disease Stage III-A  - Etiology/risk factors: Episode of severe acute kidney injury in 2021, cardiorenal physiology, age-related nephron loss  - Baseline Cr: 1.0-1.3 before episode of MI in 2021, episode of JUDE in setting of late presentation of MI/cardiogenic shock with peak creatinine of 4.0 and discharge creatinine of 1.09, follow-up creatinine in the range of 1.3-1.5, most recently 1.41 01/2024  - Urinalysis: Hematuria, pyuria, bacteriuria, urine culture growing Klebsiella 03/2022, dipstick analysis in the office today positive for protein, negative for blood, check urinalysis with microscopy  - Proteinuria: Check urine albumin to creatinine ratio and urine protein to creatinine ratio  - Imaging: Check kidney ultrasound to look at renal parenchyma to rule out obstructive etiology  - Discussed risk factor reduction to slow progression of chronic kidney disease  Intensive blood pressure control as below   Glycemic control, he is prediabetic  Lipid control, on Lipitor 40 mg daily  Lifestyle modifications (weight loss/exercise)  Avoidance of NSAIDs  Avoidance of PPI if no hard indication for use     # BPVolume   - Goal BP <120/80 per KDIGO guidelines   - Volume status: Euvolemic  - Status: Blood pressure currently well-controlled  - Current antihypertensive regimen: Bumex 1 mg daily, Toprol-XL 25 mg daily both for history of CHF and not for hypertension  - Changes: No changes to regimen blood pressure is well-controlled    # Screening for Anemia   - Target Hb: More than 10 g/dL  - Most recent hemoglobin: 13.6 g/dL    # Electrolytes/Acid Base status   - Electrolytes and acid base status within normal limits    # CKD Mineral  and Bone Disorder   - Goal Ca 8.5-10 mg/dL, goal Phos 2.7-4.6 mg/dL, goal iPTH 30-70 pg/mL  - Check 25 hydroxy vitamin-D and iPTH level    # CHF  - Most recent echocardiogram with LVEF 50%, low normal systolic function, grade 1 diastolic dysfunction  - Notes reviewed from heart failure service and plans noted for continuation of beta-blockers and loop diuretics  - Currently appears euvolemic on Bumex 1 mg daily, continue current dose    # Class II Obesity  - Discussed importance of weight loss and management of prediabetes and CKD    HISTORY OF PRESENT ILLNESS:  Requesting Physician: Luis E Montalvo MD    Robbie Momin is a 68 y.o. male who has history of prolonged hospitalization in 2021 with late presentation of anteroseptal MI presenting in cardiogenic shock requiring VSD repair with large bovine patch and hemodynamic support with [facility] ECMO.  He remained on inotropic support for a while postoperatively.  During his admission he was also noted to have acetaminophen toxicity that was treated with NAC and fomepizole.  He follows with heart failure service and is currently receiving beta-blockers and diuretics.  He provides a history of prediabetes.  He does not have history of hypertension.  He currently feels well.  He denies dyspnea at rest but has dyspnea occasionally with exertion.  He denies leg swelling.  He denies any trouble with urination.    >> Major risk factors for CKD  - Diabetes: Pre-diabetes   - Hypertension: No  - Age ? 55 years: Yes  - Family history of kidney disease: No    - Obesity or metabolic syndrome: Yes    >> Medical history evaluation   - Prior kidney disease or dialysis: No   - Incidental hematuria in the past: No    - Urinary symptoms: No   - History of foamy or frothy urine: No   - History of nephrolithiasis: No   - Diseases that share risk factors with CKD: CAD  - Systemic diseases that might affect kidney: No   - History of use of medications that might affect renal function: No    PAST  MEDICAL HISTORY:  Past Medical History:   Diagnosis Date    Allergic     Anemia     Anxiety     Central line complication 01/07/2022    Chronic kidney disease 12/30/21    Kidney Failure upon hospital admission, Tylenol overdose    Coronary artery disease 12/30/21    Myocardial infarction (HCC) 12/30/21    VSD Repair,Ecmo, Heart Attack,Bovine Repair    Obesity     Tylenol ingestion 12/31/2021    Urinary tract infection 1/23/22       PAST SURGICAL HISTORY:  Past Surgical History:   Procedure Laterality Date    CARDIAC SURGERY N/A     CORONARY ARTERY BYPASS GRAFT      VSD    EXTRACORPOREAL CIRCULATION N/A 12/31/2021    Procedure: SUPPORT EXTRACORPOREAL MEMBRANE OXYGENATION (ECMO);  Surgeon: HILDA Ochoa MD;  Location: [facility];  Service: Cardiac Surgery    IR OTHER  12/31/2021    IR OTHER  01/07/2022    [place] CLSR 1 VENTRICULAR SEPTAL DEFECT W/WO PATCH N/A 01/04/2022    Procedure: REPAIR VENTRICULAR SEPTAL DEFECT (VSD) OPEN WITH BOVINE PERICARDIAL PATCH.;  Surgeon: HILDA Ochoa MD;  Location: [facility];  Service: Cardiac Surgery    [place] ECMO/ECLS INITIATION VENO-VENOUS N/A 01/04/2022    Procedure: VA ECMO DECANNULATION;  Surgeon: HILDA Ochoa MD;  Location: [facility];  Service: Cardiac Surgery    [place] EXPL PO HEMRRG THROMBOSIS/INFCTJ CH N/A 01/07/2022    Procedure: RE-EXPLORATION MEDIASTERNAL CAVITY FOR POST-OP BLEED (BRING BACK) Removal of PA catheter;  Surgeon: HILDA Ochoa MD;  Location: [facility];  Service: Cardiac Surgery       ALLERGIES:  Allergies   Allergen Reactions    Mushroom Extract Complex - Food Allergy Facial Swelling    Peanut Oil - Food Allergy     Shellfish-Derived Products - Food Allergy Other (See Comments)     Runs in the family    Strawberry C [Ascorbate - Food Allergy]        SOCIAL HISTORY:  Social History     Substance and Sexual Activity   Alcohol Use No     Social History     Substance and Sexual Activity   Drug Use No     Social History     Tobacco Use    Smoking Status Former    Types: Pipe   Smokeless Tobacco Never   Tobacco Comments    Secondhand smoke exposure       FAMILY HISTORY:  Family History   Problem Relation Age of Onset    Other Mother         Claustrophobia    Mental illness Mother         Agoraphobia    Heart disease Mother         Enlarged heart... of heart disease    Anxiety disorder Mother         Agorpahobia    Drug abuse Family     Alcohol abuse Family     Alcohol abuse Father     Breast cancer Sister        MEDICATIONS:    Current Outpatient Medications:     ALPRAZolam (XANAX) 0.5 mg tablet, As needed, Disp: , Rfl:     ALPRAZolam (XANAX) 0.5 mg tablet, TAKE 1 TO 2 TABLETS 30-60 MINUTES PRIOR TO LEAVING HOME AS NEEDED, Disp: 30 tablet, Rfl: 0    aspirin 325 mg tablet, Take 1 tablet (325 mg total) by mouth daily, Disp: 30 tablet, Rfl: 2    atorvastatin (LIPITOR) 40 mg tablet, Take 1 tablet (40 mg total) by mouth daily after dinner, Disp: 90 tablet, Rfl: 0    bumetanide (BUMEX) 1 mg tablet, Take 1 tablet (1 mg total) by mouth daily, Disp: 90 tablet, Rfl: 0    carbamide peroxide (DEBROX) 6.5 % otic solution, Administer 5 drops into the left ear 2 (two) times a day, Disp: 15 mL, Rfl: 0    clindamycin (CLEOCIN T) 1 % lotion, Apply topically 2 (two) times a day, Disp: 60 mL, Rfl: 3    metoprolol succinate (TOPROL-XL) 25 mg 24 hr tablet, Take 1 tablet (25 mg total) by mouth 2 (two) times a day, Disp: 180 tablet, Rfl: 2    multivitamin (THERAGRAN) TABS, Take 1 tablet by mouth daily, Disp: 100 tablet, Rfl: 1    omeprazole (PriLOSEC) 20 mg delayed release capsule, Take 1 capsule (20 mg total) by mouth daily, Disp: 90 capsule, Rfl: 0    tamsulosin (FLOMAX) 0.4 mg, Take 1 capsule (0.4 mg total) by mouth daily with dinner, Disp: 90 capsule, Rfl: 0    REVIEW OF SYSTEMS:  Review of Systems   Constitutional:  Negative for chills and fever.   HENT:  Negative for ear pain and sore throat.    Eyes:  Negative for pain and visual disturbance.   Respiratory:   "Negative for cough and shortness of breath.    Cardiovascular:  Negative for chest pain and palpitations.   Gastrointestinal:  Negative for abdominal pain and vomiting.   Genitourinary:  Negative for dysuria and hematuria.   Musculoskeletal:  Negative for arthralgias and back pain.   Skin:  Negative for color change and rash.   Neurological:  Negative for seizures and syncope.   All other systems reviewed and are negative.    All the systems were reviewed and were negative except as documented on the HPI.    PHYSICAL EXAMINATION:  /79   Pulse 65   Ht 5' 6\" (1.676 m)   Wt 105 kg (231 lb)   BMI 37.28 kg/m²   Current Weight: Weight - Scale: 105 kg (231 lb) Body mass index is 37.28 kg/m².  Physical Exam  Constitutional:       Appearance: Normal appearance.   HENT:      Head: Normocephalic and atraumatic.   Cardiovascular:      Rate and Rhythm: Normal rate and regular rhythm.      Pulses: Normal pulses.      Heart sounds: Normal heart sounds.   Pulmonary:      Effort: Pulmonary effort is normal.      Breath sounds: Normal breath sounds.   Musculoskeletal:         General: Normal range of motion.      Right lower leg: No edema.      Left lower leg: No edema.   Skin:     General: Skin is warm.   Neurological:      Mental Status: He is alert and oriented to person, place, and time. Mental status is at baseline.   Psychiatric:         Mood and Affect: Mood normal.         LABORATORY RESULTS:  Lab Results   Component Value Date    K 4.0 01/04/2024     (H) 01/04/2024    CO2 25 01/04/2024    BUN 29 (H) 01/04/2024    CREATININE 1.41 (H) 01/04/2024    GLUCOSE 149 (H) 01/07/2022    GLUF 96 01/04/2024    CALCIUM 9.2 01/04/2024    CORRECTEDCA 9.9 03/28/2022    AST 18 01/04/2024    ALT 23 01/04/2024    ALKPHOS 101 01/04/2024    EGFR 51 01/04/2024     Lab Results   Component Value Date    WBC 6.12 01/04/2024    HGB 13.6 01/04/2024    HCT 42.4 01/04/2024    MCV 98 01/04/2024     01/04/2024     Lab Results "   Component Value Date    CALCIUM 9.2 01/04/2024    PHOS 2.1 (L) 01/23/2022

## 2024-02-27 NOTE — PATIENT INSTRUCTIONS
You were evaluated for kidney disease.  Your current numbers are at stage 3 (mild) chronic kidney disease.  We will perform further workup including blood tests and urine tests for further evaluation of kidney disease.  We will also perform kidney ultrasound for further evaluation.  Your blood pressure is currently well-controlled.  Continue to avoid drugs like ibuprofen or Aleve or naproxen or Advil or Motrin.

## 2024-02-28 LAB — COLOGUARD RESULT REPORTABLE: POSITIVE

## 2024-02-29 ENCOUNTER — TELEPHONE (OUTPATIENT)
Dept: INTERNAL MEDICINE CLINIC | Facility: CLINIC | Age: 69
End: 2024-02-29

## 2024-02-29 DIAGNOSIS — R19.5 POSITIVE COLORECTAL CANCER SCREENING USING COLOGUARD TEST: Primary | ICD-10-CM

## 2024-02-29 NOTE — TELEPHONE ENCOUNTER
Please let him know I placed order in computer, if he does not receive call from GI by next week , he needs to reach out to me again

## 2024-02-29 NOTE — TELEPHONE ENCOUNTER
Patient will go ahead with colonoscopy, will need referral to get this done.    Call Nick Doran  - [phone]

## 2024-03-01 NOTE — TELEPHONE ENCOUNTER
Spoke to Nick about patient getting a colonoscopy and that GI would be calling him to set up  an appointment

## 2024-03-05 ENCOUNTER — TELEPHONE (OUTPATIENT)
Age: 69
End: 2024-03-05

## 2024-03-05 NOTE — TELEPHONE ENCOUNTER
Patients GI provider:  ARLET Patton    Number to return call: [phone]    Reason for call: Consult prior to Colonoscopy ( heart conditions )     Scheduled procedure/appointment date if applicable: Appt  4/29/24 03/05/24  Screened by: Jovanna Khan    Referring Provider    Pre- Screening: answered by brother/caregiver Nick    There is no height or weight on file to calculate BMI. 37.38  Has patient been referred for a routine screening Colonoscopy? yes  Is the patient between 45-75 years old? yes      Previous Colonoscopy no   If yes:    Date:     Facility:     Reason:       Does the patient want to see a Gastroenterologist prior to their procedure OR are they having any GI symptoms? no    Has the patient been hospitalized or had abdominal surgery in the past 6 months? no    Does the patient use supplemental oxygen? no    Does the patient take Coumadin, Lovenox, Plavix, Elliquis, Xarelto, or other blood thinning medication? no    Has the patient had a stroke, cardiac event, or stent placed in the past year? no    If patient is between 45yrs - 49yrs, please advise patient that we will have to confirm benefits & coverage with their insurance company for a routine screening colonoscopy.

## 2024-03-06 ENCOUNTER — PROCEDURE VISIT (OUTPATIENT)
Dept: DERMATOLOGY | Facility: CLINIC | Age: 69
End: 2024-03-06

## 2024-03-06 VITALS — BODY MASS INDEX: 36.48 KG/M2 | HEIGHT: 66 IN | WEIGHT: 227 LBS

## 2024-03-06 DIAGNOSIS — L72.0 EPIDERMAL INCLUSION CYST: Primary | ICD-10-CM

## 2024-03-06 PROCEDURE — 88305 TISSUE EXAM BY PATHOLOGIST: CPT | Performed by: STUDENT IN AN ORGANIZED HEALTH CARE EDUCATION/TRAINING PROGRAM

## 2024-03-06 NOTE — PROGRESS NOTES
PROCEDURE:  EXCISION WITH INTERMEDIATE LAYERED CLOSURE     Attending:  and Dr. Garcia   Assistant:  Keira us    Pre-Op Diagnosis: cyst   Post-Op Diagnosis: Same        Lesion Anatomic Location: mid posterior neck  Pre-op size: 1.5 x 1.4 cm    Final repaired wound length:  4.5 cm    Written and verbal, witnessed informed consent was obtained. I discussed that excision is a method of removing lesions both benign and malignant lesions.  A portion of normal skin is often taken to ensure completeness of removal.  I reviewed that procedure will include numbing the area,  cutting around and under defect, undermining tissue, and closing the wound with sutures both inside and out.  These sutures are usually removed in 7 to 14 days. Risks (bleeding, pain, infection, scarring, recurrence) and benefits discussed. It was discussed with patient that every effort is made to minimize scar, but scarring is influenced also by extrinsic factor such as location, age and genetics.     Time Out: performed:  yes  Correct patient: yes.   Correct site per Clinic Report: yes  Correct site per Patient Report: yes    LOCAL ANESTHESIA: 3:1 1% xylocaine with epi and 1-100,000 buffered    DESCRIPTION OF PROCEDURE: The patient was brought back into the procedure room, anesthetized locally, prepped and draped in the usual fashion. Using a #15 blade with a scalpel, the lesion was excised in elliptical fashion. The wound was  undermined in the  fascial plane. Hemostasis was achieved with light electrocoagulation. Purpose of undermining was to decrease wound tension and facilitate closure.    The wound was closed with subcutaneous sutures as follows:    Deep suture:4-0 Vicryl      Epidermal edge closure was accomplished with superficial sutures as follows:    Superficial suture: 4-0 Prolene  Superficial suture type: interrupted     Estimated blood loss less than 3ml.    The patient tolerated the procedure well without any complications. The  wound was cleaned with sterile saline, dried off, surgical vaseline ointment was applied, and the wound was covered. A pressure dressing was applied for stabilization and light pressure. The patient was given detailed oral and written instructions on postoperative care as detailed in consent. The patient left in good medical condition.    POSTOP DISCUSSION DISCUSSION AND INSTRUCTIONS FOR PATIENT      Rationale for Procedure  A skin excision allows the dermatologist to remove a lesion. The procedure involves a local numbing medication and removing the entire lesion. Typically, the lesion is being removed because it is atypical, traumatized, or for significant appearance reasons.  The area will be open like a brush burn and allowed to heal.   There will be no sutures.Tissue is sent to pathologist who will reconfirm diagnosis and verify completeness of lesion removal.    Description of Procedure  We would like to perform a skin excision today.  A local anesthetic, similar to the kind that a dentist uses when filling a cavity, will be injected with a very small needle into the skin area to be sampled.  The injected skin and tissue underneath should “go to sleep” and become numbed so that no further pain should be felt.  A scalpel will be used to cut around the lesion and tissue will be submitted to pathology for examination.  Depending on the diagnosis the lesion will be excised with a certain amount of normal skin to help assure completeness of lesion removal.  The physician will discuss in advance the anticipated size and extent of removal.   Bleeding will occur, but it will stopped with direct pressure, sutures, and electrocautery.    Surgical “Vaseline-type” ointment will also applied after the procedure to help create a barrier between the wound and the outside world.      Risks and Potential Complications  The advantage of a skin excision is that it allows us to remove a problem lesion quickly.  Although this  usually permits the lesion to heal as soon as possible with the least scarring, there are some risks and potential complications that include but are not limited to the following:  Some bleeding is normal at time of procedure and some bleeding on gauze is normal  the first few days after surgery.  Profuse bleeding and bleeding with swelling and pain should be reported as detailed  below  Infection is uncommon in skin surgery.  Infection should be reported and is indicated by pain, redness, and discharge of purulent material.  Some dull to at time sharp pain could occur initially the day after surgery.  Persistent pain or increasing pain days after surgery is not expected and should be reported.  Every effort is made to minimize scar, but location, size, and genetics do play a role in scar appearance.  A surgical wound does not achieve its optimal appearance until 6 months.  There are several treatments available if scarring would be problematic including scar creams, silicone pad, laser and scar revision.  Skin discoloration can occur especially in people of color.  Its important to avoid sun on wound in first 6 months after surgery.  Treatment is available if pigment is problematic.  Incomplete removal of the lesion or recurrence of lesion can occur and this would then require further treatment and more surgery.  Nerve Damage/Numbness/Loss of Function is very rare, but is most likely to occur if lesion is large or if it is in a high risk location  Allergic Reaction to lidocaine is rare.  More commonly,  epinephrine is used  with the lidocaine.  Occasionally, epinephrine (aka adrenalin) may cause a brief  feeling of anxiety or jitteriness.  The person at the microscope  (pathologist) may provide additional information that was unexpected. This unexpected finding could provoke the need for additional treatment or evaluation.    What You Will Need to Do After the Procedure  Keep the area clean and dry the first day.  Try NOT to remove the bandage for the first day.  Gently clean the area with soap and water and apply Vaseline ointment (this is over the counter and not a prescription) to the excision  site for up to 2 weeks.    Apply a clean appropriately sized bandage to area.  Gauze and paper tape are recommended for sensitive skin.  Return for suture removal as instructed (1weeks.  Take Acetaminophen (Tylenol) for discomfort, if no contraindications.  Do NOT take Ibuprofen or aspirin unless specifically told to do so by your Dermatologist because these medications can make bleeding worse.  Call our office immediately for signs of infection: fever, chills, increased redness, warmth, tenderness, discomfort/pain, or pus or foul smell coming from the wound.    If bleeding is noticed, place a clean cloth over the area and apply firm pressure for thirty minutes.  Check the wound ONLY after 30 minutes of direct pressure; do not cheat and sneak a peak, as that does not count.  If bleeding persists after 30 minutes of legitimate direct pressure, then try one more round of direct pressure for an additional 10 minutes to the area.  Should the bleeding become heavier or not stop after the second attempt, call [facility] directly at [phone] (SKIN) or, if after hours, go to your local Emergency Room/Emergency Department.

## 2024-03-06 NOTE — PATIENT INSTRUCTIONS
What You Will Need to Do After the Procedure  Keep the area clean and dry the first day. Try NOT to remove the bandage for the first day.  Gently clean the area with soap and water and apply Vaseline ointment (this is over the counter and not a prescription) to the excision  site for up to 2 weeks.    Apply a clean appropriately sized bandage to area.  Gauze and paper tape are recommended for sensitive skin.  Return for suture removal as instructed (1weeks.  Take Acetaminophen (Tylenol) for discomfort, if no contraindications.  Do NOT take Ibuprofen or aspirin unless specifically told to do so by your Dermatologist because these medications can make bleeding worse.  Call our office immediately for signs of infection: fever, chills, increased redness, warmth, tenderness, discomfort/pain, or pus or foul smell coming from the wound.    If bleeding is noticed, place a clean cloth over the area and apply firm pressure for thirty minutes.  Check the wound ONLY after 30 minutes of direct pressure; do not cheat and sneak a peak, as that does not count.  If bleeding persists after 30 minutes of legitimate direct pressure, then try one more round of direct pressure for an additional 10 minutes to the area.  Should the bleeding become heavier or not stop after the second attempt, call [facility] directly at [phone] (SKIN) or, if after hours, go to your local Emergency Room/Emergency Department.

## 2024-03-11 ENCOUNTER — APPOINTMENT (OUTPATIENT)
Dept: LAB | Facility: CLINIC | Age: 69
End: 2024-03-11
Payer: COMMERCIAL

## 2024-03-11 DIAGNOSIS — N18.31 STAGE 3A CHRONIC KIDNEY DISEASE (HCC): ICD-10-CM

## 2024-03-11 DIAGNOSIS — I50.22 CHRONIC SYSTOLIC CONGESTIVE HEART FAILURE (HCC): ICD-10-CM

## 2024-03-11 LAB
25(OH)D3 SERPL-MCNC: 29.3 NG/ML (ref 30–100)
ALBUMIN SERPL BCP-MCNC: 4.1 G/DL (ref 3.5–5)
ANION GAP SERPL CALCULATED.3IONS-SCNC: 10 MMOL/L
BACTERIA UR QL AUTO: ABNORMAL /HPF
BILIRUB UR QL STRIP: NEGATIVE
BUN SERPL-MCNC: 24 MG/DL (ref 5–25)
CALCIUM SERPL-MCNC: 9.1 MG/DL (ref 8.4–10.2)
CHLORIDE SERPL-SCNC: 104 MMOL/L (ref 96–108)
CLARITY UR: CLEAR
CO2 SERPL-SCNC: 27 MMOL/L (ref 21–32)
COLOR UR: YELLOW
CREAT SERPL-MCNC: 1.24 MG/DL (ref 0.6–1.3)
CREAT UR-MCNC: 271 MG/DL
CREAT UR-MCNC: 271 MG/DL
GFR SERPL CREATININE-BSD FRML MDRD: 60 ML/MIN/1.73SQ M
GLUCOSE P FAST SERPL-MCNC: 102 MG/DL (ref 65–99)
GLUCOSE UR STRIP-MCNC: NEGATIVE MG/DL
HGB UR QL STRIP.AUTO: NEGATIVE
HYALINE CASTS #/AREA URNS LPF: ABNORMAL /LPF
KETONES UR STRIP-MCNC: NEGATIVE MG/DL
LEUKOCYTE ESTERASE UR QL STRIP: ABNORMAL
MICROALBUMIN UR-MCNC: 42.1 MG/L
MICROALBUMIN/CREAT 24H UR: 16 MG/G CREATININE (ref 0–30)
MUCOUS THREADS UR QL AUTO: ABNORMAL
NITRITE UR QL STRIP: NEGATIVE
NON-SQ EPI CELLS URNS QL MICRO: ABNORMAL /HPF
PH UR STRIP.AUTO: 6 [PH]
PHOSPHATE SERPL-MCNC: 3 MG/DL (ref 2.3–4.1)
POTASSIUM SERPL-SCNC: 4.1 MMOL/L (ref 3.5–5.3)
PROT UR STRIP-MCNC: ABNORMAL MG/DL
PROT UR-MCNC: 25 MG/DL
PROT/CREAT UR: 0.09 MG/G{CREAT} (ref 0–0.1)
PTH-INTACT SERPL-MCNC: 212.8 PG/ML (ref 12–88)
RBC #/AREA URNS AUTO: ABNORMAL /HPF
SODIUM SERPL-SCNC: 141 MMOL/L (ref 135–147)
SP GR UR STRIP.AUTO: 1.03 (ref 1–1.03)
UROBILINOGEN UR STRIP-ACNC: <2 MG/DL
WBC #/AREA URNS AUTO: ABNORMAL /HPF

## 2024-03-11 PROCEDURE — 36415 COLL VENOUS BLD VENIPUNCTURE: CPT

## 2024-03-11 PROCEDURE — 82043 UR ALBUMIN QUANTITATIVE: CPT

## 2024-03-11 PROCEDURE — 82306 VITAMIN D 25 HYDROXY: CPT

## 2024-03-11 PROCEDURE — 83970 ASSAY OF PARATHORMONE: CPT

## 2024-03-11 PROCEDURE — 82570 ASSAY OF URINE CREATININE: CPT

## 2024-03-11 PROCEDURE — 84156 ASSAY OF PROTEIN URINE: CPT

## 2024-03-11 PROCEDURE — 88305 TISSUE EXAM BY PATHOLOGIST: CPT | Performed by: STUDENT IN AN ORGANIZED HEALTH CARE EDUCATION/TRAINING PROGRAM

## 2024-03-11 PROCEDURE — 80069 RENAL FUNCTION PANEL: CPT

## 2024-03-11 PROCEDURE — 81001 URINALYSIS AUTO W/SCOPE: CPT

## 2024-03-12 ENCOUNTER — TELEPHONE (OUTPATIENT)
Dept: NEPHROLOGY | Facility: CLINIC | Age: 69
End: 2024-03-12

## 2024-03-12 NOTE — TELEPHONE ENCOUNTER
----- Message from Alvin Brooke MD sent at 3/11/2024 12:28 PM EDT -----  1.  Kidney function is looking slightly better than 2 months ago.    2.  There is no significant blood in the urine which is reassuring.    3.  There is no protein in the urine which is reassuring.    4.  Parathyroid hormone (PTH) level is high in setting of vitamin D deficiency and hopefully correction of vitamin D deficiency will help decrease the PTH level.  Start cholecalciferol 1000 units (25 mcg) daily.    5.  Repeat lab work including RFP, PTH, UACR and UPCR before next office visit, prescription was previously provided.

## 2024-03-12 NOTE — TELEPHONE ENCOUNTER
Called and spoke to the patient's brother in law/ caretaker, Nick, to relay the message above. Nick expressed understanding and had no further questions or concerns at this time.

## 2024-03-13 ENCOUNTER — CLINICAL SUPPORT (OUTPATIENT)
Dept: DERMATOLOGY | Facility: CLINIC | Age: 69
End: 2024-03-13

## 2024-03-13 DIAGNOSIS — Z48.02 ENCOUNTER FOR REMOVAL OF SUTURES: Primary | ICD-10-CM

## 2024-03-13 PROCEDURE — RECHECK: Performed by: STUDENT IN AN ORGANIZED HEALTH CARE EDUCATION/TRAINING PROGRAM

## 2024-03-13 NOTE — PROGRESS NOTES
"Suture removal    Date/Time: 3/13/2024 3:45 PM    Performed by: Ines Lisa RN  Authorized by: Marie Sousa MD  Universal Protocol:  Consent: Verbal consent obtained. Written consent not obtained.  Risks and benefits: risks, benefits and alternatives were discussed  Consent given by: patient  Time out: Immediately prior to procedure a \"time out\" was called to verify the correct patient, procedure, equipment, support staff and site/side marked as required.  Timeout called at: 3/13/2024 3:55 PM.  Patient understanding: patient states understanding of the procedure being performed  Patient consent: the patient's understanding of the procedure matches consent given  Procedure consent: procedure consent matches procedure scheduled  Relevant documents: relevant documents not present or verified  Test results: test results not available  Site marked: the operative site was not marked  Radiology Images displayed and confirmed. If images not available, report reviewed: imaging studies not available  Patient identity confirmed: verbally with patient      Patient location:  Clinic  Location:     Anesthesia laterality: mid posterior.    Location:  Head/neck    Head/neck location:  Neck  Procedure details:     Tools used:  Suture removal kit    Wound appearance:  No sign(s) of infection, good wound healing, clean, moist and pink    Number of sutures removed:  1  Post-procedure details:     Post-removal:  Band-Aid applied (Vaseline applied)    Patient tolerance of procedure:  Tolerated well, no immediate complications  Comments:      Patient presents with a well healing wound. Patient advised to use Vaseline and a band aid for one more week to aid in wound healing.       Before suture removal     After suture removal   "

## 2024-03-15 ENCOUNTER — HOSPITAL ENCOUNTER (OUTPATIENT)
Dept: RADIOLOGY | Facility: HOSPITAL | Age: 69
Discharge: HOME | End: 2024-03-15
Attending: INTERNAL MEDICINE
Payer: COMMERCIAL

## 2024-03-15 DIAGNOSIS — N18.31 STAGE 3A CHRONIC KIDNEY DISEASE (HCC): ICD-10-CM

## 2024-03-15 PROCEDURE — 76770 US EXAM ABDO BACK WALL COMP: CPT

## 2024-03-22 ENCOUNTER — TELEPHONE (OUTPATIENT)
Dept: NEPHROLOGY | Facility: CLINIC | Age: 69
End: 2024-03-22

## 2024-03-22 NOTE — TELEPHONE ENCOUNTER
----- Message from Alvin Brooke MD sent at 3/22/2024 10:12 AM EDT -----  Please let the patient know that kidney is looking normal in structure.  There is no swelling in the kidney.  There are no stones.  There is a very small benign simple cyst in right kidney, nothing to worry about and no follow-up is needed for this finding.  Urinary bladder is also within normal limits on ultrasound.  Thank you.

## 2024-03-22 NOTE — TELEPHONE ENCOUNTER
Called and spoke to the patient's brother-in-law, Nick, to relay the message above. Nick expressed understanding and had no further questions or concerns at this time.

## 2024-03-28 DIAGNOSIS — F40.01 AGORAPHOBIA WITH PANIC ATTACKS: ICD-10-CM

## 2024-04-01 DIAGNOSIS — F40.01 AGORAPHOBIA WITH PANIC ATTACKS: ICD-10-CM

## 2024-04-01 RX ORDER — ALPRAZOLAM 0.5 MG/1
TABLET ORAL
Qty: 30 TABLET | Refills: 0 | Status: SHIPPED | OUTPATIENT
Start: 2024-04-01

## 2024-04-01 RX ORDER — ALPRAZOLAM 0.5 MG/1
TABLET ORAL
Qty: 30 TABLET | Refills: 0 | OUTPATIENT
Start: 2024-04-01

## 2024-04-03 DIAGNOSIS — H61.22 CERUMEN DEBRIS ON TYMPANIC MEMBRANE OF LEFT EAR: ICD-10-CM

## 2024-04-03 DIAGNOSIS — Z87.74 S/P VSD REPAIR: ICD-10-CM

## 2024-04-03 DIAGNOSIS — E78.2 HYPERLIPIDEMIA, MIXED: ICD-10-CM

## 2024-04-03 DIAGNOSIS — I25.5 ISCHEMIC CARDIOMYOPATHY: ICD-10-CM

## 2024-04-03 DIAGNOSIS — F40.01 AGORAPHOBIA WITH PANIC ATTACKS: ICD-10-CM

## 2024-04-03 RX ORDER — BUMETANIDE 1 MG/1
1 TABLET ORAL DAILY
Qty: 90 TABLET | Refills: 0 | Status: SHIPPED | OUTPATIENT
Start: 2024-04-03 | End: 2024-07-02

## 2024-04-03 RX ORDER — TAMSULOSIN HYDROCHLORIDE 0.4 MG/1
0.4 CAPSULE ORAL
Qty: 90 CAPSULE | Refills: 0 | Status: SHIPPED | OUTPATIENT
Start: 2024-04-03

## 2024-04-03 RX ORDER — ALPRAZOLAM 0.5 MG/1
TABLET ORAL
Qty: 30 TABLET | Refills: 0 | Status: SHIPPED | OUTPATIENT
Start: 2024-04-03

## 2024-04-03 RX ORDER — ATORVASTATIN CALCIUM 40 MG/1
40 TABLET, FILM COATED ORAL
Qty: 90 TABLET | Refills: 0 | Status: SHIPPED | OUTPATIENT
Start: 2024-04-03

## 2024-04-29 ENCOUNTER — OFFICE VISIT (OUTPATIENT)
Dept: GASTROENTEROLOGY | Facility: CLINIC | Age: 69
End: 2024-04-29
Payer: COMMERCIAL

## 2024-04-29 DIAGNOSIS — R19.5 POSITIVE COLORECTAL CANCER SCREENING USING COLOGUARD TEST: ICD-10-CM

## 2024-04-29 PROCEDURE — 99244 OFF/OP CNSLTJ NEW/EST MOD 40: CPT | Performed by: PHYSICIAN ASSISTANT

## 2024-04-29 NOTE — PATIENT INSTRUCTIONS
Scheduled date of colonoscopy (as of today):07/26/2024  Physician performing colonoscopy: LORENZO  Location of colonoscopy: [facility]  Bowel prep reviewed with patient:Shanti / ANGELA  Instructions reviewed with patient by : LOLY  Clearances:  Patient takes .325 mg of Asprin Otherwise no BT  Patient can stop .325 mg 2 days prior to procedure called and informed Brother in law about Aspirin via phone call

## 2024-04-29 NOTE — PROGRESS NOTES
[facility] - Outpatient Consultation  Robbie Momin 68 y.o. 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     Pt's sister, who is also his caregiver, is present.     1. Positive colorectal cancer screening using Cologuard test  No prior colonoscopy. Pt denies constitutional symptoms, overt GI bleeding and GI symptoms.   - Ambulatory Referral to Gastroenterology  - Colonoscopy; Future  - polyethylene glycol (GOLYTELY) 4000 mL solution; Take 4,000 mL by mouth once for 1 dose  Dispense: 4000 mL; Refill: 0  -I obtained informed consent from the patient. The risks/benefits/alternatives of the procedure were discussed with the patient. Risks included, but not limited to, infection, bleeding, perforation, injury to organs in the abdomen, missed lesion and incomplete procedure were discussed. Patient was agreeable and electronic signature was obtained.     ______________________________________________________________________    HPI:  Robbie is a 67 y/o male with CHF and hx of cardiogenic shosk due to MI complicated by VSD s/p VSD repair with large bovine pericardial patch and ECMO decannulation 1/4/2022 who is no longer on AC who presents for consultation of positive cologuard. He denies prior colonoscopy. He denies family hx of colon cancer, unintentional weight loss, fevers, chills, night sweats, abdominal pain, n/v, heartburn, constipation, diarrhea, NSAID use except for ASA, bloody or black BM. He denies tobacco and alcohol use. He says he is no longer on blood thinners except ASA.       REVIEW OF SYSTEMS:    CONSTITUTIONAL: Denies any fever, chills, rigors, and weight loss.  HEENT: No earache or tinnitus. Denies  hearing loss or visual disturbances.  CARDIOVASCULAR: No chest pain or palpitations.   RESPIRATORY: Denies any cough, hemoptysis, shortness of breath or dyspnea on exertion.  GASTROINTESTINAL: As noted in the History of Present Illness.   GENITOURINARY: No problems with urination. Denies any hematuria or dysuria.  NEUROLOGIC: No dizziness or vertigo, denies headaches.   MUSCULOSKELETAL: Denies any muscle or joint pain.   SKIN: Denies skin rashes or itching.   ENDOCRINE: Denies excessive thirst. Denies intolerance to heat or cold.  PSYCHOSOCIAL: Denies depression or anxiety. Denies any recent memory loss.       Historical Information   Past Medical History:   Diagnosis Date    Allergic     Anemia     Anxiety     Central line complication 01/07/2022    Chronic kidney disease 12/30/21    Kidney Failure upon hospital admission, Tylenol overdose    Coronary artery disease 12/30/21    Myocardial infarction (HCC) 12/30/21    VSD Repair,Ecmo, Heart Attack,Bovine Repair    Obesity     Tylenol ingestion 12/31/2021    Urinary tract infection 1/23/22     Past Surgical History:   Procedure Laterality Date    CARDIAC SURGERY N/A     CORONARY ARTERY BYPASS GRAFT      VSD    EXTRACORPOREAL CIRCULATION N/A 12/31/2021    Procedure: SUPPORT EXTRACORPOREAL MEMBRANE OXYGENATION (ECMO);  Surgeon: HILDA Ochoa MD;  Location: [facility];  Service: Cardiac Surgery    IR OTHER  12/31/2021    IR OTHER  01/07/2022    OK CLSR 1 VENTRICULAR SEPTAL DEFECT W/WO PATCH N/A 01/04/2022    Procedure: REPAIR VENTRICULAR SEPTAL DEFECT (VSD) OPEN WITH BOVINE PERICARDIAL PATCH.;  Surgeon: HILDA Ochoa MD;  [facility];  Service: Cardiac Surgery    OK ECMO/ECLS INITIATION VENO-VENOUS N/A 01/04/2022    Procedure: VA ECMO DECANNULATION;  Surgeon: HILDA Ochoa MD;  Location: [facility];  Service: Cardiac Surgery    OK EXPL PO HEMRRG THROMBOSIS/INFCTJ CH N/A 01/07/2022    Procedure: RE-EXPLORATION MEDIASTERNAL CAVITY FOR  POST-OP BLEED (BRING BACK) Removal of PA catheter;  Surgeon: HILDA Ochoa MD;  Location: [facility];  Service: Cardiac Surgery     Social History   Social History     Substance and Sexual Activity   Alcohol Use No     Social History     Substance and Sexual Activity   Drug Use No     Social History     Tobacco Use   Smoking Status Former    Types: Pipe   Smokeless Tobacco Never   Tobacco Comments    Secondhand smoke exposure     Family History   Problem Relation Age of Onset    Other Mother         Claustrophobia    Mental illness Mother         Agoraphobia    Heart disease Mother         Enlarged heart... of heart disease    Anxiety disorder Mother         Agorpahobia    Drug abuse Family     Alcohol abuse Family     Alcohol abuse Father     Breast cancer Sister        Meds/Allergies       Current Outpatient Medications:     polyethylene glycol (GOLYTELY) 4000 mL solution    ALPRAZolam (XANAX) 0.5 mg tablet    ALPRAZolam (XANAX) 0.5 mg tablet    aspirin 325 mg tablet    atorvastatin (LIPITOR) 40 mg tablet    bumetanide (BUMEX) 1 mg tablet    carbamide peroxide (DEBROX) 6.5 % otic solution    clindamycin (CLEOCIN T) 1 % lotion    metoprolol succinate (TOPROL-XL) 25 mg 24 hr tablet    multivitamin (THERAGRAN) TABS    omeprazole (PriLOSEC) 20 mg delayed release capsule    tamsulosin (FLOMAX) 0.4 mg    Allergies   Allergen Reactions    Mushroom Extract Complex - Food Allergy Facial Swelling    Peanut Oil - Food Allergy     Shellfish-Derived Products - Food Allergy Other (See Comments)     Runs in the family    Strawberry C [Ascorbate - Food Allergy]            Objective     There were no vitals taken for this visit. There is no height or weight on file to calculate BMI.        PHYSICAL EXAM:      General Appearance:   Alert, cooperative, no distress   HEENT:   Normocephalic, atraumatic, anicteric.     Neck:  Supple, symmetrical, trachea midline   Lungs:   Clear to auscultation bilaterally; no rales,  rhonchi or wheezing; respirations unlabored    Heart::   Regular rate and rhythm; no murmur, rub, or gallop.   Abdomen:   Soft, non-tender, non-distended; normal bowel sounds; no masses, no organomegaly    Genitalia:   Deferred    Rectal:   Deferred    Extremities:  No cyanosis, clubbing or edema    Pulses:  2+ and symmetric    Skin:  No jaundice, rashes, or lesions    Lymph nodes:  No palpable cervical lymphadenopathy        Lab Results:   No visits with results within 1 Day(s) from this visit.   Latest known visit with results is:   Appointment on 03/11/2024   Component Date Value    Color, UA 03/11/2024 Yellow     Clarity, UA 03/11/2024 Clear     Specific Gravity, UA 03/11/2024 1.030     pH, UA 03/11/2024 6.0     Leukocytes, UA 03/11/2024 Trace (A)     Nitrite, UA 03/11/2024 Negative     Protein, UA 03/11/2024 30 (1+) (A)     Glucose, UA 03/11/2024 Negative     Ketones, UA 03/11/2024 Negative     Urobilinogen, UA 03/11/2024 <2.0     Bilirubin, UA 03/11/2024 Negative     Occult Blood, UA 03/11/2024 Negative     RBC, UA 03/11/2024 2-4 (A)     WBC, UA 03/11/2024 4-10 (A)     Epithelial Cells 03/11/2024 None Seen     Bacteria, UA 03/11/2024 None Seen     MUCUS THREADS 03/11/2024 Occasional (A)     Hyaline Casts, UA 03/11/2024 0-3 (A)     Albumin 03/11/2024 4.1     Calcium 03/11/2024 9.1     Phosphorus 03/11/2024 3.0     BUN 03/11/2024 24     Creatinine 03/11/2024 1.24     Sodium 03/11/2024 141     Potassium 03/11/2024 4.1     Chloride 03/11/2024 104     CO2 03/11/2024 27     ANION GAP 03/11/2024 10     eGFR 03/11/2024 60     Glucose, Fasting 03/11/2024 102 (H)     Creatinine, Ur 03/11/2024 271.0     Albumin,U,Random 03/11/2024 42.1 (H)     Albumin Creat Ratio 03/11/2024 16     Creatinine, Ur 03/11/2024 271.0     Protein Urine Random 03/11/2024 25     Prot/Creat Ratio, Ur 03/11/2024 0.09     PTH 03/11/2024 212.8 (H)     Vit D, 25-Hydroxy 03/11/2024 29.3 (L)          Radiology Results:   No results found.

## 2024-05-08 ENCOUNTER — APPOINTMENT (OUTPATIENT)
Dept: LAB | Facility: CLINIC | Age: 69
End: 2024-05-08
Payer: COMMERCIAL

## 2024-05-08 ENCOUNTER — TELEPHONE (OUTPATIENT)
Dept: NEPHROLOGY | Facility: CLINIC | Age: 69
End: 2024-05-08

## 2024-05-08 LAB
BASOPHILS # BLD AUTO: 0.04 THOUSANDS/ÂΜL (ref 0–0.1)
BASOPHILS NFR BLD AUTO: 1 % (ref 0–1)
CREAT UR-MCNC: 216.3 MG/DL
CREAT UR-MCNC: 216.3 MG/DL
EOSINOPHIL # BLD AUTO: 0.27 THOUSAND/ÂΜL (ref 0–0.61)
EOSINOPHIL NFR BLD AUTO: 5 % (ref 0–6)
ERYTHROCYTE [DISTWIDTH] IN BLOOD BY AUTOMATED COUNT: 14.1 % (ref 11.6–15.1)
HCT VFR BLD AUTO: 40.3 % (ref 36.5–49.3)
HGB BLD-MCNC: 12.9 G/DL (ref 12–17)
IMM GRANULOCYTES # BLD AUTO: 0.02 THOUSAND/UL (ref 0–0.2)
IMM GRANULOCYTES NFR BLD AUTO: 0 % (ref 0–2)
LYMPHOCYTES # BLD AUTO: 1.31 THOUSANDS/ÂΜL (ref 0.6–4.47)
LYMPHOCYTES NFR BLD AUTO: 24 % (ref 14–44)
MCH RBC QN AUTO: 31.5 PG (ref 26.8–34.3)
MCHC RBC AUTO-ENTMCNC: 32 G/DL (ref 31.4–37.4)
MCV RBC AUTO: 99 FL (ref 82–98)
MICROALBUMIN UR-MCNC: 21.3 MG/L
MICROALBUMIN/CREAT 24H UR: 10 MG/G CREATININE (ref 0–30)
MONOCYTES # BLD AUTO: 0.58 THOUSAND/ÂΜL (ref 0.17–1.22)
MONOCYTES NFR BLD AUTO: 11 % (ref 4–12)
NEUTROPHILS # BLD AUTO: 3.14 THOUSANDS/ÂΜL (ref 1.85–7.62)
NEUTS SEG NFR BLD AUTO: 59 % (ref 43–75)
NRBC BLD AUTO-RTO: 0 /100 WBCS
PHOSPHATE SERPL-MCNC: 3.5 MG/DL (ref 2.3–4.1)
PLATELET # BLD AUTO: 176 THOUSANDS/UL (ref 149–390)
PMV BLD AUTO: 11.1 FL (ref 8.9–12.7)
PROT UR-MCNC: 21 MG/DL
PROT/CREAT UR: 0.1 MG/G{CREAT} (ref 0–0.1)
RBC # BLD AUTO: 4.09 MILLION/UL (ref 3.88–5.62)
WBC # BLD AUTO: 5.36 THOUSAND/UL (ref 4.31–10.16)

## 2024-05-08 PROCEDURE — 82570 ASSAY OF URINE CREATININE: CPT

## 2024-05-08 PROCEDURE — 84156 ASSAY OF PROTEIN URINE: CPT

## 2024-05-08 PROCEDURE — 85025 COMPLETE CBC W/AUTO DIFF WBC: CPT

## 2024-05-08 PROCEDURE — 84100 ASSAY OF PHOSPHORUS: CPT

## 2024-05-08 PROCEDURE — 82043 UR ALBUMIN QUANTITATIVE: CPT

## 2024-05-08 NOTE — TELEPHONE ENCOUNTER
----- Message from Alvin Brooke MD sent at 5/8/2024  9:01 AM EDT -----  Kidney function is stable.  Will discuss in detail on office visit in June.  Let me know if there are any questions.  Thank you.

## 2024-05-13 ENCOUNTER — OFFICE VISIT (OUTPATIENT)
Dept: INTERNAL MEDICINE CLINIC | Facility: CLINIC | Age: 69
End: 2024-05-13
Payer: COMMERCIAL

## 2024-05-13 VITALS
SYSTOLIC BLOOD PRESSURE: 124 MMHG | WEIGHT: 232 LBS | HEART RATE: 65 BPM | HEIGHT: 66 IN | DIASTOLIC BLOOD PRESSURE: 80 MMHG | OXYGEN SATURATION: 99 % | TEMPERATURE: 97.7 F | BODY MASS INDEX: 37.28 KG/M2

## 2024-05-13 DIAGNOSIS — K21.9 GASTROESOPHAGEAL REFLUX DISEASE WITHOUT ESOPHAGITIS: ICD-10-CM

## 2024-05-13 DIAGNOSIS — H61.22 LEFT EAR IMPACTED CERUMEN: ICD-10-CM

## 2024-05-13 DIAGNOSIS — N40.1 BENIGN PROSTATIC HYPERPLASIA WITH URINARY RETENTION: ICD-10-CM

## 2024-05-13 DIAGNOSIS — R73.01 IMPAIRED FASTING GLUCOSE: ICD-10-CM

## 2024-05-13 DIAGNOSIS — E78.2 HYPERLIPIDEMIA, MIXED: ICD-10-CM

## 2024-05-13 DIAGNOSIS — F40.01 AGORAPHOBIA WITH PANIC ATTACKS: ICD-10-CM

## 2024-05-13 DIAGNOSIS — R33.8 BENIGN PROSTATIC HYPERPLASIA WITH URINARY RETENTION: ICD-10-CM

## 2024-05-13 DIAGNOSIS — I50.22 CHRONIC SYSTOLIC CONGESTIVE HEART FAILURE (HCC): Primary | ICD-10-CM

## 2024-05-13 PROCEDURE — 99214 OFFICE O/P EST MOD 30 MIN: CPT | Performed by: INTERNAL MEDICINE

## 2024-05-13 NOTE — PROGRESS NOTES
Assessment/Plan:      Depression Screening and Follow-up Plan: Patient was screened for depression during today's encounter. They screened negative with a PHQ-2 score of 0.            1. Chronic systolic congestive heart failure (HCC)  Comments:  Stable,  continue same medication    2. Impaired fasting glucose  Comments:  Diet and exercise discussed    3. Gastroesophageal reflux disease without esophagitis  Comments:  Stable, continue same medication    4. Benign prostatic hyperplasia with urinary retention    5. Agoraphobia with panic attacks    6. Hyperlipidemia, mixed  Comments:  Stable, continue same medication    7. Left ear impacted cerumen  Comments:  Advised him to put the over-the-counter earwax drops           Subjective:      Patient ID: Robbie Momin is a 68 y.o. male.    Follow-up on blood test done on 5/8/2024 test discussed with him        The following portions of the patient's history were reviewed and updated as appropriate: He  has a past medical history of Allergic, Anemia, Anxiety, Central line complication (01/07/2022), Chronic kidney disease (12/30/21), Coronary artery disease (12/30/21), Myocardial infarction (HCC) (12/30/21), Obesity, Tylenol ingestion (12/31/2021), and Urinary tract infection (1/23/22).  He   Patient Active Problem List    Diagnosis Date Noted    Left ear impacted cerumen 05/13/2024    Ischemic cardiomyopathy 10/17/2022    Gastroesophageal reflux disease without esophagitis 10/17/2022    Colon cancer screening declined 10/17/2022    Chronic systolic congestive heart failure (HCC) 07/11/2022    Benign prostatic hyperplasia with urinary retention 04/12/2022    Encounter for postoperative care 03/11/2022    Urinary retention 01/21/2022    S/P VSD repair 01/07/2022    Coagulopathy (HCC) 01/01/2022    Acute systolic congestive heart failure (HCC) 12/31/2021    Anemia 12/31/2021    Personal history of ECMO 12/31/2021    Cardiogenic shock (HCC) 12/30/2021    VSD (ventricular  septal defect) 12/30/2021    JUDE (acute kidney injury) (HCC) 12/30/2021    Former smoker 07/06/2018    Hyperlipidemia, mixed 05/23/2017    Impaired fasting glucose 05/23/2017    Agoraphobia with panic attacks 11/06/2015    Body mass index 38.0-38.9, adult 11/06/2015    Psychosis, atypical (HCC) 11/06/2015     He  has a past surgical history that includes Extracorporeal membrane oxygenation (N/A, 12/31/2021); pr ecmo/ecls initiation veno-venous (N/A, 01/04/2022); pr clsr 1 ventricular septal defect w/wo patch (N/A, 01/04/2022); IR other (12/31/2021); pr expl po hemrrg thrombosis/infctj ch (N/A, 01/07/2022); IR other (01/07/2022); Cardiac surgery (N/A); and Coronary artery bypass graft.  His family history includes Alcohol abuse in his family and father; Anxiety disorder in his mother; Breast cancer in his sister; Drug abuse in his family; Heart disease in his mother; Mental illness in his mother; Other in his mother.  He  reports that he has quit smoking. His smoking use included pipe. He has never used smokeless tobacco. He reports that he does not drink alcohol and does not use drugs.  Current Outpatient Medications   Medication Sig Dispense Refill    ALPRAZolam (XANAX) 0.5 mg tablet TAKE 1 TO 2 TABLETS 30-60 MINUTES PRIOR TO LEAVING HOME AS NEEDED 30 tablet 0    ALPRAZolam (XANAX) 0.5 mg tablet As needed 30 tablet 0    aspirin 325 mg tablet Take 1 tablet (325 mg total) by mouth daily 30 tablet 2    atorvastatin (LIPITOR) 40 mg tablet Take 1 tablet (40 mg total) by mouth daily after dinner 90 tablet 0    bumetanide (BUMEX) 1 mg tablet Take 1 tablet (1 mg total) by mouth daily 90 tablet 0    metoprolol succinate (TOPROL-XL) 25 mg 24 hr tablet Take 1 tablet (25 mg total) by mouth 2 (two) times a day 180 tablet 2    multivitamin (THERAGRAN) TABS Take 1 tablet by mouth daily 100 tablet 1    omeprazole (PriLOSEC) 20 mg delayed release capsule Take 1 capsule (20 mg total) by mouth daily 90 capsule 0    tamsulosin  (FLOMAX) 0.4 mg Take 1 capsule (0.4 mg total) by mouth daily with dinner 90 capsule 0    carbamide peroxide (DEBROX) 6.5 % otic solution Administer 5 drops into the left ear 2 (two) times a day (Patient not taking: Reported on 5/13/2024) 15 mL 0    clindamycin (CLEOCIN T) 1 % lotion Apply topically 2 (two) times a day (Patient not taking: Reported on 5/13/2024) 60 mL 3    polyethylene glycol (GOLYTELY) 4000 mL solution Take 4,000 mL by mouth once for 1 dose 4000 mL 0     No current facility-administered medications for this visit.     Current Outpatient Medications on File Prior to Visit   Medication Sig    ALPRAZolam (XANAX) 0.5 mg tablet TAKE 1 TO 2 TABLETS 30-60 MINUTES PRIOR TO LEAVING HOME AS NEEDED    ALPRAZolam (XANAX) 0.5 mg tablet As needed    aspirin 325 mg tablet Take 1 tablet (325 mg total) by mouth daily    atorvastatin (LIPITOR) 40 mg tablet Take 1 tablet (40 mg total) by mouth daily after dinner    bumetanide (BUMEX) 1 mg tablet Take 1 tablet (1 mg total) by mouth daily    metoprolol succinate (TOPROL-XL) 25 mg 24 hr tablet Take 1 tablet (25 mg total) by mouth 2 (two) times a day    multivitamin (THERAGRAN) TABS Take 1 tablet by mouth daily    omeprazole (PriLOSEC) 20 mg delayed release capsule Take 1 capsule (20 mg total) by mouth daily    tamsulosin (FLOMAX) 0.4 mg Take 1 capsule (0.4 mg total) by mouth daily with dinner    carbamide peroxide (DEBROX) 6.5 % otic solution Administer 5 drops into the left ear 2 (two) times a day (Patient not taking: Reported on 5/13/2024)    clindamycin (CLEOCIN T) 1 % lotion Apply topically 2 (two) times a day (Patient not taking: Reported on 5/13/2024)    polyethylene glycol (GOLYTELY) 4000 mL solution Take 4,000 mL by mouth once for 1 dose     No current facility-administered medications on file prior to visit.     He is allergic to mushroom extract complex - food allergy, peanut oil - food allergy, shellfish-derived products - food allergy, and strawberry c  "[ascorbate - food allergy]..    Review of Systems   Constitutional:  Negative for chills and fever.   HENT:  Negative for congestion, ear pain and sore throat.    Eyes:  Negative for pain.   Respiratory:  Negative for cough and shortness of breath.    Cardiovascular:  Negative for chest pain and leg swelling.   Gastrointestinal:  Negative for abdominal pain, nausea and vomiting.   Endocrine: Negative for polyuria.   Genitourinary:  Negative for difficulty urinating, frequency and urgency.   Musculoskeletal:  Negative for arthralgias and back pain.   Skin:  Negative for rash.   Neurological:  Negative for weakness and headaches.   Psychiatric/Behavioral:  Negative for sleep disturbance. The patient is not nervous/anxious.          Objective:      /80 (BP Location: Left arm, Patient Position: Sitting, Cuff Size: Standard)   Pulse 65   Temp 97.7 °F (36.5 °C) (Temporal)   Ht 5' 6\" (1.676 m)   Wt 105 kg (232 lb)   SpO2 99%   BMI 37.45 kg/m²     Recent Results (from the past 1344 hour(s))   Protein / creatinine ratio, urine    Collection Time: 05/08/24  6:34 AM   Result Value Ref Range    Creatinine, Ur 216.3 Reference range not established. mg/dL    Protein Urine Random 21 Reference range not established. mg/dL    Prot/Creat Ratio, Ur 0.10 0.00 - 0.10   Albumin / creatinine urine ratio    Collection Time: 05/08/24  6:34 AM   Result Value Ref Range    Creatinine, Ur 216.3 Reference range not established. mg/dL    Albumin,U,Random 21.3 (H) <20.0 mg/L    Albumin Creat Ratio 10 0 - 30 mg/g creatinine   CBC and differential    Collection Time: 05/08/24  6:34 AM   Result Value Ref Range    WBC 5.36 4.31 - 10.16 Thousand/uL    RBC 4.09 3.88 - 5.62 Million/uL    Hemoglobin 12.9 12.0 - 17.0 g/dL    Hematocrit 40.3 36.5 - 49.3 %    MCV 99 (H) 82 - 98 fL    MCH 31.5 26.8 - 34.3 pg    MCHC 32.0 31.4 - 37.4 g/dL    RDW 14.1 11.6 - 15.1 %    MPV 11.1 8.9 - 12.7 fL    Platelets 176 149 - 390 Thousands/uL    nRBC 0 /100 WBCs "    Segmented % 59 43 - 75 %    Immature Grans % 0 0 - 2 %    Lymphocytes % 24 14 - 44 %    Monocytes % 11 4 - 12 %    Eosinophils Relative 5 0 - 6 %    Basophils Relative 1 0 - 1 %    Absolute Neutrophils 3.14 1.85 - 7.62 Thousands/µL    Absolute Immature Grans 0.02 0.00 - 0.20 Thousand/uL    Absolute Lymphocytes 1.31 0.60 - 4.47 Thousands/µL    Absolute Monocytes 0.58 0.17 - 1.22 Thousand/µL    Eosinophils Absolute 0.27 0.00 - 0.61 Thousand/µL    Basophils Absolute 0.04 0.00 - 0.10 Thousands/µL   Comprehensive metabolic panel    Collection Time: 05/08/24  6:34 AM   Result Value Ref Range    Sodium 139 135 - 147 mmol/L    Potassium 3.8 3.5 - 5.3 mmol/L    Chloride 104 96 - 108 mmol/L    CO2 29 21 - 32 mmol/L    ANION GAP 6 4 - 13 mmol/L    BUN 27 (H) 5 - 25 mg/dL    Creatinine 1.28 0.60 - 1.30 mg/dL    Glucose, Fasting 97 65 - 99 mg/dL    Calcium 8.6 8.4 - 10.2 mg/dL    AST 19 13 - 39 U/L    ALT 48 7 - 52 U/L    Alkaline Phosphatase 86 34 - 104 U/L    Total Protein 6.9 6.4 - 8.4 g/dL    Albumin 4.1 3.5 - 5.0 g/dL    Total Bilirubin 0.66 0.20 - 1.00 mg/dL    eGFR 57 ml/min/1.73sq m   Lipid Panel with Direct LDL reflex    Collection Time: 05/08/24  6:34 AM   Result Value Ref Range    Cholesterol 86 See Comment mg/dL    Triglycerides 62 See Comment mg/dL    HDL, Direct 38 (L) >=40 mg/dL    LDL Calculated 36 0 - 100 mg/dL   TSH, 3rd generation    Collection Time: 05/08/24  6:34 AM   Result Value Ref Range    TSH 3RD GENERATON 2.160 0.450 - 4.500 uIU/mL   Hemoglobin A1C    Collection Time: 05/08/24  6:34 AM   Result Value Ref Range    Hemoglobin A1C 6.1 (H) Normal 4.0-5.6%; PreDiabetic 5.7-6.4%; Diabetic >=6.5%; Glycemic control for adults with diabetes <7.0% %     mg/dl   Phosphorus    Collection Time: 05/08/24  6:34 AM   Result Value Ref Range    Phosphorus 3.5 2.3 - 4.1 mg/dL        Physical Exam  Constitutional:       Appearance: Normal appearance.   HENT:      Head: Normocephalic.      Right Ear: Tympanic  membrane, ear canal and external ear normal.      Left Ear: External ear normal.      Nose: Nose normal. No congestion.      Mouth/Throat:      Mouth: Mucous membranes are moist.      Pharynx: Oropharynx is clear. No oropharyngeal exudate or posterior oropharyngeal erythema.   Eyes:      Extraocular Movements: Extraocular movements intact.      Conjunctiva/sclera: Conjunctivae normal.   Cardiovascular:      Rate and Rhythm: Normal rate and regular rhythm.      Heart sounds: Normal heart sounds. No murmur heard.  Pulmonary:      Effort: Pulmonary effort is normal.      Breath sounds: Normal breath sounds. No wheezing or rales.   Abdominal:      General: Abdomen is flat. There is no distension.      Palpations: Abdomen is soft.      Tenderness: There is no abdominal tenderness.   Musculoskeletal:      Cervical back: Normal range of motion and neck supple.      Right lower leg: No edema.      Left lower leg: No edema.   Lymphadenopathy:      Cervical: No cervical adenopathy.   Skin:     General: Skin is warm.   Neurological:      General: No focal deficit present.      Mental Status: He is alert and oriented to person, place, and time.

## 2024-05-30 DIAGNOSIS — E63.9 NUTRITION DISORDER: ICD-10-CM

## 2024-05-30 DIAGNOSIS — H61.22 CERUMEN DEBRIS ON TYMPANIC MEMBRANE OF LEFT EAR: ICD-10-CM

## 2024-05-30 DIAGNOSIS — F40.01 AGORAPHOBIA WITH PANIC ATTACKS: ICD-10-CM

## 2024-05-31 RX ORDER — ALPRAZOLAM 0.5 MG/1
TABLET ORAL
Qty: 30 TABLET | Refills: 0 | Status: SHIPPED | OUTPATIENT
Start: 2024-05-31

## 2024-05-31 RX ORDER — DIPHENOXYLATE HYDROCHLORIDE AND ATROPINE SULFATE 2.5; .025 MG/1; MG/1
1 TABLET ORAL DAILY
Qty: 100 TABLET | Refills: 0 | Status: SHIPPED | OUTPATIENT
Start: 2024-05-31

## 2024-06-12 ENCOUNTER — OFFICE VISIT (OUTPATIENT)
Dept: NEPHROLOGY | Facility: CLINIC | Age: 69
End: 2024-06-12
Payer: COMMERCIAL

## 2024-06-12 VITALS
WEIGHT: 228 LBS | BODY MASS INDEX: 36.64 KG/M2 | OXYGEN SATURATION: 96 % | HEIGHT: 66 IN | HEART RATE: 76 BPM | DIASTOLIC BLOOD PRESSURE: 70 MMHG | SYSTOLIC BLOOD PRESSURE: 106 MMHG

## 2024-06-12 DIAGNOSIS — E55.9 VITAMIN D INSUFFICIENCY: ICD-10-CM

## 2024-06-12 DIAGNOSIS — I50.22 CHRONIC SYSTOLIC CONGESTIVE HEART FAILURE (HCC): ICD-10-CM

## 2024-06-12 DIAGNOSIS — N18.31 STAGE 3A CHRONIC KIDNEY DISEASE (HCC): Primary | ICD-10-CM

## 2024-06-12 DIAGNOSIS — N25.81 SECONDARY HYPERPARATHYROIDISM (HCC): ICD-10-CM

## 2024-06-12 DIAGNOSIS — E66.01 CLASS 2 SEVERE OBESITY DUE TO EXCESS CALORIES WITH SERIOUS COMORBIDITY AND BODY MASS INDEX (BMI) OF 36.0 TO 36.9 IN ADULT (HCC): ICD-10-CM

## 2024-06-12 PROBLEM — H61.22 LEFT EAR IMPACTED CERUMEN: Status: RESOLVED | Noted: 2024-05-13 | Resolved: 2024-06-12

## 2024-06-12 PROCEDURE — 99214 OFFICE O/P EST MOD 30 MIN: CPT | Performed by: INTERNAL MEDICINE

## 2024-06-12 NOTE — PATIENT INSTRUCTIONS
Kidney numbers are looking stable and there is no protein in the urine which is a good sign.    Your risk of kidney failure needing dialysis in next 5 years is low.    Continue vitamin D supplement with colecalciferol 1000 units (25 mcg) daily.    We will provide a referral to see a nutritionist to help with losing weight as this will help both your heart and kidneys.    Repeat lab work including blood tests and urine tests in 6 months before next office visit.

## 2024-06-12 NOTE — PROGRESS NOTES
NEPHROLOGY OFFICE VISIT   Robbie Momin 68 y.o. male MRN: [id number]  6/12/2024    Reason for Visit: Chronic kidney disease    ASSESSMENT and PLAN:  It was a pleasure evaluating your patient in the office today. Thank you for allowing our team to participate in the care of Mr. Robbie Momin. Please do not hesitate to contact our team if further issues/questions shall arise in the interim.     # Chronic Kidney Disease Stage III-A  - Etiology/risk factors: Episode of severe acute kidney injury in 2021, cardiorenal physiology, age-related nephron loss  - Baseline Cr: 1.0-1.3 before episode of MI in 2021, episode of JUDE in setting of late presentation of MI/cardiogenic shock with peak creatinine of 4.0 and discharge creatinine of 1.09, follow-up creatinine in the range of 1.3-1.5, most recently 1.28 05/2024  - Urinalysis: 1+ protein, 2-4 RBC, 4-10 WBC, no bacteria 03/2024, repeat urinalysis with microscopy in 6 months  - Proteinuria: No, UACR 10 mg/g, UPCR 100 mg 05/2024  - Imaging: Right kidney 9.8 cm, left kidney 10 cm, normal echogenicity and contour, subcentimeter simple anechoic interpolar cyst in right kidney  - KFRE <5% chance of kidney failure needing dialysis in next 5 years  - If hematuria persists, will provide referral to see urology for further evaluation  - No indication for RAAS blockade or SGLT2 inhibitor use  - Discussed risk factor reduction to slow progression of chronic kidney disease  Intensive blood pressure control as below   Glycemic control, most recent HbA1c 6.1 05/2024  Lipid control, on Lipitor 40 mg daily  Lifestyle modifications (weight loss/exercise)  Avoidance of NSAIDs  Avoidance of PPI     # BPVolume   - Goal BP <120/80 per KDIGO guidelines   - Volume status: Euvolemic  - Status: Blood pressure currently well-controlled  - Current antihypertensive regimen: Bumex 1 mg daily, Toprol-XL 25 mg twice daily both for history of CHF and not for hypertension  - Changes: No changes to regimen  blood pressure is well-controlled     # Screening for Anemia   - Target Hb: More than 10 g/dL  - Most recent hemoglobin: 12.9 g/dL     # Electrolytes/Acid Base status   - Electrolytes and acid base status within normal limits     # CKD Mineral and Bone Disorder   - Goal Ca 8.5-10 mg/dL, goal Phos 2.7-4.6 mg/dL, goal iPTH 30-70 pg/mL  - .8 secondary hyperparathyroidism in setting of vitamin D insufficiency versus chronic kidney disease  - 25-hydroxy vitamin D level 29.3, on vitamin D supplementation  - Repeat PTH in 6 months, if PTH remains elevated more than 150 despite correction of vitamin D insufficiency, would prescribe calcitriol     # CHF  - Most recent echocardiogram with LVEF 50%, low normal systolic function, grade 1 diastolic dysfunction  - Notes reviewed from heart failure service and plans noted for continuation of beta-blockers and loop diuretics  - Currently appears euvolemic on Bumex 1 mg daily, continue current dose of Bumex without changes     # Class II Obesity  - Discussed importance of weight loss and management of prediabetes and CKD  - Referral provided to see nutritionist     HPI:  Since last visit: He has been doing well.  He denies dyspnea.  He denies leg swelling.  He denies any trouble with urination.    Robbie Momin is a 68 y.o. male who has history of prolonged hospitalization in 2021 with late presentation of anteroseptal MI presenting in cardiogenic shock requiring VSD repair with large bovine patch and hemodynamic support with [facility] ECMO.  He remained on inotropic support for a while postoperatively.  During his admission he was also noted to have acetaminophen toxicity that was treated with NAC and fomepizole.  He follows with heart failure service and is currently receiving beta-blockers and diuretics.  He provides a history of prediabetes.  He does not have history of hypertension.      >> Major risk factors for CKD  - Diabetes: Pre-diabetes   - Hypertension: No  - Age ? 55  "years: Yes  - Family history of kidney disease: No    - Obesity or metabolic syndrome: Yes     >> Medical history evaluation   - Prior kidney disease or dialysis: No   - Incidental hematuria in the past: No    - Urinary symptoms: No   - History of foamy or frothy urine: No   - History of nephrolithiasis: No   - Diseases that share risk factors with CKD: CAD  - Systemic diseases that might affect kidney: No   - History of use of medications that might affect renal function: No    PATIENT INSTRUCTIONS:    Patient Instructions   Kidney numbers are looking stable and there is no protein in the urine which is a good sign.    Your risk of kidney failure needing dialysis in next 5 years is low.    Continue vitamin D supplement with colecalciferol 1000 units (25 mcg) daily.    We will provide a referral to see a nutritionist to help with losing weight as this will help both your heart and kidneys.    Repeat lab work including blood tests and urine tests in 6 months before next office visit.      OBJECTIVE:  Current Weight: Weight - Scale: 103 kg (228 lb)  Vitals:    06/12/24 1255   BP: 106/70   BP Location: Left arm   Patient Position: Sitting   Cuff Size: Adult   Pulse: 76   SpO2: 96%   Weight: 103 kg (228 lb)   Height: 5' 6\" (1.676 m)    Body mass index is 36.8 kg/m².      REVIEW OF SYSTEMS:    Review of Systems   Constitutional:  Negative for chills and fever.   HENT:  Negative for ear pain and sore throat.    Eyes:  Negative for pain and visual disturbance.   Respiratory:  Negative for cough and shortness of breath.    Cardiovascular:  Negative for chest pain and palpitations.   Gastrointestinal:  Negative for abdominal pain and vomiting.   Genitourinary:  Negative for dysuria and hematuria.   Musculoskeletal:  Negative for arthralgias and back pain.   Skin:  Negative for color change and rash.   Neurological:  Negative for seizures and syncope.   All other systems reviewed and are negative.      Past Medical History: "   Diagnosis Date    Allergic     Anemia     Anxiety     Central line complication 2022    Chronic kidney disease 21    Kidney Failure upon hospital admission, Tylenol overdose    Coronary artery disease 21    Myocardial infarction (HCC) 21    VSD Repair,Ecmo, Heart Attack,Bovine Repair    Obesity     Tylenol ingestion 2021    Urinary tract infection 22       Past Surgical History:   Procedure Laterality Date    CARDIAC SURGERY N/A     CORONARY ARTERY BYPASS GRAFT      VSD    EXTRACORPOREAL CIRCULATION N/A 2021    Procedure: SUPPORT EXTRACORPOREAL MEMBRANE OXYGENATION (ECMO);  Surgeon: HILDA Ochoa MD;  Location: [facility];  Service: Cardiac Surgery    IR OTHER  2021    IR OTHER  2022    CO CLSR 1 VENTRICULAR SEPTAL DEFECT W/WO PATCH N/A 2022    Procedure: REPAIR VENTRICULAR SEPTAL DEFECT (VSD) OPEN WITH BOVINE PERICARDIAL PATCH.;  Surgeon: HILDA Ochoa MD;  Location: [facility];  Service: Cardiac Surgery    CO ECMO/ECLS INITIATION VENO-VENOUS N/A 2022    Procedure: [facility] ECMO DECANNULATION;  Surgeon: HILDA Ochoa MD;  Location: [facility];  Service: Cardiac Surgery    CO EXPL PO HEMRRG THROMBOSIS/INFCTJ CH N/A 2022    Procedure: RE-EXPLORATION MEDIASTERNAL CAVITY FOR POST-OP BLEED (BRING BACK) Removal of PA catheter;  Surgeon: HILDA Ochoa MD;  Location: [facility];  Service: Cardiac Surgery       Family History   Problem Relation Age of Onset    Other Mother         Claustrophobia    Mental illness Mother         Agoraphobia    Heart disease Mother         Enlarged heart... of heart disease    Anxiety disorder Mother         Agorpahobia    Drug abuse Family     Alcohol abuse Family     Alcohol abuse Father     Breast cancer Sister         Social History     Substance and Sexual Activity   Alcohol Use No     Social History     Substance and Sexual Activity   Drug Use No     Social History     Tobacco Use    Smoking Status Former    Types: Pipe   Smokeless Tobacco Never   Tobacco Comments    Secondhand smoke exposure       PHYSICAL EXAM:      Physical Exam  Constitutional:       Appearance: Normal appearance.   HENT:      Head: Normocephalic and atraumatic.   Cardiovascular:      Rate and Rhythm: Normal rate and regular rhythm.      Pulses: Normal pulses.      Heart sounds: Normal heart sounds.   Pulmonary:      Effort: Pulmonary effort is normal.      Breath sounds: Normal breath sounds.   Abdominal:      Tenderness: There is no right CVA tenderness or left CVA tenderness.   Musculoskeletal:         General: Normal range of motion.      Right lower leg: No edema.      Left lower leg: No edema.   Skin:     General: Skin is warm.   Neurological:      Mental Status: He is alert and oriented to person, place, and time. Mental status is at baseline.   Psychiatric:         Mood and Affect: Mood normal.         Medications:    Current Outpatient Medications:     ALPRAZolam (XANAX) 0.5 mg tablet, TAKE 1 TO 2 TABLETS 30-60 MINUTES PRIOR TO LEAVING HOME AS NEEDED, Disp: 30 tablet, Rfl: 0    ALPRAZolam (XANAX) 0.5 mg tablet, As needed, Disp: 30 tablet, Rfl: 0    aspirin 325 mg tablet, Take 1 tablet (325 mg total) by mouth daily, Disp: 30 tablet, Rfl: 2    atorvastatin (LIPITOR) 40 mg tablet, Take 1 tablet (40 mg total) by mouth daily after dinner, Disp: 90 tablet, Rfl: 0    bumetanide (BUMEX) 1 mg tablet, Take 1 tablet (1 mg total) by mouth daily, Disp: 90 tablet, Rfl: 0    carbamide peroxide (DEBROX) 6.5 % otic solution, Administer 5 drops into the left ear 2 (two) times a day, Disp: 15 mL, Rfl: 0    Cholecalciferol (VITAMIN D3) 1,000 units tablet, Take 1 tablet (1,000 Units total) by mouth daily, Disp: 90 tablet, Rfl: 3    metoprolol succinate (TOPROL-XL) 25 mg 24 hr tablet, Take 1 tablet (25 mg total) by mouth 2 (two) times a day, Disp: 180 tablet, Rfl: 2    multivitamin (THERAGRAN) TABS, Take 1 tablet by mouth daily, Disp:  "100 tablet, Rfl: 0    polyethylene glycol (GOLYTELY) 4000 mL solution, Take 4,000 mL by mouth once for 1 dose, Disp: 4000 mL, Rfl: 0    tamsulosin (FLOMAX) 0.4 mg, Take 1 capsule (0.4 mg total) by mouth daily with dinner, Disp: 90 capsule, Rfl: 0    clindamycin (CLEOCIN T) 1 % lotion, Apply topically 2 (two) times a day (Patient not taking: Reported on 5/13/2024), Disp: 60 mL, Rfl: 3    omeprazole (PriLOSEC) 20 mg delayed release capsule, Take 1 capsule (20 mg total) by mouth daily (Patient not taking: Reported on 6/12/2024), Disp: 90 capsule, Rfl: 0    Laboratory Results:        Invalid input(s): \"ALBUMIN\"    Results for orders placed or performed in visit on 03/11/24   Comprehensive metabolic panel   Result Value Ref Range    Sodium 139 135 - 147 mmol/L    Potassium 3.8 3.5 - 5.3 mmol/L    Chloride 104 96 - 108 mmol/L    CO2 29 21 - 32 mmol/L    ANION GAP 6 4 - 13 mmol/L    BUN 27 (H) 5 - 25 mg/dL    Creatinine 1.28 0.60 - 1.30 mg/dL    Glucose, Fasting 97 65 - 99 mg/dL    Calcium 8.6 8.4 - 10.2 mg/dL    AST 19 13 - 39 U/L    ALT 48 7 - 52 U/L    Alkaline Phosphatase 86 34 - 104 U/L    Total Protein 6.9 6.4 - 8.4 g/dL    Albumin 4.1 3.5 - 5.0 g/dL    Total Bilirubin 0.66 0.20 - 1.00 mg/dL    eGFR 57 ml/min/1.73sq m   Lipid Panel with Direct LDL reflex   Result Value Ref Range    Cholesterol 86 See Comment mg/dL    Triglycerides 62 See Comment mg/dL    HDL, Direct 38 (L) >=40 mg/dL    LDL Calculated 36 0 - 100 mg/dL   TSH, 3rd generation   Result Value Ref Range    TSH 3RD GENERATON 2.160 0.450 - 4.500 uIU/mL   Hemoglobin A1C   Result Value Ref Range    Hemoglobin A1C 6.1 (H) Normal 4.0-5.6%; PreDiabetic 5.7-6.4%; Diabetic >=6.5%; Glycemic control for adults with diabetes <7.0% %     mg/dl         "

## 2024-07-01 DIAGNOSIS — E78.2 HYPERLIPIDEMIA, MIXED: ICD-10-CM

## 2024-07-01 DIAGNOSIS — I25.5 ISCHEMIC CARDIOMYOPATHY: ICD-10-CM

## 2024-07-01 DIAGNOSIS — Z87.74 S/P VSD REPAIR: ICD-10-CM

## 2024-07-01 DIAGNOSIS — F40.01 AGORAPHOBIA WITH PANIC ATTACKS: ICD-10-CM

## 2024-07-01 RX ORDER — ATORVASTATIN CALCIUM 40 MG/1
40 TABLET, FILM COATED ORAL
Qty: 90 TABLET | Refills: 1 | Status: SHIPPED | OUTPATIENT
Start: 2024-07-01

## 2024-07-01 RX ORDER — BUMETANIDE 1 MG/1
1 TABLET ORAL DAILY
Qty: 90 TABLET | Refills: 1 | Status: SHIPPED | OUTPATIENT
Start: 2024-07-01 | End: 2024-12-28

## 2024-07-01 RX ORDER — ALPRAZOLAM 0.5 MG/1
TABLET ORAL
Qty: 30 TABLET | Refills: 0 | Status: SHIPPED | OUTPATIENT
Start: 2024-07-01

## 2024-07-10 DIAGNOSIS — H61.22 CERUMEN DEBRIS ON TYMPANIC MEMBRANE OF LEFT EAR: ICD-10-CM

## 2024-07-15 ENCOUNTER — TELEPHONE (OUTPATIENT)
Dept: GASTROENTEROLOGY | Facility: AMBULARY SURGERY CENTER | Age: 69
End: 2024-07-15

## 2024-07-26 ENCOUNTER — HOSPITAL ENCOUNTER (OUTPATIENT)
Dept: GASTROENTEROLOGY | Facility: HOSPITAL | Age: 69
Setting detail: OUTPATIENT SURGERY
End: 2024-07-26
Payer: COMMERCIAL

## 2024-07-26 ENCOUNTER — ANESTHESIA (OUTPATIENT)
Dept: GASTROENTEROLOGY | Facility: HOSPITAL | Age: 69
End: 2024-07-26

## 2024-07-26 ENCOUNTER — ANESTHESIA EVENT (OUTPATIENT)
Dept: GASTROENTEROLOGY | Facility: HOSPITAL | Age: 69
End: 2024-07-26

## 2024-07-26 VITALS
HEART RATE: 61 BPM | OXYGEN SATURATION: 99 % | BODY MASS INDEX: 36.64 KG/M2 | WEIGHT: 228 LBS | HEIGHT: 66 IN | RESPIRATION RATE: 18 BRPM | SYSTOLIC BLOOD PRESSURE: 149 MMHG | DIASTOLIC BLOOD PRESSURE: 97 MMHG | TEMPERATURE: 97.4 F

## 2024-07-26 DIAGNOSIS — R19.5 POSITIVE COLORECTAL CANCER SCREENING USING COLOGUARD TEST: ICD-10-CM

## 2024-07-26 PROCEDURE — 45385 COLONOSCOPY W/LESION REMOVAL: CPT | Performed by: INTERNAL MEDICINE

## 2024-07-26 PROCEDURE — 45380 COLONOSCOPY AND BIOPSY: CPT | Performed by: INTERNAL MEDICINE

## 2024-07-26 PROCEDURE — 88305 TISSUE EXAM BY PATHOLOGIST: CPT | Performed by: PATHOLOGY

## 2024-07-26 RX ORDER — LIDOCAINE HYDROCHLORIDE 10 MG/ML
INJECTION, SOLUTION EPIDURAL; INFILTRATION; INTRACAUDAL; PERINEURAL AS NEEDED
Status: DISCONTINUED | OUTPATIENT
Start: 2024-07-26 | End: 2024-07-26

## 2024-07-26 RX ORDER — SODIUM CHLORIDE, SODIUM LACTATE, POTASSIUM CHLORIDE, CALCIUM CHLORIDE 600; 310; 30; 20 MG/100ML; MG/100ML; MG/100ML; MG/100ML
INJECTION, SOLUTION INTRAVENOUS CONTINUOUS PRN
Status: DISCONTINUED | OUTPATIENT
Start: 2024-07-26 | End: 2024-07-26

## 2024-07-26 RX ORDER — PROPOFOL 10 MG/ML
INJECTION, EMULSION INTRAVENOUS AS NEEDED
Status: DISCONTINUED | OUTPATIENT
Start: 2024-07-26 | End: 2024-07-26

## 2024-07-26 RX ADMIN — LIDOCAINE HYDROCHLORIDE 50 MG: 10 INJECTION, SOLUTION EPIDURAL; INFILTRATION; INTRACAUDAL; PERINEURAL at 09:57

## 2024-07-26 RX ADMIN — PROPOFOL 50 MG: 10 INJECTION, EMULSION INTRAVENOUS at 10:01

## 2024-07-26 RX ADMIN — SODIUM CHLORIDE, SODIUM LACTATE, POTASSIUM CHLORIDE, AND CALCIUM CHLORIDE: .6; .31; .03; .02 INJECTION, SOLUTION INTRAVENOUS at 09:52

## 2024-07-26 RX ADMIN — Medication 40 MG: at 10:06

## 2024-07-26 RX ADMIN — PROPOFOL 50 MG: 10 INJECTION, EMULSION INTRAVENOUS at 10:09

## 2024-07-26 RX ADMIN — PROPOFOL 30 MG: 10 INJECTION, EMULSION INTRAVENOUS at 10:04

## 2024-07-26 RX ADMIN — PROPOFOL 120 MG: 10 INJECTION, EMULSION INTRAVENOUS at 09:57

## 2024-07-26 NOTE — H&P
History and Physical -  [facility]  Robbie Momin 68 y.o. male MRN: [id number]        HPI: 68-year-old male with history of coronary disease status post CABG was tested positive on stool Cologuard.  Regular bowel movements    Historical Information   Past Medical History:   Diagnosis Date    Allergic     Anemia     Anxiety     Central line complication 01/07/2022    Chronic kidney disease 12/30/21    Kidney Failure upon hospital admission, Tylenol overdose    Coronary artery disease 12/30/21    Myocardial infarction (HCC) 12/30/21    VSD Repair,Ecmo, Heart Attack,Bovine Repair    Obesity     Tylenol ingestion 12/31/2021    Urinary tract infection 1/23/22     Past Surgical History:   Procedure Laterality Date    CARDIAC SURGERY N/A     CORONARY ARTERY BYPASS GRAFT      VSD    EXTRACORPOREAL CIRCULATION N/A 12/31/2021    Procedure: SUPPORT EXTRACORPOREAL MEMBRANE OXYGENATION (ECMO);  Surgeon: HILDA Ochoa MD;  [facility]: [facility];  Service: Cardiac Surgery    IR OTHER  12/31/2021    IR OTHER  01/07/2022    MN CLSR 1 VENTRICULAR SEPTAL DEFECT W/WO PATCH N/A 01/04/2022    Procedure: REPAIR VENTRICULAR SEPTAL DEFECT (VSD) OPEN WITH BOVINE PERICARDIAL PATCH.;  Surgeon: HILDA Ochoa MD;  [facility];  Service: Cardiac Surgery    MN ECMO/ECLS INITIATION VENO-VENOUS N/A 01/04/2022    Procedure: VA ECMO DECANNULATION;  Surgeon: HILDA Ochoa MD;  [facility];  Service: Cardiac Surgery    MN EXPL PO HEMRRG THROMBOSIS/INFCTJ CH N/A 01/07/2022    Procedure: RE-EXPLORATION MEDIASTERNAL CAVITY FOR POST-OP BLEED (BRING BACK) Removal of PA catheter;  Surgeon: HILDA Ochoa MD;  [facility];  Service: Cardiac Surgery     Social History   Social History     Substance and Sexual Activity   Alcohol Use No     Social History     Substance and Sexual Activity   Drug Use No     Social History     Tobacco Use   Smoking Status Former    Types: Pipe  "  Smokeless Tobacco Never   Tobacco Comments    Secondhand smoke exposure     Family History   Problem Relation Age of Onset    Other Mother         Claustrophobia    Mental illness Mother         Agoraphobia    Heart disease Mother         Enlarged heart... of heart disease    Anxiety disorder Mother         Agorpahobia    Drug abuse Family     Alcohol abuse Family     Alcohol abuse Father     Breast cancer Sister        Meds/Allergies     Not in a hospital admission.    Allergies   Allergen Reactions    Mushroom Extract Complex - Food Allergy Facial Swelling    Peanut Oil - Food Allergy     Shellfish-Derived Products - Food Allergy Other (See Comments)     Runs in the family    Strawberry C [Ascorbate - Food Allergy]        Objective     Blood pressure 104/69, pulse 71, temperature (!) 96.2 °F (35.7 °C), temperature source Temporal, resp. rate 18, height 5' 6\" (1.676 m), weight 103 kg (228 lb), SpO2 97%.    Physical Exam:    Chest- CTA  Heart- RRR  Abdomen- NT/ND  Extremities- No edema    ASSESSMENT:     Positive stool Cologuard    PLAN:    Colonoscopy              "

## 2024-07-26 NOTE — ANESTHESIA PREPROCEDURE EVALUATION
Procedure:  COLONOSCOPY    Relevant Problems   ANESTHESIA (within normal limits)      CARDIO   (+) Chronic systolic congestive heart failure (HCC)   (+) Coronary artery disease   (+) Hyperlipidemia, mixed   (+) Myocardial infarction (HCC)      GI/HEPATIC   (+) Gastroesophageal reflux disease without esophagitis      /RENAL   (+) JUDE (acute kidney injury) (HCC)   (+) Benign prostatic hyperplasia with urinary retention      HEMATOLOGY   (+) Anemia   (+) Coagulopathy (HCC)      NEURO/PSYCH   (+) Agoraphobia with panic attacks        TTE 7/26/2024    Left Ventricle: Left ventricular cavity size is normal. Wall thickness is normal. There is no concentric hypertrophy. The left ventricular ejection fraction is 50%. Systolic function is low normal. Wall motion is normal. Diastolic function is mildly abnormal, consistent with grade I (abnormal) relaxation.    IVS: There is abnormal septal motion consistent with post-operative status. There is a surgically repaired ventricular septal defect. There is no residual shunt appreciated by color Doppler.    Right Ventricle: Right ventricular cavity size is normal. Systolic function is normal.    Left Atrium: The atrium is mildly dilated.    Tricuspid Valve: There is mild regurgitation.      Physical Exam    Airway    Mallampati score: II  TM Distance: >3 FB  Neck ROM: full     Dental   Comment: Lower teeth in poor condition     Cardiovascular      Pulmonary      Other Findings        Anesthesia Plan  ASA Score- 3     Anesthesia Type- IV sedation with anesthesia with ASA Monitors.         Additional Monitors:     Airway Plan:            Plan Factors-Exercise tolerance (METS): >4 METS.    Chart reviewed. EKG reviewed.  Existing labs reviewed. Patient summary reviewed.    Patient is not a current smoker.              Induction- intravenous.    Postoperative Plan-         Informed Consent- Anesthetic plan and risks discussed with patient.  I personally reviewed this patient with the  CRNA. Discussed and agreed on the Anesthesia Plan with the CRNA..

## 2024-07-26 NOTE — ANESTHESIA POSTPROCEDURE EVALUATION
Post-Op Assessment Note    CV Status:  Stable    Pain management: adequate       Mental Status:  Alert and awake   Hydration Status:  Euvolemic   PONV Controlled:  Controlled   Airway Patency:  Patent     Post Op Vitals Reviewed: Yes    No anethesia notable event occurred.    Staff: ROBERT           BP   104/64   Temp   97.4   Pulse  61   Resp   12   SpO2   96

## 2024-07-29 PROCEDURE — 88305 TISSUE EXAM BY PATHOLOGIST: CPT | Performed by: PATHOLOGY

## 2024-08-26 ENCOUNTER — APPOINTMENT (OUTPATIENT)
Dept: LAB | Facility: CLINIC | Age: 69
End: 2024-08-26
Payer: COMMERCIAL

## 2024-08-26 DIAGNOSIS — I50.22 CHRONIC SYSTOLIC CONGESTIVE HEART FAILURE (HCC): ICD-10-CM

## 2024-08-26 DIAGNOSIS — E55.9 VITAMIN D INSUFFICIENCY: ICD-10-CM

## 2024-08-26 DIAGNOSIS — N25.81 SECONDARY HYPERPARATHYROIDISM (HCC): ICD-10-CM

## 2024-08-26 DIAGNOSIS — N18.31 STAGE 3A CHRONIC KIDNEY DISEASE (HCC): ICD-10-CM

## 2024-08-26 LAB
ALBUMIN SERPL BCG-MCNC: 4.2 G/DL (ref 3.5–5)
ANION GAP SERPL CALCULATED.3IONS-SCNC: 7 MMOL/L (ref 4–13)
BACTERIA UR QL AUTO: ABNORMAL /HPF
BILIRUB UR QL STRIP: NEGATIVE
BUN SERPL-MCNC: 25 MG/DL (ref 5–25)
CALCIUM SERPL-MCNC: 8.9 MG/DL (ref 8.4–10.2)
CHLORIDE SERPL-SCNC: 106 MMOL/L (ref 96–108)
CLARITY UR: CLEAR
CO2 SERPL-SCNC: 27 MMOL/L (ref 21–32)
COLOR UR: YELLOW
CREAT SERPL-MCNC: 1.3 MG/DL (ref 0.6–1.3)
CREAT UR-MCNC: 233.6 MG/DL
CREAT UR-MCNC: 233.6 MG/DL
GFR SERPL CREATININE-BSD FRML MDRD: 56 ML/MIN/1.73SQ M
GLUCOSE P FAST SERPL-MCNC: 88 MG/DL (ref 65–99)
GLUCOSE UR STRIP-MCNC: NEGATIVE MG/DL
HGB UR QL STRIP.AUTO: NEGATIVE
HYALINE CASTS #/AREA URNS LPF: ABNORMAL /LPF
KETONES UR STRIP-MCNC: NEGATIVE MG/DL
LEUKOCYTE ESTERASE UR QL STRIP: ABNORMAL
MICROALBUMIN UR-MCNC: 42.7 MG/L
MICROALBUMIN/CREAT 24H UR: 18 MG/G CREATININE (ref 0–30)
NITRITE UR QL STRIP: NEGATIVE
NON-SQ EPI CELLS URNS QL MICRO: ABNORMAL /HPF
PH UR STRIP.AUTO: 6.5 [PH]
PHOSPHATE SERPL-MCNC: 3 MG/DL (ref 2.3–4.1)
POTASSIUM SERPL-SCNC: 4.1 MMOL/L (ref 3.5–5.3)
PROT UR STRIP-MCNC: ABNORMAL MG/DL
PROT UR-MCNC: 30.8 MG/DL
PROT/CREAT UR: 0.1 MG/G{CREAT} (ref 0–0.1)
PTH-INTACT SERPL-MCNC: 169 PG/ML (ref 12–88)
RBC #/AREA URNS AUTO: ABNORMAL /HPF
SODIUM SERPL-SCNC: 140 MMOL/L (ref 135–147)
SP GR UR STRIP.AUTO: 1.03 (ref 1–1.03)
UROBILINOGEN UR STRIP-ACNC: <2 MG/DL
WBC #/AREA URNS AUTO: ABNORMAL /HPF

## 2024-08-26 PROCEDURE — 81001 URINALYSIS AUTO W/SCOPE: CPT

## 2024-08-26 PROCEDURE — 82043 UR ALBUMIN QUANTITATIVE: CPT

## 2024-08-26 PROCEDURE — 84156 ASSAY OF PROTEIN URINE: CPT

## 2024-08-26 PROCEDURE — 80069 RENAL FUNCTION PANEL: CPT

## 2024-08-26 PROCEDURE — 82570 ASSAY OF URINE CREATININE: CPT

## 2024-08-26 PROCEDURE — 36415 COLL VENOUS BLD VENIPUNCTURE: CPT

## 2024-08-26 PROCEDURE — 83970 ASSAY OF PARATHORMONE: CPT

## 2024-08-27 ENCOUNTER — TELEPHONE (OUTPATIENT)
Dept: NEPHROLOGY | Facility: CLINIC | Age: 69
End: 2024-08-27

## 2024-08-27 NOTE — TELEPHONE ENCOUNTER
----- Message from Alvin rBooke MD sent at 8/26/2024  3:17 PM EDT -----  Please let the patient know that kidney function is stable.  There is no protein in the urine which is reassuring.  PTH (parathyroid hormone level, bone health number) is also improving with vitamin D supplementation.  No changes to medications at this time.  Further discussion on office visit in November.

## 2024-09-02 DIAGNOSIS — E63.9 NUTRITION DISORDER: ICD-10-CM

## 2024-09-02 DIAGNOSIS — H61.22 CERUMEN DEBRIS ON TYMPANIC MEMBRANE OF LEFT EAR: ICD-10-CM

## 2024-09-02 DIAGNOSIS — F40.01 AGORAPHOBIA WITH PANIC ATTACKS: ICD-10-CM

## 2024-09-02 DIAGNOSIS — Z87.74 S/P VSD REPAIR: ICD-10-CM

## 2024-09-03 RX ORDER — BUMETANIDE 1 MG/1
1 TABLET ORAL DAILY
Qty: 30 TABLET | Refills: 5 | Status: SHIPPED | OUTPATIENT
Start: 2024-09-03 | End: 2025-03-02

## 2024-09-03 RX ORDER — TAMSULOSIN HYDROCHLORIDE 0.4 MG/1
0.4 CAPSULE ORAL
Qty: 30 CAPSULE | Refills: 5 | Status: SHIPPED | OUTPATIENT
Start: 2024-09-03

## 2024-09-03 RX ORDER — DIPHENOXYLATE HYDROCHLORIDE AND ATROPINE SULFATE 2.5; .025 MG/1; MG/1
1 TABLET ORAL DAILY
Qty: 30 TABLET | Refills: 5 | Status: SHIPPED | OUTPATIENT
Start: 2024-09-03

## 2024-09-04 RX ORDER — ALPRAZOLAM 0.5 MG
TABLET ORAL
Qty: 30 TABLET | Refills: 0 | Status: SHIPPED | OUTPATIENT
Start: 2024-09-04

## 2024-09-16 ENCOUNTER — OFFICE VISIT (OUTPATIENT)
Dept: INTERNAL MEDICINE CLINIC | Facility: CLINIC | Age: 69
End: 2024-09-16
Payer: COMMERCIAL

## 2024-09-16 VITALS
SYSTOLIC BLOOD PRESSURE: 134 MMHG | WEIGHT: 233 LBS | HEART RATE: 64 BPM | BODY MASS INDEX: 37.45 KG/M2 | HEIGHT: 66 IN | DIASTOLIC BLOOD PRESSURE: 82 MMHG | TEMPERATURE: 98.6 F

## 2024-09-16 DIAGNOSIS — N40.1 BENIGN PROSTATIC HYPERPLASIA WITH URINARY RETENTION: ICD-10-CM

## 2024-09-16 DIAGNOSIS — E78.2 HYPERLIPIDEMIA, MIXED: ICD-10-CM

## 2024-09-16 DIAGNOSIS — I25.10 CORONARY ARTERY DISEASE INVOLVING NATIVE CORONARY ARTERY OF NATIVE HEART WITHOUT ANGINA PECTORIS: ICD-10-CM

## 2024-09-16 DIAGNOSIS — R73.01 IMPAIRED FASTING GLUCOSE: ICD-10-CM

## 2024-09-16 DIAGNOSIS — R33.8 BENIGN PROSTATIC HYPERPLASIA WITH URINARY RETENTION: ICD-10-CM

## 2024-09-16 DIAGNOSIS — I50.22 CHRONIC SYSTOLIC CONGESTIVE HEART FAILURE (HCC): Primary | ICD-10-CM

## 2024-09-16 PROCEDURE — 99214 OFFICE O/P EST MOD 30 MIN: CPT | Performed by: INTERNAL MEDICINE

## 2024-09-16 NOTE — PROGRESS NOTES
Assessment/Plan:             1. Chronic systolic congestive heart failure (HCC)  Comments:  Continue same medication  Orders:  -     CBC and differential; Future  -     Comprehensive metabolic panel; Future  -     TSH, 3rd generation; Future  2. Coronary artery disease involving native coronary artery of native heart without angina pectoris  Comments:  Continue same medication  Orders:  -     CBC and differential; Future  -     Comprehensive metabolic panel; Future  -     Lipid Panel with Direct LDL reflex; Future  3. Benign prostatic hyperplasia with urinary retention  Comments:  Continue same medication  4. Impaired fasting glucose  -     Hemoglobin A1C; Future  5. Hyperlipidemia, mixed  Comments:  Continue same medication  Orders:  -     Comprehensive metabolic panel; Future  -     Lipid Panel with Direct LDL reflex; Future  -     TSH, 3rd generation; Future         Subjective:      Patient ID: Robbie Momin is a 69 y.o. male.    Follow-up on multiple medical problems to ensure they are stable on current medications        The following portions of the patient's history were reviewed and updated as appropriate: He  has a past medical history of Allergic, Anemia, Anxiety, Central line complication (01/07/2022), Chronic kidney disease (12/30/21), Coronary artery disease (12/30/21), Myocardial infarction (HCC) (12/30/21), Obesity, Tylenol ingestion (12/31/2021), and Urinary tract infection (1/23/22).  He   Patient Active Problem List    Diagnosis Date Noted    Coronary artery disease involving native coronary artery of native heart without angina pectoris     Myocardial infarction (HCC)     Ischemic cardiomyopathy 10/17/2022    Gastroesophageal reflux disease without esophagitis 10/17/2022    Colon cancer screening declined 10/17/2022    Chronic systolic congestive heart failure (HCC) 07/11/2022    Benign prostatic hyperplasia with urinary retention 04/12/2022    Encounter for postoperative care 03/11/2022     Urinary retention 01/21/2022    S/P VSD repair 01/07/2022    Coagulopathy (HCC) 01/01/2022    Acute systolic congestive heart failure (HCC) 12/31/2021    Anemia 12/31/2021    Personal history of ECMO 12/31/2021    Cardiogenic shock ([facility]) 12/30/2021    VSD (ventricular septal defect) 12/30/2021    JUDE (acute kidney injury) ([facility]) 12/30/2021    Former smoker 07/06/2018    Hyperlipidemia, mixed 05/23/2017    Impaired fasting glucose 05/23/2017    Agoraphobia with panic attacks 11/06/2015    Body mass index 38.0-38.9, adult 11/06/2015    Psychosis, atypical ([facility]) 11/06/2015     He  has a past surgical history that includes Extracorporeal membrane oxygenation (N/A, 12/31/2021); pr ecmo/ecls initiation veno-venous (N/A, 01/04/2022); pr clsr 1 ventricular septal defect w/wo patch (N/A, 01/04/2022); IR other (12/31/2021); pr expl po hemrrg thrombosis/infctj ch (N/A, 01/07/2022); IR other (01/07/2022); Cardiac surgery (N/A); and Coronary artery bypass graft.  His family history includes Alcohol abuse in his family and father; Anxiety disorder in his mother; Breast cancer in his sister; Drug abuse in his family; Heart disease in his mother; Mental illness in his mother; Other in his mother.  He  reports that he has quit smoking. His smoking use included pipe. He has never used smokeless tobacco. He reports that he does not drink alcohol and does not use drugs.  Current Outpatient Medications   Medication Sig Dispense Refill    ALPRAZolam (XANAX) 0.5 mg tablet TAKE 1 TO 2 TABLETS 30-60 MINUTES PRIOR TO LEAVING HOME AS NEEDED 30 tablet 0    aspirin 325 mg tablet Take 1 tablet (325 mg total) by mouth daily 30 tablet 2    atorvastatin (LIPITOR) 40 mg tablet Take 1 tablet (40 mg total) by mouth daily after dinner 90 tablet 1    bumetanide (BUMEX) 1 mg tablet Take 1 tablet (1 mg total) by mouth daily 30 tablet 5    Cholecalciferol (VITAMIN D3) 1,000 units tablet Take 1 tablet (1,000 Units total) by mouth daily 90 tablet 3     metoprolol succinate (TOPROL-XL) 25 mg 24 hr tablet Take 1 tablet (25 mg total) by mouth 2 (two) times a day 180 tablet 2    multivitamin (THERAGRAN) TABS Take 1 tablet by mouth daily 30 tablet 5    tamsulosin (FLOMAX) 0.4 mg Take 1 capsule (0.4 mg total) by mouth daily with dinner 30 capsule 5    ALPRAZolam (XANAX) 0.5 mg tablet As needed (Patient not taking: Reported on 9/16/2024) 30 tablet 0    carbamide peroxide (DEBROX) 6.5 % otic solution Administer 5 drops into the left ear 2 (two) times a day (Patient not taking: Reported on 9/16/2024) 15 mL 0    clindamycin (CLEOCIN T) 1 % lotion Apply topically 2 (two) times a day (Patient not taking: Reported on 5/13/2024) 60 mL 3    omeprazole (PriLOSEC) 20 mg delayed release capsule Take 1 capsule (20 mg total) by mouth daily (Patient not taking: Reported on 6/12/2024) 90 capsule 0     No current facility-administered medications for this visit.     Current Outpatient Medications on File Prior to Visit   Medication Sig    ALPRAZolam (XANAX) 0.5 mg tablet TAKE 1 TO 2 TABLETS 30-60 MINUTES PRIOR TO LEAVING HOME AS NEEDED    aspirin 325 mg tablet Take 1 tablet (325 mg total) by mouth daily    atorvastatin (LIPITOR) 40 mg tablet Take 1 tablet (40 mg total) by mouth daily after dinner    bumetanide (BUMEX) 1 mg tablet Take 1 tablet (1 mg total) by mouth daily    Cholecalciferol (VITAMIN D3) 1,000 units tablet Take 1 tablet (1,000 Units total) by mouth daily    metoprolol succinate (TOPROL-XL) 25 mg 24 hr tablet Take 1 tablet (25 mg total) by mouth 2 (two) times a day    multivitamin (THERAGRAN) TABS Take 1 tablet by mouth daily    tamsulosin (FLOMAX) 0.4 mg Take 1 capsule (0.4 mg total) by mouth daily with dinner    ALPRAZolam (XANAX) 0.5 mg tablet As needed (Patient not taking: Reported on 9/16/2024)    carbamide peroxide (DEBROX) 6.5 % otic solution Administer 5 drops into the left ear 2 (two) times a day (Patient not taking: Reported on 9/16/2024)    clindamycin (CLEOCIN  "T) 1 % lotion Apply topically 2 (two) times a day (Patient not taking: Reported on 5/13/2024)    omeprazole (PriLOSEC) 20 mg delayed release capsule Take 1 capsule (20 mg total) by mouth daily (Patient not taking: Reported on 6/12/2024)     No current facility-administered medications on file prior to visit.     He is allergic to mushroom extract complex - food allergy, peanut oil - food allergy, shellfish-derived products - food allergy, and strawberry c [ascorbate - food allergy]..    Review of Systems   Constitutional:  Negative for chills and fever.   HENT:  Negative for congestion, ear pain and sore throat.    Eyes:  Negative for pain.   Respiratory:  Negative for cough and shortness of breath.    Cardiovascular:  Negative for chest pain and leg swelling.   Gastrointestinal:  Negative for abdominal pain, nausea and vomiting.   Endocrine: Negative for polyuria.   Genitourinary:  Negative for difficulty urinating, frequency and urgency.   Musculoskeletal:  Negative for arthralgias and back pain.   Skin:  Negative for rash.   Neurological:  Negative for weakness and headaches.   Psychiatric/Behavioral:  Negative for sleep disturbance. The patient is not nervous/anxious.          Objective:      /82 (BP Location: Right arm, Patient Position: Sitting, Cuff Size: Standard)   Pulse 64   Temp 98.6 °F (37 °C)   Ht 5' 6\" (1.676 m)   Wt 106 kg (233 lb)   BMI 37.61 kg/m²     Recent Results (from the past 1344 hour(s))   Tissue Exam    Collection Time: 07/26/24 10:03 AM   Result Value Ref Range    Case Report       Surgical Pathology Report                         Case: [id number]                                  Authorizing Provider:  Angel Calixto MD          Collected:           07/26/2024 1003              Ordering Location:     [facility]        Received:            07/26/2024 1039                                     [facility]                                                         " "  Pathologist:           Pina Hardwick MD                                                                 Specimen:    Polyp, Colorectal, cecal polyp x2 cold snare                                               Final Diagnosis       A. Polyp, Colorectal, cecal polyp x2 cold snare:  - Fragments of tubular adenoma.  - No high grade dysplasia and no evidence of malignancy.        Note       Interpretation performed at [facility], [facility] [address] [facility] [address]        Additional Information       All reported additional testing was performed with appropriately reactive controls.  These tests were developed and their performance characteristics determined by [facility] or appropriate performing facility, though some tests may be performed on tissues which have not been validated for performance characteristics (such as staining performed on alcohol exposed cell blocks and decalcified tissues).  Results should be interpreted with caution and in the context of the patients’ clinical condition. These tests may not be cleared or approved by the U.S. Food and Drug Administration, though the FDA has determined that such clearance or approval is not necessary. These tests are used for clinical purposes and they should not be regarded as investigational or for research. This laboratory has been approved by CLIA 88, designated as a high-complexity laboratory and is qualified to perform these tests.  .      Synoptic Checklist          COLON/RECTUM POLYP FORM - GI - All Specimens          :    Adenoma(s)      Gross Description       A. The specimen is received in formalin, labeled with the patient's name and hospital number, and is designated \" cecal polyp x 2 cold snare\".  The specimen consists of 3 tan soft tissue fragments measuring in range of 0.3-0.4 cm in greatest dimension.  Also present are scant white presumed food particles.  Entirely submitted. Screened cassette.    Note: The estimated total " formalin fixation time based upon information provided by the submitting clinician and the standard processing schedule is under 72 hours.    -[facility]     Urinalysis with microscopic    Collection Time: 08/26/24  6:39 AM   Result Value Ref Range    Color, UA Yellow     Clarity, UA Clear     Specific Gravity, UA 1.028 1.003 - 1.030    pH, UA 6.5 4.5, 5.0, 5.5, 6.0, 6.5, 7.0, 7.5, 8.0    Leukocytes, UA Trace (A) Negative    Nitrite, UA Negative Negative    Protein, UA 30 (1+) (A) Negative mg/dl    Glucose, UA Negative Negative mg/dl    Ketones, UA Negative Negative mg/dl    Urobilinogen, UA <2.0 <2.0 mg/dl mg/dl    Bilirubin, UA Negative Negative    Occult Blood, UA Negative Negative    RBC, UA None Seen None Seen, 1-2 /hpf    WBC, UA 2-4 (A) None Seen, 1-2 /hpf    Epithelial Cells Occasional None Seen, Occasional /hpf    Bacteria, UA Occasional None Seen, Occasional /hpf    Hyaline Casts, UA 3-5 (A) None Seen /lpf   Renal function panel    Collection Time: 08/26/24  6:39 AM   Result Value Ref Range    Albumin 4.2 3.5 - 5.0 g/dL    Calcium 8.9 8.4 - 10.2 mg/dL    Phosphorus 3.0 2.3 - 4.1 mg/dL    BUN 25 5 - 25 mg/dL    Creatinine 1.30 0.60 - 1.30 mg/dL    Sodium 140 135 - 147 mmol/L    Potassium 4.1 3.5 - 5.3 mmol/L    Chloride 106 96 - 108 mmol/L    CO2 27 21 - 32 mmol/L    ANION GAP 7 4 - 13 mmol/L    eGFR 56 ml/min/1.73sq m    Glucose, Fasting 88 65 - 99 mg/dL   Protein / creatinine ratio, urine    Collection Time: 08/26/24  6:39 AM   Result Value Ref Range    Creatinine, Ur 233.6 Reference range not established. mg/dL    Protein Urine Random 30.8 Reference range not established. mg/dL    Prot/Creat Ratio, Ur 0.1 0.0 - 0.1   Albumin / creatinine urine ratio    Collection Time: 08/26/24  6:39 AM   Result Value Ref Range    Creatinine, Ur 233.6 Reference range not established. mg/dL    Albumin,U,Random 42.7 (H) <20.0 mg/L    Albumin Creat Ratio 18 0 - 30 mg/g creatinine   PTH, intact    Collection Time: 08/26/24   6:39 AM   Result Value Ref Range    .0 (H) 12.0 - 88.0 pg/mL        Physical Exam  Constitutional:       Appearance: Normal appearance.   HENT:      Head: Normocephalic.      Right Ear: Tympanic membrane, ear canal and external ear normal.      Left Ear: Tympanic membrane, ear canal and external ear normal.      Nose: Nose normal. No congestion.      Mouth/Throat:      Mouth: Mucous membranes are moist.      Pharynx: Oropharynx is clear. No oropharyngeal exudate or posterior oropharyngeal erythema.   Eyes:      Extraocular Movements: Extraocular movements intact.      Conjunctiva/sclera: Conjunctivae normal.   Cardiovascular:      Rate and Rhythm: Normal rate and regular rhythm.      Heart sounds: Normal heart sounds. No murmur heard.  Pulmonary:      Effort: Pulmonary effort is normal.      Breath sounds: Normal breath sounds. No wheezing or rales.   Abdominal:      General: Abdomen is flat. There is no distension.      Palpations: Abdomen is soft.      Tenderness: There is no abdominal tenderness.   Musculoskeletal:         General: Normal range of motion.      Cervical back: Normal range of motion and neck supple.      Right lower leg: No edema.      Left lower leg: No edema.   Lymphadenopathy:      Cervical: No cervical adenopathy.   Skin:     General: Skin is warm.   Neurological:      General: No focal deficit present.      Mental Status: He is alert and oriented to person, place, and time.

## 2024-10-21 ENCOUNTER — CLINICAL SUPPORT (OUTPATIENT)
Dept: INTERNAL MEDICINE CLINIC | Facility: CLINIC | Age: 69
End: 2024-10-21
Payer: COMMERCIAL

## 2024-10-21 DIAGNOSIS — Z23 ENCOUNTER FOR IMMUNIZATION: Primary | ICD-10-CM

## 2024-10-21 PROCEDURE — 90471 IMMUNIZATION ADMIN: CPT

## 2024-10-21 PROCEDURE — 90662 IIV NO PRSV INCREASED AG IM: CPT

## 2024-11-05 DIAGNOSIS — Z87.74 S/P VSD REPAIR: ICD-10-CM

## 2024-11-06 RX ORDER — METOPROLOL SUCCINATE 25 MG/1
25 TABLET, EXTENDED RELEASE ORAL 2 TIMES DAILY
Qty: 180 TABLET | Refills: 1 | Status: SHIPPED | OUTPATIENT
Start: 2024-11-06

## 2024-11-08 ENCOUNTER — TELEPHONE (OUTPATIENT)
Dept: NEPHROLOGY | Facility: CLINIC | Age: 69
End: 2024-11-08

## 2024-11-08 DIAGNOSIS — N17.9 AKI (ACUTE KIDNEY INJURY) (HCC): Primary | ICD-10-CM

## 2024-11-08 NOTE — TELEPHONE ENCOUNTER
Spoke with patient's brother-in-law reminding them to go for lab work before his appt on 11/14. He expressed understanding and thanked us for the call.

## 2024-11-08 NOTE — TELEPHONE ENCOUNTER
Patient's brother in law calling to ask if upcoming labs are fasting. Informed him no they are not.  No further action needed

## 2024-11-10 ENCOUNTER — APPOINTMENT (OUTPATIENT)
Dept: LAB | Facility: CLINIC | Age: 69
End: 2024-11-10
Payer: COMMERCIAL

## 2024-11-10 DIAGNOSIS — I25.10 CORONARY ARTERY DISEASE INVOLVING NATIVE CORONARY ARTERY OF NATIVE HEART WITHOUT ANGINA PECTORIS: ICD-10-CM

## 2024-11-10 DIAGNOSIS — E78.2 HYPERLIPIDEMIA, MIXED: ICD-10-CM

## 2024-11-10 DIAGNOSIS — R73.01 IMPAIRED FASTING GLUCOSE: ICD-10-CM

## 2024-11-10 DIAGNOSIS — I50.22 CHRONIC SYSTOLIC CONGESTIVE HEART FAILURE (HCC): ICD-10-CM

## 2024-11-10 DIAGNOSIS — N17.9 AKI (ACUTE KIDNEY INJURY) (HCC): ICD-10-CM

## 2024-11-10 LAB
ALBUMIN SERPL BCG-MCNC: 4.1 G/DL (ref 3.5–5)
ALP SERPL-CCNC: 73 U/L (ref 34–104)
ALT SERPL W P-5'-P-CCNC: 41 U/L (ref 7–52)
ANION GAP SERPL CALCULATED.3IONS-SCNC: 9 MMOL/L (ref 4–13)
AST SERPL W P-5'-P-CCNC: 28 U/L (ref 13–39)
BACTERIA UR QL AUTO: ABNORMAL /HPF
BASOPHILS # BLD AUTO: 0.04 THOUSANDS/ÂΜL (ref 0–0.1)
BASOPHILS NFR BLD AUTO: 1 % (ref 0–1)
BILIRUB SERPL-MCNC: 0.73 MG/DL (ref 0.2–1)
BILIRUB UR QL STRIP: NEGATIVE
BUN SERPL-MCNC: 19 MG/DL (ref 5–25)
CALCIUM SERPL-MCNC: 8.7 MG/DL (ref 8.4–10.2)
CHLORIDE SERPL-SCNC: 105 MMOL/L (ref 96–108)
CHOLEST SERPL-MCNC: 90 MG/DL
CLARITY UR: CLEAR
CO2 SERPL-SCNC: 25 MMOL/L (ref 21–32)
COLOR UR: YELLOW
CREAT SERPL-MCNC: 1.09 MG/DL (ref 0.6–1.3)
CREAT UR-MCNC: 279.1 MG/DL
CREAT UR-MCNC: 279.1 MG/DL
EOSINOPHIL # BLD AUTO: 0.22 THOUSAND/ÂΜL (ref 0–0.61)
EOSINOPHIL NFR BLD AUTO: 4 % (ref 0–6)
ERYTHROCYTE [DISTWIDTH] IN BLOOD BY AUTOMATED COUNT: 13.5 % (ref 11.6–15.1)
EST. AVERAGE GLUCOSE BLD GHB EST-MCNC: 131 MG/DL
GFR SERPL CREATININE-BSD FRML MDRD: 69 ML/MIN/1.73SQ M
GLUCOSE P FAST SERPL-MCNC: 95 MG/DL (ref 65–99)
GLUCOSE UR STRIP-MCNC: NEGATIVE MG/DL
HBA1C MFR BLD: 6.2 %
HCT VFR BLD AUTO: 41.3 % (ref 36.5–49.3)
HDLC SERPL-MCNC: 34 MG/DL
HGB BLD-MCNC: 13.1 G/DL (ref 12–17)
HGB UR QL STRIP.AUTO: NEGATIVE
IMM GRANULOCYTES # BLD AUTO: 0.02 THOUSAND/UL (ref 0–0.2)
IMM GRANULOCYTES NFR BLD AUTO: 0 % (ref 0–2)
KETONES UR STRIP-MCNC: NEGATIVE MG/DL
LDLC SERPL CALC-MCNC: 36 MG/DL (ref 0–100)
LEUKOCYTE ESTERASE UR QL STRIP: ABNORMAL
LYMPHOCYTES # BLD AUTO: 1.1 THOUSANDS/ÂΜL (ref 0.6–4.47)
LYMPHOCYTES NFR BLD AUTO: 21 % (ref 14–44)
MCH RBC QN AUTO: 31.4 PG (ref 26.8–34.3)
MCHC RBC AUTO-ENTMCNC: 31.7 G/DL (ref 31.4–37.4)
MCV RBC AUTO: 99 FL (ref 82–98)
MICROALBUMIN UR-MCNC: 262.7 MG/L
MICROALBUMIN/CREAT 24H UR: 94 MG/G CREATININE (ref 0–30)
MONOCYTES # BLD AUTO: 0.44 THOUSAND/ÂΜL (ref 0.17–1.22)
MONOCYTES NFR BLD AUTO: 8 % (ref 4–12)
MUCOUS THREADS UR QL AUTO: ABNORMAL
NEUTROPHILS # BLD AUTO: 3.49 THOUSANDS/ÂΜL (ref 1.85–7.62)
NEUTS SEG NFR BLD AUTO: 66 % (ref 43–75)
NITRITE UR QL STRIP: NEGATIVE
NON-SQ EPI CELLS URNS QL MICRO: ABNORMAL /HPF
NRBC BLD AUTO-RTO: 0 /100 WBCS
PH UR STRIP.AUTO: 6.5 [PH]
PHOSPHATE SERPL-MCNC: 2.8 MG/DL (ref 2.3–4.1)
PLATELET # BLD AUTO: 184 THOUSANDS/UL (ref 149–390)
PMV BLD AUTO: 11.6 FL (ref 8.9–12.7)
POTASSIUM SERPL-SCNC: 4 MMOL/L (ref 3.5–5.3)
PROT SERPL-MCNC: 6.9 G/DL (ref 6.4–8.4)
PROT UR STRIP-MCNC: ABNORMAL MG/DL
PROT UR-MCNC: 60.9 MG/DL
PROT/CREAT UR: 0.2 MG/G{CREAT} (ref 0–0.1)
RBC # BLD AUTO: 4.17 MILLION/UL (ref 3.88–5.62)
RBC #/AREA URNS AUTO: ABNORMAL /HPF
SODIUM SERPL-SCNC: 139 MMOL/L (ref 135–147)
SP GR UR STRIP.AUTO: 1.03 (ref 1–1.03)
TRIGL SERPL-MCNC: 99 MG/DL
TSH SERPL DL<=0.05 MIU/L-ACNC: 1.94 UIU/ML (ref 0.45–4.5)
UROBILINOGEN UR STRIP-ACNC: 2 MG/DL
WBC # BLD AUTO: 5.31 THOUSAND/UL (ref 4.31–10.16)
WBC #/AREA URNS AUTO: ABNORMAL /HPF

## 2024-11-10 PROCEDURE — 84156 ASSAY OF PROTEIN URINE: CPT

## 2024-11-10 PROCEDURE — 84443 ASSAY THYROID STIM HORMONE: CPT

## 2024-11-10 PROCEDURE — 82570 ASSAY OF URINE CREATININE: CPT

## 2024-11-10 PROCEDURE — 80061 LIPID PANEL: CPT

## 2024-11-10 PROCEDURE — 82043 UR ALBUMIN QUANTITATIVE: CPT

## 2024-11-10 PROCEDURE — 81001 URINALYSIS AUTO W/SCOPE: CPT

## 2024-11-10 PROCEDURE — 80053 COMPREHEN METABOLIC PANEL: CPT

## 2024-11-10 PROCEDURE — 36415 COLL VENOUS BLD VENIPUNCTURE: CPT

## 2024-11-10 PROCEDURE — 83036 HEMOGLOBIN GLYCOSYLATED A1C: CPT

## 2024-11-10 PROCEDURE — 85025 COMPLETE CBC W/AUTO DIFF WBC: CPT

## 2024-11-10 PROCEDURE — 84100 ASSAY OF PHOSPHORUS: CPT

## 2024-11-14 ENCOUNTER — OFFICE VISIT (OUTPATIENT)
Dept: NEPHROLOGY | Facility: CLINIC | Age: 69
End: 2024-11-14
Payer: COMMERCIAL

## 2024-11-14 VITALS
DIASTOLIC BLOOD PRESSURE: 71 MMHG | SYSTOLIC BLOOD PRESSURE: 104 MMHG | WEIGHT: 233 LBS | HEART RATE: 81 BPM | HEIGHT: 66 IN | BODY MASS INDEX: 37.45 KG/M2

## 2024-11-14 DIAGNOSIS — E66.01 CLASS 2 SEVERE OBESITY DUE TO EXCESS CALORIES WITH SERIOUS COMORBIDITY AND BODY MASS INDEX (BMI) OF 37.0 TO 37.9 IN ADULT (HCC): ICD-10-CM

## 2024-11-14 DIAGNOSIS — E66.812 CLASS 2 SEVERE OBESITY DUE TO EXCESS CALORIES WITH SERIOUS COMORBIDITY AND BODY MASS INDEX (BMI) OF 37.0 TO 37.9 IN ADULT (HCC): ICD-10-CM

## 2024-11-14 DIAGNOSIS — N25.81 SECONDARY HYPERPARATHYROIDISM (HCC): ICD-10-CM

## 2024-11-14 DIAGNOSIS — I50.22 CHRONIC SYSTOLIC CONGESTIVE HEART FAILURE (HCC): ICD-10-CM

## 2024-11-14 DIAGNOSIS — N18.31 STAGE 3A CHRONIC KIDNEY DISEASE (HCC): Primary | ICD-10-CM

## 2024-11-14 PROCEDURE — 99214 OFFICE O/P EST MOD 30 MIN: CPT | Performed by: INTERNAL MEDICINE

## 2024-11-14 RX ORDER — TELMISARTAN 20 MG/1
20 TABLET ORAL DAILY
Qty: 90 TABLET | Refills: 3 | Status: CANCELLED | OUTPATIENT
Start: 2024-11-14 | End: 2025-02-12

## 2024-11-14 NOTE — ASSESSMENT & PLAN NOTE
- Most recent echocardiogram with LVEF 50%, low normal systolic function, G1 DD 04/2022  - Notes reviewed from heart failure service and plans noted for continuation of beta-blockers and loop diuretics  - Volume status: Euvolemic  - Diuretics: Bumex 1 mg daily  - Changes: Continue Bumex at current dose

## 2024-11-14 NOTE — PATIENT INSTRUCTIONS
Your kidney function is looking stable.    There is small amount of protein in the urine and would recommend daily exercise to lose weight.    Please check your blood pressure at home and if your blood pressure readings are acceptable, we will start a medication to help decrease the amount of protein in the urine.    Repeat lab work in 3 months and then in 6 months.    Follow-up in the office in 6 months.

## 2024-11-14 NOTE — PROGRESS NOTES
Name: Robbie Momin      : 1955      MRN: [id number]  Encounter Provider: Alvin Brooke MD  Encounter Date: 2024   Encounter department: [facility] [place]  :  Assessment & Plan  Stage 3a chronic kidney disease (HCC)  - Etiology/risk factors: Episode of severe acute kidney injury in , cardiorenal physiology, age-related nephron loss  - Baseline Cr: 1.0-1.3 before episode of MI in , episode of JUDE in setting of late presentation of MI/cardiogenic shock with peak creatinine of 4.0 and discharge creatinine of 1.09, follow-up creatinine in the range of 1.3-1.5, most recently 1.09 2024  - Urinalysis: 2+ protein, 1-2 RBC, 10-20 WBC 2024  - Proteinuria: UACR 94 mg/g, UPCR 200 mg 2024  - Imaging: Right kidney 9.8 cm, left kidney 10 cm, normal echogenicity and contour, subcentimeter simple anechoic interpolar cyst in right kidney  - KFRE 2-year 0.1%, 5-year 0.2% as of 2024  - Changes: Need to start RAASi but BP is currently on the lower side  - Patient to monitor BP at home and if BP improves, will start low-dose RAASi  - Repeat RFPl/urine protein assessment in 3 months in 6 months  - Follow-up in renal office in 6 months  - Discussed risk factor reduction to slow progression of chronic kidney disease  Intensive blood pressure control as below   Glycemic control, most recent HbA1c 6.1 2024  Lipid control, on Lipitor 40 mg daily  Lifestyle modifications (weight loss/exercise)  Avoidance of NSAIDs  Avoidance of PPI     # BPVolume   - Goal BP <120/80 per KDIGO guidelines   - Volume status: Euvolemic  - Status: Blood pressure currently well-controlled  - Current antihypertensive regimen: Bumex 1 mg daily, Toprol-XL 25 mg twice daily both for history of CHF and not for hypertension  - Changes: No changes to regimen blood pressure is well-controlled     # Screening for Anemia   - Target Hb: More than 10 g/dL  - Most recent hemoglobin: 13.1 g/dL 2024     #  Electrolytes/Acid Base status   - Electrolytes and acid base status within normal limits  Chronic systolic congestive heart failure (HCC)  - Most recent echocardiogram with LVEF 50%, low normal systolic function, G1 DD 04/2022  - Notes reviewed from heart failure service and plans noted for continuation of beta-blockers and loop diuretics  - Volume status: Euvolemic  - Diuretics: Bumex 1 mg daily  - Changes: Continue Bumex at current dose  Secondary hyperparathyroidism (HCC)  - Goal Ca 8.5-10 mg/dL, goal Phos 2.7-4.6 mg/dL, goal iPTH 30-70 pg/mL  -  08/2024 improved from 212.8 03/2024 (secondary hyperparathyroidism in setting of vitamin D insufficiency versus chronic kidney disease)  - 25-hydroxy vitamin D level 29.3, start cholecalciferol 1000 units daily  - No need for activated vitamin D, reserve for PTH level more than 200  Class 2 severe obesity due to excess calories with serious comorbidity and body mass index (BMI) of 37.0 to 37.9 in adult (formerly [facility])  - Discussed importance of weight loss and management of prediabetes, CKD with albuminuria      History of Present Illness   HPI  Robbie Momin is a 69 y.o. male     Since last visit: He has been doing well.  He denies dyspnea.  He denies leg swelling.  He denies any trouble with urination.  He denies dysuria.     Robbie Momin is a 69 y.o. male who has history of prolonged hospitalization in 2021 with late presentation of anteroseptal MI presenting in cardiogenic shock requiring VSD repair with large bovine patch and hemodynamic support with [facility] ECMO.  He remained on inotropic support for a while postoperatively.  During his admission he was also noted to have acetaminophen toxicity that was treated with NAC and fomepizole.  He follows with heart failure service and is currently receiving beta-blockers and diuretics.  He provides a history of prediabetes.  He does not have history of hypertension.       >> Major risk factors for CKD  - Diabetes: Pre-diabetes    - Hypertension: No  - Age >= 55 years: Yes  - Family history of kidney disease: No    - Obesity or metabolic syndrome: Yes     >> Medical history evaluation   - Prior kidney disease or dialysis: No   - Incidental hematuria in the past: No    - Urinary symptoms: No   - History of foamy or frothy urine: No   - History of nephrolithiasis: No   - Diseases that share risk factors with CKD: CAD  - Systemic diseases that might affect kidney: No   - History of use of medications that might affect renal function: No    History obtained from: patient  Review of Systems   Constitutional:  Negative for chills and fever.   HENT:  Negative for ear pain and sore throat.    Eyes:  Negative for pain and visual disturbance.   Respiratory:  Negative for cough and shortness of breath.    Cardiovascular:  Negative for chest pain and palpitations.   Gastrointestinal:  Negative for abdominal pain and vomiting.   Genitourinary:  Negative for dysuria and hematuria.   Musculoskeletal:  Negative for arthralgias and back pain.   Skin:  Negative for color change and rash.   Neurological:  Negative for seizures and syncope.   All other systems reviewed and are negative.    Past Medical History   Past Medical History:   Diagnosis Date    Allergic     Anemia     Anxiety     Central line complication 01/07/2022    Chronic kidney disease 12/30/21    Kidney Failure upon hospital admission, Tylenol overdose    Coronary artery disease 12/30/21    Myocardial infarction (HCC) 12/30/21    VSD Repair,Ecmo, Heart Attack,Bovine Repair    Obesity     Tylenol ingestion 12/31/2021    Urinary tract infection 1/23/22     Past Surgical History:   Procedure Laterality Date    CARDIAC SURGERY N/A     CORONARY ARTERY BYPASS GRAFT      VSD    EXTRACORPOREAL CIRCULATION N/A 12/31/2021    Procedure: SUPPORT EXTRACORPOREAL MEMBRANE OXYGENATION (ECMO);  Surgeon: HILDA Ochoa MD;  Location: [facility];  Service: Cardiac Surgery    IR OTHER  12/31/2021    IR  OTHER  2022    [facility] CLSR 1 VENTRICULAR SEPTAL DEFECT W/WO PATCH N/A 2022    Procedure: REPAIR VENTRICULAR SEPTAL DEFECT (VSD) OPEN WITH BOVINE PERICARDIAL PATCH.;  Surgeon: HILDA Ochoa MD;  Location: [facility];  Service: Cardiac Surgery    [facility] ECMO/ECLS INITIATION VENO-VENOUS N/A 2022    Procedure: VA ECMO DECANNULATION;  Surgeon: HILDA Ochoa MD;  Location: [facility];  Service: Cardiac Surgery    NH EXPL PO HEMRRG THROMBOSIS/INFCTJ CH N/A 2022    Procedure: RE-EXPLORATION MEDIASTERNAL CAVITY FOR POST-OP BLEED (BRING BACK) Removal of PA catheter;  Surgeon: HILDA Ochoa MD;  Location: [facility];  Service: Cardiac Surgery     Family History   Problem Relation Age of Onset    Other Mother         Claustrophobia    Mental illness Mother         Agoraphobia    Heart disease Mother         Enlarged heart... of heart disease    Anxiety disorder Mother         Agorpahobia    Drug abuse Family     Alcohol abuse Family     Alcohol abuse Father     Breast cancer Sister       reports that he has quit smoking. His smoking use included pipe. He has never used smokeless tobacco. He reports that he does not drink alcohol and does not use drugs.  Current Outpatient Medications on File Prior to Visit   Medication Sig Dispense Refill    ALPRAZolam (XANAX) 0.5 mg tablet TAKE 1 TO 2 TABLETS 30-60 MINUTES PRIOR TO LEAVING HOME AS NEEDED 30 tablet 0    aspirin 325 mg tablet Take 1 tablet (325 mg total) by mouth daily 30 tablet 2    atorvastatin (LIPITOR) 40 mg tablet Take 1 tablet (40 mg total) by mouth daily after dinner 90 tablet 1    bumetanide (BUMEX) 1 mg tablet Take 1 tablet (1 mg total) by mouth daily 30 tablet 5    metoprolol succinate (TOPROL-XL) 25 mg 24 hr tablet TAKE 1 TABLET TWICE A  tablet 1    multivitamin (THERAGRAN) TABS Take 1 tablet by mouth daily 30 tablet 5    tamsulosin (FLOMAX) 0.4 mg Take 1 capsule (0.4 mg total) by mouth daily with dinner 30 capsule  "5    ALPRAZolam (XANAX) 0.5 mg tablet As needed (Patient not taking: Reported on 11/14/2024) 30 tablet 0    carbamide peroxide (DEBROX) 6.5 % otic solution Administer 5 drops into the left ear 2 (two) times a day (Patient not taking: Reported on 11/14/2024) 15 mL 0    Cholecalciferol (VITAMIN D3) 1,000 units tablet Take 1 tablet (1,000 Units total) by mouth daily 90 tablet 3    clindamycin (CLEOCIN T) 1 % lotion Apply topically 2 (two) times a day (Patient not taking: Reported on 11/14/2024) 60 mL 3    omeprazole (PriLOSEC) 20 mg delayed release capsule Take 1 capsule (20 mg total) by mouth daily (Patient not taking: Reported on 11/14/2024) 90 capsule 0     No current facility-administered medications on file prior to visit.     Allergies   Allergen Reactions    Mushroom Extract Complex - Food Allergy Facial Swelling    Peanut Oil - Food Allergy     Shellfish-Derived Products - Food Allergy Other (See Comments)     Runs in the family    Strawberry C [Ascorbate - Food Allergy]          Objective   /71 (BP Location: Left arm, Patient Position: Sitting, Cuff Size: Adult)   Pulse 81   Ht 5' 6\" (1.676 m)   Wt 106 kg (233 lb)   BMI 37.61 kg/m²      Physical Exam  Vitals and nursing note reviewed.   Constitutional:       General: He is not in acute distress.     Appearance: He is well-developed.   HENT:      Head: Normocephalic and atraumatic.   Eyes:      Conjunctiva/sclera: Conjunctivae normal.   Cardiovascular:      Rate and Rhythm: Normal rate and regular rhythm.      Pulses: Normal pulses.      Heart sounds: Normal heart sounds. No murmur heard.  Pulmonary:      Effort: Pulmonary effort is normal. No respiratory distress.      Breath sounds: Normal breath sounds.   Abdominal:      Tenderness: There is no right CVA tenderness or left CVA tenderness.   Musculoskeletal:         General: No swelling.      Cervical back: Neck supple.      Right lower leg: No edema.      Left lower leg: No edema.   Skin:     " General: Skin is warm and dry.      Capillary Refill: Capillary refill takes less than 2 seconds.   Neurological:      Mental Status: He is alert.   Psychiatric:         Mood and Affect: Mood normal.

## 2024-11-15 DIAGNOSIS — F40.01 AGORAPHOBIA WITH PANIC ATTACKS: ICD-10-CM

## 2024-11-15 DIAGNOSIS — H61.22 CERUMEN DEBRIS ON TYMPANIC MEMBRANE OF LEFT EAR: ICD-10-CM

## 2024-11-15 DIAGNOSIS — Z87.74 S/P VSD REPAIR: ICD-10-CM

## 2024-11-15 RX ORDER — ALPRAZOLAM 0.5 MG
TABLET ORAL
Start: 2024-11-15

## 2024-11-15 RX ORDER — TAMSULOSIN HYDROCHLORIDE 0.4 MG/1
0.4 CAPSULE ORAL
Qty: 30 CAPSULE | Refills: 5 | Status: SHIPPED | OUTPATIENT
Start: 2024-11-15

## 2024-11-15 RX ORDER — BUMETANIDE 1 MG/1
1 TABLET ORAL DAILY
Qty: 30 TABLET | Refills: 5 | Status: SHIPPED | OUTPATIENT
Start: 2024-11-15 | End: 2025-05-14

## 2024-11-21 ENCOUNTER — PATIENT MESSAGE (OUTPATIENT)
Dept: NEPHROLOGY | Facility: CLINIC | Age: 69
End: 2024-11-21

## 2024-11-21 DIAGNOSIS — N18.31 STAGE 3A CHRONIC KIDNEY DISEASE (HCC): ICD-10-CM

## 2024-11-21 DIAGNOSIS — N17.9 AKI (ACUTE KIDNEY INJURY) (HCC): Primary | ICD-10-CM

## 2024-11-29 DIAGNOSIS — N18.31 STAGE 3A CHRONIC KIDNEY DISEASE (HCC): Primary | ICD-10-CM

## 2024-11-29 RX ORDER — LOSARTAN POTASSIUM 25 MG/1
12.5 TABLET ORAL DAILY
Qty: 45 TABLET | Refills: 3 | Status: SHIPPED | OUTPATIENT
Start: 2024-11-29 | End: 2025-02-27

## 2024-12-04 PROBLEM — L72.0 EPIDERMAL CYST OF NECK: Status: ACTIVE | Noted: 2024-12-04

## 2024-12-16 ENCOUNTER — OFFICE VISIT (OUTPATIENT)
Dept: INTERNAL MEDICINE CLINIC | Facility: CLINIC | Age: 69
End: 2024-12-16
Payer: COMMERCIAL

## 2024-12-16 VITALS
HEIGHT: 66 IN | SYSTOLIC BLOOD PRESSURE: 134 MMHG | DIASTOLIC BLOOD PRESSURE: 82 MMHG | TEMPERATURE: 98.1 F | WEIGHT: 232 LBS | BODY MASS INDEX: 37.28 KG/M2 | HEART RATE: 68 BPM | OXYGEN SATURATION: 96 %

## 2024-12-16 DIAGNOSIS — R73.01 IMPAIRED FASTING GLUCOSE: ICD-10-CM

## 2024-12-16 DIAGNOSIS — R33.8 BENIGN PROSTATIC HYPERPLASIA WITH URINARY RETENTION: ICD-10-CM

## 2024-12-16 DIAGNOSIS — I25.10 CORONARY ARTERY DISEASE INVOLVING NATIVE CORONARY ARTERY OF NATIVE HEART WITHOUT ANGINA PECTORIS: Primary | ICD-10-CM

## 2024-12-16 DIAGNOSIS — I50.22 CHRONIC SYSTOLIC CONGESTIVE HEART FAILURE (HCC): ICD-10-CM

## 2024-12-16 DIAGNOSIS — I25.5 ISCHEMIC CARDIOMYOPATHY: ICD-10-CM

## 2024-12-16 DIAGNOSIS — E78.2 HYPERLIPIDEMIA, MIXED: ICD-10-CM

## 2024-12-16 DIAGNOSIS — N40.1 BENIGN PROSTATIC HYPERPLASIA WITH URINARY RETENTION: ICD-10-CM

## 2024-12-16 DIAGNOSIS — K21.9 GASTROESOPHAGEAL REFLUX DISEASE WITHOUT ESOPHAGITIS: ICD-10-CM

## 2024-12-16 PROCEDURE — 99214 OFFICE O/P EST MOD 30 MIN: CPT | Performed by: INTERNAL MEDICINE

## 2024-12-16 NOTE — ADDENDUM NOTE
Addended by: LAILA MAE on: 12/16/2024 11:05 AM     Modules accepted: Orders    
How Is Your Wound Healing?: healing slowly

## 2024-12-16 NOTE — PROGRESS NOTES
Assessment/Plan:             1. Coronary artery disease involving native coronary artery of native heart without angina pectoris  Comments:  continue same med  Orders:  -     CBC and differential; Future  2. Chronic systolic congestive heart failure (HCC)  Comments:  continue same med  Orders:  -     CBC and differential; Future  -     Comprehensive metabolic panel; Future  -     Lipid Panel with Direct LDL reflex; Future  -     TSH, 3rd generation; Future  3. Ischemic cardiomyopathy  4. Gastroesophageal reflux disease without esophagitis  Comments:  stable, as needed med  5. Impaired fasting glucose  Comments:  diet and exercise discussed  Orders:  -     Hemoglobin A1C; Future  6. Benign prostatic hyperplasia with urinary retention  Comments:  continue same med  7. Hyperlipidemia, mixed  Comments:  continue same med  Orders:  -     Comprehensive metabolic panel; Future  -     Lipid Panel with Direct LDL reflex; Future  -     TSH, 3rd generation; Future         Subjective:      Patient ID: Robbie Momin is a 69 y.o. male.    Follow-up on multiple medical problems to ensure they are stable on current medications        The following portions of the patient's history were reviewed and updated as appropriate: He  has a past medical history of Allergic, Anemia, Anxiety, Central line complication (01/07/2022), Chronic kidney disease (12/30/21), Coronary artery disease (12/30/21), Myocardial infarction (HCC) (12/30/21), Obesity, Tylenol ingestion (12/31/2021), and Urinary tract infection (1/23/22).  He   Patient Active Problem List    Diagnosis Date Noted    Epidermal cyst of neck 12/04/2024    Coronary artery disease involving native coronary artery of native heart without angina pectoris     Myocardial infarction (HCC)     Ischemic cardiomyopathy 10/17/2022    Gastroesophageal reflux disease without esophagitis 10/17/2022    Colon cancer screening declined 10/17/2022    Chronic systolic congestive heart failure (HCC)  07/11/2022    Benign prostatic hyperplasia with urinary retention 04/12/2022    Encounter for postoperative care 03/11/2022    Urinary retention 01/21/2022    S/P VSD repair 01/07/2022    Coagulopathy ([facility]) 01/01/2022    Acute systolic congestive heart failure ([facility]) 12/31/2021    Anemia 12/31/2021    Personal history of ECMO 12/31/2021    Cardiogenic shock ([facility]) 12/30/2021    VSD (ventricular septal defect) 12/30/2021    JUDE (acute kidney injury) ([facility]) 12/30/2021    Former smoker 07/06/2018    Hyperlipidemia, mixed 05/23/2017    Impaired fasting glucose 05/23/2017    Agoraphobia with panic attacks 11/06/2015    Body mass index 38.0-38.9, adult 11/06/2015    Psychosis, atypical ([facility]) 11/06/2015     He  has a past surgical history that includes Extracorporeal membrane oxygenation (N/A, 12/31/2021); pr ecmo/ecls initiation veno-venous (N/A, 01/04/2022); pr clsr 1 ventricular septal defect w/wo patch (N/A, 01/04/2022); IR other (12/31/2021); pr expl po hemrrg thrombosis/infctj ch (N/A, 01/07/2022); IR other (01/07/2022); Cardiac surgery (N/A); and Coronary artery bypass graft.  His family history includes Alcohol abuse in his family and father; Anxiety disorder in his mother; Breast cancer in his sister; Drug abuse in his family; Heart disease in his mother; Mental illness in his mother; Other in his mother.  He  reports that he has quit smoking. His smoking use included pipe. He has never used smokeless tobacco. He reports that he does not drink alcohol and does not use drugs.  Current Outpatient Medications   Medication Sig Dispense Refill    ALPRAZolam (XANAX) 0.5 mg tablet TAKE 1 TO 2 TABLETS 30-60 MINUTES PRIOR TO LEAVING HOME AS NEEDED 30 tablet 0    ALPRAZolam (XANAX) 0.5 mg tablet As needed      aspirin 325 mg tablet Take 1 tablet (325 mg total) by mouth daily 30 tablet 2    atorvastatin (LIPITOR) 40 mg tablet Take 1 tablet (40 mg total) by mouth daily after dinner 90 tablet 1    bumetanide (BUMEX) 1 mg tablet  Take 1 tablet (1 mg total) by mouth daily 30 tablet 5    carbamide peroxide (DEBROX) 6.5 % otic solution Administer 5 drops into the left ear 2 (two) times a day 15 mL 0    Cholecalciferol (VITAMIN D3) 1,000 units tablet Take 1 tablet (1,000 Units total) by mouth daily 90 tablet 3    losartan (COZAAR) 25 mg tablet Take 0.5 tablets (12.5 mg total) by mouth daily 45 tablet 3    metoprolol succinate (TOPROL-XL) 25 mg 24 hr tablet TAKE 1 TABLET TWICE A  tablet 1    multivitamin (THERAGRAN) TABS Take 1 tablet by mouth daily 30 tablet 5    tamsulosin (FLOMAX) 0.4 mg Take 1 capsule (0.4 mg total) by mouth daily with dinner 30 capsule 5    clindamycin (CLEOCIN T) 1 % lotion Apply topically 2 (two) times a day (Patient not taking: Reported on 5/13/2024) 60 mL 3    omeprazole (PriLOSEC) 20 mg delayed release capsule Take 1 capsule (20 mg total) by mouth daily (Patient not taking: Reported on 11/14/2024) 90 capsule 0     No current facility-administered medications for this visit.     Current Outpatient Medications on File Prior to Visit   Medication Sig    ALPRAZolam (XANAX) 0.5 mg tablet TAKE 1 TO 2 TABLETS 30-60 MINUTES PRIOR TO LEAVING HOME AS NEEDED    ALPRAZolam (XANAX) 0.5 mg tablet As needed    aspirin 325 mg tablet Take 1 tablet (325 mg total) by mouth daily    atorvastatin (LIPITOR) 40 mg tablet Take 1 tablet (40 mg total) by mouth daily after dinner    bumetanide (BUMEX) 1 mg tablet Take 1 tablet (1 mg total) by mouth daily    carbamide peroxide (DEBROX) 6.5 % otic solution Administer 5 drops into the left ear 2 (two) times a day    Cholecalciferol (VITAMIN D3) 1,000 units tablet Take 1 tablet (1,000 Units total) by mouth daily    losartan (COZAAR) 25 mg tablet Take 0.5 tablets (12.5 mg total) by mouth daily    metoprolol succinate (TOPROL-XL) 25 mg 24 hr tablet TAKE 1 TABLET TWICE A DAY    multivitamin (THERAGRAN) TABS Take 1 tablet by mouth daily    tamsulosin (FLOMAX) 0.4 mg Take 1 capsule (0.4 mg total)  "by mouth daily with dinner    clindamycin (CLEOCIN T) 1 % lotion Apply topically 2 (two) times a day (Patient not taking: Reported on 5/13/2024)    omeprazole (PriLOSEC) 20 mg delayed release capsule Take 1 capsule (20 mg total) by mouth daily (Patient not taking: Reported on 11/14/2024)     No current facility-administered medications on file prior to visit.     He is allergic to mushroom extract complex - food allergy, peanut oil - food allergy, shellfish-derived products - food allergy, and strawberry c [ascorbate - food allergy]..    Review of Systems   Constitutional:  Negative for chills and fever.   HENT:  Negative for congestion, ear pain and sore throat.    Eyes:  Negative for pain.   Respiratory:  Negative for cough and shortness of breath.    Cardiovascular:  Negative for chest pain and leg swelling.   Gastrointestinal:  Negative for abdominal pain, nausea and vomiting.   Endocrine: Negative for polyuria.   Genitourinary:  Negative for difficulty urinating, frequency and urgency.   Musculoskeletal:  Negative for arthralgias and back pain.   Skin:  Negative for rash.   Neurological:  Negative for weakness and headaches.   Psychiatric/Behavioral:  Negative for sleep disturbance. The patient is not nervous/anxious.          Objective:      /82 (BP Location: Left arm, Patient Position: Sitting, Cuff Size: Standard)   Pulse 68   Temp 98.1 °F (36.7 °C) (Temporal)   Ht 5' 6\" (1.676 m)   Wt 105 kg (232 lb)   SpO2 96%   BMI 37.45 kg/m²     Recent Results (from the past 8 weeks)   CBC and differential    Collection Time: 11/10/24  8:31 AM   Result Value Ref Range    WBC 5.31 4.31 - 10.16 Thousand/uL    RBC 4.17 3.88 - 5.62 Million/uL    Hemoglobin 13.1 12.0 - 17.0 g/dL    Hematocrit 41.3 36.5 - 49.3 %    MCV 99 (H) 82 - 98 fL    MCH 31.4 26.8 - 34.3 pg    MCHC 31.7 31.4 - 37.4 g/dL    RDW 13.5 11.6 - 15.1 %    MPV 11.6 8.9 - 12.7 fL    Platelets 184 149 - 390 Thousands/uL    nRBC 0 /100 WBCs    " Segmented % 66 43 - 75 %    Immature Grans % 0 0 - 2 %    Lymphocytes % 21 14 - 44 %    Monocytes % 8 4 - 12 %    Eosinophils Relative 4 0 - 6 %    Basophils Relative 1 0 - 1 %    Absolute Neutrophils 3.49 1.85 - 7.62 Thousands/µL    Absolute Immature Grans 0.02 0.00 - 0.20 Thousand/uL    Absolute Lymphocytes 1.10 0.60 - 4.47 Thousands/µL    Absolute Monocytes 0.44 0.17 - 1.22 Thousand/µL    Eosinophils Absolute 0.22 0.00 - 0.61 Thousand/µL    Basophils Absolute 0.04 0.00 - 0.10 Thousands/µL   Comprehensive metabolic panel    Collection Time: 11/10/24  8:31 AM   Result Value Ref Range    Sodium 139 135 - 147 mmol/L    Potassium 4.0 3.5 - 5.3 mmol/L    Chloride 105 96 - 108 mmol/L    CO2 25 21 - 32 mmol/L    ANION GAP 9 4 - 13 mmol/L    BUN 19 5 - 25 mg/dL    Creatinine 1.09 0.60 - 1.30 mg/dL    Glucose, Fasting 95 65 - 99 mg/dL    Calcium 8.7 8.4 - 10.2 mg/dL    AST 28 13 - 39 U/L    ALT 41 7 - 52 U/L    Alkaline Phosphatase 73 34 - 104 U/L    Total Protein 6.9 6.4 - 8.4 g/dL    Albumin 4.1 3.5 - 5.0 g/dL    Total Bilirubin 0.73 0.20 - 1.00 mg/dL    eGFR 69 ml/min/1.73sq m   Lipid Panel with Direct LDL reflex    Collection Time: 11/10/24  8:31 AM   Result Value Ref Range    Cholesterol 90 See Comment mg/dL    Triglycerides 99 See Comment mg/dL    HDL, Direct 34 (L) >=40 mg/dL    LDL Calculated 36 0 - 100 mg/dL   TSH, 3rd generation    Collection Time: 11/10/24  8:31 AM   Result Value Ref Range    TSH 3RD GENERATON 1.943 0.450 - 4.500 uIU/mL   Hemoglobin A1C    Collection Time: 11/10/24  8:31 AM   Result Value Ref Range    Hemoglobin A1C 6.2 (H) Normal 4.0-5.6%; PreDiabetic 5.7-6.4%; Diabetic >=6.5%; Glycemic control for adults with diabetes <7.0% %     mg/dl   Urinalysis with microscopic    Collection Time: 11/10/24  8:31 AM   Result Value Ref Range    Color, UA Yellow     Clarity, UA Clear     Specific Gravity, UA 1.029 1.003 - 1.030    pH, UA 6.5 4.5, 5.0, 5.5, 6.0, 6.5, 7.0, 7.5, 8.0    Leukocytes, UA  Small (A) Negative    Nitrite, UA Negative Negative    Protein,  (2+) (A) Negative mg/dl    Glucose, UA Negative Negative mg/dl    Ketones, UA Negative Negative mg/dl    Urobilinogen, UA 2.0 (A) <2.0 mg/dl mg/dl    Bilirubin, UA Negative Negative    Occult Blood, UA Negative Negative    RBC, UA 1-2 None Seen, 1-2 /hpf    WBC, UA 10-20 (A) None Seen, 1-2 /hpf    Epithelial Cells None Seen None Seen, Occasional /hpf    Bacteria, UA None Seen None Seen, Occasional /hpf    MUCUS THREADS Occasional (A) None Seen   Protein / creatinine ratio, urine    Collection Time: 11/10/24  8:31 AM   Result Value Ref Range    Creatinine, Ur 279.1 Reference range not established. mg/dL    Protein Urine Random 60.9 Reference range not established. mg/dL    Prot/Creat Ratio, Ur 0.2 (H) 0.0 - 0.1   Albumin / creatinine urine ratio    Collection Time: 11/10/24  8:31 AM   Result Value Ref Range    Creatinine, Ur 279.1 Reference range not established. mg/dL    Albumin,U,Random 262.7 (H) <20.0 mg/L    Albumin Creat Ratio 94 (H) 0 - 30 mg/g creatinine   Phosphorus    Collection Time: 11/10/24  8:31 AM   Result Value Ref Range    Phosphorus 2.8 2.3 - 4.1 mg/dL        Physical Exam  Constitutional:       Appearance: Normal appearance.   HENT:      Head: Normocephalic.      Right Ear: Tympanic membrane, ear canal and external ear normal.      Left Ear: Tympanic membrane, ear canal and external ear normal.      Nose: Nose normal. No congestion.      Mouth/Throat:      Mouth: Mucous membranes are moist.      Pharynx: Oropharynx is clear. No oropharyngeal exudate or posterior oropharyngeal erythema.   Eyes:      Extraocular Movements: Extraocular movements intact.      Conjunctiva/sclera: Conjunctivae normal.   Cardiovascular:      Rate and Rhythm: Normal rate and regular rhythm.      Heart sounds: Normal heart sounds. No murmur heard.  Pulmonary:      Effort: Pulmonary effort is normal.      Breath sounds: Normal breath sounds. No wheezing or  rales.   Abdominal:      General: Abdomen is flat. There is no distension.      Palpations: Abdomen is soft.      Tenderness: There is no abdominal tenderness.   Musculoskeletal:         General: Normal range of motion.      Cervical back: Normal range of motion and neck supple.      Right lower leg: No edema.      Left lower leg: No edema.   Lymphadenopathy:      Cervical: No cervical adenopathy.   Skin:     General: Skin is warm.   Neurological:      General: No focal deficit present.      Mental Status: He is alert and oriented to person, place, and time.

## 2024-12-21 ENCOUNTER — APPOINTMENT (OUTPATIENT)
Dept: LAB | Facility: CLINIC | Age: 69
End: 2024-12-21
Payer: COMMERCIAL

## 2024-12-21 DIAGNOSIS — I50.22 CHRONIC SYSTOLIC CONGESTIVE HEART FAILURE (HCC): ICD-10-CM

## 2024-12-21 DIAGNOSIS — N18.31 STAGE 3A CHRONIC KIDNEY DISEASE (HCC): ICD-10-CM

## 2024-12-21 DIAGNOSIS — R73.01 IMPAIRED FASTING GLUCOSE: ICD-10-CM

## 2024-12-21 DIAGNOSIS — I25.10 CORONARY ARTERY DISEASE INVOLVING NATIVE CORONARY ARTERY OF NATIVE HEART WITHOUT ANGINA PECTORIS: ICD-10-CM

## 2024-12-21 DIAGNOSIS — E78.2 HYPERLIPIDEMIA, MIXED: ICD-10-CM

## 2024-12-21 LAB
ALBUMIN SERPL BCG-MCNC: 4.2 G/DL (ref 3.5–5)
ALP SERPL-CCNC: 71 U/L (ref 34–104)
ALT SERPL W P-5'-P-CCNC: 65 U/L (ref 7–52)
ANION GAP SERPL CALCULATED.3IONS-SCNC: 10 MMOL/L (ref 4–13)
AST SERPL W P-5'-P-CCNC: 37 U/L (ref 13–39)
BASOPHILS # BLD AUTO: 0.05 THOUSANDS/ÂΜL (ref 0–0.1)
BASOPHILS NFR BLD AUTO: 1 % (ref 0–1)
BILIRUB SERPL-MCNC: 0.78 MG/DL (ref 0.2–1)
BUN SERPL-MCNC: 25 MG/DL (ref 5–25)
CALCIUM SERPL-MCNC: 9.5 MG/DL (ref 8.4–10.2)
CHLORIDE SERPL-SCNC: 106 MMOL/L (ref 96–108)
CHOLEST SERPL-MCNC: 87 MG/DL (ref ?–200)
CO2 SERPL-SCNC: 23 MMOL/L (ref 21–32)
CREAT SERPL-MCNC: 1.09 MG/DL (ref 0.6–1.3)
EOSINOPHIL # BLD AUTO: 0.2 THOUSAND/ÂΜL (ref 0–0.61)
EOSINOPHIL NFR BLD AUTO: 4 % (ref 0–6)
ERYTHROCYTE [DISTWIDTH] IN BLOOD BY AUTOMATED COUNT: 13.2 % (ref 11.6–15.1)
EST. AVERAGE GLUCOSE BLD GHB EST-MCNC: 134 MG/DL
GFR SERPL CREATININE-BSD FRML MDRD: 69 ML/MIN/1.73SQ M
GLUCOSE P FAST SERPL-MCNC: 91 MG/DL (ref 65–99)
HBA1C MFR BLD: 6.3 %
HCT VFR BLD AUTO: 40.8 % (ref 36.5–49.3)
HDLC SERPL-MCNC: 33 MG/DL
HGB BLD-MCNC: 13.3 G/DL (ref 12–17)
IMM GRANULOCYTES # BLD AUTO: 0.01 THOUSAND/UL (ref 0–0.2)
IMM GRANULOCYTES NFR BLD AUTO: 0 % (ref 0–2)
LDLC SERPL CALC-MCNC: 37 MG/DL (ref 0–100)
LYMPHOCYTES # BLD AUTO: 1.2 THOUSANDS/ÂΜL (ref 0.6–4.47)
LYMPHOCYTES NFR BLD AUTO: 23 % (ref 14–44)
MCH RBC QN AUTO: 31.9 PG (ref 26.8–34.3)
MCHC RBC AUTO-ENTMCNC: 32.6 G/DL (ref 31.4–37.4)
MCV RBC AUTO: 98 FL (ref 82–98)
MONOCYTES # BLD AUTO: 0.52 THOUSAND/ÂΜL (ref 0.17–1.22)
MONOCYTES NFR BLD AUTO: 10 % (ref 4–12)
NEUTROPHILS # BLD AUTO: 3.27 THOUSANDS/ÂΜL (ref 1.85–7.62)
NEUTS SEG NFR BLD AUTO: 62 % (ref 43–75)
NRBC BLD AUTO-RTO: 0 /100 WBCS
PLATELET # BLD AUTO: 159 THOUSANDS/UL (ref 149–390)
PMV BLD AUTO: 11.4 FL (ref 8.9–12.7)
POTASSIUM SERPL-SCNC: 4.1 MMOL/L (ref 3.5–5.3)
PROT SERPL-MCNC: 6.8 G/DL (ref 6.4–8.4)
RBC # BLD AUTO: 4.17 MILLION/UL (ref 3.88–5.62)
SODIUM SERPL-SCNC: 139 MMOL/L (ref 135–147)
TRIGL SERPL-MCNC: 85 MG/DL (ref ?–150)
TSH SERPL DL<=0.05 MIU/L-ACNC: 2.23 UIU/ML (ref 0.45–4.5)
WBC # BLD AUTO: 5.25 THOUSAND/UL (ref 4.31–10.16)

## 2024-12-21 PROCEDURE — 80053 COMPREHEN METABOLIC PANEL: CPT

## 2024-12-21 PROCEDURE — 85025 COMPLETE CBC W/AUTO DIFF WBC: CPT

## 2024-12-21 PROCEDURE — 80061 LIPID PANEL: CPT

## 2024-12-21 PROCEDURE — 84443 ASSAY THYROID STIM HORMONE: CPT

## 2024-12-21 PROCEDURE — 83036 HEMOGLOBIN GLYCOSYLATED A1C: CPT

## 2024-12-21 PROCEDURE — 36415 COLL VENOUS BLD VENIPUNCTURE: CPT

## 2025-01-01 DIAGNOSIS — N25.81 SECONDARY HYPERPARATHYROIDISM (HCC): ICD-10-CM

## 2025-01-01 DIAGNOSIS — E63.9 NUTRITION DISORDER: ICD-10-CM

## 2025-01-01 DIAGNOSIS — I25.5 ISCHEMIC CARDIOMYOPATHY: ICD-10-CM

## 2025-01-01 DIAGNOSIS — Z87.74 S/P VSD REPAIR: ICD-10-CM

## 2025-01-01 DIAGNOSIS — E78.2 HYPERLIPIDEMIA, MIXED: ICD-10-CM

## 2025-01-01 DIAGNOSIS — E55.9 VITAMIN D INSUFFICIENCY: ICD-10-CM

## 2025-01-01 DIAGNOSIS — F40.01 AGORAPHOBIA WITH PANIC ATTACKS: ICD-10-CM

## 2025-01-01 DIAGNOSIS — H61.22 CERUMEN DEBRIS ON TYMPANIC MEMBRANE OF LEFT EAR: ICD-10-CM

## 2025-01-01 RX ORDER — ATORVASTATIN CALCIUM 40 MG/1
40 TABLET, FILM COATED ORAL
Qty: 90 TABLET | Refills: 1 | Status: SHIPPED | OUTPATIENT
Start: 2025-01-01

## 2025-01-01 RX ORDER — BUMETANIDE 1 MG/1
1 TABLET ORAL DAILY
Qty: 90 TABLET | Refills: 1 | Status: SHIPPED | OUTPATIENT
Start: 2025-01-01 | End: 2025-06-30

## 2025-01-02 RX ORDER — TAMSULOSIN HYDROCHLORIDE 0.4 MG/1
0.4 CAPSULE ORAL
Qty: 30 CAPSULE | Refills: 5 | Status: SHIPPED | OUTPATIENT
Start: 2025-01-02

## 2025-01-03 RX ORDER — ALPRAZOLAM 0.5 MG
TABLET ORAL
Qty: 30 TABLET | Refills: 0 | Status: SHIPPED | OUTPATIENT
Start: 2025-01-03

## 2025-01-03 RX ORDER — DIPHENOXYLATE HYDROCHLORIDE AND ATROPINE SULFATE 2.5; .025 MG/1; MG/1
1 TABLET ORAL DAILY
Qty: 30 TABLET | Refills: 0 | Status: SHIPPED | OUTPATIENT
Start: 2025-01-03

## 2025-02-19 ENCOUNTER — OFFICE VISIT (OUTPATIENT)
Dept: CARDIOLOGY CLINIC | Facility: CLINIC | Age: 70
End: 2025-02-19
Payer: COMMERCIAL

## 2025-02-19 ENCOUNTER — TELEPHONE (OUTPATIENT)
Dept: INTERNAL MEDICINE CLINIC | Facility: CLINIC | Age: 70
End: 2025-02-19

## 2025-02-19 VITALS
DIASTOLIC BLOOD PRESSURE: 70 MMHG | WEIGHT: 232 LBS | HEART RATE: 84 BPM | SYSTOLIC BLOOD PRESSURE: 100 MMHG | HEIGHT: 66 IN | OXYGEN SATURATION: 97 % | BODY MASS INDEX: 37.28 KG/M2

## 2025-02-19 DIAGNOSIS — Q21.0 VSD (VENTRICULAR SEPTAL DEFECT): ICD-10-CM

## 2025-02-19 DIAGNOSIS — E78.2 HYPERLIPIDEMIA, MIXED: ICD-10-CM

## 2025-02-19 DIAGNOSIS — I50.22 CHRONIC SYSTOLIC CONGESTIVE HEART FAILURE (HCC): Primary | ICD-10-CM

## 2025-02-19 DIAGNOSIS — I25.5 ISCHEMIC CARDIOMYOPATHY: ICD-10-CM

## 2025-02-19 DIAGNOSIS — I25.10 CORONARY ARTERY DISEASE INVOLVING NATIVE CORONARY ARTERY OF NATIVE HEART WITHOUT ANGINA PECTORIS: ICD-10-CM

## 2025-02-19 DIAGNOSIS — R73.01 IMPAIRED FASTING GLUCOSE: Primary | ICD-10-CM

## 2025-02-19 PROCEDURE — 99214 OFFICE O/P EST MOD 30 MIN: CPT | Performed by: INTERNAL MEDICINE

## 2025-02-19 NOTE — TELEPHONE ENCOUNTER
Patient would like a blood work before appointment.  Please order.     Patient will go a week before appointment.

## 2025-02-19 NOTE — PROGRESS NOTES
Heart Failure Outpatient Progress Note - Robbie Momin 69 y.o. male MRN: [id number]    @ Encounter: [id number]      Assessment/Plan:    Patient Active Problem List    Diagnosis Date Noted    Epidermal cyst of neck 12/04/2024    Coronary artery disease involving native coronary artery of native heart without angina pectoris     Myocardial infarction (HCC)     Ischemic cardiomyopathy 10/17/2022    Gastroesophageal reflux disease without esophagitis 10/17/2022    Colon cancer screening declined 10/17/2022    Chronic systolic congestive heart failure (HCC) 07/11/2022    Benign prostatic hyperplasia with urinary retention 04/12/2022    Encounter for postoperative care 03/11/2022    Urinary retention 01/21/2022    S/P VSD repair 01/07/2022    Coagulopathy ([facility]) 01/01/2022    Acute systolic congestive heart failure ([facility]) 12/31/2021    Anemia 12/31/2021    Personal history of ECMO 12/31/2021    Cardiogenic shock ([facility]) 12/30/2021    VSD (ventricular septal defect) 12/30/2021    JUDE (acute kidney injury) ([facility]) 12/30/2021    Former smoker 07/06/2018    Hyperlipidemia, mixed 05/23/2017    Impaired fasting glucose 05/23/2017    Agoraphobia with panic attacks 11/06/2015    Body mass index 38.0-38.9, adult 11/06/2015    Psychosis, atypical ([facility]) 11/06/2015       # Chronic HFmrEF, Stage C  S/P Cardiogenic shock due to anteroseptal MI complicated by VSD on [facility] ECMO 12/31/21 s/p VSD repair with large bovine pericardial patch and ECMO decannulation 1/4/2022, also with vasodilatory component post op; s/p multiple blood product transfusions     Weight: 195 lbs, 208 lbs, 227 lbs, 234 lbs --> 226 lbs today    Studies personally reviewed by myself:  Echo 4/7/22  LVEF: 50%  LVEDd: 5.3 cm  IVS: surgically repaired VSD, no residual shunt  RV: normal    Echo 1/13/22  LVEF 50%  Normal RV size and systolic function     Echo 1/10/22 - on milrnone  LVEF 40%  Normal RV size and systolic function     Echocardiogram 12/30/21  LVEF: 40-45%  LVIDd:  4.5cm  RV: normal size, moderately reduced systolic function  MR: none seen  PASP: 65mmHg + RAP  RVOT: shortened PAAT  Other: muscular VSD with large left to right shunt; dyskinetic septum     EKG 1/3/2022: sinus rhythm, IRBBB, anteroseptal q waves  EKG 12/30/21: sinus tachycardia, RBBB, anteroseptal ST segment elevation     Neurohormonal Blockade: -   --Beta-Blocker: metoprolol tartrate 25 mg BID  --ACEi, ARB or ARNi:    (or SVR reduction)   --Aldosterone Receptor Blocker:  --SGLT2i:   --Diuretic: Bumex 1 mg daily     Sudden Cardiac Death Risk Reduction:  --ICD: LVEF > 35%     Electrical Resynchronization:  -- QRSd 98msec     Advanced Therapies (If appropriate):  --We will continue to monitor  Current smoker. Small LV and VSD precluding LVAD     # Coronary artery disease, undefined anatomy  Rx: asa 325 mg  daily, atorvastatin 80 mg  Angina: none    # Post MI VSD, surgically repaired    # dyslipidemia- atorvastatin 80 mg   12/21/24: LDL 37, HDL 33  1/4/24: LDL 46, HDL 38  8/10/23: LDL 36, HDL 38     # CKD,1.4 on 1/4/23    # S/p Acute hypoxic respiratory failure, resolved  # tobacco use     Plan:  Echo follow up  Lipids at goal on atorvastatin 40 mg  Quit tobacco  Continue asa, statin for CAD  Needs to do more activity- is walking 3-4 days a week  No night time eating    HPI:     68 yo male presents for follow up after prolonged hospitalization from 12/20/21 through 1/31/22 with later presentation anteroseptal MI presenting in cardiogenic shock requiring VSD repair with large bovine patch and hemodynamic support with [facility] ECMO. He required repeat sternotomy for mediastinal re-exploration with removal of retained PA catheter on 1/7/22. He was maintained on inotropic support for quite a while post operatively. Of note, he also required transfusion of multiple blood products.       Interval History:  Increased metoprolol to 25 mg BID  Sternal wound itches  Walks three days a week    Review of Systems   Constitutional:   Negative for activity change, appetite change, fatigue and unexpected weight change.   HENT:  Negative for congestion and nosebleeds.    Eyes: Negative.    Respiratory:  Negative for cough, chest tightness and shortness of breath.    Cardiovascular:  Negative for chest pain, palpitations and leg swelling.   Gastrointestinal:  Negative for abdominal distention.   Endocrine: Negative.    Genitourinary: Negative.    Musculoskeletal: Negative.    Skin: Negative.    Neurological:  Negative for dizziness, syncope and weakness.   Hematological: Negative.    Psychiatric/Behavioral: Negative.         Past Medical History:   Diagnosis Date    Allergic     Anemia     Anxiety     Central line complication 01/07/2022    Chronic kidney disease 12/30/21    Kidney Failure upon hospital admission, Tylenol overdose    Coronary artery disease 12/30/21    Myocardial infarction (HCC) 12/30/21    VSD Repair,Ecmo, Heart Attack,Bovine Repair    Obesity     Tylenol ingestion 12/31/2021    Urinary tract infection 1/23/22         Allergies   Allergen Reactions    Mushroom Extract Complex - Food Allergy Facial Swelling    Peanut Oil - Food Allergy     Shellfish-Derived Products - Food Allergy Other (See Comments)     Runs in the family    Strawberry C [Ascorbate - Food Allergy]      .    Current Outpatient Medications:     ALPRAZolam (XANAX) 0.5 mg tablet, TAKE 1 TO 2 TABLETS 30-60 MINUTES PRIOR TO LEAVING HOME AS NEEDED, Disp: 30 tablet, Rfl: 0    ALPRAZolam (XANAX) 0.5 mg tablet, As needed, Disp: 30 tablet, Rfl: 0    aspirin 325 mg tablet, Take 1 tablet (325 mg total) by mouth daily, Disp: 30 tablet, Rfl: 2    atorvastatin (LIPITOR) 40 mg tablet, Take 1 tablet (40 mg total) by mouth daily after dinner, Disp: 90 tablet, Rfl: 1    bumetanide (BUMEX) 1 mg tablet, Take 1 tablet (1 mg total) by mouth daily, Disp: 90 tablet, Rfl: 1    carbamide peroxide (DEBROX) 6.5 % otic solution, Administer 5 drops into the left ear 2 (two) times a day, Disp:  15 mL, Rfl: 0    Cholecalciferol (VITAMIN D3) 1,000 units tablet, Take 1 tablet (1,000 Units total) by mouth daily, Disp: 90 tablet, Rfl: 1    clindamycin (CLEOCIN T) 1 % lotion, Apply topically 2 (two) times a day (Patient not taking: Reported on 5/13/2024), Disp: 60 mL, Rfl: 3    losartan (COZAAR) 25 mg tablet, Take 0.5 tablets (12.5 mg total) by mouth daily, Disp: 45 tablet, Rfl: 3    metoprolol succinate (TOPROL-XL) 25 mg 24 hr tablet, TAKE 1 TABLET TWICE A DAY, Disp: 180 tablet, Rfl: 1    multivitamin (THERAGRAN) TABS, Take 1 tablet by mouth daily, Disp: 30 tablet, Rfl: 0    omeprazole (PriLOSEC) 20 mg delayed release capsule, Take 1 capsule (20 mg total) by mouth daily (Patient not taking: Reported on 11/14/2024), Disp: 90 capsule, Rfl: 0    tamsulosin (FLOMAX) 0.4 mg, Take 1 capsule (0.4 mg total) by mouth daily with dinner, Disp: 30 capsule, Rfl: 5    Social History     Socioeconomic History    Marital status: Single     Spouse name: Not on file    Number of children: Not on file    Years of education: Not on file    Highest education level: Not on file   Occupational History    Not on file   Tobacco Use    Smoking status: Former     Types: Pipe    Smokeless tobacco: Never    Tobacco comments:     Secondhand smoke exposure   Vaping Use    Vaping status: Never Used   Substance and Sexual Activity    Alcohol use: No    Drug use: No    Sexual activity: Not Currently     Partners: Female     Birth control/protection: Abstinence, None   Other Topics Concern    Not on file   Social History Narrative    Always uses seatbelt    No caffeine use     Social Drivers of Health     Financial Resource Strain: Not on file   Food Insecurity: Not on file   Transportation Needs: Not on file   Physical Activity: Not on file   Stress: Not on file   Social Connections: Not on file   Intimate Partner Violence: Not on file   Housing Stability: Not on file       Family History   Problem Relation Age of Onset    Other Mother          Claustrophobia    Mental illness Mother         Agoraphobia    Heart disease Mother         Enlarged heart... of heart disease    Anxiety disorder Mother         Agorpahobia    Drug abuse Family     Alcohol abuse Family     Alcohol abuse Father     Breast cancer Sister        Physical Exam:    Vitals:     Physical Exam    Labs & Results:    Lab Results   Component Value Date    SODIUM 139 2024    K 4.1 2024     2024    CO2 23 2024    BUN 25 2024    CREATININE 1.09 2024    GLUC 96 2022    CALCIUM 9.5 2024     Lab Results   Component Value Date    WBC 5.25 2024    HGB 13.3 2024    HCT 40.8 2024    MCV 98 2024     2024     Lab Results   Component Value Date    NTBNP 43,122 (H) 2021      Lab Results   Component Value Date    CHOLESTEROL 87 2024    CHOLESTEROL 90 11/10/2024    CHOLESTEROL 86 2024     Lab Results   Component Value Date    HDL 33 (L) 2024    HDL 34 (L) 11/10/2024    HDL 38 (L) 2024     Lab Results   Component Value Date    TRIG 85 2024    TRIG 99 11/10/2024    TRIG 62 2024     Lab Results   Component Value Date    NONHDLC 52 2022    NONHDLC 137 2021    NONHDLC 161 2021       EKG personally reviewed by Jonas Fernandez.     Counseling / Coordination of Care  Time spent today 25 minutes.  Greater than 50% of total time was spent with the patient and / or family counseling and / or coordination of care. We went over current diagnosis, most recent studies and any changes in treatment.    Thank you for the opportunity to participate in the care of this patient.    JONAS FERNANDEZ D.O.  DIRECTOR OF HEART FAILURE/ PULMONARY HYPERTENSION  MEDICAL DIRECTOR OF LVAD PROGRAM  [facility]

## 2025-02-26 ENCOUNTER — OFFICE VISIT (OUTPATIENT)
Dept: PODIATRY | Facility: CLINIC | Age: 70
End: 2025-02-26
Payer: COMMERCIAL

## 2025-02-26 DIAGNOSIS — S90.222A CONTUSION OF LESSER TOE OF LEFT FOOT WITH DAMAGE TO NAIL, INITIAL ENCOUNTER: Primary | ICD-10-CM

## 2025-02-26 DIAGNOSIS — M89.8X7 EXOSTOSIS OF BONE OF FOOT: ICD-10-CM

## 2025-02-26 DIAGNOSIS — B35.1 ONYCHOMYCOSIS: ICD-10-CM

## 2025-02-26 DIAGNOSIS — R73.03 PRE-DIABETES: ICD-10-CM

## 2025-02-26 DIAGNOSIS — L89.890 PRESSURE ULCER OF RIGHT FOOT, UNSTAGEABLE (HCC): ICD-10-CM

## 2025-02-26 DIAGNOSIS — I50.21 ACUTE SYSTOLIC CONGESTIVE HEART FAILURE (HCC): ICD-10-CM

## 2025-02-26 PROCEDURE — 99212 OFFICE O/P EST SF 10 MIN: CPT | Performed by: PODIATRIST

## 2025-02-26 PROCEDURE — 11720 DEBRIDE NAIL 1-5: CPT | Performed by: PODIATRIST

## 2025-02-26 NOTE — ASSESSMENT & PLAN NOTE
Wt Readings from Last 3 Encounters:   02/19/25 105 kg (232 lb)   12/16/24 105 kg (232 lb)   11/14/24 106 kg (233 lb)

## 2025-02-26 NOTE — PROGRESS NOTES
Name: Robbie Momin      : 1955      MRN: [id number]  Encounter Provider: Malcolm Cuello DPM  Encounter Date: 2025   Encounter department: [facility] [place]  :  Assessment & Plan  Contusion of lesser toe of left foot with damage to nail, initial encounter         Pressure ulcer of right foot, unstageable (HCC)       Padded the area with triple antibiotic and a Band-Aid and dispensed U-shaped pad over the area to reduce pressure.  Showed the patient how to apply the U-shaped pad around the bone spur to reduce pressure.  He is to wear the pads whenever he is wearing shoes.  When he returns for next visit he will be dispensed a large tube foam that will be cut to fit the toe to cushion this area even better.  Exostosis of bone of foot         Pre-diabetes         Acute systolic congestive heart failure (HCC)  Wt Readings from Last 3 Encounters:   25 105 kg (232 lb)   24 105 kg (232 lb)   24 106 kg (233 lb)          Onychomycosis           Return in about 4 months (around 2025).     History of Present Illness   HPI  Robbie Momin is a 69 y.o. male who presents with chief complaint of a painful sore on his right foot in the area of his bunion and also bruising to the tips of toes 2 through 4 on the left foot.  He is prediabetic who sugar currently being controlled with diet.  He also has a history of heart attack.  History obtained from: patient    Review of Systems  Medical History Reviewed by provider this encounter:     .  Current Outpatient Medications on File Prior to Visit   Medication Sig Dispense Refill    ALPRAZolam (XANAX) 0.5 mg tablet TAKE 1 TO 2 TABLETS 30-60 MINUTES PRIOR TO LEAVING HOME AS NEEDED 30 tablet 0    ALPRAZolam (XANAX) 0.5 mg tablet As needed 30 tablet 0    aspirin 325 mg tablet Take 1 tablet (325 mg total) by mouth daily 30 tablet 2    atorvastatin (LIPITOR) 40 mg tablet Take 1 tablet (40 mg total) by mouth daily after dinner 90 tablet 1     bumetanide (BUMEX) 1 mg tablet Take 1 tablet (1 mg total) by mouth daily 90 tablet 1    carbamide peroxide (DEBROX) 6.5 % otic solution Administer 5 drops into the left ear 2 (two) times a day 15 mL 0    Cholecalciferol (VITAMIN D3) 1,000 units tablet Take 1 tablet (1,000 Units total) by mouth daily 90 tablet 1    losartan (COZAAR) 25 mg tablet Take 0.5 tablets (12.5 mg total) by mouth daily 45 tablet 3    metoprolol succinate (TOPROL-XL) 25 mg 24 hr tablet TAKE 1 TABLET TWICE A  tablet 1    multivitamin (THERAGRAN) TABS Take 1 tablet by mouth daily 30 tablet 0    omeprazole (PriLOSEC) 20 mg delayed release capsule Take 1 capsule (20 mg total) by mouth daily 90 capsule 0    tamsulosin (FLOMAX) 0.4 mg Take 1 capsule (0.4 mg total) by mouth daily with dinner 30 capsule 5    clindamycin (CLEOCIN T) 1 % lotion Apply topically 2 (two) times a day (Patient not taking: Reported on 2/21/2025) 60 mL 3     No current facility-administered medications on file prior to visit.      Social History     Tobacco Use    Smoking status: Former     Types: Pipe    Smokeless tobacco: Never    Tobacco comments:     Secondhand smoke exposure   Vaping Use    Vaping status: Never Used   Substance and Sexual Activity    Alcohol use: No    Drug use: No    Sexual activity: Not Currently     Partners: Female     Birth control/protection: Abstinence, None        Objective   There were no vitals taken for this visit.     Physical Exam  Vascular status is a faint 1/4 DP PT negative digital hair normal distal cooling slightly delayed capillary refill with edema present bilaterally.  The edema is +1 pitting and the capillary refill is approximately 3 seconds.    Derm nails are brittle elongated hypertrophic white-yellow discoloration with subungual debris x 2.  There is an increased thickness and the nails are approximately 2 mm.  There is dark discoloration to the distal nails beds on digits 2 through 4 on the left foot.  No ecchymosis or  bruising is noted to the toes cells.  There is a small superficial ulcer present on the first MPJ on the right foot measuring approximately 0.5 x 0.5 cm just into the epidermis no signs of any infection slight area of erythema surrounding it.    Ortho hammertoe deformity with under lapping hallux valgus is noted on the right extremity.  There is a dorsal medial prominence on the first MPJ and metatarsal head on the right foot.  Hammertoe deformities and semifixed position are noted on the 2nd through 4th toes on the left foot.    Neuro is intact and equal bilaterally and 0.5 monofilament test was performed and patient felt all 5 sites that were done.  The sites were the plantar aspect of the hallux, plantar aspect of the third and fourth toes, submet 3, and the plantar aspect of the arch.    Administrative Statements   I have spent a total time of 15 minutes in caring for this patient on the day of the visit/encounter including Risks and benefits of tx options, Instructions for management, Patient and family education, Importance of tx compliance, Counseling / Coordination of care, Documenting in the medical record, and Obtaining or reviewing history  .

## 2025-03-11 ENCOUNTER — HOSPITAL ENCOUNTER (OUTPATIENT)
Dept: NON INVASIVE DIAGNOSTICS | Facility: CLINIC | Age: 70
Discharge: HOME | End: 2025-03-11
Payer: COMMERCIAL

## 2025-03-11 VITALS
DIASTOLIC BLOOD PRESSURE: 70 MMHG | HEART RATE: 84 BPM | BODY MASS INDEX: 37.28 KG/M2 | WEIGHT: 232 LBS | HEIGHT: 66 IN | SYSTOLIC BLOOD PRESSURE: 100 MMHG

## 2025-03-11 DIAGNOSIS — I25.10 CORONARY ARTERY DISEASE INVOLVING NATIVE CORONARY ARTERY OF NATIVE HEART WITHOUT ANGINA PECTORIS: ICD-10-CM

## 2025-03-11 DIAGNOSIS — I50.22 CHRONIC SYSTOLIC CONGESTIVE HEART FAILURE (HCC): ICD-10-CM

## 2025-03-11 PROCEDURE — 93306 TTE W/DOPPLER COMPLETE: CPT | Performed by: INTERNAL MEDICINE

## 2025-03-11 PROCEDURE — 93306 TTE W/DOPPLER COMPLETE: CPT

## 2025-03-11 RX ADMIN — PERFLUTREN 0.6 ML/MIN: 6.52 INJECTION, SUSPENSION INTRAVENOUS at 16:15

## 2025-03-12 LAB
AORTIC ROOT: 3.4 CM
ASCENDING AORTA: 2.9 CM
BSA FOR ECHO PROCEDURE: 2.13 M2
E WAVE DECELERATION TIME: 161 MS
E/A RATIO: 1.81
FRACTIONAL SHORTENING: 38 (ref 28–44)
INTERVENTRICULAR SEPTUM IN DIASTOLE (PARASTERNAL SHORT AXIS VIEW): 1.1 CM
INTERVENTRICULAR SEPTUM: 1.1 CM (ref 0.6–1.1)
LAAS-AP4: 21.2 CM2
LEFT ATRIUM SIZE: 3.9 CM
LEFT INTERNAL DIMENSION IN SYSTOLE: 3.3 CM (ref 2.1–4)
LEFT VENTRICULAR INTERNAL DIMENSION IN DIASTOLE: 5.3 CM (ref 3.5–6)
LEFT VENTRICULAR POSTERIOR WALL IN END DIASTOLE: 1.2 CM
LEFT VENTRICULAR STROKE VOLUME: 93 ML
LV EF US.2D.A4C+ESTIMATED: 46 %
LVSV (TEICH): 93 ML
MV E'TISSUE VEL-LAT: 12 CM/S
MV E'TISSUE VEL-SEP: 9 CM/S
MV PEAK A VEL: 0.47 M/S
MV PEAK E VEL: 85 CM/S
MV STENOSIS PRESSURE HALF TIME: 47 MS
MV VALVE AREA P 1/2 METHOD: 4.68
RIGHT ATRIAL 2D VOLUME: 33 ML
RIGHT ATRIUM AREA SYSTOLE A4C: 16.3 CM2
RIGHT VENTRICLE ID DIMENSION: 4.5 CM
SL CV LV EF: 50
SL CV PED ECHO LEFT VENTRICLE DIASTOLIC VOLUME (MOD BIPLANE) 2D: 136 ML
SL CV PED ECHO LEFT VENTRICLE SYSTOLIC VOLUME (MOD BIPLANE) 2D: 43 ML
TR MAX PG: 30 MMHG
TR PEAK VELOCITY: 2.8 M/S
TRICUSPID ANNULAR PLANE SYSTOLIC EXCURSION: 1.6 CM
TRICUSPID VALVE PEAK REGURGITATION VELOCITY: 2.75 M/S

## 2025-03-18 ENCOUNTER — RESULTS FOLLOW-UP (OUTPATIENT)
Dept: CARDIOLOGY CLINIC | Facility: CLINIC | Age: 70
End: 2025-03-18

## 2025-04-10 ENCOUNTER — APPOINTMENT (OUTPATIENT)
Dept: LAB | Facility: CLINIC | Age: 70
End: 2025-04-10
Payer: COMMERCIAL

## 2025-04-10 DIAGNOSIS — N18.31 STAGE 3A CHRONIC KIDNEY DISEASE (HCC): ICD-10-CM

## 2025-04-10 DIAGNOSIS — R73.01 IMPAIRED FASTING GLUCOSE: ICD-10-CM

## 2025-04-10 DIAGNOSIS — E66.01 CLASS 2 SEVERE OBESITY DUE TO EXCESS CALORIES WITH SERIOUS COMORBIDITY AND BODY MASS INDEX (BMI) OF 37.0 TO 37.9 IN ADULT (HCC): ICD-10-CM

## 2025-04-10 DIAGNOSIS — E78.2 HYPERLIPIDEMIA, MIXED: ICD-10-CM

## 2025-04-10 DIAGNOSIS — N25.81 SECONDARY HYPERPARATHYROIDISM (HCC): ICD-10-CM

## 2025-04-10 DIAGNOSIS — I50.22 CHRONIC SYSTOLIC CONGESTIVE HEART FAILURE (HCC): ICD-10-CM

## 2025-04-10 DIAGNOSIS — E66.812 CLASS 2 SEVERE OBESITY DUE TO EXCESS CALORIES WITH SERIOUS COMORBIDITY AND BODY MASS INDEX (BMI) OF 37.0 TO 37.9 IN ADULT (HCC): ICD-10-CM

## 2025-04-10 LAB
ALBUMIN SERPL BCG-MCNC: 4.4 G/DL (ref 3.5–5)
ALP SERPL-CCNC: 72 U/L (ref 34–104)
ALT SERPL W P-5'-P-CCNC: 31 U/L (ref 7–52)
ANION GAP SERPL CALCULATED.3IONS-SCNC: 12 MMOL/L (ref 4–13)
AST SERPL W P-5'-P-CCNC: 21 U/L (ref 13–39)
BASOPHILS # BLD AUTO: 0.04 THOUSANDS/ÂΜL (ref 0–0.1)
BASOPHILS NFR BLD AUTO: 1 % (ref 0–1)
BILIRUB SERPL-MCNC: 0.68 MG/DL (ref 0.2–1)
BUN SERPL-MCNC: 27 MG/DL (ref 5–25)
CALCIUM SERPL-MCNC: 9.2 MG/DL (ref 8.4–10.2)
CHLORIDE SERPL-SCNC: 104 MMOL/L (ref 96–108)
CO2 SERPL-SCNC: 24 MMOL/L (ref 21–32)
CREAT SERPL-MCNC: 1.16 MG/DL (ref 0.6–1.3)
EOSINOPHIL # BLD AUTO: 0.24 THOUSAND/ÂΜL (ref 0–0.61)
EOSINOPHIL NFR BLD AUTO: 4 % (ref 0–6)
ERYTHROCYTE [DISTWIDTH] IN BLOOD BY AUTOMATED COUNT: 13.4 % (ref 11.6–15.1)
EST. AVERAGE GLUCOSE BLD GHB EST-MCNC: 128 MG/DL
GFR SERPL CREATININE-BSD FRML MDRD: 64 ML/MIN/1.73SQ M
GLUCOSE P FAST SERPL-MCNC: 97 MG/DL (ref 65–99)
HBA1C MFR BLD: 6.1 %
HCT VFR BLD AUTO: 41.9 % (ref 36.5–49.3)
HGB BLD-MCNC: 13.4 G/DL (ref 12–17)
IMM GRANULOCYTES # BLD AUTO: 0 THOUSAND/UL (ref 0–0.2)
IMM GRANULOCYTES NFR BLD AUTO: 0 % (ref 0–2)
LYMPHOCYTES # BLD AUTO: 1.29 THOUSANDS/ÂΜL (ref 0.6–4.47)
LYMPHOCYTES NFR BLD AUTO: 23 % (ref 14–44)
MCH RBC QN AUTO: 31.5 PG (ref 26.8–34.3)
MCHC RBC AUTO-ENTMCNC: 32 G/DL (ref 31.4–37.4)
MCV RBC AUTO: 99 FL (ref 82–98)
MONOCYTES # BLD AUTO: 0.48 THOUSAND/ÂΜL (ref 0.17–1.22)
MONOCYTES NFR BLD AUTO: 9 % (ref 4–12)
NEUTROPHILS # BLD AUTO: 3.47 THOUSANDS/ÂΜL (ref 1.85–7.62)
NEUTS SEG NFR BLD AUTO: 63 % (ref 43–75)
NRBC BLD AUTO-RTO: 0 /100 WBCS
PLATELET # BLD AUTO: 179 THOUSANDS/UL (ref 149–390)
PMV BLD AUTO: 11 FL (ref 8.9–12.7)
POTASSIUM SERPL-SCNC: 3.7 MMOL/L (ref 3.5–5.3)
PROT SERPL-MCNC: 7 G/DL (ref 6.4–8.4)
RBC # BLD AUTO: 4.25 MILLION/UL (ref 3.88–5.62)
SODIUM SERPL-SCNC: 140 MMOL/L (ref 135–147)
TSH SERPL DL<=0.05 MIU/L-ACNC: 2.42 UIU/ML (ref 0.45–4.5)
WBC # BLD AUTO: 5.52 THOUSAND/UL (ref 4.31–10.16)

## 2025-04-10 PROCEDURE — 84443 ASSAY THYROID STIM HORMONE: CPT

## 2025-04-10 PROCEDURE — 36415 COLL VENOUS BLD VENIPUNCTURE: CPT

## 2025-04-10 PROCEDURE — 80053 COMPREHEN METABOLIC PANEL: CPT

## 2025-04-10 PROCEDURE — 85025 COMPLETE CBC W/AUTO DIFF WBC: CPT

## 2025-04-10 PROCEDURE — 83036 HEMOGLOBIN GLYCOSYLATED A1C: CPT

## 2025-04-21 ENCOUNTER — OFFICE VISIT (OUTPATIENT)
Dept: INTERNAL MEDICINE CLINIC | Facility: CLINIC | Age: 70
End: 2025-04-21
Payer: COMMERCIAL

## 2025-04-21 VITALS
DIASTOLIC BLOOD PRESSURE: 72 MMHG | SYSTOLIC BLOOD PRESSURE: 114 MMHG | HEIGHT: 66 IN | TEMPERATURE: 98.4 F | WEIGHT: 228 LBS | BODY MASS INDEX: 36.64 KG/M2 | HEART RATE: 60 BPM | OXYGEN SATURATION: 98 %

## 2025-04-21 DIAGNOSIS — E78.2 HYPERLIPIDEMIA, MIXED: ICD-10-CM

## 2025-04-21 DIAGNOSIS — R33.8 BENIGN PROSTATIC HYPERPLASIA WITH URINARY RETENTION: ICD-10-CM

## 2025-04-21 DIAGNOSIS — R73.01 IMPAIRED FASTING GLUCOSE: ICD-10-CM

## 2025-04-21 DIAGNOSIS — Z23 ENCOUNTER FOR IMMUNIZATION: ICD-10-CM

## 2025-04-21 DIAGNOSIS — Z00.00 ANNUAL PHYSICAL EXAM: ICD-10-CM

## 2025-04-21 DIAGNOSIS — N40.1 BENIGN PROSTATIC HYPERPLASIA WITH URINARY RETENTION: ICD-10-CM

## 2025-04-21 DIAGNOSIS — K21.9 GASTROESOPHAGEAL REFLUX DISEASE WITHOUT ESOPHAGITIS: ICD-10-CM

## 2025-04-21 DIAGNOSIS — I50.22 CHRONIC SYSTOLIC CONGESTIVE HEART FAILURE (HCC): ICD-10-CM

## 2025-04-21 DIAGNOSIS — I25.10 CORONARY ARTERY DISEASE INVOLVING NATIVE CORONARY ARTERY OF NATIVE HEART WITHOUT ANGINA PECTORIS: Primary | ICD-10-CM

## 2025-04-21 PROCEDURE — 99214 OFFICE O/P EST MOD 30 MIN: CPT | Performed by: INTERNAL MEDICINE

## 2025-04-21 PROCEDURE — 90471 IMMUNIZATION ADMIN: CPT | Performed by: INTERNAL MEDICINE

## 2025-04-21 PROCEDURE — 99397 PER PM REEVAL EST PAT 65+ YR: CPT | Performed by: INTERNAL MEDICINE

## 2025-04-21 PROCEDURE — 90677 PCV20 VACCINE IM: CPT | Performed by: INTERNAL MEDICINE

## 2025-04-21 NOTE — PROGRESS NOTES
Adult Annual Physical  Name: Robbie Momin      : 1955      MRN: [id number]  Encounter Provider: Luis E Montalvo MD  Encounter Date: 2025   Encounter department: [facility]INE [address]    :  Assessment & Plan  Coronary artery disease involving native coronary artery of native heart without angina pectoris         Gastroesophageal reflux disease without esophagitis         Chronic systolic congestive heart failure (HCC)  Wt Readings from Last 3 Encounters:   25 103 kg (228 lb)   25 105 kg (232 lb)   25 105 kg (232 lb)                  Impaired fasting glucose         Benign prostatic hyperplasia with urinary retention         Hyperlipidemia, mixed         Annual physical exam         Coronary artery disease involving native coronary artery of native heart without angina pectoris         Gastroesophageal reflux disease without esophagitis         Chronic systolic congestive heart failure (HCC)  Wt Readings from Last 3 Encounters:   25 103 kg (228 lb)   25 105 kg (232 lb)   25 105 kg (232 lb)                  Impaired fasting glucose         Benign prostatic hyperplasia with urinary retention         Hyperlipidemia, mixed         Annual physical exam                   Immunizations:  - Immunizations due: Prevnar 20, Tdap and Zoster (Shingrix)         History of Present Illness     Adult Annual Physical:  Patient presents for annual physical.     Diet and Physical Activity:  - Diet/Nutrition: heart healthy (low sodium) diet.  - Exercise: moderate cardiovascular exercise.    General Health:  - Sleep: sleeps well.  - Hearing: normal hearing bilateral ears.  - Vision: no vision problems.  - Dental: regular dental visits.     Health:  - History of STDs: no.   - Urinary symptoms: none.     Advanced Care Planning:  - Has an advanced directive?: no    - Has a durable medical POA?: yes    - ACP document given to patient?: yes      Review of Systems    Constitutional:  Negative for chills and fever.   HENT:  Negative for congestion, ear pain and sore throat.    Eyes:  Negative for pain.   Respiratory:  Negative for cough and shortness of breath.    Cardiovascular:  Negative for chest pain and leg swelling.   Gastrointestinal:  Negative for abdominal pain, nausea and vomiting.   Endocrine: Negative for polyuria.   Genitourinary:  Negative for difficulty urinating, frequency and urgency.   Musculoskeletal:  Negative for arthralgias and back pain.   Skin:  Negative for rash.   Neurological:  Negative for weakness and headaches.   Psychiatric/Behavioral:  Negative for sleep disturbance. The patient is not nervous/anxious.      Current Outpatient Medications on File Prior to Visit   Medication Sig Dispense Refill   • ALPRAZolam (XANAX) 0.5 mg tablet TAKE 1 TO 2 TABLETS 30-60 MINUTES PRIOR TO LEAVING HOME AS NEEDED 30 tablet 0   • ALPRAZolam (XANAX) 0.5 mg tablet As needed 30 tablet 0   • aspirin 325 mg tablet Take 1 tablet (325 mg total) by mouth daily 30 tablet 2   • atorvastatin (LIPITOR) 40 mg tablet Take 1 tablet (40 mg total) by mouth daily after dinner 90 tablet 1   • bumetanide (BUMEX) 1 mg tablet Take 1 tablet (1 mg total) by mouth daily 90 tablet 1   • carbamide peroxide (DEBROX) 6.5 % otic solution Administer 5 drops into the left ear 2 (two) times a day 15 mL 0   • Cholecalciferol (VITAMIN D3) 1,000 units tablet Take 1 tablet (1,000 Units total) by mouth daily 90 tablet 1   • losartan (COZAAR) 25 mg tablet Take 0.5 tablets (12.5 mg total) by mouth daily 45 tablet 3   • metoprolol succinate (TOPROL-XL) 25 mg 24 hr tablet TAKE 1 TABLET TWICE A  tablet 1   • multivitamin (THERAGRAN) TABS Take 1 tablet by mouth daily 30 tablet 0   • omeprazole (PriLOSEC) 20 mg delayed release capsule Take 1 capsule (20 mg total) by mouth daily 90 capsule 0   • tamsulosin (FLOMAX) 0.4 mg Take 1 capsule (0.4 mg total) by mouth daily with dinner 30 capsule 5   •  "clindamycin (CLEOCIN T) 1 % lotion Apply topically 2 (two) times a day (Patient not taking: Reported on 5/13/2024) 60 mL 3     No current facility-administered medications on file prior to visit.      Social History     Tobacco Use   • Smoking status: Former     Current packs/day: 1.50     Average packs/day: 1.5 packs/day for 30.0 years (45.0 ttl pk-yrs)     Types: Pipe, Cigarettes   • Smokeless tobacco: Never   • Tobacco comments:     Secondhand smoke exposure   Vaping Use   • Vaping status: Never Used   Substance and Sexual Activity   • Alcohol use: No   • Drug use: No   • Sexual activity: Not Currently     Partners: Female     Birth control/protection: Abstinence, None       Objective   /72 (BP Location: Right arm, Patient Position: Sitting, Cuff Size: Standard)   Pulse 60   Temp 98.4 °F (36.9 °C) (Temporal)   Ht 5' 6\" (1.676 m)   Wt 103 kg (228 lb)   SpO2 98%   BMI 36.80 kg/m²     Physical Exam  Vitals and nursing note reviewed.   Constitutional:       General: He is not in acute distress.     Appearance: He is well-developed.   HENT:      Head: Normocephalic and atraumatic.   Eyes:      Conjunctiva/sclera: Conjunctivae normal.   Cardiovascular:      Rate and Rhythm: Normal rate and regular rhythm.      Heart sounds: No murmur heard.  Pulmonary:      Effort: Pulmonary effort is normal. No respiratory distress.      Breath sounds: Normal breath sounds.   Abdominal:      Palpations: Abdomen is soft.      Tenderness: There is no abdominal tenderness.   Musculoskeletal:         General: No swelling.      Cervical back: Neck supple.   Skin:     General: Skin is warm and dry.      Capillary Refill: Capillary refill takes less than 2 seconds.   Neurological:      Mental Status: He is alert.   Psychiatric:         Mood and Affect: Mood normal.         "

## 2025-04-21 NOTE — PROGRESS NOTES
Assessment/Plan:             1. Coronary artery disease involving native coronary artery of native heart without angina pectoris  Comments:  continue same med  Assessment & Plan:         2. Gastroesophageal reflux disease without esophagitis  Comments:  continue same med  Assessment & Plan:         3. Chronic systolic congestive heart failure (HCC)  Comments:  continue same med  Assessment & Plan:  Wt Readings from Last 3 Encounters:   04/21/25 103 kg (228 lb)   03/11/25 105 kg (232 lb)   02/19/25 105 kg (232 lb)                  4. Impaired fasting glucose  Assessment & Plan:         5. Benign prostatic hyperplasia with urinary retention  Comments:  continue same med  Assessment & Plan:         6. Hyperlipidemia, mixed  Comments:  continue same med  Assessment & Plan:         7. Annual physical exam         Subjective:      Patient ID: Robbie Momin is a 69 y.o. male.    Follow-up on blood test done on 4/10/2025 test discussed with him, also physical        The following portions of the patient's history were reviewed and updated as appropriate: He  has a past medical history of Allergic, Anemia, Anxiety, Central line complication (01/07/2022), CHF (congestive heart failure) (HCC) (12/30/21), Chronic kidney disease (12/30/21), Coronary artery disease (12/30/21), Hyperlipidemia, Myocardial infarction (HCC) (12/30/21), Obesity, Tylenol ingestion (12/31/2021), and Urinary tract infection (1/23/22).  He   Patient Active Problem List    Diagnosis Date Noted   • Epidermal cyst of neck 12/04/2024   • Coronary artery disease involving native coronary artery of native heart without angina pectoris    • Myocardial infarction (HCC)    • Ischemic cardiomyopathy 10/17/2022   • Gastroesophageal reflux disease without esophagitis 10/17/2022   • Colon cancer screening declined 10/17/2022   • Chronic systolic congestive heart failure (HCC) 07/11/2022   • Benign prostatic hyperplasia with urinary retention 04/12/2022   • Encounter  for postoperative care 03/11/2022   • Urinary retention 01/21/2022   • S/P VSD repair 01/07/2022   • Coagulopathy ([facility]) 01/01/2022   • Acute systolic congestive heart failure ([facility]) 12/31/2021   • Anemia 12/31/2021   • Personal history of ECMO 12/31/2021   • Cardiogenic shock ([facility]) 12/30/2021   • VSD (ventricular septal defect) 12/30/2021   • JUDE (acute kidney injury) ([facility]) 12/30/2021   • Former smoker 07/06/2018   • Hyperlipidemia, mixed 05/23/2017   • Impaired fasting glucose 05/23/2017   • 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  His family history includes Alcohol abuse in his family and father; Anxiety disorder in his mother; Breast cancer in his sister; Drug abuse in his family; Heart attack in his father and mother; Heart disease in his mother; Heart failure in his father and mother; Mental illness in his mother; Other in his mother.  He  reports that he has quit smoking. His smoking use included pipe and cigarettes. He has a 45 pack-year smoking history. He has never used smokeless tobacco. He reports that he does not drink alcohol and does not use drugs.  Current Outpatient Medications   Medication Sig Dispense Refill   • ALPRAZolam (XANAX) 0.5 mg tablet TAKE 1 TO 2 TABLETS 30-60 MINUTES PRIOR TO LEAVING HOME AS NEEDED 30 tablet 0   • ALPRAZolam (XANAX) 0.5 mg tablet As needed 30 tablet 0   • aspirin 325 mg tablet Take 1 tablet (325 mg total) by mouth daily 30 tablet 2   • atorvastatin (LIPITOR) 40 mg tablet Take 1 tablet (40 mg total) by  mouth daily after dinner 90 tablet 1   • bumetanide (BUMEX) 1 mg tablet Take 1 tablet (1 mg total) by mouth daily 90 tablet 1   • carbamide peroxide (DEBROX) 6.5 % otic solution Administer 5 drops into the left ear 2 (two) times a day 15 mL 0   • Cholecalciferol (VITAMIN D3) 1,000 units tablet Take 1 tablet (1,000 Units total) by mouth daily 90 tablet 1   • losartan (COZAAR) 25 mg tablet Take 0.5 tablets (12.5 mg total) by mouth daily 45 tablet 3   • metoprolol succinate (TOPROL-XL) 25 mg 24 hr tablet TAKE 1 TABLET TWICE A  tablet 1   • multivitamin (THERAGRAN) TABS Take 1 tablet by mouth daily 30 tablet 0   • omeprazole (PriLOSEC) 20 mg delayed release capsule Take 1 capsule (20 mg total) by mouth daily 90 capsule 0   • tamsulosin (FLOMAX) 0.4 mg Take 1 capsule (0.4 mg total) by mouth daily with dinner 30 capsule 5   • clindamycin (CLEOCIN T) 1 % lotion Apply topically 2 (two) times a day (Patient not taking: Reported on 5/13/2024) 60 mL 3     No current facility-administered medications for this visit.     Current Outpatient Medications on File Prior to Visit   Medication Sig   • ALPRAZolam (XANAX) 0.5 mg tablet TAKE 1 TO 2 TABLETS 30-60 MINUTES PRIOR TO LEAVING HOME AS NEEDED   • ALPRAZolam (XANAX) 0.5 mg tablet As needed   • aspirin 325 mg tablet Take 1 tablet (325 mg total) by mouth daily   • atorvastatin (LIPITOR) 40 mg tablet Take 1 tablet (40 mg total) by mouth daily after dinner   • bumetanide (BUMEX) 1 mg tablet Take 1 tablet (1 mg total) by mouth daily   • carbamide peroxide (DEBROX) 6.5 % otic solution Administer 5 drops into the left ear 2 (two) times a day   • Cholecalciferol (VITAMIN D3) 1,000 units tablet Take 1 tablet (1,000 Units total) by mouth daily   • losartan (COZAAR) 25 mg tablet Take 0.5 tablets (12.5 mg total) by mouth daily   • metoprolol succinate (TOPROL-XL) 25 mg 24 hr tablet TAKE 1 TABLET TWICE A DAY   • multivitamin (THERAGRAN) TABS Take 1 tablet by mouth daily   • omeprazole  "(PriLOSEC) 20 mg delayed release capsule Take 1 capsule (20 mg total) by mouth daily   • tamsulosin (FLOMAX) 0.4 mg Take 1 capsule (0.4 mg total) by mouth daily with dinner   • clindamycin (CLEOCIN T) 1 % lotion Apply topically 2 (two) times a day (Patient not taking: Reported on 5/13/2024)     No current facility-administered medications on file prior to visit.     He is allergic to mushroom extract complex - food allergy, peanut oil - food allergy, shellfish-derived products - food allergy, and strawberry c [ascorbate - food allergy]..    Review of Systems   Constitutional:  Negative for chills and fever.   HENT:  Negative for congestion, ear pain and sore throat.    Eyes:  Negative for pain.   Respiratory:  Negative for cough and shortness of breath.    Cardiovascular:  Negative for chest pain and leg swelling.   Gastrointestinal:  Negative for abdominal pain, nausea and vomiting.   Endocrine: Negative for polyuria.   Genitourinary:  Negative for difficulty urinating, frequency and urgency.   Musculoskeletal:  Negative for arthralgias and back pain.   Skin:  Negative for rash.   Neurological:  Negative for weakness and headaches.   Psychiatric/Behavioral:  Negative for sleep disturbance. The patient is not nervous/anxious.          Objective:      /72 (BP Location: Right arm, Patient Position: Sitting, Cuff Size: Standard)   Pulse 60   Temp 98.4 °F (36.9 °C) (Temporal)   Ht 5' 6\" (1.676 m)   Wt 103 kg (228 lb)   SpO2 98%   BMI 36.80 kg/m²     Recent Results (from the past 8 weeks)   Echo complete w/ contrast if indicated    Collection Time: 03/11/25  4:27 PM   Result Value Ref Range    BSA 2.13 m2    A4C EF 46 %    LVIDd 5.30 cm    LVIDS 3.30 cm    IVSd 1.10 cm    LVPWd 1.20 cm    FS 38 28 - 44    MV E' Tissue Velocity Septal 9 cm/s    MV E' Tissue Velocity Lateral 12 cm/s    E/A ratio 1.81     E wave deceleration time 161 ms    MV Peak E Tariq 85 cm/s    MV Peak A Tariq 0.47 m/s    RVID d 4.5 cm    " Tricuspid annular plane systolic excursion 1.60 cm    LA size 3.9 cm    RA 2D Volume 33.0 mL    RAA A4C 16.3 cm2    MV stenosis pressure 1/2 time 47 ms    MV valve area p 1/2 method 4.68     TR Peak Tariq 2.8 m/s    Triscuspid Valve Regurgitation Peak Gradient 30.0 mmHg    Ao root 3.40 cm    Asc Ao 2.9 cm    Tricuspid valve peak regurgitation velocity 2.75 m/s    Left ventricular stroke volume (2D) 93.00 mL    IVS 1.1 cm    LEFT VENTRICLE SYSTOLIC VOLUME (MOD BIPLANE) 2D 43 mL    LV DIASTOLIC VOLUME (MOD BIPLANE) 2D 136 mL    Left Atrium Area-systolic Four Chamber 21.2 cm2    LVSV, 2D 93 mL    LV EF 50    CBC and differential    Collection Time: 04/10/25  6:33 AM   Result Value Ref Range    WBC 5.52 4.31 - 10.16 Thousand/uL    RBC 4.25 3.88 - 5.62 Million/uL    Hemoglobin 13.4 12.0 - 17.0 g/dL    Hematocrit 41.9 36.5 - 49.3 %    MCV 99 (H) 82 - 98 fL    MCH 31.5 26.8 - 34.3 pg    MCHC 32.0 31.4 - 37.4 g/dL    RDW 13.4 11.6 - 15.1 %    MPV 11.0 8.9 - 12.7 fL    Platelets 179 149 - 390 Thousands/uL    nRBC 0 /100 WBCs    Segmented % 63 43 - 75 %    Immature Grans % 0 0 - 2 %    Lymphocytes % 23 14 - 44 %    Monocytes % 9 4 - 12 %    Eosinophils Relative 4 0 - 6 %    Basophils Relative 1 0 - 1 %    Absolute Neutrophils 3.47 1.85 - 7.62 Thousands/µL    Absolute Immature Grans 0.00 0.00 - 0.20 Thousand/uL    Absolute Lymphocytes 1.29 0.60 - 4.47 Thousands/µL    Absolute Monocytes 0.48 0.17 - 1.22 Thousand/µL    Eosinophils Absolute 0.24 0.00 - 0.61 Thousand/µL    Basophils Absolute 0.04 0.00 - 0.10 Thousands/µL   Comprehensive metabolic panel    Collection Time: 04/10/25  6:33 AM   Result Value Ref Range    Sodium 140 135 - 147 mmol/L    Potassium 3.7 3.5 - 5.3 mmol/L    Chloride 104 96 - 108 mmol/L    CO2 24 21 - 32 mmol/L    ANION GAP 12 4 - 13 mmol/L    BUN 27 (H) 5 - 25 mg/dL    Creatinine 1.16 0.60 - 1.30 mg/dL    Glucose, Fasting 97 65 - 99 mg/dL    Calcium 9.2 8.4 - 10.2 mg/dL    AST 21 13 - 39 U/L    ALT 31 7 - 52  U/L    Alkaline Phosphatase 72 34 - 104 U/L    Total Protein 7.0 6.4 - 8.4 g/dL    Albumin 4.4 3.5 - 5.0 g/dL    Total Bilirubin 0.68 0.20 - 1.00 mg/dL    eGFR 64 ml/min/1.73sq m   TSH, 3rd generation    Collection Time: 04/10/25  6:33 AM   Result Value Ref Range    TSH 3RD GENERATON 2.422 0.450 - 4.500 uIU/mL   Hemoglobin A1C    Collection Time: 04/10/25  6:33 AM   Result Value Ref Range    Hemoglobin A1C 6.1 (H) Normal 4.0-5.6%; PreDiabetic 5.7-6.4%; Diabetic >=6.5%; Glycemic control for adults with diabetes <7.0% %     mg/dl        Physical Exam  Constitutional:       Appearance: Normal appearance.   HENT:      Head: Normocephalic.      Right Ear: Tympanic membrane, ear canal and external ear normal.      Left Ear: Tympanic membrane, ear canal and external ear normal.      Nose: Nose normal. No congestion.      Mouth/Throat:      Mouth: Mucous membranes are moist.      Pharynx: Oropharynx is clear. No oropharyngeal exudate or posterior oropharyngeal erythema.   Eyes:      Extraocular Movements: Extraocular movements intact.      Conjunctiva/sclera: Conjunctivae normal.   Cardiovascular:      Rate and Rhythm: Normal rate and regular rhythm.      Heart sounds: Normal heart sounds. No murmur heard.  Pulmonary:      Effort: Pulmonary effort is normal.      Breath sounds: Normal breath sounds. No wheezing or rales.   Abdominal:      General: Abdomen is flat. There is no distension.      Palpations: Abdomen is soft.      Tenderness: There is no abdominal tenderness.   Musculoskeletal:      Cervical back: Normal range of motion and neck supple.      Right lower leg: No edema.      Left lower leg: No edema.   Lymphadenopathy:      Cervical: No cervical adenopathy.   Skin:     General: Skin is warm.   Neurological:      General: No focal deficit present.      Mental Status: He is alert and oriented to person, place, and time.

## 2025-04-21 NOTE — PATIENT INSTRUCTIONS
"Patient Education     Routine physical for adults   The Basics   Written by the doctors and editors at [facility]   What is a physical? -- A physical is a routine visit, or \"check-up,\" with your doctor. You might also hear it called a \"wellness visit\" or \"preventive visit.\"  During each visit, the doctor will:   Ask about your physical and mental health   Ask about your habits, behaviors, and lifestyle   Do an exam   Give you vaccines if needed   Talk to you about any medicines you take   Give advice about your health   Answer your questions  Getting regular check-ups is an important part of taking care of your health. It can help your doctor find and treat any problems you have. But it's also important for preventing health problems.  A routine physical is different from a \"sick visit.\" A sick visit is when you see a doctor because of a health concern or problem. Since physicals are scheduled ahead of time, you can think about what you want to ask the doctor.  How often should I get a physical? -- It depends on your age and health. In general, for people age 21 years and older:   If you are younger than 50 years, you might be able to get a physical every 3 years.   If you are 50 years or older, your doctor might recommend a physical every year.  If you have an ongoing health condition, like diabetes or high blood pressure, your doctor will probably want to see you more often.  What happens during a physical? -- In general, each visit will include:   Physical exam - The doctor or nurse will check your height, weight, heart rate, and blood pressure. They will also look at your eyes and ears. They will ask about how you are feeling and whether you have any symptoms that bother you.   Medicines - It's a good idea to bring a list of all the medicines you take to each doctor visit. Your doctor will talk to you about your medicines and answer any questions. Tell them if you are having any side effects that bother you. You " "should also tell them if you are having trouble paying for any of your medicines.   Habits and behaviors - This includes:   Your diet   Your exercise habits   Whether you smoke, drink alcohol, or use drugs   Whether you are sexually active   Whether you feel safe at home  Your doctor will talk to you about things you can do to improve your health and lower your risk of health problems. They will also offer help and support. For example, if you want to quit smoking, they can give you advice and might prescribe medicines. If you want to improve your diet or get more physical activity, they can help you with this, too.   Lab tests, if needed - The tests you get will depend on your age and situation. For example, your doctor might want to check your:   Cholesterol   Blood sugar   Iron level   Vaccines - The recommended vaccines will depend on your age, health, and what vaccines you already had. Vaccines are very important because they can prevent certain serious or deadly infections.   Discussion of screening - \"Screening\" means checking for diseases or other health problems before they cause symptoms. Your doctor can recommend screening based on your age, risk, and preferences. This might include tests to check for:   Cancer, such as breast, prostate, cervical, ovarian, colorectal, prostate, lung, or skin cancer   Sexually transmitted infections, such as chlamydia and gonorrhea   Mental health conditions like depression and anxiety  Your doctor will talk to you about the different types of screening tests. They can help you decide which screenings to have. They can also explain what the results might mean.   Answering questions - The physical is a good time to ask the doctor or nurse questions about your health. If needed, they can refer you to other doctors or specialists, too.  Adults older than 65 years often need other care, too. As you get older, your doctor will talk to you about:   How to prevent falling at " home   Hearing or vision tests   Memory testing   How to take your medicines safely   Making sure that you have the help and support you need at home  All topics are updated as new evidence becomes available and our peer review process is complete.  This topic retrieved from [employer] on: May 02, 2024.  Topic 707894 Version 1.0  Release: 32.4.3 - C32.122  © 2024 UpToDate, Inc. and/or its affiliates. All rights reserved.  Consumer Information Use and Disclaimer   Disclaimer: This generalized information is a limited summary of diagnosis, treatment, and/or medication information. It is not meant to be comprehensive and should be used as a tool to help the user understand and/or assess potential diagnostic and treatment options. It does NOT include all information about conditions, treatments, medications, side effects, or risks that may apply to a specific patient. It is not intended to be medical advice or a substitute for the medical advice, diagnosis, or treatment of a health care provider based on the health care provider's examination and assessment of a patient's specific and unique circumstances. Patients must speak with a health care provider for complete information about their health, medical questions, and treatment options, including any risks or benefits regarding use of medications. This information does not endorse any treatments or medications as safe, effective, or approved for treating a specific patient. UpToDate, Inc. and its affiliates disclaim any warranty or liability relating to this information or the use thereof.The use of this information is governed by the Terms of Use, available at https://www.[employer].com/en/know/clinical-effectiveness-terms. 2024© UpToDate, Inc. and its affiliates and/or licensors. All rights reserved.  Copyright   © 2024 UpToDate, Inc. and/or its affiliates. All rights reserved.

## 2025-04-21 NOTE — ASSESSMENT & PLAN NOTE
Wt Readings from Last 3 Encounters:   04/21/25 103 kg (228 lb)   03/11/25 105 kg (232 lb)   02/19/25 105 kg (232 lb)

## 2025-05-05 DIAGNOSIS — Z87.74 S/P VSD REPAIR: ICD-10-CM

## 2025-05-06 RX ORDER — METOPROLOL SUCCINATE 25 MG/1
25 TABLET, EXTENDED RELEASE ORAL 2 TIMES DAILY
Qty: 180 TABLET | Refills: 1 | Status: SHIPPED | OUTPATIENT
Start: 2025-05-06 | End: 2025-05-14 | Stop reason: SDUPTHER

## 2025-05-14 DIAGNOSIS — E63.9 NUTRITION DISORDER: ICD-10-CM

## 2025-05-14 DIAGNOSIS — F40.01 AGORAPHOBIA WITH PANIC ATTACKS: ICD-10-CM

## 2025-05-14 DIAGNOSIS — E78.2 HYPERLIPIDEMIA, MIXED: ICD-10-CM

## 2025-05-14 DIAGNOSIS — H61.22 CERUMEN DEBRIS ON TYMPANIC MEMBRANE OF LEFT EAR: ICD-10-CM

## 2025-05-14 DIAGNOSIS — Z87.74 S/P VSD REPAIR: ICD-10-CM

## 2025-05-14 DIAGNOSIS — I25.5 ISCHEMIC CARDIOMYOPATHY: ICD-10-CM

## 2025-05-14 DIAGNOSIS — E55.9 VITAMIN D INSUFFICIENCY: ICD-10-CM

## 2025-05-14 DIAGNOSIS — N18.31 STAGE 3A CHRONIC KIDNEY DISEASE (HCC): ICD-10-CM

## 2025-05-14 DIAGNOSIS — N25.81 SECONDARY HYPERPARATHYROIDISM (HCC): ICD-10-CM

## 2025-05-15 ENCOUNTER — TELEPHONE (OUTPATIENT)
Dept: NEPHROLOGY | Facility: CLINIC | Age: 70
End: 2025-05-15

## 2025-05-15 DIAGNOSIS — N17.9 AKI (ACUTE KIDNEY INJURY) (HCC): Primary | ICD-10-CM

## 2025-05-15 RX ORDER — ALPRAZOLAM 0.5 MG
TABLET ORAL
Qty: 30 TABLET | Refills: 0 | Status: SHIPPED | OUTPATIENT
Start: 2025-05-15

## 2025-05-15 RX ORDER — DIPHENOXYLATE HYDROCHLORIDE AND ATROPINE SULFATE 2.5; .025 MG/1; MG/1
1 TABLET ORAL DAILY
Qty: 30 TABLET | Refills: 5 | Status: SHIPPED | OUTPATIENT
Start: 2025-05-15

## 2025-05-15 RX ORDER — LOSARTAN POTASSIUM 25 MG/1
12.5 TABLET ORAL DAILY
Qty: 45 TABLET | Refills: 1 | Status: SHIPPED | OUTPATIENT
Start: 2025-05-15 | End: 2025-11-11

## 2025-05-15 RX ORDER — ATORVASTATIN CALCIUM 40 MG/1
40 TABLET, FILM COATED ORAL
Qty: 90 TABLET | Refills: 1 | Status: SHIPPED | OUTPATIENT
Start: 2025-05-15

## 2025-05-15 RX ORDER — METOPROLOL SUCCINATE 25 MG/1
25 TABLET, EXTENDED RELEASE ORAL 2 TIMES DAILY
Qty: 180 TABLET | Refills: 0 | Status: SHIPPED | OUTPATIENT
Start: 2025-05-15

## 2025-05-15 RX ORDER — BUMETANIDE 1 MG/1
1 TABLET ORAL DAILY
Qty: 90 TABLET | Refills: 1 | Status: SHIPPED | OUTPATIENT
Start: 2025-05-15 | End: 2025-11-11

## 2025-05-15 NOTE — TELEPHONE ENCOUNTER
Spoke with patient's brother-in-law, Nick, reminding them to go for lab work before their appt on 5/21.  They expressed understanding and thanked us for the call.

## 2025-05-16 ENCOUNTER — APPOINTMENT (OUTPATIENT)
Dept: LAB | Facility: CLINIC | Age: 70
End: 2025-05-16
Attending: INTERNAL MEDICINE
Payer: COMMERCIAL

## 2025-05-16 ENCOUNTER — RESULTS FOLLOW-UP (OUTPATIENT)
Dept: NEPHROLOGY | Facility: CLINIC | Age: 70
End: 2025-05-16

## 2025-05-16 DIAGNOSIS — N17.9 AKI (ACUTE KIDNEY INJURY) (HCC): ICD-10-CM

## 2025-05-16 LAB
ALBUMIN SERPL BCG-MCNC: 4.1 G/DL (ref 3.5–5)
ANION GAP SERPL CALCULATED.3IONS-SCNC: 10 MMOL/L (ref 4–13)
BUN SERPL-MCNC: 31 MG/DL (ref 5–25)
CALCIUM SERPL-MCNC: 8.9 MG/DL (ref 8.4–10.2)
CHLORIDE SERPL-SCNC: 106 MMOL/L (ref 96–108)
CO2 SERPL-SCNC: 24 MMOL/L (ref 21–32)
CREAT SERPL-MCNC: 1.35 MG/DL (ref 0.6–1.3)
CREAT UR-MCNC: 47.1 MG/DL
CREAT UR-MCNC: 47.1 MG/DL
ERYTHROCYTE [DISTWIDTH] IN BLOOD BY AUTOMATED COUNT: 13.1 % (ref 11.6–15.1)
GFR SERPL CREATININE-BSD FRML MDRD: 53 ML/MIN/1.73SQ M
GLUCOSE P FAST SERPL-MCNC: 82 MG/DL (ref 65–99)
HCT VFR BLD AUTO: 41.5 % (ref 36.5–49.3)
HGB BLD-MCNC: 12.9 G/DL (ref 12–17)
MCH RBC QN AUTO: 31.2 PG (ref 26.8–34.3)
MCHC RBC AUTO-ENTMCNC: 31.1 G/DL (ref 31.4–37.4)
MCV RBC AUTO: 101 FL (ref 82–98)
MICROALBUMIN UR-MCNC: <7 MG/L
PHOSPHATE SERPL-MCNC: 3.1 MG/DL (ref 2.3–4.1)
PLATELET # BLD AUTO: 172 THOUSANDS/UL (ref 149–390)
PMV BLD AUTO: 11.4 FL (ref 8.9–12.7)
POTASSIUM SERPL-SCNC: 4.2 MMOL/L (ref 3.5–5.3)
PROT UR-MCNC: 4.9 MG/DL
PROT/CREAT UR: 0.1 MG/G{CREAT}
PTH-INTACT SERPL-MCNC: 103.3 PG/ML (ref 12–88)
RBC # BLD AUTO: 4.13 MILLION/UL (ref 3.88–5.62)
SODIUM SERPL-SCNC: 140 MMOL/L (ref 135–147)
WBC # BLD AUTO: 5.43 THOUSAND/UL (ref 4.31–10.16)

## 2025-05-16 PROCEDURE — 80069 RENAL FUNCTION PANEL: CPT

## 2025-05-16 PROCEDURE — 83970 ASSAY OF PARATHORMONE: CPT

## 2025-05-16 PROCEDURE — 84156 ASSAY OF PROTEIN URINE: CPT

## 2025-05-16 PROCEDURE — 82043 UR ALBUMIN QUANTITATIVE: CPT

## 2025-05-16 PROCEDURE — 85027 COMPLETE CBC AUTOMATED: CPT

## 2025-05-16 PROCEDURE — 82570 ASSAY OF URINE CREATININE: CPT

## 2025-05-21 ENCOUNTER — TELEPHONE (OUTPATIENT)
Dept: NEPHROLOGY | Facility: CLINIC | Age: 70
End: 2025-05-21

## 2025-06-23 DIAGNOSIS — H61.22 CERUMEN DEBRIS ON TYMPANIC MEMBRANE OF LEFT EAR: ICD-10-CM

## 2025-06-23 DIAGNOSIS — I25.5 ISCHEMIC CARDIOMYOPATHY: ICD-10-CM

## 2025-06-23 DIAGNOSIS — E55.9 VITAMIN D INSUFFICIENCY: ICD-10-CM

## 2025-06-23 DIAGNOSIS — F40.01 AGORAPHOBIA WITH PANIC ATTACKS: ICD-10-CM

## 2025-06-23 DIAGNOSIS — E78.2 HYPERLIPIDEMIA, MIXED: ICD-10-CM

## 2025-06-23 DIAGNOSIS — Z87.74 S/P VSD REPAIR: ICD-10-CM

## 2025-06-23 DIAGNOSIS — N25.81 SECONDARY HYPERPARATHYROIDISM (HCC): ICD-10-CM

## 2025-06-23 RX ORDER — BUMETANIDE 1 MG/1
1 TABLET ORAL DAILY
Qty: 90 TABLET | Refills: 1 | Status: SHIPPED | OUTPATIENT
Start: 2025-06-23 | End: 2025-12-20

## 2025-06-23 RX ORDER — ALPRAZOLAM 0.5 MG
TABLET ORAL
Qty: 30 TABLET | Refills: 0 | Status: SHIPPED | OUTPATIENT
Start: 2025-06-23

## 2025-06-23 RX ORDER — ATORVASTATIN CALCIUM 40 MG/1
40 TABLET, FILM COATED ORAL
Qty: 90 TABLET | Refills: 1 | Status: SHIPPED | OUTPATIENT
Start: 2025-06-23

## 2025-06-23 NOTE — TELEPHONE ENCOUNTER
Problem: Adult Inpatient Plan of Care  Goal: Plan of Care Review  Outcome: Progressing  Goal: Patient-Specific Goal (Individualized)  Outcome: Progressing  Goal: Absence of Hospital-Acquired Illness or Injury  Outcome: Progressing  Goal: Optimal Comfort and Wellbeing  Outcome: Progressing  Goal: Readiness for Transition of Care  Outcome: Progressing     Problem: Diabetes Comorbidity  Goal: Blood Glucose Level Within Targeted Range  Outcome: Progressing     Problem: Stroke, Intracerebral Hemorrhage  Goal: Optimal Coping  Outcome: Progressing  Goal: Effective Bowel Elimination  Outcome: Progressing  Goal: Optimal Cerebral Tissue Perfusion  Outcome: Progressing  Goal: Optimal Cognitive Function  Outcome: Progressing  Goal: Effective Communication Skills  Outcome: Progressing  Goal: Optimal Functional Ability  Outcome: Progressing  Goal: Optimal Nutrition Intake  Outcome: Progressing  Goal: Optimal Pain Control and Function  Outcome: Progressing  Goal: Effective Oxygenation and Ventilation  Outcome: Progressing  Goal: Improved Sensorimotor Function  Outcome: Progressing  Goal: Safe and Effective Swallow  Outcome: Progressing  Goal: Effective Urinary Elimination  Outcome: Progressing     Problem: Skin Injury Risk Increased  Goal: Skin Health and Integrity  Outcome: Progressing     Problem: Fall Injury Risk  Goal: Absence of Fall and Fall-Related Injury  Outcome: Progressing     Problem: Infection  Goal: Absence of Infection Signs and Symptoms  Outcome: Progressing     Problem: Coping Ineffective  Goal: Effective Coping  Outcome: Progressing      Spoke with Nick perez

## 2025-06-24 ENCOUNTER — TELEPHONE (OUTPATIENT)
Dept: NEPHROLOGY | Facility: CLINIC | Age: 70
End: 2025-06-24

## 2025-07-01 ENCOUNTER — OFFICE VISIT (OUTPATIENT)
Dept: NEPHROLOGY | Facility: CLINIC | Age: 70
End: 2025-07-01
Payer: COMMERCIAL

## 2025-07-01 VITALS
OXYGEN SATURATION: 94 % | DIASTOLIC BLOOD PRESSURE: 60 MMHG | HEART RATE: 81 BPM | SYSTOLIC BLOOD PRESSURE: 100 MMHG | WEIGHT: 235 LBS | BODY MASS INDEX: 37.93 KG/M2

## 2025-07-01 DIAGNOSIS — N18.31 STAGE 3A CHRONIC KIDNEY DISEASE (HCC): Primary | ICD-10-CM

## 2025-07-01 DIAGNOSIS — I50.22 CHRONIC SYSTOLIC CONGESTIVE HEART FAILURE (HCC): ICD-10-CM

## 2025-07-01 DIAGNOSIS — N25.81 SECONDARY HYPERPARATHYROIDISM (HCC): ICD-10-CM

## 2025-07-01 PROCEDURE — 99214 OFFICE O/P EST MOD 30 MIN: CPT | Performed by: INTERNAL MEDICINE

## 2025-07-01 NOTE — PROGRESS NOTES
Name: Robbie Momin      : 1955      MRN: [id number]  Encounter Provider: Alvin Brooke MD  Encounter Date: 2025   Encounter department: [facility]  :  Assessment & Plan  Stage 3a chronic kidney disease (HCC)  - Etiology/risk factors: Episode of severe acute kidney injury in , cardiorenal physiology, age-related nephron loss  - Baseline Cr: 1.0-1.3 before episode of MI in , episode of JUDE in setting of late presentation of MI/cardiogenic shock with peak creatinine of 4.0 and discharge creatinine of 1.09, follow-up creatinine in the range of 1.3-1.5, most recently 1.35 2025  - Urinalysis: 2+ protein, 1-2 RBC, 10-20 WBC 2024  - Proteinuria: UACR no albuminuria 2025 improved from 94 mg/g 2024  - Imaging: Right kidney 9.8 cm, left kidney 10 cm, normal echogenicity and contour, subcentimeter simple anechoic interpolar cyst in right kidney  - KFRE 2-year 0.2%, 5-year 0.6% as of 2025  - Current Rx: RAAS inhibitor (low-dose losartan)  - Changes: No changes  - Repeat RFPl/urine protein assessment in 6 months  - Follow-up in renal office in 6 months  - Discussed risk factor reduction to slow progression of chronic kidney disease  Intensive blood pressure control as below   Glycemic control, most recent HbA1c 6.1 2025  Lipid control, on Lipitor 40 mg daily  Lifestyle modifications (weight loss/exercise)  Avoidance of NSAIDs  Avoidance of PPI       BPVolume   - Goal BP <120/80 per KDIGO guidelines   - Volume status: Euvolemic  - Status: Blood pressure well-controlled  - Current antihypertensive regimen: Bumex 1 mg daily, Toprol-XL 25 mg twice daily both for history of CHF and not for hypertension, losartan 12.5 mg daily primarily to decrease intraglomerular pressure  - Changes: No changes  - Patient was advised to notify for lightheadedness in which case we may have to discontinue losartan     Screening for Anemia   - Target Hb: More than 10 g/dL  - Most  recent hemoglobin: 12.9 g/dL 05/2025     Electrolytes/Acid Base status   - Electrolytes and acid base status within normal limits  Chronic systolic congestive heart failure (HCC)  - Most recent echocardiogram with LVEF 50%, low normal systolic function, G1 DD 04/2022  - Notes reviewed from heart failure service and plans noted for continuation of beta-blockers and loop diuretics  - Volume status: Euvolemic  - Diuretics: Bumex 1 mg daily  - Changes: Continue Bumex as such   Secondary hyperparathyroidism (HCC)  - Goal Ca 8.5-10 mg/dL, goal Phos 2.7-4.6 mg/dL, goal iPTH 30-70 pg/mL  -  05/2025 improved from 169 08/2024 improved from 212.8 03/2024  - Calcium 8.9/phosphorus 3.1 as of 05/2025 at goal  - 25-hydroxy vitamin D level 29.3 03/2024  - Current Rx: D3 1000 needs daily  - Changes: No need for activated vitamin D, reserve for PTH level more than 200  - Repeat PTH and 25 vitamin D level in 6 months      History of Present Illness   HPI  Robbie Momin is a 69 y.o. male     Since last visit: He has been doing well.  He denies dyspnea.  He denies leg swelling.  He denies any trouble with urination.     From initial consultation: Robbie Momin has history of prolonged hospitalization in 2021 with late presentation of anteroseptal MI presenting in cardiogenic shock requiring VSD repair with large bovine patch and hemodynamic support with [facility] ECMO.  He remained on inotropic support for a while postoperatively.  During his admission he was also noted to have acetaminophen toxicity that was treated with NAC and fomepizole.  He follows with heart failure service and is currently receiving beta-blockers and diuretics.  He provides a history of prediabetes.  He does not have history of hypertension.       >> Major risk factors for CKD  - Diabetes: Pre-diabetes   - Hypertension: No  - Age >= 55 years: Yes  - Family history of kidney disease: No    - Obesity or metabolic syndrome: Yes     >> Medical history evaluation   -  Prior kidney disease or dialysis: No   - Incidental hematuria in the past: No    - Urinary symptoms: No   - History of foamy or frothy urine: No   - History of nephrolithiasis: No   - Diseases that share risk factors with CKD: CAD  - Systemic diseases that might affect kidney: No   - History of use of medications that might affect renal function: No    History obtained from: patient    Review of Systems   Constitutional:  Negative for chills and fever.   HENT:  Negative for ear pain and sore throat.    Eyes:  Negative for pain and visual disturbance.   Respiratory:  Negative for cough and shortness of breath.    Cardiovascular:  Negative for chest pain and palpitations.   Gastrointestinal:  Negative for abdominal pain and vomiting.   Genitourinary:  Negative for dysuria and hematuria.   Musculoskeletal:  Negative for arthralgias and back pain.   Skin:  Negative for color change and rash.   Neurological:  Negative for seizures and syncope.   All other systems reviewed and are negative.    Past Medical History   Past Medical History[1]  Past Surgical History[2]  Family History[3]   reports that he has quit smoking. His smoking use included pipe and cigarettes. He has a 45 pack-year smoking history. He has never used smokeless tobacco. He reports that he does not drink alcohol and does not use drugs.  Current Outpatient Medications   Medication Instructions    ALPRAZolam (XANAX) 0.5 mg tablet TAKE 1 TO 2 TABLETS 30-60 MINUTES PRIOR TO LEAVING HOME AS NEEDED    ALPRAZolam (XANAX) 0.5 mg tablet As needed    aspirin 325 mg, Oral, Daily    atorvastatin (LIPITOR) 40 mg, Oral, Daily after dinner    bumetanide (BUMEX) 1 mg, Oral, Daily    carbamide peroxide (DEBROX) 6.5 % otic solution 5 drops, Left Ear, 2 times daily    Cholecalciferol (VITAMIN D3) 1,000 Units, Oral, Daily    clindamycin (CLEOCIN T) 1 % lotion Topical, 2 times daily    losartan (COZAAR) 12.5 mg, Oral, Daily    metoprolol succinate (TOPROL-XL) 25 mg, Oral, 2  times daily    multivitamin (THERAGRAN) TABS 1 tablet, Oral, Daily    omeprazole (PRILOSEC) 20 mg, Oral, Daily    tamsulosin (FLOMAX) 0.4 mg, Oral, Daily with dinner   Allergies[4]      Objective   /60 (BP Location: Left arm, Patient Position: Sitting, Cuff Size: Adult)   Pulse 81   Wt 107 kg (235 lb)   SpO2 94%   BMI 37.93 kg/m²      Physical Exam  Vitals and nursing note reviewed.   Constitutional:       General: He is not in acute distress.     Appearance: He is well-developed.   HENT:      Head: Normocephalic and atraumatic.     Eyes:      Conjunctiva/sclera: Conjunctivae normal.       Cardiovascular:      Rate and Rhythm: Normal rate and regular rhythm.      Pulses: Normal pulses.      Heart sounds: Normal heart sounds. No murmur heard.  Pulmonary:      Effort: Pulmonary effort is normal. No respiratory distress.      Breath sounds: Normal breath sounds.   Abdominal:      Tenderness: There is no right CVA tenderness or left CVA tenderness.     Musculoskeletal:         General: No swelling.      Cervical back: Neck supple.      Right lower leg: No edema.      Left lower leg: No edema.     Skin:     General: Skin is warm and dry.      Capillary Refill: Capillary refill takes less than 2 seconds.     Neurological:      Mental Status: He is alert.     Psychiatric:         Mood and Affect: Mood normal.                [1]   Past Medical History:  Diagnosis Date    Allergic     Anemia     Anxiety     Central line complication 01/07/2022    CHF (congestive heart failure) ([facility]) 12/30/21    Chronic kidney disease 12/30/21    Kidney Failure upon hospital admission, Tylenol overdose    Coronary artery disease 12/30/21    Hyperlipidemia     Myocardial infarction (HCC) 12/30/21    VSD Repair,Ecmo, Heart Attack,Bovine Repair    Obesity     Tylenol ingestion 12/31/2021    Urinary tract infection 1/23/22   [2]   Past Surgical History:  Procedure Laterality Date    CARDIAC SURGERY N/A     CORONARY ARTERY BYPASS GRAFT       VSD    EXTRACORPOREAL CIRCULATION N/A 2021    Procedure: SUPPORT EXTRACORPOREAL MEMBRANE OXYGENATION (ECMO);  Surgeon: HILDA Ochoa MD;  Location: [facility];  Service: Cardiac Surgery    IR OTHER  2021    IR OTHER  2022    HI CLSR 1 VENTRICULAR SEPTAL DEFECT W/WO PATCH N/A 2022    Procedure: REPAIR VENTRICULAR SEPTAL DEFECT (VSD) OPEN WITH BOVINE PERICARDIAL PATCH.;  Surgeon: HILDA Ochoa MD;  Location: [facility];  Service: Cardiac Surgery    HI ECMO/ECLS INITIATION VENO-VENOUS N/A 2022    Procedure: VA ECMO DECANNULATION;  Surgeon: HILDA Ochoa MD;  Location: [facility];  Service: Cardiac Surgery    HI EXPL PO HEMRRG THROMBOSIS/INFCTJ CH N/A 2022    Procedure: RE-EXPLORATION MEDIASTERNAL CAVITY FOR POST-OP BLEED (BRING BACK) Removal of PA catheter;  Surgeon: HILDA Ochoa MD;  Location: [facility];  Service: Cardiac Surgery   [3]   Family History  Problem Relation Name Age of Onset    Other Mother Inocencia Momin         Claustrophobia    Mental illness Mother Inocencia Momin         Agoraphobia    Heart disease Mother Inocencia Momin         Enlarged heart... of heart disease    Anxiety disorder Mother Inocencia Momin         Agorpahobia    Heart attack Mother Inocencia Momin     Heart failure Mother Inocencia Momin     Drug abuse Family      Alcohol abuse Family      Alcohol abuse Father Robbie Momin     Heart attack Father Robbie Momin     Heart failure Father Robbie Momin     Breast cancer Sister Franny Doran    [4]   Allergies  Allergen Reactions    Mushroom Extract Complex - Food Allergy Facial Swelling    Peanut Oil - Food Allergy     Shellfish-Derived Products - Food Allergy Other (See Comments)     Runs in the family    Strawberry C [Ascorbate - Food Allergy]

## 2025-07-01 NOTE — ASSESSMENT & PLAN NOTE
- Most recent echocardiogram with LVEF 50%, low normal systolic function, G1 DD 04/2022  - Notes reviewed from heart failure service and plans noted for continuation of beta-blockers and loop diuretics  - Volume status: Euvolemic  - Diuretics: Bumex 1 mg daily  - Changes: Continue Bumex as such

## 2025-07-01 NOTE — PATIENT INSTRUCTIONS
Your kidney function is stable at stage IIIa kidney disease and amount of protein in the urine has improved significantly.    Continue half tablet of losartan.    Notify for lightheadedness or dizziness.    Repeat lab work including blood tests and urine test in 6 months.    Follow-up in kidney office in 6 months.

## 2025-07-25 DIAGNOSIS — E55.9 VITAMIN D INSUFFICIENCY: ICD-10-CM

## 2025-07-25 DIAGNOSIS — F40.01 AGORAPHOBIA WITH PANIC ATTACKS: ICD-10-CM

## 2025-07-25 DIAGNOSIS — N25.81 SECONDARY HYPERPARATHYROIDISM (HCC): ICD-10-CM

## 2025-07-25 DIAGNOSIS — E63.9 NUTRITION DISORDER: ICD-10-CM

## 2025-07-29 RX ORDER — DIPHENOXYLATE HYDROCHLORIDE AND ATROPINE SULFATE 2.5; .025 MG/1; MG/1
1 TABLET ORAL DAILY
Qty: 30 TABLET | Refills: 0 | OUTPATIENT
Start: 2025-07-29

## 2025-07-29 RX ORDER — ALPRAZOLAM 0.5 MG
TABLET ORAL
Qty: 30 TABLET | Refills: 0 | Status: SHIPPED | OUTPATIENT
Start: 2025-07-29

## 2025-08-18 ENCOUNTER — OFFICE VISIT (OUTPATIENT)
Dept: INTERNAL MEDICINE CLINIC | Facility: CLINIC | Age: 70
End: 2025-08-18
Payer: COMMERCIAL

## 2025-08-18 VITALS
TEMPERATURE: 97.9 F | HEART RATE: 58 BPM | OXYGEN SATURATION: 98 % | HEIGHT: 66 IN | SYSTOLIC BLOOD PRESSURE: 116 MMHG | BODY MASS INDEX: 37.32 KG/M2 | WEIGHT: 232.2 LBS | DIASTOLIC BLOOD PRESSURE: 70 MMHG

## 2025-08-18 DIAGNOSIS — Z00.00 ROUTINE GENERAL MEDICAL EXAMINATION AT A HEALTH CARE FACILITY: ICD-10-CM

## 2025-08-18 DIAGNOSIS — R73.01 IMPAIRED FASTING GLUCOSE: ICD-10-CM

## 2025-08-18 DIAGNOSIS — I50.22 CHRONIC SYSTOLIC CONGESTIVE HEART FAILURE (HCC): Primary | ICD-10-CM

## 2025-08-18 DIAGNOSIS — Z87.74 S/P VSD REPAIR: ICD-10-CM

## 2025-08-18 DIAGNOSIS — K21.9 GASTROESOPHAGEAL REFLUX DISEASE WITHOUT ESOPHAGITIS: ICD-10-CM

## 2025-08-18 DIAGNOSIS — E78.2 HYPERLIPIDEMIA, MIXED: ICD-10-CM

## 2025-08-18 DIAGNOSIS — I25.10 CORONARY ARTERY DISEASE INVOLVING NATIVE CORONARY ARTERY OF NATIVE HEART WITHOUT ANGINA PECTORIS: ICD-10-CM

## 2025-08-18 PROCEDURE — 99397 PER PM REEVAL EST PAT 65+ YR: CPT | Performed by: INTERNAL MEDICINE

## 2025-08-18 PROCEDURE — 99214 OFFICE O/P EST MOD 30 MIN: CPT | Performed by: INTERNAL MEDICINE

## 2025-08-20 RX ORDER — TAMSULOSIN HYDROCHLORIDE 0.4 MG/1
0.4 CAPSULE ORAL
Qty: 30 CAPSULE | Refills: 5 | Status: SHIPPED | OUTPATIENT
Start: 2025-08-20

## 2025-08-21 ENCOUNTER — OFFICE VISIT (OUTPATIENT)
Dept: CARDIOLOGY CLINIC | Facility: CLINIC | Age: 70
End: 2025-08-21
Payer: COMMERCIAL

## 2025-08-21 VITALS
HEIGHT: 66 IN | SYSTOLIC BLOOD PRESSURE: 92 MMHG | DIASTOLIC BLOOD PRESSURE: 60 MMHG | BODY MASS INDEX: 37.93 KG/M2 | HEART RATE: 82 BPM | WEIGHT: 236 LBS | OXYGEN SATURATION: 95 %

## 2025-08-21 DIAGNOSIS — Q21.0 VSD (VENTRICULAR SEPTAL DEFECT): ICD-10-CM

## 2025-08-21 DIAGNOSIS — I25.5 ISCHEMIC CARDIOMYOPATHY: Primary | ICD-10-CM

## 2025-08-21 DIAGNOSIS — I25.10 CORONARY ARTERY DISEASE INVOLVING NATIVE CORONARY ARTERY OF NATIVE HEART WITHOUT ANGINA PECTORIS: ICD-10-CM

## 2025-08-21 DIAGNOSIS — I36.1 NONRHEUMATIC TRICUSPID VALVE REGURGITATION: ICD-10-CM

## 2025-08-21 PROCEDURE — 99214 OFFICE O/P EST MOD 30 MIN: CPT | Performed by: INTERNAL MEDICINE

## (undated) DEVICE — BULB SYRINGE,IRRIGATION WITH PROTECTIVE CAP: Brand: DOVER

## (undated) DEVICE — SUT ETHIBOND 2-0 SH-1/SH-1 30 IN X763H

## (undated) DEVICE — GUIDEWIRE AMPLATZ .035 260CM 6CM ST SS

## (undated) DEVICE — DRAPE TOWEL: Brand: CONVERTORS

## (undated) DEVICE — SUT SILK 2-0 18 IN A185H

## (undated) DEVICE — CATH STRAIGHT RED RUBBER 20 FR

## (undated) DEVICE — FILTER SMOKE EVAC VIROSAFE

## (undated) DEVICE — INTENDED FOR TISSUE SEPARATION, AND OTHER PROCEDURES THAT REQUIRE A SHARP SURGICAL BLADE TO PUNCTURE OR CUT.: Brand: BARD-PARKER ® CARBON RIB-BACK BLADES

## (undated) DEVICE — BAG DECANTER

## (undated) DEVICE — SUTURE BOOTS YELLOW

## (undated) DEVICE — PACK VALVE PBDS

## (undated) DEVICE — THERMOFLECT BLANKET, L, 25EA                               TS THERMOFLECT BLANKET, 48" X 84", SILVER, 5/BG, 5 BG/CS NW: Brand: THERMOFLECT

## (undated) DEVICE — Device

## (undated) DEVICE — SUT SILK 2 60 IN SA8H

## (undated) DEVICE — OASIS DRAIN, SINGLE, INLINE & ATS COMPATIBLE: Brand: OASIS

## (undated) DEVICE — PINNACLE R/O II INTRODUCER SHEATH WITH RADIOPAQUE MARKER: Brand: PINNACLE

## (undated) DEVICE — EVERGRIP INSERT SET 86MM: Brand: FOGARTY EVERGRIP

## (undated) DEVICE — SUT PDS PLUS 1 CTB 36 IN PDPB359T

## (undated) DEVICE — SUT MONOCRYL 3-0 PS-2 18 IN Y497G

## (undated) DEVICE — 2000CC GUARDIAN II: Brand: GUARDIAN

## (undated) DEVICE — GLOVE INDICATOR PI UNDERGLOVE SZ 8 BLUE

## (undated) DEVICE — SUT ETHIBOND 2-0 SH/SH 36 IN X523H

## (undated) DEVICE — ANTIBACTERIAL UNDYED BRAIDED (POLYGLACTIN 910), SYNTHETIC ABSORBABLE SUTURE: Brand: COATED VICRYL

## (undated) DEVICE — DRESSING ALLEVYN LIFE SACRAL 6.75 X 6.5 IN

## (undated) DEVICE — IMMOBILIZER KNEE UNIVERSAL 19 IN

## (undated) DEVICE — MICROPUNCTURE 401

## (undated) DEVICE — SUT ETHIBOND 2-0 V-5/V-5 30 IN PXX52

## (undated) DEVICE — SUT SILK 2-0 SH CR/8 18 IN C012D

## (undated) DEVICE — OASIS DRAIN, BRU, IN-LINE, ATS COMPATIBLE: Brand: OASIS

## (undated) DEVICE — CARDIO PERI-GROIN: Brand: CONVERTORS

## (undated) DEVICE — DRAPE SHEET THREE QUARTER

## (undated) DEVICE — 3M™ TEGADERM™ TRANSPARENT FILM DRESSING FRAME STYLE, 1624W, 2-3/8 IN X 2-3/4 IN (6 CM X 7 CM), 100/CT 4CT/CASE: Brand: 3M™ TEGADERM™

## (undated) DEVICE — MICROPUNCTURE INTRODUCER SET SILHOUETTE TRANSITIONLESS PUSH-PLUS DESIGN - STIFFENED CANNULA WITH NITINOL WIRE GUIDE: Brand: MICROPUNCTURE

## (undated) DEVICE — 4 F TEMPO AQUA 0.038  65CM VER: Brand: TEMPO AQUA

## (undated) DEVICE — 3M™ TEGADERM™ CHG DRESSING 25/CARTON 4 CARTONS/CASE 1659: Brand: TEGADERM™

## (undated) DEVICE — MEDI-VAC YANK SUCT HNDL W/TPRD BULBOUS TIP: Brand: CARDINAL HEALTH

## (undated) DEVICE — RECIP.STERNUM SAW BLADE 34/7.5/0.7MM: Brand: AESCULAP

## (undated) DEVICE — 3000CC GUARDIAN II: Brand: GUARDIAN

## (undated) DEVICE — ELECTRODE BLADE E-Z CLEAN 4IN -0014A

## (undated) DEVICE — BONE WAX WHITE: Brand: BONE WAX WHITE

## (undated) DEVICE — PERCLOSE™ PROSTYLE™ SUTURE-MEDIATED CLOSURE AND REPAIR SYSTEM
Type: IMPLANTABLE DEVICE | Site: HEART | Status: NON-FUNCTIONAL
Brand: PERCLOSE™ PROSTYLE™

## (undated) DEVICE — SUT VICRYL PLUS 0 CTB-1 27 IN VCPB260H

## (undated) DEVICE — 40601 PROLONGED POSITIONING SYSTEM: Brand: 40601 PROLONGED POSITIONING SYSTEM

## (undated) DEVICE — STERNAL WIRE

## (undated) DEVICE — CATH TEMPO AQUA 4FVER135Â°100CM: Brand: TEMPO AQUA

## (undated) DEVICE — SUT SILK 0 CT-1 30 IN 424H

## (undated) DEVICE — LIGACLIP MCA MULTIPLE CLIP APPLIERS, 20 MEDIUM CLIPS: Brand: LIGACLIP

## (undated) DEVICE — SILVER-COATED ANTIBACTERIAL BARRIER DRESSING: Brand: ACTICOAT SURGIC 10X12CM 5PK US

## (undated) DEVICE — CHLORAPREP HI-LITE 10.5ML ORANGE

## (undated) DEVICE — BETHLEHEM MAJOR GENERAL PACK: Brand: CARDINAL HEALTH

## (undated) DEVICE — PAD GROUNDING ADULT

## (undated) DEVICE — PLUMEPEN PRO 10FT

## (undated) DEVICE — DRESSING ALLEVYN LIFE HEEL 25 X 25.2CM

## (undated) DEVICE — RADIFOCUS GLIDEWIRE: Brand: GLIDEWIRE

## (undated) DEVICE — GAUZE SPONGES,16 PLY: Brand: CURITY

## (undated) DEVICE — SUT MONOCRYL PLUS 3-0 PS-2 27 IN MCP427H

## (undated) DEVICE — 32 FR STRAIGHT – SOFT PVC CATHETER: Brand: PVC THORACIC CATHETERS

## (undated) DEVICE — STOCKINETTE REGULAR

## (undated) DEVICE — SHEATH INTRO DRY SEAL 16FR 33CM

## (undated) DEVICE — CARDIOVASCULAR SPLIT DRAPE: Brand: CONVERTORS

## (undated) DEVICE — GLOVE SRG BIOGEL ECLIPSE 8

## (undated) DEVICE — PLEDGET CARDIO PTFE 9.5 X 4.8 SOFT LF (6EA/PK)

## (undated) DEVICE — SUCTION CATH 18 FR

## (undated) DEVICE — LIGHT HANDLE COVER SLEEVE DISP BLUE STELLAR

## (undated) DEVICE — 32 FR RIGHT ANGLE – SOFT PVC CATHETER: Brand: PVC THORACIC CATHETERS

## (undated) DEVICE — SUT PROLENE 4-0 BB 36 IN 8581H

## (undated) DEVICE — BLANKET HYPOTHERMIA ADULT GAYMAR

## (undated) DEVICE — SILVER-COATED ANTIBACTERIAL BARRIER DRESSING: Brand: ACTICOAT SURGIC 10X25CM 5PK US

## (undated) DEVICE — TRAY FOLEY 16FR SURESTEP TEMP SENS URIMETER STAT LOK

## (undated) DEVICE — CHLORAPREP HI-LITE 26ML ORANGE

## (undated) DEVICE — SUT VICRYL PLUS 1 CTB-1 36 IN VCPB947H